# Patient Record
Sex: MALE | Race: WHITE | Employment: OTHER | URBAN - METROPOLITAN AREA
[De-identification: names, ages, dates, MRNs, and addresses within clinical notes are randomized per-mention and may not be internally consistent; named-entity substitution may affect disease eponyms.]

---

## 2018-09-07 ENCOUNTER — HOSPITAL ENCOUNTER (OUTPATIENT)
Dept: MRI IMAGING | Age: 73
Discharge: HOME | End: 2018-09-07
Attending: FAMILY MEDICINE
Payer: MEDICARE

## 2018-09-07 VITALS — WEIGHT: 250 LBS | BODY MASS INDEX: 33.91 KG/M2

## 2018-09-07 DIAGNOSIS — R89.1 ABNORMAL LEVEL OF HORMONES IN SPECIMENS FROM OTH ORG/TISS: ICD-10-CM

## 2018-09-07 PROCEDURE — 74011250636 HC RX REV CODE- 250/636: Performed by: RADIOLOGY

## 2018-09-07 PROCEDURE — 70553 MRI BRAIN STEM W/O & W/DYE: CPT

## 2018-09-07 PROCEDURE — A9585 GADOBUTROL INJECTION: HCPCS | Performed by: RADIOLOGY

## 2018-09-07 RX ADMIN — GADOBUTROL 11.5 ML: 604.72 INJECTION INTRAVENOUS at 10:06

## 2018-11-19 ENCOUNTER — OFFICE VISIT (OUTPATIENT)
Dept: SURGERY | Age: 73
End: 2018-11-19

## 2018-11-19 VITALS
SYSTOLIC BLOOD PRESSURE: 120 MMHG | HEIGHT: 72 IN | HEART RATE: 89 BPM | TEMPERATURE: 97.3 F | DIASTOLIC BLOOD PRESSURE: 80 MMHG | OXYGEN SATURATION: 94 % | BODY MASS INDEX: 34.4 KG/M2 | WEIGHT: 254 LBS | RESPIRATION RATE: 16 BRPM

## 2018-11-19 DIAGNOSIS — R59.0 POSTERIOR CERVICAL ADENOPATHY: Primary | ICD-10-CM

## 2018-11-19 RX ORDER — BUSPIRONE HYDROCHLORIDE 5 MG/1
5 TABLET ORAL DAILY
COMMUNITY
End: 2022-04-29

## 2018-11-19 RX ORDER — TAMSULOSIN HYDROCHLORIDE 0.4 MG/1
0.4 CAPSULE ORAL
Status: ON HOLD | COMMUNITY
End: 2022-02-06

## 2018-11-19 NOTE — PROGRESS NOTES
Surgery History and Physical    Subjective:      Jose Gottlieb  is a 68 y.o.  male who presents for evaluation of enlarged lymph nodes in right posterior cervical region. Pt denies cold symptoms. He also denies unusual recent allergies. Past Medical History:   Diagnosis Date    Arthritis     Cancer ([facility] [address]     scc top of head and nose    Hypertension     Other unknown and unspecified cause of morbidity or mortality     history of pulmonary sarcoid     Other unknown and unspecified cause of morbidity or mortality     depression, anxiety    Pulmonary sarcoidosis ([facility] [address]     Unspecified sleep apnea     cpap       Past Surgical History:   Procedure Laterality Date    ABDOMEN SURGERY PROC UNLISTED Bilateral     inguinal hernia repair    HX CATARACT REMOVAL      HX ORTHOPAEDIC Left     TKR    HX OTHER SURGICAL      scc removed top of head and nose    HX OTHER SURGICAL      benign lump removed from thyroid       Social History     Tobacco Use    Smoking status: Former Smoker     Years: 2.00     Last attempt to quit: 1965     Years since quittin.2    Smokeless tobacco: Never Used   Substance Use Topics    Alcohol use: Yes     Alcohol/week: 6.0 oz     Types: 10 Glasses of wine per week     Comment: 2 glassses of wine per day       Family History   Problem Relation Age of Onset    Cancer Mother         breast with mets    Heart Disease Father     Cancer Sister         breast    Anesth Problems Neg Hx        Current Outpatient Medications on File Prior to Visit   Medication Sig Dispense Refill    tamsulosin (FLOMAX) 0.4 mg capsule tamsulosin 0.4 mg capsule      busPIRone (BUSPAR) 5 mg tablet buspirone 5 mg tablet      lisinopril (PRINIVIL, ZESTRIL) 10 mg tablet Take 10 mg by mouth two (2) times a day.  simvastatin (ZOCOR) 20 mg tablet Take 20 mg by mouth daily.  escitalopram oxalate (LEXAPRO) 10 mg tablet Take 10 mg by mouth daily.       zolpidem (AMBIEN) 5 mg tablet Take 2.5 mg by mouth nightly as needed for Sleep.  naproxen sodium (ALEVE) 220 mg cap Take 2 Tabs by mouth daily.  multivitamin (ONE A DAY) tablet Take 1 Tab by mouth daily. No current facility-administered medications on file prior to visit. Allergies   Allergen Reactions    Pcn [Penicillins] Hives         Review of Systems:    Pertinent items are noted in the History of Present Illness. Objective:     Visit Vitals  /80 (BP 1 Location: Left arm, BP Patient Position: Sitting)   Pulse 89   Temp 97.3 °F (36.3 °C) (Oral)   Resp 16   Ht 6' (1.829 m)   Wt 254 lb (115.2 kg)   SpO2 94%   BMI 34.45 kg/m²        Physical Exam:  LYMPHATIC: 1cm right upper posterior cervical lymph node that was soft and nontender. Labs: No results found for this or any previous visit (from the past 24 hour(s)). Assessment and Plan: This is clearly a benign lymph node which might possibly be related to his sarcoidosis. In any event, it can be ignored. This document was scribed by Rodrigo Brochure as dictated by Dr. Vargas Juares.        Signed By: Bryan Soliman MD     November 19, 2018

## 2018-11-19 NOTE — PROGRESS NOTES
1. Have you been to the ER, urgent care clinic since your last visit? Hospitalized since your last visit? PCP-Dr. Angela Cardoza    2. Have you seen or consulted any other health care providers outside of the [address] [place] [address] since your last visit? Include any pap smears or colon screening.  No

## 2021-01-28 ENCOUNTER — HOSPITAL ENCOUNTER (OUTPATIENT)
Dept: PREADMISSION TESTING | Age: 76
Discharge: HOME | End: 2021-01-28
Payer: MEDICARE

## 2021-01-28 ENCOUNTER — TRANSCRIBE ORDER (OUTPATIENT)
Dept: REGISTRATION | Age: 76
End: 2021-01-28

## 2021-01-28 VITALS
HEART RATE: 95 BPM | WEIGHT: 248.46 LBS | SYSTOLIC BLOOD PRESSURE: 126 MMHG | BODY MASS INDEX: 34.78 KG/M2 | TEMPERATURE: 97.8 F | HEIGHT: 71 IN | OXYGEN SATURATION: 96 % | DIASTOLIC BLOOD PRESSURE: 69 MMHG

## 2021-01-28 DIAGNOSIS — Z01.812 PRE-PROCEDURE LAB EXAM: Primary | ICD-10-CM

## 2021-01-28 LAB
ALBUMIN SERPL-MCNC: 3.7 G/DL (ref 3.5–5)
ALBUMIN/GLOB SERPL: 1.1 {RATIO} (ref 1.1–2.2)
ALP SERPL-CCNC: 47 U/L (ref 45–117)
ALT SERPL-CCNC: 48 U/L (ref 12–78)
ANION GAP SERPL CALC-SCNC: 7 MMOL/L (ref 5–15)
APPEARANCE UR: ABNORMAL
AST SERPL-CCNC: 27 U/L (ref 15–37)
BACTERIA URNS QL MICRO: NEGATIVE /HPF
BASOPHILS # BLD: 0 K/UL (ref 0–0.1)
BASOPHILS NFR BLD: 1 % (ref 0–1)
BILIRUB SERPL-MCNC: 0.5 MG/DL (ref 0.2–1)
BILIRUB UR QL: NEGATIVE
BUN SERPL-MCNC: 24 MG/DL (ref 6–20)
BUN/CREAT SERPL: 21 (ref 12–20)
CALCIUM SERPL-MCNC: 9.2 MG/DL (ref 8.5–10.1)
CAOX CRY URNS QL MICRO: ABNORMAL
CHLORIDE SERPL-SCNC: 110 MMOL/L (ref 97–108)
CO2 SERPL-SCNC: 25 MMOL/L (ref 21–32)
COLOR UR: ABNORMAL
CREAT SERPL-MCNC: 1.16 MG/DL (ref 0.7–1.3)
DIFFERENTIAL METHOD BLD: ABNORMAL
EOSINOPHIL # BLD: 0.2 K/UL (ref 0–0.4)
EOSINOPHIL NFR BLD: 4 % (ref 0–7)
EPITH CASTS URNS QL MICRO: ABNORMAL /LPF
ERYTHROCYTE [DISTWIDTH] IN BLOOD BY AUTOMATED COUNT: 13.2 % (ref 11.5–14.5)
GLOBULIN SER CALC-MCNC: 3.3 G/DL (ref 2–4)
GLUCOSE SERPL-MCNC: 104 MG/DL (ref 65–100)
GLUCOSE UR STRIP.AUTO-MCNC: NEGATIVE MG/DL
HCT VFR BLD AUTO: 41.8 % (ref 36.6–50.3)
HGB BLD-MCNC: 13.9 G/DL (ref 12.1–17)
HGB UR QL STRIP: ABNORMAL
IMM GRANULOCYTES # BLD AUTO: 0 K/UL (ref 0–0.04)
IMM GRANULOCYTES NFR BLD AUTO: 1 % (ref 0–0.5)
KETONES UR QL STRIP.AUTO: NEGATIVE MG/DL
LEUKOCYTE ESTERASE UR QL STRIP.AUTO: ABNORMAL
LYMPHOCYTES # BLD: 0.5 K/UL (ref 0.8–3.5)
LYMPHOCYTES NFR BLD: 11 % (ref 12–49)
MCH RBC QN AUTO: 33.4 PG (ref 26–34)
MCHC RBC AUTO-ENTMCNC: 33.3 G/DL (ref 30–36.5)
MCV RBC AUTO: 100.5 FL (ref 80–99)
MONOCYTES # BLD: 0.6 K/UL (ref 0–1)
MONOCYTES NFR BLD: 15 % (ref 5–13)
NEUTS SEG # BLD: 2.8 K/UL (ref 1.8–8)
NEUTS SEG NFR BLD: 68 % (ref 32–75)
NITRITE UR QL STRIP.AUTO: NEGATIVE
NRBC # BLD: 0 K/UL (ref 0–0.01)
NRBC BLD-RTO: 0 PER 100 WBC
PH UR STRIP: 5 [PH] (ref 5–8)
PLATELET # BLD AUTO: 170 K/UL (ref 150–400)
PMV BLD AUTO: 9.7 FL (ref 8.9–12.9)
POTASSIUM SERPL-SCNC: 4.1 MMOL/L (ref 3.5–5.1)
PROT SERPL-MCNC: 7 G/DL (ref 6.4–8.2)
PROT UR STRIP-MCNC: ABNORMAL MG/DL
RBC # BLD AUTO: 4.16 M/UL (ref 4.1–5.7)
RBC #/AREA URNS HPF: ABNORMAL /HPF (ref 0–5)
RBC MORPH BLD: ABNORMAL
SODIUM SERPL-SCNC: 142 MMOL/L (ref 136–145)
SP GR UR REFRACTOMETRY: 1.02 (ref 1–1.03)
UA: UC IF INDICATED,UAUC: ABNORMAL
UROBILINOGEN UR QL STRIP.AUTO: 0.2 EU/DL (ref 0.2–1)
WBC # BLD AUTO: 4.1 K/UL (ref 4.1–11.1)
WBC URNS QL MICRO: ABNORMAL /HPF (ref 0–4)

## 2021-01-28 PROCEDURE — 87086 URINE CULTURE/COLONY COUNT: CPT

## 2021-01-28 PROCEDURE — 93005 ELECTROCARDIOGRAM TRACING: CPT

## 2021-01-28 PROCEDURE — 36415 COLL VENOUS BLD VENIPUNCTURE: CPT

## 2021-01-28 PROCEDURE — 85025 COMPLETE CBC W/AUTO DIFF WBC: CPT

## 2021-01-28 PROCEDURE — 80053 COMPREHEN METABOLIC PANEL: CPT

## 2021-01-28 PROCEDURE — 81001 URINALYSIS AUTO W/SCOPE: CPT

## 2021-01-28 RX ORDER — ACETAMINOPHEN 500 MG
1000 TABLET ORAL
COMMUNITY

## 2021-01-28 NOTE — PERIOP NOTES
PAT instructions reviewed with patient and given the opportunity to ask questions. Patient given surgical site information FAQs handout and reviewed. Patient given two bottles CHG soap and instruction sheet, instructions for use reviewed with patient. Patient made aware of COVID 19 testing to be done 96  hours prior to surgery. Patient instructed to self quarantine between testing and arrival time day of surgery. Patient instructed re: check-in procedure for day of surgery. Pulmonary notes CXR 1/11/21 received - Dr. Kyra Montano. Cardiology notes received - Dr. Christopher Salas. Left message for nurse [facility] re: patient states CXR done 1/11/21 does not want to repeat today, is this acceptable for surgery 2/10/21.

## 2021-01-29 LAB
ATRIAL RATE: 79 BPM
BACTERIA SPEC CULT: NORMAL
CALCULATED P AXIS, ECG09: 29 DEGREES
CALCULATED R AXIS, ECG10: -31 DEGREES
CALCULATED T AXIS, ECG11: 102 DEGREES
DIAGNOSIS, 93000: NORMAL
P-R INTERVAL, ECG05: 206 MS
Q-T INTERVAL, ECG07: 428 MS
QRS DURATION, ECG06: 140 MS
QTC CALCULATION (BEZET), ECG08: 490 MS
SERVICE CMNT-IMP: NORMAL
VENTRICULAR RATE, ECG03: 79 BPM

## 2021-02-06 ENCOUNTER — HOSPITAL ENCOUNTER (OUTPATIENT)
Dept: PREADMISSION TESTING | Age: 76
Discharge: HOME | End: 2021-02-06
Payer: MEDICARE

## 2021-02-06 DIAGNOSIS — Z01.812 PRE-PROCEDURE LAB EXAM: ICD-10-CM

## 2021-02-06 PROCEDURE — U0003 INFECTIOUS AGENT DETECTION BY NUCLEIC ACID (DNA OR RNA); SEVERE ACUTE RESPIRATORY SYNDROME CORONAVIRUS 2 (SARS-COV-2) (CORONAVIRUS DISEASE [COVID-19]), AMPLIFIED PROBE TECHNIQUE, MAKING USE OF HIGH THROUGHPUT TECHNOLOGIES AS DESCRIBED BY CMS-2020-01-R: HCPCS

## 2021-02-07 LAB — SARS-COV-2, COV2NT: NOT DETECTED

## 2021-02-10 ENCOUNTER — HOSPITAL ENCOUNTER (OUTPATIENT)
Age: 76
Setting detail: OBSERVATION
Discharge: HOME | End: 2021-02-11
Attending: UROLOGY | Admitting: UROLOGY
Payer: MEDICARE

## 2021-02-10 ENCOUNTER — ANESTHESIA (OUTPATIENT)
Dept: SURGERY | Age: 76
End: 2021-02-10
Payer: MEDICARE

## 2021-02-10 ENCOUNTER — ANESTHESIA EVENT (OUTPATIENT)
Dept: SURGERY | Age: 76
End: 2021-02-10
Payer: MEDICARE

## 2021-02-10 PROBLEM — Z98.890 STATUS POST SURGERY: Status: ACTIVE | Noted: 2021-02-10

## 2021-02-10 PROCEDURE — 74011000258 HC RX REV CODE- 258: Performed by: UROLOGY

## 2021-02-10 PROCEDURE — 77030013079 HC BLNKT BAIR HGGR 3M -A: Performed by: ANESTHESIOLOGY

## 2021-02-10 PROCEDURE — 76010000149 HC OR TIME 1 TO 1.5 HR: Performed by: UROLOGY

## 2021-02-10 PROCEDURE — 88305 TISSUE EXAM BY PATHOLOGIST: CPT

## 2021-02-10 PROCEDURE — 74011250636 HC RX REV CODE- 250/636: Performed by: NURSE ANESTHETIST, CERTIFIED REGISTERED

## 2021-02-10 PROCEDURE — 99218 HC RM OBSERVATION: CPT

## 2021-02-10 PROCEDURE — 74011250637 HC RX REV CODE- 250/637: Performed by: UROLOGY

## 2021-02-10 PROCEDURE — 74011250636 HC RX REV CODE- 250/636: Performed by: ANESTHESIOLOGY

## 2021-02-10 PROCEDURE — 88307 TISSUE EXAM BY PATHOLOGIST: CPT

## 2021-02-10 PROCEDURE — 77030021707 HC SET IRR FLD WRMR 3M -B: Performed by: UROLOGY

## 2021-02-10 PROCEDURE — 76060000033 HC ANESTHESIA 1 TO 1.5 HR: Performed by: UROLOGY

## 2021-02-10 PROCEDURE — 77010033678 HC OXYGEN DAILY

## 2021-02-10 PROCEDURE — 94760 N-INVAS EAR/PLS OXIMETRY 1: CPT

## 2021-02-10 PROCEDURE — 74011000250 HC RX REV CODE- 250: Performed by: NURSE ANESTHETIST, CERTIFIED REGISTERED

## 2021-02-10 PROCEDURE — 94762 N-INVAS EAR/PLS OXIMTRY CONT: CPT

## 2021-02-10 PROCEDURE — 77030010509 HC AIRWY LMA MSK TELE -A: Performed by: ANESTHESIOLOGY

## 2021-02-10 PROCEDURE — 77030040361 HC SLV COMPR DVT MDII -B: Performed by: UROLOGY

## 2021-02-10 PROCEDURE — 76210000001 HC OR PH I REC 2.5 TO 3 HR: Performed by: UROLOGY

## 2021-02-10 PROCEDURE — 2709999900 HC NON-CHARGEABLE SUPPLY: Performed by: UROLOGY

## 2021-02-10 RX ORDER — ROCURONIUM BROMIDE 10 MG/ML
INJECTION, SOLUTION INTRAVENOUS AS NEEDED
Status: DISCONTINUED | OUTPATIENT
Start: 2021-02-10 | End: 2021-02-10 | Stop reason: HOSPADM

## 2021-02-10 RX ORDER — CELECOXIB 100 MG/1
100 CAPSULE ORAL
Status: DISCONTINUED | OUTPATIENT
Start: 2021-02-10 | End: 2021-02-11 | Stop reason: HOSPADM

## 2021-02-10 RX ORDER — SODIUM CHLORIDE, SODIUM LACTATE, POTASSIUM CHLORIDE, CALCIUM CHLORIDE 600; 310; 30; 20 MG/100ML; MG/100ML; MG/100ML; MG/100ML
75 INJECTION, SOLUTION INTRAVENOUS CONTINUOUS
Status: DISCONTINUED | OUTPATIENT
Start: 2021-02-10 | End: 2021-02-10 | Stop reason: HOSPADM

## 2021-02-10 RX ORDER — HYDROMORPHONE HYDROCHLORIDE 1 MG/ML
0.2 INJECTION, SOLUTION INTRAMUSCULAR; INTRAVENOUS; SUBCUTANEOUS
Status: DISCONTINUED | OUTPATIENT
Start: 2021-02-10 | End: 2021-02-10 | Stop reason: HOSPADM

## 2021-02-10 RX ORDER — MIDAZOLAM HYDROCHLORIDE 1 MG/ML
0.5 INJECTION, SOLUTION INTRAMUSCULAR; INTRAVENOUS
Status: COMPLETED | OUTPATIENT
Start: 2021-02-10 | End: 2021-02-10

## 2021-02-10 RX ORDER — SODIUM CHLORIDE 9 MG/ML
50 INJECTION, SOLUTION INTRAVENOUS CONTINUOUS
Status: DISCONTINUED | OUTPATIENT
Start: 2021-02-10 | End: 2021-02-10 | Stop reason: HOSPADM

## 2021-02-10 RX ORDER — ONDANSETRON 2 MG/ML
INJECTION INTRAMUSCULAR; INTRAVENOUS AS NEEDED
Status: DISCONTINUED | OUTPATIENT
Start: 2021-02-10 | End: 2021-02-10 | Stop reason: HOSPADM

## 2021-02-10 RX ORDER — PHENYLEPHRINE HCL IN 0.9% NACL 0.4MG/10ML
SYRINGE (ML) INTRAVENOUS AS NEEDED
Status: DISCONTINUED | OUTPATIENT
Start: 2021-02-10 | End: 2021-02-10 | Stop reason: HOSPADM

## 2021-02-10 RX ORDER — MIDAZOLAM HYDROCHLORIDE 1 MG/ML
1 INJECTION, SOLUTION INTRAMUSCULAR; INTRAVENOUS AS NEEDED
Status: DISCONTINUED | OUTPATIENT
Start: 2021-02-10 | End: 2021-02-10 | Stop reason: HOSPADM

## 2021-02-10 RX ORDER — MORPHINE SULFATE 10 MG/ML
2 INJECTION, SOLUTION INTRAMUSCULAR; INTRAVENOUS
Status: DISCONTINUED | OUTPATIENT
Start: 2021-02-10 | End: 2021-02-10 | Stop reason: HOSPADM

## 2021-02-10 RX ORDER — FENTANYL CITRATE 50 UG/ML
50 INJECTION, SOLUTION INTRAMUSCULAR; INTRAVENOUS AS NEEDED
Status: DISCONTINUED | OUTPATIENT
Start: 2021-02-10 | End: 2021-02-10 | Stop reason: HOSPADM

## 2021-02-10 RX ORDER — ZOLPIDEM TARTRATE 5 MG/1
2.5 TABLET ORAL
Status: DISCONTINUED | OUTPATIENT
Start: 2021-02-10 | End: 2021-02-11 | Stop reason: HOSPADM

## 2021-02-10 RX ORDER — LEVOFLOXACIN 5 MG/ML
INJECTION, SOLUTION INTRAVENOUS AS NEEDED
Status: DISCONTINUED | OUTPATIENT
Start: 2021-02-10 | End: 2021-02-10 | Stop reason: HOSPADM

## 2021-02-10 RX ORDER — DEXMEDETOMIDINE HYDROCHLORIDE 100 UG/ML
INJECTION, SOLUTION INTRAVENOUS AS NEEDED
Status: DISCONTINUED | OUTPATIENT
Start: 2021-02-10 | End: 2021-02-10 | Stop reason: HOSPADM

## 2021-02-10 RX ORDER — LISINOPRIL 10 MG/1
10 TABLET ORAL 2 TIMES DAILY
Status: DISCONTINUED | OUTPATIENT
Start: 2021-02-10 | End: 2021-02-11 | Stop reason: HOSPADM

## 2021-02-10 RX ORDER — BUSPIRONE HYDROCHLORIDE 5 MG/1
5 TABLET ORAL DAILY
Status: DISCONTINUED | OUTPATIENT
Start: 2021-02-11 | End: 2021-02-11 | Stop reason: HOSPADM

## 2021-02-10 RX ORDER — FENTANYL CITRATE 50 UG/ML
25 INJECTION, SOLUTION INTRAMUSCULAR; INTRAVENOUS
Status: COMPLETED | OUTPATIENT
Start: 2021-02-10 | End: 2021-02-10

## 2021-02-10 RX ORDER — LIDOCAINE HYDROCHLORIDE 10 MG/ML
0.1 INJECTION, SOLUTION EPIDURAL; INFILTRATION; INTRACAUDAL; PERINEURAL AS NEEDED
Status: DISCONTINUED | OUTPATIENT
Start: 2021-02-10 | End: 2021-02-10 | Stop reason: HOSPADM

## 2021-02-10 RX ORDER — SODIUM CHLORIDE 0.9 % (FLUSH) 0.9 %
5-40 SYRINGE (ML) INJECTION EVERY 8 HOURS
Status: DISCONTINUED | OUTPATIENT
Start: 2021-02-10 | End: 2021-02-10 | Stop reason: HOSPADM

## 2021-02-10 RX ORDER — SODIUM CHLORIDE 0.9 % (FLUSH) 0.9 %
5-40 SYRINGE (ML) INJECTION EVERY 8 HOURS
Status: DISCONTINUED | OUTPATIENT
Start: 2021-02-10 | End: 2021-02-11 | Stop reason: HOSPADM

## 2021-02-10 RX ORDER — NALOXONE HYDROCHLORIDE 0.4 MG/ML
0.4 INJECTION, SOLUTION INTRAMUSCULAR; INTRAVENOUS; SUBCUTANEOUS AS NEEDED
Status: DISCONTINUED | OUTPATIENT
Start: 2021-02-10 | End: 2021-02-11 | Stop reason: HOSPADM

## 2021-02-10 RX ORDER — ESCITALOPRAM OXALATE 10 MG/1
10 TABLET ORAL DAILY
Status: DISCONTINUED | OUTPATIENT
Start: 2021-02-11 | End: 2021-02-11 | Stop reason: HOSPADM

## 2021-02-10 RX ORDER — DIPHENHYDRAMINE HYDROCHLORIDE 50 MG/ML
12.5 INJECTION, SOLUTION INTRAMUSCULAR; INTRAVENOUS AS NEEDED
Status: DISCONTINUED | OUTPATIENT
Start: 2021-02-10 | End: 2021-02-10 | Stop reason: HOSPADM

## 2021-02-10 RX ORDER — OXYCODONE AND ACETAMINOPHEN 5; 325 MG/1; MG/1
1 TABLET ORAL AS NEEDED
Status: DISCONTINUED | OUTPATIENT
Start: 2021-02-10 | End: 2021-02-10 | Stop reason: HOSPADM

## 2021-02-10 RX ORDER — SODIUM CHLORIDE 0.9 % (FLUSH) 0.9 %
5-40 SYRINGE (ML) INJECTION AS NEEDED
Status: DISCONTINUED | OUTPATIENT
Start: 2021-02-10 | End: 2021-02-11 | Stop reason: HOSPADM

## 2021-02-10 RX ORDER — ONDANSETRON 2 MG/ML
4 INJECTION INTRAMUSCULAR; INTRAVENOUS AS NEEDED
Status: DISCONTINUED | OUTPATIENT
Start: 2021-02-10 | End: 2021-02-10 | Stop reason: HOSPADM

## 2021-02-10 RX ORDER — EPHEDRINE SULFATE/0.9% NACL/PF 50 MG/5 ML
SYRINGE (ML) INTRAVENOUS AS NEEDED
Status: DISCONTINUED | OUTPATIENT
Start: 2021-02-10 | End: 2021-02-10 | Stop reason: HOSPADM

## 2021-02-10 RX ORDER — PROPOFOL 10 MG/ML
INJECTION, EMULSION INTRAVENOUS AS NEEDED
Status: DISCONTINUED | OUTPATIENT
Start: 2021-02-10 | End: 2021-02-10 | Stop reason: HOSPADM

## 2021-02-10 RX ORDER — ACETAMINOPHEN 325 MG/1
650 TABLET ORAL
Status: DISCONTINUED | OUTPATIENT
Start: 2021-02-10 | End: 2021-02-11 | Stop reason: HOSPADM

## 2021-02-10 RX ORDER — SODIUM CHLORIDE 0.9 % (FLUSH) 0.9 %
5-40 SYRINGE (ML) INJECTION AS NEEDED
Status: DISCONTINUED | OUTPATIENT
Start: 2021-02-10 | End: 2021-02-10 | Stop reason: HOSPADM

## 2021-02-10 RX ORDER — FENTANYL CITRATE 50 UG/ML
INJECTION, SOLUTION INTRAMUSCULAR; INTRAVENOUS AS NEEDED
Status: DISCONTINUED | OUTPATIENT
Start: 2021-02-10 | End: 2021-02-10 | Stop reason: HOSPADM

## 2021-02-10 RX ORDER — MIDAZOLAM HYDROCHLORIDE 1 MG/ML
INJECTION, SOLUTION INTRAMUSCULAR; INTRAVENOUS AS NEEDED
Status: DISCONTINUED | OUTPATIENT
Start: 2021-02-10 | End: 2021-02-10 | Stop reason: HOSPADM

## 2021-02-10 RX ORDER — DEXAMETHASONE SODIUM PHOSPHATE 4 MG/ML
INJECTION, SOLUTION INTRA-ARTICULAR; INTRALESIONAL; INTRAMUSCULAR; INTRAVENOUS; SOFT TISSUE AS NEEDED
Status: DISCONTINUED | OUTPATIENT
Start: 2021-02-10 | End: 2021-02-10 | Stop reason: HOSPADM

## 2021-02-10 RX ORDER — LIDOCAINE HYDROCHLORIDE 20 MG/ML
INJECTION, SOLUTION EPIDURAL; INFILTRATION; INTRACAUDAL; PERINEURAL AS NEEDED
Status: DISCONTINUED | OUTPATIENT
Start: 2021-02-10 | End: 2021-02-10 | Stop reason: HOSPADM

## 2021-02-10 RX ORDER — TAMSULOSIN HYDROCHLORIDE 0.4 MG/1
0.4 CAPSULE ORAL
Status: DISCONTINUED | OUTPATIENT
Start: 2021-02-10 | End: 2021-02-11 | Stop reason: HOSPADM

## 2021-02-10 RX ORDER — DEXTROSE MONOHYDRATE AND SODIUM CHLORIDE 5; .45 G/100ML; G/100ML
75 INJECTION, SOLUTION INTRAVENOUS CONTINUOUS
Status: DISCONTINUED | OUTPATIENT
Start: 2021-02-10 | End: 2021-02-11 | Stop reason: HOSPADM

## 2021-02-10 RX ADMIN — ONDANSETRON HYDROCHLORIDE 4 MG: 2 INJECTION, SOLUTION INTRAMUSCULAR; INTRAVENOUS at 08:28

## 2021-02-10 RX ADMIN — FENTANYL CITRATE 25 MCG: 50 INJECTION, SOLUTION INTRAMUSCULAR; INTRAVENOUS at 09:58

## 2021-02-10 RX ADMIN — SUGAMMADEX 200 MG: 100 INJECTION, SOLUTION INTRAVENOUS at 09:15

## 2021-02-10 RX ADMIN — Medication 15 MG: at 08:45

## 2021-02-10 RX ADMIN — MIDAZOLAM 0.5 MG: 1 INJECTION INTRAMUSCULAR; INTRAVENOUS at 10:00

## 2021-02-10 RX ADMIN — DEXMEDETOMIDINE HYDROCHLORIDE 10 MCG: 100 INJECTION, SOLUTION, CONCENTRATE INTRAVENOUS at 09:20

## 2021-02-10 RX ADMIN — ROCURONIUM BROMIDE 10 MG: 10 SOLUTION INTRAVENOUS at 08:56

## 2021-02-10 RX ADMIN — ONDANSETRON 4 MG: 2 INJECTION INTRAMUSCULAR; INTRAVENOUS at 09:36

## 2021-02-10 RX ADMIN — PROPOFOL 100 MG: 10 INJECTION, EMULSION INTRAVENOUS at 08:21

## 2021-02-10 RX ADMIN — SODIUM CHLORIDE, POTASSIUM CHLORIDE, SODIUM LACTATE AND CALCIUM CHLORIDE 75 ML/HR: 600; 310; 30; 20 INJECTION, SOLUTION INTRAVENOUS at 08:05

## 2021-02-10 RX ADMIN — DEXTROSE MONOHYDRATE AND SODIUM CHLORIDE 75 ML/HR: 5; .45 INJECTION, SOLUTION INTRAVENOUS at 22:48

## 2021-02-10 RX ADMIN — MIDAZOLAM 2 MG: 1 INJECTION INTRAMUSCULAR; INTRAVENOUS at 08:14

## 2021-02-10 RX ADMIN — PROPOFOL 50 MG: 10 INJECTION, EMULSION INTRAVENOUS at 08:56

## 2021-02-10 RX ADMIN — Medication 80 MCG: at 08:37

## 2021-02-10 RX ADMIN — ZOLPIDEM TARTRATE 2.5 MG: 5 TABLET ORAL at 21:39

## 2021-02-10 RX ADMIN — PROPOFOL 50 MG: 10 INJECTION, EMULSION INTRAVENOUS at 08:50

## 2021-02-10 RX ADMIN — DEXTROSE MONOHYDRATE AND SODIUM CHLORIDE 75 ML/HR: 5; .45 INJECTION, SOLUTION INTRAVENOUS at 10:02

## 2021-02-10 RX ADMIN — MORPHINE SULFATE 2 MG: 10 INJECTION INTRAVENOUS at 10:30

## 2021-02-10 RX ADMIN — MORPHINE SULFATE 2 MG: 10 INJECTION INTRAVENOUS at 10:25

## 2021-02-10 RX ADMIN — Medication 80 MCG: at 08:32

## 2021-02-10 RX ADMIN — PROPOFOL 50 MG: 10 INJECTION, EMULSION INTRAVENOUS at 08:22

## 2021-02-10 RX ADMIN — FENTANYL CITRATE 25 MCG: 50 INJECTION, SOLUTION INTRAMUSCULAR; INTRAVENOUS at 08:27

## 2021-02-10 RX ADMIN — MIDAZOLAM 0.5 MG: 1 INJECTION INTRAMUSCULAR; INTRAVENOUS at 09:54

## 2021-02-10 RX ADMIN — MEPERIDINE HYDROCHLORIDE 12.5 MG: 25 INJECTION INTRAMUSCULAR; INTRAVENOUS; SUBCUTANEOUS at 10:48

## 2021-02-10 RX ADMIN — PROPOFOL 50 MG: 10 INJECTION, EMULSION INTRAVENOUS at 09:02

## 2021-02-10 RX ADMIN — LISINOPRIL 10 MG: 10 TABLET ORAL at 21:39

## 2021-02-10 RX ADMIN — MEPERIDINE HYDROCHLORIDE 12.5 MG: 25 INJECTION INTRAMUSCULAR; INTRAVENOUS; SUBCUTANEOUS at 10:13

## 2021-02-10 RX ADMIN — DEXAMETHASONE SODIUM PHOSPHATE 4 MG: 4 INJECTION, SOLUTION INTRAMUSCULAR; INTRAVENOUS at 08:28

## 2021-02-10 RX ADMIN — ROCURONIUM BROMIDE 20 MG: 10 SOLUTION INTRAVENOUS at 08:50

## 2021-02-10 RX ADMIN — FENTANYL CITRATE 25 MCG: 50 INJECTION, SOLUTION INTRAMUSCULAR; INTRAVENOUS at 08:21

## 2021-02-10 RX ADMIN — FENTANYL CITRATE 25 MCG: 50 INJECTION, SOLUTION INTRAMUSCULAR; INTRAVENOUS at 09:35

## 2021-02-10 RX ADMIN — PHENYLEPHRINE HYDROCHLORIDE 40 MCG/MIN: 10 INJECTION INTRAVENOUS at 08:37

## 2021-02-10 RX ADMIN — Medication 15 MG: at 08:35

## 2021-02-10 RX ADMIN — PROPOFOL 50 MG: 10 INJECTION, EMULSION INTRAVENOUS at 08:23

## 2021-02-10 RX ADMIN — MORPHINE SULFATE 2 MG: 10 INJECTION INTRAVENOUS at 11:15

## 2021-02-10 RX ADMIN — ROCURONIUM BROMIDE 10 MG: 10 SOLUTION INTRAVENOUS at 08:57

## 2021-02-10 RX ADMIN — DEXMEDETOMIDINE HYDROCHLORIDE 10 MCG: 100 INJECTION, SOLUTION, CONCENTRATE INTRAVENOUS at 09:01

## 2021-02-10 RX ADMIN — LIDOCAINE HYDROCHLORIDE 80 MG: 20 INJECTION, SOLUTION EPIDURAL; INFILTRATION; INTRACAUDAL; PERINEURAL at 08:21

## 2021-02-10 RX ADMIN — FENTANYL CITRATE 25 MCG: 50 INJECTION, SOLUTION INTRAMUSCULAR; INTRAVENOUS at 08:25

## 2021-02-10 RX ADMIN — MORPHINE SULFATE 2 MG: 10 INJECTION INTRAVENOUS at 11:05

## 2021-02-10 RX ADMIN — MORPHINE SULFATE 2 MG: 10 INJECTION INTRAVENOUS at 11:00

## 2021-02-10 RX ADMIN — MIDAZOLAM 0.5 MG: 1 INJECTION INTRAMUSCULAR; INTRAVENOUS at 09:49

## 2021-02-10 RX ADMIN — FENTANYL CITRATE 25 MCG: 50 INJECTION, SOLUTION INTRAMUSCULAR; INTRAVENOUS at 10:07

## 2021-02-10 RX ADMIN — FENTANYL CITRATE 25 MCG: 50 INJECTION, SOLUTION INTRAMUSCULAR; INTRAVENOUS at 09:40

## 2021-02-10 RX ADMIN — LEVOFLOXACIN 500 MG: 5 INJECTION, SOLUTION INTRAVENOUS at 08:30

## 2021-02-10 RX ADMIN — FENTANYL CITRATE 25 MCG: 50 INJECTION, SOLUTION INTRAMUSCULAR; INTRAVENOUS at 08:29

## 2021-02-10 RX ADMIN — MIDAZOLAM 0.5 MG: 1 INJECTION INTRAMUSCULAR; INTRAVENOUS at 09:44

## 2021-02-10 RX ADMIN — TAMSULOSIN HYDROCHLORIDE 0.4 MG: 0.4 CAPSULE ORAL at 21:39

## 2021-02-10 NOTE — OP NOTES
[facility]  OPERATIVE REPORT    Name:  Lisa Rocha  MR#:  [id number]  :  1945  ACCOUNT #:  [de-identified]  DATE OF SERVICE:  02/10/2021    PREOPERATIVE DIAGNOSIS:  Transitional cell carcinoma of the bladder. POSTOPERATIVE DIAGNOSIS:  Transitional cell carcinoma of the bladder. PROCEDURES PERFORMED:  Transurethral resection of medium bladder tumor 3 cm, biopsy of the prostatic urethra. SURGEON:  Viki Fernandez MD    ASSISTANT:  None. ANESTHESIA:  General.    COMPLICATIONS:  None. SPECIMENS REMOVED:  Bladder tumor labeled by site, prostatic urethral sample. IMPLANTS:  None. ESTIMATED BLOOD LOSS:  200 mL. PROCEDURE:  After anesthesia, the patient was prepped and draped in lithotomy. The 21 cystoscope was passed through the urethra. There was some papillary change involving the prostatic urethra at the level of the verumontanum, more prominent on the patient's left side. The bladder was then carefully examined with a 30-degree and a 70-degree lens. There was a reddened area with papillary change involving the dome of the bladder near the air bubble. There was a more pale, raised papillary lesion arising from the right lateral wall just inside the bladder neck at the 7 o'clock position. Lateral to the patient's right ureteral orifice, there was a large depression that could be termed as a large cellule and this area contained reddened, irregular mucosa with papillary change and this area was 3 cm. At the 4 o'clock position just lateral to the patient's median lobe, there was another papillary abnormality at the bladder neck at 4 o'clock on the left side. Initially using the rigid transurethral resection forceps, all the above areas were sampled and in some cases completely eradicated. Then, the resectoscope was inserted and the larger area involving the right floor of the bladder lateral to the right ureteral orifice was resected.   All these tissue samples were collected and sent separately by site. The median lobe was partially resected in conjunction with some further resection of the bladder neck. Hemostasis was obtained with electrocautery, but the prostatic urethra continued to be somewhat oozy and a 24-Kyrgyz 3-way Juarez was inserted and put to continuous irrigation. The patient was reacted from the anesthetic and transferred to the recovery in stable condition. A decision was made not to use gemcitabine, because of the vascularity of the resection site.         Adalberto Mishra MD      [email or web address]  D:  02/10/2021 9:35  T:  02/10/2021 14:48  JOB #:  [id number]  CC:  [facility]

## 2021-02-10 NOTE — ANESTHESIA PREPROCEDURE EVALUATION
Anesthetic History   No history of anesthetic complications            Review of Systems / Medical History  Patient summary reviewed, nursing notes reviewed and pertinent labs reviewed    Pulmonary        Sleep apnea: CPAP        Comments: Pulmonary sarcoidosis ([facility] [address] (D86.0)  Smoking Status: Former Smoker  Quit Smokin65     Neuro/Psych         Psychiatric history    Comments: Posterior cervical adenopathy, benign (R59.0) Cardiovascular    Hypertension        Dysrhythmias       Exercise tolerance: >4 METS  Comments: LBBB   GI/Hepatic/Renal  Within defined limits              Endo/Other        Arthritis and cancer     Other Findings              Physical Exam    Airway  Mallampati: III  TM Distance: > 6 cm  Neck ROM: normal range of motion   Mouth opening: Normal     Cardiovascular  Regular rate and rhythm,  S1 and S2 normal,  no murmur, click, rub, or gallop  Rhythm: regular  Rate: normal         Dental    Dentition: Caps/crowns  Comments: missing molar   Pulmonary  Breath sounds clear to auscultation               Abdominal  GI exam deferred       Other Findings            Anesthetic Plan    ASA: 3  Anesthesia type: general          Induction: Intravenous  Anesthetic plan and risks discussed with: Patient

## 2021-02-10 NOTE — PROGRESS NOTES
Admission Medication Reconciliation:    Attempted to reach patient by cell phone-no answer. Called to speak with patient in his room, RN states he is \"very sleepy\" following surgery and \"probably not up to much conversation right now. \"    +++++++++++++++++++++++++++++++++++++++++++++++++++++  Update:  Have called [facility] three times, put on hold each time for > 10 min. Please note that the following are supplements which wife states patient takes:    · B complex  · Testosterone support  · Male enhancement support  · Cogni-- support (for memory)  · Vit E    +++++++++++++++++++++++++++++++++++++++++++++++++++++++  In progress:    Unable to speak with patient face to face at this time due to general isolation precautions in the ED related to COVID-19 pandemic. Spoke with wife Dolphus Cockayne) by telephone @ [phone]. She has referred me to [facility] for to obtain current RX list, states that patient uses numerous supplements at home. Also stated that patient is a \"very heavy drinker, ingests 2-3 bottles of wine every night \"at a minimum. \"  Called and discussed with Urology as well as Alexandra Mitchell RN (for monitoring purposes). Last ingestion was last night. Will update PTA med list once the additional information is obtained. Thank you for allowing me to participate in the care of your patient. Timo Kothari PharmD, RN # [phone]       [facility] pharmacy benefit data reflects medications filled and processed through the patient's insurance, however   this data does NOT capture whether the medication was picked up or is currently being taken by the patient.     Allergies:  Pcn [penicillins]    Significant PMH/Disease States:   Past Medical History:   Diagnosis Date    Arrhythmia     LBBB    Arthritis     Autoimmune disease ([address]     PULMONARY SARCOIDOSIS    Cancer ([facility] [address]     scc top of head and nose    Cancer ([facility] [address] 2015    BLADDER    Hypertension     Other unknown and unspecified cause of morbidity or mortality history of pulmonary sarcoid     Other unknown and unspecified cause of morbidity or mortality     depression, anxiety    Posterior cervical adenopathy, benign 11/19/2018    Pulmonary sarcoidosis ([facility] [address]     Unspecified sleep apnea     cpap     Chief Complaint for this Admission:  No chief complaint on file. Prior to Admission Medications:   Prior to Admission Medications   Prescriptions Last Dose Informant Taking?   acetaminophen (Tylenol Extra Strength) 500 mg tablet 2/10/2021 at 0530  Yes   Sig: Take 1,000 mg by mouth every six (6) hours as needed for Pain. busPIRone (BUSPAR) 5 mg tablet 2/9/2021 at 0800  Yes   Sig: Take 5 mg by mouth daily. escitalopram oxalate (LEXAPRO) 10 mg tablet 2/9/2021 at 0800  Yes   Sig: Take 10 mg by mouth daily. lisinopril (PRINIVIL, ZESTRIL) 10 mg tablet 2/9/2021 at 2100  Yes   Sig: Take 10 mg by mouth two (2) times a day. multivitamin (ONE A DAY) tablet 2/3/2021 at Unknown time  Yes   Sig: Take 1 Tab by mouth daily. simvastatin (ZOCOR) 20 mg tablet 2/9/2021 at 2100  Yes   Sig: Take 20 mg by mouth nightly. tamsulosin (FLOMAX) 0.4 mg capsule 2/9/2021 at 2100  Yes   Sig: Take 0.4 mg by mouth nightly. zolpidem (AMBIEN) 5 mg tablet 2/9/2021 at 2100  Yes   Sig: Take 2.5 mg by mouth nightly as needed for Sleep. Facility-Administered Medications: None     Please contact the main inpatient pharmacy with any questions or concerns at [phone] and we will direct you to the clinical pharmacist covering this patient's care while in-house.    Elli Ruelas, [address]

## 2021-02-10 NOTE — PERIOP NOTES
TRANSFER - OUT REPORT:    Verbal report given to CIRA Aj(name) on Afia Colby  being transferred to 5E(unit) for routine post - op       Report consisted of patients Situation, Background, Assessment and   Recommendations(SBAR). Time Pre op antibiotic given:0830  Anesthesia Stop time: 7346  Juarez Present on Transfer to floor:YES  Order for Juarez on Chart:YES  Discharge Prescriptions with Chart:YES    Information from the following report(s) SBAR, Kardex, OR Summary, Procedure Summary, Intake/Output, MAR, Recent Results and Cardiac Rhythm SR was reviewed with the receiving nurse. Opportunity for questions and clarification was provided. Is the patient on 02? YES       L/Min 2       Other     Is the patient on a monitor? NO    Is the nurse transporting with the patient? NO    Surgical Waiting Area notified of patient's transfer from PACU? YES      The following personal items collected during your admission accompanied patient upon transfer:   Dental Appliance: Dental Appliances: Other (comment)(glasses and bag of clothes returned to pt in PACU)  Vision: Visual Aid: Glasses  Hearing Aid:    Jewelry: Jewelry: None  Clothing: Clothing: Other (comment)(clothes and shoes to pacu)  Other Valuables:  Other Valuables: Eyeglasses, Cell Phone(Glasses to pacu)  Valuables sent to safe:

## 2021-02-10 NOTE — PERIOP NOTES
Patient: Hanane Spangler MRN: [id number]  SSN: [id number]   YOB: 1945  Age: 77 y.o. Sex: male     Patient is status post Procedure(s):  TRANSURETHRAL RESECTION OF BLADDER TUMOR, MEDIUM 3CM.     Surgeon(s) and Role:     * Jocelyn Garcia MD - Primary    Local/Dose/Irrigation:  See [facility]                  Peripheral IV 02/10/21 Right Hand (Active)   Site Assessment Clean, dry, & intact 02/10/21 0803   Phlebitis Assessment 0 02/10/21 0803   Infiltration Assessment 0 02/10/21 0803   Dressing Status Clean, dry, & intact 02/10/21 0803   Dressing Type Transparent 02/10/21 0803   Hub Color/Line Status Pink 02/10/21 0803            Airway - Endotracheal Tube 02/10/21 (Active)                   Dressing/Packing:       Splint/Cast:  ]    Other:

## 2021-02-10 NOTE — ANESTHESIA POSTPROCEDURE EVALUATION
Procedure(s):  TRANSURETHRAL RESECTION OF BLADDER TUMOR, MEDIUM 3CM. general    Anesthesia Post Evaluation      Multimodal analgesia: multimodal analgesia used between 6 hours prior to anesthesia start to PACU discharge  Patient location during evaluation: PACU  Patient participation: complete - patient participated  Level of consciousness: awake and alert  Pain management: adequate  Airway patency: patent  Anesthetic complications: no  Cardiovascular status: acceptable  Respiratory status: acceptable  Hydration status: acceptable  Comments: Seen, no complaints   Post anesthesia nausea and vomiting:  none  Final Post Anesthesia Temperature Assessment:  Normothermia (36.0-37.5 degrees C)      INITIAL Post-op Vital signs:   Vitals Value Taken Time   /83 02/10/21 1015   Temp 36.2 °C (97.1 °F) 02/10/21 0928   Pulse 68 02/10/21 1015   Resp 18 02/10/21 1015   SpO2 94 % 02/10/21 1015   Vitals shown include unvalidated device data.

## 2021-02-10 NOTE — BRIEF OP NOTE
Brief Postoperative Note    Patient: Blair Mott  YOB: 1945  MRN: [id number]    Date of Procedure: 2/10/2021     Pre-Op Diagnosis: BLADDER CANCER    Post-Op Diagnosis: Same as preoperative diagnosis. Procedure(s):  TRANSURETHRAL RESECTION OF BLADDER TUMOR, MEDIUM 3CM    Surgeon(s):  Reyna Mortimer, MD    Surgical Assistant: None    Anesthesia: General     Estimated Blood Loss (mL): 230     Complications: None    Specimens:   ID Type Source Tests Collected by Time Destination   1 : right bladder neck 7 o'clock Fresh Bladder  Reyna Mortimer, MD 2/10/2021 5738 Pathology   2 : posterior dome Fresh Bladder  Reyna Mortimer, MD 2/10/2021 6755 Pathology   3 : right floor Fresh Bladder  Reyna Mortimer, MD 2/10/2021 0845 Pathology   4 : prostatic urethra Fresh Urethra  Reyna Mortimer, MD 2/10/2021 1131 Pathology   5 : bladder neck 4 o'clock Fresh Bladder  Reyna Mortimer, MD 2/10/2021 9472 Pathology   6 : transuretheral resection of right floor of bladder and median lobe Fresh Bladder  Reyna Mortimer, MD 2/10/2021 6468 Pathology        Implants: * No implants in log *    Drains: * No LDAs found *    Findings: multiple areas of likely recurrent tcc.  Largest 3 cm     Electronically Signed by Erin Juarez MD on 2/10/2021 at 9:30 AM

## 2021-02-11 VITALS
WEIGHT: 248 LBS | HEART RATE: 80 BPM | DIASTOLIC BLOOD PRESSURE: 74 MMHG | TEMPERATURE: 98.1 F | RESPIRATION RATE: 18 BRPM | OXYGEN SATURATION: 97 % | SYSTOLIC BLOOD PRESSURE: 115 MMHG | BODY MASS INDEX: 34.59 KG/M2

## 2021-02-11 LAB
ANION GAP SERPL CALC-SCNC: 7 MMOL/L (ref 5–15)
BUN SERPL-MCNC: 21 MG/DL (ref 6–20)
BUN/CREAT SERPL: 22 (ref 12–20)
CALCIUM SERPL-MCNC: 8.9 MG/DL (ref 8.5–10.1)
CHLORIDE SERPL-SCNC: 108 MMOL/L (ref 97–108)
CO2 SERPL-SCNC: 25 MMOL/L (ref 21–32)
CREAT SERPL-MCNC: 0.94 MG/DL (ref 0.7–1.3)
GLUCOSE SERPL-MCNC: 118 MG/DL (ref 65–100)
HGB BLD-MCNC: 12.9 G/DL (ref 12.1–17)
POTASSIUM SERPL-SCNC: 3.7 MMOL/L (ref 3.5–5.1)
SODIUM SERPL-SCNC: 140 MMOL/L (ref 136–145)

## 2021-02-11 PROCEDURE — 80048 BASIC METABOLIC PNL TOTAL CA: CPT

## 2021-02-11 PROCEDURE — 36415 COLL VENOUS BLD VENIPUNCTURE: CPT

## 2021-02-11 PROCEDURE — 74011250637 HC RX REV CODE- 250/637: Performed by: UROLOGY

## 2021-02-11 PROCEDURE — 85018 HEMOGLOBIN: CPT

## 2021-02-11 PROCEDURE — 99218 HC RM OBSERVATION: CPT

## 2021-02-11 RX ADMIN — BUSPIRONE HYDROCHLORIDE 5 MG: 5 TABLET ORAL at 10:40

## 2021-02-11 RX ADMIN — LISINOPRIL 10 MG: 10 TABLET ORAL at 10:40

## 2021-02-11 RX ADMIN — ESCITALOPRAM OXALATE 10 MG: 10 TABLET ORAL at 10:40

## 2021-02-11 NOTE — PROGRESS NOTES
Progress Note    Patient: Ming Bullock MRN: [id number]  SSN: [id number]    YOB: 1945  Age: 77 y.o. Sex: male        ADMITTED:  2/10/2021 to Mercedes Gonzalez MD  for Status post surgery [Z98.890]         Ming Bullock is 1 Day Post-Op Procedure(s):  TRANSURETHRAL RESECTION OF BLADDER TUMOR, MEDIUM 3CM. Patient denies pain and resting comfortably. Abd soft. No s/s of infection noted  Slow CBI in place. Macario draining clear yellow UA in tubing and light pink UA in macario bag. No clots noted. +flatus and BM  Reports ambulation without difficulty  Mild nonproductive cough that he reports he gets in the AM at baseline    afvss  Hgb: 12.9  Cr: 0.94  Pathology pending      Vitals:  Temp (24hrs), Av.7 °F (36.5 °C), Min:97.3 °F (36.3 °C), Max:98.4 °F (36.9 °C)     Blood pressure 115/74, pulse 80, temperature 98.1 °F (36.7 °C), resp. rate 18, weight 112.5 kg (248 lb), SpO2 97 %. I&O's:   1901 -  0700  In: 33173 [P.O.:420; I.V.:950]  Out: 28106 [Urine:58584]   No intake/output data recorded. Labs:   Recent Labs     21  0248   HGB 12.9     Recent Labs     21  0248      K 3.7      CO2 25   *   BUN 21*   CREA 0.94   CA 8.9        Cultures:      Imaging:       Assessment:     - 1 Day Post-Op Procedure(s):  TRANSURETHRAL RESECTION OF BLADDER TUMOR, MEDIUM 3CM    Active Problems:    Status post surgery (2/10/2021)        Plan:     - anticipated discharge this morning  - discussed importance of leaving macario in place at discharge. patient refuses to be discharged with macario. Discussed with MD who reported risk of internal seepage.  Information relayed to patient who is aware of the importance to return to ED for clot retention or issues voiding.  - outpatient f/u scheduled 21 at 12 with Dr. Tracie Gitelman at the [facility] location    Signed By: Isabela Mckeon NP - 2021

## 2021-02-11 NOTE — DISCHARGE SUMMARY
Urology Discharge Summary    Patient: Ashish Castañeda MRN: [id number]  SSN: [id number]    YOB: 1945  Age: 77 y.o. Sex: male               ADMISSION:  to Arianne Burnett MD by Jewel Deal MD  2/10/2021 ADMISSION DIAGNOSIS: Status post surgery [Z98.890]  2021 DISCHARGE DIAGNOSIS: [x]    Same  PROCEDURES: POD# 1 Day Post-Op Procedure(s):  TRANSURETHRAL RESECTION OF BLADDER TUMOR, MEDIUM 3CM    RECENT LABS: Temp (24hrs), Av.8 °F (36.6 °C), Min:97.4 °F (36.3 °C), Max:98.4 °F (36.9 °C)    No results found for requested labs within last 720 hours. Recent Labs     21  0248   BUN 21*   CREA 0.94        HOSPITAL COURSE: [x]    Uncomplicated.      COMPLICATIONS: [x]    None identified  DISCHARGE TO:     [x]    Home  []    Rehab []    SNF  FOLLOWUP: 21 at 12 with Dr. Caterina Osborn with [facility] at the [facility] location    [x]    [employer] WITHOUT CHANGES WITH THE EXCEPTION OF:  []    NONE    [unfilled]      Veda Mak NP 2021 11:37 AM

## 2021-02-11 NOTE — PROGRESS NOTES
Observation notice provided in writing to patient and/or caregiver as well as verbal explanation of the policy. Patients who are in outpatient status also receive the Observation notice.       Renee TIM, ACM-SW

## 2021-02-11 NOTE — PROGRESS NOTES
Bedside shift change report given to Rosangela Granado RN (oncoming nurse) by Cassandra Humphreys RN (offgoing nurse). Report included the following information SBAR, Kardex, Intake/Output, MAR and Recent Results.

## 2021-06-17 ENCOUNTER — HOSPITAL ENCOUNTER (OUTPATIENT)
Dept: PREADMISSION TESTING | Age: 76
Discharge: HOME | End: 2021-06-17
Payer: MEDICARE

## 2021-06-17 VITALS
DIASTOLIC BLOOD PRESSURE: 87 MMHG | HEART RATE: 80 BPM | WEIGHT: 246.47 LBS | BODY MASS INDEX: 34.51 KG/M2 | RESPIRATION RATE: 20 BRPM | HEIGHT: 71 IN | TEMPERATURE: 98 F | SYSTOLIC BLOOD PRESSURE: 155 MMHG

## 2021-06-17 LAB
ALBUMIN SERPL-MCNC: 3.7 G/DL (ref 3.5–5)
ALBUMIN/GLOB SERPL: 1.1 {RATIO} (ref 1.1–2.2)
ALP SERPL-CCNC: 48 U/L (ref 45–117)
ALT SERPL-CCNC: 54 U/L (ref 12–78)
ANION GAP SERPL CALC-SCNC: 5 MMOL/L (ref 5–15)
APPEARANCE UR: CLEAR
AST SERPL-CCNC: 34 U/L (ref 15–37)
BACTERIA URNS QL MICRO: ABNORMAL /HPF
BASOPHILS # BLD: 0 K/UL (ref 0–0.1)
BASOPHILS NFR BLD: 1 % (ref 0–1)
BILIRUB SERPL-MCNC: 0.5 MG/DL (ref 0.2–1)
BILIRUB UR QL: NEGATIVE
BUN SERPL-MCNC: 22 MG/DL (ref 6–20)
BUN/CREAT SERPL: 21 (ref 12–20)
CALCIUM SERPL-MCNC: 9.5 MG/DL (ref 8.5–10.1)
CAOX CRY URNS QL MICRO: ABNORMAL
CHLORIDE SERPL-SCNC: 109 MMOL/L (ref 97–108)
CO2 SERPL-SCNC: 28 MMOL/L (ref 21–32)
COLOR UR: ABNORMAL
CREAT SERPL-MCNC: 1.03 MG/DL (ref 0.7–1.3)
DIFFERENTIAL METHOD BLD: ABNORMAL
EOSINOPHIL # BLD: 0.1 K/UL (ref 0–0.4)
EOSINOPHIL NFR BLD: 3 % (ref 0–7)
EPITH CASTS URNS QL MICRO: ABNORMAL /LPF
ERYTHROCYTE [DISTWIDTH] IN BLOOD BY AUTOMATED COUNT: 13.7 % (ref 11.5–14.5)
GLOBULIN SER CALC-MCNC: 3.3 G/DL (ref 2–4)
GLUCOSE SERPL-MCNC: 102 MG/DL (ref 65–100)
GLUCOSE UR STRIP.AUTO-MCNC: NEGATIVE MG/DL
HCT VFR BLD AUTO: 42.5 % (ref 36.6–50.3)
HGB BLD-MCNC: 14.1 G/DL (ref 12.1–17)
HGB UR QL STRIP: ABNORMAL
IMM GRANULOCYTES # BLD AUTO: 0 K/UL (ref 0–0.04)
IMM GRANULOCYTES NFR BLD AUTO: 0 % (ref 0–0.5)
KETONES UR QL STRIP.AUTO: NEGATIVE MG/DL
LEUKOCYTE ESTERASE UR QL STRIP.AUTO: ABNORMAL
LYMPHOCYTES # BLD: 0.4 K/UL (ref 0.8–3.5)
LYMPHOCYTES NFR BLD: 11 % (ref 12–49)
MCH RBC QN AUTO: 33 PG (ref 26–34)
MCHC RBC AUTO-ENTMCNC: 33.2 G/DL (ref 30–36.5)
MCV RBC AUTO: 99.5 FL (ref 80–99)
MONOCYTES # BLD: 0.6 K/UL (ref 0–1)
MONOCYTES NFR BLD: 17 % (ref 5–13)
NEUTS SEG # BLD: 2.4 K/UL (ref 1.8–8)
NEUTS SEG NFR BLD: 68 % (ref 32–75)
NITRITE UR QL STRIP.AUTO: NEGATIVE
NRBC # BLD: 0 K/UL (ref 0–0.01)
NRBC BLD-RTO: 0 PER 100 WBC
PH UR STRIP: 6.5 [PH] (ref 5–8)
PLATELET # BLD AUTO: 153 K/UL (ref 150–400)
PMV BLD AUTO: 9.8 FL (ref 8.9–12.9)
POTASSIUM SERPL-SCNC: 4.1 MMOL/L (ref 3.5–5.1)
PROT SERPL-MCNC: 7 G/DL (ref 6.4–8.2)
PROT UR STRIP-MCNC: ABNORMAL MG/DL
RBC # BLD AUTO: 4.27 M/UL (ref 4.1–5.7)
RBC #/AREA URNS HPF: ABNORMAL /HPF (ref 0–5)
RBC MORPH BLD: ABNORMAL
SODIUM SERPL-SCNC: 142 MMOL/L (ref 136–145)
SP GR UR REFRACTOMETRY: 1.02 (ref 1–1.03)
UA: UC IF INDICATED,UAUC: ABNORMAL
UROBILINOGEN UR QL STRIP.AUTO: 0.2 EU/DL (ref 0.2–1)
WBC # BLD AUTO: 3.5 K/UL (ref 4.1–11.1)
WBC URNS QL MICRO: ABNORMAL /HPF (ref 0–4)

## 2021-06-17 PROCEDURE — 87086 URINE CULTURE/COLONY COUNT: CPT

## 2021-06-17 PROCEDURE — 81001 URINALYSIS AUTO W/SCOPE: CPT

## 2021-06-17 PROCEDURE — 80053 COMPREHEN METABOLIC PANEL: CPT

## 2021-06-17 PROCEDURE — 85025 COMPLETE CBC W/AUTO DIFF WBC: CPT

## 2021-06-17 NOTE — PERIOP NOTES
Patient given surgical site infection FAQ handout and CHG soap Preop instructions reviewed and patient verbalizes understanding of instructions. Patient has been given the opportunity to ask additional questions. PT HAS COMPLETED COVID 19 VACCINE 2 WEEKS OR GREATER FROM SURGERY DATE AND DOES NOT REQUIRE COVID TESTING PER HOSPITAL POLICY. PT INSTRUCTED TO BRING PROOF OF VACCINE DOCUMENTATION ON DOS.     PATIENT RECEIVED MODERNA VACCINE  1-28-21 AND 2-25-21

## 2021-06-18 LAB
BACTERIA SPEC CULT: NORMAL
SERVICE CMNT-IMP: NORMAL

## 2021-06-18 NOTE — PERIOP NOTES
CALLED DR. SUAREZ'S OFFICE AND SPOKE TO BETTE ABOUT ABNORMAL LABS. WBC 3.5  SAID SHE WILL NOTIFY MD.

## 2021-06-23 ENCOUNTER — ANESTHESIA (OUTPATIENT)
Dept: SURGERY | Age: 76
End: 2021-06-23
Payer: MEDICARE

## 2021-06-23 ENCOUNTER — ANESTHESIA EVENT (OUTPATIENT)
Dept: SURGERY | Age: 76
End: 2021-06-23
Payer: MEDICARE

## 2021-06-23 ENCOUNTER — HOSPITAL ENCOUNTER (OUTPATIENT)
Age: 76
Setting detail: OBSERVATION
Discharge: HOME | End: 2021-06-24
Attending: UROLOGY | Admitting: UROLOGY
Payer: MEDICARE

## 2021-06-23 PROBLEM — C67.9 BLADDER CANCER (HCC): Status: ACTIVE | Noted: 2021-06-23

## 2021-06-23 LAB
ANION GAP SERPL CALC-SCNC: 3 MMOL/L (ref 5–15)
BUN SERPL-MCNC: 22 MG/DL (ref 6–20)
BUN/CREAT SERPL: 23 (ref 12–20)
CALCIUM SERPL-MCNC: 8.7 MG/DL (ref 8.5–10.1)
CHLORIDE SERPL-SCNC: 108 MMOL/L (ref 97–108)
CO2 SERPL-SCNC: 27 MMOL/L (ref 21–32)
CREAT SERPL-MCNC: 0.94 MG/DL (ref 0.7–1.3)
GLUCOSE SERPL-MCNC: 112 MG/DL (ref 65–100)
HCT VFR BLD AUTO: 41.4 % (ref 36.6–50.3)
HGB BLD-MCNC: 13.9 G/DL (ref 12.1–17)
POTASSIUM SERPL-SCNC: 4.1 MMOL/L (ref 3.5–5.1)
SODIUM SERPL-SCNC: 138 MMOL/L (ref 136–145)

## 2021-06-23 PROCEDURE — 74011250636 HC RX REV CODE- 250/636: Performed by: ANESTHESIOLOGY

## 2021-06-23 PROCEDURE — 2709999900 HC NON-CHARGEABLE SUPPLY: Performed by: UROLOGY

## 2021-06-23 PROCEDURE — 88342 IMHCHEM/IMCYTCHM 1ST ANTB: CPT

## 2021-06-23 PROCEDURE — 74011250636 HC RX REV CODE- 250/636: Performed by: UROLOGY

## 2021-06-23 PROCEDURE — 74011250637 HC RX REV CODE- 250/637: Performed by: ANESTHESIOLOGY

## 2021-06-23 PROCEDURE — 76010000153 HC OR TIME 1.5 TO 2 HR: Performed by: UROLOGY

## 2021-06-23 PROCEDURE — 88341 IMHCHEM/IMCYTCHM EA ADD ANTB: CPT

## 2021-06-23 PROCEDURE — 77030026438 HC STYL ET INTUB CARD -A: Performed by: NURSE ANESTHETIST, CERTIFIED REGISTERED

## 2021-06-23 PROCEDURE — 88307 TISSUE EXAM BY PATHOLOGIST: CPT

## 2021-06-23 PROCEDURE — 77030008684 HC TU ET CUF COVD -B: Performed by: NURSE ANESTHETIST, CERTIFIED REGISTERED

## 2021-06-23 PROCEDURE — 85018 HEMOGLOBIN: CPT

## 2021-06-23 PROCEDURE — 2709999900 HC NON-CHARGEABLE SUPPLY

## 2021-06-23 PROCEDURE — 99218 HC RM OBSERVATION: CPT

## 2021-06-23 PROCEDURE — 76210000016 HC OR PH I REC 1 TO 1.5 HR: Performed by: UROLOGY

## 2021-06-23 PROCEDURE — 77030021707 HC SET IRR FLD WRMR 3M -B: Performed by: UROLOGY

## 2021-06-23 PROCEDURE — 77030018831 HC SOL IRR H20 BAXT -A: Performed by: UROLOGY

## 2021-06-23 PROCEDURE — 76060000034 HC ANESTHESIA 1.5 TO 2 HR: Performed by: UROLOGY

## 2021-06-23 PROCEDURE — 74011000258 HC RX REV CODE- 258: Performed by: NURSE ANESTHETIST, CERTIFIED REGISTERED

## 2021-06-23 PROCEDURE — 77030012893

## 2021-06-23 PROCEDURE — 77030040922 HC BLNKT HYPOTHRM STRY -A

## 2021-06-23 PROCEDURE — 88305 TISSUE EXAM BY PATHOLOGIST: CPT

## 2021-06-23 PROCEDURE — 77030040361 HC SLV COMPR DVT MDII -B: Performed by: UROLOGY

## 2021-06-23 PROCEDURE — 80048 BASIC METABOLIC PNL TOTAL CA: CPT

## 2021-06-23 PROCEDURE — 74011000250 HC RX REV CODE- 250: Performed by: UROLOGY

## 2021-06-23 PROCEDURE — 74011000250 HC RX REV CODE- 250: Performed by: NURSE ANESTHETIST, CERTIFIED REGISTERED

## 2021-06-23 PROCEDURE — 77030040831 HC BAG URINE DRNG MDII -A: Performed by: UROLOGY

## 2021-06-23 PROCEDURE — 74011250637 HC RX REV CODE- 250/637: Performed by: UROLOGY

## 2021-06-23 PROCEDURE — 74011250636 HC RX REV CODE- 250/636: Performed by: NURSE ANESTHETIST, CERTIFIED REGISTERED

## 2021-06-23 PROCEDURE — 36415 COLL VENOUS BLD VENIPUNCTURE: CPT

## 2021-06-23 RX ORDER — SODIUM CHLORIDE, SODIUM LACTATE, POTASSIUM CHLORIDE, CALCIUM CHLORIDE 600; 310; 30; 20 MG/100ML; MG/100ML; MG/100ML; MG/100ML
1000 INJECTION, SOLUTION INTRAVENOUS CONTINUOUS
Status: DISCONTINUED | OUTPATIENT
Start: 2021-06-23 | End: 2021-06-23 | Stop reason: HOSPADM

## 2021-06-23 RX ORDER — MIDAZOLAM HYDROCHLORIDE 1 MG/ML
1 INJECTION, SOLUTION INTRAMUSCULAR; INTRAVENOUS AS NEEDED
Status: DISCONTINUED | OUTPATIENT
Start: 2021-06-23 | End: 2021-06-23 | Stop reason: HOSPADM

## 2021-06-23 RX ORDER — FENTANYL CITRATE 50 UG/ML
INJECTION, SOLUTION INTRAMUSCULAR; INTRAVENOUS AS NEEDED
Status: DISCONTINUED | OUTPATIENT
Start: 2021-06-23 | End: 2021-06-23 | Stop reason: HOSPADM

## 2021-06-23 RX ORDER — HYDROMORPHONE HYDROCHLORIDE 1 MG/ML
0.2 INJECTION, SOLUTION INTRAMUSCULAR; INTRAVENOUS; SUBCUTANEOUS
Status: ACTIVE | OUTPATIENT
Start: 2021-06-23 | End: 2021-06-23

## 2021-06-23 RX ORDER — MORPHINE SULFATE 2 MG/ML
2 INJECTION, SOLUTION INTRAMUSCULAR; INTRAVENOUS
Status: DISCONTINUED | OUTPATIENT
Start: 2021-06-23 | End: 2021-06-23 | Stop reason: HOSPADM

## 2021-06-23 RX ORDER — PHENYLEPHRINE HCL IN 0.9% NACL 0.4MG/10ML
SYRINGE (ML) INTRAVENOUS
Status: DISCONTINUED | OUTPATIENT
Start: 2021-06-23 | End: 2021-06-23 | Stop reason: HOSPADM

## 2021-06-23 RX ORDER — DEXAMETHASONE SODIUM PHOSPHATE 4 MG/ML
INJECTION, SOLUTION INTRA-ARTICULAR; INTRALESIONAL; INTRAMUSCULAR; INTRAVENOUS; SOFT TISSUE AS NEEDED
Status: DISCONTINUED | OUTPATIENT
Start: 2021-06-23 | End: 2021-06-23 | Stop reason: HOSPADM

## 2021-06-23 RX ORDER — SODIUM CHLORIDE 9 MG/ML
25 INJECTION, SOLUTION INTRAVENOUS CONTINUOUS
Status: DISCONTINUED | OUTPATIENT
Start: 2021-06-23 | End: 2021-06-23 | Stop reason: HOSPADM

## 2021-06-23 RX ORDER — ZOLPIDEM TARTRATE 10 MG/1
5 TABLET ORAL
Status: ON HOLD | COMMUNITY
End: 2021-12-20 | Stop reason: SDUPTHER

## 2021-06-23 RX ORDER — ACETAMINOPHEN 500 MG
1000 TABLET ORAL EVERY 6 HOURS
Status: DISCONTINUED | OUTPATIENT
Start: 2021-06-23 | End: 2021-06-24 | Stop reason: HOSPADM

## 2021-06-23 RX ORDER — FENTANYL CITRATE 50 UG/ML
50 INJECTION, SOLUTION INTRAMUSCULAR; INTRAVENOUS AS NEEDED
Status: DISCONTINUED | OUTPATIENT
Start: 2021-06-23 | End: 2021-06-23 | Stop reason: HOSPADM

## 2021-06-23 RX ORDER — ACETAMINOPHEN 325 MG/1
650 TABLET ORAL ONCE
Status: COMPLETED | OUTPATIENT
Start: 2021-06-23 | End: 2021-06-23

## 2021-06-23 RX ORDER — PROPOFOL 10 MG/ML
INJECTION, EMULSION INTRAVENOUS AS NEEDED
Status: DISCONTINUED | OUTPATIENT
Start: 2021-06-23 | End: 2021-06-23 | Stop reason: HOSPADM

## 2021-06-23 RX ORDER — EPHEDRINE SULFATE/0.9% NACL/PF 50 MG/5 ML
5 SYRINGE (ML) INTRAVENOUS AS NEEDED
Status: DISCONTINUED | OUTPATIENT
Start: 2021-06-23 | End: 2021-06-23 | Stop reason: HOSPADM

## 2021-06-23 RX ORDER — DIPHENHYDRAMINE HYDROCHLORIDE 50 MG/ML
12.5 INJECTION, SOLUTION INTRAMUSCULAR; INTRAVENOUS AS NEEDED
Status: DISCONTINUED | OUTPATIENT
Start: 2021-06-23 | End: 2021-06-23 | Stop reason: HOSPADM

## 2021-06-23 RX ORDER — ROCURONIUM BROMIDE 10 MG/ML
INJECTION, SOLUTION INTRAVENOUS AS NEEDED
Status: DISCONTINUED | OUTPATIENT
Start: 2021-06-23 | End: 2021-06-23 | Stop reason: HOSPADM

## 2021-06-23 RX ORDER — SODIUM CHLORIDE, SODIUM LACTATE, POTASSIUM CHLORIDE, CALCIUM CHLORIDE 600; 310; 30; 20 MG/100ML; MG/100ML; MG/100ML; MG/100ML
100 INJECTION, SOLUTION INTRAVENOUS CONTINUOUS
Status: DISCONTINUED | OUTPATIENT
Start: 2021-06-23 | End: 2021-06-23 | Stop reason: HOSPADM

## 2021-06-23 RX ORDER — SODIUM CHLORIDE 0.9 % (FLUSH) 0.9 %
5-40 SYRINGE (ML) INJECTION AS NEEDED
Status: DISCONTINUED | OUTPATIENT
Start: 2021-06-23 | End: 2021-06-24 | Stop reason: HOSPADM

## 2021-06-23 RX ORDER — LIDOCAINE HYDROCHLORIDE 10 MG/ML
0.1 INJECTION, SOLUTION EPIDURAL; INFILTRATION; INTRACAUDAL; PERINEURAL AS NEEDED
Status: DISCONTINUED | OUTPATIENT
Start: 2021-06-23 | End: 2021-06-23 | Stop reason: HOSPADM

## 2021-06-23 RX ORDER — SODIUM CHLORIDE, SODIUM LACTATE, POTASSIUM CHLORIDE, CALCIUM CHLORIDE 600; 310; 30; 20 MG/100ML; MG/100ML; MG/100ML; MG/100ML
75 INJECTION, SOLUTION INTRAVENOUS CONTINUOUS
Status: DISCONTINUED | OUTPATIENT
Start: 2021-06-23 | End: 2021-06-24 | Stop reason: HOSPADM

## 2021-06-23 RX ORDER — ONDANSETRON 2 MG/ML
4 INJECTION INTRAMUSCULAR; INTRAVENOUS AS NEEDED
Status: DISCONTINUED | OUTPATIENT
Start: 2021-06-23 | End: 2021-06-23 | Stop reason: HOSPADM

## 2021-06-23 RX ORDER — ONDANSETRON 2 MG/ML
INJECTION INTRAMUSCULAR; INTRAVENOUS AS NEEDED
Status: DISCONTINUED | OUTPATIENT
Start: 2021-06-23 | End: 2021-06-23 | Stop reason: HOSPADM

## 2021-06-23 RX ORDER — LISINOPRIL 5 MG/1
5 TABLET ORAL 2 TIMES DAILY
COMMUNITY
End: 2021-12-21

## 2021-06-23 RX ORDER — HYDROMORPHONE HYDROCHLORIDE 2 MG/ML
INJECTION, SOLUTION INTRAMUSCULAR; INTRAVENOUS; SUBCUTANEOUS AS NEEDED
Status: DISCONTINUED | OUTPATIENT
Start: 2021-06-23 | End: 2021-06-23 | Stop reason: HOSPADM

## 2021-06-23 RX ORDER — ROPIVACAINE HYDROCHLORIDE 5 MG/ML
150 INJECTION, SOLUTION EPIDURAL; INFILTRATION; PERINEURAL AS NEEDED
Status: DISCONTINUED | OUTPATIENT
Start: 2021-06-23 | End: 2021-06-23 | Stop reason: HOSPADM

## 2021-06-23 RX ORDER — TAMSULOSIN HYDROCHLORIDE 0.4 MG/1
0.4 CAPSULE ORAL
Status: DISCONTINUED | OUTPATIENT
Start: 2021-06-23 | End: 2021-06-24 | Stop reason: HOSPADM

## 2021-06-23 RX ORDER — ONDANSETRON 4 MG/1
4 TABLET, ORALLY DISINTEGRATING ORAL
Status: DISCONTINUED | OUTPATIENT
Start: 2021-06-23 | End: 2021-06-24 | Stop reason: HOSPADM

## 2021-06-23 RX ORDER — SODIUM CHLORIDE, SODIUM LACTATE, POTASSIUM CHLORIDE, CALCIUM CHLORIDE 600; 310; 30; 20 MG/100ML; MG/100ML; MG/100ML; MG/100ML
25 INJECTION, SOLUTION INTRAVENOUS CONTINUOUS
Status: DISCONTINUED | OUTPATIENT
Start: 2021-06-23 | End: 2021-06-23 | Stop reason: HOSPADM

## 2021-06-23 RX ORDER — BUSPIRONE HYDROCHLORIDE 5 MG/1
5 TABLET ORAL DAILY
Status: DISCONTINUED | OUTPATIENT
Start: 2021-06-24 | End: 2021-06-24 | Stop reason: HOSPADM

## 2021-06-23 RX ORDER — TRAMADOL HYDROCHLORIDE 50 MG/1
100 TABLET ORAL
Status: DISCONTINUED | OUTPATIENT
Start: 2021-06-23 | End: 2021-06-24 | Stop reason: HOSPADM

## 2021-06-23 RX ORDER — LISINOPRIL 10 MG/1
10 TABLET ORAL 2 TIMES DAILY
Status: DISCONTINUED | OUTPATIENT
Start: 2021-06-23 | End: 2021-06-24 | Stop reason: HOSPADM

## 2021-06-23 RX ORDER — MIDAZOLAM HYDROCHLORIDE 1 MG/ML
0.5 INJECTION, SOLUTION INTRAMUSCULAR; INTRAVENOUS
Status: DISCONTINUED | OUTPATIENT
Start: 2021-06-23 | End: 2021-06-23 | Stop reason: HOSPADM

## 2021-06-23 RX ORDER — MIDAZOLAM HYDROCHLORIDE 1 MG/ML
INJECTION, SOLUTION INTRAMUSCULAR; INTRAVENOUS AS NEEDED
Status: DISCONTINUED | OUTPATIENT
Start: 2021-06-23 | End: 2021-06-23 | Stop reason: HOSPADM

## 2021-06-23 RX ORDER — SODIUM CHLORIDE 0.9 % (FLUSH) 0.9 %
5-40 SYRINGE (ML) INJECTION EVERY 8 HOURS
Status: DISCONTINUED | OUTPATIENT
Start: 2021-06-23 | End: 2021-06-24 | Stop reason: HOSPADM

## 2021-06-23 RX ORDER — TRAMADOL HYDROCHLORIDE 50 MG/1
50 TABLET ORAL
Status: DISCONTINUED | OUTPATIENT
Start: 2021-06-23 | End: 2021-06-24 | Stop reason: HOSPADM

## 2021-06-23 RX ORDER — FENTANYL CITRATE 50 UG/ML
25 INJECTION, SOLUTION INTRAMUSCULAR; INTRAVENOUS
Status: DISCONTINUED | OUTPATIENT
Start: 2021-06-23 | End: 2021-06-23 | Stop reason: HOSPADM

## 2021-06-23 RX ORDER — ESCITALOPRAM OXALATE 10 MG/1
10 TABLET ORAL DAILY
Status: DISCONTINUED | OUTPATIENT
Start: 2021-06-24 | End: 2021-06-24 | Stop reason: HOSPADM

## 2021-06-23 RX ORDER — SUCCINYLCHOLINE CHLORIDE 20 MG/ML
INJECTION INTRAMUSCULAR; INTRAVENOUS AS NEEDED
Status: DISCONTINUED | OUTPATIENT
Start: 2021-06-23 | End: 2021-06-23 | Stop reason: HOSPADM

## 2021-06-23 RX ORDER — LIDOCAINE HYDROCHLORIDE 20 MG/ML
INJECTION, SOLUTION EPIDURAL; INFILTRATION; INTRACAUDAL; PERINEURAL AS NEEDED
Status: DISCONTINUED | OUTPATIENT
Start: 2021-06-23 | End: 2021-06-23 | Stop reason: HOSPADM

## 2021-06-23 RX ORDER — ONDANSETRON 2 MG/ML
4 INJECTION INTRAMUSCULAR; INTRAVENOUS
Status: DISCONTINUED | OUTPATIENT
Start: 2021-06-23 | End: 2021-06-24 | Stop reason: HOSPADM

## 2021-06-23 RX ORDER — OXYCODONE HYDROCHLORIDE 5 MG/1
5 TABLET ORAL AS NEEDED
Status: DISCONTINUED | OUTPATIENT
Start: 2021-06-23 | End: 2021-06-23 | Stop reason: HOSPADM

## 2021-06-23 RX ORDER — BUPROPION HYDROCHLORIDE 100 MG/1
100 TABLET, EXTENDED RELEASE ORAL DAILY
COMMUNITY

## 2021-06-23 RX ORDER — EPHEDRINE SULFATE/0.9% NACL/PF 50 MG/5 ML
SYRINGE (ML) INTRAVENOUS AS NEEDED
Status: DISCONTINUED | OUTPATIENT
Start: 2021-06-23 | End: 2021-06-23 | Stop reason: HOSPADM

## 2021-06-23 RX ORDER — ZOLPIDEM TARTRATE 5 MG/1
2.5 TABLET ORAL
Status: DISCONTINUED | OUTPATIENT
Start: 2021-06-23 | End: 2021-06-24 | Stop reason: HOSPADM

## 2021-06-23 RX ADMIN — DEXMEDETOMIDINE HYDROCHLORIDE 0.6 MCG: 100 INJECTION, SOLUTION, CONCENTRATE INTRAVENOUS at 09:11

## 2021-06-23 RX ADMIN — PROPOFOL 150 MG: 10 INJECTION, EMULSION INTRAVENOUS at 07:36

## 2021-06-23 RX ADMIN — DEXMEDETOMIDINE HYDROCHLORIDE 4 MCG: 100 INJECTION, SOLUTION, CONCENTRATE INTRAVENOUS at 08:34

## 2021-06-23 RX ADMIN — LIDOCAINE HYDROCHLORIDE 80 MG: 20 INJECTION, SOLUTION EPIDURAL; INFILTRATION; INTRACAUDAL; PERINEURAL at 07:36

## 2021-06-23 RX ADMIN — ROCURONIUM BROMIDE 10 MG: 10 SOLUTION INTRAVENOUS at 08:08

## 2021-06-23 RX ADMIN — ROCURONIUM BROMIDE 10 MG: 10 SOLUTION INTRAVENOUS at 07:57

## 2021-06-23 RX ADMIN — FENTANYL CITRATE 50 MCG: 50 INJECTION, SOLUTION INTRAMUSCULAR; INTRAVENOUS at 07:57

## 2021-06-23 RX ADMIN — SODIUM CHLORIDE, POTASSIUM CHLORIDE, SODIUM LACTATE AND CALCIUM CHLORIDE 25 ML/HR: 600; 310; 30; 20 INJECTION, SOLUTION INTRAVENOUS at 06:40

## 2021-06-23 RX ADMIN — Medication 5 MG: at 08:01

## 2021-06-23 RX ADMIN — SODIUM CHLORIDE, POTASSIUM CHLORIDE, SODIUM LACTATE AND CALCIUM CHLORIDE: 600; 310; 30; 20 INJECTION, SOLUTION INTRAVENOUS at 09:00

## 2021-06-23 RX ADMIN — Medication 10 MG: at 08:48

## 2021-06-23 RX ADMIN — DEXMEDETOMIDINE HYDROCHLORIDE 4 MCG: 100 INJECTION, SOLUTION, CONCENTRATE INTRAVENOUS at 08:37

## 2021-06-23 RX ADMIN — ROCURONIUM BROMIDE 20 MG: 10 SOLUTION INTRAVENOUS at 08:48

## 2021-06-23 RX ADMIN — Medication 40 MCG: at 07:50

## 2021-06-23 RX ADMIN — ACETAMINOPHEN 650 MG: 325 TABLET ORAL at 07:19

## 2021-06-23 RX ADMIN — ROCURONIUM BROMIDE 20 MG: 10 SOLUTION INTRAVENOUS at 08:21

## 2021-06-23 RX ADMIN — PROPOFOL 50 MG: 10 INJECTION, EMULSION INTRAVENOUS at 07:37

## 2021-06-23 RX ADMIN — ROCURONIUM BROMIDE 5 MG: 10 SOLUTION INTRAVENOUS at 07:36

## 2021-06-23 RX ADMIN — TAMSULOSIN HYDROCHLORIDE 0.4 MG: 0.4 CAPSULE ORAL at 21:55

## 2021-06-23 RX ADMIN — SUCCINYLCHOLINE CHLORIDE 160 MG: 20 INJECTION, SOLUTION INTRAMUSCULAR; INTRAVENOUS at 07:36

## 2021-06-23 RX ADMIN — DEXMEDETOMIDINE HYDROCHLORIDE 4 MCG: 100 INJECTION, SOLUTION, CONCENTRATE INTRAVENOUS at 08:21

## 2021-06-23 RX ADMIN — Medication 40 MCG/MIN: at 07:49

## 2021-06-23 RX ADMIN — HYDROMORPHONE HYDROCHLORIDE 0.4 MG: 2 INJECTION, SOLUTION INTRAMUSCULAR; INTRAVENOUS; SUBCUTANEOUS at 08:40

## 2021-06-23 RX ADMIN — SODIUM CHLORIDE, POTASSIUM CHLORIDE, SODIUM LACTATE AND CALCIUM CHLORIDE 75 ML/HR: 600; 310; 30; 20 INJECTION, SOLUTION INTRAVENOUS at 09:45

## 2021-06-23 RX ADMIN — SODIUM CHLORIDE, POTASSIUM CHLORIDE, SODIUM LACTATE AND CALCIUM CHLORIDE 75 ML/HR: 600; 310; 30; 20 INJECTION, SOLUTION INTRAVENOUS at 21:55

## 2021-06-23 RX ADMIN — ONDANSETRON HYDROCHLORIDE 4 MG: 2 INJECTION, SOLUTION INTRAMUSCULAR; INTRAVENOUS at 07:45

## 2021-06-23 RX ADMIN — Medication 5 MG: at 08:06

## 2021-06-23 RX ADMIN — DEXAMETHASONE SODIUM PHOSPHATE 4 MG: 4 INJECTION, SOLUTION INTRAMUSCULAR; INTRAVENOUS at 07:45

## 2021-06-23 RX ADMIN — WATER 2 G: 1 INJECTION INTRAMUSCULAR; INTRAVENOUS; SUBCUTANEOUS at 07:45

## 2021-06-23 RX ADMIN — MIDAZOLAM 2 MG: 1 INJECTION INTRAMUSCULAR; INTRAVENOUS at 07:25

## 2021-06-23 RX ADMIN — SUGAMMADEX 200 MG: 100 INJECTION, SOLUTION INTRAVENOUS at 08:55

## 2021-06-23 RX ADMIN — DEXMEDETOMIDINE HYDROCHLORIDE 4 MCG: 100 INJECTION, SOLUTION, CONCENTRATE INTRAVENOUS at 08:30

## 2021-06-23 RX ADMIN — ACETAMINOPHEN 1000 MG: 500 TABLET ORAL at 14:43

## 2021-06-23 RX ADMIN — FENTANYL CITRATE 50 MCG: 50 INJECTION, SOLUTION INTRAMUSCULAR; INTRAVENOUS at 07:36

## 2021-06-23 RX ADMIN — ROCURONIUM BROMIDE 25 MG: 10 SOLUTION INTRAVENOUS at 07:45

## 2021-06-23 RX ADMIN — DEXMEDETOMIDINE HYDROCHLORIDE 4 MCG: 100 INJECTION, SOLUTION, CONCENTRATE INTRAVENOUS at 08:41

## 2021-06-23 RX ADMIN — ACETAMINOPHEN 1000 MG: 500 TABLET ORAL at 21:55

## 2021-06-23 NOTE — PROGRESS NOTES
Admission Medication Reconciliation:    Information obtained from:  Patient  RxQuery data available¹:  YES    Comments/Recommendations: Spoke with patient regarding use of PTA medications including prescription/OTC, vitamins/supplements, inhaled, topical, nasal, injectable, otic and ophthalmic medications. Patient was a good historian. Updated PTA meds/reviewed patient's allergies. Medication changes (since last review): Added  - Bupropion SR    Adjusted  - Lisinopril (from 10 mg to 5  mg BID)  - Zolpidem (from 2.5 mg to 5 mg QHS prn- take 1/2 of 10 mg tab)    Removed  - None     ¹RxQuery pharmacy benefit data reflects medications filled and processed through the patient's insurance, however   this data does NOT capture whether the medication was picked up or is currently being taken by the patient. Allergies:  Pcn [penicillins]    Significant PMH/Disease States:   Past Medical History:   Diagnosis Date    Arrhythmia     LBBB    Arthritis     Autoimmune disease ([facility] [address]     PULMONARY SARCOIDOSIS    Cancer ([facility] [address]     scc top of head and nose    Cancer ([facility] [address] 2015    BLADDER    COVID-19 vaccine series completed     [facility] VACCINE 1-28-21 AND 2-25-21    Hypertension     Other unknown and unspecified cause of morbidity or mortality     history of pulmonary sarcoid     Other unknown and unspecified cause of morbidity or mortality     depression, anxiety    Posterior cervical adenopathy, benign 11/19/2018    Psychiatric disorder     DEPRESSION AND ANXIETY    Pulmonary sarcoidosis ([facility] [address]     Unspecified sleep apnea     cpap     Chief Complaint for this Admission:  No chief complaint on file. Prior to Admission Medications:   Prior to Admission Medications   Prescriptions Last Dose Informant Taking?   acetaminophen (Tylenol Extra Strength) 500 mg tablet 6/22/2021 at 930PM  Yes   Sig: Take 1,000 mg by mouth every six (6) hours as needed for Pain.    buPROPion SR (WELLBUTRIN SR) 100 mg SR tablet   Yes   Sig: Take 100 mg by mouth daily. busPIRone (BUSPAR) 5 mg tablet 6/22/2021 at Unknown time  Yes   Sig: Take 5 mg by mouth daily. escitalopram oxalate (LEXAPRO) 10 mg tablet 6/22/2021 at Unknown time  Yes   Sig: Take 10 mg by mouth daily. lisinopriL (PRINIVIL, ZESTRIL) 5 mg tablet 6/22/2021 at Unknown time  Yes   Sig: Take 5 mg by mouth two (2) times a day. multivitamin (ONE A DAY) tablet 6/16/2021  Yes   Sig: Take 1 Tab by mouth daily. simvastatin (ZOCOR) 20 mg tablet 6/22/2021 at Unknown time  Yes   Sig: Take 20 mg by mouth nightly. tamsulosin (FLOMAX) 0.4 mg capsule 6/22/2021 at Unknown time  Yes   Sig: Take 0.4 mg by mouth nightly. zolpidem (AMBIEN) 10 mg tablet 6/22/2021 at Unknown time  Yes   Sig: Take 5 mg by mouth nightly as needed for Sleep. Facility-Administered Medications: None     Please contact the main inpatient pharmacy with any questions or concerns at [phone] and we will direct you to the clinical pharmacist covering this patient's care while in-house.    TARAS Canales

## 2021-06-23 NOTE — OP NOTES
[address]   OPERATIVE REPORT    Name:  Toño Gandhi  MR#:  [id number]  :  1945  ACCOUNT #:  [de-identified]  DATE OF SERVICE:  2021      PREOPERATIVE DIAGNOSIS:  Bladder cancer. POSTOPERATIVE DIAGNOSIS:  Bladder cancer. PROCEDURES PERFORMED:  Cystoscopy; transurethral resection of bladder tumor, the largest bladder tumor was 2 cm, there were multiple sites including the prostatic urethra; also presence of prostatic urethral stones, extraction of prostatic urethral stones. SURGEON:  Ayesha James MD    ASSISTANT:  None. ANESTHESIA:  General.    COMPLICATIONS:  None. SPECIMENS REMOVED:  Bladder tumor labeled by site, prostatic urethral stones. IMPLANTS:  None. ESTIMATED BLOOD LOSS:  100 mL. PROCEDURE:  After anesthesia, the patient was prepped and draped in the lithotomy position. A 21 cystoscope was passed into the urethra. The prostate was visually obstructing and there were prostatic calcifications arising from ducts within the prostate. There was also a diverticulum lateral to the verumontanum off of the prostatic urethra that had smooth walls and contained multiple round calcifications. Papillary changes were noted within the prostatic urethra consistent with transitional cell carcinoma and this was seen in the floor of the prostatic urethra and there was also low papillary change with erythema noted diffusely to involve the prostatic urethra. Within the bladder, there were 2 papillary tumors seen emanating from the surface of the left hemitrigone. The most concerning lesion was a raised, solid-appearing mass involving the upper right lateral wall that had erythema and papillary configuration. This was 2 cm in size. There was diffuse redness scattered in other areas involving the posterior wall and the left lateral wall.   There were also diffuse changes in the floor of the bladder that could be consistent with carcinoma in situ or an inflammatory process. At this point, the rigid transurethral resection forceps were employed to take samples from within the prostatic urethra. Additional tissue was later resected and added to the specimen. The rigid biopsy forceps were used to sample the lesion involving the right lateral wall and the posterior wall as well as the left hemitrigone. The resectoscope was then inserted with the obturator in position and using pure cutting current, the right lateral wall lesion was resected deeply into muscularis propria and in some areas perivesical adipose tissue. Tissue was then resected from the prostatic urethra to try and assess for deeper involvement and then the surface of the prostatic urethra was diffusely fulgurated. Other reddened areas within the bladder were fulgurated. Stones were extracted from the diverticulum in the floor of the prostatic urethra, but care was taken not to injure the surface of that structure which likely is closely associated with the anterior wall of the rectum. Some tiny stones were left in place and there were multiple stones within the deeper ducts of the prostatic urethra that were not removed. After all the tissue was collected and hemostasis was obtained, a 24-German 3-way Juarez was inserted, put to gentle irrigation with clear return. The patient was reacted from anesthesia. Given the depth of the resection, the vascularity of the prostatic urethra was felt inappropriate to use intravesical chemotherapy at this time. The patient was transferred to Recovery in stable condition.         MD NINI [email or web address]  D:  06/23/2021 9:10  T:  06/23/2021 12:02  JOB #:  [id number]  CC:  [facility]

## 2021-06-23 NOTE — ANESTHESIA PREPROCEDURE EVALUATION
Relevant Problems   HEMATOLOGY   (+) Posterior cervical adenopathy, benign       Anesthetic History   No history of anesthetic complications            Review of Systems / Medical History  Patient summary reviewed, nursing notes reviewed and pertinent labs reviewed    Pulmonary        Sleep apnea: CPAP        Comments: Pulmonary sarcoiditis   Neuro/Psych         Psychiatric history     Cardiovascular    Hypertension                   GI/Hepatic/Renal  Within defined limits              Endo/Other        Arthritis and cancer     Other Findings              Physical Exam    Airway  Mallampati: II  TM Distance: > 6 cm  Neck ROM: normal range of motion   Mouth opening: Normal     Cardiovascular  Regular rate and rhythm,  S1 and S2 normal,  no murmur, click, rub, or gallop             Dental  No notable dental hx       Pulmonary  Breath sounds clear to auscultation               Abdominal  GI exam deferred       Other Findings            Anesthetic Plan    ASA: 3  Anesthesia type: general          Induction: Intravenous  Anesthetic plan and risks discussed with: Patient

## 2021-06-23 NOTE — BRIEF OP NOTE
Brief Postoperative Note    Patient: Julieta Fregoso  YOB: 1945  MRN: [id number]    Date of Procedure: 6/23/2021     Pre-Op Diagnosis: BLADDER CANCER, prostatic urethral stones. Post-Op Diagnosis: Same as preoperative diagnosis. Procedure(s):  TRANSURETHRAL RESECTION OF BLADDER TUMOR  Extraction of prostatic urethral stones. bx and fulguration of prostatic urethra  Surgeon(s):  Gallo Medel MD    Surgical Assistant: None    Anesthesia: General     Estimated Blood Loss (mL): less than 646     Complications: None    Specimens:   ID Type Source Tests Collected by Time Destination   1 : Prostatic Urethra Fresh Ton Medel MD 6/23/2021 0809 Pathology   2 : Right Lateral Wall Bladder Fresh Ton Medel MD 6/23/2021 1510 Pathology   3 : POSTERIOR WALL  FLOOR  RIGHT OF MIDLINE Fresh Ton Medel MD 6/23/2021 2182 Pathology   4 : Left Ferny Trigone Corine Medel MD 6/23/2021 4233 Pathology   5 : additional tissue from bladder Fresh Ton Medel MD 6/23/2021 1114 Pathology        Implants: * No implants in log *    Drains: * No LDAs found *    Findings: dicateted .       Electronically Signed by Shay Rodriguez MD on 6/23/2021 at 9:10 AM

## 2021-06-23 NOTE — PERIOP NOTES
TRANSFER - OUT REPORT:    Verbal report given to CIRA Shelton(name) on Yvonne Lambert  being transferred to room 504(unit) for routine post - op       Report consisted of patients Situation, Background, Assessment and   Recommendations(SBAR). Time Pre op antibiotic given:0745  Anesthesia Stop time: 5949  Juarez Present on Transfer to floor:yes  Order for Juarez on Chart:yes  Discharge Prescriptions with Chart:yes    Information from the following report(s) SBAR, Kardex, OR Summary, Procedure Summary, Intake/Output, MAR, Recent Results and Cardiac Rhythm sr with BBB was reviewed with the receiving nurse. Opportunity for questions and clarification was provided. Is the patient on 02? NO    Is the patient on a monitor? NO    Is the nurse transporting with the patient? NO    Surgical Waiting Area notified of patient's transfer from PACU? YES      The following personal items collected during your admission accompanied patient upon transfer:   Dental Appliance: Dental Appliances: Other (comment) (bag of clothes and glasses retured to pt in PACU)  Vision: Visual Aid: Glasses  Hearing Aid:    Jewelry: Jewelry: None  Clothing: Clothing:  (to pacu)  Other Valuables:  Other Valuables: Eyeglasses (glasses to pacu)  Valuables sent to safe:

## 2021-06-24 VITALS
DIASTOLIC BLOOD PRESSURE: 82 MMHG | OXYGEN SATURATION: 97 % | HEART RATE: 74 BPM | BODY MASS INDEX: 34.51 KG/M2 | RESPIRATION RATE: 17 BRPM | TEMPERATURE: 99.6 F | HEIGHT: 71 IN | SYSTOLIC BLOOD PRESSURE: 135 MMHG | WEIGHT: 246.47 LBS

## 2021-06-24 LAB
ANION GAP SERPL CALC-SCNC: 4 MMOL/L (ref 5–15)
BUN SERPL-MCNC: 20 MG/DL (ref 6–20)
BUN/CREAT SERPL: 21 (ref 12–20)
CALCIUM SERPL-MCNC: 9.1 MG/DL (ref 8.5–10.1)
CHLORIDE SERPL-SCNC: 106 MMOL/L (ref 97–108)
CO2 SERPL-SCNC: 29 MMOL/L (ref 21–32)
CREAT SERPL-MCNC: 0.95 MG/DL (ref 0.7–1.3)
GLUCOSE SERPL-MCNC: 103 MG/DL (ref 65–100)
HCT VFR BLD AUTO: 39.7 % (ref 36.6–50.3)
HGB BLD-MCNC: 13.3 G/DL (ref 12.1–17)
POTASSIUM SERPL-SCNC: 4.5 MMOL/L (ref 3.5–5.1)
SODIUM SERPL-SCNC: 139 MMOL/L (ref 136–145)

## 2021-06-24 PROCEDURE — 36415 COLL VENOUS BLD VENIPUNCTURE: CPT

## 2021-06-24 PROCEDURE — 77030012865 HC BG URIN LEG MDII -A

## 2021-06-24 PROCEDURE — 77030040831 HC BAG URINE DRNG MDII -A

## 2021-06-24 PROCEDURE — 99218 HC RM OBSERVATION: CPT

## 2021-06-24 PROCEDURE — 80048 BASIC METABOLIC PNL TOTAL CA: CPT

## 2021-06-24 PROCEDURE — 74011250637 HC RX REV CODE- 250/637: Performed by: UROLOGY

## 2021-06-24 PROCEDURE — 85018 HEMOGLOBIN: CPT

## 2021-06-24 RX ADMIN — ACETAMINOPHEN 1000 MG: 500 TABLET ORAL at 10:45

## 2021-06-24 RX ADMIN — ESCITALOPRAM OXALATE 10 MG: 10 TABLET ORAL at 08:30

## 2021-06-24 RX ADMIN — BUSPIRONE HYDROCHLORIDE 5 MG: 5 TABLET ORAL at 08:30

## 2021-06-24 NOTE — PROGRESS NOTES
Transition of Care: home with f/u with specialist/pcp    Transport Plan: in car with wife    RUR: n/a    Main contact is wifeAndria Poster [phone]    CM noted discharge order; no other CM needs noted    0945: this CM met with pleasant patient; he is alert and oriented x 3; he is independent in his ADLs; his wife will transport him home today; preferred pharmacy [facility]    Reason for Admission:  Bladder cancer                     RUR Score:       n/a              Plan for utilizing home health:  none        PCP: First and Last name:  Severa Fleck, MD     Name of Practice:    Are you a current patient: Yes/No: yes   Approximate date of last visit:    Can you participate in a virtual visit with your PCP:                     Current Advanced Directive/Advance Care Plan: Full Code      Healthcare Decision Maker:   Click here to complete [address] including selection of the Healthcare Decision Maker Relationship (ie \"Primary\")                             Transition of Care Plan:  Home with f/u with specialist/pcp; transport in car with wife       Care Management Interventions  Current Support Network: Lives with Spouse  Discharge Location  Discharge Placement: Home with family assistance (followup with specialist/pcp)                         Observation notice provided in writing to patient and/or caregiver as well as verbal explanation of the policy. Patients who are in outpatient status also receive the Observation notice. Patient has received notice and or patient representative has received via secure email, fax, or certified mail based on patient representative's preference.      CM following  Sd Hsieh RN, CRM

## 2021-06-24 NOTE — ANESTHESIA POSTPROCEDURE EVALUATION
Post-Anesthesia Evaluation and Assessment    Patient: Francy Manjarrez MRN: [id number]  SSN: [id number]    YOB: 1945  Age: 77 y.o. Sex: male      I have evaluated the patient and they are stable and ready for discharge from the PACU. Cardiovascular Function/Vital Signs  Visit Vitals  BP (!) 155/84 (BP 1 Location: Right upper arm, BP Patient Position: At rest;Supine)   Pulse 74   Temp 37.6 °C (99.6 °F)   Resp 17   Ht 5' 11\" (1.803 m)   Wt 111.8 kg (246 lb 7.6 oz)   SpO2 97%   BMI 34.38 kg/m²       Patient is status post General anesthesia for Procedure(s):  TRANSURETHRAL RESECTION OF BLADDER TUMOR. Nausea/Vomiting: None    Postoperative hydration reviewed and adequate. Pain:  Pain Scale 1: Numeric (0 - 10) (06/23/21 2154)  Pain Intensity 1: 0 (06/23/21 2154)   Managed    Neurological Status:   Neuro (WDL): Within Defined Limits (06/23/21 1000)  Neuro  Neurologic State: Alert (06/23/21 1341)  Orientation Level: Oriented X4 (06/23/21 1341)  Cognition: Follows commands; Appropriate decision making; Appropriate safety awareness; Appropriate for age attention/concentration (06/23/21 1341)  LUE Motor Response: Purposeful (06/23/21 1341)  LLE Motor Response: Purposeful (06/23/21 1000)  RUE Motor Response: Purposeful (06/23/21 1000)  RLE Motor Response: Purposeful (06/23/21 1000)   At baseline    Mental Status, Level of Consciousness: Alert and  oriented to person, place, and time    Pulmonary Status:   O2 Device: None (Room air) (06/23/21 2154)   Adequate oxygenation and airway patent    Complications related to anesthesia: None    Post-anesthesia assessment completed.  No concerns    Signed By: Elaina Lombard, MD     June 24, 2021              Procedure(s):  TRANSURETHRAL RESECTION OF BLADDER TUMOR.    general    <BSHSIANPOST>    INITIAL Post-op Vital signs:   Vitals Value Taken Time   /83 06/23/21 1300   Temp 36.8 °C (98.2 °F) 06/23/21 1000   Pulse 67 06/23/21 1304   Resp 19 06/23/21 1306 SpO2 90 % 06/23/21 1304   Vitals shown include unvalidated device data.

## 2021-06-24 NOTE — PROGRESS NOTES
Bedside shift change report given to [facility], RN (oncoming nurse) by Xochitl Chauhan RN (offgoing nurse). Report included the following information SBAR, Kardex, Intake/Output, MAR and Recent Results.

## 2021-06-24 NOTE — PROGRESS NOTES
vss af abd soft. Urine clear. Ok for [employer] w macario cath . surg findings discussed in detail.

## 2021-07-08 ENCOUNTER — DOCUMENTATION ONLY (OUTPATIENT)
Dept: ONCOLOGY | Age: 76
End: 2021-07-08

## 2021-07-08 ENCOUNTER — OFFICE VISIT (OUTPATIENT)
Dept: ONCOLOGY | Age: 76
End: 2021-07-08
Payer: MEDICARE

## 2021-07-08 VITALS
DIASTOLIC BLOOD PRESSURE: 66 MMHG | BODY MASS INDEX: 33.89 KG/M2 | TEMPERATURE: 98.2 F | OXYGEN SATURATION: 95 % | WEIGHT: 243 LBS | HEART RATE: 73 BPM | SYSTOLIC BLOOD PRESSURE: 97 MMHG | RESPIRATION RATE: 18 BRPM

## 2021-07-08 DIAGNOSIS — D86.9 SARCOIDOSIS: ICD-10-CM

## 2021-07-08 DIAGNOSIS — I10 ESSENTIAL HYPERTENSION: ICD-10-CM

## 2021-07-08 DIAGNOSIS — C67.9 MALIGNANT NEOPLASM OF URINARY BLADDER, UNSPECIFIED SITE (HCC): Primary | ICD-10-CM

## 2021-07-08 DIAGNOSIS — E66.09 CLASS 1 OBESITY DUE TO EXCESS CALORIES WITH SERIOUS COMORBIDITY AND BODY MASS INDEX (BMI) OF 33.0 TO 33.9 IN ADULT: ICD-10-CM

## 2021-07-08 PROCEDURE — G8536 NO DOC ELDER MAL SCRN: HCPCS | Performed by: INTERNAL MEDICINE

## 2021-07-08 PROCEDURE — G8427 DOCREV CUR MEDS BY ELIG CLIN: HCPCS | Performed by: INTERNAL MEDICINE

## 2021-07-08 PROCEDURE — G8419 CALC BMI OUT NRM PARAM NOF/U: HCPCS | Performed by: INTERNAL MEDICINE

## 2021-07-08 PROCEDURE — G8510 SCR DEP NEG, NO PLAN REQD: HCPCS | Performed by: INTERNAL MEDICINE

## 2021-07-08 PROCEDURE — 99205 OFFICE O/P NEW HI 60 MIN: CPT | Performed by: INTERNAL MEDICINE

## 2021-07-08 PROCEDURE — G0463 HOSPITAL OUTPT CLINIC VISIT: HCPCS | Performed by: INTERNAL MEDICINE

## 2021-07-08 PROCEDURE — 1101F PT FALLS ASSESS-DOCD LE1/YR: CPT | Performed by: INTERNAL MEDICINE

## 2021-07-08 PROCEDURE — 3017F COLORECTAL CA SCREEN DOC REV: CPT | Performed by: INTERNAL MEDICINE

## 2021-07-08 RX ORDER — ONDANSETRON 8 MG/1
8 TABLET, ORALLY DISINTEGRATING ORAL
Qty: 30 TABLET | Refills: 3 | Status: SHIPPED | OUTPATIENT
Start: 2021-07-08 | End: 2021-08-06 | Stop reason: SDUPTHER

## 2021-07-08 RX ORDER — LIDOCAINE AND PRILOCAINE 25; 25 MG/G; MG/G
CREAM TOPICAL AS NEEDED
Qty: 30 G | Refills: 3 | Status: SHIPPED | OUTPATIENT
Start: 2021-07-08 | End: 2021-08-06 | Stop reason: SDUPTHER

## 2021-07-08 RX ORDER — DEXAMETHASONE 4 MG/1
TABLET ORAL
Qty: 16 TABLET | Refills: 0 | Status: SHIPPED | OUTPATIENT
Start: 2021-07-08 | End: 2021-08-06 | Stop reason: SDUPTHER

## 2021-07-08 RX ORDER — ONDANSETRON 8 MG/1
8 TABLET, ORALLY DISINTEGRATING ORAL
Qty: 30 TABLET | Refills: 3 | Status: SHIPPED | OUTPATIENT
Start: 2021-07-08 | End: 2021-11-22

## 2021-07-08 RX ORDER — HYDROCODONE BITARTRATE AND ACETAMINOPHEN 7.5; 325 MG/1; MG/1
TABLET ORAL
COMMUNITY
End: 2021-11-22

## 2021-07-08 NOTE — PROGRESS NOTES
Alba Romero is a 77 y.o. male    Chief Complaint   Patient presents with    New Patient     Bladder Cancer       1. Have you been to the ER, urgent care clinic since your last visit? Hospitalized since your last visit? No    2. Have you seen or consulted any other health care providers outside of the [address] [place] [address] since your last visit? Include any pap smears or colon screening.  Abhay Ashley PCP  MD [facility]  Pulmonary associates  Cardiology- [facility]

## 2021-07-08 NOTE — PROGRESS NOTES
[facility] at [facility]  [address]  W: [phone]  F: [phone]    Reason for Visit:   Ko Good is a 77 y.o. male who is seen in consultation at the request of Dr. Tomasa De Leon for evaluation of Muscle invasive bladder cancer- Mixed histology    Treatment History:   · 3/1/2021: CT IVP: Multiple abdominal, iliac, mediastinal nodes unchanged from 2019 consistent with h/o sarcoidosis, Thickened R lateral bladder wall. · 6/23/2021: Cystoscopy and TURBT- Papillary changes were noted within the prostatic urethra ,  papillary tumors seen emanating from the surface of the left hemitrigone, There were also diffuse changes in the floor of the bladder- Low grade urothelial carcinoma of the prostatic urethra, R lateral bladder wall with HG Muscle invasive urothelial carcinoma and squamous differentiation+ CIS, Left brenda trigone HG non invasive urothelial carcinoma    History of Present Illness:   Patient is a 77 y.o. male with PMH as below including sarcoidosis who is seen for evaluation of bladder cancer. He has a history of nonmuscle invasive bladder cancer in 2015, underwent 2 induction courses of BCG, had a non muscle invasive recurrence in 2019 and had re induction with BCG. [place] [facility] [place]. In March 2021 he had a positive FISH and was seen by Dr. Tomasa De Leon. Had a CT IVP 3/2021 which showed some Bladder thickening. 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 He is now sent for management. He states that he has had frequent urination at night, he has pain with urination. He has been taking norco which has helped some with the pain. Cant sleep much. He has Left ear hearing impairment . He has no neuropathy. He can do stationary biking for 45 minutes.  Has had some bone spurs that have been removed previously. He is not unsteady. He has also had some low normal BP and is off Lisinopril. He sees Dr. Gilma Granado for Sarcoidosis      Mother and sister had breast cancer and brother had kidney cancer     Past Medical History:   Diagnosis Date    Arrhythmia     LBBB    Arthritis     Autoimmune disease ([facility] [address]     PULMONARY SARCOIDOSIS    Cancer ([facility] [address]     scc top of head and nose    Cancer ([facility] [address]     BLADDER    COVID-19 vaccine series completed     [facility] VACCINE 21 AND 21    Hypertension     Other unknown and unspecified cause of morbidity or mortality     history of pulmonary sarcoid     Other unknown and unspecified cause of morbidity or mortality     depression, anxiety    Posterior cervical adenopathy, benign 2018    Psychiatric disorder     DEPRESSION AND ANXIETY    Pulmonary sarcoidosis ([facility] [address]     Unspecified sleep apnea     cpap      Past Surgical History:   Procedure Laterality Date    HX CATARACT REMOVAL Bilateral     HX HERNIA REPAIR Right AS A CHILD    INGUINAL    HX HERNIA REPAIR Left     INGUIBAL    HX KNEE REPLACEMENT Left     HX ORTHOPAEDIC Right     BONE SPURS REMOVED FROM FOOT    HX OTHER SURGICAL      scc removed top of head and nose    HX OTHER SURGICAL      benign lump removed from thyroid    HX OTHER SURGICAL Right     SKIN CANCER RELMOVED FROM LEG    HX UROLOGICAL  2020    CYSTO    IA CARDIAC SURG PROCEDURE UNLIST  2021    CARDIAC CATH    IA REVISE KNEE JOINT REPLACE,ALL PARTS Left       Social History     Tobacco Use    Smoking status: Former Smoker     Packs/day: 0.50     Years: 2.00     Pack years: 1.00     Quit date: 1965     Years since quittin.9    Smokeless tobacco: Never Used   Substance Use Topics    Alcohol use:  Yes     Alcohol/week: 14.0 standard drinks     Types: 12 Glasses of wine, 2 Shots of liquor per week      Family History   Problem Relation Age of Onset    Cancer Mother         breast with mets    Dementia Mother     Heart Disease Father     Cancer Sister         breast    Lung Disease Brother     No Known Problems Brother     Anesth Problems Neg Hx      Current Outpatient Medications   Medication Sig    lisinopriL (PRINIVIL, ZESTRIL) 5 mg tablet Take 5 mg by mouth two (2) times a day.  zolpidem (AMBIEN) 10 mg tablet Take 5 mg by mouth nightly as needed for Sleep.  buPROPion SR (WELLBUTRIN SR) 100 mg SR tablet Take 100 mg by mouth daily.  acetaminophen (Tylenol Extra Strength) 500 mg tablet Take 1,000 mg by mouth every six (6) hours as needed for Pain.  tamsulosin (FLOMAX) 0.4 mg capsule Take 0.4 mg by mouth nightly.  busPIRone (BUSPAR) 5 mg tablet Take 5 mg by mouth daily.  simvastatin (ZOCOR) 20 mg tablet Take 20 mg by mouth nightly.  escitalopram oxalate (LEXAPRO) 10 mg tablet Take 10 mg by mouth daily.  multivitamin (ONE A DAY) tablet Take 1 Tab by mouth daily. No current facility-administered medications for this visit. Allergies   Allergen Reactions    Pcn [Penicillins] Hives        Review of Systems: A complete review of systems was obtained, negative except as described above. Physical Exam:     Visit Vitals  BP 97/66 (BP 1 Location: Left upper arm, BP Patient Position: Sitting)   Pulse 73   Temp 98.2 °F (36.8 °C)   Resp 18   Wt 243 lb (110.2 kg)   SpO2 95%   BMI 33.89 kg/m²     ECOG PS: 1  General: No distress  Eyes: PERRLA, anicteric sclerae  HENT: Atraumatic  Neck: Supple  Lymphatic: No cervical, supraclavicular, or inguinal adenopathy  Respiratory:normal respiratory effort  CV: Normal rate, no peripheral edema  GI: Soft, nontender, nondistended  MS: Normal gait and station. Digits without clubbing or cyanosis. Skin: No rashes, ecchymoses, or petechiae. Normal temperature, turgor, and texture.   Psych: Alert, oriented, appropriate affect, normal judgment/insight    Results:     Lab Results   Component Value Date/Time    WBC 3.5 (L) 06/17/2021 09:56 AM    HGB 13.3 06/24/2021 04:41 AM    HCT 39.7 06/24/2021 04:41 AM    PLATELET 378 04/15/5570 09:56 AM    MCV 99.5 (H) 06/17/2021 09:56 AM    ABS. NEUTROPHILS 2.4 06/17/2021 09:56 AM     Lab Results   Component Value Date/Time    Sodium 139 06/24/2021 04:41 AM    Potassium 4.5 06/24/2021 04:41 AM    Chloride 106 06/24/2021 04:41 AM    CO2 29 06/24/2021 04:41 AM    Glucose 103 (H) 06/24/2021 04:41 AM    BUN 20 06/24/2021 04:41 AM    Creatinine 0.95 06/24/2021 04:41 AM    GFR est AA >60 06/24/2021 04:41 AM    GFR est non-AA >60 06/24/2021 04:41 AM    Calcium 9.1 06/24/2021 04:41 AM    Glucose (POC) 93 12/01/2009 07:10 AM     Lab Results   Component Value Date/Time    Bilirubin, total 0.5 06/17/2021 09:56 AM    ALT (SGPT) 54 06/17/2021 09:56 AM    Alk. phosphatase 48 06/17/2021 09:56 AM    Protein, total 7.0 06/17/2021 09:56 AM    Albumin 3.7 06/17/2021 09:56 AM    Globulin 3.3 06/17/2021 09:56 AM       External   Records reviewed and summarized above. Pathology report(s) reviewed above. Radiology report(s) reviewed above. Assessment:   1) Muscle invasive bladder cancer- With squamous differentiation     S/p TURBT 6/23/2021- Path reviewed  Staging pending     T2N0M0     I discussed the rationale for neoadjuvant chemotherapy which has level 1 evidence supporting improved survival in pure urothelial cancers      5 year OS for muscle invasive disease is about 40-45% with surgery alone. Randomized trials have demonstrated an improvement of 5% in OS and 7% in DFS at 5 years for combined modality therapy (i.e: neoadjuvant chemotherapy followed by surgery).  I explained that though the absolute percentage of benefit appears modest, the original study that established this approach as the standard of care for muscle invasive disease and tested MVAC in this setting had a remarkable difference in the median OS 77 months, as compared with 46 months  Mala CHURCH 2003: MVAC vs none). The benefit was largely seen in patients who had no residual disease at cystectomy and significantly more patients in the combination-therapy group had  no residual disease than patients in the cystectomy group (38 percent vs. 15 percent,  P<0.001). MVAC however is an aggressive regimen which is not well tolerated in most patients over the age of 61.      There are however multiple retrospective studies that suggest poorer outcomes in patients with mixed histology and a clear survival advantage for NAC in these situations is yet to be demonstrated. NAC is likely to achieve downstaging in patients with mized histology though pT0 rates are consistently lower ( 16 % vs 38%), NAC does not lead to detrimental outcomes in mixed histology patients and is usually considered if tolerated Fabienne Vazquez et al . Natalie Oneal of Urol 2013)      Have discussed the risk of  severe/ irreversible neurotoxicity/ nephrotoxicity and ototoxicity and significant. He has some baseline hearing impairment. We discussed proceeding with NAC, restaging in 2 cycles given mixed histology and considering split dosing if he develops grade 3 or more toxicities  They are agreeable to this plan  Encouraged to get a second opinion and he has one [facility] scheduled      They have a vacation coming up and want to hold off on treatments until after that        2) Extensive low grade non muscle invasive urothelial cancer    3) Sarcoidosis    4) Obesity    5) Psychosocial  Coping well  Supportive wife Vaishali  Met with SW      Plan:     · CT CAP  · Port  · Second opinion as scheduled and call us  · Approval for Cisplatin and Gemzar NAC for 4 cycles tentatively to start Aug 4  · Scans after cycle 2, Cysto after cycle 2  · Antiemetics per protocol    RTC C1D1 with scans       I appreciate the opportunity to participate in [facility].     Signed By: Callie Francis MD

## 2021-07-08 NOTE — PROGRESS NOTES
Oncology Social Work  Psychosocial Assessment    Reason for Assessment:       [] Social Work Referral [x] Initial Assessment [] New Diagnosis [] Other    Advance Care Planning:  Advance Care Planning 6/24/2021   Patient's [address] is: -   Primary Decision Maker Name -   Secondary Decision Maker Name -   Secondary Decision Maker Phone Number -   Confirm Advance Directive Yes, not on file   Patient Would Like to Complete Advance Directive No   Does the patient have other document types -   Patient has an AMD already. Asked the patient to provide a copy of this document during his next office visit. Sources of Information:    [x]Patient  []Family  []Staff  []Medical Record    Mental Status:    [x]Alert  []Lethargic  []Unresponsive   [] Unable to assess   Oriented to:  [x]Person  [x]Place  [x]Time  [x]Situation      Relationship Status:  []Single  [x]  []Significant Other/Life Partner  []  []  []    Living Circumstances:  []Lives Alone  [x]Family/Significant Other in Household  []Roommates  []Children in the Home  []Paid Caregivers  []Assisted Living Facility/Group Home  []Skilled [address]  []Homeless  []Incarcerated  []Environmental/Care Concerns  []Other:    Employment Status:  []Employed Full-time []Employed Part-time []Homemaker  [x] Retired [] Short-Term Disability [] [place]  [] Unemployed     Barriers to Learning:    []Language  []Developmental  []Cognitive  []Altered Mental Status  []Visual/Hearing Impairment  []Unable to Read/Write  []Motivational  [] Challenges Understanding Medical Jargon [x]No Barriers Identified      Financial/Legal Concerns:    []Uninsured  []Limited Income/Resources  []Non-Citizen  []Food Insecurity [x]No Concerns Identified   []Other:    Evangelical/Spiritual/Existential:  Does patient have any spiritual or Protestant beliefs? [x] Yes [] No  Is the patient involved in a spiritual, wyatt or Protestant community?  [x] Yes [] No  Patient expressing spiritual/existential angst? [] Yes [x] No  Notes: The patient attends [address]. Support System:    Identified Support Person/Group:  [x]Strong  []Fair  []Limited    Coping with Illness:   [x]  Coping Well  [] Challenges Coping with Serious Illness [] Situational Depression [] Situational Anxiety [] Anticipatory Grief  [] Recent Loss [] Caregiver Lena            Narrative:   Met with the patient and his wife, during his initial office visit today. Patient is being seen for Muscle invasive bladder cancer- Mixed histology. The patient shared that he has had 6-7 surgeries, and treatment through [facility]. He received treatment at [facility] last year as well. Patient has a PCP - Dr. Joni Melendez. Living Situation - Patient lives with his wife, Raulito Best. He does not use any DME to ambulate, and is independent with IADLs and ADLs. He can do stationary biking for 45 minutes. 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 Social Support - This is the patient's second marriage. He and his wife have 3 sons in total, who all live in [place]. They have 6 grandchildren. They hope to be able to visit their children in the near future. They attend [facility]. Resources provided -  Invited the patient to the upcoming Virtual Support Group meetings, led by this . The patient and his wife mentioned that they learned of an ostomy support group that might meet at [facility]. Researched this, and learned that the group is not currently meeting, in person or virtually, but they have plans to resume once they find an available meeting space. Provided information for The Ostomy Association of [facility] -- BombTimer.[email or web address] org/ostomy-association-[facility]/>.   Explained that Glenny Calvert Jennifer Villalobos of the [employer], can be reached at P#[phone]. Shared that he leads this support group, as well as efforts to connect patients, past and present, who have the same surgeries. The patient and his wife expressed interest in speaking with the dietitian. Sent a referral to colleague Arielle Kilgore, Registered Dietitian for follow up purposes. Provided this 's contact information as well as information regarding the complementary services provided by the [facility], explaining that the center is currently closed due to COVID-19 restrictions. Explained that meditation and yoga are both being offered virtually. The patient's wife expressed interest in meditation services. Referral placed to [facility], Patient Advocate, for follow up purposes. Plan:   1. Introduced self and role of this  in the [employer]. 2.  Informed the patient of the [facility] and available resources there. 3.  Continue to meet with the patient when he returns to the clinic for ongoing assessment of the patients adjustment to his diagnosis and treatment. 4.  Ongoing psychosocial support as desired by patient.       Referral:   Complementary therapies referral  Support Groups referral  Nutrition referral  Michelle Middleton LCSW

## 2021-07-11 RX ORDER — DIPHENHYDRAMINE HYDROCHLORIDE 50 MG/ML
25 INJECTION, SOLUTION INTRAMUSCULAR; INTRAVENOUS AS NEEDED
Status: CANCELLED
Start: 2021-08-05

## 2021-07-11 RX ORDER — SODIUM CHLORIDE 9 MG/ML
10 INJECTION INTRAMUSCULAR; INTRAVENOUS; SUBCUTANEOUS AS NEEDED
Status: CANCELLED | OUTPATIENT
Start: 2021-08-05

## 2021-07-11 RX ORDER — PALONOSETRON 0.05 MG/ML
0.25 INJECTION, SOLUTION INTRAVENOUS ONCE
Status: CANCELLED | OUTPATIENT
Start: 2021-08-05 | End: 2021-08-04

## 2021-07-11 RX ORDER — EPINEPHRINE 1 MG/ML
0.3 INJECTION, SOLUTION, CONCENTRATE INTRAVENOUS AS NEEDED
Status: CANCELLED | OUTPATIENT
Start: 2021-08-05

## 2021-07-11 RX ORDER — SODIUM CHLORIDE 9 MG/ML
25 INJECTION, SOLUTION INTRAVENOUS CONTINUOUS
Status: CANCELLED | OUTPATIENT
Start: 2021-08-05

## 2021-07-11 RX ORDER — DIPHENHYDRAMINE HYDROCHLORIDE 50 MG/ML
50 INJECTION, SOLUTION INTRAMUSCULAR; INTRAVENOUS AS NEEDED
Status: CANCELLED
Start: 2021-08-12

## 2021-07-11 RX ORDER — HYDROCORTISONE SODIUM SUCCINATE 100 MG/2ML
100 INJECTION, POWDER, FOR SOLUTION INTRAMUSCULAR; INTRAVENOUS AS NEEDED
Status: CANCELLED | OUTPATIENT
Start: 2021-08-05

## 2021-07-11 RX ORDER — SODIUM CHLORIDE 0.9 % (FLUSH) 0.9 %
10 SYRINGE (ML) INJECTION AS NEEDED
Status: CANCELLED | OUTPATIENT
Start: 2021-08-05

## 2021-07-11 RX ORDER — ACETAMINOPHEN 325 MG/1
650 TABLET ORAL AS NEEDED
Status: CANCELLED
Start: 2021-08-05

## 2021-07-11 RX ORDER — HYDROCORTISONE SODIUM SUCCINATE 100 MG/2ML
100 INJECTION, POWDER, FOR SOLUTION INTRAMUSCULAR; INTRAVENOUS AS NEEDED
Status: CANCELLED | OUTPATIENT
Start: 2021-08-12

## 2021-07-11 RX ORDER — ONDANSETRON 2 MG/ML
8 INJECTION INTRAMUSCULAR; INTRAVENOUS AS NEEDED
Status: CANCELLED | OUTPATIENT
Start: 2021-08-12

## 2021-07-11 RX ORDER — EPINEPHRINE 1 MG/ML
0.3 INJECTION, SOLUTION, CONCENTRATE INTRAVENOUS AS NEEDED
Status: CANCELLED | OUTPATIENT
Start: 2021-08-12

## 2021-07-11 RX ORDER — HEPARIN 100 UNIT/ML
300-500 SYRINGE INTRAVENOUS AS NEEDED
Status: CANCELLED
Start: 2021-08-12

## 2021-07-11 RX ORDER — SODIUM CHLORIDE 0.9 % (FLUSH) 0.9 %
10 SYRINGE (ML) INJECTION AS NEEDED
Status: CANCELLED | OUTPATIENT
Start: 2021-08-12

## 2021-07-11 RX ORDER — ALBUTEROL SULFATE 0.83 MG/ML
2.5 SOLUTION RESPIRATORY (INHALATION) AS NEEDED
Status: CANCELLED
Start: 2021-08-05

## 2021-07-11 RX ORDER — DIPHENHYDRAMINE HYDROCHLORIDE 50 MG/ML
25 INJECTION, SOLUTION INTRAMUSCULAR; INTRAVENOUS AS NEEDED
Status: CANCELLED
Start: 2021-08-12

## 2021-07-11 RX ORDER — ACETAMINOPHEN 325 MG/1
650 TABLET ORAL AS NEEDED
Status: CANCELLED
Start: 2021-08-12

## 2021-07-11 RX ORDER — ONDANSETRON 2 MG/ML
8 INJECTION INTRAMUSCULAR; INTRAVENOUS ONCE
Status: CANCELLED | OUTPATIENT
Start: 2021-08-12 | End: 2021-08-11

## 2021-07-11 RX ORDER — HEPARIN 100 UNIT/ML
300-500 SYRINGE INTRAVENOUS AS NEEDED
Status: CANCELLED
Start: 2021-08-05

## 2021-07-11 RX ORDER — DIPHENHYDRAMINE HYDROCHLORIDE 50 MG/ML
50 INJECTION, SOLUTION INTRAMUSCULAR; INTRAVENOUS AS NEEDED
Status: CANCELLED
Start: 2021-08-05

## 2021-07-11 RX ORDER — ONDANSETRON 2 MG/ML
8 INJECTION INTRAMUSCULAR; INTRAVENOUS AS NEEDED
Status: CANCELLED | OUTPATIENT
Start: 2021-08-05

## 2021-07-11 RX ORDER — ALBUTEROL SULFATE 0.83 MG/ML
2.5 SOLUTION RESPIRATORY (INHALATION) AS NEEDED
Status: CANCELLED
Start: 2021-08-12

## 2021-07-11 RX ORDER — SODIUM CHLORIDE 9 MG/ML
25 INJECTION, SOLUTION INTRAVENOUS CONTINUOUS
Status: CANCELLED | OUTPATIENT
Start: 2021-08-12

## 2021-07-11 RX ORDER — SODIUM CHLORIDE 9 MG/ML
10 INJECTION INTRAMUSCULAR; INTRAVENOUS; SUBCUTANEOUS AS NEEDED
Status: CANCELLED | OUTPATIENT
Start: 2021-08-12

## 2021-07-16 ENCOUNTER — HOSPITAL ENCOUNTER (OUTPATIENT)
Dept: INTERVENTIONAL RADIOLOGY/VASCULAR | Age: 76
Discharge: HOME | End: 2021-07-16
Attending: STUDENT IN AN ORGANIZED HEALTH CARE EDUCATION/TRAINING PROGRAM | Admitting: STUDENT IN AN ORGANIZED HEALTH CARE EDUCATION/TRAINING PROGRAM
Payer: MEDICARE

## 2021-07-16 VITALS
SYSTOLIC BLOOD PRESSURE: 128 MMHG | RESPIRATION RATE: 18 BRPM | DIASTOLIC BLOOD PRESSURE: 79 MMHG | TEMPERATURE: 98.5 F | HEIGHT: 72 IN | HEART RATE: 76 BPM | WEIGHT: 242 LBS | BODY MASS INDEX: 32.78 KG/M2

## 2021-07-16 DIAGNOSIS — C67.9 MALIGNANT NEOPLASM OF URINARY BLADDER, UNSPECIFIED SITE (HCC): ICD-10-CM

## 2021-07-16 PROCEDURE — C1769 GUIDE WIRE: HCPCS

## 2021-07-16 PROCEDURE — 77030031139 HC SUT VCRL2 J&J -A

## 2021-07-16 PROCEDURE — 77030010507 HC ADH SKN DERMBND J&J -B

## 2021-07-16 PROCEDURE — 36561 INSERT TUNNELED CV CATH: CPT

## 2021-07-16 PROCEDURE — 2709999900 HC NON-CHARGEABLE SUPPLY

## 2021-07-16 PROCEDURE — 74011000250 HC RX REV CODE- 250: Performed by: STUDENT IN AN ORGANIZED HEALTH CARE EDUCATION/TRAINING PROGRAM

## 2021-07-16 PROCEDURE — 74011250636 HC RX REV CODE- 250/636: Performed by: STUDENT IN AN ORGANIZED HEALTH CARE EDUCATION/TRAINING PROGRAM

## 2021-07-16 PROCEDURE — C1894 INTRO/SHEATH, NON-LASER: HCPCS

## 2021-07-16 PROCEDURE — C1788 PORT, INDWELLING, IMP: HCPCS

## 2021-07-16 PROCEDURE — 77030011893 HC TY CUT DN TRIS -B

## 2021-07-16 RX ORDER — FENTANYL CITRATE 50 UG/ML
25-100 INJECTION, SOLUTION INTRAMUSCULAR; INTRAVENOUS
Status: DISCONTINUED | OUTPATIENT
Start: 2021-07-16 | End: 2021-07-16 | Stop reason: HOSPADM

## 2021-07-16 RX ORDER — CEFAZOLIN SODIUM 1 G/3ML
2 INJECTION, POWDER, FOR SOLUTION INTRAMUSCULAR; INTRAVENOUS ONCE
Status: DISCONTINUED | OUTPATIENT
Start: 2021-07-16 | End: 2021-07-16

## 2021-07-16 RX ORDER — HEPARIN 100 UNIT/ML
500 SYRINGE INTRAVENOUS
Status: COMPLETED | OUTPATIENT
Start: 2021-07-16 | End: 2021-07-16

## 2021-07-16 RX ORDER — MIDAZOLAM HYDROCHLORIDE 1 MG/ML
5 INJECTION, SOLUTION INTRAMUSCULAR; INTRAVENOUS
Status: DISCONTINUED | OUTPATIENT
Start: 2021-07-16 | End: 2021-07-16 | Stop reason: HOSPADM

## 2021-07-16 RX ORDER — LIDOCAINE HYDROCHLORIDE 20 MG/ML
20 INJECTION, SOLUTION INFILTRATION; PERINEURAL
Status: DISCONTINUED | OUTPATIENT
Start: 2021-07-16 | End: 2021-07-16 | Stop reason: HOSPADM

## 2021-07-16 RX ORDER — LIDOCAINE HYDROCHLORIDE AND EPINEPHRINE 10; 10 MG/ML; UG/ML
20 INJECTION, SOLUTION INFILTRATION; PERINEURAL
Status: COMPLETED | OUTPATIENT
Start: 2021-07-16 | End: 2021-07-16

## 2021-07-16 RX ADMIN — WATER 2 G: 1 INJECTION INTRAMUSCULAR; INTRAVENOUS; SUBCUTANEOUS at 07:50

## 2021-07-16 RX ADMIN — HEPARIN 500 UNITS: 100 SYRINGE at 08:31

## 2021-07-16 RX ADMIN — MIDAZOLAM HYDROCHLORIDE 1 MG: 1 INJECTION, SOLUTION INTRAMUSCULAR; INTRAVENOUS at 08:26

## 2021-07-16 RX ADMIN — MIDAZOLAM HYDROCHLORIDE 1 MG: 1 INJECTION, SOLUTION INTRAMUSCULAR; INTRAVENOUS at 08:13

## 2021-07-16 RX ADMIN — LIDOCAINE HYDROCHLORIDE,EPINEPHRINE BITARTRATE 20 MG: 10; .01 INJECTION, SOLUTION INFILTRATION; PERINEURAL at 08:22

## 2021-07-16 RX ADMIN — FENTANYL CITRATE 50 MCG: 50 INJECTION, SOLUTION INTRAMUSCULAR; INTRAVENOUS at 08:13

## 2021-07-16 RX ADMIN — FENTANYL CITRATE 50 MCG: 50 INJECTION, SOLUTION INTRAMUSCULAR; INTRAVENOUS at 08:20

## 2021-07-16 RX ADMIN — MIDAZOLAM HYDROCHLORIDE 1 MG: 1 INJECTION, SOLUTION INTRAMUSCULAR; INTRAVENOUS at 08:20

## 2021-07-16 NOTE — PROGRESS NOTES
Pt verbalized understanding of discharge and follow up instructions. Opportunities for questions and concerns were provided. Wound is CDI. IV discontinued, pt wheeled/ambulated off units\, steady gait noted as pt ambulated to private vehicle.

## 2021-07-16 NOTE — PROGRESS NOTES
Pt arrives ambulatory  to angio department accompanied by wiife for port placement procedure. All assessments completed and consent was reviewed. Education given was regarding procedure, moderate sedation, post-procedure care and  management/follow-up. Opportunity for questions was provided and all questions and concerns were addressed.

## 2021-07-16 NOTE — H&P
Radiology History and Physical    Patient: J Luis Foy 77 y.o. male       Chief Complaint: Bladder cancer    History of Present Illness: 77year old male with bladder cancer presents for port placement. History:    Past Medical History:   Diagnosis Date    Arrhythmia     LBBB    Arthritis     Autoimmune disease ([address]     PULMONARY SARCOIDOSIS    Cancer ([address]     scc top of head and nose    Cancer ([address] 2015    BLADDER    COVID-19 vaccine series completed     [facility] VACCINE 21 AND 21    Hypertension     Other unknown and unspecified cause of morbidity or mortality     history of pulmonary sarcoid     Other unknown and unspecified cause of morbidity or mortality     depression, anxiety    Posterior cervical adenopathy, benign 2018    Psychiatric disorder     DEPRESSION AND ANXIETY    Pulmonary sarcoidosis (HCC)     Unspecified sleep apnea     cpap     Family History   Problem Relation Age of Onset    Cancer Mother         breast with mets    Dementia Mother     Heart Disease Father     Cancer Sister         breast    Lung Disease Brother     No Known Problems Brother     Anesth Problems Neg Hx      Social History     Socioeconomic History    Marital status:      Spouse name: Not on file    Number of children: Not on file    Years of education: Not on file    Highest education level: Not on file   Occupational History    Not on file   Tobacco Use    Smoking status: Former Smoker     Packs/day: 0.50     Years: 2.00     Pack years: 1.00     Quit date: 1965     Years since quittin.9    Smokeless tobacco: Never Used   Vaping Use    Vaping Use: Never used   Substance and Sexual Activity    Alcohol use:  Yes     Alcohol/week: 14.0 standard drinks     Types: 12 Glasses of wine, 2 Shots of liquor per week    Drug use: No    Sexual activity: Not Currently   Other Topics Concern    Not on file   Social History Narrative    Not on file     Social Determinants of Health     Financial Resource Strain:     Difficulty of Paying Living Expenses:    Food Insecurity: No Food Insecurity    Worried About Running Out of Food in the Last Year: Never true    Ran Out of Food in the Last Year: Never true   Transportation Needs: No Transportation Needs    Lack of Transportation (Medical): No    Lack of Transportation (Non-Medical): No   Physical Activity:     Days of Exercise per Week:     Minutes of Exercise per Session:    Stress:     Feeling of Stress :    Social Connections: Unknown    Frequency of Communication with Friends and Family: Not on file    Frequency of Social Gatherings with Friends and Family: Not on file    Attends Confucianist Services: More than 4 times per year    Active Member of gokit Group or Organizations: Not on file    Attends Club or Organization Meetings: Not on file    Marital Status:    Intimate Partner Violence:     Fear of Current or Ex-Partner:     Emotionally Abused:     Physically Abused:     Sexually Abused: Allergies: Allergies   Allergen Reactions    Pcn [Penicillins] Hives       Current Medications:  Current Facility-Administered Medications   Medication Dose Route Frequency    lidocaine (XYLOCAINE) 20 mg/mL (2 %) injection 400 mg  20 mL SubCUTAneous RAD ONCE    lidocaine-EPINEPHrine (XYLOCAINE) 1 %-1:100,000 injection 200 mg  20 mL SubCUTAneous RAD ONCE    heparin (porcine) pf 500 Units  500 Units IntraVENous RAD ONCE    fentaNYL citrate (PF) injection  mcg   mcg IntraVENous Rad Multiple    midazolam (VERSED) injection 5 mg  5 mg IntraVENous Rad Multiple        Physical Exam:  Blood pressure (!) 139/92, pulse 75, temperature 98.5 °F (36.9 °C), resp. rate 18, height 6' (1.829 m), weight 109.8 kg (242 lb).   GENERAL: alert, cooperative, no distress, appears stated age,   LUNG: Nonlabored respiration on room air  HEART: regular rate and rhythm    Alerts:    Hospital Problems  Date Reviewed: 7/8/2021    None          Laboratory:    No results for input(s): HGB, HCT, WBC, PLT, INR, BUN, CREA, K, CRCLT, HGBEXT, HCTEXT, PLTEXT, INREXT in the last 72 hours. No lab exists for component: PTT, PT      Plan of Care/Planned Procedure:  Risks, benefits, and alternatives reviewed with patient and he agrees to proceed with the procedure. Port placement. Deemed appropriate for moderate sedation with versed and fentanyl.     Lizeth Valencia MD  Interventional Radiology  [facility]  8:05 AM, 7/16/2021

## 2021-07-16 NOTE — DISCHARGE INSTRUCTIONS
Patient Education        Implanted [facility] PROVIDERS [facility] for Chemotherapy: Care Instructions  Overview  An implanted port is a device that's placed, in most cases, under the skin of your chest below your collarbone. It's made of plastic, stainless steel, or titanium. The port is about the size of a quarter, but thicker. A thin, flexible tube called a catheter runs from the port under your skin into a large vein. A membrane (septum) similar to a pencil eraser is in the center of the port. A nurse uses a needle to put chemotherapy or other medicine and fluids through the septum into a blood vessel. The port may be used to draw blood for tests only if another vein, such as in the hand or arm, can't be used. An implanted port can be used for months. A special needle (called a Schuler needle) may stay in the port for a short time. The port needs regular care to make sure that it doesn't get blocked. Tell your doctor if you take aspirin or some other blood thinner. These medicines can increase the chance of bleeding inside your body. Follow-up care is a key part of your treatment and safety. Be sure to make and go to all appointments, and call your doctor if you are having problems. It's also a good idea to know your test results and keep a list of the medicines you take. How can you care for yourself at home? · You will probably need to take 1 day off from work and will be able return to normal activities shortly after. This depends on the type of work you do, why you have the port, and how you feel. · You probably will be able to bathe and swim. But you may need to avoid some activities if a Schuler needle is left in the port. Talk to your doctor about any limits on your activity. · Some clothes may rub the skin over the port. Do not wear a bra or suspenders that irritate your skin near the port. · You will get a medical alert card with information about your port. Carry this with you.  It will tell health care workers you have a port in case you need emergency care. · Your port will need regular flushing to keep it open. A nurse or other health professional will do this for you. When should you call for help? Call your doctor now or seek immediate medical care if:    · You have signs of infection, such as:  ? Increased pain, swelling, warmth, or redness near the port. ? Red streaks leading from the port. ? Pus draining from the port. ? A fever.     · You have pain or swelling in your neck or arm.     · You have trouble breathing. Watch closely for changes in your health, and be sure to contact your doctor if:    · You have any problems with your port. Where can you learn more? Go to http://www.gray.com/  Enter P577 in the search box to learn more about \"Implanted [facility] - [facility] [place] for Chemotherapy: Care Instructions. \"  Current as of: February 26, 2020               Content Version: 12.8  © 2006-2021 Healthwise, Incorporated. Care instructions adapted under license by [employer] (which disclaims liability or warranty for this information). If you have questions about a medical condition or this instruction, always ask your healthcare professional. [place] [address] any warranty or liability for your use of this information.

## 2021-07-19 ENCOUNTER — HOSPITAL ENCOUNTER (OUTPATIENT)
Dept: CT IMAGING | Age: 76
Discharge: HOME | End: 2021-07-19
Attending: INTERNAL MEDICINE
Payer: MEDICARE

## 2021-07-19 DIAGNOSIS — C67.9 MALIGNANT NEOPLASM OF URINARY BLADDER, UNSPECIFIED SITE (HCC): ICD-10-CM

## 2021-07-19 PROCEDURE — 71260 CT THORAX DX C+: CPT

## 2021-07-19 PROCEDURE — 74177 CT ABD & PELVIS W/CONTRAST: CPT

## 2021-07-19 PROCEDURE — 74011000636 HC RX REV CODE- 636: Performed by: INTERNAL MEDICINE

## 2021-07-19 RX ADMIN — IOPAMIDOL 100 ML: 755 INJECTION, SOLUTION INTRAVENOUS at 10:18

## 2021-07-21 ENCOUNTER — PATIENT MESSAGE (OUTPATIENT)
Dept: ONCOLOGY | Age: 76
End: 2021-07-21

## 2021-07-21 ENCOUNTER — TELEPHONE (OUTPATIENT)
Dept: ONCOLOGY | Age: 76
End: 2021-07-21

## 2021-07-21 NOTE — TELEPHONE ENCOUNTER
Cancer [place] at [address]  [address]: [phone]  F: [phone]    Medical Nutrition Therapy  Nutrition Referral:    Referral received from [facility] - BHUMI MCCORMICK regarding patients concern about to eat. Patient has bladder cancer and plan for cisplatin and gemzar. Called patient, unable to leave voicemail, mailbox full. Will send Queue-it message to open communication and let patient know that RD is available to address nutrition throughout the spectrum of care.      Signed By: Sheryl Mora, [address], [address] [facility] [address] [facility] [address]

## 2021-08-05 ENCOUNTER — DOCUMENTATION ONLY (OUTPATIENT)
Dept: ONCOLOGY | Age: 76
End: 2021-08-05

## 2021-08-05 ENCOUNTER — OFFICE VISIT (OUTPATIENT)
Dept: ONCOLOGY | Age: 76
End: 2021-08-05
Payer: MEDICARE

## 2021-08-05 ENCOUNTER — HOSPITAL ENCOUNTER (OUTPATIENT)
Dept: INFUSION THERAPY | Age: 76
Discharge: HOME | End: 2021-08-05
Payer: MEDICARE

## 2021-08-05 VITALS
WEIGHT: 247.2 LBS | HEIGHT: 71 IN | DIASTOLIC BLOOD PRESSURE: 80 MMHG | BODY MASS INDEX: 34.61 KG/M2 | TEMPERATURE: 97.3 F | RESPIRATION RATE: 18 BRPM | SYSTOLIC BLOOD PRESSURE: 157 MMHG | HEART RATE: 84 BPM

## 2021-08-05 VITALS
HEART RATE: 113 BPM | SYSTOLIC BLOOD PRESSURE: 128 MMHG | DIASTOLIC BLOOD PRESSURE: 83 MMHG | OXYGEN SATURATION: 95 % | BODY MASS INDEX: 34.45 KG/M2 | TEMPERATURE: 97.3 F | WEIGHT: 247 LBS | RESPIRATION RATE: 18 BRPM

## 2021-08-05 DIAGNOSIS — C67.9 MALIGNANT NEOPLASM OF URINARY BLADDER, UNSPECIFIED SITE (HCC): ICD-10-CM

## 2021-08-05 DIAGNOSIS — Z51.11 ENCOUNTER FOR ANTINEOPLASTIC CHEMOTHERAPY: Primary | ICD-10-CM

## 2021-08-05 DIAGNOSIS — C67.9 MALIGNANT NEOPLASM OF URINARY BLADDER, UNSPECIFIED SITE (HCC): Primary | ICD-10-CM

## 2021-08-05 DIAGNOSIS — Z95.828 PORT-A-CATH IN PLACE: ICD-10-CM

## 2021-08-05 LAB
ALBUMIN SERPL-MCNC: 2.4 G/DL (ref 3.5–5)
ALBUMIN/GLOB SERPL: 0.5 {RATIO} (ref 1.1–2.2)
ALP SERPL-CCNC: 53 U/L (ref 45–117)
ALT SERPL-CCNC: 36 U/L (ref 12–78)
ANION GAP SERPL CALC-SCNC: 3 MMOL/L (ref 5–15)
AST SERPL-CCNC: 24 U/L (ref 15–37)
BASOPHILS # BLD: 0 K/UL (ref 0–0.1)
BASOPHILS NFR BLD: 1 % (ref 0–1)
BILIRUB SERPL-MCNC: 0.5 MG/DL (ref 0.2–1)
BUN SERPL-MCNC: 17 MG/DL (ref 6–20)
BUN/CREAT SERPL: 20 (ref 12–20)
CALCIUM SERPL-MCNC: 9.3 MG/DL (ref 8.5–10.1)
CHLORIDE SERPL-SCNC: 109 MMOL/L (ref 97–108)
CO2 SERPL-SCNC: 28 MMOL/L (ref 21–32)
CREAT SERPL-MCNC: 0.87 MG/DL (ref 0.7–1.3)
DIFFERENTIAL METHOD BLD: ABNORMAL
EOSINOPHIL # BLD: 0.1 K/UL (ref 0–0.4)
EOSINOPHIL NFR BLD: 4 % (ref 0–7)
ERYTHROCYTE [DISTWIDTH] IN BLOOD BY AUTOMATED COUNT: 13.8 % (ref 11.5–14.5)
GLOBULIN SER CALC-MCNC: 5 G/DL (ref 2–4)
GLUCOSE SERPL-MCNC: 113 MG/DL (ref 65–100)
HBV CORE IGM SER QL: NONREACTIVE
HBV SURFACE AG SER QL: 0.14 INDEX
HBV SURFACE AG SER QL: NEGATIVE
HCT VFR BLD AUTO: 40.8 % (ref 36.6–50.3)
HGB BLD-MCNC: 13.7 G/DL (ref 12.1–17)
IMM GRANULOCYTES # BLD AUTO: 0 K/UL (ref 0–0.04)
IMM GRANULOCYTES NFR BLD AUTO: 1 % (ref 0–0.5)
LYMPHOCYTES # BLD: 0.5 K/UL (ref 0.8–3.5)
LYMPHOCYTES NFR BLD: 13 % (ref 12–49)
MAGNESIUM SERPL-MCNC: 1.7 MG/DL (ref 1.6–2.4)
MCH RBC QN AUTO: 33.4 PG (ref 26–34)
MCHC RBC AUTO-ENTMCNC: 33.6 G/DL (ref 30–36.5)
MCV RBC AUTO: 99.5 FL (ref 80–99)
MONOCYTES # BLD: 0.4 K/UL (ref 0–1)
MONOCYTES NFR BLD: 12 % (ref 5–13)
NEUTS SEG # BLD: 2.6 K/UL (ref 1.8–8)
NEUTS SEG NFR BLD: 69 % (ref 32–75)
NRBC # BLD: 0 K/UL (ref 0–0.01)
NRBC BLD-RTO: 0 PER 100 WBC
PLATELET # BLD AUTO: 188 K/UL (ref 150–400)
PMV BLD AUTO: 9 FL (ref 8.9–12.9)
POTASSIUM SERPL-SCNC: 3.9 MMOL/L (ref 3.5–5.1)
PROT SERPL-MCNC: 7.4 G/DL (ref 6.4–8.2)
RBC # BLD AUTO: 4.1 M/UL (ref 4.1–5.7)
RBC MORPH BLD: ABNORMAL
SODIUM SERPL-SCNC: 140 MMOL/L (ref 136–145)
WBC # BLD AUTO: 3.6 K/UL (ref 4.1–11.1)

## 2021-08-05 PROCEDURE — 74011000258 HC RX REV CODE- 258: Performed by: INTERNAL MEDICINE

## 2021-08-05 PROCEDURE — G8417 CALC BMI ABV UP PARAM F/U: HCPCS | Performed by: INTERNAL MEDICINE

## 2021-08-05 PROCEDURE — 1101F PT FALLS ASSESS-DOCD LE1/YR: CPT | Performed by: INTERNAL MEDICINE

## 2021-08-05 PROCEDURE — G0463 HOSPITAL OUTPT CLINIC VISIT: HCPCS | Performed by: REGISTERED NURSE

## 2021-08-05 PROCEDURE — 96413 CHEMO IV INFUSION 1 HR: CPT

## 2021-08-05 PROCEDURE — 77030012965 HC NDL HUBR BBMI -A

## 2021-08-05 PROCEDURE — G8427 DOCREV CUR MEDS BY ELIG CLIN: HCPCS | Performed by: INTERNAL MEDICINE

## 2021-08-05 PROCEDURE — 80053 COMPREHEN METABOLIC PANEL: CPT

## 2021-08-05 PROCEDURE — 85025 COMPLETE CBC W/AUTO DIFF WBC: CPT

## 2021-08-05 PROCEDURE — 83735 ASSAY OF MAGNESIUM: CPT

## 2021-08-05 PROCEDURE — 96415 CHEMO IV INFUSION ADDL HR: CPT

## 2021-08-05 PROCEDURE — 74011250636 HC RX REV CODE- 250/636: Performed by: INTERNAL MEDICINE

## 2021-08-05 PROCEDURE — 96361 HYDRATE IV INFUSION ADD-ON: CPT

## 2021-08-05 PROCEDURE — 86705 HEP B CORE ANTIBODY IGM: CPT

## 2021-08-05 PROCEDURE — 99214 OFFICE O/P EST MOD 30 MIN: CPT | Performed by: INTERNAL MEDICINE

## 2021-08-05 PROCEDURE — 36415 COLL VENOUS BLD VENIPUNCTURE: CPT

## 2021-08-05 PROCEDURE — 3017F COLORECTAL CA SCREEN DOC REV: CPT | Performed by: INTERNAL MEDICINE

## 2021-08-05 PROCEDURE — 96417 CHEMO IV INFUS EACH ADDL SEQ: CPT

## 2021-08-05 PROCEDURE — G8536 NO DOC ELDER MAL SCRN: HCPCS | Performed by: INTERNAL MEDICINE

## 2021-08-05 PROCEDURE — G8432 DEP SCR NOT DOC, RNG: HCPCS | Performed by: INTERNAL MEDICINE

## 2021-08-05 PROCEDURE — 87340 HEPATITIS B SURFACE AG IA: CPT

## 2021-08-05 PROCEDURE — 96375 TX/PRO/DX INJ NEW DRUG ADDON: CPT

## 2021-08-05 RX ORDER — SODIUM CHLORIDE 9 MG/ML
25 INJECTION, SOLUTION INTRAVENOUS CONTINUOUS
Status: DISPENSED | OUTPATIENT
Start: 2021-08-05 | End: 2021-08-05

## 2021-08-05 RX ORDER — PROCHLORPERAZINE MALEATE 5 MG
5 TABLET ORAL
Qty: 30 TABLET | Refills: 3 | Status: SHIPPED | OUTPATIENT
Start: 2021-08-05 | End: 2021-11-22

## 2021-08-05 RX ORDER — SODIUM CHLORIDE 0.9 % (FLUSH) 0.9 %
10 SYRINGE (ML) INJECTION AS NEEDED
Status: DISPENSED | OUTPATIENT
Start: 2021-08-05 | End: 2021-08-05

## 2021-08-05 RX ORDER — HEPARIN 100 UNIT/ML
300-500 SYRINGE INTRAVENOUS AS NEEDED
Status: ACTIVE | OUTPATIENT
Start: 2021-08-05 | End: 2021-08-05

## 2021-08-05 RX ORDER — PALONOSETRON 0.05 MG/ML
0.25 INJECTION, SOLUTION INTRAVENOUS ONCE
Status: COMPLETED | OUTPATIENT
Start: 2021-08-05 | End: 2021-08-05

## 2021-08-05 RX ADMIN — Medication 10 ML: at 17:00

## 2021-08-05 RX ADMIN — PALONOSETRON 0.25 MG: 0.25 INJECTION, SOLUTION INTRAVENOUS at 13:46

## 2021-08-05 RX ADMIN — GEMCITABINE 2350 MG: 38 INJECTION, SOLUTION INTRAVENOUS at 13:52

## 2021-08-05 RX ADMIN — CISPLATIN 164.5 MG: 100 INJECTION, SOLUTION INTRAVENOUS at 14:32

## 2021-08-05 RX ADMIN — HEPARIN 500 UNITS: 100 SYRINGE at 17:00

## 2021-08-05 RX ADMIN — DEXAMETHASONE SODIUM PHOSPHATE 12 MG: 4 INJECTION, SOLUTION INTRA-ARTICULAR; INTRALESIONAL; INTRAMUSCULAR; INTRAVENOUS; SOFT TISSUE at 13:20

## 2021-08-05 RX ADMIN — Medication 10 ML: at 09:15

## 2021-08-05 RX ADMIN — MAGNESIUM SULFATE HEPTAHYDRATE: 500 INJECTION, SOLUTION INTRAMUSCULAR; INTRAVENOUS at 14:30

## 2021-08-05 RX ADMIN — MAGNESIUM SULFATE HEPTAHYDRATE: 500 INJECTION, SOLUTION INTRAMUSCULAR; INTRAVENOUS at 11:50

## 2021-08-05 RX ADMIN — SODIUM CHLORIDE 25 ML/HR: 900 INJECTION, SOLUTION INTRAVENOUS at 12:53

## 2021-08-05 RX ADMIN — SODIUM CHLORIDE 150 MG: 900 INJECTION, SOLUTION INTRAVENOUS at 12:55

## 2021-08-05 RX ADMIN — Medication 10 ML: at 09:16

## 2021-08-05 NOTE — PROGRESS NOTES
Pharmacy Note- Chemotherapy Education    Bety Pérez is a  77 y. o.male  diagnosed with bladder cancer here today for Cycle 1, Day 1 chemotherapy counseling. Mr. Camila Garcia is being treated with CISplatin and gemcitabine. Provided education on CISplatin, gemcitabine and premedications - fosaprepitant, palonosetron and dexamethasone. Provided Mr. Camila Garcia with a calendar and handout on supportive care medications and reviewed this in detail. Side effects of chemotherapy reviewed included s/s infection, anemia, appetite changes, thromboyctopenia, fatigue, hair loss/alopecia, bone pain, skin and nail changes, diarrhea/constipation, kidney changes and hearing changes. Patient given ways to manage these side effects and when to contact office. Mr. Camila Garcia verbalized understanding of the information presented and all of the patient's questions were answered.     Bettina Litten, PharmD, BCPS, BCOP    For Pharmacy Admin Tracking Only     CPA in place: No   Recommendation Provided To: Patient/Caregiver: 1 via In person     Intervention Accepted By: Patient/Caregiver: 1   Time Spent (min): 20

## 2021-08-05 NOTE — PROGRESS NOTES
[facility] Chemo Progress Note    [id number]  Arrived to [facility] for  Cisplatin/Gemzar (C1D1) ambulatory in stable condition accompanied by wife. Assessment was completed, no acute issues at this time, no new complaints voiced. Port accessed with 0.75 inch villa needle, with positive blood return. Labs drawn and sent for processing. Port flushed, curos cap applied to end clave. Patient left for medical oncology appointment. 1035 Patient returned to [facility]. Labs WDL for treatment. Medication ordered from pharmacy. Chemotherapy Flowsheet 8/5/2021   Cycle C1D1   Date 8/5/2021   Drug / Regimen Cisplatin/Gemzar   Pre Hydration given   Post Hydration given   Pre Meds given   Notes given     Mr. Darrin Polo vitals were reviewed. Patient Vitals for the past 12 hrs:   Temp Pulse Resp BP   08/05/21 1700  84  (!) 157/80   08/05/21 0911 97.3 °F (36.3 °C) (!) 113 18 128/83         Lab results were obtained and reviewed. Recent Results (from the past 12 hour(s))   CBC WITH AUTOMATED DIFF    Collection Time: 08/05/21  9:24 AM   Result Value Ref Range    WBC 3.6 (L) 4.1 - 11.1 K/uL    RBC 4.10 4. 10 - 5.70 M/uL    HGB 13.7 12.1 - 17.0 g/dL    HCT 40.8 36.6 - 50.3 %    MCV 99.5 (H) 80.0 - 99.0 FL    MCH 33.4 26.0 - 34.0 PG    MCHC 33.6 30.0 - 36.5 g/dL    RDW 13.8 11.5 - 14.5 %    PLATELET 471 671 - 248 K/uL    MPV 9.0 8.9 - 12.9 FL    NRBC 0.0 0  WBC    ABSOLUTE NRBC 0.00 0.00 - 0.01 K/uL    NEUTROPHILS 69 32 - 75 %    LYMPHOCYTES 13 12 - 49 %    MONOCYTES 12 5 - 13 %    EOSINOPHILS 4 0 - 7 %    BASOPHILS 1 0 - 1 %    IMMATURE GRANULOCYTES 1 (H) 0.0 - 0.5 %    ABS. NEUTROPHILS 2.6 1.8 - 8.0 K/UL    ABS. LYMPHOCYTES 0.5 (L) 0.8 - 3.5 K/UL    ABS. MONOCYTES 0.4 0.0 - 1.0 K/UL    ABS. EOSINOPHILS 0.1 0.0 - 0.4 K/UL    ABS. BASOPHILS 0.0 0.0 - 0.1 K/UL    ABS. IMM.  GRANS. 0.0 0.00 - 0.04 K/UL    DF SMEAR SCANNED      RBC COMMENTS NORMOCYTIC, NORMOCHROMIC     METABOLIC PANEL, COMPREHENSIVE    Collection Time: 08/05/21  9:24 AM   Result Value Ref Range    Sodium 140 136 - 145 mmol/L    Potassium 3.9 3.5 - 5.1 mmol/L    Chloride 109 (H) 97 - 108 mmol/L    CO2 28 21 - 32 mmol/L    Anion gap 3 (L) 5 - 15 mmol/L    Glucose 113 (H) 65 - 100 mg/dL    BUN 17 6 - 20 MG/DL    Creatinine 0.87 0.70 - 1.30 MG/DL    BUN/Creatinine ratio 20 12 - 20      GFR est AA >60 >60 ml/min/1.73m2    GFR est non-AA >60 >60 ml/min/1.73m2    Calcium 9.3 8.5 - 10.1 MG/DL    Bilirubin, total 0.5 0.2 - 1.0 MG/DL    ALT (SGPT) 36 12 - 78 U/L    AST (SGOT) 24 15 - 37 U/L    Alk. phosphatase 53 45 - 117 U/L    Protein, total 7.4 6.4 - 8.2 g/dL    Albumin 2.4 (L) 3.5 - 5.0 g/dL    Globulin 5.0 (H) 2.0 - 4.0 g/dL    A-G Ratio 0.5 (L) 1.1 - 2.2     MAGNESIUM    Collection Time: 08/05/21  9:24 AM   Result Value Ref Range    Magnesium 1.7 1.6 - 2.4 mg/dL   HEP B SURFACE AG    Collection Time: 08/05/21  9:24 AM   Result Value Ref Range    Hepatitis B surface Ag 0.14 Index    Hep B surface Ag Interp. Negative NEG     HEPATITIS B CORE AB, IGM    Collection Time: 08/05/21  9:24 AM   Result Value Ref Range    Hepatitis B core, IgM NONREACTIVE NR         Pre-medications  were administered as ordered and chemotherapy was initiated.   Medications Administered     0.9% sodium chloride 1,000 mL with potassium chloride 10 mEq, magnesium sulfate 2 g infusion     Admin Date  08/05/2021 Action  New Bag Dose   Rate  1,000 mL/hr Route  IntraVENous Administered By  Celeste Arellaon RN           Admin Date  08/05/2021 Action  New Bag Dose   Rate  1,000 mL/hr Route  IntraVENous Administered By  Celeste Arellano RN          0.9% sodium chloride infusion     Admin Date  08/05/2021 Action  New Bag Dose  25 mL/hr Rate  25 mL/hr Route  IntraVENous Administered By  Celeste Arellano RN          CISplatin (PLATINOL) 164.5 mg in 0.9% sodium chloride 500 mL, overfill volume 50 mL chemo infusion     Admin Date  08/05/2021 Action  New Bag Dose  164.5 mg Rate  357.3 mL/hr Route  IntraVENous Administered By  Haaj Liang Gaye Ye RN          dexamethasone (DECADRON) 12 mg in 0.9% sodium chloride 50 mL, overfill volume 5 mL IVPB     Admin Date  08/05/2021 Action  New Bag Dose  12 mg Rate  232 mL/hr Route  IntraVENous Administered By  Jayesh Levnie RN          fosaprepitant (EMEND) 150 mg in 0.9% sodium chloride 150 mL IVPB     Admin Date  08/05/2021 Action  New Bag Dose  150 mg Rate  450 mL/hr Route  IntraVENous Administered By  Jayesh Levine RN          gemcitabine (GEMZAR) 2,350 mg in 0.9% sodium chloride 250 mL, overfill volume 25 mL chemo infusion     Admin Date  08/05/2021 Action  New Bag Dose  2,350 mg Rate  673.6 mL/hr Route  IntraVENous Administered By  Jayesh Levine RN          heparin (porcine) pf 300-500 Units     Admin Date  08/05/2021 Action  Given Dose  500 Units Route  InterCATHeter Administered By  Jayesh Levine RN          palonosetron HCl (ALOXI) injection 0.25 mg     Admin Date  08/05/2021 Action  Given Dose  0.25 mg Route  IntraVENous Administered By  Jayesh Levine RN          sodium chloride (NS) flush 10 mL     Admin Date  08/05/2021 Action  Given Dose  10 mL Route  IntraVENous Administered By  Jayesh Levine RN           Admin Date  08/05/2021 Action  Given Dose  10 mL Route  IntraVENous Administered By  Jayesh Levine RN           Admin Date  08/05/2021 Action  Given Dose  10 mL Route  IntraVENous Administered By  Jayesh Levine RN              1700 Patient tolerated treatment well. Port maintained positive blood return throughout treatment. Port flushed, heparinized and de accessed per protocol. Patient was discharged from [place] in stable condition. Patient is aware of next appointment.     Future Appointments   Date Time Provider Gregorio Ibarra   8/12/2021  9:00 AM G3 TD LONG [address] [facility]   8/26/2021 11:30 AM E4 TD LONG [facility]   8/26/2021 11:45 AM Tash Diaz, ELISABET [address] BS AMB   9/2/2021  1:00 PM F4 TD [place] [address] [facility] [place]  August 5, 2021

## 2021-08-05 NOTE — PROGRESS NOTES
[facility] [place] [facility]  [address], [address]  W: [phone]  F: [phone]    Reason for Visit:   Chrissie Leos is a 77 y.o. male who is seen in follow-up of Muscle invasive bladder cancer- Mixed histology    Treatment History:   · 3/1/2021: CT IVP: Multiple abdominal, iliac, mediastinal nodes unchanged from 2019 consistent with h/o sarcoidosis, Thickened R lateral bladder wall. · 6/23/2021: Cystoscopy and TURBT- Papillary changes were noted within the prostatic urethra ,  papillary tumors seen emanating from the surface of the left hemitrigone, There were also diffuse changes in the floor of the bladder- Low grade urothelial carcinoma of the prostatic urethra, R lateral bladder wall with HG Muscle invasive urothelial carcinoma and squamous differentiation+ CIS, Left brenda trigone HG non invasive urothelial carcinoma  · 8/5/21: Cisplatin + Gemzar     History of Present Illness:   Patient is a 77 y.o. male with PMH as below including sarcoidosis who is seen for evaluation of bladder cancer. He has a history of nonmuscle invasive bladder cancer in 2015, underwent 2 induction courses of BCG, had a non muscle invasive recurrence in 2019 and had re induction with BCG. Cystoscopy 6/2020 at [facility] did not show any recurrence. In March 2021 he had a positive FISH and was seen by Dr. Cristian Stone. Had a CT IVP 3/2021 which showed some Bladder thickening. 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 He comes today for cycle 1 of Cisplatin + Gemzar. He overall feels well. His pain with urination has resolved. He has no other pain. He has no neuropathy. He denies nausea/vomiting or diarrhea/constipation. No other complaints today.      He sees Dr. Shira Cannon for Sarcoidosis      Mother and sister had breast cancer and brother had kidney cancer     Past Medical History:   Diagnosis Date    Arrhythmia     LBBB    Arthritis     Autoimmune disease ([address]     PULMONARY SARCOIDOSIS    Cancer ([address]     scc top of head and nose    Cancer ([address]     BLADDER    COVID-19 vaccine series completed     [facility] VACCINE 21 AND 21    Hypertension     Other unknown and unspecified cause of morbidity or mortality     history of pulmonary sarcoid     Other unknown and unspecified cause of morbidity or mortality     depression, anxiety    Posterior cervical adenopathy, benign 2018    Psychiatric disorder     DEPRESSION AND ANXIETY    Pulmonary sarcoidosis ([address]     Unspecified sleep apnea     cpap      Past Surgical History:   Procedure Laterality Date    HX CATARACT REMOVAL Bilateral     HX HERNIA REPAIR Right AS A CHILD    INGUINAL    HX HERNIA REPAIR Left     INGUIBAL    HX KNEE REPLACEMENT Left     HX ORTHOPAEDIC Right     BONE SPURS REMOVED FROM FOOT    HX OTHER SURGICAL      scc removed top of head and nose    HX OTHER SURGICAL  2005    benign lump removed from thyroid    HX OTHER SURGICAL Right 2020    SKIN CANCER RELMOVED FROM LEG    HX UROLOGICAL  2020    CYSTO    IR INSERT TUNL CVC W PORT OVER 5 YEARS  2021    ME CARDIAC SURG PROCEDURE UNLIST  2021    CARDIAC CATH    ME REVISE KNEE JOINT REPLACE,ALL PARTS Left       Social History     Tobacco Use    Smoking status: Former Smoker     Packs/day: 0.50     Years: 2.00     Pack years: 1.00     Quit date: 1965     Years since quittin.9    Smokeless tobacco: Never Used   Substance Use Topics    Alcohol use:  Yes     Alcohol/week: 14.0 standard drinks     Types: 12 Glasses of wine, 2 Shots of liquor per week      Family History   Problem Relation Age of Onset    Cancer Mother         breast with mets    Dementia Mother     Heart Disease Father     Cancer Sister         breast    Lung Disease Brother     No Known Problems Brother     Anesth Problems Neg Hx      Current Outpatient Medications   Medication Sig    prochlorperazine (Compazine) 5 mg tablet Take 1 Tablet by mouth every six (6) hours as needed for Nausea or Vomiting.  HYDROcodone-acetaminophen (NORCO) 7.5-325 mg per tablet Take  by mouth.  dexAMETHasone (DECADRON) 4 mg tablet Take 2 tabs (8mg) once daily on days 2 & 3 of chemotherapy    ondansetron (ZOFRAN ODT) 8 mg disintegrating tablet Take 1 Tablet by mouth every eight (8) hours as needed for Nausea or Vomiting.  ondansetron (ZOFRAN ODT) 8 mg disintegrating tablet Take 1 Tablet by mouth every eight (8) hours as needed for Nausea or Vomiting.  lidocaine-prilocaine (EMLA) topical cream Apply  to affected area as needed for Pain. Apply a thin layer over port a cath 30-60 minutes prior to port access    lisinopriL (PRINIVIL, ZESTRIL) 5 mg tablet Take 5 mg by mouth two (2) times a day.  zolpidem (AMBIEN) 10 mg tablet Take 5 mg by mouth nightly as needed for Sleep.  buPROPion SR (WELLBUTRIN SR) 100 mg SR tablet Take 100 mg by mouth daily.  acetaminophen (Tylenol Extra Strength) 500 mg tablet Take 1,000 mg by mouth every six (6) hours as needed for Pain.  tamsulosin (FLOMAX) 0.4 mg capsule Take 0.4 mg by mouth nightly.  busPIRone (BUSPAR) 5 mg tablet Take 5 mg by mouth daily.  simvastatin (ZOCOR) 20 mg tablet Take 20 mg by mouth nightly.  escitalopram oxalate (LEXAPRO) 10 mg tablet Take 10 mg by mouth daily.  multivitamin (ONE A DAY) tablet Take 1 Tab by mouth daily. No current facility-administered medications for this visit.      Facility-Administered Medications Ordered in Other Visits   Medication Dose Route Frequency    sodium chloride (NS) flush 10 mL  10 mL IntraVENous PRN    heparin (porcine) pf 300-500 Units  300-500 Units InterCATHeter PRN      Allergies   Allergen Reactions    Pcn [Penicillins] Hives        Review of Systems: A complete review of systems was obtained, negative except as described above. Physical Exam:     Visit Vitals  /83 (BP 1 Location: Left upper arm, BP Patient Position: Sitting)   Pulse (!) 113   Temp 97.3 °F (36.3 °C)   Resp 18   Wt 247 lb (112 kg)   SpO2 95%   BMI 34.45 kg/m²     ECOG PS: 1  General: No distress  Eyes: PERRL, anicteric sclerae  HENT: Atraumatic, PAC in place   Neck: Supple  Skin: No rashes, ecchymoses, or petechiae. Normal temperature, turgor, and texture. Psych: Alert, oriented, appropriate affect, normal judgment/insight    Results:     Lab Results   Component Value Date/Time    WBC 3.6 (L) 08/05/2021 09:24 AM    HGB 13.7 08/05/2021 09:24 AM    HCT 40.8 08/05/2021 09:24 AM    PLATELET 990 21/05/6454 09:24 AM    MCV 99.5 (H) 08/05/2021 09:24 AM    ABS. NEUTROPHILS 2.6 08/05/2021 09:24 AM     Lab Results   Component Value Date/Time    Sodium 139 06/24/2021 04:41 AM    Potassium 4.5 06/24/2021 04:41 AM    Chloride 106 06/24/2021 04:41 AM    CO2 29 06/24/2021 04:41 AM    Glucose 103 (H) 06/24/2021 04:41 AM    BUN 20 06/24/2021 04:41 AM    Creatinine 0.95 06/24/2021 04:41 AM    GFR est AA >60 06/24/2021 04:41 AM    GFR est non-AA >60 06/24/2021 04:41 AM    Calcium 9.1 06/24/2021 04:41 AM    Glucose (POC) 93 12/01/2009 07:10 AM     Lab Results   Component Value Date/Time    Bilirubin, total 0.5 06/17/2021 09:56 AM    ALT (SGPT) 54 06/17/2021 09:56 AM    Alk. phosphatase 48 06/17/2021 09:56 AM    Protein, total 7.0 06/17/2021 09:56 AM    Albumin 3.7 06/17/2021 09:56 AM    Globulin 3.3 06/17/2021 09:56 AM       External   Records reviewed and summarized above. Pathology report(s) reviewed above. Radiology report(s) reviewed above. CT CAP 7/19/21:   IMPRESSION  1. While the bladder is decompressed by Juarez is thick-walled and irregular  2. No evidence of metastatic disease to the abdomen or pelvis  3.  Extensive bilateral hilar and mediastinal adenopathy with patchy perihilar  airspace disease with associated nodular peribronchial cuffing. Findings can be  seen with sarcoidosis. Differentiating adenopathy from sarcoidosis for  metastatic disease is difficult     Assessment:   1) Muscle invasive bladder cancer- With squamous differentiation     S/p TURBT 6/23/2021- Path reviewed   T2N0M0     I discussed the rationale for neoadjuvant chemotherapy which has level 1 evidence supporting improved survival in pure urothelial cancers. See prior office notes regarding discussion of treatment plan. We discussed proceeding with NAC, restaging in 2 cycles given mixed histology and considering split dosing if he develops grade 3 or more toxicities    Today is cycle 1 day 1 of NAC Cisplatin + Gemzar x 4 cycles     CT imaging obtained 7/19/21 was personally reviewed & shows no evidence of metastatic disease. B/l hilar & mediastinal adenopathy is likely secondary to sarcoidosis but we will follow this closely. He had a second opinion with [place] [facility] who agreed with above plan  He is also seeking a second opinion at [facility] urology      2) Extensive low grade non muscle invasive urothelial cancer    3) Sarcoidosis  Follows with pulmonary    4) Obesity    5) Psychosocial  Coping well  Supportive wife Vaishali  Met with SW    6) Encounter for high risk drugs like chemotherapy  Reviewed side effects & anti-emetics  Met with pharmD     Plan:     · Proceed today with cycle 1 day 1 of Cisplatin (70mg/m2 on day 1) and Gemzar (1000mg/m2 on days 1, 8)  NAC for 4 cycles   · Dexamethasone days 2, 3  · zofran / compazine prn  · Labs per protocol   · Scans after cycle 2, Cysto after cycle 2  · Send records to pulmonologist per patient request     RTC in 3 weeks for cycle 2     I appreciate the opportunity to participate in Mr. Magdiel Griggs care. I performed a history and physical examination of the patient and discussed his management with the NPP.  I reviewed the NPP note and agree with the documented findings and plan of care    Mixed histology MIBC  Starting NAC today  No adenopathy, no hematuria  Notes from [facility] reviewed  Plan on interim scans    Signed By: Rosa Tompkins MD

## 2021-08-05 NOTE — PROGRESS NOTES
Rohit Sanches is a 77 y.o. male    Chief Complaint   Patient presents with    Chemotherapy     Muscle invasive bladder cancer- Mixed histology       1. Have you been to the ER, urgent care clinic since your last visit? Hospitalized since your last visit? No    2. Have you seen or consulted any other health care providers outside of the [address] [place] [address] since your last visit? Include any pap smears or colon screening.  ENT, Cardiology, Pulmonology, PCP
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
## 2021-08-06 ENCOUNTER — TELEPHONE (OUTPATIENT)
Dept: ONCOLOGY | Age: 76
End: 2021-08-06

## 2021-08-06 DIAGNOSIS — C67.9 MALIGNANT NEOPLASM OF URINARY BLADDER, UNSPECIFIED SITE (HCC): ICD-10-CM

## 2021-08-06 RX ORDER — DEXAMETHASONE 4 MG/1
TABLET ORAL
Qty: 16 TABLET | Refills: 0 | Status: SHIPPED | OUTPATIENT
Start: 2021-08-06 | End: 2021-09-02 | Stop reason: SDUPTHER

## 2021-08-06 RX ORDER — LIDOCAINE AND PRILOCAINE 25; 25 MG/G; MG/G
CREAM TOPICAL AS NEEDED
Qty: 30 G | Refills: 3 | Status: SHIPPED
Start: 2021-08-06 | End: 2021-12-21

## 2021-08-06 RX ORDER — ONDANSETRON 8 MG/1
8 TABLET, ORALLY DISINTEGRATING ORAL
Qty: 30 TABLET | Refills: 3 | Status: SHIPPED | OUTPATIENT
Start: 2021-08-06 | End: 2021-11-22

## 2021-08-06 NOTE — TELEPHONE ENCOUNTER
8/6/21 1120pm - Called patient, ID verified x2. Patient informed this RN that he went to  his prescriptions yesterday and his dexamethasone was not there and Zofran was no there. Patient also requesting a new prescription of emla be sent to his pharmacy as well. This RN will notify NP. Patient has no other questions or concerns at this time.

## 2021-08-11 ENCOUNTER — HOSPITAL ENCOUNTER (OUTPATIENT)
Dept: INFUSION THERAPY | Age: 76
Discharge: HOME | End: 2021-08-11
Payer: MEDICARE

## 2021-08-11 VITALS
SYSTOLIC BLOOD PRESSURE: 128 MMHG | DIASTOLIC BLOOD PRESSURE: 85 MMHG | HEART RATE: 81 BPM | TEMPERATURE: 97.1 F | RESPIRATION RATE: 18 BRPM

## 2021-08-11 LAB
ANION GAP SERPL CALC-SCNC: 5 MMOL/L (ref 5–15)
BASOPHILS # BLD: 0 K/UL (ref 0–0.1)
BASOPHILS NFR BLD: 1 % (ref 0–1)
BUN SERPL-MCNC: 23 MG/DL (ref 6–20)
BUN/CREAT SERPL: 25 (ref 12–20)
CALCIUM SERPL-MCNC: 8.6 MG/DL (ref 8.5–10.1)
CHLORIDE SERPL-SCNC: 104 MMOL/L (ref 97–108)
CO2 SERPL-SCNC: 25 MMOL/L (ref 21–32)
CREAT SERPL-MCNC: 0.93 MG/DL (ref 0.7–1.3)
DIFFERENTIAL METHOD BLD: ABNORMAL
EOSINOPHIL # BLD: 0 K/UL (ref 0–0.4)
EOSINOPHIL NFR BLD: 1 % (ref 0–7)
ERYTHROCYTE [DISTWIDTH] IN BLOOD BY AUTOMATED COUNT: 13 % (ref 11.5–14.5)
GLUCOSE SERPL-MCNC: 87 MG/DL (ref 65–100)
HCT VFR BLD AUTO: 36.4 % (ref 36.6–50.3)
HGB BLD-MCNC: 12.4 G/DL (ref 12.1–17)
IMM GRANULOCYTES # BLD AUTO: 0 K/UL (ref 0–0.04)
IMM GRANULOCYTES NFR BLD AUTO: 1 % (ref 0–0.5)
LYMPHOCYTES # BLD: 0.3 K/UL (ref 0.8–3.5)
LYMPHOCYTES NFR BLD: 11 % (ref 12–49)
MAGNESIUM SERPL-MCNC: 2 MG/DL (ref 1.6–2.4)
MCH RBC QN AUTO: 33.6 PG (ref 26–34)
MCHC RBC AUTO-ENTMCNC: 34.1 G/DL (ref 30–36.5)
MCV RBC AUTO: 98.6 FL (ref 80–99)
MONOCYTES # BLD: 0.2 K/UL (ref 0–1)
MONOCYTES NFR BLD: 7 % (ref 5–13)
NEUTS SEG # BLD: 2.6 K/UL (ref 1.8–8)
NEUTS SEG NFR BLD: 79 % (ref 32–75)
NRBC # BLD: 0 K/UL (ref 0–0.01)
NRBC BLD-RTO: 0 PER 100 WBC
PLATELET # BLD AUTO: 104 K/UL (ref 150–400)
PMV BLD AUTO: 9.4 FL (ref 8.9–12.9)
POTASSIUM SERPL-SCNC: 3.1 MMOL/L (ref 3.5–5.1)
RBC # BLD AUTO: 3.69 M/UL (ref 4.1–5.7)
RBC MORPH BLD: ABNORMAL
SODIUM SERPL-SCNC: 134 MMOL/L (ref 136–145)
WBC # BLD AUTO: 3.1 K/UL (ref 4.1–11.1)

## 2021-08-11 PROCEDURE — 80048 BASIC METABOLIC PNL TOTAL CA: CPT

## 2021-08-11 PROCEDURE — 83735 ASSAY OF MAGNESIUM: CPT

## 2021-08-11 PROCEDURE — 85025 COMPLETE CBC W/AUTO DIFF WBC: CPT

## 2021-08-11 PROCEDURE — 36415 COLL VENOUS BLD VENIPUNCTURE: CPT

## 2021-08-12 ENCOUNTER — HOSPITAL ENCOUNTER (OUTPATIENT)
Dept: INFUSION THERAPY | Age: 76
Discharge: HOME | End: 2021-08-12
Payer: MEDICARE

## 2021-08-12 VITALS
WEIGHT: 238 LBS | TEMPERATURE: 97 F | SYSTOLIC BLOOD PRESSURE: 112 MMHG | HEIGHT: 71 IN | BODY MASS INDEX: 33.32 KG/M2 | HEART RATE: 69 BPM | DIASTOLIC BLOOD PRESSURE: 67 MMHG | RESPIRATION RATE: 18 BRPM

## 2021-08-12 DIAGNOSIS — C67.9 MALIGNANT NEOPLASM OF URINARY BLADDER, UNSPECIFIED SITE (HCC): Primary | ICD-10-CM

## 2021-08-12 PROCEDURE — 74011250636 HC RX REV CODE- 250/636: Performed by: INTERNAL MEDICINE

## 2021-08-12 PROCEDURE — 77030012965 HC NDL HUBR BBMI -A

## 2021-08-12 PROCEDURE — 96375 TX/PRO/DX INJ NEW DRUG ADDON: CPT

## 2021-08-12 PROCEDURE — 96361 HYDRATE IV INFUSION ADD-ON: CPT

## 2021-08-12 PROCEDURE — 96413 CHEMO IV INFUSION 1 HR: CPT

## 2021-08-12 PROCEDURE — 74011250636 HC RX REV CODE- 250/636: Performed by: REGISTERED NURSE

## 2021-08-12 PROCEDURE — 74011250637 HC RX REV CODE- 250/637: Performed by: REGISTERED NURSE

## 2021-08-12 RX ORDER — SODIUM CHLORIDE 0.9 % (FLUSH) 0.9 %
10 SYRINGE (ML) INJECTION AS NEEDED
Status: DISPENSED | OUTPATIENT
Start: 2021-08-12 | End: 2021-08-12

## 2021-08-12 RX ORDER — SODIUM CHLORIDE 9 MG/ML
25 INJECTION, SOLUTION INTRAVENOUS CONTINUOUS
Status: DISPENSED | OUTPATIENT
Start: 2021-08-12 | End: 2021-08-12

## 2021-08-12 RX ORDER — POTASSIUM CHLORIDE 750 MG/1
40 TABLET, FILM COATED, EXTENDED RELEASE ORAL
Status: COMPLETED | OUTPATIENT
Start: 2021-08-12 | End: 2021-08-12

## 2021-08-12 RX ORDER — HEPARIN 100 UNIT/ML
300-500 SYRINGE INTRAVENOUS AS NEEDED
Status: DISCONTINUED | OUTPATIENT
Start: 2021-08-12 | End: 2021-08-13 | Stop reason: HOSPADM

## 2021-08-12 RX ORDER — ONDANSETRON 2 MG/ML
8 INJECTION INTRAMUSCULAR; INTRAVENOUS ONCE
Status: COMPLETED | OUTPATIENT
Start: 2021-08-12 | End: 2021-08-12

## 2021-08-12 RX ADMIN — GEMCITABINE 2350 MG: 38 INJECTION, SOLUTION INTRAVENOUS at 10:24

## 2021-08-12 RX ADMIN — SODIUM CHLORIDE 500 ML: 900 INJECTION, SOLUTION INTRAVENOUS at 10:30

## 2021-08-12 RX ADMIN — HEPARIN 500 UNITS: 100 SYRINGE at 11:20

## 2021-08-12 RX ADMIN — Medication 10 ML: at 11:20

## 2021-08-12 RX ADMIN — POTASSIUM CHLORIDE 40 MEQ: 750 TABLET, FILM COATED, EXTENDED RELEASE ORAL at 10:29

## 2021-08-12 RX ADMIN — ONDANSETRON 8 MG: 2 INJECTION INTRAMUSCULAR; INTRAVENOUS at 09:56

## 2021-08-12 RX ADMIN — SODIUM CHLORIDE 25 ML/HR: 900 INJECTION, SOLUTION INTRAVENOUS at 09:57

## 2021-08-20 ENCOUNTER — TELEPHONE (OUTPATIENT)
Dept: ONCOLOGY | Age: 76
End: 2021-08-20

## 2021-08-20 RX ORDER — ALBUTEROL SULFATE 0.83 MG/ML
2.5 SOLUTION RESPIRATORY (INHALATION) AS NEEDED
Status: CANCELLED
Start: 2021-08-26

## 2021-08-20 RX ORDER — HYDROCORTISONE SODIUM SUCCINATE 100 MG/2ML
100 INJECTION, POWDER, FOR SOLUTION INTRAMUSCULAR; INTRAVENOUS AS NEEDED
Status: CANCELLED | OUTPATIENT
Start: 2021-09-09

## 2021-08-20 RX ORDER — DIPHENHYDRAMINE HYDROCHLORIDE 50 MG/ML
50 INJECTION, SOLUTION INTRAMUSCULAR; INTRAVENOUS AS NEEDED
Status: CANCELLED
Start: 2021-08-26

## 2021-08-20 RX ORDER — HYDROCORTISONE SODIUM SUCCINATE 100 MG/2ML
100 INJECTION, POWDER, FOR SOLUTION INTRAMUSCULAR; INTRAVENOUS AS NEEDED
Status: CANCELLED | OUTPATIENT
Start: 2021-08-26

## 2021-08-20 RX ORDER — ONDANSETRON 2 MG/ML
8 INJECTION INTRAMUSCULAR; INTRAVENOUS AS NEEDED
Status: CANCELLED | OUTPATIENT
Start: 2021-08-26

## 2021-08-20 RX ORDER — ONDANSETRON 2 MG/ML
8 INJECTION INTRAMUSCULAR; INTRAVENOUS ONCE
Status: CANCELLED | OUTPATIENT
Start: 2021-09-09 | End: 2021-09-02

## 2021-08-20 RX ORDER — DIPHENHYDRAMINE HYDROCHLORIDE 50 MG/ML
25 INJECTION, SOLUTION INTRAMUSCULAR; INTRAVENOUS AS NEEDED
Status: CANCELLED
Start: 2021-08-26

## 2021-08-20 RX ORDER — ACETAMINOPHEN 325 MG/1
650 TABLET ORAL AS NEEDED
Status: CANCELLED
Start: 2021-08-26

## 2021-08-20 RX ORDER — ONDANSETRON 2 MG/ML
8 INJECTION INTRAMUSCULAR; INTRAVENOUS AS NEEDED
Status: CANCELLED | OUTPATIENT
Start: 2021-09-09

## 2021-08-20 RX ORDER — SODIUM CHLORIDE 9 MG/ML
10 INJECTION INTRAMUSCULAR; INTRAVENOUS; SUBCUTANEOUS AS NEEDED
Status: CANCELLED | OUTPATIENT
Start: 2021-09-09

## 2021-08-20 RX ORDER — PALONOSETRON 0.05 MG/ML
0.25 INJECTION, SOLUTION INTRAVENOUS ONCE
Status: CANCELLED | OUTPATIENT
Start: 2021-08-26 | End: 2021-08-26

## 2021-08-20 RX ORDER — ACETAMINOPHEN 325 MG/1
650 TABLET ORAL AS NEEDED
Status: CANCELLED
Start: 2021-09-09

## 2021-08-20 RX ORDER — SODIUM CHLORIDE 9 MG/ML
25 INJECTION, SOLUTION INTRAVENOUS CONTINUOUS
Status: CANCELLED | OUTPATIENT
Start: 2021-09-09

## 2021-08-20 RX ORDER — DIPHENHYDRAMINE HYDROCHLORIDE 50 MG/ML
50 INJECTION, SOLUTION INTRAMUSCULAR; INTRAVENOUS AS NEEDED
Status: CANCELLED
Start: 2021-09-09

## 2021-08-20 RX ORDER — DIPHENHYDRAMINE HYDROCHLORIDE 50 MG/ML
25 INJECTION, SOLUTION INTRAMUSCULAR; INTRAVENOUS AS NEEDED
Status: CANCELLED
Start: 2021-09-09

## 2021-08-20 RX ORDER — SODIUM CHLORIDE 9 MG/ML
25 INJECTION, SOLUTION INTRAVENOUS CONTINUOUS
Status: CANCELLED | OUTPATIENT
Start: 2021-08-26

## 2021-08-20 RX ORDER — ALBUTEROL SULFATE 0.83 MG/ML
2.5 SOLUTION RESPIRATORY (INHALATION) AS NEEDED
Status: CANCELLED
Start: 2021-09-09

## 2021-08-20 RX ORDER — HEPARIN 100 UNIT/ML
300-500 SYRINGE INTRAVENOUS AS NEEDED
Status: CANCELLED
Start: 2021-09-09

## 2021-08-20 RX ORDER — EPINEPHRINE 1 MG/ML
0.3 INJECTION, SOLUTION, CONCENTRATE INTRAVENOUS AS NEEDED
Status: CANCELLED | OUTPATIENT
Start: 2021-08-26

## 2021-08-20 RX ORDER — HEPARIN 100 UNIT/ML
300-500 SYRINGE INTRAVENOUS AS NEEDED
Status: CANCELLED
Start: 2021-08-26

## 2021-08-20 RX ORDER — SODIUM CHLORIDE 0.9 % (FLUSH) 0.9 %
10 SYRINGE (ML) INJECTION AS NEEDED
Status: CANCELLED | OUTPATIENT
Start: 2021-09-09

## 2021-08-20 RX ORDER — SODIUM CHLORIDE 0.9 % (FLUSH) 0.9 %
10 SYRINGE (ML) INJECTION AS NEEDED
Status: CANCELLED | OUTPATIENT
Start: 2021-08-26

## 2021-08-20 RX ORDER — SODIUM CHLORIDE 9 MG/ML
10 INJECTION INTRAMUSCULAR; INTRAVENOUS; SUBCUTANEOUS AS NEEDED
Status: CANCELLED | OUTPATIENT
Start: 2021-08-26

## 2021-08-20 RX ORDER — EPINEPHRINE 1 MG/ML
0.3 INJECTION, SOLUTION, CONCENTRATE INTRAVENOUS AS NEEDED
Status: CANCELLED | OUTPATIENT
Start: 2021-09-09

## 2021-08-20 NOTE — TELEPHONE ENCOUNTER
Pt wife called and stated Mr Franklin Magana is being admitted to an ER in [place], [place]. (they are on vacation). Pt has been having lots of complications. He is currently getting Ct done of his stomach.     # [phone]

## 2021-08-23 ENCOUNTER — TELEPHONE (OUTPATIENT)
Dept: ONCOLOGY | Age: 76
End: 2021-08-23

## 2021-08-23 NOTE — TELEPHONE ENCOUNTER
Called pt, no answer.    LVM stating we would like to see him office this week to decide on treatment [facility] Hospital Medicine Progress Note    Primary Team: [facility] Hospitalist Team A  Attending Physician: Anayeli Madden MD  Resident: Kostas  Intern: Kalia    Subjective:      Ms. Watson is a 89yo female admitted to [facility] Internal Medicine on for pre-op prior to hip arthroplasty performed on 11/10 at [facility].     This morning, Ms Watson endorses feeling really well. She has no pain but endorses minimal soreness. She is voiding and stooling. She has no complaints and slept well. Her incision site is clean, dry, and intact. She states her appetite has improved, will discontinue fluids in preparation for discharge today as her JENA has resolved. No reports of afib overnight. Has been approved for SNF, will discharge today. Denies Fever, Chills, SOB, or Abd pain.     Objective:     Last 24 Hour Vital Signs:  BP  Min: 103/55  Max: 158/71  Temp  Av.7 °F (36.5 °C)  Min: 97.4 °F (36.3 °C)  Max: 98 °F (36.7 °C)  Pulse  Av.7  Min: 62  Max: 110  Resp  Av.2  Min: 17  Max: 20  SpO2  Av.3 %  Min: 95 %  Max: 98 %  I/O last 3 completed shifts:  In: 1587.5 [P.O.:120; I.V.:1467.5]  Out: 600 [Urine:600]    Physical Examination:  General appearance: alert, appears stated age and cooperative  Head: Normocephalic, without obvious abnormality, atraumatic  Eyes: conjunctivae/corneas clear. EOM's intact.  Throat: lips, mucosa, and tongue normal; teeth and gums normal, MMM  Lungs: clear to auscultation bilaterally, unlabored respirations, symmetric  Heart:regular rate and rhythm, S1, S2 normal  Abdomen: soft, non-tender; bowel sounds normal  Extremities: extremities normal, atraumatic, no cyanosis or edema, right hip tender and immobilized, incision site clean, dry, and intact  Pulses: 2+ and symmetric  Skin: Skin color, texture, turgor normal. No rashes or lesions  Neurologic: Grossly normal, A&Ox4       Laboratory:  Laboratory Data Reviewed: yes  Pertinent Findings:  Recent Labs   Lab 18  1998  18  7569  11/13/18  0446 11/14/18  0456   WBC 11.83   < > 14.20* 11.51 10.02   HGB 13.2   < > 10.4* 10.9* 10.6*   HCT 40.8   < > 32.8* 34.3* 33.3*      < > 223 280 376*   MCV 91   < > 91 92 91   RDW 12.6   < > 12.8 12.7 12.8      < > 134* 133* 136   K 3.6   < > 4.2 3.8 4.0      < > 99 100 105   CO2 22*   < > 24 21* 22*   BUN 15   < > 26* 46* 31*   CREATININE 0.7   < > 1.0 1.5* 0.8   *   < > 104 101 114*   CALCIUM 8.9   < > 8.9 8.7 8.8   PROT 6.7  --   --   --   --    ALBUMIN 3.6  --   --   --   --    AST 18  --   --   --   --    ALT 12  --   --   --   --    ALKPHOS 115  --   --   --   --    BILITOT 0.6  --   --   --   --     < > = values in this interval not displayed.      Trended Cardiac Data:   Recent Labs   Lab 11/11/18  0347 11/11/18  0450 11/11/18  0930   TROPONINI 0.046* 0.053* 0.039*        Other Results:  EKG (my interpretation): sinus tachycardia, t-wave flattening, normal axis, PVCs    Radiology Data Reviewed: yes  Pertinent Findings:  11/10/18: Postsurgical changes of recent hip arthroplasty procedure. The orthopedic hardware appears in good position and alignment without adjacent fracture.    Current Medications:     Infusions:   sodium chloride 0.9% 75 mL/hr at 11/13/18 2212        Scheduled:   enoxaparin  30 mg Subcutaneous Daily    morphine  15 mg Oral Q12H    mupirocin  1 g Nasal BID    polyethylene glycol  17 g Oral Daily    senna-docusate 8.6-50 mg  1 tablet Oral BID    tuberculin  5 Units Intradermal Once    verapamil  240 mg Oral Daily        PRN:  HYDROmorphone, ondansetron, oxyCODONE-acetaminophen, ramelteon, sodium chloride 0.9%, traMADol    Antibiotics and Day Number of Therapy:  Cefazoline x1 on 11/10 in surgery    Lines and Day Number of Therapy:  PIV - 11/9    Assessment:     Apple Watson is a 89 y.o.female with  Patient Active Problem List    Diagnosis Date Noted    JENA (acute kidney injury) 11/13/2018    Elevated troponin     NSTEMI (non-ST elevated  myocardial infarction) 11/11/2018    Closed nondisplaced fracture of condyle of right femur     Fall     Closed fracture of neck of right femur 11/09/2018    Spondylolisthesis, grade 2 03/01/2017    Aortic calcification 03/01/2017    White coat syndrome with diagnosis of hypertension 03/01/2017    Calcification of aorta 03/01/2017    Irregular heart beat 03/01/2017    Scoliosis 02/10/2016    Osteoporosis 02/10/2016    Arthritis 02/10/2016    Essential hypertension 02/10/2016        Plan:     Intermediate Troponin - resolved  - Pt normotensive overnight, trops peaked, r/o ACS  - Troponin 0.046 >> 0.053 >>0.39  - Likely due to stress of surgery plus hypertensive episode from pain  - HR previously irregularily irregular on exam, today RRR  - continue on home med verapamil  - will establish care with cardiologist on discharge    Femur fracture - s/p hip arthroplasty 11/10  - fracture as above, Orthopedic surgery consulted  - will give dilaudid & tylenol for pain control  - Noted to have osteoporosis in chart, but no imaging available  - will recommend DEXA scan outpt  - Calcium & Vit D supplementation in setting of femur fracture  - granda removed, voiding and stooling  - pt has placement at SNF, stable for discharge today     JENA - resolved  - Cr 0.6 on admission -->1.0 --> 1.5  --> 0.8 today  - not on any nephrotoxic drugs  - On maintenance fluids  - encouraging PO intake  - FeNa 0.15% - indicates prerenal cause  - voiding well  - BP stable    Irregular heart beat  - appreciated previously on my exam, will continue home verapamil and place ambulatory outpt consult for Cardiology  - monitor  - Echo: LV EF 60%, RV function normal, LA severely dilated, normal CVP (3mm Hg), PA pressure 30.67, mild aortic valve stenosis     Scoliosis  - Stable, will continue pain management as above, holding home medications     HCM  - HgA1c 5, no need for intervention     Diet: regular  Ppx: Lovenox  Dispo: Today to SNF,  medically stable    Marliuz Motta MD  hospitals Internal Medicine HO-I    hospitals Medicine Hospitalist Pager numbers:   hospitals Hospitalist Medicine Team A (Lidia/Maryanne): [phone]  hospitals Hospitalist Medicine Team B (Светлана/Wayne):  [phone]

## 2021-08-23 NOTE — TELEPHONE ENCOUNTER
Called patient back, LUZ verified. He was down in [place] and he was called by his urologist & told he had a UTI. He was started on antibiotics for this and took this for 2 days but then went to ER as he was not feeling well and he was admitted to ICU for bacteremia. Now being discharged on abx x 14 days (unsure name). He was wondering if his chemo needs to be delayed. Advised I would d/w Dr. Anastasia Lebron and call him back.

## 2021-08-25 ENCOUNTER — TELEPHONE (OUTPATIENT)
Dept: ONCOLOGY | Age: 76
End: 2021-08-25

## 2021-08-25 RX ORDER — DIPHENHYDRAMINE HYDROCHLORIDE 50 MG/ML
50 INJECTION, SOLUTION INTRAMUSCULAR; INTRAVENOUS AS NEEDED
Status: CANCELLED
Start: 2021-09-02

## 2021-08-25 RX ORDER — EPINEPHRINE 1 MG/ML
0.3 INJECTION, SOLUTION, CONCENTRATE INTRAVENOUS AS NEEDED
Status: CANCELLED | OUTPATIENT
Start: 2021-09-02

## 2021-08-25 RX ORDER — SODIUM CHLORIDE 9 MG/ML
25 INJECTION, SOLUTION INTRAVENOUS CONTINUOUS
Status: CANCELLED | OUTPATIENT
Start: 2021-09-02

## 2021-08-25 RX ORDER — HYDROCORTISONE SODIUM SUCCINATE 100 MG/2ML
100 INJECTION, POWDER, FOR SOLUTION INTRAMUSCULAR; INTRAVENOUS AS NEEDED
Status: CANCELLED | OUTPATIENT
Start: 2021-09-02

## 2021-08-25 RX ORDER — ACETAMINOPHEN 325 MG/1
650 TABLET ORAL AS NEEDED
Status: CANCELLED
Start: 2021-09-02

## 2021-08-25 RX ORDER — SODIUM CHLORIDE 0.9 % (FLUSH) 0.9 %
10 SYRINGE (ML) INJECTION AS NEEDED
Status: CANCELLED | OUTPATIENT
Start: 2021-09-02

## 2021-08-25 RX ORDER — ALBUTEROL SULFATE 0.83 MG/ML
2.5 SOLUTION RESPIRATORY (INHALATION) AS NEEDED
Status: CANCELLED
Start: 2021-09-02

## 2021-08-25 RX ORDER — HEPARIN 100 UNIT/ML
300-500 SYRINGE INTRAVENOUS AS NEEDED
Status: CANCELLED
Start: 2021-09-02

## 2021-08-25 RX ORDER — SODIUM CHLORIDE 9 MG/ML
10 INJECTION INTRAMUSCULAR; INTRAVENOUS; SUBCUTANEOUS AS NEEDED
Status: CANCELLED | OUTPATIENT
Start: 2021-09-02

## 2021-08-25 RX ORDER — DIPHENHYDRAMINE HYDROCHLORIDE 50 MG/ML
25 INJECTION, SOLUTION INTRAMUSCULAR; INTRAVENOUS AS NEEDED
Status: CANCELLED
Start: 2021-09-02

## 2021-08-25 RX ORDER — PALONOSETRON 0.05 MG/ML
0.25 INJECTION, SOLUTION INTRAVENOUS ONCE
Status: CANCELLED | OUTPATIENT
Start: 2021-09-02 | End: 2021-09-02

## 2021-08-25 RX ORDER — ONDANSETRON 2 MG/ML
8 INJECTION INTRAMUSCULAR; INTRAVENOUS AS NEEDED
Status: CANCELLED | OUTPATIENT
Start: 2021-09-02

## 2021-08-25 NOTE — TELEPHONE ENCOUNTER
Called the patient and verified ID x 2. The patient stated that he is travelling and requested that communication be via email as it is very difficult to hear. Offered MyChart and the patient declined. [employer] email sent to the patient as requested.

## 2021-08-25 NOTE — TELEPHONE ENCOUNTER
Patient has a  to attend tomorrow at 11am. Patient would like to reschedule his opic and ov.      # [phone]

## 2021-08-26 ENCOUNTER — HOSPITAL ENCOUNTER (OUTPATIENT)
Dept: INFUSION THERAPY | Age: 76
End: 2021-08-26
Payer: MEDICARE

## 2021-08-26 NOTE — TELEPHONE ENCOUNTER
Patient wants to know was anyone able to get him rescheduled yet for opic?  Please call at [phone]

## 2021-08-27 ENCOUNTER — TELEPHONE (OUTPATIENT)
Dept: ONCOLOGY | Age: 76
End: 2021-08-27

## 2021-08-27 NOTE — TELEPHONE ENCOUNTER
Patient would like to do labs a day before his treatment, if possible.  Please call patient     Cb# [phone]

## 2021-09-01 ENCOUNTER — HOSPITAL ENCOUNTER (OUTPATIENT)
Dept: INFUSION THERAPY | Age: 76
Discharge: HOME | End: 2021-09-01
Payer: MEDICARE

## 2021-09-01 VITALS
TEMPERATURE: 97.1 F | RESPIRATION RATE: 18 BRPM | DIASTOLIC BLOOD PRESSURE: 75 MMHG | SYSTOLIC BLOOD PRESSURE: 119 MMHG | OXYGEN SATURATION: 96 % | HEART RATE: 77 BPM

## 2021-09-01 LAB
ALBUMIN SERPL-MCNC: 2.8 G/DL (ref 3.5–5)
ALBUMIN/GLOB SERPL: 0.7 {RATIO} (ref 1.1–2.2)
ALP SERPL-CCNC: 92 U/L (ref 45–117)
ALT SERPL-CCNC: 33 U/L (ref 12–78)
ANION GAP SERPL CALC-SCNC: 7 MMOL/L (ref 5–15)
AST SERPL-CCNC: 22 U/L (ref 15–37)
BASOPHILS # BLD: 0.1 K/UL (ref 0–0.1)
BASOPHILS NFR BLD: 1 % (ref 0–1)
BILIRUB SERPL-MCNC: 0.3 MG/DL (ref 0.2–1)
BUN SERPL-MCNC: 17 MG/DL (ref 6–20)
BUN/CREAT SERPL: 18 (ref 12–20)
CALCIUM SERPL-MCNC: 9 MG/DL (ref 8.5–10.1)
CHLORIDE SERPL-SCNC: 107 MMOL/L (ref 97–108)
CO2 SERPL-SCNC: 23 MMOL/L (ref 21–32)
CREAT SERPL-MCNC: 0.93 MG/DL (ref 0.7–1.3)
DIFFERENTIAL METHOD BLD: ABNORMAL
EOSINOPHIL # BLD: 0.1 K/UL (ref 0–0.4)
EOSINOPHIL NFR BLD: 1 % (ref 0–7)
ERYTHROCYTE [DISTWIDTH] IN BLOOD BY AUTOMATED COUNT: 13.5 % (ref 11.5–14.5)
GLOBULIN SER CALC-MCNC: 4.3 G/DL (ref 2–4)
GLUCOSE SERPL-MCNC: 89 MG/DL (ref 65–100)
HCT VFR BLD AUTO: 33.4 % (ref 36.6–50.3)
HGB BLD-MCNC: 11.1 G/DL (ref 12.1–17)
IMM GRANULOCYTES # BLD AUTO: 0.2 K/UL (ref 0–0.04)
IMM GRANULOCYTES NFR BLD AUTO: 3 % (ref 0–0.5)
LYMPHOCYTES # BLD: 0.9 K/UL (ref 0.8–3.5)
LYMPHOCYTES NFR BLD: 16 % (ref 12–49)
MAGNESIUM SERPL-MCNC: 2.2 MG/DL (ref 1.6–2.4)
MCH RBC QN AUTO: 33.5 PG (ref 26–34)
MCHC RBC AUTO-ENTMCNC: 33.2 G/DL (ref 30–36.5)
MCV RBC AUTO: 100.9 FL (ref 80–99)
MONOCYTES # BLD: 0.7 K/UL (ref 0–1)
MONOCYTES NFR BLD: 13 % (ref 5–13)
NEUTS SEG # BLD: 3.7 K/UL (ref 1.8–8)
NEUTS SEG NFR BLD: 66 % (ref 32–75)
NRBC # BLD: 0 K/UL (ref 0–0.01)
NRBC BLD-RTO: 0 PER 100 WBC
PLATELET # BLD AUTO: 426 K/UL (ref 150–400)
PMV BLD AUTO: 9.3 FL (ref 8.9–12.9)
POTASSIUM SERPL-SCNC: 4.7 MMOL/L (ref 3.5–5.1)
PROT SERPL-MCNC: 7.1 G/DL (ref 6.4–8.2)
RBC # BLD AUTO: 3.31 M/UL (ref 4.1–5.7)
RBC MORPH BLD: ABNORMAL
RBC MORPH BLD: ABNORMAL
SODIUM SERPL-SCNC: 137 MMOL/L (ref 136–145)
WBC # BLD AUTO: 5.7 K/UL (ref 4.1–11.1)

## 2021-09-01 PROCEDURE — 83735 ASSAY OF MAGNESIUM: CPT

## 2021-09-01 PROCEDURE — 85025 COMPLETE CBC W/AUTO DIFF WBC: CPT

## 2021-09-01 PROCEDURE — 80053 COMPREHEN METABOLIC PANEL: CPT

## 2021-09-01 PROCEDURE — 36415 COLL VENOUS BLD VENIPUNCTURE: CPT

## 2021-09-01 NOTE — PROGRESS NOTES
[facility] Lab Visit:        1700:  Pt arrived ambulatory to [facility] in stable condition, for lab draw. Labs drawn peripherally from Left AC arm and sent fro processing. Pt tolerated well. Visit Vitals  /75 (BP 1 Location: Left upper arm, BP Patient Position: Sitting)   Pulse 77   Temp 97.1 °F (36.2 °C)   Resp 18   SpO2 96%         1715: No new concerns voiced. D/cd home ambulatory in no distress. Pt aware of next [facility] appointment scheduled. Labs available in CC once resulted.

## 2021-09-02 ENCOUNTER — OFFICE VISIT (OUTPATIENT)
Dept: ONCOLOGY | Age: 76
End: 2021-09-02
Payer: MEDICARE

## 2021-09-02 ENCOUNTER — HOSPITAL ENCOUNTER (OUTPATIENT)
Dept: INFUSION THERAPY | Age: 76
Discharge: HOME | End: 2021-09-02
Payer: MEDICARE

## 2021-09-02 VITALS
BODY MASS INDEX: 32.5 KG/M2 | DIASTOLIC BLOOD PRESSURE: 65 MMHG | RESPIRATION RATE: 18 BRPM | WEIGHT: 233 LBS | HEART RATE: 87 BPM | TEMPERATURE: 98.1 F | OXYGEN SATURATION: 94 % | SYSTOLIC BLOOD PRESSURE: 96 MMHG

## 2021-09-02 VITALS
WEIGHT: 233.03 LBS | DIASTOLIC BLOOD PRESSURE: 83 MMHG | SYSTOLIC BLOOD PRESSURE: 133 MMHG | BODY MASS INDEX: 32.62 KG/M2 | HEART RATE: 85 BPM | HEIGHT: 71 IN | TEMPERATURE: 97.8 F | RESPIRATION RATE: 18 BRPM

## 2021-09-02 DIAGNOSIS — Z95.828 PORT-A-CATH IN PLACE: ICD-10-CM

## 2021-09-02 DIAGNOSIS — C67.9 MALIGNANT NEOPLASM OF URINARY BLADDER, UNSPECIFIED SITE (HCC): ICD-10-CM

## 2021-09-02 DIAGNOSIS — Z51.11 ENCOUNTER FOR ANTINEOPLASTIC CHEMOTHERAPY: Primary | ICD-10-CM

## 2021-09-02 DIAGNOSIS — C67.9 MALIGNANT NEOPLASM OF URINARY BLADDER, UNSPECIFIED SITE (HCC): Primary | ICD-10-CM

## 2021-09-02 PROCEDURE — 99214 OFFICE O/P EST MOD 30 MIN: CPT | Performed by: INTERNAL MEDICINE

## 2021-09-02 PROCEDURE — G0463 HOSPITAL OUTPT CLINIC VISIT: HCPCS | Performed by: REGISTERED NURSE

## 2021-09-02 PROCEDURE — 96413 CHEMO IV INFUSION 1 HR: CPT

## 2021-09-02 PROCEDURE — 96415 CHEMO IV INFUSION ADDL HR: CPT

## 2021-09-02 PROCEDURE — 3017F COLORECTAL CA SCREEN DOC REV: CPT | Performed by: INTERNAL MEDICINE

## 2021-09-02 PROCEDURE — G8536 NO DOC ELDER MAL SCRN: HCPCS | Performed by: INTERNAL MEDICINE

## 2021-09-02 PROCEDURE — G8417 CALC BMI ABV UP PARAM F/U: HCPCS | Performed by: INTERNAL MEDICINE

## 2021-09-02 PROCEDURE — 96367 TX/PROPH/DG ADDL SEQ IV INF: CPT

## 2021-09-02 PROCEDURE — 96417 CHEMO IV INFUS EACH ADDL SEQ: CPT

## 2021-09-02 PROCEDURE — G8427 DOCREV CUR MEDS BY ELIG CLIN: HCPCS | Performed by: INTERNAL MEDICINE

## 2021-09-02 PROCEDURE — 77030012965 HC NDL HUBR BBMI -A

## 2021-09-02 PROCEDURE — 74011000258 HC RX REV CODE- 258: Performed by: REGISTERED NURSE

## 2021-09-02 PROCEDURE — 74011250636 HC RX REV CODE- 250/636: Performed by: REGISTERED NURSE

## 2021-09-02 PROCEDURE — 96375 TX/PRO/DX INJ NEW DRUG ADDON: CPT

## 2021-09-02 PROCEDURE — G8432 DEP SCR NOT DOC, RNG: HCPCS | Performed by: INTERNAL MEDICINE

## 2021-09-02 PROCEDURE — 96361 HYDRATE IV INFUSION ADD-ON: CPT

## 2021-09-02 PROCEDURE — 1101F PT FALLS ASSESS-DOCD LE1/YR: CPT | Performed by: INTERNAL MEDICINE

## 2021-09-02 RX ORDER — SODIUM CHLORIDE 9 MG/ML
25 INJECTION, SOLUTION INTRAVENOUS CONTINUOUS
Status: DISPENSED | OUTPATIENT
Start: 2021-09-02 | End: 2021-09-02

## 2021-09-02 RX ORDER — HEPARIN 100 UNIT/ML
300-500 SYRINGE INTRAVENOUS AS NEEDED
Status: ACTIVE | OUTPATIENT
Start: 2021-09-02 | End: 2021-09-02

## 2021-09-02 RX ORDER — CEFDINIR 300 MG/1
300 CAPSULE ORAL 2 TIMES DAILY
COMMUNITY
End: 2021-11-22

## 2021-09-02 RX ORDER — PALONOSETRON 0.05 MG/ML
0.25 INJECTION, SOLUTION INTRAVENOUS ONCE
Status: COMPLETED | OUTPATIENT
Start: 2021-09-02 | End: 2021-09-02

## 2021-09-02 RX ORDER — SODIUM CHLORIDE 0.9 % (FLUSH) 0.9 %
10 SYRINGE (ML) INJECTION AS NEEDED
Status: DISPENSED | OUTPATIENT
Start: 2021-09-02 | End: 2021-09-02

## 2021-09-02 RX ORDER — SODIUM CHLORIDE 9 MG/ML
10 INJECTION INTRAMUSCULAR; INTRAVENOUS; SUBCUTANEOUS AS NEEDED
Status: ACTIVE | OUTPATIENT
Start: 2021-09-02 | End: 2021-09-02

## 2021-09-02 RX ORDER — DEXAMETHASONE 4 MG/1
TABLET ORAL
Qty: 16 TABLET | Refills: 0 | Status: SHIPPED | OUTPATIENT
Start: 2021-09-02 | End: 2021-11-22

## 2021-09-02 RX ADMIN — DEXAMETHASONE SODIUM PHOSPHATE 12 MG: 4 INJECTION, SOLUTION INTRA-ARTICULAR; INTRALESIONAL; INTRAMUSCULAR; INTRAVENOUS; SOFT TISSUE at 11:18

## 2021-09-02 RX ADMIN — SODIUM CHLORIDE 25 ML/HR: 900 INJECTION, SOLUTION INTRAVENOUS at 10:22

## 2021-09-02 RX ADMIN — CISPLATIN 164.5 MG: 100 INJECTION, SOLUTION INTRAVENOUS at 12:27

## 2021-09-02 RX ADMIN — Medication 10 ML: at 15:22

## 2021-09-02 RX ADMIN — SODIUM CHLORIDE 150 MG: 900 INJECTION, SOLUTION INTRAVENOUS at 10:51

## 2021-09-02 RX ADMIN — POTASSIUM CHLORIDE: 2 INJECTION, SOLUTION, CONCENTRATE INTRAVENOUS at 13:41

## 2021-09-02 RX ADMIN — Medication 500 UNITS: at 15:22

## 2021-09-02 RX ADMIN — POTASSIUM CHLORIDE: 2 INJECTION, SOLUTION, CONCENTRATE INTRAVENOUS at 11:19

## 2021-09-02 RX ADMIN — PALONOSETRON 0.25 MG: 0.05 INJECTION, SOLUTION INTRAVENOUS at 10:24

## 2021-09-02 RX ADMIN — GEMCITABINE 1762 MG: 38 INJECTION, SOLUTION INTRAVENOUS at 11:46

## 2021-09-02 NOTE — PROGRESS NOTES
Isrrael Willard is a 77 y.o. male    Chief Complaint   Patient presents with    Chemotherapy     Muscle invasive bladder cancer- Mixed histology       1. Have you been to the ER, urgent care clinic since your last visit? Hospitalized since your last visit? Yes, [facility]    2. Have you seen or consulted any other health care providers outside of the [address] [place] [address] since your last visit? Include any pap smears or colon screening.  No

## 2021-09-02 NOTE — PROGRESS NOTES
[facility] at [address]   Michael Sumner [address], [address]  W: [phone]  F: [phone]    Reason for Visit:   Bernadette Monge is a 77 y.o. male who is seen in follow-up of Muscle invasive bladder cancer- Mixed histology    Treatment History:   · 3/1/2021: CT IVP: Multiple abdominal, iliac, mediastinal nodes unchanged from 2019 consistent with h/o sarcoidosis, Thickened R lateral bladder wall. · 6/23/2021: Cystoscopy and TURBT- Papillary changes were noted within the prostatic urethra ,  papillary tumors seen emanating from the surface of the left hemitrigone, There were also diffuse changes in the floor of the bladder- Low grade urothelial carcinoma of the prostatic urethra, R lateral bladder wall with HG Muscle invasive urothelial carcinoma and squamous differentiation+ CIS, Left brenda trigone HG non invasive urothelial carcinoma  · 8/5/21: Cisplatin + Gemzar     History of Present Illness:   Patient is a 77 y.o. male with PMH as below including sarcoidosis who is seen for evaluation of bladder cancer. He has a history of nonmuscle invasive bladder cancer in 2015, underwent 2 induction courses of BCG, had a non muscle invasive recurrence in 2019 and had re induction with BCG. Cystoscopy 6/2020 at [facility] did not show any recurrence. In March 2021 he had a positive FISH and was seen by Dr. Frances Ospina. Had a CT IVP 3/2021 which showed some Bladder thickening. 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 He comes today for cycle 2 of Cisplatin + Gemzar. After cycle 1, he was out of town and was told he had a UTI by urologist. He was started on Bactrim. He went to the ER in [place] due to not feeling well and was hospitalized with severe sepsis requiring ICU admission and pressors. Was not intubated.  Per patient, he received granix while hospitalized due to neutropenia. He was d/c with macario and has had fu with urology and macario was removed and he has to straight cath himself. He has had no fevers / chills since d/c. This treatment was delayed 1 week as he had a  to attend.      He sees Dr. Mansi Brandt for Sarcoidosis      Mother and sister had breast cancer and brother had kidney cancer     Past Medical History:   Diagnosis Date    Arrhythmia     LBBB    Arthritis     Autoimmune disease ([address]     PULMONARY SARCOIDOSIS    Cancer ([address]     scc top of head and nose    Cancer [facility] 2015    BLADDER    COVID-19 vaccine series completed     [facility] VACCINE 21 AND 21    Hypertension     Other unknown and unspecified cause of morbidity or mortality     history of pulmonary sarcoid     Other unknown and unspecified cause of morbidity or mortality     depression, anxiety    Posterior cervical adenopathy, benign 2018    Psychiatric disorder     DEPRESSION AND ANXIETY    Pulmonary sarcoidosis ([facility]     Unspecified sleep apnea     cpap      Past Surgical History:   Procedure Laterality Date    HX CATARACT REMOVAL Bilateral     HX HERNIA REPAIR Right AS A CHILD    INGUINAL    HX HERNIA REPAIR Left     INGUIBAL    HX KNEE REPLACEMENT Left     HX ORTHOPAEDIC Right     BONE SPURS REMOVED FROM FOOT    HX OTHER SURGICAL      scc removed top of head and nose    HX OTHER SURGICAL      benign lump removed from thyroid    HX OTHER SURGICAL Right     SKIN CANCER RELMOVED FROM LEG    HX UROLOGICAL  2020    CYSTO    IR INSERT TUNL CVC W PORT OVER 5 YEARS  2021    MA CARDIAC SURG PROCEDURE UNLIST  2021    CARDIAC CATH    MA REVISE KNEE JOINT REPLACE,ALL PARTS Left       Social History     Tobacco Use    Smoking status: Former Smoker     Packs/day: 0.50     Years: 2.00     Pack years: 1.00     Quit date: 1965     Years since quittin.0    Smokeless tobacco: Never Used   Substance Use Topics    Alcohol use: Yes     Alcohol/week: 14.0 standard drinks     Types: 12 Glasses of wine, 2 Shots of liquor per week      Family History   Problem Relation Age of Onset    Cancer Mother         breast with mets    Dementia Mother     Heart Disease Father     Cancer Sister         breast    Lung Disease Brother     No Known Problems Brother     Anesth Problems Neg Hx      Current Outpatient Medications   Medication Sig    cefdinir (OMNICEF) 300 mg capsule Take 300 mg by mouth two (2) times a day.  dexAMETHasone (DECADRON) 4 mg tablet Take 2 tabs (8mg) once daily on days 2 & 3 of chemotherapy    lidocaine-prilocaine (EMLA) topical cream Apply  to affected area as needed for Pain. Apply a thin layer over port a cath 30-60 minutes prior to port access    ondansetron (ZOFRAN ODT) 8 mg disintegrating tablet Take 1 Tablet by mouth every eight (8) hours as needed for Nausea or Vomiting.  prochlorperazine (Compazine) 5 mg tablet Take 1 Tablet by mouth every six (6) hours as needed for Nausea or Vomiting.  HYDROcodone-acetaminophen (NORCO) 7.5-325 mg per tablet Take  by mouth.  ondansetron (ZOFRAN ODT) 8 mg disintegrating tablet Take 1 Tablet by mouth every eight (8) hours as needed for Nausea or Vomiting.  lisinopriL (PRINIVIL, ZESTRIL) 5 mg tablet Take 5 mg by mouth two (2) times a day.  zolpidem (AMBIEN) 10 mg tablet Take 5 mg by mouth nightly as needed for Sleep.  buPROPion SR (WELLBUTRIN SR) 100 mg SR tablet Take 100 mg by mouth daily.  acetaminophen (Tylenol Extra Strength) 500 mg tablet Take 1,000 mg by mouth every six (6) hours as needed for Pain.  tamsulosin (FLOMAX) 0.4 mg capsule Take 0.4 mg by mouth nightly.  busPIRone (BUSPAR) 5 mg tablet Take 5 mg by mouth daily.  simvastatin (ZOCOR) 20 mg tablet Take 20 mg by mouth nightly.  escitalopram oxalate (LEXAPRO) 10 mg tablet Take 10 mg by mouth daily.     multivitamin (ONE A DAY) tablet Take 1 Tab by mouth daily. No current facility-administered medications for this visit. Allergies   Allergen Reactions    Pcn [Penicillins] Hives        Review of Systems: A complete review of systems was obtained, negative except as described above. Physical Exam:     Visit Vitals  BP 96/65 (BP 1 Location: Left upper arm, BP Patient Position: Sitting)   Pulse 87   Temp 98.1 °F (36.7 °C)   Resp 18   Wt 233 lb (105.7 kg)   SpO2 94%   BMI 32.50 kg/m²     ECOG PS: 1  General: No distress  Eyes: PERRL, anicteric sclerae  HENT: Atraumatic, PAC in place   Neck: Supple  Skin: No rashes, ecchymoses, or petechiae. Normal temperature, turgor, and texture. Psych: Alert, oriented, appropriate affect, normal judgment/insight    Results:     Lab Results   Component Value Date/Time    WBC 5.7 09/01/2021 04:30 PM    HGB 11.1 (L) 09/01/2021 04:30 PM    HCT 33.4 (L) 09/01/2021 04:30 PM    PLATELET 191 (H) 86/03/9109 04:30 PM    .9 (H) 09/01/2021 04:30 PM    ABS. NEUTROPHILS 3.7 09/01/2021 04:30 PM     Lab Results   Component Value Date/Time    Sodium 137 09/01/2021 04:30 PM    Potassium 4.7 09/01/2021 04:30 PM    Chloride 107 09/01/2021 04:30 PM    CO2 23 09/01/2021 04:30 PM    Glucose 89 09/01/2021 04:30 PM    BUN 17 09/01/2021 04:30 PM    Creatinine 0.93 09/01/2021 04:30 PM    GFR est AA >60 09/01/2021 04:30 PM    GFR est non-AA >60 09/01/2021 04:30 PM    Calcium 9.0 09/01/2021 04:30 PM    Glucose (POC) 93 12/01/2009 07:10 AM     Lab Results   Component Value Date/Time    Bilirubin, total 0.3 09/01/2021 04:30 PM    ALT (SGPT) 33 09/01/2021 04:30 PM    Alk. phosphatase 92 09/01/2021 04:30 PM    Protein, total 7.1 09/01/2021 04:30 PM    Albumin 2.8 (L) 09/01/2021 04:30 PM    Globulin 4.3 (H) 09/01/2021 04:30 PM       External   Records reviewed and summarized above. Pathology report(s) reviewed above. Radiology report(s) reviewed above. CT CAP 7/19/21:   IMPRESSION  1.  While the bladder is decompressed by Juarez is thick-walled and irregular  2. No evidence of metastatic disease to the abdomen or pelvis  3. Extensive bilateral hilar and mediastinal adenopathy with patchy perihilar  airspace disease with associated nodular peribronchial cuffing. Findings can be  seen with sarcoidosis. Differentiating adenopathy from sarcoidosis for  metastatic disease is difficult     Assessment:   1) Muscle invasive bladder cancer- With squamous differentiation     S/p TURBT 6/23/2021- Path reviewed   [id number]     I discussed the rationale for neoadjuvant chemotherapy which has level 1 evidence supporting improved survival in pure urothelial cancers. See prior office notes regarding discussion of treatment plan. We discussed proceeding with NAC, restaging in 2 cycles given mixed histology and considering split dosing if he develops grade 3 or more toxicities. CT imaging obtained 7/19/21 was personally reviewed & shows no evidence of metastatic disease. B/l hilar & mediastinal adenopathy is likely secondary to sarcoidosis but we will follow this closely. He had a second opinion with urology at [facility] [place] who agreed with above plan  He is also seeking a second opinion at [facility] urology        Today is cycle 2 day 1 of NAC Cisplatin + Gemzar x 4 cycles. He was hospitalized with sepsis after cycle 1- see below.    Dose reductions discussed   Interim scans       2) Extensive low grade non muscle invasive urothelial cancer    3) Sarcoidosis  Follows with pulmonary    4) Obesity    5) Recent sepsis / UTI  Has recovered   On PO abx until Tuesday   No recurrent symptoms     6) Neutropenia  Per outside records - grade 4 & required granix   He is at high risk for recurrent grade IV neutropenia hence will add neulasta moving forward     7) Thrombocytopenia  Grade 2 per outside records - 60K   Will DR gemzar as below     6) Encounter for high risk drugs like chemotherapy  Toxicities include grade IV neutropenia, grade 2 thrombocytopenia   DR as below     Plan: · Proceed today with cycle 2 day 1 of Cisplatin (70mg/m2 on day 1) and Gemzar DR 25% (750mg/m2 on days 1, 8)  NAC for 4 cycles   · Dexamethasone days 2, 3 DR to 4mg  · zofran / compazine prn  · Labs per protocol   · Scans after cycle 2   · 500cc bolus today   · neulasta on day 8 given high risk for grade IV neutropenia     RTC in 3 weeks for cycle 3 & scan review     I appreciate the opportunity to participate in Mr. Juvencio Mcneill care. I performed a history and physical examination of the patient and discussed his management with the NPP.  I reviewed the NPP note and agree with the documented findings and plan of care    MIBC - With squamous histology  Grade IV neutropenia after cycle 1  Recovered now, gemzar to be dropped by 25%, add Neulasta  Interim scans   Labs stable  No erythema around the port and no sores  Signed By: Adriana Montoya MD

## 2021-09-02 NOTE — PROGRESS NOTES
[facility] Chemo Progress Note    Date: 2021    Name: Tatianna Solis    MRN: [id number]         : 1945    [address] Magana Arrived to [facility] for  C2 D1 Cisplatin/ Gemcitabine ambulatory in stable condition. Assessment was completed, no acute issues at this time, no new complaints voiced. Port accessed with positive blood return. Labs drawn yesterday  Chemotherapy Flowsheet 2021   Cycle C2 D1   Date 2021   Drug / Regimen Cisplatin/Gemzar   Pre Hydration given   Post Hydration given   Pre Meds given   Notes given         Patient denies SOB, fever, cough, general not feeling well. Patient denies recent exposure to someone who has tested positive for COVID-19. Patient denies having contact with anyone who has a pending COVID test.      Mr. Kathy Fernández vitals were reviewed. Patient Vitals for the past 12 hrs:   Temp Pulse Resp BP   21 1520  85  133/83   21 1013 97.8 °F (36.6 °C) 71 18 122/80         Lab results were obtained and reviewed. No results found for this or any previous visit (from the past 12 hour(s)). Pre-medications  were administered as ordered and chemotherapy was initiated.   Medications Administered     0.9% sodium chloride 1,000 mL with potassium chloride 10 mEq, magnesium sulfate 2 g infusion     Admin Date  2021 Action  New Bag Dose   Rate  1,000 mL/hr Route  IntraVENous Administered By  Lisa Rosenthal RN           Admin Date  2021 Action  New Bag Dose   Rate  1,000 mL/hr Route  IntraVENous Administered By  Lisa Rosenthal RN          0.9% sodium chloride infusion     Admin Date  2021 Action  New Bag Dose  25 mL/hr Rate  25 mL/hr Route  IntraVENous Administered By  Lisa Rosenthal RN          CISplatin (PLATINOL) 164.5 mg in 0.9% sodium chloride 500 mL, overfill volume 50 mL chemo infusion     Admin Date  2021 Action  New Bag Dose  164.5 mg Rate  357.3 mL/hr Route  IntraVENous Administered By  Lisa Rosenthal RN dexamethasone (DECADRON) 12 mg in 0.9% sodium chloride 50 mL, overfill volume 5 mL IVPB     Admin Date  09/02/2021 Action  New Bag Dose  12 mg Rate  232 mL/hr Route  IntraVENous Administered By  Benjy Najera RN          fosaprepitant (EMEND) 150 mg in 0.9% sodium chloride 150 mL IVPB     Admin Date  09/02/2021 Action  New Bag Dose  150 mg Rate  450 mL/hr Route  IntraVENous Administered By  Benjy Najera RN          gemcitabine (GEMZAR) 1,762 mg in 0.9% sodium chloride 250 mL, overfill volume 25 mL chemo infusion     Admin Date  09/02/2021 Action  New Bag Dose  1,762 mg Rate  642.7 mL/hr Route  IntraVENous Administered By  Benjy Najera RN          heparin (porcine) pf 300-500 Units     Admin Date  09/02/2021 Action  Given Dose  500 Units Route  InterCATHeter Administered By  Benjy Najera RN          palonosetron HCl (ALOXI) injection 0.25 mg     Admin Date  09/02/2021 Action  Given Dose  0.25 mg Route  IntraVENous Administered By  Benjy Najera RN          sodium chloride (NS) flush 10 mL     Admin Date  09/02/2021 Action  Given Dose  10 mL Route  IntraVENous Administered By  Benjy Najera RN                  9017 Patient tolerated treatment well. Port maintained positive blood return throughout treatment. Port flushed, heparinized and de accessed per protocol.  Patient was discharged from [address] [place] [address]   Date Time Provider Gregorio Stacey   9/9/2021  1:00 PM C1 TD MED [address] [place] [address]   9/23/2021  9:00 AM G3 TD LONG [address] [place] [address]   9/23/2021  9:15 AM Rajat Bai  [address] BS AMB   9/30/2021  9:00 AM E3 TD MED [facility]   10/14/2021  8:30 AM F1 TD LONG TX [facility]   10/21/2021  1:30 PM D4 TD MED [facility] Kwaku Petit RN  September 2, 2021

## 2021-09-08 ENCOUNTER — HOSPITAL ENCOUNTER (OUTPATIENT)
Dept: INFUSION THERAPY | Age: 76
Discharge: HOME | End: 2021-09-08
Payer: MEDICARE

## 2021-09-08 VITALS
RESPIRATION RATE: 18 BRPM | SYSTOLIC BLOOD PRESSURE: 116 MMHG | DIASTOLIC BLOOD PRESSURE: 72 MMHG | OXYGEN SATURATION: 98 % | HEART RATE: 66 BPM | TEMPERATURE: 96.8 F

## 2021-09-08 LAB
ANION GAP SERPL CALC-SCNC: 6 MMOL/L (ref 5–15)
BASOPHILS # BLD: 0.1 K/UL (ref 0–0.1)
BASOPHILS NFR BLD: 2 % (ref 0–1)
BUN SERPL-MCNC: 21 MG/DL (ref 6–20)
BUN/CREAT SERPL: 22 (ref 12–20)
CALCIUM SERPL-MCNC: 8.5 MG/DL (ref 8.5–10.1)
CHLORIDE SERPL-SCNC: 107 MMOL/L (ref 97–108)
CO2 SERPL-SCNC: 23 MMOL/L (ref 21–32)
CREAT SERPL-MCNC: 0.97 MG/DL (ref 0.7–1.3)
DIFFERENTIAL METHOD BLD: ABNORMAL
EOSINOPHIL # BLD: 0 K/UL (ref 0–0.4)
EOSINOPHIL NFR BLD: 1 % (ref 0–7)
ERYTHROCYTE [DISTWIDTH] IN BLOOD BY AUTOMATED COUNT: 13.4 % (ref 11.5–14.5)
GLUCOSE SERPL-MCNC: 88 MG/DL (ref 65–100)
HCT VFR BLD AUTO: 31.3 % (ref 36.6–50.3)
HGB BLD-MCNC: 10.4 G/DL (ref 12.1–17)
IMM GRANULOCYTES # BLD AUTO: 0 K/UL (ref 0–0.04)
IMM GRANULOCYTES NFR BLD AUTO: 1 % (ref 0–0.5)
LYMPHOCYTES # BLD: 0.6 K/UL (ref 0.8–3.5)
LYMPHOCYTES NFR BLD: 15 % (ref 12–49)
MAGNESIUM SERPL-MCNC: 1.8 MG/DL (ref 1.6–2.4)
MCH RBC QN AUTO: 33.2 PG (ref 26–34)
MCHC RBC AUTO-ENTMCNC: 33.2 G/DL (ref 30–36.5)
MCV RBC AUTO: 100 FL (ref 80–99)
MONOCYTES # BLD: 0.2 K/UL (ref 0–1)
MONOCYTES NFR BLD: 6 % (ref 5–13)
NEUTS SEG # BLD: 2.8 K/UL (ref 1.8–8)
NEUTS SEG NFR BLD: 75 % (ref 32–75)
NRBC # BLD: 0 K/UL (ref 0–0.01)
NRBC BLD-RTO: 0 PER 100 WBC
PLATELET # BLD AUTO: 226 K/UL (ref 150–400)
PMV BLD AUTO: 9.2 FL (ref 8.9–12.9)
POTASSIUM SERPL-SCNC: 4.2 MMOL/L (ref 3.5–5.1)
RBC # BLD AUTO: 3.13 M/UL (ref 4.1–5.7)
RBC MORPH BLD: ABNORMAL
RBC MORPH BLD: ABNORMAL
SODIUM SERPL-SCNC: 136 MMOL/L (ref 136–145)
WBC # BLD AUTO: 3.7 K/UL (ref 4.1–11.1)

## 2021-09-08 PROCEDURE — 83735 ASSAY OF MAGNESIUM: CPT

## 2021-09-08 PROCEDURE — 85025 COMPLETE CBC W/AUTO DIFF WBC: CPT

## 2021-09-08 PROCEDURE — 36415 COLL VENOUS BLD VENIPUNCTURE: CPT

## 2021-09-08 PROCEDURE — 80048 BASIC METABOLIC PNL TOTAL CA: CPT

## 2021-09-09 ENCOUNTER — TELEPHONE (OUTPATIENT)
Dept: ONCOLOGY | Age: 76
End: 2021-09-09

## 2021-09-09 ENCOUNTER — HOSPITAL ENCOUNTER (OUTPATIENT)
Dept: INFUSION THERAPY | Age: 76
Discharge: HOME | End: 2021-09-09
Payer: MEDICARE

## 2021-09-09 VITALS
WEIGHT: 231.6 LBS | HEART RATE: 77 BPM | BODY MASS INDEX: 32.42 KG/M2 | HEIGHT: 71 IN | SYSTOLIC BLOOD PRESSURE: 131 MMHG | DIASTOLIC BLOOD PRESSURE: 86 MMHG | TEMPERATURE: 96.8 F | RESPIRATION RATE: 18 BRPM

## 2021-09-09 DIAGNOSIS — C67.9 MALIGNANT NEOPLASM OF URINARY BLADDER, UNSPECIFIED SITE (HCC): Primary | ICD-10-CM

## 2021-09-09 PROCEDURE — 96413 CHEMO IV INFUSION 1 HR: CPT

## 2021-09-09 PROCEDURE — 74011250636 HC RX REV CODE- 250/636: Performed by: INTERNAL MEDICINE

## 2021-09-09 PROCEDURE — 77030012965 HC NDL HUBR BBMI -A

## 2021-09-09 PROCEDURE — 96377 APPLICATON ON-BODY INJECTOR: CPT

## 2021-09-09 PROCEDURE — 74011250636 HC RX REV CODE- 250/636: Performed by: REGISTERED NURSE

## 2021-09-09 PROCEDURE — 96375 TX/PRO/DX INJ NEW DRUG ADDON: CPT

## 2021-09-09 RX ORDER — SODIUM CHLORIDE 0.9 % (FLUSH) 0.9 %
10 SYRINGE (ML) INJECTION AS NEEDED
Status: DISPENSED | OUTPATIENT
Start: 2021-09-09 | End: 2021-09-09

## 2021-09-09 RX ORDER — HEPARIN 100 UNIT/ML
300-500 SYRINGE INTRAVENOUS AS NEEDED
Status: ACTIVE | OUTPATIENT
Start: 2021-09-09 | End: 2021-09-09

## 2021-09-09 RX ORDER — SODIUM CHLORIDE 9 MG/ML
25 INJECTION, SOLUTION INTRAVENOUS CONTINUOUS
Status: DISPENSED | OUTPATIENT
Start: 2021-09-09 | End: 2021-09-09

## 2021-09-09 RX ORDER — ONDANSETRON 2 MG/ML
8 INJECTION INTRAMUSCULAR; INTRAVENOUS ONCE
Status: COMPLETED | OUTPATIENT
Start: 2021-09-09 | End: 2021-09-09

## 2021-09-09 RX ORDER — SODIUM CHLORIDE 9 MG/ML
10 INJECTION INTRAMUSCULAR; INTRAVENOUS; SUBCUTANEOUS AS NEEDED
Status: ACTIVE | OUTPATIENT
Start: 2021-09-09 | End: 2021-09-09

## 2021-09-09 RX ADMIN — PEGFILGRASTIM 6 MG: KIT SUBCUTANEOUS at 13:04

## 2021-09-09 RX ADMIN — ONDANSETRON 8 MG: 2 INJECTION INTRAMUSCULAR; INTRAVENOUS at 11:23

## 2021-09-09 RX ADMIN — HEPARIN 500 UNITS: 100 SYRINGE at 13:06

## 2021-09-09 RX ADMIN — GEMCITABINE 1762 MG: 38 INJECTION, SOLUTION INTRAVENOUS at 12:25

## 2021-09-09 RX ADMIN — SODIUM CHLORIDE 25 ML/HR: 900 INJECTION, SOLUTION INTRAVENOUS at 11:19

## 2021-09-09 RX ADMIN — SODIUM CHLORIDE 10 ML: 9 INJECTION INTRAMUSCULAR; INTRAVENOUS; SUBCUTANEOUS at 11:19

## 2021-09-09 RX ADMIN — Medication 10 ML: at 13:05

## 2021-09-09 NOTE — TELEPHONE ENCOUNTER
Pt called and stated Dr Jeana Balderas wanted him to get a scan done long term through his chemo treatment. He would like further info on this. also patient has question regarding a OTC medication dr Jeana Balderas suggested he take    # [phone]

## 2021-09-09 NOTE — PROGRESS NOTES
[facility] Chemo Progress Note    Date: 2021    Name: Percy Almazan    MRN: [id number]         : 1945    [address] Mr. Marie Corral Arrived to [facility] for  Cycle 2 Day 8 Gemzar ambulatory in stable condition. Assessment was completed, no acute issues at this time, no new complaints voiced. Port accessed with positive blood return. Port flushed and NS started at [facility]. Labs were drawn 21 and are within the treatment limits. Chemotherapy Flowsheet 2021   Cycle C2D8   Date 2021   Drug / Regimen Gemzar   Pre Hydration -   Post Hydration -   Pre Meds given   Notes given + Neulasta OBI         Patient denies SOB, fever, cough, general not feeling well. Patient denies recent exposure to someone who has tested positive for COVID-19. Patient denies having contact with anyone who has a pending COVID test.      Mr. Carmen Fink vitals were reviewed. Patient Vitals for the past 12 hrs:   Temp Pulse Resp BP   21 1259  77  131/86   21 1107 96.8 °F (36 °C) 73 18 118/79         Lab results were obtained and reviewed. No results found for this or any previous visit (from the past 12 hour(s)). Pre-medications  were administered as ordered and chemotherapy was initiated.   Medications Administered     0.9% sodium chloride infusion     Admin Date  2021 Action  New Bag Dose  25 mL/hr Rate  25 mL/hr Route  IntraVENous Administered By  Roselyn Thomas RN          0.9% sodium chloride injection 10 mL     Admin Date  2021 Action  Given Dose  10 mL Route  IntraVENous Administered By  Roselyn Thomas RN          gemcitabine (GEMZAR) 1,762 mg in 0.9% sodium chloride 250 mL, overfill volume 25 mL chemo infusion     Admin Date  2021 Action  New Bag Dose  1,762 mg Rate  642.7 mL/hr Route  IntraVENous Administered By  Roselyn Thomas RN          heparin (porcine) pf 300-500 Units     Admin Date  2021 Action  Given Dose  500 Units Route  InterCATHeter Administered By  Roselyn Thomas CIRA          ondansetron [facility]) injection 8 mg     Admin Date  09/09/2021 Action  Given Dose  8 mg Route  IntraVENous Administered By  Rachel Mccracken RN          pegfilgrastim (NEULASTA) wearable SQ injector 6 mg     Admin Date  09/09/2021 Action  Given Dose  6 mg Route  SubCUTAneous Administered By  Rachel Mccracken RN          sodium chloride (NS) flush 10 mL     Admin Date  09/09/2021 Action  Given Dose  10 mL Route  IntraVENous Administered By  Rachel Mccracken, CIRA                Patient provided with education regarding the Neulasta OBI. Patient understands need to keep it dry around the time of infusion to help know if it leaks. Patient aware that it will start to give the Neulasta around 2000 on 9/10/21 and will complete it's infusion around 2100 on 9/10/21. Patient aware to call [facility] with any questions or concerns about the OBI. Information given to patient to review. 1310 Patient tolerated treatment well. Port maintained positive blood return throughout treatment. Port flushed, heparinized and de accessed per protocol. Patient was discharged from [facility] in stable condition. Patient aware of next appointment.      Future Appointments   Date Time Provider Gregorio Ibarra   9/23/2021  9:00 AM G3 TD LONG [address]   9/23/2021  9:15 AM Yoel Chanel  N [facility] BS AMB   9/30/2021  9:00 AM E3 TD MED [address]   10/14/2021  8:30 AM F1 TD LONG [place] [facility] H   10/21/2021  1:30 PM D4 TD  Iván Luna RN  September 9, 2021

## 2021-09-09 NOTE — TELEPHONE ENCOUNTER
Called the patient and left a message to call this office back at his earliest convenience tomorrow 9/10/21 between 8:00 am and 5:00 pm.

## 2021-09-10 NOTE — TELEPHONE ENCOUNTER
Received a call from the patient and verified ID x 2. The patient stated that when he last saw Dr. Kareem Perkins she mentioned that midway through treatment he would have scans done and the patient asked how he should go about scheduling those. Informed the patient that a CT of his abdomen and pelvis and a CT of his chest were ordered and both are with contrast and that he is able to call the schedulers at [phone] to schedule those appointments and to schedule them early to mid week next week so that results are available for his scheduled appointment on 9/23/21. The patient also stated that Dr. Kareem Perkins suggested that he take Claritin for side effects from one of the medications that he is taking but he is not able to remember the details. Informed the patient that the Claritin is to help with bone pain that can occur due to the Neulasta. The patient verbalized understanding and stated that he will call the schedulers and denied any questions or concerns.

## 2021-09-14 ENCOUNTER — HOSPITAL ENCOUNTER (OUTPATIENT)
Dept: CT IMAGING | Age: 76
Discharge: HOME | End: 2021-09-14
Attending: REGISTERED NURSE
Payer: MEDICARE

## 2021-09-14 DIAGNOSIS — C67.9 MALIGNANT NEOPLASM OF URINARY BLADDER, UNSPECIFIED SITE (HCC): ICD-10-CM

## 2021-09-14 PROCEDURE — 74011000250 HC RX REV CODE- 250: Performed by: REGISTERED NURSE

## 2021-09-14 PROCEDURE — 74011000636 HC RX REV CODE- 636: Performed by: REGISTERED NURSE

## 2021-09-14 PROCEDURE — 71260 CT THORAX DX C+: CPT

## 2021-09-14 PROCEDURE — 74177 CT ABD & PELVIS W/CONTRAST: CPT

## 2021-09-14 RX ORDER — BARIUM SULFATE 20 MG/ML
900 SUSPENSION ORAL ONCE
Status: COMPLETED | OUTPATIENT
Start: 2021-09-14 | End: 2021-09-14

## 2021-09-14 RX ADMIN — IOPAMIDOL 100 ML: 755 INJECTION, SOLUTION INTRAVENOUS at 14:57

## 2021-09-14 RX ADMIN — BARIUM SULFATE 900 ML: 20 SUSPENSION ORAL at 14:55

## 2021-09-17 RX ORDER — HYDROCORTISONE SODIUM SUCCINATE 100 MG/2ML
100 INJECTION, POWDER, FOR SOLUTION INTRAMUSCULAR; INTRAVENOUS AS NEEDED
Status: CANCELLED | OUTPATIENT
Start: 2021-09-23

## 2021-09-17 RX ORDER — ONDANSETRON 2 MG/ML
8 INJECTION INTRAMUSCULAR; INTRAVENOUS AS NEEDED
Status: CANCELLED | OUTPATIENT
Start: 2021-09-30

## 2021-09-17 RX ORDER — SODIUM CHLORIDE 9 MG/ML
25 INJECTION, SOLUTION INTRAVENOUS CONTINUOUS
Status: CANCELLED | OUTPATIENT
Start: 2021-09-30

## 2021-09-17 RX ORDER — DIPHENHYDRAMINE HYDROCHLORIDE 50 MG/ML
50 INJECTION, SOLUTION INTRAMUSCULAR; INTRAVENOUS AS NEEDED
Status: CANCELLED
Start: 2021-09-23

## 2021-09-17 RX ORDER — DIPHENHYDRAMINE HYDROCHLORIDE 50 MG/ML
25 INJECTION, SOLUTION INTRAMUSCULAR; INTRAVENOUS AS NEEDED
Status: CANCELLED
Start: 2021-09-23

## 2021-09-17 RX ORDER — HEPARIN 100 UNIT/ML
300-500 SYRINGE INTRAVENOUS AS NEEDED
Status: CANCELLED
Start: 2021-09-30

## 2021-09-17 RX ORDER — ALBUTEROL SULFATE 0.83 MG/ML
2.5 SOLUTION RESPIRATORY (INHALATION) AS NEEDED
Status: CANCELLED
Start: 2021-09-23

## 2021-09-17 RX ORDER — ACETAMINOPHEN 325 MG/1
650 TABLET ORAL AS NEEDED
Status: CANCELLED
Start: 2021-09-30

## 2021-09-17 RX ORDER — EPINEPHRINE 1 MG/ML
0.3 INJECTION, SOLUTION, CONCENTRATE INTRAVENOUS AS NEEDED
Status: CANCELLED | OUTPATIENT
Start: 2021-09-23

## 2021-09-17 RX ORDER — DIPHENHYDRAMINE HYDROCHLORIDE 50 MG/ML
25 INJECTION, SOLUTION INTRAMUSCULAR; INTRAVENOUS AS NEEDED
Status: CANCELLED
Start: 2021-09-30

## 2021-09-17 RX ORDER — ONDANSETRON 2 MG/ML
8 INJECTION INTRAMUSCULAR; INTRAVENOUS AS NEEDED
Status: CANCELLED | OUTPATIENT
Start: 2021-09-23

## 2021-09-17 RX ORDER — HYDROCORTISONE SODIUM SUCCINATE 100 MG/2ML
100 INJECTION, POWDER, FOR SOLUTION INTRAMUSCULAR; INTRAVENOUS AS NEEDED
Status: CANCELLED | OUTPATIENT
Start: 2021-09-30

## 2021-09-17 RX ORDER — ALBUTEROL SULFATE 0.83 MG/ML
2.5 SOLUTION RESPIRATORY (INHALATION) AS NEEDED
Status: CANCELLED
Start: 2021-09-30

## 2021-09-17 RX ORDER — DIPHENHYDRAMINE HYDROCHLORIDE 50 MG/ML
50 INJECTION, SOLUTION INTRAMUSCULAR; INTRAVENOUS AS NEEDED
Status: CANCELLED
Start: 2021-09-30

## 2021-09-17 RX ORDER — EPINEPHRINE 1 MG/ML
0.3 INJECTION, SOLUTION, CONCENTRATE INTRAVENOUS AS NEEDED
Status: CANCELLED | OUTPATIENT
Start: 2021-09-30

## 2021-09-17 RX ORDER — ACETAMINOPHEN 325 MG/1
650 TABLET ORAL AS NEEDED
Status: CANCELLED
Start: 2021-09-23

## 2021-09-17 RX ORDER — SODIUM CHLORIDE 9 MG/ML
10 INJECTION INTRAMUSCULAR; INTRAVENOUS; SUBCUTANEOUS AS NEEDED
Status: CANCELLED | OUTPATIENT
Start: 2021-09-30

## 2021-09-17 RX ORDER — ONDANSETRON 2 MG/ML
8 INJECTION INTRAMUSCULAR; INTRAVENOUS ONCE
Status: CANCELLED | OUTPATIENT
Start: 2021-09-30 | End: 2021-09-30

## 2021-09-17 RX ORDER — SODIUM CHLORIDE 0.9 % (FLUSH) 0.9 %
10 SYRINGE (ML) INJECTION AS NEEDED
Status: CANCELLED | OUTPATIENT
Start: 2021-09-30

## 2021-09-22 ENCOUNTER — HOSPITAL ENCOUNTER (OUTPATIENT)
Dept: INFUSION THERAPY | Age: 76
Discharge: HOME | End: 2021-09-22
Payer: MEDICARE

## 2021-09-22 VITALS
HEART RATE: 93 BPM | TEMPERATURE: 96.9 F | RESPIRATION RATE: 18 BRPM | DIASTOLIC BLOOD PRESSURE: 61 MMHG | SYSTOLIC BLOOD PRESSURE: 105 MMHG | OXYGEN SATURATION: 97 %

## 2021-09-22 LAB
ALBUMIN SERPL-MCNC: 2.9 G/DL (ref 3.5–5)
ALBUMIN/GLOB SERPL: 0.8 {RATIO} (ref 1.1–2.2)
ALP SERPL-CCNC: 82 U/L (ref 45–117)
ALT SERPL-CCNC: 18 U/L (ref 12–78)
ANION GAP SERPL CALC-SCNC: 7 MMOL/L (ref 5–15)
AST SERPL-CCNC: 15 U/L (ref 15–37)
BASOPHILS # BLD: 0 K/UL (ref 0–0.1)
BASOPHILS NFR BLD: 0 % (ref 0–1)
BILIRUB SERPL-MCNC: 0.2 MG/DL (ref 0.2–1)
BUN SERPL-MCNC: 23 MG/DL (ref 6–20)
BUN/CREAT SERPL: 21 (ref 12–20)
CALCIUM SERPL-MCNC: 8.9 MG/DL (ref 8.5–10.1)
CHLORIDE SERPL-SCNC: 107 MMOL/L (ref 97–108)
CO2 SERPL-SCNC: 24 MMOL/L (ref 21–32)
CREAT SERPL-MCNC: 1.09 MG/DL (ref 0.7–1.3)
DIFFERENTIAL METHOD BLD: ABNORMAL
EOSINOPHIL # BLD: 0 K/UL (ref 0–0.4)
EOSINOPHIL NFR BLD: 0 % (ref 0–7)
ERYTHROCYTE [DISTWIDTH] IN BLOOD BY AUTOMATED COUNT: 16.1 % (ref 11.5–14.5)
GLOBULIN SER CALC-MCNC: 3.6 G/DL (ref 2–4)
GLUCOSE SERPL-MCNC: 106 MG/DL (ref 65–100)
HCT VFR BLD AUTO: 28.6 % (ref 36.6–50.3)
HGB BLD-MCNC: 9.2 G/DL (ref 12.1–17)
IMM GRANULOCYTES # BLD AUTO: 0 K/UL
IMM GRANULOCYTES NFR BLD AUTO: 0 %
LYMPHOCYTES # BLD: 0.7 K/UL (ref 0.8–3.5)
LYMPHOCYTES NFR BLD: 7 % (ref 12–49)
MAGNESIUM SERPL-MCNC: 1.9 MG/DL (ref 1.6–2.4)
MCH RBC QN AUTO: 33.5 PG (ref 26–34)
MCHC RBC AUTO-ENTMCNC: 32.2 G/DL (ref 30–36.5)
MCV RBC AUTO: 104 FL (ref 80–99)
METAMYELOCYTES NFR BLD MANUAL: 1 %
MONOCYTES # BLD: 0.7 K/UL (ref 0–1)
MONOCYTES NFR BLD: 8 % (ref 5–13)
NEUTS BAND NFR BLD MANUAL: 2 % (ref 0–6)
NEUTS SEG # BLD: 7.8 K/UL (ref 1.8–8)
NEUTS SEG NFR BLD: 82 % (ref 32–75)
NRBC # BLD: 0 K/UL (ref 0–0.01)
NRBC BLD-RTO: 0 PER 100 WBC
PLATELET # BLD AUTO: 245 K/UL (ref 150–400)
PMV BLD AUTO: 9.8 FL (ref 8.9–12.9)
POTASSIUM SERPL-SCNC: 4.2 MMOL/L (ref 3.5–5.1)
PROT SERPL-MCNC: 6.5 G/DL (ref 6.4–8.2)
RBC # BLD AUTO: 2.75 M/UL (ref 4.1–5.7)
RBC MORPH BLD: ABNORMAL
RBC MORPH BLD: ABNORMAL
SODIUM SERPL-SCNC: 138 MMOL/L (ref 136–145)
WBC # BLD AUTO: 9.3 K/UL (ref 4.1–11.1)

## 2021-09-22 PROCEDURE — 83735 ASSAY OF MAGNESIUM: CPT

## 2021-09-22 PROCEDURE — 80053 COMPREHEN METABOLIC PANEL: CPT

## 2021-09-22 PROCEDURE — 85025 COMPLETE CBC W/AUTO DIFF WBC: CPT

## 2021-09-23 ENCOUNTER — OFFICE VISIT (OUTPATIENT)
Dept: ONCOLOGY | Age: 76
End: 2021-09-23
Payer: MEDICARE

## 2021-09-23 ENCOUNTER — HOSPITAL ENCOUNTER (OUTPATIENT)
Dept: INFUSION THERAPY | Age: 76
Discharge: HOME | End: 2021-09-23
Payer: MEDICARE

## 2021-09-23 VITALS
DIASTOLIC BLOOD PRESSURE: 71 MMHG | TEMPERATURE: 98.6 F | RESPIRATION RATE: 18 BRPM | SYSTOLIC BLOOD PRESSURE: 109 MMHG | WEIGHT: 238 LBS | OXYGEN SATURATION: 93 % | HEART RATE: 93 BPM | BODY MASS INDEX: 33.19 KG/M2

## 2021-09-23 VITALS
WEIGHT: 238 LBS | DIASTOLIC BLOOD PRESSURE: 66 MMHG | RESPIRATION RATE: 18 BRPM | SYSTOLIC BLOOD PRESSURE: 138 MMHG | BODY MASS INDEX: 33.32 KG/M2 | HEIGHT: 71 IN | HEART RATE: 84 BPM | TEMPERATURE: 96.8 F

## 2021-09-23 DIAGNOSIS — C67.9 MALIGNANT NEOPLASM OF URINARY BLADDER, UNSPECIFIED SITE (HCC): Primary | ICD-10-CM

## 2021-09-23 DIAGNOSIS — E66.09 CLASS 1 OBESITY DUE TO EXCESS CALORIES WITH SERIOUS COMORBIDITY AND BODY MASS INDEX (BMI) OF 33.0 TO 33.9 IN ADULT: ICD-10-CM

## 2021-09-23 LAB
FERRITIN SERPL-MCNC: 626 NG/ML (ref 26–388)
IRON SATN MFR SERPL: 31 % (ref 20–50)
IRON SERPL-MCNC: 78 UG/DL (ref 35–150)
TIBC SERPL-MCNC: 248 UG/DL (ref 250–450)

## 2021-09-23 PROCEDURE — 96413 CHEMO IV INFUSION 1 HR: CPT

## 2021-09-23 PROCEDURE — 36415 COLL VENOUS BLD VENIPUNCTURE: CPT

## 2021-09-23 PROCEDURE — 99215 OFFICE O/P EST HI 40 MIN: CPT | Performed by: INTERNAL MEDICINE

## 2021-09-23 PROCEDURE — 3017F COLORECTAL CA SCREEN DOC REV: CPT | Performed by: INTERNAL MEDICINE

## 2021-09-23 PROCEDURE — 74011250636 HC RX REV CODE- 250/636: Performed by: INTERNAL MEDICINE

## 2021-09-23 PROCEDURE — G8417 CALC BMI ABV UP PARAM F/U: HCPCS | Performed by: INTERNAL MEDICINE

## 2021-09-23 PROCEDURE — G8536 NO DOC ELDER MAL SCRN: HCPCS | Performed by: INTERNAL MEDICINE

## 2021-09-23 PROCEDURE — 1101F PT FALLS ASSESS-DOCD LE1/YR: CPT | Performed by: INTERNAL MEDICINE

## 2021-09-23 PROCEDURE — G8432 DEP SCR NOT DOC, RNG: HCPCS | Performed by: INTERNAL MEDICINE

## 2021-09-23 PROCEDURE — 96368 THER/DIAG CONCURRENT INF: CPT

## 2021-09-23 PROCEDURE — 83540 ASSAY OF IRON: CPT

## 2021-09-23 PROCEDURE — G0463 HOSPITAL OUTPT CLINIC VISIT: HCPCS | Performed by: INTERNAL MEDICINE

## 2021-09-23 PROCEDURE — 96375 TX/PRO/DX INJ NEW DRUG ADDON: CPT

## 2021-09-23 PROCEDURE — 96417 CHEMO IV INFUS EACH ADDL SEQ: CPT

## 2021-09-23 PROCEDURE — 77030012965 HC NDL HUBR BBMI -A

## 2021-09-23 PROCEDURE — G8427 DOCREV CUR MEDS BY ELIG CLIN: HCPCS | Performed by: INTERNAL MEDICINE

## 2021-09-23 PROCEDURE — 82728 ASSAY OF FERRITIN: CPT

## 2021-09-23 PROCEDURE — 74011000258 HC RX REV CODE- 258: Performed by: INTERNAL MEDICINE

## 2021-09-23 PROCEDURE — 96367 TX/PROPH/DG ADDL SEQ IV INF: CPT

## 2021-09-23 PROCEDURE — 96415 CHEMO IV INFUSION ADDL HR: CPT

## 2021-09-23 RX ORDER — SODIUM CHLORIDE 9 MG/ML
25 INJECTION, SOLUTION INTRAVENOUS CONTINUOUS
Status: DISPENSED | OUTPATIENT
Start: 2021-09-23 | End: 2021-09-23

## 2021-09-23 RX ORDER — PALONOSETRON 0.05 MG/ML
0.25 INJECTION, SOLUTION INTRAVENOUS ONCE
Status: COMPLETED | OUTPATIENT
Start: 2021-09-23 | End: 2021-09-23

## 2021-09-23 RX ORDER — SODIUM CHLORIDE 0.9 % (FLUSH) 0.9 %
10 SYRINGE (ML) INJECTION AS NEEDED
Status: DISPENSED | OUTPATIENT
Start: 2021-09-23 | End: 2021-09-23

## 2021-09-23 RX ORDER — HEPARIN 100 UNIT/ML
300-500 SYRINGE INTRAVENOUS AS NEEDED
Status: ACTIVE | OUTPATIENT
Start: 2021-09-23 | End: 2021-09-23

## 2021-09-23 RX ORDER — SODIUM CHLORIDE 9 MG/ML
10 INJECTION INTRAMUSCULAR; INTRAVENOUS; SUBCUTANEOUS AS NEEDED
Status: DISPENSED | OUTPATIENT
Start: 2021-09-23 | End: 2021-09-23

## 2021-09-23 RX ADMIN — DEXAMETHASONE SODIUM PHOSPHATE 12 MG: 4 INJECTION, SOLUTION INTRA-ARTICULAR; INTRALESIONAL; INTRAMUSCULAR; INTRAVENOUS; SOFT TISSUE at 13:15

## 2021-09-23 RX ADMIN — SODIUM CHLORIDE 10 ML: 9 INJECTION INTRAMUSCULAR; INTRAVENOUS; SUBCUTANEOUS at 09:00

## 2021-09-23 RX ADMIN — SODIUM CHLORIDE 150 MG: 900 INJECTION, SOLUTION INTRAVENOUS at 12:50

## 2021-09-23 RX ADMIN — Medication 10 ML: at 18:00

## 2021-09-23 RX ADMIN — MAGNESIUM SULFATE HEPTAHYDRATE: 500 INJECTION, SOLUTION INTRAMUSCULAR; INTRAVENOUS at 11:40

## 2021-09-23 RX ADMIN — POTASSIUM CHLORIDE: 2 INJECTION, SOLUTION, CONCENTRATE INTRAVENOUS at 15:21

## 2021-09-23 RX ADMIN — GEMCITABINE 1762 MG: 38 INJECTION, SOLUTION INTRAVENOUS at 14:27

## 2021-09-23 RX ADMIN — CISPLATIN 164.5 MG: 100 INJECTION, SOLUTION INTRAVENOUS at 15:24

## 2021-09-23 RX ADMIN — Medication 10 ML: at 09:00

## 2021-09-23 RX ADMIN — PALONOSETRON HYDROCHLORIDE 0.25 MG: 0.25 INJECTION, SOLUTION INTRAVENOUS at 13:57

## 2021-09-23 RX ADMIN — HEPARIN 500 UNITS: 100 SYRINGE at 18:00

## 2021-09-23 NOTE — PROGRESS NOTES
Percy Almazan is a 77 y.o. male    Chief Complaint   Patient presents with    Chemotherapy      Muscle invasive bladder cancer- Mixed histology       1. Have you been to the ER, urgent care clinic since your last visit? Hospitalized since your last visit? No    2. Have you seen or consulted any other health care providers outside of the [address] [place] [address] since your last visit? Include any pap smears or colon screening.  Yes, Md Mery King

## 2021-09-23 NOTE — PROGRESS NOTES
\[facility]\"" Chemo Progress Note    Date: 2021    Name: Micky Easley    MRN: [id number]         : 1945    0900 Mr. Cecilia Pino Arrived to [facility] for C3D1 Gemzar/Cisplatin ambulatory in stable condition. Assessment was completed, no acute issues at this time, no new complaints voiced. Port accessed with positive blood return. Labs drawn and sent for processing. Patient to MD office. Patient returned to [facility], Normal Saline started at [facility]. Chemotherapy Flowsheet 2021   Cycle C3D1   Date 2021   Drug / Regimen Cisplatin/Gemzar   Pre Hydration given   Post Hydration given   Pre Meds given   Notes given         Patient denies SOB, fever, cough, general not feeling well. Patient denies recent exposure to someone who has tested positive for COVID-19. Patient denies having contact with anyone who has a pending COVID test.      Mr. Isabel Ibarra vitals were reviewed. Patient Vitals for the past 12 hrs:   Temp Pulse Resp BP   21 1800  84 18 138/66   21 0900 96.8 °F (36 °C) 93 18 (!) 95/58         Lab results were obtained and reviewed. Recent Results (from the past 12 hour(s))   IRON PROFILE    Collection Time: 21 11:37 AM   Result Value Ref Range    Iron 78 35 - 150 ug/dL    TIBC 248 (L) 250 - 450 ug/dL    Iron % saturation 31 20 - 50 %   FERRITIN    Collection Time: 21 11:37 AM   Result Value Ref Range    Ferritin 626 (H) 26 - 388 NG/ML       Pre-medications  were administered as ordered and chemotherapy was initiated.   Medications Administered     0.9% sodium chloride 1,000 mL with potassium chloride 10 mEq, magnesium sulfate 2 g infusion     Admin Date  2021 Action  New Bag Dose   Rate  1,000 mL/hr Route  IntraVENous Administered By  Hayden Jones RN           Admin Date  2021 Action  New Bag Dose   Rate  1,000 mL/hr Route  IntraVENous Administered By  Hayden Jones RN          0.9% sodium chloride injection 10 mL     Admin Date  2021 Action  Given Dose  10 mL Route  IntraVENous Administered By  Jammie Barragan RN          CISplatin (PLATINOL) 164.5 mg in 0.9% sodium chloride 500 mL, overfill volume 50 mL chemo infusion     Admin Date  09/23/2021 Action  New Bag Dose  164.5 mg Rate  357.3 mL/hr Route  IntraVENous Administered By  Jammie Barragan RN          dexamethasone (DECADRON) 12 mg in 0.9% sodium chloride 50 mL, overfill volume 5 mL IVPB     Admin Date  09/23/2021 Action  New Bag Dose  12 mg Rate  232 mL/hr Route  IntraVENous Administered By  Jammie Barragan RN          fosaprepitant (EMEND) 150 mg in 0.9% sodium chloride 150 mL IVPB     Admin Date  09/23/2021 Action  New Bag Dose  150 mg Rate  450 mL/hr Route  IntraVENous Administered By  Jammie Barragan RN          gemcitabine (GEMZAR) 1,762 mg in 0.9% sodium chloride 250 mL, overfill volume 25 mL chemo infusion     Admin Date  09/23/2021 Action  New Bag Dose  1,762 mg Rate  642.7 mL/hr Route  IntraVENous Administered By  Jammie Barragan RN          heparin (porcine) pf 300-500 Units     Admin Date  09/23/2021 Action  Given Dose  500 Units Route  InterCATHeter Administered By  Jammie Barragan RN          palonosetron HCl (ALOXI) injection 0.25 mg     Admin Date  09/23/2021 Action  Given Dose  0.25 mg Route  IntraVENous Administered By  Jammie Barragan RN          sodium chloride (NS) flush 10 mL     Admin Date  09/23/2021 Action  Given Dose  10 mL Route  IntraVENous Administered By  Jammie Barragan RN           Admin Date  09/23/2021 Action  Given Dose  10 mL Route  IntraVENous Administered By  Jammie Barragan RN                  1800 Patient tolerated treatment well. Port maintained positive blood return throughout treatment. Port flushed, heparinized and de accessed per protocol. Patient was discharged from [facility] in stable condition. Patient aware of next appointment.      Future Appointments   Date Time Provider Gregorio Ibarra   9/30/2021  9:00 AM [address] 10/14/2021  8:30 AM F1 TD LONG [address] [facility] H   10/14/2021  9:00 AM Trinity Catalan, ELISABET [address] [facility] BS AMB   10/21/2021  1:30 PM D4 TD  [facility], RN  September 23, 2021

## 2021-09-23 NOTE — PROGRESS NOTES
[facility] [place] [facility], [id number] [place] [address] Road, [address] [place] [address] Cheryl  W: [phone]  F: [phone]    Reason for Visit:   Sonu Ortiz is a 77 y.o. male who is seen in follow-up of Muscle invasive bladder cancer- Mixed histology    Treatment History:   · 3/1/2021: CT IVP: Multiple abdominal, iliac, mediastinal nodes unchanged from 2019 consistent with h/o sarcoidosis, Thickened R lateral bladder wall. · 6/23/2021: Cystoscopy and TURBT- Papillary changes were noted within the prostatic urethra ,  papillary tumors seen emanating from the surface of the left hemitrigone, There were also diffuse changes in the floor of the bladder- Low grade urothelial carcinoma of the prostatic urethra, R lateral bladder wall with HG Muscle invasive urothelial carcinoma and squamous differentiation+ CIS, Left brenda trigone HG non invasive urothelial carcinoma  · 8/5/21: Cisplatin + Gemzar   · 9/14/2021: CT was stable. History of Present Illness:   Patient is a 77 y.o. male with PMH as below including sarcoidosis who is seen for evaluation of bladder cancer. He has a history of nonmuscle invasive bladder cancer in 2015, underwent 2 induction courses of BCG, had a non muscle invasive recurrence in 2019 and had re induction with BCG. Cystoscopy 6/2020 at [facility] did not show any recurrence. In March 2021 he had a positive FISH and was seen by Dr. Arlene Bolanos. Had a CT IVP 3/2021 which showed some Bladder thickening. 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 Grade 4 neutropenia after cycle 1. He comes today for cycle 3 of Cisplatin + Gemzar. He states that he did well. He had no nausea, no fevers, chills, sweats. He is self cathing, he has had blood in urine once. He states that he tolerated the neulasta.  He has no neuropathy and no sores and no hearing impediment. He has decided to have surgery with Dr. Mary Vieira   He sees Dr. Luis A Najera for Sarcoidosis      Mother and sister had breast cancer and brother had kidney cancer     Past Medical History:   Diagnosis Date    Arrhythmia     LBBB    Arthritis     Autoimmune disease ([facility] [address]     PULMONARY SARCOIDOSIS    Cancer ([facility] [address]     scc top of head and nose    Cancer ([facility] [address] [facility]     BLADDER    COVID-19 vaccine series completed     [address] 21 AND 21    Hypertension     Other unknown and unspecified cause of morbidity or mortality     history of pulmonary sarcoid     Other unknown and unspecified cause of morbidity or mortality     depression, anxiety    Posterior cervical adenopathy, benign 2018    Psychiatric disorder     DEPRESSION AND ANXIETY    Pulmonary sarcoidosis ([facility] [address] [facility]     Unspecified sleep apnea     cpap      Past Surgical History:   Procedure Laterality Date    HX CATARACT REMOVAL Bilateral     HX HERNIA REPAIR Right AS A CHILD    INGUINAL    HX HERNIA REPAIR Left     INGUIBAL    HX KNEE REPLACEMENT Left     HX ORTHOPAEDIC Right     BONE SPURS REMOVED FROM FOOT    HX OTHER SURGICAL      scc removed top of head and nose    HX OTHER SURGICAL      benign lump removed from thyroid    HX OTHER SURGICAL Right 2020    SKIN CANCER RELMOVED FROM LEG    HX UROLOGICAL  2020    CYSTO    IR INSERT TUNL CVC W PORT OVER 5 YEARS  2021    RI CARDIAC SURG PROCEDURE UNLIST  2021    CARDIAC CATH    RI REVISE KNEE JOINT REPLACE,ALL PARTS Left       Social History     Tobacco Use    Smoking status: Former Smoker     Packs/day: 0.50     Years: 2.00     Pack years: 1.00     Quit date: 1965     Years since quittin.1    Smokeless tobacco: Never Used   Substance Use Topics    Alcohol use:  Yes     Alcohol/week: 14.0 standard drinks     Types: 12 Glasses of wine, 2 Shots of liquor per week      Family History   Problem Relation Age of Onset    Cancer Mother         breast with mets    Dementia Mother     Heart Disease Father     Cancer Sister         breast    Lung Disease Brother     No Known Problems Brother     Anesth Problems Neg Hx      Current Outpatient Medications   Medication Sig    cefdinir (OMNICEF) 300 mg capsule Take 300 mg by mouth two (2) times a day.  dexAMETHasone (DECADRON) 4 mg tablet Take 2 tabs (8mg) once daily on days 2 & 3 of chemotherapy    lidocaine-prilocaine (EMLA) topical cream Apply  to affected area as needed for Pain. Apply a thin layer over port a cath 30-60 minutes prior to port access    ondansetron (ZOFRAN ODT) 8 mg disintegrating tablet Take 1 Tablet by mouth every eight (8) hours as needed for Nausea or Vomiting.  prochlorperazine (Compazine) 5 mg tablet Take 1 Tablet by mouth every six (6) hours as needed for Nausea or Vomiting.  HYDROcodone-acetaminophen (NORCO) 7.5-325 mg per tablet Take  by mouth.  ondansetron (ZOFRAN ODT) 8 mg disintegrating tablet Take 1 Tablet by mouth every eight (8) hours as needed for Nausea or Vomiting.  lisinopriL (PRINIVIL, ZESTRIL) 5 mg tablet Take 5 mg by mouth two (2) times a day.  zolpidem (AMBIEN) 10 mg tablet Take 5 mg by mouth nightly as needed for Sleep.  buPROPion SR (WELLBUTRIN SR) 100 mg SR tablet Take 100 mg by mouth daily.  acetaminophen (Tylenol Extra Strength) 500 mg tablet Take 1,000 mg by mouth every six (6) hours as needed for Pain.  tamsulosin (FLOMAX) 0.4 mg capsule Take 0.4 mg by mouth nightly.  busPIRone (BUSPAR) 5 mg tablet Take 5 mg by mouth daily.  simvastatin (ZOCOR) 20 mg tablet Take 20 mg by mouth nightly.  escitalopram oxalate (LEXAPRO) 10 mg tablet Take 10 mg by mouth daily.  multivitamin (ONE A DAY) tablet Take 1 Tab by mouth daily. No current facility-administered medications for this visit.       Allergies   Allergen Reactions    Pcn [Penicillins] Hives        Review of Systems: A complete review of systems was obtained, negative except as described above. Physical Exam:     Visit Vitals  /71 (BP 1 Location: Left upper arm, BP Patient Position: Sitting)   Pulse 93   Temp 98.6 °F (37 °C)   Resp 18   Wt 238 lb (108 kg)   SpO2 93%   BMI 33.19 kg/m²     ECOG PS: 1  General: No distress  Eyes: PERRL, anicteric sclerae  HENT: Atraumatic, PAC in place   Neck: Supple  Skin: No rashes, ecchymoses, or petechiae. Normal temperature, turgor, and texture. Psych: Alert, oriented, appropriate affect, normal judgment/insight    Results:     Lab Results   Component Value Date/Time    WBC 9.3 09/22/2021 05:14 PM    HGB 9.2 (L) 09/22/2021 05:14 PM    HCT 28.6 (L) 09/22/2021 05:14 PM    PLATELET 309 58/27/8991 05:14 PM    .0 (H) 09/22/2021 05:14 PM    ABS. NEUTROPHILS 7.8 09/22/2021 05:14 PM     Lab Results   Component Value Date/Time    Sodium 138 09/22/2021 05:14 PM    Potassium 4.2 09/22/2021 05:14 PM    Chloride 107 09/22/2021 05:14 PM    CO2 24 09/22/2021 05:14 PM    Glucose 106 (H) 09/22/2021 05:14 PM    BUN 23 (H) 09/22/2021 05:14 PM    Creatinine 1.09 09/22/2021 05:14 PM    GFR est AA >60 09/22/2021 05:14 PM    GFR est non-AA >60 09/22/2021 05:14 PM    Calcium 8.9 09/22/2021 05:14 PM    Glucose (POC) 93 12/01/2009 07:10 AM     Lab Results   Component Value Date/Time    Bilirubin, total 0.2 09/22/2021 05:14 PM    ALT (SGPT) 18 09/22/2021 05:14 PM    Alk. phosphatase 82 09/22/2021 05:14 PM    Protein, total 6.5 09/22/2021 05:14 PM    Albumin 2.9 (L) 09/22/2021 05:14 PM    Globulin 3.6 09/22/2021 05:14 PM       External   Records reviewed and summarized above. Pathology report(s) reviewed above. Radiology report(s) reviewed above. CT CAP 7/19/21:   IMPRESSION  1. While the bladder is decompressed by Juarez is thick-walled and irregular  2. No evidence of metastatic disease to the abdomen or pelvis  3.  Extensive bilateral hilar and mediastinal adenopathy with patchy perihilar  airspace disease with associated nodular peribronchial cuffing. Findings can be  seen with sarcoidosis. Differentiating adenopathy from sarcoidosis for  metastatic disease is difficult       9/2021 CT     IMPRESSION     1. Mild bladder wall thickening posteriorly and along the right lateral bladder  wall, nonspecific. No evidence of metastatic disease within the chest, abdomen,  or pelvis. 2. Extensive mediastinal and bilateral hilar lymphadenopathy with lymph node  calcifications, most compatible with known sarcoidosis. No significant change. 3. Bilateral perihilar airspace opacities have improved. No new pulmonary  pathology identified. 4. Severe diverticulosis of the colon. No definite superimposed acute  diverticulitis. Assessment:   1) Muscle invasive bladder cancer- With squamous differentiation     S/p TURBT 6/23/2021- Path reviewed   [id number]     I discussed the rationale for neoadjuvant chemotherapy which has level 1 evidence supporting improved survival in pure urothelial cancers. See prior office notes regarding discussion of treatment plan. We discussed proceeding with NAC, restaging in 2 cycles given mixed histology and considering split dosing if he develops grade 3 or more toxicities. CT imaging obtained 7/19/21 was personally reviewed & shows no evidence of metastatic disease. B/l hilar & mediastinal adenopathy is likely secondary to sarcoidosis but we will follow this closely. He had a second opinion with urology at [facility] who agreed with above plan. He is also seeking a second opinion at [facility] urology        Today is cycle 3 day 1 of NAC Cisplatin + Gemzar x 4 cycles. He was hospitalized with sepsis after cycle 1- and hence dose reductions were made. Tolerated cycle 2 well. Interim scans reviewed as above and stable. .   Proceed with cycle 3  He can have surgery anytime after Thanksgiving and preferably before the end of this year.         2) Extensive low grade non muscle invasive urothelial cancer    3) Sarcoidosis  Follows with pulmonary  Stable. 4) Obesity    5) Recent sepsis / UTI  Has recovered       6) Neutropenia  Per outside records - grade 4 & required granix   He is at high risk for recurrent grade IV neutropenia hence added neulasta moving forward   Counts normal today     7) Thrombocytopenia  Grade 2 per outside records - 60K   DR gemzar and tolerated well     6) Encounter for high risk drugs like chemotherapy  Grade 1 anemia  Previous dose reductions due to grade IV neutropenia after cycle 1    Plan:     · Proceed today with cycle 3 day 1 of Cisplatin (70mg/m2 on day 1) and Gemzar DR 25% (750mg/m2 on days 1, 8)  NAC for 4 cycles   · Dexamethasone days 2, 3 DR to 4mg  · zofran / compazine prn  · Labs per protocol . Add Iron profile and ferritin   · neulasta on day 8 given high risk for grade IV neutropenia     RTC in 3 weeks for cycle 4  Communicated with Dr. Sanchez Husbands     I appreciate the opportunity to participate in Mr. Terri Clifford shannon.     Signed By: Klaudia Gracia MD

## 2021-09-29 ENCOUNTER — HOSPITAL ENCOUNTER (OUTPATIENT)
Dept: INFUSION THERAPY | Age: 76
Discharge: HOME | End: 2021-09-29
Payer: MEDICARE

## 2021-09-29 VITALS
SYSTOLIC BLOOD PRESSURE: 154 MMHG | RESPIRATION RATE: 18 BRPM | DIASTOLIC BLOOD PRESSURE: 94 MMHG | OXYGEN SATURATION: 97 % | HEART RATE: 67 BPM | TEMPERATURE: 96.9 F

## 2021-09-29 LAB
ANION GAP SERPL CALC-SCNC: 8 MMOL/L (ref 5–15)
BASOPHILS # BLD: 0 K/UL (ref 0–0.1)
BASOPHILS NFR BLD: 2 % (ref 0–1)
BUN SERPL-MCNC: 24 MG/DL (ref 6–20)
BUN/CREAT SERPL: 26 (ref 12–20)
CALCIUM SERPL-MCNC: 9 MG/DL (ref 8.5–10.1)
CHLORIDE SERPL-SCNC: 104 MMOL/L (ref 97–108)
CO2 SERPL-SCNC: 23 MMOL/L (ref 21–32)
CREAT SERPL-MCNC: 0.92 MG/DL (ref 0.7–1.3)
DIFFERENTIAL METHOD BLD: ABNORMAL
EOSINOPHIL # BLD: 0 K/UL (ref 0–0.4)
EOSINOPHIL NFR BLD: 1 % (ref 0–7)
ERYTHROCYTE [DISTWIDTH] IN BLOOD BY AUTOMATED COUNT: 16.1 % (ref 11.5–14.5)
GLUCOSE SERPL-MCNC: 89 MG/DL (ref 65–100)
HCT VFR BLD AUTO: 27.3 % (ref 36.6–50.3)
HGB BLD-MCNC: 8.6 G/DL (ref 12.1–17)
IMM GRANULOCYTES # BLD AUTO: 0 K/UL (ref 0–0.04)
IMM GRANULOCYTES NFR BLD AUTO: 1 % (ref 0–0.5)
LYMPHOCYTES # BLD: 0.4 K/UL (ref 0.8–3.5)
LYMPHOCYTES NFR BLD: 23 % (ref 12–49)
MAGNESIUM SERPL-MCNC: 1.8 MG/DL (ref 1.6–2.4)
MCH RBC QN AUTO: 33 PG (ref 26–34)
MCHC RBC AUTO-ENTMCNC: 31.5 G/DL (ref 30–36.5)
MCV RBC AUTO: 104.6 FL (ref 80–99)
MONOCYTES # BLD: 0.3 K/UL (ref 0–1)
MONOCYTES NFR BLD: 15 % (ref 5–13)
NEUTS SEG # BLD: 1.2 K/UL (ref 1.8–8)
NEUTS SEG NFR BLD: 58 % (ref 32–75)
NRBC # BLD: 0 K/UL (ref 0–0.01)
NRBC BLD-RTO: 0 PER 100 WBC
PLATELET # BLD AUTO: 224 K/UL (ref 150–400)
PMV BLD AUTO: 9 FL (ref 8.9–12.9)
POTASSIUM SERPL-SCNC: 3.9 MMOL/L (ref 3.5–5.1)
RBC # BLD AUTO: 2.61 M/UL (ref 4.1–5.7)
RBC MORPH BLD: ABNORMAL
RBC MORPH BLD: ABNORMAL
SODIUM SERPL-SCNC: 135 MMOL/L (ref 136–145)
WBC # BLD AUTO: 1.9 K/UL (ref 4.1–11.1)

## 2021-09-29 PROCEDURE — 36415 COLL VENOUS BLD VENIPUNCTURE: CPT

## 2021-09-29 PROCEDURE — 85025 COMPLETE CBC W/AUTO DIFF WBC: CPT

## 2021-09-29 PROCEDURE — 83735 ASSAY OF MAGNESIUM: CPT

## 2021-09-29 PROCEDURE — 80048 BASIC METABOLIC PNL TOTAL CA: CPT

## 2021-09-29 NOTE — PROGRESS NOTES
Luis CROWELL -     [id number] - Self ambulating patient, in stable condition, presented to [facility] for lab draw. 1637 - Labs were drawn and sent for processing.  Patient tolerated lab draw and voiced no complaints    VITALS:    BP: 154/94  Temp: 96.9  P: 67  RR: 18  O2: 97

## 2021-09-30 ENCOUNTER — HOSPITAL ENCOUNTER (OUTPATIENT)
Dept: INFUSION THERAPY | Age: 76
Discharge: HOME | End: 2021-09-30
Payer: MEDICARE

## 2021-09-30 VITALS
WEIGHT: 238 LBS | RESPIRATION RATE: 16 BRPM | SYSTOLIC BLOOD PRESSURE: 130 MMHG | HEIGHT: 71 IN | BODY MASS INDEX: 33.32 KG/M2 | TEMPERATURE: 96.8 F | DIASTOLIC BLOOD PRESSURE: 74 MMHG | HEART RATE: 75 BPM

## 2021-09-30 DIAGNOSIS — C67.9 MALIGNANT NEOPLASM OF URINARY BLADDER, UNSPECIFIED SITE (HCC): Primary | ICD-10-CM

## 2021-09-30 PROCEDURE — 77030012965 HC NDL HUBR BBMI -A

## 2021-09-30 PROCEDURE — 74011250636 HC RX REV CODE- 250/636: Performed by: INTERNAL MEDICINE

## 2021-09-30 PROCEDURE — 96413 CHEMO IV INFUSION 1 HR: CPT

## 2021-09-30 PROCEDURE — 96375 TX/PRO/DX INJ NEW DRUG ADDON: CPT

## 2021-09-30 PROCEDURE — 96377 APPLICATON ON-BODY INJECTOR: CPT

## 2021-09-30 RX ORDER — ONDANSETRON 2 MG/ML
8 INJECTION INTRAMUSCULAR; INTRAVENOUS ONCE
Status: COMPLETED | OUTPATIENT
Start: 2021-09-30 | End: 2021-09-30

## 2021-09-30 RX ORDER — SODIUM CHLORIDE 0.9 % (FLUSH) 0.9 %
10 SYRINGE (ML) INJECTION AS NEEDED
Status: DISPENSED | OUTPATIENT
Start: 2021-09-30 | End: 2021-09-30

## 2021-09-30 RX ORDER — HEPARIN 100 UNIT/ML
300-500 SYRINGE INTRAVENOUS AS NEEDED
Status: ACTIVE | OUTPATIENT
Start: 2021-09-30 | End: 2021-09-30

## 2021-09-30 RX ORDER — SODIUM CHLORIDE 9 MG/ML
25 INJECTION, SOLUTION INTRAVENOUS CONTINUOUS
Status: DISPENSED | OUTPATIENT
Start: 2021-09-30 | End: 2021-09-30

## 2021-09-30 RX ADMIN — Medication 10 ML: at 11:40

## 2021-09-30 RX ADMIN — PEGFILGRASTIM 6 MG: KIT SUBCUTANEOUS at 11:41

## 2021-09-30 RX ADMIN — Medication 10 ML: at 09:27

## 2021-09-30 RX ADMIN — SODIUM CHLORIDE 25 ML/HR: 900 INJECTION, SOLUTION INTRAVENOUS at 09:27

## 2021-09-30 RX ADMIN — HEPARIN 500 UNITS: 100 SYRINGE at 11:40

## 2021-09-30 RX ADMIN — Medication 10 ML: at 09:26

## 2021-09-30 RX ADMIN — GEMCITABINE HYDROCHLORIDE 1762 MG: 2 INJECTION, SOLUTION INTRAVENOUS at 11:09

## 2021-09-30 RX ADMIN — ONDANSETRON 8 MG: 2 INJECTION INTRAMUSCULAR; INTRAVENOUS at 10:05

## 2021-09-30 NOTE — PROGRESS NOTES
[facility] Chemo Progress Note    0900 Mr. Izzy Ashford Arrived to [address] [place] [address] for Gemzar (C3D8) ambulatory in stable condition. Assessment was completed, no acute issues at this time, no new complaints voiced. Port accessed with positive blood return. Port connected to [facility]. Labs reviewed from 9/29 - [facility] for treatment. Chemotherapy ordered from pharmacy. Patient denies SOB, fever, cough, general not feeling well. Patient denies recent exposure to someone who has tested positive for COVID-19. Patient denies having contact with anyone who has a pending COVID test.    Chemotherapy Flowsheet 9/30/2021   Cycle C3D8   Date 9/30/2021   Drug / Regimen Gemzar   Pre Hydration -   Post Hydration -   Pre Meds given   Notes given + neulasta OBI     Mr. Grady Locket vitals were reviewed. Patient Vitals for the past 12 hrs:   Temp Pulse Resp BP   09/30/21 1140  75  130/74   09/30/21 0907 96.8 °F (36 °C) 84 16 (!) 107/59   Pre-medications  were administered as ordered and chemotherapy was initiated.   Medications Administered     0.9% sodium chloride infusion     Admin Date  09/30/2021 Action  New Bag Dose  25 mL/hr Rate  25 mL/hr Route  IntraVENous Administered By  Domingo Barker RN          gemcitabine (GEMZAR) 1,762 mg in 0.9% sodium chloride 250 mL, overfill volume 25 mL chemo infusion     Admin Date  09/30/2021 Action  New Bag Dose  1,762 mg Rate  642.7 mL/hr Route  IntraVENous Administered By  Domingo Barker RN          heparin (porcine) pf 300-500 Units     Admin Date  09/30/2021 Action  Given Dose  500 Units Route  InterCATHeter Administered By  Domingo Barker RN          [facility]etron [facility]) injection 8 mg     Admin Date  09/30/2021 Action  Given Dose  8 mg Route  IntraVENous Administered By  Domingo Barker RN          pegfilgrastim (NEULASTA) wearable SQ injector 6 mg     Admin Date  09/30/2021 Action  Given Dose  6 mg Route  SubCUTAneous Administered By  Domingo Barker RN          sodium chloride (NS) flush 10 mL     Admin Date  09/30/2021 Action  Given Dose  10 mL Route  IntraVENous Administered By  Sara Graves RN           Admin Date  09/30/2021 Action  Given Dose  10 mL Route  IntraVENous Administered By  Sara Graves RN           Admin Date  09/30/2021 Action  Given Dose  10 mL Route  IntraVENous Administered By  Sara Graves RN            Education provided to patient about Neulasta On Body Injector including: side effects, how/when to remove the device, as well as what to do in the event of device malfunction. Opportunity for questions was provided and all questions were answered. Patient verbalized understanding. Neulasta placed on left arm. 1140 Patient tolerated treatment well. Port maintained positive blood return throughout treatment. Port flushed, heparinized and de accessed per protocol. Patient was discharged from [facility] in stable condition. Patient aware of next appointment.      Future Appointments   Date Time Provider Gregorio Ibarra   10/14/2021  8:30 AM F1 TD LONG [facility]   10/14/2021  9:00 AM Sen Vanessa  [address] BS AMB   10/21/2021  1:30 PM D4 TD AMY [place] [facility] [place]  September 30, 2021

## 2021-10-07 RX ORDER — ACETAMINOPHEN 325 MG/1
650 TABLET ORAL AS NEEDED
Status: CANCELLED
Start: 2021-10-21

## 2021-10-07 RX ORDER — ALBUTEROL SULFATE 0.83 MG/ML
2.5 SOLUTION RESPIRATORY (INHALATION) AS NEEDED
Status: CANCELLED
Start: 2021-10-21

## 2021-10-07 RX ORDER — HYDROCORTISONE SODIUM SUCCINATE 100 MG/2ML
100 INJECTION, POWDER, FOR SOLUTION INTRAMUSCULAR; INTRAVENOUS AS NEEDED
Status: CANCELLED | OUTPATIENT
Start: 2021-10-21

## 2021-10-07 RX ORDER — SODIUM CHLORIDE 9 MG/ML
10 INJECTION INTRAMUSCULAR; INTRAVENOUS; SUBCUTANEOUS AS NEEDED
Status: CANCELLED | OUTPATIENT
Start: 2021-10-21

## 2021-10-07 RX ORDER — HEPARIN 100 UNIT/ML
300-500 SYRINGE INTRAVENOUS AS NEEDED
Status: CANCELLED
Start: 2021-10-21

## 2021-10-07 RX ORDER — ACETAMINOPHEN 325 MG/1
650 TABLET ORAL AS NEEDED
Status: CANCELLED
Start: 2021-10-14

## 2021-10-07 RX ORDER — DIPHENHYDRAMINE HYDROCHLORIDE 50 MG/ML
50 INJECTION, SOLUTION INTRAMUSCULAR; INTRAVENOUS AS NEEDED
Status: CANCELLED
Start: 2021-10-21

## 2021-10-07 RX ORDER — DIPHENHYDRAMINE HYDROCHLORIDE 50 MG/ML
50 INJECTION, SOLUTION INTRAMUSCULAR; INTRAVENOUS AS NEEDED
Status: CANCELLED
Start: 2021-10-14

## 2021-10-07 RX ORDER — HYDROCORTISONE SODIUM SUCCINATE 100 MG/2ML
100 INJECTION, POWDER, FOR SOLUTION INTRAMUSCULAR; INTRAVENOUS AS NEEDED
Status: CANCELLED | OUTPATIENT
Start: 2021-10-14

## 2021-10-07 RX ORDER — SODIUM CHLORIDE 9 MG/ML
25 INJECTION, SOLUTION INTRAVENOUS CONTINUOUS
Status: CANCELLED | OUTPATIENT
Start: 2021-10-21

## 2021-10-07 RX ORDER — ONDANSETRON 2 MG/ML
8 INJECTION INTRAMUSCULAR; INTRAVENOUS ONCE
Status: CANCELLED | OUTPATIENT
Start: 2021-10-21 | End: 2021-10-21

## 2021-10-07 RX ORDER — SODIUM CHLORIDE 0.9 % (FLUSH) 0.9 %
10 SYRINGE (ML) INJECTION AS NEEDED
Status: CANCELLED | OUTPATIENT
Start: 2021-10-21

## 2021-10-07 RX ORDER — EPINEPHRINE 1 MG/ML
0.3 INJECTION, SOLUTION, CONCENTRATE INTRAVENOUS AS NEEDED
Status: CANCELLED | OUTPATIENT
Start: 2021-10-14

## 2021-10-07 RX ORDER — DIPHENHYDRAMINE HYDROCHLORIDE 50 MG/ML
25 INJECTION, SOLUTION INTRAMUSCULAR; INTRAVENOUS AS NEEDED
Status: CANCELLED
Start: 2021-10-21

## 2021-10-07 RX ORDER — EPINEPHRINE 1 MG/ML
0.3 INJECTION, SOLUTION, CONCENTRATE INTRAVENOUS AS NEEDED
Status: CANCELLED | OUTPATIENT
Start: 2021-10-21

## 2021-10-07 RX ORDER — ONDANSETRON 2 MG/ML
8 INJECTION INTRAMUSCULAR; INTRAVENOUS AS NEEDED
Status: CANCELLED | OUTPATIENT
Start: 2021-10-14

## 2021-10-07 RX ORDER — ALBUTEROL SULFATE 0.83 MG/ML
2.5 SOLUTION RESPIRATORY (INHALATION) AS NEEDED
Status: CANCELLED
Start: 2021-10-14

## 2021-10-07 RX ORDER — ONDANSETRON 2 MG/ML
8 INJECTION INTRAMUSCULAR; INTRAVENOUS AS NEEDED
Status: CANCELLED | OUTPATIENT
Start: 2021-10-21

## 2021-10-07 RX ORDER — DIPHENHYDRAMINE HYDROCHLORIDE 50 MG/ML
25 INJECTION, SOLUTION INTRAMUSCULAR; INTRAVENOUS AS NEEDED
Status: CANCELLED
Start: 2021-10-14

## 2021-10-11 ENCOUNTER — TELEPHONE (OUTPATIENT)
Dept: ONCOLOGY | Age: 76
End: 2021-10-11

## 2021-10-11 NOTE — TELEPHONE ENCOUNTER
Patient Is requesting labs to be drawn the day prior to opic this week. Something late wednesday afternoon pt said.  Please call and arrange     Cb# [phone]

## 2021-10-12 NOTE — TELEPHONE ENCOUNTER
Pt is following up on this as he has not heard from anyone. Patient requesting labs day prior to treatment this week. Please call patient to arrange, thanks!     Cb# [id number]

## 2021-10-13 ENCOUNTER — HOSPITAL ENCOUNTER (OUTPATIENT)
Dept: INFUSION THERAPY | Age: 76
Discharge: HOME | End: 2021-10-13
Payer: MEDICARE

## 2021-10-13 VITALS
SYSTOLIC BLOOD PRESSURE: 113 MMHG | OXYGEN SATURATION: 95 % | DIASTOLIC BLOOD PRESSURE: 76 MMHG | RESPIRATION RATE: 18 BRPM | TEMPERATURE: 97.3 F | HEART RATE: 88 BPM

## 2021-10-13 LAB
ALBUMIN SERPL-MCNC: 3.4 G/DL (ref 3.5–5)
ALBUMIN/GLOB SERPL: 1.1 {RATIO} (ref 1.1–2.2)
ALP SERPL-CCNC: 99 U/L (ref 45–117)
ALT SERPL-CCNC: 20 U/L (ref 12–78)
ANION GAP SERPL CALC-SCNC: 9 MMOL/L (ref 5–15)
AST SERPL-CCNC: 16 U/L (ref 15–37)
BASOPHILS # BLD: 0.1 K/UL (ref 0–0.1)
BASOPHILS NFR BLD: 1 % (ref 0–1)
BILIRUB SERPL-MCNC: 0.3 MG/DL (ref 0.2–1)
BUN SERPL-MCNC: 16 MG/DL (ref 6–20)
BUN/CREAT SERPL: 15 (ref 12–20)
CALCIUM SERPL-MCNC: 8.8 MG/DL (ref 8.5–10.1)
CHLORIDE SERPL-SCNC: 106 MMOL/L (ref 97–108)
CO2 SERPL-SCNC: 24 MMOL/L (ref 21–32)
CREAT SERPL-MCNC: 1.06 MG/DL (ref 0.7–1.3)
DIFFERENTIAL METHOD BLD: ABNORMAL
EOSINOPHIL # BLD: 0.1 K/UL (ref 0–0.4)
EOSINOPHIL NFR BLD: 1 % (ref 0–7)
ERYTHROCYTE [DISTWIDTH] IN BLOOD BY AUTOMATED COUNT: 19.9 % (ref 11.5–14.5)
GLOBULIN SER CALC-MCNC: 3.1 G/DL (ref 2–4)
GLUCOSE SERPL-MCNC: 119 MG/DL (ref 65–100)
HCT VFR BLD AUTO: 25.4 % (ref 36.6–50.3)
HGB BLD-MCNC: 8.3 G/DL (ref 12.1–17)
IMM GRANULOCYTES # BLD AUTO: 0 K/UL
IMM GRANULOCYTES NFR BLD AUTO: 0 %
LYMPHOCYTES # BLD: 0.5 K/UL (ref 0.8–3.5)
LYMPHOCYTES NFR BLD: 5 % (ref 12–49)
MAGNESIUM SERPL-MCNC: 2.1 MG/DL (ref 1.6–2.4)
MCH RBC QN AUTO: 34.7 PG (ref 26–34)
MCHC RBC AUTO-ENTMCNC: 32.7 G/DL (ref 30–36.5)
MCV RBC AUTO: 106.3 FL (ref 80–99)
METAMYELOCYTES NFR BLD MANUAL: 3 %
MONOCYTES # BLD: 0.8 K/UL (ref 0–1)
MONOCYTES NFR BLD: 7 % (ref 5–13)
NEUTS BAND NFR BLD MANUAL: 2 % (ref 0–6)
NEUTS SEG # BLD: 9 K/UL (ref 1.8–8)
NEUTS SEG NFR BLD: 81 % (ref 32–75)
NRBC # BLD: 0.05 K/UL (ref 0–0.01)
NRBC BLD-RTO: 0.5 PER 100 WBC
PLATELET # BLD AUTO: 183 K/UL (ref 150–400)
PMV BLD AUTO: 10 FL (ref 8.9–12.9)
POTASSIUM SERPL-SCNC: 4.2 MMOL/L (ref 3.5–5.1)
PROT SERPL-MCNC: 6.5 G/DL (ref 6.4–8.2)
RBC # BLD AUTO: 2.39 M/UL (ref 4.1–5.7)
RBC MORPH BLD: ABNORMAL
RBC MORPH BLD: ABNORMAL
SODIUM SERPL-SCNC: 139 MMOL/L (ref 136–145)
WBC # BLD AUTO: 10.8 K/UL (ref 4.1–11.1)

## 2021-10-13 PROCEDURE — 83735 ASSAY OF MAGNESIUM: CPT

## 2021-10-13 PROCEDURE — 80053 COMPREHEN METABOLIC PANEL: CPT

## 2021-10-13 PROCEDURE — 36415 COLL VENOUS BLD VENIPUNCTURE: CPT

## 2021-10-13 PROCEDURE — 85025 COMPLETE CBC W/AUTO DIFF WBC: CPT

## 2021-10-13 NOTE — PROGRESS NOTES
[facility] Lab Visit:        1500:  Pt arrived ambulatory  To [facility] in stable condition, fort lab draw. Labs drawn peripherally from Right AC arm and sent for processing. Pt tolerated well. Visit Vitals  /76 (BP 1 Location: Right upper arm, BP Patient Position: Sitting)   Pulse 88   Temp 97.3 °F (36.3 °C)   Resp 18   SpO2 95%       1515: No new concerns voiced. D/cd home ambulatory in no distress. Pt aware of next [facility] appointment scheduled. Labs available in CC once resulted.

## 2021-10-14 ENCOUNTER — HOSPITAL ENCOUNTER (OUTPATIENT)
Dept: INFUSION THERAPY | Age: 76
Discharge: HOME | End: 2021-10-14
Payer: MEDICARE

## 2021-10-14 ENCOUNTER — OFFICE VISIT (OUTPATIENT)
Dept: ONCOLOGY | Age: 76
End: 2021-10-14
Payer: MEDICARE

## 2021-10-14 VITALS
HEART RATE: 102 BPM | RESPIRATION RATE: 16 BRPM | TEMPERATURE: 98.2 F | BODY MASS INDEX: 33.21 KG/M2 | SYSTOLIC BLOOD PRESSURE: 109 MMHG | WEIGHT: 237.2 LBS | HEIGHT: 71 IN | DIASTOLIC BLOOD PRESSURE: 71 MMHG

## 2021-10-14 VITALS
RESPIRATION RATE: 18 BRPM | DIASTOLIC BLOOD PRESSURE: 71 MMHG | SYSTOLIC BLOOD PRESSURE: 109 MMHG | WEIGHT: 237.2 LBS | HEART RATE: 102 BPM | OXYGEN SATURATION: 93 % | HEIGHT: 71 IN | TEMPERATURE: 98.2 F | BODY MASS INDEX: 33.21 KG/M2

## 2021-10-14 DIAGNOSIS — C67.9 MALIGNANT NEOPLASM OF URINARY BLADDER, UNSPECIFIED SITE (HCC): Primary | ICD-10-CM

## 2021-10-14 DIAGNOSIS — Z95.828 PORT-A-CATH IN PLACE: ICD-10-CM

## 2021-10-14 DIAGNOSIS — D64.9 ANEMIA, UNSPECIFIED TYPE: ICD-10-CM

## 2021-10-14 DIAGNOSIS — Z51.11 ENCOUNTER FOR ANTINEOPLASTIC CHEMOTHERAPY: ICD-10-CM

## 2021-10-14 PROCEDURE — G8510 SCR DEP NEG, NO PLAN REQD: HCPCS | Performed by: INTERNAL MEDICINE

## 2021-10-14 PROCEDURE — G8427 DOCREV CUR MEDS BY ELIG CLIN: HCPCS | Performed by: INTERNAL MEDICINE

## 2021-10-14 PROCEDURE — G8417 CALC BMI ABV UP PARAM F/U: HCPCS | Performed by: INTERNAL MEDICINE

## 2021-10-14 PROCEDURE — 74011250636 HC RX REV CODE- 250/636: Performed by: INTERNAL MEDICINE

## 2021-10-14 PROCEDURE — 74011000258 HC RX REV CODE- 258: Performed by: INTERNAL MEDICINE

## 2021-10-14 PROCEDURE — 96415 CHEMO IV INFUSION ADDL HR: CPT

## 2021-10-14 PROCEDURE — G0463 HOSPITAL OUTPT CLINIC VISIT: HCPCS | Performed by: REGISTERED NURSE

## 2021-10-14 PROCEDURE — 96413 CHEMO IV INFUSION 1 HR: CPT

## 2021-10-14 PROCEDURE — G8536 NO DOC ELDER MAL SCRN: HCPCS | Performed by: INTERNAL MEDICINE

## 2021-10-14 PROCEDURE — 3017F COLORECTAL CA SCREEN DOC REV: CPT | Performed by: INTERNAL MEDICINE

## 2021-10-14 PROCEDURE — 99214 OFFICE O/P EST MOD 30 MIN: CPT | Performed by: INTERNAL MEDICINE

## 2021-10-14 PROCEDURE — 96367 TX/PROPH/DG ADDL SEQ IV INF: CPT

## 2021-10-14 PROCEDURE — 96417 CHEMO IV INFUS EACH ADDL SEQ: CPT

## 2021-10-14 PROCEDURE — 96375 TX/PRO/DX INJ NEW DRUG ADDON: CPT

## 2021-10-14 PROCEDURE — 1101F PT FALLS ASSESS-DOCD LE1/YR: CPT | Performed by: INTERNAL MEDICINE

## 2021-10-14 PROCEDURE — 77030012965 HC NDL HUBR BBMI -A

## 2021-10-14 RX ORDER — SODIUM CHLORIDE 0.9 % (FLUSH) 0.9 %
10 SYRINGE (ML) INJECTION AS NEEDED
Status: DISPENSED | OUTPATIENT
Start: 2021-10-14 | End: 2021-10-14

## 2021-10-14 RX ORDER — SODIUM CHLORIDE 9 MG/ML
10 INJECTION INTRAMUSCULAR; INTRAVENOUS; SUBCUTANEOUS AS NEEDED
Status: ACTIVE | OUTPATIENT
Start: 2021-10-14 | End: 2021-10-14

## 2021-10-14 RX ORDER — SODIUM CHLORIDE 9 MG/ML
25 INJECTION, SOLUTION INTRAVENOUS CONTINUOUS
Status: DISPENSED | OUTPATIENT
Start: 2021-10-14 | End: 2021-10-14

## 2021-10-14 RX ORDER — HEPARIN 100 UNIT/ML
300-500 SYRINGE INTRAVENOUS AS NEEDED
Status: ACTIVE | OUTPATIENT
Start: 2021-10-14 | End: 2021-10-14

## 2021-10-14 RX ORDER — PALONOSETRON 0.05 MG/ML
0.25 INJECTION, SOLUTION INTRAVENOUS ONCE
Status: COMPLETED | OUTPATIENT
Start: 2021-10-14 | End: 2021-10-14

## 2021-10-14 RX ADMIN — POTASSIUM CHLORIDE: 2 INJECTION, SOLUTION, CONCENTRATE INTRAVENOUS at 10:20

## 2021-10-14 RX ADMIN — SODIUM CHLORIDE 25 ML/HR: 900 INJECTION, SOLUTION INTRAVENOUS at 11:24

## 2021-10-14 RX ADMIN — GEMCITABINE HYDROCHLORIDE 1762 MG: 2 INJECTION, SOLUTION INTRAVENOUS at 12:15

## 2021-10-14 RX ADMIN — DEXAMETHASONE SODIUM PHOSPHATE 12 MG: 4 INJECTION, SOLUTION INTRA-ARTICULAR; INTRALESIONAL; INTRAMUSCULAR; INTRAVENOUS; SOFT TISSUE at 11:30

## 2021-10-14 RX ADMIN — Medication 500 UNITS: at 15:41

## 2021-10-14 RX ADMIN — SODIUM CHLORIDE 10 ML: 9 INJECTION INTRAMUSCULAR; INTRAVENOUS; SUBCUTANEOUS at 09:52

## 2021-10-14 RX ADMIN — FOSAPREPITANT 150 MG: 150 INJECTION, POWDER, LYOPHILIZED, FOR SOLUTION INTRAVENOUS at 11:50

## 2021-10-14 RX ADMIN — CISPLATIN 164.5 MG: 100 INJECTION, SOLUTION INTRAVENOUS at 13:05

## 2021-10-14 RX ADMIN — PALONOSETRON HYDROCHLORIDE 0.25 MG: 0.25 INJECTION, SOLUTION INTRAVENOUS at 11:24

## 2021-10-14 RX ADMIN — Medication 10 ML: at 09:52

## 2021-10-14 RX ADMIN — POTASSIUM CHLORIDE: 2 INJECTION, SOLUTION, CONCENTRATE INTRAVENOUS at 13:05

## 2021-10-14 NOTE — PROGRESS NOTES
[facility] at [address], [id number] [place] [address] Road, [address] [place] [address]  W: [phone]  F: [phone]    Reason for Visit:   Blair Mott is a 77 y.o. male who is seen in follow-up of Muscle invasive bladder cancer- Mixed histology    Treatment History:   · 3/1/2021: CT IVP: Multiple abdominal, iliac, mediastinal nodes unchanged from 2019 consistent with h/o sarcoidosis, Thickened R lateral bladder wall. · 6/23/2021: Cystoscopy and TURBT- Papillary changes were noted within the prostatic urethra ,  papillary tumors seen emanating from the surface of the left hemitrigone, There were also diffuse changes in the floor of the bladder- Low grade urothelial carcinoma of the prostatic urethra, R lateral bladder wall with HG Muscle invasive urothelial carcinoma and squamous differentiation+ CIS, Left brenda trigone HG non invasive urothelial carcinoma  · 8/5/21: Cisplatin + Gemzar   · 9/14/2021: CT was stable. History of Present Illness:   Patient is a 77 y.o. male with PMH as below including sarcoidosis who is seen for evaluation of bladder cancer. He has a history of nonmuscle invasive bladder cancer in 2015, underwent 2 induction courses of BCG, had a non muscle invasive recurrence in 2019 and had re induction with BCG. Cystoscopy 6/2020 at [facility] did not show any recurrence. In March 2021 he had a positive FISH and was seen by Dr. Rosangela Granado. Had a CT IVP 3/2021 which showed some Bladder thickening. 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 Grade 4 neutropenia after cycle 1. He comes today for cycle 4 day 1 of Cisplatin + Gemzar. Felt a little more tired after last cycle. Surgery is either 12/6 or 12/13. Denies nausea/vomiting. Has a good appetite. Denies mouth sores. Denies numbness/tingling in fingers/toes.  Hearing unchanged. Occasional hematuria unchanged. No other complaints today. Mother and sister had breast cancer and brother had kidney cancer     Past Medical History:   Diagnosis Date    Arrhythmia     LBBB    Arthritis     Autoimmune disease ([facility]     PULMONARY SARCOIDOSIS    Cancer ([facility]     scc top of head and nose    Cancer ([facility]     BLADDER    COVID-19 vaccine series completed     [facility] VACCINE 21 AND 21    Hypertension     Other unknown and unspecified cause of morbidity or mortality     history of pulmonary sarcoid     Other unknown and unspecified cause of morbidity or mortality     depression, anxiety    Posterior cervical adenopathy, benign 2018    Psychiatric disorder     DEPRESSION AND ANXIETY    Pulmonary sarcoidosis ([facility]     Unspecified sleep apnea     cpap      Past Surgical History:   Procedure Laterality Date    HX CATARACT REMOVAL Bilateral     HX HERNIA REPAIR Right AS A CHILD    INGUINAL    HX HERNIA REPAIR Left     INGUIBAL    HX KNEE REPLACEMENT Left     HX ORTHOPAEDIC Right     BONE SPURS REMOVED FROM FOOT    HX OTHER SURGICAL      scc removed top of head and nose    HX OTHER SURGICAL      benign lump removed from thyroid    HX OTHER SURGICAL Right     SKIN CANCER RELMOVED FROM LEG    HX UROLOGICAL  2020    CYSTO    IR INSERT TUNL CVC W PORT OVER 5 YEARS  2021    [place] CARDIAC SURG PROCEDURE UNLIST  2021    CARDIAC CATH    WI REVISE KNEE JOINT REPLACE,ALL PARTS Left       Social History     Tobacco Use    Smoking status: Former Smoker     Packs/day: 0.50     Years: 2.00     Pack years: 1.00     Quit date: 1965     Years since quittin.1    Smokeless tobacco: Never Used   Substance Use Topics    Alcohol use:  Yes     Alcohol/week: 14.0 standard drinks     Types: 12 Glasses of wine, 2 Shots of liquor per week      Family History   Problem Relation Age of Onset    Cancer Mother         breast with mets    Dementia Mother     Heart Disease Father     Cancer Sister         breast    Lung Disease Brother     No Known Problems Brother     Anesth Problems Neg Hx      Current Outpatient Medications   Medication Sig    dexAMETHasone (DECADRON) 4 mg tablet Take 2 tabs (8mg) once daily on days 2 & 3 of chemotherapy    lidocaine-prilocaine (EMLA) topical cream Apply  to affected area as needed for Pain. Apply a thin layer over port a cath 30-60 minutes prior to port access    ondansetron (ZOFRAN ODT) 8 mg disintegrating tablet Take 1 Tablet by mouth every eight (8) hours as needed for Nausea or Vomiting.  prochlorperazine (Compazine) 5 mg tablet Take 1 Tablet by mouth every six (6) hours as needed for Nausea or Vomiting.  HYDROcodone-acetaminophen (NORCO) 7.5-325 mg per tablet Take  by mouth.  ondansetron (ZOFRAN ODT) 8 mg disintegrating tablet Take 1 Tablet by mouth every eight (8) hours as needed for Nausea or Vomiting.  lisinopriL (PRINIVIL, ZESTRIL) 5 mg tablet Take 5 mg by mouth two (2) times a day.  zolpidem (AMBIEN) 10 mg tablet Take 5 mg by mouth nightly as needed for Sleep.  buPROPion SR (WELLBUTRIN SR) 100 mg SR tablet Take 100 mg by mouth daily.  acetaminophen (Tylenol Extra Strength) 500 mg tablet Take 1,000 mg by mouth every six (6) hours as needed for Pain.  tamsulosin (FLOMAX) 0.4 mg capsule Take 0.4 mg by mouth nightly.  busPIRone (BUSPAR) 5 mg tablet Take 5 mg by mouth daily.  simvastatin (ZOCOR) 20 mg tablet Take 20 mg by mouth nightly.  escitalopram oxalate (LEXAPRO) 10 mg tablet Take 10 mg by mouth daily.  multivitamin (ONE A DAY) tablet Take 1 Tab by mouth daily.  cefdinir (OMNICEF) 300 mg capsule Take 300 mg by mouth two (2) times a day. (Patient not taking: Reported on 10/14/2021)     No current facility-administered medications for this visit.       Allergies   Allergen Reactions    Pcn [Penicillins] Hives        Review of Systems: A complete review of systems was obtained, negative except as described above. Physical Exam:     Visit Vitals  /71   Pulse (!) 102   Temp 98.2 °F (36.8 °C) (Temporal)   Resp 18   Ht 5' 11\" (1.803 m)   Wt 237 lb 3.2 oz (107.6 kg)   SpO2 93%   BMI 33.08 kg/m²     ECOG PS: 1  General: No distress  Eyes: PERRL, anicteric sclerae  HENT: Atraumatic, PAC in place   Neck: Supple  Skin: No rashes, ecchymoses, or petechiae. Normal temperature, turgor, and texture. Psych: Alert, oriented, appropriate affect, normal judgment/insight    Results:     Lab Results   Component Value Date/Time    WBC 10.8 10/13/2021 03:18 PM    HGB 8.3 (L) 10/13/2021 03:18 PM    HCT 25.4 (L) 10/13/2021 03:18 PM    PLATELET 388 13/68/3855 03:18 PM    .3 (H) 10/13/2021 03:18 PM    ABS. NEUTROPHILS 9.0 (H) 10/13/2021 03:18 PM     Lab Results   Component Value Date/Time    Sodium 139 10/13/2021 03:18 PM    Potassium 4.2 10/13/2021 03:18 PM    Chloride 106 10/13/2021 03:18 PM    CO2 24 10/13/2021 03:18 PM    Glucose 119 (H) 10/13/2021 03:18 PM    BUN 16 10/13/2021 03:18 PM    Creatinine 1.06 10/13/2021 03:18 PM    GFR est AA >60 10/13/2021 03:18 PM    GFR est non-AA >60 10/13/2021 03:18 PM    Calcium 8.8 10/13/2021 03:18 PM    Glucose (POC) 93 12/01/2009 07:10 AM     Lab Results   Component Value Date/Time    Bilirubin, total 0.3 10/13/2021 03:18 PM    ALT (SGPT) 20 10/13/2021 03:18 PM    Alk. phosphatase 99 10/13/2021 03:18 PM    Protein, total 6.5 10/13/2021 03:18 PM    Albumin 3.4 (L) 10/13/2021 03:18 PM    Globulin 3.1 10/13/2021 03:18 PM       External   Records reviewed and summarized above. Pathology report(s) reviewed above. Radiology report(s) reviewed above. CT CAP 7/19/21:   IMPRESSION  1. While the bladder is decompressed by Juarez is thick-walled and irregular  2. No evidence of metastatic disease to the abdomen or pelvis  3.  Extensive bilateral hilar and mediastinal adenopathy with patchy perihilar  airspace disease with associated nodular peribronchial cuffing. Findings can be  seen with sarcoidosis. Differentiating adenopathy from sarcoidosis for  metastatic disease is difficult       9/2021 CT     IMPRESSION     1. Mild bladder wall thickening posteriorly and along the right lateral bladder  wall, nonspecific. No evidence of metastatic disease within the chest, abdomen,  or pelvis. 2. Extensive mediastinal and bilateral hilar lymphadenopathy with lymph node  calcifications, most compatible with known sarcoidosis. No significant change. 3. Bilateral perihilar airspace opacities have improved. No new pulmonary  pathology identified. 4. Severe diverticulosis of the colon. No definite superimposed acute  diverticulitis. Assessment:   1) Muscle invasive bladder cancer- With squamous differentiation     S/p TURBT 6/23/2021- Path reviewed   [id number]     I discussed the rationale for neoadjuvant chemotherapy which has level 1 evidence supporting improved survival in pure urothelial cancers. See prior office notes regarding discussion of treatment plan. We discussed proceeding with NAC, restaging in 2 cycles given mixed histology and considering split dosing if he develops grade 3 or more toxicities. CT imaging obtained 7/19/21 was personally reviewed & shows no evidence of metastatic disease. B/l hilar & mediastinal adenopathy is likely secondary to sarcoidosis but we will follow this closely. He had a second opinion with urology at [facility] who agreed with above plan. He is also seeking a second opinion at [facility] urology      Today is cycle 4 day 1 of NAC Cisplatin + Gemzar x 4 cycles. He was hospitalized with sepsis after cycle 1- and hence dose reductions were made. Tolerated cycle 2 well. Interim scans reviewed as above and stable.  .   Proceed with cycle 4  He can have surgery anytime after Thanksgiving and preferably before the end of this year- he is thinking either 12/6 or 12/13    2) Extensive low grade non muscle invasive urothelial cancer      3) Sarcoidosis  Follows with pulmonary  Stable. 4) Obesity    5) Recent sepsis / UTI  Has recovered     6) Neutropenia  Per outside records - grade 4 & required granix   He is at high risk for recurrent grade IV neutropenia hence added neulasta moving forward   Counts normal today     7) Thrombocytopenia  Grade 2 per outside records - 60K   DR gemzar and tolerated well     8) Encounter for high risk drugs like chemotherapy  Grade 2 anemia  Previous dose reductions due to grade IV neutropenia after cycle 1    Plan:     · Proceed today with cycle 4 day 1 of Cisplatin (70mg/m2 on day 1) and Gemzar DR 25% (750mg/m2 on days 1, 8)  NAC for 4 cycles   · Dexamethasone days 2, 3 DR to 4mg  · zofran / compazine prn  · Labs per protocol  · neulasta on day 8 given high risk for grade IV neutropenia   · Surgery early December   · PF every 6-8 weeks   · Follow-up 3 weeks post op     RTC 3 weeks post op     I appreciate the opportunity to participate in Mr. David ortega. I performed a history and physical examination of the patient and discussed his management with the NPP.  I reviewed the NPP note and agree with the documented findings and plan of care  Mixed histology bladder cancer  Today is cycle 4/4 of NAC  Has grade 1 anemia, no recurrence of neutropenia after dose modification  No edema  Has fatigue  Surgery 12/6  See me post op    Signed By: Selene Butterfield MD

## 2021-10-14 NOTE — PROGRESS NOTES
Identified pt with two pt identifiers(name and ). Reviewed record in preparation for visit and have obtained necessary documentation. Chief Complaint   Patient presents with    Chemotherapy        Vitals:    10/14/21 0856   BP: 109/71   Pulse: (!) 102   Resp: 18   Temp: 98.2 °F (36.8 °C)   TempSrc: Temporal   SpO2: 93%   Weight: 237 lb 3.2 oz (107.6 kg)   Height: 5' 11\" (1.803 m)   PainSc:   0 - No pain       Health Maintenance Due   Topic    Hepatitis C Screening     Lipid Screen     DTaP/Tdap/Td series (1 - Tdap)    Colorectal Cancer Screening Combo     Shingrix Vaccine Age 49> (1 of 2)    AAA Screening 73-69 YO Male Smoking Patients     Pneumococcal 65+ years (1 of 1 - PPSV23)    Medicare Yearly Exam     Flu Vaccine (1)       Coordination of Care Questionnaire:  :   1) Have you been to an emergency room, urgent care, or hospitalized since your last visit? If yes, where when, and reason for visit? no       2. Have seen or consulted any other health care provider since your last visit? If yes, where when, and reason for visit? NO      Patient is accompanied by wife I have received verbal consent from Erasto Josue to discuss any/all medical information while they are present in the room.

## 2021-10-14 NOTE — PROGRESS NOTES
[facility]fusion [facility] - Chemotherapy Progress Note    0915 Pt admit to [facility] for Gemzar/Cisplatin C4D1 ambulatory in stable condition. Assessment completed. No new concerns voiced. Port accessed with positive blood return; labs drawn 10/13/2021, WNL. Line flushed, pre-hydration infusing. Patient denies SOB, fever, cough, general not feeling well. Patient denies recent exposure to someone who has tested positive for COVID-19.  Patient  denies having contact with anyone who has a pending COVID test.     Visit Vitals  /71   Pulse (!) 102   Temp 98.2 °F (36.8 °C)   Resp 16   Ht 5' 11\" (1.803 m)   Wt 107.6 kg (237 lb 3.2 oz)   BMI 33.08 kg/m²     Medications Administered     0.9% sodium chloride 1,000 mL with potassium chloride 10 mEq, magnesium sulfate 2 g infusion     Admin Date  10/14/2021 Action  New Bag Dose   Rate  1,000 mL/hr Route  IntraVENous Administered By  Booker Leigh RN           Admin Date  10/14/2021 Action  New Bag Dose   Rate  357 mL/hr Route  IntraVENous Administered By  Booker Leigh RN          0.9% sodium chloride infusion     Admin Date  10/14/2021 Action  New Bag Dose  25 mL/hr Rate  25 mL/hr Route  IntraVENous Administered By  Booker Leigh RN          0.9% sodium chloride injection 10 mL     Admin Date  10/14/2021 Action  Given Dose  10 mL Route  IntraVENous Administered By  Booker Leigh RN          CISplatin (PLATINOL) 164.5 mg in 0.9% sodium chloride 500 mL, overfill volume 50 mL chemo infusion     Admin Date  10/14/2021 Action  New Bag Dose  164.5 mg Rate  357.3 mL/hr Route  IntraVENous Administered By  Booker Leigh RN          dexamethasone (DECADRON) 12 mg in 0.9% sodium chloride 50 mL, overfill volume 5 mL IVPB     Admin Date  10/14/2021 Action  New Bag Dose  12 mg Rate  232 mL/hr Route  IntraVENous Administered By  Booker Leigh RN          fosaprepitant (EMEND) 150 mg in 0.9% sodium chloride 150 mL IVPB     Admin Date  10/14/2021 Action  New Bag Dose  150 mg Rate  450 mL/hr Route  IntraVENous Administered By  Charlyn Severe, RN          gemcitabine (GEMZAR) 1,762 mg in 0.9% sodium chloride 250 mL, overfill volume 25 mL chemo infusion     Admin Date  10/14/2021 Action  New Bag Dose  1,762 mg Rate  642.7 mL/hr Route  IntraVENous Administered By  Charlyn Severe, RN          heparin (porcine) pf 300-500 Units     Admin Date  10/14/2021 Action  Given Dose  500 Units Route  InterCATHeter Administered By  Charlyn Severe, RN          palonosetron HCl (ALOXI) injection 0.25 mg     Admin Date  10/14/2021 Action  Given Dose  0.25 mg Route  IntraVENous Administered By  Charlyn Severe, RN          sodium chloride (NS) flush 10 mL     Admin Date  10/14/2021 Action  Given Dose  10 mL Route  IntraVENous Administered By  Charlyn Severe, RN                  1540 Pt tolerated treatment well. Port maintained positive blood return throughout treatment, flushed with positive blood return at conclusion, heparinized and de-accessed. D/c home ambulatory in no distress. Pt aware of next [facility] appointment scheduled for 10/21/2021. Recent Results (from the past 24 hour(s))   CBC WITH AUTOMATED DIFF    Collection Time: 10/13/21  3:18 PM   Result Value Ref Range    WBC 10.8 4.1 - 11.1 K/uL    RBC 2.39 (L) 4.10 - 5.70 M/uL    HGB 8.3 (L) 12.1 - 17.0 g/dL    HCT 25.4 (L) 36.6 - 50.3 %    .3 (H) 80.0 - 99.0 FL    MCH 34.7 (H) 26.0 - 34.0 PG    MCHC 32.7 30.0 - 36.5 g/dL    RDW 19.9 (H) 11.5 - 14.5 %    PLATELET 120 724 - 709 K/uL    MPV 10.0 8.9 - 12.9 FL    NRBC 0.5 (H) 0  WBC    ABSOLUTE NRBC 0.05 (H) 0.00 - 0.01 K/uL    NEUTROPHILS 81 (H) 32 - 75 %    BAND NEUTROPHILS 2 0 - 6 %    LYMPHOCYTES 5 (L) 12 - 49 %    MONOCYTES 7 5 - 13 %    EOSINOPHILS 1 0 - 7 %    BASOPHILS 1 0 - 1 %    METAMYELOCYTES 3 (H) 0 %    IMMATURE GRANULOCYTES 0 %    ABS. NEUTROPHILS 9.0 (H) 1.8 - 8.0 K/UL    ABS. LYMPHOCYTES 0.5 (L) 0.8 - 3.5 K/UL    ABS.  MONOCYTES 0.8 0.0 - 1.0 K/UL    ABS. EOSINOPHILS 0.1 0.0 - 0.4 K/UL    ABS. BASOPHILS 0.1 0.0 - 0.1 K/UL    ABS. IMM. GRANS. 0.0 K/UL    DF MANUAL      RBC COMMENTS ANISOCYTOSIS  1+        RBC COMMENTS MACROCYTOSIS  1+       METABOLIC PANEL, COMPREHENSIVE    Collection Time: 10/13/21  3:18 PM   Result Value Ref Range    Sodium 139 136 - 145 mmol/L    Potassium 4.2 3.5 - 5.1 mmol/L    Chloride 106 97 - 108 mmol/L    CO2 24 21 - 32 mmol/L    Anion gap 9 5 - 15 mmol/L    Glucose 119 (H) 65 - 100 mg/dL    BUN 16 6 - 20 MG/DL    Creatinine 1.06 0.70 - 1.30 MG/DL    BUN/Creatinine ratio 15 12 - 20      GFR est AA >60 >60 ml/min/1.73m2    GFR est non-AA >60 >60 ml/min/1.73m2    Calcium 8.8 8.5 - 10.1 MG/DL    Bilirubin, total 0.3 0.2 - 1.0 MG/DL    ALT (SGPT) 20 12 - 78 U/L    AST (SGOT) 16 15 - 37 U/L    Alk.  phosphatase 99 45 - 117 U/L    Protein, total 6.5 6.4 - 8.2 g/dL    Albumin 3.4 (L) 3.5 - 5.0 g/dL    Globulin 3.1 2.0 - 4.0 g/dL    A-G Ratio 1.1 1.1 - 2.2     MAGNESIUM    Collection Time: 10/13/21  3:18 PM   Result Value Ref Range    Magnesium 2.1 1.6 - 2.4 mg/dL

## 2021-10-21 ENCOUNTER — HOSPITAL ENCOUNTER (OUTPATIENT)
Dept: INFUSION THERAPY | Age: 76
Discharge: HOME | End: 2021-10-21
Payer: MEDICARE

## 2021-10-21 VITALS
WEIGHT: 241.4 LBS | DIASTOLIC BLOOD PRESSURE: 89 MMHG | BODY MASS INDEX: 33.67 KG/M2 | SYSTOLIC BLOOD PRESSURE: 147 MMHG | HEART RATE: 71 BPM | TEMPERATURE: 96.8 F | RESPIRATION RATE: 18 BRPM

## 2021-10-21 DIAGNOSIS — C67.9 MALIGNANT NEOPLASM OF URINARY BLADDER, UNSPECIFIED SITE (HCC): Primary | ICD-10-CM

## 2021-10-21 PROCEDURE — 77030012965 HC NDL HUBR BBMI -A

## 2021-10-21 PROCEDURE — 74011250636 HC RX REV CODE- 250/636: Performed by: INTERNAL MEDICINE

## 2021-10-21 PROCEDURE — 96375 TX/PRO/DX INJ NEW DRUG ADDON: CPT

## 2021-10-21 PROCEDURE — 96413 CHEMO IV INFUSION 1 HR: CPT

## 2021-10-21 RX ORDER — ONDANSETRON 2 MG/ML
8 INJECTION INTRAMUSCULAR; INTRAVENOUS ONCE
Status: COMPLETED | OUTPATIENT
Start: 2021-10-21 | End: 2021-10-21

## 2021-10-21 RX ORDER — SODIUM CHLORIDE 0.9 % (FLUSH) 0.9 %
10 SYRINGE (ML) INJECTION AS NEEDED
Status: DISCONTINUED | OUTPATIENT
Start: 2021-10-21 | End: 2021-10-22 | Stop reason: HOSPADM

## 2021-10-21 RX ORDER — HEPARIN 100 UNIT/ML
300-500 SYRINGE INTRAVENOUS AS NEEDED
Status: DISCONTINUED | OUTPATIENT
Start: 2021-10-21 | End: 2021-10-22 | Stop reason: HOSPADM

## 2021-10-21 RX ORDER — SODIUM CHLORIDE 9 MG/ML
25 INJECTION, SOLUTION INTRAVENOUS CONTINUOUS
Status: DISCONTINUED | OUTPATIENT
Start: 2021-10-21 | End: 2021-10-22 | Stop reason: HOSPADM

## 2021-10-21 RX ORDER — SODIUM CHLORIDE 9 MG/ML
10 INJECTION INTRAMUSCULAR; INTRAVENOUS; SUBCUTANEOUS AS NEEDED
Status: DISCONTINUED | OUTPATIENT
Start: 2021-10-21 | End: 2021-10-22 | Stop reason: HOSPADM

## 2021-10-21 RX ADMIN — SODIUM CHLORIDE 25 ML/HR: 900 INJECTION, SOLUTION INTRAVENOUS at 14:29

## 2021-10-21 RX ADMIN — GEMCITABINE HYDROCHLORIDE 1762 MG: 2 INJECTION, SOLUTION INTRAVENOUS at 15:18

## 2021-10-21 RX ADMIN — ONDANSETRON 8 MG: 2 INJECTION, SOLUTION INTRAMUSCULAR; INTRAVENOUS at 14:30

## 2021-10-21 RX ADMIN — SODIUM CHLORIDE 10 ML: 9 INJECTION INTRAMUSCULAR; INTRAVENOUS; SUBCUTANEOUS at 14:15

## 2021-10-21 RX ADMIN — Medication 10 ML: at 16:05

## 2021-10-21 RX ADMIN — PEGFILGRASTIM 6 MG: KIT SUBCUTANEOUS at 16:08

## 2021-10-21 RX ADMIN — HEPARIN 500 UNITS: 100 SYRINGE at 16:05

## 2021-10-21 RX ADMIN — Medication 10 ML: at 14:15

## 2021-10-21 NOTE — PROGRESS NOTES
Outpatient Infusion Center - Chemotherapy Progress Note    1400 Pt admit to [facility] for Gemzar ambulatory in stable condition. Assessment completed. No new concerns voiced. Port accessed with positive blood return; labs drawn 10/20/2021, WNL. Line flushed, NS  infusing. Chemotherapy Flowsheet 10/21/2021   Cycle C4 D8   Date 10/21/2021   Drug / Regimen Gemzar   Pre Hydration -   Post Hydration -   Pre Meds -   Notes given +Neulasta       Visit Vitals  BP (!) 147/89   Pulse 71   Temp 96.8 °F (36 °C)   Resp 18   Wt 109.5 kg (241 lb 6.4 oz)   BMI 33.67 kg/m²     Medications Administered     0.9% sodium chloride infusion     Admin Date  10/21/2021 Action  New Bag Dose  25 mL/hr Rate  25 mL/hr Route  IntraVENous Administered By  Jh Mora RN          0.9% sodium chloride injection 10 mL     Admin Date  10/21/2021 Action  Given Dose  10 mL Route  IntraVENous Administered By  Jh Mora RN          gemcitabine (GEMZAR) 1,762 mg in 0.9% sodium chloride 250 mL, overfill volume 25 mL chemo infusion     Admin Date  10/21/2021 Action  New Bag Dose  1,762 mg Rate  642.7 mL/hr Route  IntraVENous Administered By  Jh Mora RN          heparin (porcine) pf 300-500 Units     Admin Date  10/21/2021 Action  Given Dose  500 Units Route  InterCATHeter Administered By  Jh Mora RN          ondansetron [facility]) injection 8 mg     Admin Date  10/21/2021 Action  Given Dose  8 mg Route  IntraVENous Administered By  Jh Mora RN          pegfilgrastim (NEULASTA) wearable SQ injector 6 mg     Admin Date  10/21/2021 Action  Given Dose  6 mg Route  SubCUTAneous Administered By  Jh Mora RN          sodium chloride (NS) flush 10 mL     Admin Date  10/21/2021 Action  Given Dose  10 mL Route  IntraVENous Administered By  hJ Mora RN           Admin Date  10/21/2021 Action  Given Dose  10 mL Route  IntraVENous Administered By  Jh Mora, CIRA                        1600 Pt tolerated treatment well. Port maintained positive blood return throughout treatment, flushed with positive blood return at conclusion, heparinized and de-accessed. D/c home ambulatory in no distress. Pt has no further appts.

## 2021-10-28 ENCOUNTER — TELEPHONE (OUTPATIENT)
Dept: ONCOLOGY | Age: 76
End: 2021-10-28

## 2021-10-28 NOTE — TELEPHONE ENCOUNTER
Patient stated he is having some reactions to last chemo and is requesting call back from nurse.     # [phone]

## 2021-10-29 ENCOUNTER — HOSPITAL ENCOUNTER (OUTPATIENT)
Dept: INFUSION THERAPY | Age: 76
Discharge: HOME | End: 2021-10-29
Payer: MEDICARE

## 2021-10-29 ENCOUNTER — TELEPHONE (OUTPATIENT)
Dept: ONCOLOGY | Age: 76
End: 2021-10-29

## 2021-10-29 ENCOUNTER — DOCUMENTATION ONLY (OUTPATIENT)
Dept: ONCOLOGY | Age: 76
End: 2021-10-29

## 2021-10-29 VITALS
DIASTOLIC BLOOD PRESSURE: 93 MMHG | SYSTOLIC BLOOD PRESSURE: 132 MMHG | TEMPERATURE: 98.3 F | HEART RATE: 82 BPM | RESPIRATION RATE: 18 BRPM

## 2021-10-29 DIAGNOSIS — C67.9 MALIGNANT NEOPLASM OF URINARY BLADDER, UNSPECIFIED SITE (HCC): Primary | ICD-10-CM

## 2021-10-29 LAB
ALBUMIN SERPL-MCNC: 3.1 G/DL (ref 3.5–5)
ALBUMIN/GLOB SERPL: 0.8 {RATIO} (ref 1.1–2.2)
ALP SERPL-CCNC: 114 U/L (ref 45–117)
ALT SERPL-CCNC: 18 U/L (ref 12–78)
ANION GAP SERPL CALC-SCNC: 6 MMOL/L (ref 5–15)
AST SERPL-CCNC: 10 U/L (ref 15–37)
BASOPHILS # BLD: 0 K/UL (ref 0–0.1)
BASOPHILS NFR BLD: 0 % (ref 0–1)
BILIRUB SERPL-MCNC: 0.4 MG/DL (ref 0.2–1)
BUN SERPL-MCNC: 16 MG/DL (ref 6–20)
BUN/CREAT SERPL: 16 (ref 12–20)
CALCIUM SERPL-MCNC: 9.2 MG/DL (ref 8.5–10.1)
CHLORIDE SERPL-SCNC: 105 MMOL/L (ref 97–108)
CO2 SERPL-SCNC: 25 MMOL/L (ref 21–32)
CREAT SERPL-MCNC: 0.98 MG/DL (ref 0.7–1.3)
DIFFERENTIAL METHOD BLD: ABNORMAL
EOSINOPHIL # BLD: 0 K/UL (ref 0–0.4)
EOSINOPHIL NFR BLD: 0 % (ref 0–7)
ERYTHROCYTE [DISTWIDTH] IN BLOOD BY AUTOMATED COUNT: 19.3 % (ref 11.5–14.5)
GLOBULIN SER CALC-MCNC: 3.7 G/DL (ref 2–4)
GLUCOSE SERPL-MCNC: 115 MG/DL (ref 65–100)
HCT VFR BLD AUTO: 18.5 % (ref 36.6–50.3)
HGB BLD-MCNC: 6.1 G/DL (ref 12.1–17)
IMM GRANULOCYTES # BLD AUTO: 0 K/UL
IMM GRANULOCYTES NFR BLD AUTO: 0 %
LYMPHOCYTES # BLD: 0.6 K/UL (ref 0.8–3.5)
LYMPHOCYTES NFR BLD: 4 % (ref 12–49)
MCH RBC QN AUTO: 34.9 PG (ref 26–34)
MCHC RBC AUTO-ENTMCNC: 33 G/DL (ref 30–36.5)
MCV RBC AUTO: 105.7 FL (ref 80–99)
MONOCYTES # BLD: 1.2 K/UL (ref 0–1)
MONOCYTES NFR BLD: 8 % (ref 5–13)
NEUTS SEG # BLD: 13.2 K/UL (ref 1.8–8)
NEUTS SEG NFR BLD: 88 % (ref 32–75)
NRBC # BLD: 0.08 K/UL (ref 0–0.01)
NRBC BLD-RTO: 0.5 PER 100 WBC
PATH REV BLD -IMP: ABNORMAL
PLATELET # BLD AUTO: 26 K/UL (ref 150–400)
PLATELET COMMENTS,PCOM: ABNORMAL
PMV BLD AUTO: 10.5 FL (ref 8.9–12.9)
POTASSIUM SERPL-SCNC: 3.7 MMOL/L (ref 3.5–5.1)
PROT SERPL-MCNC: 6.8 G/DL (ref 6.4–8.2)
RBC # BLD AUTO: 1.75 M/UL (ref 4.1–5.7)
RBC MORPH BLD: ABNORMAL
RBC MORPH BLD: ABNORMAL
SODIUM SERPL-SCNC: 136 MMOL/L (ref 136–145)
WBC # BLD AUTO: 15 K/UL (ref 4.1–11.1)

## 2021-10-29 PROCEDURE — 96360 HYDRATION IV INFUSION INIT: CPT

## 2021-10-29 PROCEDURE — 85025 COMPLETE CBC W/AUTO DIFF WBC: CPT

## 2021-10-29 PROCEDURE — 36415 COLL VENOUS BLD VENIPUNCTURE: CPT

## 2021-10-29 PROCEDURE — 80053 COMPREHEN METABOLIC PANEL: CPT

## 2021-10-29 PROCEDURE — 77030016057 HC NDL HUBR APOL -B

## 2021-10-29 PROCEDURE — 74011250636 HC RX REV CODE- 250/636: Performed by: REGISTERED NURSE

## 2021-10-29 RX ORDER — SODIUM CHLORIDE 9 MG/ML
10 INJECTION INTRAMUSCULAR; INTRAVENOUS; SUBCUTANEOUS AS NEEDED
Status: CANCELLED | OUTPATIENT
Start: 2021-10-29

## 2021-10-29 RX ORDER — HEPARIN 100 UNIT/ML
300-500 SYRINGE INTRAVENOUS AS NEEDED
Status: DISCONTINUED | OUTPATIENT
Start: 2021-10-29 | End: 2021-10-30 | Stop reason: HOSPADM

## 2021-10-29 RX ORDER — SODIUM CHLORIDE 0.9 % (FLUSH) 0.9 %
10 SYRINGE (ML) INJECTION AS NEEDED
Status: CANCELLED | OUTPATIENT
Start: 2021-10-29

## 2021-10-29 RX ORDER — SODIUM CHLORIDE 0.9 % (FLUSH) 0.9 %
10 SYRINGE (ML) INJECTION AS NEEDED
Status: DISCONTINUED | OUTPATIENT
Start: 2021-10-29 | End: 2021-10-30 | Stop reason: HOSPADM

## 2021-10-29 RX ORDER — SODIUM CHLORIDE 9 MG/ML
10 INJECTION INTRAMUSCULAR; INTRAVENOUS; SUBCUTANEOUS AS NEEDED
Status: DISCONTINUED | OUTPATIENT
Start: 2021-10-29 | End: 2021-10-30 | Stop reason: HOSPADM

## 2021-10-29 RX ORDER — HEPARIN 100 UNIT/ML
300-500 SYRINGE INTRAVENOUS AS NEEDED
Status: CANCELLED
Start: 2021-10-29

## 2021-10-29 RX ADMIN — HEPARIN 500 UNITS: 100 SYRINGE at 14:28

## 2021-10-29 RX ADMIN — Medication 10 ML: at 14:27

## 2021-10-29 RX ADMIN — SODIUM CHLORIDE 10 ML: 9 INJECTION INTRAMUSCULAR; INTRAVENOUS; SUBCUTANEOUS at 13:10

## 2021-10-29 RX ADMIN — Medication 10 ML: at 13:10

## 2021-10-29 RX ADMIN — SODIUM CHLORIDE 1000 ML: 900 INJECTION, SOLUTION INTRAVENOUS at 13:15

## 2021-10-29 NOTE — PROGRESS NOTES
Please call & let pt know his Hgb is 6.1 g/dL   He needs to go to ER tonight for 1 unit prbc transfusion  His PLT are also low at 26K and he needs to let us know if he has any bleeding

## 2021-10-29 NOTE — TELEPHONE ENCOUNTER
175 [facility] patient ,HIPAA verified x2  Informed patient that his Hgb is 6.1 g/dL   He needs to go to ER tonight for 1 unit prbc transfusion  His PLT are also low at 26K and he needs to let us know if he has any bleeding. Patient denied any bleeding at this time and voiced understanding on going to the ER ASAP today.

## 2021-10-29 NOTE — TELEPHONE ENCOUNTER
10/29/21 at 10:25 am - Called the patient and verified ID x 2. Asked how the patient is doing and the patient stated that he is generally \"ok\" in the morning but in the afternoon he goes in to a \"meltdown\" and is extremely tired, headachy, dizzy, has chills, and \"collapses. \"  Asked if he has taken his temperature when he has chills and if he experiences any other symptoms like shortness of breath, blurry vision or anything else and the patient stated that he has not taken his temperature but feels a bit feverish in the afternoon, has shortness of breath and that just a little walking exhausts him. Asked how long has this been going on and the patient stated that this has been happening since his last infusion on Thursday 10/21/21. Asked if it is any better or worse since it started and the patient stated that it is about the same since it started. Asked the patient if he is staying hydrated and eating well and the patient stated that he is trying to drink plenty of fluids but finds himself thirsty and his appetite is ok. Also, asked the patient if he is having any urinary issues or coughing and the patient denied any coughing and stated that he has to use a catheter to void. Asked if his urine is malodorous and the patient denied that his urine is malodorous. Informed the patient that Dominic Grant NP recommends IV hydration and labs and asked about the patient's availability today for hydration and labs. The patient stated that he has doctors' appointments at 11:00 am and 3:00 pm around the [facility]. Informed the patient that this office will see if [facility] has any available appointments for hydration and this office will call him back with an update. The patient verbalized understanding and denied any questions or concerns.     10/29/21 at 10:43 am - Called the patient back and informed him that there is an available appointment at 1:00 pm today in the [address] [place] [address] in the [facility] building [address] and that they will also draw labs and that Dominic Grant NP recommends that he monitor his temperature over the weekend and if it is above 100.4 that he should go to the emergency room immediately. The patient verbalized understanding and stated that he is able to make that appointment and denied any questions or concerns.

## 2021-10-29 NOTE — PROGRESS NOTES
Recent Results (from the past 12 hour(s))   CBC WITH AUTOMATED DIFF    Collection Time: 10/29/21  1:21 PM   Result Value Ref Range    WBC 15.0 (H) 4.1 - 11.1 K/uL    RBC 1.75 (L) 4.10 - 5.70 M/uL    HGB 6.1 (L) 12.1 - 17.0 g/dL    HCT 18.5 (L) 36.6 - 50.3 %    .7 (H) 80.0 - 99.0 FL    MCH 34.9 (H) 26.0 - 34.0 PG    MCHC 33.0 30.0 - 36.5 g/dL    RDW 19.3 (H) 11.5 - 14.5 %    PLATELET 26 (LL) 994 - 400 K/uL    MPV 10.5 8.9 - 12.9 FL    NRBC 0.5 (H) 0  WBC    ABSOLUTE NRBC 0.08 (H) 0.00 - 0.01 K/uL    NEUTROPHILS 88 (H) 32 - 75 %    LYMPHOCYTES 4 (L) 12 - 49 %    MONOCYTES 8 5 - 13 %    EOSINOPHILS 0 0 - 7 %    BASOPHILS 0 0 - 1 %    IMMATURE GRANULOCYTES 0 %    ABS. NEUTROPHILS 13.2 (H) 1.8 - 8.0 K/UL    ABS. LYMPHOCYTES 0.6 (L) 0.8 - 3.5 K/UL    ABS. MONOCYTES 1.2 (H) 0.0 - 1.0 K/UL    ABS. EOSINOPHILS 0.0 0.0 - 0.4 K/UL    ABS. BASOPHILS 0.0 0.0 - 0.1 K/UL    ABS. IMM. GRANS. 0.0 K/UL    DF MANUAL      PLATELET COMMENTS Pathology Review Requested      RBC COMMENTS ANISOCYTOSIS  1+        RBC COMMENTS MACROCYTOSIS  1+       METABOLIC PANEL, COMPREHENSIVE    Collection Time: 10/29/21  1:21 PM   Result Value Ref Range    Sodium 136 136 - 145 mmol/L    Potassium 3.7 3.5 - 5.1 mmol/L    Chloride 105 97 - 108 mmol/L    CO2 25 21 - 32 mmol/L    Anion gap 6 5 - 15 mmol/L    Glucose 115 (H) 65 - 100 mg/dL    BUN 16 6 - 20 MG/DL    Creatinine 0.98 0.70 - 1.30 MG/DL    BUN/Creatinine ratio 16 12 - 20      GFR est AA >60 >60 ml/min/1.73m2    GFR est non-AA >60 >60 ml/min/1.73m2    Calcium 9.2 8.5 - 10.1 MG/DL    Bilirubin, total 0.4 0.2 - 1.0 MG/DL    ALT (SGPT) 18 12 - 78 U/L    AST (SGOT) 10 (L) 15 - 37 U/L    Alk.  phosphatase 114 45 - 117 U/L    Protein, total 6.8 6.4 - 8.2 g/dL    Albumin 3.1 (L) 3.5 - 5.0 g/dL    Globulin 3.7 2.0 - 4.0 g/dL    A-G Ratio 0.8 (L) 1.1 - 2.2

## 2021-10-29 NOTE — PROGRESS NOTES
[facility] Progress Note    Date: 2021    Name: Venkat Wylie    MRN: [id number]         : 1945    Mr. Jennifer Gale Arrived ambulatory and in no distress for Hydration Regimen. Assessment was completed, no acute issues at this time, pt c/o feeling \"flu-like\" in the evenings, with chills, fatigue, headaches. Right chest wall port accessed without difficulty, labs drawn & sent for processing. Mr. Mims [facility] vitals were reviewed. Patient Vitals for the past 12 hrs:   Temp Pulse Resp BP   10/29/21 1429  82 18 (!) 132/93   10/29/21 1303 98.3 °F (36.8 °C) 92 18 135/71     Lab results were obtained and reviewed. Recent Results (from the past 12 hour(s))   METABOLIC PANEL, COMPREHENSIVE    Collection Time: 10/29/21  1:21 PM   Result Value Ref Range    Sodium 136 136 - 145 mmol/L    Potassium 3.7 3.5 - 5.1 mmol/L    Chloride 105 97 - 108 mmol/L    CO2 25 21 - 32 mmol/L    Anion gap 6 5 - 15 mmol/L    Glucose 115 (H) 65 - 100 mg/dL    BUN 16 6 - 20 MG/DL    Creatinine 0.98 0.70 - 1.30 MG/DL    BUN/Creatinine ratio 16 12 - 20      GFR est AA >60 >60 ml/min/1.73m2    GFR est non-AA >60 >60 ml/min/1.73m2    Calcium 9.2 8.5 - 10.1 MG/DL    Bilirubin, total 0.4 0.2 - 1.0 MG/DL    ALT (SGPT) 18 12 - 78 U/L    AST (SGOT) 10 (L) 15 - 37 U/L    Alk. phosphatase 114 45 - 117 U/L    Protein, total 6.8 6.4 - 8.2 g/dL    Albumin 3.1 (L) 3.5 - 5.0 g/dL    Globulin 3.7 2.0 - 4.0 g/dL    A-G Ratio 0.8 (L) 1.1 - 2.2     CBC pending in CC.     Medications:  Medications Administered     0.9% sodium chloride injection 10 mL     Admin Date  10/29/2021 Action  Given Dose  10 mL Route  IntraVENous Administered By  Tr Yang RN          heparin (porcine) pf 300-500 Units     Admin Date  10/29/2021 Action  Given Dose  500 Units Route  InterCATHeter Administered By  Tr Yang, RN          sodium chloride (NS) flush 10 mL     Admin Date  10/29/2021 Action  Given Dose  10 mL Route  IntraVENous Administered By  Nolan Rivers RN           Admin Date  10/29/2021 Action  Given Dose  10 mL Route  IntraVENous Administered By  Nolan Rivers RN          sodium chloride 0.9 % bolus infusion 1,000 mL     Admin Date  10/29/2021 Action  New Bag Dose  1,000 mL Rate  1,000 mL/hr Route  IntraVENous Administered By  Nolan Rivers RN              Mr. Leanne Venegas tolerated treatment well and was discharged from [place] [address] in stable condition at 1435. Port de-accessed, flushed & heparinized per protocol. He is to return on December 2 for his next appointment.     Tari Ludny RN  October 29, 2021

## 2021-10-29 NOTE — PROGRESS NOTES
1507: Received critical platelet result. Notified Zulema Perales NP. No additional orders received at this time. 1520: Faxed CBC/CMP results from 10/29/21 to patient's PCP, Dr. Luciana Villegas.

## 2021-10-29 NOTE — TELEPHONE ENCOUNTER
Patient and wife both made follow up calls this morning regarding this issue. Call back at your earliest convince.     # [phone]

## 2021-10-30 ENCOUNTER — HOSPITAL ENCOUNTER (EMERGENCY)
Age: 76
Discharge: HOME | End: 2021-10-30
Attending: STUDENT IN AN ORGANIZED HEALTH CARE EDUCATION/TRAINING PROGRAM | Admitting: STUDENT IN AN ORGANIZED HEALTH CARE EDUCATION/TRAINING PROGRAM
Payer: MEDICARE

## 2021-10-30 VITALS
BODY MASS INDEX: 32.48 KG/M2 | HEIGHT: 71 IN | HEART RATE: 87 BPM | OXYGEN SATURATION: 95 % | WEIGHT: 232 LBS | TEMPERATURE: 98.3 F | RESPIRATION RATE: 15 BRPM | SYSTOLIC BLOOD PRESSURE: 144 MMHG | DIASTOLIC BLOOD PRESSURE: 87 MMHG

## 2021-10-30 DIAGNOSIS — Z51.89 ENCOUNTER FOR BLOOD TRANSFUSION: ICD-10-CM

## 2021-10-30 DIAGNOSIS — D64.81 ANEMIA ASSOCIATED WITH CHEMOTHERAPY: Primary | ICD-10-CM

## 2021-10-30 DIAGNOSIS — T45.1X5A ANEMIA ASSOCIATED WITH CHEMOTHERAPY: Primary | ICD-10-CM

## 2021-10-30 LAB
ALBUMIN SERPL-MCNC: 3 G/DL (ref 3.5–5)
ALBUMIN/GLOB SERPL: 0.8 {RATIO} (ref 1.1–2.2)
ALP SERPL-CCNC: 111 U/L (ref 45–117)
ALT SERPL-CCNC: 17 U/L (ref 12–78)
ANION GAP SERPL CALC-SCNC: 4 MMOL/L (ref 5–15)
AST SERPL-CCNC: 10 U/L (ref 15–37)
BASOPHILS # BLD: 0 K/UL (ref 0–0.1)
BASOPHILS NFR BLD: 0 % (ref 0–1)
BILIRUB SERPL-MCNC: 0.4 MG/DL (ref 0.2–1)
BUN SERPL-MCNC: 15 MG/DL (ref 6–20)
BUN/CREAT SERPL: 15 (ref 12–20)
CALCIUM SERPL-MCNC: 8.8 MG/DL (ref 8.5–10.1)
CHLORIDE SERPL-SCNC: 106 MMOL/L (ref 97–108)
CO2 SERPL-SCNC: 24 MMOL/L (ref 21–32)
COMMENT, HOLDF: NORMAL
CREAT SERPL-MCNC: 1 MG/DL (ref 0.7–1.3)
DIFFERENTIAL METHOD BLD: ABNORMAL
EOSINOPHIL # BLD: 0 K/UL (ref 0–0.4)
EOSINOPHIL NFR BLD: 0 % (ref 0–7)
ERYTHROCYTE [DISTWIDTH] IN BLOOD BY AUTOMATED COUNT: 19.8 % (ref 11.5–14.5)
GLOBULIN SER CALC-MCNC: 3.8 G/DL (ref 2–4)
GLUCOSE SERPL-MCNC: 101 MG/DL (ref 65–100)
HCT VFR BLD AUTO: 19.2 % (ref 36.6–50.3)
HGB BLD-MCNC: 6.2 G/DL (ref 12.1–17)
HISTORY CHECKED?,CKHIST: NORMAL
IMM GRANULOCYTES # BLD AUTO: 0 K/UL
IMM GRANULOCYTES NFR BLD AUTO: 0 %
LYMPHOCYTES # BLD: 0.4 K/UL (ref 0.8–3.5)
LYMPHOCYTES NFR BLD: 3 % (ref 12–49)
MCH RBC QN AUTO: 34.8 PG (ref 26–34)
MCHC RBC AUTO-ENTMCNC: 32.3 G/DL (ref 30–36.5)
MCV RBC AUTO: 107.9 FL (ref 80–99)
METAMYELOCYTES NFR BLD MANUAL: 3 %
MONOCYTES # BLD: 1.4 K/UL (ref 0–1)
MONOCYTES NFR BLD: 11 % (ref 5–13)
MYELOCYTES NFR BLD MANUAL: 1 %
NEUTS SEG # BLD: 10.5 K/UL (ref 1.8–8)
NEUTS SEG NFR BLD: 82 % (ref 32–75)
NRBC # BLD: 0.04 K/UL (ref 0–0.01)
NRBC BLD-RTO: 0.3 PER 100 WBC
PLATELET # BLD AUTO: 43 K/UL (ref 150–400)
PMV BLD AUTO: 11.6 FL (ref 8.9–12.9)
POTASSIUM SERPL-SCNC: 3.4 MMOL/L (ref 3.5–5.1)
PROT SERPL-MCNC: 6.8 G/DL (ref 6.4–8.2)
RBC # BLD AUTO: 1.78 M/UL (ref 4.1–5.7)
RBC MORPH BLD: ABNORMAL
SAMPLES BEING HELD,HOLD: NORMAL
SODIUM SERPL-SCNC: 134 MMOL/L (ref 136–145)
WBC # BLD AUTO: 12.8 K/UL (ref 4.1–11.1)
WBC MORPH BLD: ABNORMAL

## 2021-10-30 PROCEDURE — 36430 TRANSFUSION BLD/BLD COMPNT: CPT

## 2021-10-30 PROCEDURE — 74011250636 HC RX REV CODE- 250/636: Performed by: STUDENT IN AN ORGANIZED HEALTH CARE EDUCATION/TRAINING PROGRAM

## 2021-10-30 PROCEDURE — 85025 COMPLETE CBC W/AUTO DIFF WBC: CPT

## 2021-10-30 PROCEDURE — 74011250637 HC RX REV CODE- 250/637: Performed by: STUDENT IN AN ORGANIZED HEALTH CARE EDUCATION/TRAINING PROGRAM

## 2021-10-30 PROCEDURE — 51701 INSERT BLADDER CATHETER: CPT

## 2021-10-30 PROCEDURE — 86901 BLOOD TYPING SEROLOGIC RH(D): CPT

## 2021-10-30 PROCEDURE — 36415 COLL VENOUS BLD VENIPUNCTURE: CPT

## 2021-10-30 PROCEDURE — 96374 THER/PROPH/DIAG INJ IV PUSH: CPT

## 2021-10-30 PROCEDURE — P9016 RBC LEUKOCYTES REDUCED: HCPCS

## 2021-10-30 PROCEDURE — 80053 COMPREHEN METABOLIC PANEL: CPT

## 2021-10-30 PROCEDURE — 99285 EMERGENCY DEPT VISIT HI MDM: CPT

## 2021-10-30 PROCEDURE — 86923 COMPATIBILITY TEST ELECTRIC: CPT

## 2021-10-30 RX ORDER — SODIUM CHLORIDE 9 MG/ML
250 INJECTION, SOLUTION INTRAVENOUS AS NEEDED
Status: DISCONTINUED | OUTPATIENT
Start: 2021-10-30 | End: 2021-10-30 | Stop reason: HOSPADM

## 2021-10-30 RX ORDER — POTASSIUM CHLORIDE 750 MG/1
20 TABLET, FILM COATED, EXTENDED RELEASE ORAL
Status: COMPLETED | OUTPATIENT
Start: 2021-10-30 | End: 2021-10-30

## 2021-10-30 RX ORDER — FUROSEMIDE 10 MG/ML
40 INJECTION INTRAMUSCULAR; INTRAVENOUS ONCE
Status: COMPLETED | OUTPATIENT
Start: 2021-10-30 | End: 2021-10-30

## 2021-10-30 RX ADMIN — FUROSEMIDE 40 MG: 10 INJECTION, SOLUTION INTRAMUSCULAR; INTRAVENOUS at 15:37

## 2021-10-30 RX ADMIN — POTASSIUM CHLORIDE 20 MEQ: 750 TABLET, FILM COATED, EXTENDED RELEASE ORAL at 15:37

## 2021-10-30 NOTE — ED PROVIDER NOTES
Blair Mott is a 77 y.o. male with past medical history notable for bladder cancer presenting with generalized weakness, fatigue and dyspnea on exertion. This been gradually progressive. Is recently completed a cycle of chemotherapy. He is followed for bladder cancer by Dr Samaria Stein. Denies GI bleeding, abdominal pain, chest pain, nausea, hematemesis. Has poor appetite and low energy level however. No recent infections. Has had intermittent hematuria but none in the past week. Sent for blood transfusion. Referral Request  This is a new problem. The current episode started more than 2 days ago. The problem occurs constantly. The problem has not changed since onset. Pertinent negatives include no chest pain, no abdominal pain, no headaches and no shortness of breath. Exacerbated by: Exertion. Relieved by: Rest.   Abnormal Lab Results  Pertinent negatives include no chest pain, no abdominal pain, no headaches and no shortness of breath.         Past Medical History:   Diagnosis Date    Arrhythmia     LBBB    Arthritis     Autoimmune disease ([facility]     PULMONARY SARCOIDOSIS    Cancer ([facility]     scc top of head and nose    Cancer ([facility] 2015    BLADDER    COVID-19 vaccine series completed     [facility] VACCINE 1-28-21 AND 2-25-21    Hypertension     Other unknown and unspecified cause of morbidity or mortality     history of pulmonary sarcoid     Other unknown and unspecified cause of morbidity or mortality     depression, anxiety    Posterior cervical adenopathy, benign 11/19/2018    Psychiatric disorder     DEPRESSION AND ANXIETY    Pulmonary sarcoidosis ([facility]     Unspecified sleep apnea     cpap       Past Surgical History:   Procedure Laterality Date    HX CATARACT REMOVAL Bilateral     HX HERNIA REPAIR Right AS A CHILD    INGUINAL    HX HERNIA REPAIR Left 1990    INGUIBAL    HX KNEE REPLACEMENT Left 2008    HX ORTHOPAEDIC Right     BONE SPURS REMOVED FROM FOOT    HX OTHER SURGICAL      scc removed top of head and nose    HX OTHER SURGICAL      benign lump removed from thyroid    HX OTHER SURGICAL Right     SKIN CANCER RELMOVED FROM LEG    HX UROLOGICAL  2020    CYSTO    IR INSERT TUNL CVC W PORT OVER 5 YEARS  2021    [place] CARDIAC SURG PROCEDURE UNLIST  2021    CARDIAC CATH    [place] REVISE KNEE JOINT REPLACE,ALL PARTS Left          Family History:   Problem Relation Age of Onset    Cancer Mother         breast with mets    Dementia Mother     Heart Disease Father     Cancer Sister         breast    Lung Disease Brother     No Known Problems Brother     Anesth Problems Neg Hx        Social History     Socioeconomic History    Marital status:      Spouse name: Not on file    Number of children: Not on file    Years of education: Not on file    Highest education level: Not on file   Occupational History    Not on file   Tobacco Use    Smoking status: Former Smoker     Packs/day: 0.50     Years: 2.00     Pack years: 1.00     Quit date: 1965     Years since quittin.2    Smokeless tobacco: Never Used   Vaping Use    Vaping Use: Never used   Substance and Sexual Activity    Alcohol use: Yes     Alcohol/week: 14.0 standard drinks     Types: 12 Glasses of wine, 2 Shots of liquor per week    Drug use: No    Sexual activity: Not Currently   Other Topics Concern    Not on file   Social History Narrative    Not on file     Social Determinants of Health     Financial Resource Strain:     Difficulty of Paying Living Expenses:    Food Insecurity: No Food Insecurity    Worried About Running Out of Food in the Last Year: Never true    Tee of Food in the Last Year: Never true   Transportation Needs: No Transportation Needs    Lack of Transportation (Medical): No    Lack of Transportation (Non-Medical):  No   Physical Activity:     Days of Exercise per Week:     Minutes of Exercise per Session:    Stress:     Feeling of Stress :    Social Connections: Unknown    Frequency of Communication with Friends and Family: Not on file    Frequency of Social Gatherings with Friends and Family: Not on file    Attends Taoist Services: More than 4 times per year    Active Member of [address] [place] [address] [employer] or Organizations: Not on file    Attends Club or Organization Meetings: Not on file    Marital Status:    Intimate Partner Violence:     Fear of Current or Ex-Partner:     Emotionally Abused:     Physically Abused:     Sexually Abused: ALLERGIES: Pcn [penicillins]    Review of Systems   Constitutional: Positive for chills and fatigue. Negative for fever. HENT: Negative for ear pain, sore throat and trouble swallowing. Eyes: Negative for visual disturbance. Respiratory: Negative for cough and shortness of breath. Cardiovascular: Negative for chest pain. Gastrointestinal: Negative for abdominal pain, nausea and vomiting. Genitourinary: Negative for dysuria and frequency. Musculoskeletal: Negative for back pain. Skin: Negative for rash. Neurological: Positive for light-headedness. Negative for syncope and headaches. Psychiatric/Behavioral: Negative for confusion. All other systems reviewed and are negative. Vitals:    10/30/21 1030 10/30/21 1115   BP: 116/64 112/72   Pulse: 93 73   Resp: 20 16   Temp: 98 °F (36.7 °C)    SpO2: 98% 96%   Weight: 105.2 kg (232 lb)    Height: 5' 11\" (1.803 m)             Physical Exam  Constitutional:       General: He is not in acute distress. Appearance: He is not toxic-appearing. HENT:      Head: Normocephalic and atraumatic. Mouth/Throat:      Mouth: Mucous membranes are moist.   Eyes:      Extraocular Movements: Extraocular movements intact. Cardiovascular:      Rate and Rhythm: Normal rate and regular rhythm. Heart sounds: Normal heart sounds. Pulmonary:      Effort: Pulmonary effort is normal. No respiratory distress. Breath sounds: Normal breath sounds. Abdominal:      Palpations: Abdomen is soft. Tenderness: There is no abdominal tenderness. Musculoskeletal:      Cervical back: Normal range of motion. Right lower leg: No edema. Left lower leg: No edema. Skin:     Capillary Refill: Capillary refill takes less than 2 seconds. Coloration: Skin is pale. Neurological:      General: No focal deficit present. Mental Status: He is alert and oriented to person, place, and time. Psychiatric:         Mood and Affect: Mood normal.          MDM  Number of Diagnoses or Management Options     Amount and/or Complexity of Data Reviewed  Decide to obtain previous medical records or to obtain history from someone other than the patient: yes           Procedures        PROGRESS NOTE:    Patient with no symptoms of hemorrhage but gradually worsening symptomatic anemia. Consented for blood transfusion. We will transfuse 1 unit. Patient has been in the ED for 6 hours already and is still waiting to finish his transfusion. Would prefer to recheck levels as outpatient. No edema initially, lungs clear prior to initiation of transfusion. Reevaluated 4:02 PM and feels slightly dyspneic but otherwise well. Give a dose of Lasix and then resume the remainder of his transfusion. If he remains stable he may be discharged for outpatient follow-up. If he has any progressive symptoms he can be admitted for observation. Discussed with Dr. Marco A Martinez who will follow up on this. Plan for discharge at this time.     4:02 PM   10/30/21  Faina Ponce MD   _____________________________       LABORATORY RESULTS:  Labs Reviewed   CBC WITH AUTOMATED DIFF - Abnormal; Notable for the following components:       Result Value    WBC 12.8 (*)     RBC 1.78 (*)     HGB 6.2 (*)     HCT 19.2 (*)     .9 (*)     MCH 34.8 (*)     RDW 19.8 (*)     PLATELET 43 (*)     NRBC 0.3 (*)     ABSOLUTE NRBC 0.04 (*)     NEUTROPHILS 82 (*)     LYMPHOCYTES 3 (*)     METAMYELOCYTES 3 (*)     MYELOCYTES 1 (*)     ABS. NEUTROPHILS 10.5 (*)     ABS. LYMPHOCYTES 0.4 (*)     ABS. MONOCYTES 1.4 (*)     All other components within normal limits   METABOLIC PANEL, COMPREHENSIVE - Abnormal; Notable for the following components:    Sodium 134 (*)     Potassium 3.4 (*)     Anion gap 4 (*)     Glucose 101 (*)     AST (SGOT) 10 (*)     Albumin 3.0 (*)     A-G Ratio 0.8 (*)     All other components within normal limits   SAMPLES BEING HELD   TYPE & SCREEN   RBC, ALLOCATE       IMAGING RESULTS:  No orders to display       MEDICATIONS GIVEN:  Medications   0.9% sodium chloride infusion 250 mL (has no administration in time range)   furosemide (LASIX) injection 40 mg (40 mg IntraVENous Given 10/30/21 1537)   potassium chloride SR (KLOR-CON 10) tablet 20 mEq (20 mEq Oral Given 10/30/21 1537)       IMPRESSION:  1. Anemia associated with chemotherapy    2. Encounter for blood transfusion        PLAN:  Follow-up Information     Follow up With Specialties Details Why Contact Info    Bianca Brito MD Hematology and Oncology, Internal Medicine, Hematology, Oncology Schedule an appointment as soon as possible for a visit on 11/1/2021 Call for a follow up lab work and appointment. [address] [place] [address]  [phone]           Current Discharge Medication List            Refugio Kanner, MD        Please note that this dictation was completed with [employer], the computer voice recognition software. Quite often unanticipated grammatical, syntax, homophones, and other interpretive errors are inadvertently transcribed by the computer software. Please disregard these errors. Please excuse any errors that have escaped final proofreading.

## 2021-10-30 NOTE — ED NOTES
Pt ambulatory to ED after being referred by Dr. Melgar Persons to be transfused. Pt with hx of bladder CA and receiving chemo. Hgb 6.1 yesterday.

## 2021-10-30 NOTE — ED NOTES
Patient straight cathed, 500mL. Patient states he straight caths himself at home due to bladder cancer.

## 2021-10-31 LAB
ABO + RH BLD: NORMAL
BLD PROD TYP BPU: NORMAL
BLOOD GROUP ANTIBODIES SERPL: NORMAL
BPU ID: NORMAL
CROSSMATCH RESULT,%XM: NORMAL
SPECIMEN EXP DATE BLD: NORMAL
STATUS OF UNIT,%ST: NORMAL
UNIT DIVISION, %UDIV: 0

## 2021-11-01 RX ORDER — SODIUM CHLORIDE 9 MG/ML
10 INJECTION INTRAMUSCULAR; INTRAVENOUS; SUBCUTANEOUS AS NEEDED
Status: CANCELLED | OUTPATIENT
Start: 2021-11-04

## 2021-11-01 RX ORDER — HEPARIN 100 UNIT/ML
500 SYRINGE INTRAVENOUS AS NEEDED
Status: CANCELLED | OUTPATIENT
Start: 2021-11-04

## 2021-11-01 RX ORDER — SODIUM CHLORIDE 0.9 % (FLUSH) 0.9 %
5-10 SYRINGE (ML) INJECTION AS NEEDED
Status: CANCELLED | OUTPATIENT
Start: 2021-11-04

## 2021-11-01 NOTE — TELEPHONE ENCOUNTER
Patient called and stated he went in for blood transfusion over the weekend. What needs to happen next?     # [phone]

## 2021-11-04 ENCOUNTER — HOSPITAL ENCOUNTER (OUTPATIENT)
Dept: INFUSION THERAPY | Age: 76
Discharge: HOME | End: 2021-11-04
Payer: MEDICARE

## 2021-11-04 VITALS
HEART RATE: 79 BPM | RESPIRATION RATE: 14 BRPM | TEMPERATURE: 96.8 F | SYSTOLIC BLOOD PRESSURE: 119 MMHG | DIASTOLIC BLOOD PRESSURE: 59 MMHG

## 2021-11-04 DIAGNOSIS — C67.9 MALIGNANT NEOPLASM OF URINARY BLADDER, UNSPECIFIED SITE (HCC): Primary | ICD-10-CM

## 2021-11-04 LAB
ABO + RH BLD: NORMAL
BASOPHILS # BLD: 0 K/UL (ref 0–0.1)
BASOPHILS NFR BLD: 0 % (ref 0–1)
BLOOD GROUP ANTIBODIES SERPL: NORMAL
DIFFERENTIAL METHOD BLD: ABNORMAL
EOSINOPHIL # BLD: 0.2 K/UL (ref 0–0.4)
EOSINOPHIL NFR BLD: 2 % (ref 0–7)
ERYTHROCYTE [DISTWIDTH] IN BLOOD BY AUTOMATED COUNT: 21.7 % (ref 11.5–14.5)
HCT VFR BLD AUTO: 23.1 % (ref 36.6–50.3)
HGB BLD-MCNC: 7.3 G/DL (ref 12.1–17)
IMM GRANULOCYTES # BLD AUTO: 0 K/UL
IMM GRANULOCYTES NFR BLD AUTO: 0 %
LYMPHOCYTES # BLD: 0.7 K/UL (ref 0.8–3.5)
LYMPHOCYTES NFR BLD: 8 % (ref 12–49)
MCH RBC QN AUTO: 33.6 PG (ref 26–34)
MCHC RBC AUTO-ENTMCNC: 31.6 G/DL (ref 30–36.5)
MCV RBC AUTO: 106.5 FL (ref 80–99)
MONOCYTES # BLD: 0.7 K/UL (ref 0–1)
MONOCYTES NFR BLD: 8 % (ref 5–13)
MYELOCYTES NFR BLD MANUAL: 1 %
NEUTS SEG # BLD: 6.8 K/UL (ref 1.8–8)
NEUTS SEG NFR BLD: 81 % (ref 32–75)
NRBC # BLD: 0 K/UL (ref 0–0.01)
NRBC BLD-RTO: 0 PER 100 WBC
PLATELET # BLD AUTO: 207 K/UL (ref 150–400)
PMV BLD AUTO: 9.7 FL (ref 8.9–12.9)
RBC # BLD AUTO: 2.17 M/UL (ref 4.1–5.7)
RBC MORPH BLD: ABNORMAL
RBC MORPH BLD: ABNORMAL
SPECIMEN EXP DATE BLD: NORMAL
WBC # BLD AUTO: 8.4 K/UL (ref 4.1–11.1)

## 2021-11-04 PROCEDURE — 85025 COMPLETE CBC W/AUTO DIFF WBC: CPT

## 2021-11-04 PROCEDURE — 86901 BLOOD TYPING SEROLOGIC RH(D): CPT

## 2021-11-04 PROCEDURE — 36415 COLL VENOUS BLD VENIPUNCTURE: CPT

## 2021-11-04 RX ORDER — SODIUM CHLORIDE 0.9 % (FLUSH) 0.9 %
5-10 SYRINGE (ML) INJECTION AS NEEDED
Status: DISCONTINUED | OUTPATIENT
Start: 2021-11-04 | End: 2021-11-05 | Stop reason: HOSPADM

## 2021-11-04 RX ORDER — SODIUM CHLORIDE 9 MG/ML
10 INJECTION INTRAMUSCULAR; INTRAVENOUS; SUBCUTANEOUS AS NEEDED
Status: DISCONTINUED | OUTPATIENT
Start: 2021-11-04 | End: 2021-11-05 | Stop reason: HOSPADM

## 2021-11-04 RX ORDER — HEPARIN 100 UNIT/ML
500 SYRINGE INTRAVENOUS AS NEEDED
Status: DISCONTINUED | OUTPATIENT
Start: 2021-11-04 | End: 2021-11-05 | Stop reason: HOSPADM

## 2021-11-04 NOTE — PROGRESS NOTES
OPIC Short Note                   1400 Pt admit to [facility] for Labs/Possible transfusion ambulatory in stable condition. Assessment completed. No new concerns voiced. Labs drawn peripherally and sent for processing. Visit Vitals  BP (!) 119/59 (BP 1 Location: Right upper arm, BP Patient Position: Sitting)   Pulse 79   Temp 96.8 °F (36 °C)   Resp 14     Lab results were obtained and reviewed. See CC for results. Blood transfusion not indicated per MD order. Mr. Leanne Venegas was discharged from [place] [address] in stable condition 1445. Patient is aware of next appointment.     Future Appointments   Date Time Provider Gregorio Ibarra   12/2/2021  1:00 PM G1 TD [facility]   1/6/2022  9:30 AM Edmundo Flynn  [address] [facility] AMB   1/13/2022  1:00 PM G1 TD [facility]   2/24/2022  1:00 PM G1 TD [facility]   4/7/2022  1:00 PM G1 TD [facility] [address] [facility] [place] RN  November 4, 2021

## 2021-11-15 ENCOUNTER — TRANSCRIBE ORDER (OUTPATIENT)
Dept: REGISTRATION | Age: 76
End: 2021-11-15

## 2021-11-15 DIAGNOSIS — Z01.812 PRE-PROCEDURE LAB EXAM: Primary | ICD-10-CM

## 2021-11-22 ENCOUNTER — HOSPITAL ENCOUNTER (OUTPATIENT)
Dept: PREADMISSION TESTING | Age: 76
Discharge: HOME | End: 2021-11-22
Payer: MEDICARE

## 2021-11-22 VITALS
SYSTOLIC BLOOD PRESSURE: 135 MMHG | DIASTOLIC BLOOD PRESSURE: 84 MMHG | HEART RATE: 72 BPM | BODY MASS INDEX: 33.4 KG/M2 | WEIGHT: 238.54 LBS | TEMPERATURE: 98.2 F | HEIGHT: 71 IN

## 2021-11-22 LAB
ABO + RH BLD: NORMAL
ALBUMIN SERPL-MCNC: 3.6 G/DL (ref 3.5–5)
ALBUMIN/GLOB SERPL: 1.1 {RATIO} (ref 1.1–2.2)
ALP SERPL-CCNC: 58 U/L (ref 45–117)
ALT SERPL-CCNC: 22 U/L (ref 12–78)
ANION GAP SERPL CALC-SCNC: 4 MMOL/L (ref 5–15)
AST SERPL-CCNC: 16 U/L (ref 15–37)
BASOPHILS # BLD: 0.1 K/UL (ref 0–0.1)
BASOPHILS NFR BLD: 1 % (ref 0–1)
BILIRUB SERPL-MCNC: 0.4 MG/DL (ref 0.2–1)
BLOOD GROUP ANTIBODIES SERPL: NORMAL
BUN SERPL-MCNC: 28 MG/DL (ref 6–20)
BUN/CREAT SERPL: 32 (ref 12–20)
CALCIUM SERPL-MCNC: 9.7 MG/DL (ref 8.5–10.1)
CEA SERPL-MCNC: 3.5 NG/ML
CHLORIDE SERPL-SCNC: 106 MMOL/L (ref 97–108)
CO2 SERPL-SCNC: 27 MMOL/L (ref 21–32)
CREAT SERPL-MCNC: 0.88 MG/DL (ref 0.7–1.3)
DIFFERENTIAL METHOD BLD: ABNORMAL
EOSINOPHIL # BLD: 0.2 K/UL (ref 0–0.4)
EOSINOPHIL NFR BLD: 4 % (ref 0–7)
ERYTHROCYTE [DISTWIDTH] IN BLOOD BY AUTOMATED COUNT: 18.6 % (ref 11.5–14.5)
EST. AVERAGE GLUCOSE BLD GHB EST-MCNC: 82 MG/DL
GLOBULIN SER CALC-MCNC: 3.3 G/DL (ref 2–4)
GLUCOSE SERPL-MCNC: 100 MG/DL (ref 65–100)
HBA1C MFR BLD: 4.5 % (ref 4–5.6)
HCT VFR BLD AUTO: 31.1 % (ref 36.6–50.3)
HGB BLD-MCNC: 9.7 G/DL (ref 12.1–17)
HISTORY CHECKED?,CKHIST: NORMAL
IMM GRANULOCYTES # BLD AUTO: 0 K/UL (ref 0–0.04)
IMM GRANULOCYTES NFR BLD AUTO: 0 % (ref 0–0.5)
INR PPP: 1.1 (ref 0.9–1.1)
LYMPHOCYTES # BLD: 0.6 K/UL (ref 0.8–3.5)
LYMPHOCYTES NFR BLD: 12 % (ref 12–49)
MAGNESIUM SERPL-MCNC: 2 MG/DL (ref 1.6–2.4)
MCH RBC QN AUTO: 35 PG (ref 26–34)
MCHC RBC AUTO-ENTMCNC: 31.2 G/DL (ref 30–36.5)
MCV RBC AUTO: 112.3 FL (ref 80–99)
MONOCYTES # BLD: 0.6 K/UL (ref 0–1)
MONOCYTES NFR BLD: 13 % (ref 5–13)
NEUTS SEG # BLD: 3.4 K/UL (ref 1.8–8)
NEUTS SEG NFR BLD: 70 % (ref 32–75)
NRBC # BLD: 0 K/UL (ref 0–0.01)
NRBC BLD-RTO: 0 PER 100 WBC
PHOSPHATE SERPL-MCNC: 3.7 MG/DL (ref 2.6–4.7)
PLATELET # BLD AUTO: 214 K/UL (ref 150–400)
PMV BLD AUTO: 8.8 FL (ref 8.9–12.9)
POTASSIUM SERPL-SCNC: 4.8 MMOL/L (ref 3.5–5.1)
PROT SERPL-MCNC: 6.9 G/DL (ref 6.4–8.2)
PROTHROMBIN TIME: 11.1 SEC (ref 9–11.1)
RBC # BLD AUTO: 2.77 M/UL (ref 4.1–5.7)
RBC MORPH BLD: ABNORMAL
RBC MORPH BLD: ABNORMAL
SODIUM SERPL-SCNC: 137 MMOL/L (ref 136–145)
SPECIMEN EXP DATE BLD: NORMAL
WBC # BLD AUTO: 4.9 K/UL (ref 4.1–11.1)

## 2021-11-22 PROCEDURE — 80053 COMPREHEN METABOLIC PANEL: CPT

## 2021-11-22 PROCEDURE — 85610 PROTHROMBIN TIME: CPT

## 2021-11-22 PROCEDURE — 85025 COMPLETE CBC W/AUTO DIFF WBC: CPT

## 2021-11-22 PROCEDURE — 36415 COLL VENOUS BLD VENIPUNCTURE: CPT

## 2021-11-22 PROCEDURE — 83735 ASSAY OF MAGNESIUM: CPT

## 2021-11-22 PROCEDURE — 86901 BLOOD TYPING SEROLOGIC RH(D): CPT

## 2021-11-22 PROCEDURE — 84100 ASSAY OF PHOSPHORUS: CPT

## 2021-11-22 PROCEDURE — 83036 HEMOGLOBIN GLYCOSYLATED A1C: CPT

## 2021-11-22 PROCEDURE — 82378 CARCINOEMBRYONIC ANTIGEN: CPT

## 2021-11-22 RX ORDER — SODIUM CHLORIDE 9 MG/ML
250 INJECTION, SOLUTION INTRAVENOUS AS NEEDED
Status: CANCELLED | OUTPATIENT
Start: 2021-11-22

## 2021-11-22 RX ORDER — SODIUM CHLORIDE 9 MG/ML
250 INJECTION, SOLUTION INTRAVENOUS AS NEEDED
Status: DISCONTINUED | OUTPATIENT
Start: 2021-11-22 | End: 2021-11-26 | Stop reason: HOSPADM

## 2021-11-22 NOTE — PERIOP NOTES
PAT Nutrition Screen    1. Has the patient had an unplanned weight loss of 10% of body weight or more in the last 6 months? No = 0   2. Has the patient been eating less than 50% of their normal diet in the preceding week? No = 0       Patient instructed on Non-Diabetic pre-operative nutrition plan to start 5 days prior to surgery. Patient given 10 shakes and 3Patient given surgical site infection information FAQs handout and hand hygiene tips sheet. Pre-operative instructions reviewed and patient verbalizes understanding of instructions. Patient has been given the opportunity to ask additional questions. PATIENT GIVEN 2 BOTTLES OF CHG SOAP TO USE DAY BEFORE SURGERY AND DOS. INSTRUCTIONS PROVIDED. PT ADVISED TO NOT EAT SOLID FOODS AFTER MIDNIGHT THE NIGHT BEFORE SURGERY INCLUDING CANDY,MINTS OR GUM. DO NOT DRINK ALCOHOL 24 HOURS BEFORE SURGERY. PT MAY DRINK CLEAR LIQUIDS FROM 12 MIDNIGHT UNTIL 1 HOUR PRIOR TO ARRIVAL. pre-surgery drinks. Opportunity given for questions and all questions answered. WENT OVER PG 7-11 IN [facility] HANDBOOK,PT PERFORMED INCENTIVE SPIROMETRY WELL. PT IS SCHEDULED FOR COVID TESTING.

## 2021-11-22 NOTE — PERIOP NOTES
NOTIFIED DR SUAREZ'S OFFICE THAT WAS UNABLE TO OBTAIN A URINE SAMPLE FROM PT. PT SELF CATHS. SPOKE TO EMILY IN WOUND CARE THAT PT WILL NEED AN ILEAL CONDUIT PLACED.

## 2021-11-23 NOTE — PERIOP NOTES
PAT Nurse Practitioner   Pre-Operative Chart Review/Assessment: UROLOGY [facility]                 Patient Name:  Satnam Puri                    MRN:   [id number]     Sex:   male                                       Age:   77 y.o.    :  1945       Today's Date:  2021     Date of PAT:   2021      Date of Surgery:    2021      Procedure(s):   Radical Cystectomy, Urethrectomy, Prostatectomy, Bilateral Lymphadenectomy, Ileal Conduit Diversion     Surgeon:  Dr. Munroe                    PLAN:       1)  PCP: Dr. Devonte Ahumada        2)  Cardiac Clearance: Pt followed by Dr. Kristine Watts. LOV 21. Pt was cleared for urological procedure at that time. No cardiac changes since. OV note and EKG on chart. 3)  Diabetic Treatment Consult: Not indicated. A1c-4.5       4)  Sleep Apnea evaluation:  +DOLORES dx. Pt uses CPAP. 5)  Additional Concerns: Former smoker, HTN, Pulmonary Sarcoidosis(followed by Dr. Mauricio Flores; [address]; condition stable at that time, no pulmonary changes since, OV note on chart), LBBB, dep/anx, [place] L side, pt self-caths                    Vital Signs:         Vitals:    21 1217   BP: 135/84   Pulse: 72   Temp: 98.2 °F (36.8 °C)   Weight: 108.2 kg (238 lb 8.6 oz)   Height: 5' 11\" (1.803 m)          Preoperative Nutrition Screen (CLOTILDE)   Patient's Age: 77 y.o. Patient's BMI: Estimated body mass index is 33.27 kg/m² as calculated from the following:    Height as of this encounter: 5' 11\" (1.803 m). Weight as of this encounter: 108.2 kg (238 lb 8.6 oz). If the answer to any of the following is Yes, then recommend prescribe Oral Nutrition Supplements (ONS) for at least 5 days prior to surgery and/or order referral to dietitian for further assessment and nutrition therapy. 1. Does the patient have a documented serum albumin less than 3.0 within the last 90 days? No = 0          2. Is patient's BMI less than 18.5 (or less than 20 if age over 72)?   No = 0 3. Has the patient had an unplanned weight loss of 10% of body weight or more in the last 6 months? No = 0   4. Has the patient been eating less than 50% of their normal diet in the preceding week? No = 0   CLOTILDE Score (number of Yes responses), 0-4 0     Plan:   2 daily immuno nutrition shakes, 5 days prior to surgery and 5 days post-operatively. 3 pre-surgery drinks.       Electronically signed by Macey Kang NP on 11/23/21 at 8:47 AM    ____________________________________________  PAST MEDICAL HISTORY  Past Medical History:   Diagnosis Date    Arrhythmia     LBBB    Arthritis     Asthma     MILD    Cancer ([address]     scc top of head and nose    Cancer ([address] 2015    BLADDER    COVID-19 vaccine series completed     [facility] VACCINE 1-28-21 AND 2-25-21    Depression with anxiety     Hypertension     Other unknown and unspecified cause of morbidity or mortality     history of pulmonary sarcoid     Posterior cervical adenopathy, benign 11/19/2018    Pulmonary sarcoidosis ([address]     Unspecified sleep apnea     cpap      ____________________________________________  PAST SURGICAL HISTORY  Past Surgical History:   Procedure Laterality Date    HX CATARACT REMOVAL Bilateral     HX HERNIA REPAIR Right AS A CHILD    INGUINAL    HX HERNIA REPAIR Left 1990    INGUIBAL    HX KNEE REPLACEMENT Left 2008    HX ORTHOPAEDIC Right     BONE SPURS REMOVED FROM FOOT    HX OTHER SURGICAL      scc removed top of head and nose    HX OTHER SURGICAL  2005    benign lump removed from thyroid    HX OTHER SURGICAL Right 2020    SKIN CANCER RELMOVED FROM LEG    HX UROLOGICAL  06/2020    CYSTO    IR INSERT TUNL CVC W PORT OVER 5 YEARS  7/16/2021    TN CARDIAC SURG PROCEDURE UNLIST  01/22/2021    CARDIAC CATH    TN REVISE KNEE JOINT REPLACE,ALL PARTS Left 2019      ____________________________________________  HOME MEDICATIONS  Current Outpatient Medications   Medication Sig    lidocaine-prilocaine (EMLA) topical cream Apply  to affected area as needed for Pain. Apply a thin layer over port a cath 30-60 minutes prior to port access    lisinopriL (PRINIVIL, ZESTRIL) 5 mg tablet Take 5 mg by mouth two (2) times a day.  zolpidem (AMBIEN) 10 mg tablet Take 5 mg by mouth nightly as needed for Sleep.  buPROPion SR (WELLBUTRIN SR) 100 mg SR tablet Take 100 mg by mouth daily.  acetaminophen (Tylenol Extra Strength) 500 mg tablet Take 1,000 mg by mouth every six (6) hours as needed for Pain.  tamsulosin (FLOMAX) 0.4 mg capsule Take 0.4 mg by mouth nightly.  busPIRone (BUSPAR) 5 mg tablet Take 5 mg by mouth daily.  simvastatin (ZOCOR) 20 mg tablet Take 20 mg by mouth nightly.  escitalopram oxalate (LEXAPRO) 10 mg tablet Take 10 mg by mouth daily.  multivitamin (ONE A DAY) tablet Take 1 Tab by mouth daily. Current Facility-Administered Medications   Medication Dose Route Frequency    0.9% sodium chloride infusion 250 mL  250 mL IntraVENous PRN      ____________________________________________  ALLERGIES  Allergies   Allergen Reactions    Pcn [Penicillins] Hives     Patient screened for any delayed non-IgE-mediated reaction to PCN.         Patient notes the following:    NO SEVERE NON-IGE MEDIATED REACTIONS      ____________________________________________  SOCIAL HISTORY  Social History     Tobacco Use    Smoking status: Former Smoker     Packs/day: 0.50     Years: 2.00     Pack years: 1.00     Quit date: 1965     Years since quittin.2    Smokeless tobacco: Never Used   Substance Use Topics    Alcohol use:  Yes     Alcohol/week: 2.0 standard drinks     Types: 2 Glasses of wine per week     Comment: DAILY      ____________________________________________   Internal Administration   First Dose COVID-19, Chris Langston, Primary or Immunocompromised Series, MRNA, PF, 100mcg/0.5mL  2021   Second Dose COVID-19, Chris Langston, Primary or Immunocompromised Series, MRNA, PF, 100mcg/0.5mL  2021      Last COVID Lab SARS-CoV-2 ( )   Date Value   12/01/2021 Not detected        ___________________________________________    Labs:    Hospital Outpatient Visit on 11/22/2021   Component Date Value Ref Range Status    WBC 11/22/2021 4.9  4.1 - 11.1 K/uL Final    RBC 11/22/2021 2.77* 4.10 - 5.70 M/uL Final    HGB 11/22/2021 9.7* 12.1 - 17.0 g/dL Final    INVESTIGATED PER DELTA CHECK PROTOCOL    HCT 11/22/2021 31.1* 36.6 - 50.3 % Final    MCV 11/22/2021 112.3* 80.0 - 99.0 FL Final    MCH 11/22/2021 35.0* 26.0 - 34.0 PG Final    MCHC 11/22/2021 31.2  30.0 - 36.5 g/dL Final    RDW 11/22/2021 18.6* 11.5 - 14.5 % Final    PLATELET 83/94/6190 568  150 - 400 K/uL Final    MPV 11/22/2021 8.8* 8.9 - 12.9 FL Final    NRBC 11/22/2021 0.0  0  WBC Final    ABSOLUTE NRBC 11/22/2021 0.00  0.00 - 0.01 K/uL Final    NEUTROPHILS 11/22/2021 70  32 - 75 % Final    LYMPHOCYTES 11/22/2021 12  12 - 49 % Final    MONOCYTES 11/22/2021 13  5 - 13 % Final    EOSINOPHILS 11/22/2021 4  0 - 7 % Final    BASOPHILS 11/22/2021 1  0 - 1 % Final    IMMATURE GRANULOCYTES 11/22/2021 0  0.0 - 0.5 % Final    ABS. NEUTROPHILS 11/22/2021 3.4  1.8 - 8.0 K/UL Final    ABS. LYMPHOCYTES 11/22/2021 0.6* 0.8 - 3.5 K/UL Final    ABS. MONOCYTES 11/22/2021 0.6  0.0 - 1.0 K/UL Final    ABS. EOSINOPHILS 11/22/2021 0.2  0.0 - 0.4 K/UL Final    ABS. BASOPHILS 11/22/2021 0.1  0.0 - 0.1 K/UL Final    ABS. IMM.  GRANS. 11/22/2021 0.0  0.00 - 0.04 K/UL Final    DF 11/22/2021 SMEAR SCANNED    Final    RBC COMMENTS 11/22/2021     Final                    Value:ANISOCYTOSIS  1+      RBC COMMENTS 11/22/2021     Final                    Value:MACROCYTOSIS  2+      Sodium 11/22/2021 137  136 - 145 mmol/L Final    Potassium 11/22/2021 4.8  3.5 - 5.1 mmol/L Final    Chloride 11/22/2021 106  97 - 108 mmol/L Final    CO2 11/22/2021 27  21 - 32 mmol/L Final    Anion gap 11/22/2021 4* 5 - 15 mmol/L Final    Glucose 11/22/2021 100  65 - 100 mg/dL Final    BUN 11/22/2021 28* 6 - 20 MG/DL Final    Creatinine 11/22/2021 0.88  0.70 - 1.30 MG/DL Final    BUN/Creatinine ratio 11/22/2021 32* 12 - 20   Final    GFR est AA 11/22/2021 >60  >60 ml/min/1.73m2 Final    GFR est non-AA 11/22/2021 >60  >60 ml/min/1.73m2 Final    Estimated GFR is calculated using the IDMS-traceable Modification of Diet in Renal Disease (MDRD) Study equation, reported for both  Americans (GFRAA) and non- Americans (GFRNA), and normalized to 1.73m2 body surface area. The physician must decide which value applies to the patient.  Calcium 11/22/2021 9.7  8.5 - 10.1 MG/DL Final    Bilirubin, total 11/22/2021 0.4  0.2 - 1.0 MG/DL Final    ALT (SGPT) 11/22/2021 22  12 - 78 U/L Final    AST (SGOT) 11/22/2021 16  15 - 37 U/L Final    Alk. phosphatase 11/22/2021 58  45 - 117 U/L Final    Protein, total 11/22/2021 6.9  6.4 - 8.2 g/dL Final    Albumin 11/22/2021 3.6  3.5 - 5.0 g/dL Final    Globulin 11/22/2021 3.3  2.0 - 4.0 g/dL Final    A-G Ratio 11/22/2021 1.1  1.1 - 2.2   Final    Hemoglobin A1c 11/22/2021 4.5  4.0 - 5.6 % Final    Comment: NEW METHOD  PLEASE NOTE NEW REFERENCE RANGE  (NOTE)  HbA1C Interpretive Ranges  <5.7              Normal  5.7 - 6.4         Consider Prediabetes  >6.5              Consider Diabetes      Est. average glucose 11/22/2021 82  mg/dL Final    INR 11/22/2021 1.1  0.9 - 1.1   Final    A single therapeutic range for Vit K antagonists may not be optimal for all indications - see June, 2008 issue of Chest, American College of Chest Physicians Evidence-Based Clinical Practice Guidelines, 8th Edition.     Prothrombin time 11/22/2021 11.1  9.0 - 11.1 sec Final    Magnesium 11/22/2021 2.0  1.6 - 2.4 mg/dL Final    Crossmatch Expiration 11/22/2021 11/25/2021,2359   Final    ABO/Rh(D) 11/22/2021 A NEGATIVE   Final    Antibody screen 11/22/2021 NEG   Final    HISTORY CHECKED? 11/22/2021 Historical check performed   Final    CEA 11/22/2021 3.5  ng/mL Final Comment:  Non-smokers:   0.0 - 3.0 ng/mL   Smokers:       0.0 - 5.0 ng/mL  Results may vary depending on . Method used is [employer]      Phosphorus 11/22/2021 3.7  2.6 - 4.7 MG/DL Final   Hospital Outpatient Visit on 11/04/2021   Component Date Value Ref Range Status    Crossmatch Expiration 11/04/2021 11/07/2021,2359   Final    ABO/Rh(D) 11/04/2021 A NEGATIVE   Final    Antibody screen 11/04/2021 NEG   Final    WBC 11/04/2021 8.4  4.1 - 11.1 K/uL Final    RBC 11/04/2021 2.17* 4.10 - 5.70 M/uL Final    HGB 11/04/2021 7.3* 12.1 - 17.0 g/dL Final    HCT 11/04/2021 23.1* 36.6 - 50.3 % Final    MCV 11/04/2021 106.5* 80.0 - 99.0 FL Final    MCH 11/04/2021 33.6  26.0 - 34.0 PG Final    MCHC 11/04/2021 31.6  30.0 - 36.5 g/dL Final    RDW 11/04/2021 21.7* 11.5 - 14.5 % Final    PLATELET 27/63/9099 161  150 - 400 K/uL Final    MPV 11/04/2021 9.7  8.9 - 12.9 FL Final    NRBC 11/04/2021 0.0  0  WBC Final    ABSOLUTE NRBC 11/04/2021 0.00  0.00 - 0.01 K/uL Final    NEUTROPHILS 11/04/2021 81* 32 - 75 % Final    LYMPHOCYTES 11/04/2021 8* 12 - 49 % Final    MONOCYTES 11/04/2021 8  5 - 13 % Final    EOSINOPHILS 11/04/2021 2  0 - 7 % Final    BASOPHILS 11/04/2021 0  0 - 1 % Final    MYELOCYTES 11/04/2021 1* 0 % Final    IMMATURE GRANULOCYTES 11/04/2021 0  % Final    ABS. NEUTROPHILS 11/04/2021 6.8  1.8 - 8.0 K/UL Final    ABS. LYMPHOCYTES 11/04/2021 0.7* 0.8 - 3.5 K/UL Final    ABS. MONOCYTES 11/04/2021 0.7  0.0 - 1.0 K/UL Final    ABS. EOSINOPHILS 11/04/2021 0.2  0.0 - 0.4 K/UL Final    ABS. BASOPHILS 11/04/2021 0.0  0.0 - 0.1 K/UL Final    ABS. IMM. GRANS.  11/04/2021 0.0  K/UL Final    DF 11/04/2021 MANUAL    Final    RBC COMMENTS 11/04/2021     Final                    Value:ANISOCYTOSIS  2+      RBC COMMENTS 11/04/2021     Final                    Value:MACROCYTOSIS  1+     Admission on 10/30/2021, Discharged on 10/30/2021   Component Date Value Ref Range Status  WBC 10/30/2021 12.8* 4.1 - 11.1 K/uL Final    RBC 10/30/2021 1.78* 4.10 - 5.70 M/uL Final    HGB 10/30/2021 6.2* 12.1 - 17.0 g/dL Final    RESULTS REPEATED    HCT 10/30/2021 19.2* 36.6 - 50.3 % Final    MCV 10/30/2021 107.9* 80.0 - 99.0 FL Final    MCH 10/30/2021 34.8* 26.0 - 34.0 PG Final    MCHC 10/30/2021 32.3  30.0 - 36.5 g/dL Final    RDW 10/30/2021 19.8* 11.5 - 14.5 % Final    PLATELET 68/74/5130 43* 150 - 400 K/uL Final    Comment: RESULTS VERIFIED, PHONED TO AND READ BACK BY  [facility] [place] [facility] [place]      MPV 10/30/2021 11.6  8.9 - 12.9 FL Final    NRBC 10/30/2021 0.3* 0  WBC Final    ABSOLUTE NRBC 10/30/2021 0.04* 0.00 - 0.01 K/uL Final    NEUTROPHILS 10/30/2021 82* 32 - 75 % Final    LYMPHOCYTES 10/30/2021 3* 12 - 49 % Final    MONOCYTES 10/30/2021 11  5 - 13 % Final    EOSINOPHILS 10/30/2021 0  0 - 7 % Final    BASOPHILS 10/30/2021 0  0 - 1 % Final    METAMYELOCYTES 10/30/2021 3* 0 % Final    MYELOCYTES 10/30/2021 1* 0 % Final    IMMATURE GRANULOCYTES 10/30/2021 0  % Final    ABS. NEUTROPHILS 10/30/2021 10.5* 1.8 - 8.0 K/UL Final    ABS. LYMPHOCYTES 10/30/2021 0.4* 0.8 - 3.5 K/UL Final    ABS. MONOCYTES 10/30/2021 1.4* 0.0 - 1.0 K/UL Final    ABS. EOSINOPHILS 10/30/2021 0.0  0.0 - 0.4 K/UL Final    ABS. BASOPHILS 10/30/2021 0.0  0.0 - 0.1 K/UL Final    ABS. IMM. GRANS.  10/30/2021 0.0  K/UL Final    DF 10/30/2021 MANUAL    Final    RBC COMMENTS 10/30/2021     Final                    Value:MACROCYTOSIS  1+      RBC COMMENTS 10/30/2021     Final                    Value:ANISOCYTOSIS  1+      RBC COMMENTS 10/30/2021     Final                    Value:POLYCHROMASIA  1+      WBC COMMENTS 10/30/2021 DOHLE BODIES    Final    PRESENT    Sodium 10/30/2021 134* 136 - 145 mmol/L Final    Potassium 10/30/2021 3.4* 3.5 - 5.1 mmol/L Final    Chloride 10/30/2021 106  97 - 108 mmol/L Final    CO2 10/30/2021 24  21 - 32 mmol/L Final    Anion gap 10/30/2021 4* 5 - 15 mmol/L Final  Glucose 10/30/2021 101* 65 - 100 mg/dL Final    BUN 10/30/2021 15  6 - 20 MG/DL Final    Creatinine 10/30/2021 1.00  0.70 - 1.30 MG/DL Final    BUN/Creatinine ratio 10/30/2021 15  12 - 20   Final    GFR est AA 10/30/2021 >60  >60 ml/min/1.73m2 Final    GFR est non-AA 10/30/2021 >60  >60 ml/min/1.73m2 Final    Estimated GFR is calculated using the IDMS-traceable Modification of Diet in Renal Disease (MDRD) Study equation, reported for both  Americans (GFRAA) and non- Americans (GFRNA), and normalized to 1.73m2 body surface area. The physician must decide which value applies to the patient.  Calcium 10/30/2021 8.8  8.5 - 10.1 MG/DL Final    Bilirubin, total 10/30/2021 0.4  0.2 - 1.0 MG/DL Final    ALT (SGPT) 10/30/2021 17  12 - 78 U/L Final    AST (SGOT) 10/30/2021 10* 15 - 37 U/L Final    Alk. phosphatase 10/30/2021 111  45 - 117 U/L Final    Protein, total 10/30/2021 6.8  6.4 - 8.2 g/dL Final    Albumin 10/30/2021 3.0* 3.5 - 5.0 g/dL Final    Globulin 10/30/2021 3.8  2.0 - 4.0 g/dL Final    A-G Ratio 10/30/2021 0.8* 1.1 - 2.2   Final    SAMPLES BEING HELD 10/30/2021 1RED 1BLU   Final    COMMENT 10/30/2021 Add-on orders for these samples will be processed based on acceptable specimen integrity and analyte stability, which may vary by analyte.     Final    Crossmatch Expiration 10/30/2021 11/02/2021,2358   Final    ABO/Rh(D) 10/30/2021 A NEGATIVE   Final    Antibody screen 10/30/2021 NEG   Final    Unit number 10/30/2021 [id number]   Final    Blood component type 10/30/2021 RC LR   Final    Unit division 10/30/2021 00   Final    Status of unit 10/30/2021 TRANSFUSED   Final    Crossmatch result 10/30/2021 Compatible   Final    HISTORY CHECKED? 10/30/2021 Historical check performed   Final   Hospital Outpatient Visit on 10/29/2021   Component Date Value Ref Range Status    WBC 10/29/2021 15.0* 4.1 - 11.1 K/uL Final    RBC 10/29/2021 1.75* 4.10 - 5.70 M/uL Final    HGB 10/29/2021 6.1* 12.1 - 17.0 g/dL Final    RESULTS REPEATED    HCT 10/29/2021 18.5* 36.6 - 50.3 % Final    MCV 10/29/2021 105.7* 80.0 - 99.0 FL Final    MCH 10/29/2021 34.9* 26.0 - 34.0 PG Final    MCHC 10/29/2021 33.0  30.0 - 36.5 g/dL Final    RDW 10/29/2021 19.3* 11.5 - 14.5 % Final    PLATELET 13/63/9207 26* 150 - 400 K/uL Final    Comment: RESULTS REPEATED  RESULTS VERIFIED, PHONED TO AND READ BACK BY  CIRA MAGUIRE @ 6207 TO RELEASE PLT COUNT/CJP      MPV 10/29/2021 10.5  8.9 - 12.9 FL Final    NRBC 10/29/2021 0.5* 0  WBC Final    ABSOLUTE NRBC 10/29/2021 0.08* 0.00 - 0.01 K/uL Final    NEUTROPHILS 10/29/2021 88* 32 - 75 % Final    LYMPHOCYTES 10/29/2021 4* 12 - 49 % Final    MONOCYTES 10/29/2021 8  5 - 13 % Final    EOSINOPHILS 10/29/2021 0  0 - 7 % Final    BASOPHILS 10/29/2021 0  0 - 1 % Final    IMMATURE GRANULOCYTES 10/29/2021 0  % Final    ABS. NEUTROPHILS 10/29/2021 13.2* 1.8 - 8.0 K/UL Final    ABS. LYMPHOCYTES 10/29/2021 0.6* 0.8 - 3.5 K/UL Final    ABS. MONOCYTES 10/29/2021 1.2* 0.0 - 1.0 K/UL Final    ABS. EOSINOPHILS 10/29/2021 0.0  0.0 - 0.4 K/UL Final    ABS. BASOPHILS 10/29/2021 0.0  0.0 - 0.1 K/UL Final    ABS. IMM. GRANS. 10/29/2021 0.0  K/UL Final    DF 10/29/2021 MANUAL    Final    PLATELET COMMENTS 55/74/1777 Pathology Review Requested    Final    RBC COMMENTS 10/29/2021     Final                    Value:ANISOCYTOSIS  1+      RBC COMMENTS 10/29/2021     Final                    Value:MACROCYTOSIS  1+      Pathologist review 10/29/2021 Pathologic examination results can be viewed in [facility] Chart Review under the Pathology tab.     Final        Sodium 10/29/2021 136  136 - 145 mmol/L Final    Potassium 10/29/2021 3.7  3.5 - 5.1 mmol/L Final    Chloride 10/29/2021 105  97 - 108 mmol/L Final    CO2 10/29/2021 25  21 - 32 mmol/L Final    Anion gap 10/29/2021 6  5 - 15 mmol/L Final    Glucose 10/29/2021 115* 65 - 100 mg/dL Final    BUN 10/29/2021 16  6 - 20 MG/DL Final    Creatinine 10/29/2021 0.98  0.70 - 1.30 MG/DL Final    BUN/Creatinine ratio 10/29/2021 16  12 - 20   Final    GFR est AA 10/29/2021 >60  >60 ml/min/1.73m2 Final    GFR est non-AA 10/29/2021 >60  >60 ml/min/1.73m2 Final    Estimated GFR is calculated using the IDMS-traceable Modification of Diet in Renal Disease (MDRD) Study equation, reported for both  Americans (GFRAA) and non- Americans (GFRNA), and normalized to 1.73m2 body surface area. The physician must decide which value applies to the patient.  Calcium 10/29/2021 9.2  8.5 - 10.1 MG/DL Final    Bilirubin, total 10/29/2021 0.4  0.2 - 1.0 MG/DL Final    ALT (SGPT) 10/29/2021 18  12 - 78 U/L Final    AST (SGOT) 10/29/2021 10* 15 - 37 U/L Final    Alk.  phosphatase 10/29/2021 114  45 - 117 U/L Final    Protein, total 10/29/2021 6.8  6.4 - 8.2 g/dL Final    Albumin 10/29/2021 3.1* 3.5 - 5.0 g/dL Final    Globulin 10/29/2021 3.7  2.0 - 4.0 g/dL Final    A-G Ratio 10/29/2021 0.8* 1.1 - 2.2   Final   Hospital Outpatient Visit on 10/20/2021   Component Date Value Ref Range Status    WBC 10/20/2021 2.3* 4.1 - 11.1 K/uL Final    RBC 10/20/2021 2.22* 4.10 - 5.70 M/uL Final    HGB 10/20/2021 7.7* 12.1 - 17.0 g/dL Final    HCT 10/20/2021 23.1* 36.6 - 50.3 % Final    MCV 10/20/2021 104.1* 80.0 - 99.0 FL Final    MCH 10/20/2021 34.7* 26.0 - 34.0 PG Final    MCHC 10/20/2021 33.3  30.0 - 36.5 g/dL Final    RDW 10/20/2021 18.7* 11.5 - 14.5 % Final    PLATELET 36/61/7561 053  150 - 400 K/uL Final    MPV 10/20/2021 9.1  8.9 - 12.9 FL Final    NRBC 10/20/2021 0.0  0  WBC Final    ABSOLUTE NRBC 10/20/2021 0.00  0.00 - 0.01 K/uL Final    NEUTROPHILS 10/20/2021 70  32 - 75 % Final    LYMPHOCYTES 10/20/2021 16  12 - 49 % Final    MONOCYTES 10/20/2021 12  5 - 13 % Final    EOSINOPHILS 10/20/2021 1  0 - 7 % Final    BASOPHILS 10/20/2021 1  0 - 1 % Final    IMMATURE GRANULOCYTES 10/20/2021 0  0.0 - 0.5 % Final    ABS. NEUTROPHILS 10/20/2021 1.6* 1.8 - 8.0 K/UL Final    ABS. LYMPHOCYTES 10/20/2021 0.4* 0.8 - 3.5 K/UL Final    ABS. MONOCYTES 10/20/2021 0.3  0.0 - 1.0 K/UL Final    ABS. EOSINOPHILS 10/20/2021 0.0  0.0 - 0.4 K/UL Final    ABS. BASOPHILS 10/20/2021 0.0  0.0 - 0.1 K/UL Final    ABS. IMM. GRANS.  10/20/2021 0.0  0.00 - 0.04 K/UL Final    DF 10/20/2021 SMEAR SCANNED    Final    RBC COMMENTS 10/20/2021     Final                    Value:ANISOCYTOSIS  1+      RBC COMMENTS 10/20/2021     Final                    Value:MACROCYTOSIS  1+      Sodium 10/20/2021 139  136 - 145 mmol/L Final    Potassium 10/20/2021 4.3  3.5 - 5.1 mmol/L Final    Chloride 10/20/2021 109* 97 - 108 mmol/L Final    CO2 10/20/2021 22  21 - 32 mmol/L Final    Anion gap 10/20/2021 8  5 - 15 mmol/L Final    Glucose 10/20/2021 110* 65 - 100 mg/dL Final    BUN 10/20/2021 32* 6 - 20 MG/DL Final    Creatinine 10/20/2021 1.05  0.70 - 1.30 MG/DL Final    BUN/Creatinine ratio 10/20/2021 30* 12 - 20   Final    GFR est AA 10/20/2021 >60  >60 ml/min/1.73m2 Final    GFR est non-AA 10/20/2021 >60  >60 ml/min/1.73m2 Final    Calcium 10/20/2021 8.9  8.5 - 10.1 MG/DL Final    Magnesium 10/20/2021 1.6  1.6 - 2.4 mg/dL Final   Hospital Outpatient Visit on 10/13/2021   Component Date Value Ref Range Status    WBC 10/13/2021 10.8  4.1 - 11.1 K/uL Final    RBC 10/13/2021 2.39* 4.10 - 5.70 M/uL Final    HGB 10/13/2021 8.3* 12.1 - 17.0 g/dL Final    HCT 10/13/2021 25.4* 36.6 - 50.3 % Final    MCV 10/13/2021 106.3* 80.0 - 99.0 FL Final    MCH 10/13/2021 34.7* 26.0 - 34.0 PG Final    MCHC 10/13/2021 32.7  30.0 - 36.5 g/dL Final    RDW 10/13/2021 19.9* 11.5 - 14.5 % Final    PLATELET 06/47/8389 711  150 - 400 K/uL Final    MPV 10/13/2021 10.0  8.9 - 12.9 FL Final    NRBC 10/13/2021 0.5* 0  WBC Final    ABSOLUTE NRBC 10/13/2021 0.05* 0.00 - 0.01 K/uL Final    NEUTROPHILS 10/13/2021 81* 32 - 75 % Final    BAND NEUTROPHILS 10/13/2021 2  0 - 6 % Final    LYMPHOCYTES 10/13/2021 5* 12 - 49 % Final    MONOCYTES 10/13/2021 7  5 - 13 % Final    EOSINOPHILS 10/13/2021 1  0 - 7 % Final    BASOPHILS 10/13/2021 1  0 - 1 % Final    METAMYELOCYTES 10/13/2021 3* 0 % Final    IMMATURE GRANULOCYTES 10/13/2021 0  % Final    ABS. NEUTROPHILS 10/13/2021 9.0* 1.8 - 8.0 K/UL Final    ABS. LYMPHOCYTES 10/13/2021 0.5* 0.8 - 3.5 K/UL Final    ABS. MONOCYTES 10/13/2021 0.8  0.0 - 1.0 K/UL Final    ABS. EOSINOPHILS 10/13/2021 0.1  0.0 - 0.4 K/UL Final    ABS. BASOPHILS 10/13/2021 0.1  0.0 - 0.1 K/UL Final    ABS. IMM. GRANS. 10/13/2021 0.0  K/UL Final    DF 10/13/2021 MANUAL    Final    RBC COMMENTS 10/13/2021     Final                    Value:ANISOCYTOSIS  1+      RBC COMMENTS 10/13/2021     Final                    Value:MACROCYTOSIS  1+      Sodium 10/13/2021 139  136 - 145 mmol/L Final    Potassium 10/13/2021 4.2  3.5 - 5.1 mmol/L Final    Chloride 10/13/2021 106  97 - 108 mmol/L Final    CO2 10/13/2021 24  21 - 32 mmol/L Final    Anion gap 10/13/2021 9  5 - 15 mmol/L Final    Glucose 10/13/2021 119* 65 - 100 mg/dL Final    BUN 10/13/2021 16  6 - 20 MG/DL Final    Creatinine 10/13/2021 1.06  0.70 - 1.30 MG/DL Final    BUN/Creatinine ratio 10/13/2021 15  12 - 20   Final    GFR est AA 10/13/2021 >60  >60 ml/min/1.73m2 Final    GFR est non-AA 10/13/2021 >60  >60 ml/min/1.73m2 Final    Estimated GFR is calculated using the IDMS-traceable Modification of Diet in Renal Disease (MDRD) Study equation, reported for both  Americans (GFRAA) and non- Americans (GFRNA), and normalized to 1.73m2 body surface area. The physician must decide which value applies to the patient.  Calcium 10/13/2021 8.8  8.5 - 10.1 MG/DL Final    Bilirubin, total 10/13/2021 0.3  0.2 - 1.0 MG/DL Final    ALT (SGPT) 10/13/2021 20  12 - 78 U/L Final    AST (SGOT) 10/13/2021 16  15 - 37 U/L Final    Alk.  phosphatase 10/13/2021 99  45 - 117 U/L Final    Protein, total 10/13/2021 6.5  6.4 - 8.2 g/dL Final    Albumin 10/13/2021 3.4* 3.5 - 5.0 g/dL Final    Globulin 10/13/2021 3.1  2.0 - 4.0 g/dL Final    A-G Ratio 10/13/2021 1.1  1.1 - 2.2   Final    Magnesium 10/13/2021 2.1  1.6 - 2.4 mg/dL Final   Hospital Outpatient Visit on 09/29/2021   Component Date Value Ref Range Status    WBC 09/29/2021 1.9* 4.1 - 11.1 K/uL Final    RBC 09/29/2021 2.61* 4.10 - 5.70 M/uL Final    HGB 09/29/2021 8.6* 12.1 - 17.0 g/dL Final    HCT 09/29/2021 27.3* 36.6 - 50.3 % Final    MCV 09/29/2021 104.6* 80.0 - 99.0 FL Final    MCH 09/29/2021 33.0  26.0 - 34.0 PG Final    MCHC 09/29/2021 31.5  30.0 - 36.5 g/dL Final    RDW 09/29/2021 16.1* 11.5 - 14.5 % Final    PLATELET 59/99/9444 222  150 - 400 K/uL Final    MPV 09/29/2021 9.0  8.9 - 12.9 FL Final    NRBC 09/29/2021 0.0  0  WBC Final    ABSOLUTE NRBC 09/29/2021 0.00  0.00 - 0.01 K/uL Final    NEUTROPHILS 09/29/2021 58  32 - 75 % Final    LYMPHOCYTES 09/29/2021 23  12 - 49 % Final    MONOCYTES 09/29/2021 15* 5 - 13 % Final    EOSINOPHILS 09/29/2021 1  0 - 7 % Final    BASOPHILS 09/29/2021 2* 0 - 1 % Final    IMMATURE GRANULOCYTES 09/29/2021 1* 0.0 - 0.5 % Final    ABS. NEUTROPHILS 09/29/2021 1.2* 1.8 - 8.0 K/UL Final    ABS. LYMPHOCYTES 09/29/2021 0.4* 0.8 - 3.5 K/UL Final    ABS. MONOCYTES 09/29/2021 0.3  0.0 - 1.0 K/UL Final    ABS. EOSINOPHILS 09/29/2021 0.0  0.0 - 0.4 K/UL Final    ABS. BASOPHILS 09/29/2021 0.0  0.0 - 0.1 K/UL Final    ABS. IMM.  GRANS. 09/29/2021 0.0  0.00 - 0.04 K/UL Final    DF 09/29/2021 SMEAR SCANNED    Final    RBC COMMENTS 09/29/2021     Final                    Value:ANISOCYTOSIS  1+      RBC COMMENTS 09/29/2021     Final                    Value:MACROCYTOSIS  1+      Sodium 09/29/2021 135* 136 - 145 mmol/L Final    Potassium 09/29/2021 3.9  3.5 - 5.1 mmol/L Final    Chloride 09/29/2021 104  97 - 108 mmol/L Final    CO2 09/29/2021 23  21 - 32 mmol/L Final    Anion gap 09/29/2021 8  5 - 15 mmol/L Final    Glucose 09/29/2021 89  65 - 100 mg/dL Final    BUN 09/29/2021 24* 6 - 20 MG/DL Final    Creatinine 09/29/2021 0.92  0.70 - 1.30 MG/DL Final    BUN/Creatinine ratio 09/29/2021 26* 12 - 20   Final    GFR est AA 09/29/2021 >60  >60 ml/min/1.73m2 Final    GFR est non-AA 09/29/2021 >60  >60 ml/min/1.73m2 Final    Calcium 09/29/2021 9.0  8.5 - 10.1 MG/DL Final    Magnesium 09/29/2021 1.8  1.6 - 2.4 mg/dL Final   Hospital Outpatient Visit on 09/23/2021   Component Date Value Ref Range Status    Iron 09/23/2021 78  35 - 150 ug/dL Final    TIBC 09/23/2021 248* 250 - 450 ug/dL Final    Iron % saturation 09/23/2021 31  20 - 50 % Final    Ferritin 09/23/2021 626* 26 - 388 NG/ML Final        Results from Hospital Encounter encounter on 06/03/16    XR SPINE CERV 4 OR 5 V    Narrative  **Final Report**      ICD Codes / Adm. Diagnosis: 585.2  N18.2 /  Examination:  CR C SPINE MIN 4 VWS  - [id number] - Jay  3 2016  2:36PM  Accession No:  [id number]  Reason:  neck pain      REPORT:  EXAM: CR C SPINE MIN 4 VWS    INDICATION: Left-sided neck pain for 2 to 3 months. No history of injury. COMPARISON: None. FINDINGS: AP, lateral, swimmers lateral, bilateral oblique and open mouth  odontoid views of the cervical spine were obtained. The alignment is  remarkable for straightening. The vertebral body heights are  well-preserved. There is disc space narrowing with spondylitic change at  C4-5 C5-6 and C6-7. Marked narrowing is present between the anterior C1  arch and dens. Uncovertebral joint hypertrophy is more marked on the right  than the left, most pronounced at C4-5 and C5-6. There is no fracture or  subluxation. The prevertebral soft tissues are normal. The odontoid  process is intact and the C1-C2 relationship is normal.    Impression  :  Degenerative disc disease and osteoarthritis.           Signing/Reading Doctor: Yadiel Kilgore ([id number])  Approved: Yadiel Kilgore ([id number])  Jay  3 2016  4:15PM      Skin:     Denies open wounds, cuts, sores, rashes or other areas of concern in PAT assessment.          Yvonne Valdes, NP

## 2021-11-23 NOTE — PERIOP NOTES
FAXED PRE-ADMISSION TESTING REPORTS (AND FAX CONFIRMATION RECEIVED TO DR.WILLIAM SUAREZ'S OFFICE . CALLED ON 11/23/21  AT 1414  AND LEFT MESSAGE WITH ONE OF STAFF FOR NURSE TO RETURN MY CALL ,  RE: ABNORMAL LAB WORK RBC 2.77 (L), HEMOGLOBIN 9.7 (L), HEMATOCRIT 31.1 (L), RDW 18.6 (H). PTT WASN'T ABLE TO BE RUN, CALLED PATIENT BACK ON HIS CELL # TO REQUEST HE RETURN TO P.A.T. TO REDRAW PTT. LEFT VOICEMAIL MESSAGE. FAXED EKG FROM PARTNER MD TO SURGEONS OFFICE.

## 2021-11-29 ENCOUNTER — HOSPITAL ENCOUNTER (OUTPATIENT)
Dept: PREADMISSION TESTING | Age: 76
Discharge: HOME | End: 2021-11-29
Payer: MEDICARE

## 2021-11-29 LAB
APTT PPP: 26.8 SEC (ref 22.1–31)
THERAPEUTIC RANGE,PTTT: NORMAL SECS (ref 58–77)

## 2021-11-29 PROCEDURE — 85730 THROMBOPLASTIN TIME PARTIAL: CPT

## 2021-11-29 RX ORDER — HEPARIN 100 UNIT/ML
500 SYRINGE INTRAVENOUS AS NEEDED
Status: CANCELLED | OUTPATIENT
Start: 2021-12-02

## 2021-11-29 RX ORDER — SODIUM CHLORIDE 9 MG/ML
10 INJECTION INTRAMUSCULAR; INTRAVENOUS; SUBCUTANEOUS AS NEEDED
Status: CANCELLED | OUTPATIENT
Start: 2021-12-02

## 2021-11-29 RX ORDER — SODIUM CHLORIDE 0.9 % (FLUSH) 0.9 %
5-10 SYRINGE (ML) INJECTION AS NEEDED
Status: CANCELLED | OUTPATIENT
Start: 2021-12-02

## 2021-12-01 ENCOUNTER — HOSPITAL ENCOUNTER (OUTPATIENT)
Dept: PREADMISSION TESTING | Age: 76
Discharge: HOME | End: 2021-12-01
Attending: UROLOGY
Payer: MEDICARE

## 2021-12-01 DIAGNOSIS — Z01.812 PRE-PROCEDURE LAB EXAM: ICD-10-CM

## 2021-12-01 PROCEDURE — U0005 INFEC AGEN DETEC AMPLI PROBE: HCPCS

## 2021-12-02 ENCOUNTER — HOSPITAL ENCOUNTER (OUTPATIENT)
Dept: INFUSION THERAPY | Age: 76
Discharge: HOME | End: 2021-12-02
Payer: MEDICARE

## 2021-12-02 ENCOUNTER — HOSPITAL ENCOUNTER (OUTPATIENT)
Dept: INFUSION THERAPY | Age: 76
End: 2021-12-02

## 2021-12-02 VITALS
HEART RATE: 81 BPM | DIASTOLIC BLOOD PRESSURE: 78 MMHG | TEMPERATURE: 97.1 F | SYSTOLIC BLOOD PRESSURE: 126 MMHG | RESPIRATION RATE: 18 BRPM

## 2021-12-02 DIAGNOSIS — C67.9 MALIGNANT NEOPLASM OF URINARY BLADDER, UNSPECIFIED SITE (HCC): Primary | ICD-10-CM

## 2021-12-02 LAB
ALBUMIN SERPL-MCNC: 3.7 G/DL (ref 3.5–5)
ALBUMIN/GLOB SERPL: 0.9 {RATIO} (ref 1.1–2.2)
ALP SERPL-CCNC: 53 U/L (ref 45–117)
ALT SERPL-CCNC: 24 U/L (ref 12–78)
ANION GAP SERPL CALC-SCNC: 9 MMOL/L (ref 5–15)
AST SERPL-CCNC: 18 U/L (ref 15–37)
BILIRUB SERPL-MCNC: 0.3 MG/DL (ref 0.2–1)
BUN SERPL-MCNC: 25 MG/DL (ref 6–20)
BUN/CREAT SERPL: 28 (ref 12–20)
CALCIUM SERPL-MCNC: 9.3 MG/DL (ref 8.5–10.1)
CHLORIDE SERPL-SCNC: 106 MMOL/L (ref 97–108)
CO2 SERPL-SCNC: 23 MMOL/L (ref 21–32)
CREAT SERPL-MCNC: 0.9 MG/DL (ref 0.7–1.3)
GLOBULIN SER CALC-MCNC: 4.1 G/DL (ref 2–4)
GLUCOSE SERPL-MCNC: 82 MG/DL (ref 65–100)
POTASSIUM SERPL-SCNC: 3.9 MMOL/L (ref 3.5–5.1)
PROT SERPL-MCNC: 7.8 G/DL (ref 6.4–8.2)
SARS-COV-2, XPLCVT: NOT DETECTED
SODIUM SERPL-SCNC: 138 MMOL/L (ref 136–145)
SOURCE, COVRS: NORMAL

## 2021-12-02 PROCEDURE — 74011250636 HC RX REV CODE- 250/636: Performed by: REGISTERED NURSE

## 2021-12-02 PROCEDURE — 80053 COMPREHEN METABOLIC PANEL: CPT

## 2021-12-02 PROCEDURE — 85025 COMPLETE CBC W/AUTO DIFF WBC: CPT

## 2021-12-02 PROCEDURE — 36591 DRAW BLOOD OFF VENOUS DEVICE: CPT

## 2021-12-02 PROCEDURE — 77030012965 HC NDL HUBR BBMI -A

## 2021-12-02 RX ORDER — SODIUM CHLORIDE 9 MG/ML
10 INJECTION INTRAMUSCULAR; INTRAVENOUS; SUBCUTANEOUS AS NEEDED
Status: DISCONTINUED | OUTPATIENT
Start: 2021-12-02 | End: 2021-12-03 | Stop reason: HOSPADM

## 2021-12-02 RX ORDER — HEPARIN 100 UNIT/ML
500 SYRINGE INTRAVENOUS AS NEEDED
Status: DISCONTINUED | OUTPATIENT
Start: 2021-12-02 | End: 2021-12-03 | Stop reason: HOSPADM

## 2021-12-02 RX ORDER — SODIUM CHLORIDE 0.9 % (FLUSH) 0.9 %
5-10 SYRINGE (ML) INJECTION AS NEEDED
Status: DISCONTINUED | OUTPATIENT
Start: 2021-12-02 | End: 2021-12-03 | Stop reason: HOSPADM

## 2021-12-02 RX ADMIN — SODIUM CHLORIDE 10 ML: 9 INJECTION INTRAMUSCULAR; INTRAVENOUS; SUBCUTANEOUS at 18:01

## 2021-12-02 RX ADMIN — SODIUM CHLORIDE 10 ML: 9 INJECTION INTRAMUSCULAR; INTRAVENOUS; SUBCUTANEOUS at 18:00

## 2021-12-02 RX ADMIN — HEPARIN 500 UNITS: 100 SYRINGE at 18:01

## 2021-12-02 NOTE — PROGRESS NOTES
OPIC Short Note                  1800 Pt admit to [facility] for Port flush + Labs ambulatory in stable condition. Assessment completed. No new concerns voiced. Port accessed with positive blood return. Labs collected and sent for processing. Port flushed, heparinized and de-accessed per protocol. Patient Vitals for the past 12 hrs:   Temp Pulse Resp BP   12/02/21 1809 97.1 °F (36.2 °C) 81 18 126/78     Medications Administered     0.9% sodium chloride injection 10 mL     Admin Date  12/02/2021 Action  Given Dose  10 mL Route  IntraVENous Administered By  Ho Rondon RN           Admin Date  12/02/2021 Action  Given Dose  10 mL Route  IntraVENous Administered By  Ho Rondon RN          heparin (porcine) pf 500 Units     Admin Date  12/02/2021 Action  Given Dose  500 Units Route  IntraVENous Administered By  Ho Rondon RN              Mr. Devonte Wall was discharged from [place] [id number] in stable condition. Patient is aware of next appointment.      Future Appointments   Date Time Provider Gregorio Ibarra   1/6/2022  9:30 AM Nasim Rosario  [address] BS AMB   1/13/2022  1:00 PM G1 TD [facility]   2/24/2022  1:00 PM G1 TD [facility]   4/7/2022  1:00 PM G1 TD [address] RN  December 2, 2021

## 2021-12-03 ENCOUNTER — ANESTHESIA EVENT (OUTPATIENT)
Dept: SURGERY | Age: 76
DRG: 653 | End: 2021-12-03
Payer: MEDICARE

## 2021-12-03 LAB
BASOPHILS # BLD: 0 K/UL (ref 0–0.1)
BASOPHILS NFR BLD: 1 % (ref 0–1)
DIFFERENTIAL METHOD BLD: ABNORMAL
EOSINOPHIL # BLD: 0.2 K/UL (ref 0–0.4)
EOSINOPHIL NFR BLD: 5 % (ref 0–7)
ERYTHROCYTE [DISTWIDTH] IN BLOOD BY AUTOMATED COUNT: 15.8 % (ref 11.5–14.5)
HCT VFR BLD AUTO: 32.5 % (ref 36.6–50.3)
HGB BLD-MCNC: 10.2 G/DL (ref 12.1–17)
IMM GRANULOCYTES # BLD AUTO: 0 K/UL (ref 0–0.04)
IMM GRANULOCYTES NFR BLD AUTO: 0 % (ref 0–0.5)
LYMPHOCYTES # BLD: 0.9 K/UL (ref 0.8–3.5)
LYMPHOCYTES NFR BLD: 19 % (ref 12–49)
MCH RBC QN AUTO: 34.5 PG (ref 26–34)
MCHC RBC AUTO-ENTMCNC: 31.4 G/DL (ref 30–36.5)
MCV RBC AUTO: 109.8 FL (ref 80–99)
MONOCYTES # BLD: 0.6 K/UL (ref 0–1)
MONOCYTES NFR BLD: 13 % (ref 5–13)
NEUTS SEG # BLD: 3.1 K/UL (ref 1.8–8)
NEUTS SEG NFR BLD: 62 % (ref 32–75)
NRBC # BLD: 0 K/UL (ref 0–0.01)
NRBC BLD-RTO: 0 PER 100 WBC
PLATELET # BLD AUTO: 180 K/UL (ref 150–400)
PMV BLD AUTO: 9.1 FL (ref 8.9–12.9)
RBC # BLD AUTO: 2.96 M/UL (ref 4.1–5.7)
RBC MORPH BLD: ABNORMAL
RBC MORPH BLD: ABNORMAL
WBC # BLD AUTO: 4.8 K/UL (ref 4.1–11.1)

## 2021-12-06 ENCOUNTER — HOSPITAL ENCOUNTER (INPATIENT)
Age: 76
LOS: 15 days | Discharge: NURSING, REHAB OR LONG-TERM CARE | DRG: 653 | End: 2021-12-21
Attending: UROLOGY | Admitting: UROLOGY
Payer: MEDICARE

## 2021-12-06 ENCOUNTER — ANESTHESIA (OUTPATIENT)
Dept: SURGERY | Age: 76
DRG: 653 | End: 2021-12-06
Payer: MEDICARE

## 2021-12-06 ENCOUNTER — APPOINTMENT (OUTPATIENT)
Dept: GENERAL RADIOLOGY | Age: 76
DRG: 653 | End: 2021-12-06
Attending: UROLOGY
Payer: MEDICARE

## 2021-12-06 DIAGNOSIS — T81.31XA DEHISCENCE OF POSTOPERATIVE WOUND OF ABDOMEN: ICD-10-CM

## 2021-12-06 DIAGNOSIS — F51.01 INSOMNIA, IDIOPATHIC: Primary | ICD-10-CM

## 2021-12-06 LAB
ABO + RH BLD: NORMAL
ANION GAP SERPL CALC-SCNC: 8 MMOL/L (ref 5–15)
BLD PROD TYP BPU: NORMAL
BLD PROD TYP BPU: NORMAL
BLOOD GROUP ANTIBODIES SERPL: NORMAL
BPU ID: NORMAL
BPU ID: NORMAL
BUN SERPL-MCNC: 22 MG/DL (ref 6–20)
BUN/CREAT SERPL: 17 (ref 12–20)
CALCIUM SERPL-MCNC: 8.1 MG/DL (ref 8.5–10.1)
CHLORIDE SERPL-SCNC: 108 MMOL/L (ref 97–108)
CO2 SERPL-SCNC: 25 MMOL/L (ref 21–32)
CREAT SERPL-MCNC: 1.31 MG/DL (ref 0.7–1.3)
CROSSMATCH RESULT,%XM: NORMAL
CROSSMATCH RESULT,%XM: NORMAL
GLUCOSE SERPL-MCNC: 120 MG/DL (ref 65–100)
HCT VFR BLD AUTO: 27.9 % (ref 36.6–50.3)
HGB BLD-MCNC: 8.6 G/DL (ref 12.1–17)
HISTORY CHECKED?,CKHIST: NORMAL
POTASSIUM SERPL-SCNC: 4.1 MMOL/L (ref 3.5–5.1)
SODIUM SERPL-SCNC: 141 MMOL/L (ref 136–145)
SPECIMEN EXP DATE BLD: NORMAL
STATUS OF UNIT,%ST: NORMAL
STATUS OF UNIT,%ST: NORMAL
UNIT DIVISION, %UDIV: 0
UNIT DIVISION, %UDIV: 0

## 2021-12-06 PROCEDURE — 65270000032 HC RM SEMIPRIVATE

## 2021-12-06 PROCEDURE — 77030040504 HC DRN WND MDII -B: Performed by: UROLOGY

## 2021-12-06 PROCEDURE — 77030040361 HC SLV COMPR DVT MDII -B: Performed by: UROLOGY

## 2021-12-06 PROCEDURE — 77030019702 HC WRP THER MENM -C: Performed by: UROLOGY

## 2021-12-06 PROCEDURE — 0VT00ZZ RESECTION OF PROSTATE, OPEN APPROACH: ICD-10-PCS | Performed by: UROLOGY

## 2021-12-06 PROCEDURE — 76060000042 HC ANESTHESIA 5.5 TO 6 HR: Performed by: UROLOGY

## 2021-12-06 PROCEDURE — 77030026438 HC STYL ET INTUB CARD -A: Performed by: ANESTHESIOLOGY

## 2021-12-06 PROCEDURE — 86901 BLOOD TYPING SEROLOGIC RH(D): CPT

## 2021-12-06 PROCEDURE — 77030010544 HC BG URET DRNG BARD -A: Performed by: UROLOGY

## 2021-12-06 PROCEDURE — 77030008684 HC TU ET CUF COVD -B: Performed by: ANESTHESIOLOGY

## 2021-12-06 PROCEDURE — 77030040830 HC CATH URETH FOL MDII -A: Performed by: UROLOGY

## 2021-12-06 PROCEDURE — 77030009979 HC RELD STPLR TCR J&J -C: Performed by: UROLOGY

## 2021-12-06 PROCEDURE — 88331 PATH CONSLTJ SURG 1 BLK 1SPC: CPT

## 2021-12-06 PROCEDURE — C1769 GUIDE WIRE: HCPCS | Performed by: UROLOGY

## 2021-12-06 PROCEDURE — 0WQF0ZZ REPAIR ABDOMINAL WALL, OPEN APPROACH: ICD-10-PCS | Performed by: UROLOGY

## 2021-12-06 PROCEDURE — 74011250636 HC RX REV CODE- 250/636: Performed by: ANESTHESIOLOGY

## 2021-12-06 PROCEDURE — 77030031139 HC SUT VCRL2 J&J -A: Performed by: UROLOGY

## 2021-12-06 PROCEDURE — 74011000250 HC RX REV CODE- 250: Performed by: ANESTHESIOLOGY

## 2021-12-06 PROCEDURE — C2617 STENT, NON-COR, TEM W/O DEL: HCPCS | Performed by: UROLOGY

## 2021-12-06 PROCEDURE — 74011000250 HC RX REV CODE- 250: Performed by: NURSE ANESTHETIST, CERTIFIED REGISTERED

## 2021-12-06 PROCEDURE — 88307 TISSUE EXAM BY PATHOLOGIST: CPT

## 2021-12-06 PROCEDURE — 74011250636 HC RX REV CODE- 250/636: Performed by: UROLOGY

## 2021-12-06 PROCEDURE — 77030040506 HC DRN WND MDII -A: Performed by: UROLOGY

## 2021-12-06 PROCEDURE — 86923 COMPATIBILITY TEST ELECTRIC: CPT

## 2021-12-06 PROCEDURE — 07BC0ZX EXCISION OF PELVIS LYMPHATIC, OPEN APPROACH, DIAGNOSTIC: ICD-10-PCS | Performed by: UROLOGY

## 2021-12-06 PROCEDURE — 80048 BASIC METABOLIC PNL TOTAL CA: CPT

## 2021-12-06 PROCEDURE — 74011250636 HC RX REV CODE- 250/636: Performed by: NURSE ANESTHETIST, CERTIFIED REGISTERED

## 2021-12-06 PROCEDURE — 77030002916 HC SUT ETHLN J&J -A: Performed by: UROLOGY

## 2021-12-06 PROCEDURE — P9045 ALBUMIN (HUMAN), 5%, 250 ML: HCPCS | Performed by: NURSE ANESTHETIST, CERTIFIED REGISTERED

## 2021-12-06 PROCEDURE — 85018 HEMOGLOBIN: CPT

## 2021-12-06 PROCEDURE — 88309 TISSUE EXAM BY PATHOLOGIST: CPT

## 2021-12-06 PROCEDURE — 0TTB0ZZ RESECTION OF BLADDER, OPEN APPROACH: ICD-10-PCS | Performed by: UROLOGY

## 2021-12-06 PROCEDURE — 0TTD0ZZ RESECTION OF URETHRA, OPEN APPROACH: ICD-10-PCS | Performed by: UROLOGY

## 2021-12-06 PROCEDURE — 76010000177 HC OR TIME 5 TO 5.5 HR INTENSV-TIER 1: Performed by: UROLOGY

## 2021-12-06 PROCEDURE — 77030011264 HC ELECTRD BLD EXT COVD -A: Performed by: UROLOGY

## 2021-12-06 PROCEDURE — 77030019905 HC CATH URETH INTMIT MDII -A: Performed by: UROLOGY

## 2021-12-06 PROCEDURE — 77030008462 HC STPLR SKN PROX J&J -A: Performed by: UROLOGY

## 2021-12-06 PROCEDURE — 77030002888 HC SUT CHRMC J&J -A: Performed by: UROLOGY

## 2021-12-06 PROCEDURE — 74011000258 HC RX REV CODE- 258: Performed by: ANESTHESIOLOGY

## 2021-12-06 PROCEDURE — 88305 TISSUE EXAM BY PATHOLOGIST: CPT

## 2021-12-06 PROCEDURE — 77030040356 HC CORD BPLR FRCP COVD -A: Performed by: UROLOGY

## 2021-12-06 PROCEDURE — 77030042556 HC PNCL CAUT -B: Performed by: UROLOGY

## 2021-12-06 PROCEDURE — 71045 X-RAY EXAM CHEST 1 VIEW: CPT

## 2021-12-06 PROCEDURE — 2709999900 HC NON-CHARGEABLE SUPPLY: Performed by: UROLOGY

## 2021-12-06 PROCEDURE — 36415 COLL VENOUS BLD VENIPUNCTURE: CPT

## 2021-12-06 PROCEDURE — 74011250637 HC RX REV CODE- 250/637: Performed by: UROLOGY

## 2021-12-06 PROCEDURE — 77030002996 HC SUT SLK J&J -A: Performed by: UROLOGY

## 2021-12-06 PROCEDURE — 77030040503 HC DRN WND PENRS MDII -A: Performed by: UROLOGY

## 2021-12-06 PROCEDURE — 77030040831 HC BAG URINE DRNG MDII -A: Performed by: UROLOGY

## 2021-12-06 PROCEDURE — 77030011278 HC ELECTRD LIG IMPT COVD -F: Performed by: UROLOGY

## 2021-12-06 PROCEDURE — 76210000000 HC OR PH I REC 2 TO 2.5 HR: Performed by: UROLOGY

## 2021-12-06 PROCEDURE — 77030020061 HC IV BLD WRMR ADMIN SET 3M -B: Performed by: ANESTHESIOLOGY

## 2021-12-06 PROCEDURE — 0T1807C BYPASS BILATERAL URETERS TO ILEOCUTANEOUS WITH AUTOLOGOUS TISSUE SUBSTITUTE, OPEN APPROACH: ICD-10-PCS | Performed by: UROLOGY

## 2021-12-06 DEVICE — STENT URET 7FR L90CM SIL PTFE J SGL URIN DIV: Type: IMPLANTABLE DEVICE | Status: FUNCTIONAL

## 2021-12-06 RX ORDER — EPHEDRINE SULFATE/0.9% NACL/PF 50 MG/5 ML
SYRINGE (ML) INTRAVENOUS AS NEEDED
Status: DISCONTINUED | OUTPATIENT
Start: 2021-12-06 | End: 2021-12-06 | Stop reason: HOSPADM

## 2021-12-06 RX ORDER — LORAZEPAM 2 MG/ML
1 INJECTION INTRAMUSCULAR
Status: DISCONTINUED | OUTPATIENT
Start: 2021-12-06 | End: 2021-12-10 | Stop reason: ALTCHOICE

## 2021-12-06 RX ORDER — ONDANSETRON 2 MG/ML
4 INJECTION INTRAMUSCULAR; INTRAVENOUS
Status: CANCELLED | OUTPATIENT
Start: 2021-12-06

## 2021-12-06 RX ORDER — LIDOCAINE HYDROCHLORIDE AND EPINEPHRINE 15; 5 MG/ML; UG/ML
INJECTION, SOLUTION EPIDURAL
Status: COMPLETED | OUTPATIENT
Start: 2021-12-06 | End: 2021-12-06

## 2021-12-06 RX ORDER — MIDAZOLAM HYDROCHLORIDE 1 MG/ML
5 INJECTION, SOLUTION INTRAMUSCULAR; INTRAVENOUS ONCE
Status: COMPLETED | OUTPATIENT
Start: 2021-12-06 | End: 2021-12-06

## 2021-12-06 RX ORDER — FENTANYL CITRATE 50 UG/ML
INJECTION, SOLUTION INTRAMUSCULAR; INTRAVENOUS AS NEEDED
Status: DISCONTINUED | OUTPATIENT
Start: 2021-12-06 | End: 2021-12-06 | Stop reason: HOSPADM

## 2021-12-06 RX ORDER — NALOXONE HYDROCHLORIDE 0.4 MG/ML
0.4 INJECTION, SOLUTION INTRAMUSCULAR; INTRAVENOUS; SUBCUTANEOUS AS NEEDED
Status: ACTIVE | OUTPATIENT
Start: 2021-12-06 | End: 2021-12-09

## 2021-12-06 RX ORDER — BUPIVACAINE HYDROCHLORIDE 2.5 MG/ML
INJECTION, SOLUTION EPIDURAL; INFILTRATION; INTRACAUDAL AS NEEDED
Status: DISCONTINUED | OUTPATIENT
Start: 2021-12-06 | End: 2021-12-06 | Stop reason: HOSPADM

## 2021-12-06 RX ORDER — DIPHENHYDRAMINE HYDROCHLORIDE 50 MG/ML
12.5 INJECTION, SOLUTION INTRAMUSCULAR; INTRAVENOUS
Status: DISCONTINUED | OUTPATIENT
Start: 2021-12-06 | End: 2021-12-16

## 2021-12-06 RX ORDER — SODIUM CHLORIDE, SODIUM LACTATE, POTASSIUM CHLORIDE, CALCIUM CHLORIDE 600; 310; 30; 20 MG/100ML; MG/100ML; MG/100ML; MG/100ML
INJECTION, SOLUTION INTRAVENOUS
Status: DISCONTINUED | OUTPATIENT
Start: 2021-12-06 | End: 2021-12-06 | Stop reason: HOSPADM

## 2021-12-06 RX ORDER — FENTANYL CITRATE 50 UG/ML
50 INJECTION, SOLUTION INTRAMUSCULAR; INTRAVENOUS
Status: DISCONTINUED | OUTPATIENT
Start: 2021-12-06 | End: 2021-12-06 | Stop reason: HOSPADM

## 2021-12-06 RX ORDER — DEXMEDETOMIDINE HYDROCHLORIDE 100 UG/ML
INJECTION, SOLUTION INTRAVENOUS AS NEEDED
Status: DISCONTINUED | OUTPATIENT
Start: 2021-12-06 | End: 2021-12-06 | Stop reason: HOSPADM

## 2021-12-06 RX ORDER — DEXAMETHASONE SODIUM PHOSPHATE 4 MG/ML
INJECTION, SOLUTION INTRA-ARTICULAR; INTRALESIONAL; INTRAMUSCULAR; INTRAVENOUS; SOFT TISSUE AS NEEDED
Status: DISCONTINUED | OUTPATIENT
Start: 2021-12-06 | End: 2021-12-06 | Stop reason: HOSPADM

## 2021-12-06 RX ORDER — SODIUM CHLORIDE, SODIUM LACTATE, POTASSIUM CHLORIDE, CALCIUM CHLORIDE 600; 310; 30; 20 MG/100ML; MG/100ML; MG/100ML; MG/100ML
125 INJECTION, SOLUTION INTRAVENOUS CONTINUOUS
Status: DISCONTINUED | OUTPATIENT
Start: 2021-12-06 | End: 2021-12-06 | Stop reason: HOSPADM

## 2021-12-06 RX ORDER — SODIUM CHLORIDE, SODIUM LACTATE, POTASSIUM CHLORIDE, CALCIUM CHLORIDE 600; 310; 30; 20 MG/100ML; MG/100ML; MG/100ML; MG/100ML
75 INJECTION, SOLUTION INTRAVENOUS CONTINUOUS
Status: CANCELLED | OUTPATIENT
Start: 2021-12-06

## 2021-12-06 RX ORDER — SODIUM CHLORIDE 9 MG/ML
250 INJECTION, SOLUTION INTRAVENOUS AS NEEDED
Status: DISCONTINUED | OUTPATIENT
Start: 2021-12-06 | End: 2021-12-06 | Stop reason: HOSPADM

## 2021-12-06 RX ORDER — LORAZEPAM 2 MG/ML
1 INJECTION INTRAMUSCULAR
Status: CANCELLED | OUTPATIENT
Start: 2021-12-06

## 2021-12-06 RX ORDER — ENOXAPARIN SODIUM 100 MG/ML
40 INJECTION SUBCUTANEOUS EVERY 24 HOURS
Status: CANCELLED | OUTPATIENT
Start: 2021-12-06

## 2021-12-06 RX ORDER — PROPOFOL 10 MG/ML
INJECTION, EMULSION INTRAVENOUS AS NEEDED
Status: DISCONTINUED | OUTPATIENT
Start: 2021-12-06 | End: 2021-12-06 | Stop reason: HOSPADM

## 2021-12-06 RX ORDER — FENTANYL CITRATE 50 UG/ML
25 INJECTION, SOLUTION INTRAMUSCULAR; INTRAVENOUS
Status: DISCONTINUED | OUTPATIENT
Start: 2021-12-06 | End: 2021-12-06 | Stop reason: HOSPADM

## 2021-12-06 RX ORDER — PHENYLEPHRINE HCL IN 0.9% NACL 0.4MG/10ML
SYRINGE (ML) INTRAVENOUS
Status: DISCONTINUED | OUTPATIENT
Start: 2021-12-06 | End: 2021-12-06 | Stop reason: HOSPADM

## 2021-12-06 RX ORDER — SODIUM CHLORIDE 0.9 % (FLUSH) 0.9 %
5-40 SYRINGE (ML) INJECTION AS NEEDED
Status: CANCELLED | OUTPATIENT
Start: 2021-12-06

## 2021-12-06 RX ORDER — MIDAZOLAM HYDROCHLORIDE 1 MG/ML
1 INJECTION, SOLUTION INTRAMUSCULAR; INTRAVENOUS AS NEEDED
Status: COMPLETED | OUTPATIENT
Start: 2021-12-06 | End: 2021-12-06

## 2021-12-06 RX ORDER — CEFOXITIN 2 G/1
INJECTION, POWDER, FOR SOLUTION INTRAVENOUS AS NEEDED
Status: DISCONTINUED | OUTPATIENT
Start: 2021-12-06 | End: 2021-12-06 | Stop reason: HOSPADM

## 2021-12-06 RX ORDER — ONDANSETRON 2 MG/ML
4 INJECTION INTRAMUSCULAR; INTRAVENOUS AS NEEDED
Status: DISCONTINUED | OUTPATIENT
Start: 2021-12-06 | End: 2021-12-06 | Stop reason: HOSPADM

## 2021-12-06 RX ORDER — MIDAZOLAM HYDROCHLORIDE 1 MG/ML
INJECTION, SOLUTION INTRAMUSCULAR; INTRAVENOUS AS NEEDED
Status: DISCONTINUED | OUTPATIENT
Start: 2021-12-06 | End: 2021-12-06 | Stop reason: HOSPADM

## 2021-12-06 RX ORDER — CELECOXIB 200 MG/1
200 CAPSULE ORAL ONCE
Status: COMPLETED | OUTPATIENT
Start: 2021-12-06 | End: 2021-12-06

## 2021-12-06 RX ORDER — ACETAMINOPHEN 500 MG
1000 TABLET ORAL ONCE
Status: COMPLETED | OUTPATIENT
Start: 2021-12-06 | End: 2021-12-06

## 2021-12-06 RX ORDER — TRANEXAMIC ACID 100 MG/ML
INJECTION, SOLUTION INTRAVENOUS AS NEEDED
Status: DISCONTINUED | OUTPATIENT
Start: 2021-12-06 | End: 2021-12-06 | Stop reason: HOSPADM

## 2021-12-06 RX ORDER — ONDANSETRON 4 MG/1
4 TABLET, ORALLY DISINTEGRATING ORAL
Status: CANCELLED | OUTPATIENT
Start: 2021-12-06

## 2021-12-06 RX ORDER — ALBUMIN HUMAN 50 G/1000ML
SOLUTION INTRAVENOUS AS NEEDED
Status: DISCONTINUED | OUTPATIENT
Start: 2021-12-06 | End: 2021-12-06 | Stop reason: HOSPADM

## 2021-12-06 RX ORDER — SODIUM CHLORIDE 9 MG/ML
INJECTION, SOLUTION INTRAVENOUS
Status: DISCONTINUED | OUTPATIENT
Start: 2021-12-06 | End: 2021-12-06 | Stop reason: HOSPADM

## 2021-12-06 RX ORDER — GABAPENTIN 300 MG/1
300 CAPSULE ORAL 3 TIMES DAILY
Status: DISCONTINUED | OUTPATIENT
Start: 2021-12-06 | End: 2021-12-06 | Stop reason: HOSPADM

## 2021-12-06 RX ORDER — ROCURONIUM BROMIDE 10 MG/ML
INJECTION, SOLUTION INTRAVENOUS AS NEEDED
Status: DISCONTINUED | OUTPATIENT
Start: 2021-12-06 | End: 2021-12-06 | Stop reason: HOSPADM

## 2021-12-06 RX ORDER — ONDANSETRON 2 MG/ML
4 INJECTION INTRAMUSCULAR; INTRAVENOUS
Status: DISCONTINUED | OUTPATIENT
Start: 2021-12-06 | End: 2021-12-07

## 2021-12-06 RX ORDER — SUCCINYLCHOLINE CHLORIDE 20 MG/ML
INJECTION INTRAMUSCULAR; INTRAVENOUS AS NEEDED
Status: DISCONTINUED | OUTPATIENT
Start: 2021-12-06 | End: 2021-12-06 | Stop reason: HOSPADM

## 2021-12-06 RX ORDER — KETAMINE HYDROCHLORIDE 10 MG/ML
INJECTION, SOLUTION INTRAMUSCULAR; INTRAVENOUS AS NEEDED
Status: DISCONTINUED | OUTPATIENT
Start: 2021-12-06 | End: 2021-12-06 | Stop reason: HOSPADM

## 2021-12-06 RX ORDER — METRONIDAZOLE 500 MG/100ML
500 INJECTION, SOLUTION INTRAVENOUS ONCE
Status: DISCONTINUED | OUTPATIENT
Start: 2021-12-06 | End: 2021-12-06 | Stop reason: CLARIF

## 2021-12-06 RX ORDER — ACETAMINOPHEN 500 MG
1000 TABLET ORAL EVERY 6 HOURS
Status: CANCELLED | OUTPATIENT
Start: 2021-12-06

## 2021-12-06 RX ORDER — ONDANSETRON 2 MG/ML
INJECTION INTRAMUSCULAR; INTRAVENOUS AS NEEDED
Status: DISCONTINUED | OUTPATIENT
Start: 2021-12-06 | End: 2021-12-06 | Stop reason: HOSPADM

## 2021-12-06 RX ORDER — LIDOCAINE HYDROCHLORIDE 20 MG/ML
INJECTION, SOLUTION EPIDURAL; INFILTRATION; INTRACAUDAL; PERINEURAL AS NEEDED
Status: DISCONTINUED | OUTPATIENT
Start: 2021-12-06 | End: 2021-12-06 | Stop reason: HOSPADM

## 2021-12-06 RX ORDER — MAGNESIUM SULFATE HEPTAHYDRATE 40 MG/ML
INJECTION, SOLUTION INTRAVENOUS AS NEEDED
Status: DISCONTINUED | OUTPATIENT
Start: 2021-12-06 | End: 2021-12-06 | Stop reason: HOSPADM

## 2021-12-06 RX ORDER — SODIUM CHLORIDE 0.9 % (FLUSH) 0.9 %
5-40 SYRINGE (ML) INJECTION EVERY 8 HOURS
Status: CANCELLED | OUTPATIENT
Start: 2021-12-06

## 2021-12-06 RX ADMIN — CELECOXIB 200 MG: 200 CAPSULE ORAL at 10:21

## 2021-12-06 RX ADMIN — SODIUM CHLORIDE 1000 ML: 9 INJECTION, SOLUTION INTRAVENOUS at 20:06

## 2021-12-06 RX ADMIN — GABAPENTIN 300 MG: 300 CAPSULE ORAL at 10:21

## 2021-12-06 RX ADMIN — ROCURONIUM BROMIDE 5 MG: 10 SOLUTION INTRAVENOUS at 12:45

## 2021-12-06 RX ADMIN — Medication 10 MG: at 18:05

## 2021-12-06 RX ADMIN — DEXAMETHASONE SODIUM PHOSPHATE 4 MG: 4 INJECTION, SOLUTION INTRAMUSCULAR; INTRAVENOUS at 13:15

## 2021-12-06 RX ADMIN — FENTANYL CITRATE 100 MCG: 0.05 INJECTION, SOLUTION INTRAMUSCULAR; INTRAVENOUS at 11:32

## 2021-12-06 RX ADMIN — ROCURONIUM BROMIDE 45 MG: 10 SOLUTION INTRAVENOUS at 12:55

## 2021-12-06 RX ADMIN — TRANEXAMIC ACID 1 G: 100 INJECTION, SOLUTION INTRAVENOUS at 13:00

## 2021-12-06 RX ADMIN — ALBUMIN (HUMAN) 250 ML: 12.5 INJECTION, SOLUTION INTRAVENOUS at 13:28

## 2021-12-06 RX ADMIN — CEFOXITIN SODIUM 2 G: 2 POWDER, FOR SOLUTION INTRAVENOUS at 17:01

## 2021-12-06 RX ADMIN — BUPIVACAINE HYDROCHLORIDE 5 ML: 2.5 INJECTION, SOLUTION EPIDURAL; INFILTRATION; INTRACAUDAL; PERINEURAL at 13:15

## 2021-12-06 RX ADMIN — FENTANYL CITRATE 50 MCG: 50 INJECTION, SOLUTION INTRAMUSCULAR; INTRAVENOUS at 17:44

## 2021-12-06 RX ADMIN — Medication 5 MG: at 12:58

## 2021-12-06 RX ADMIN — MIDAZOLAM HYDROCHLORIDE 2 MG: 1 INJECTION, SOLUTION INTRAMUSCULAR; INTRAVENOUS at 11:33

## 2021-12-06 RX ADMIN — MIDAZOLAM 4 MG: 1 INJECTION INTRAMUSCULAR; INTRAVENOUS at 10:55

## 2021-12-06 RX ADMIN — ONDANSETRON HYDROCHLORIDE 4 MG: 2 INJECTION, SOLUTION INTRAMUSCULAR; INTRAVENOUS at 17:09

## 2021-12-06 RX ADMIN — Medication 3 ML: at 11:10

## 2021-12-06 RX ADMIN — HYDROMORPHONE HYDROCHLORIDE 4 ML: 2 INJECTION INTRAMUSCULAR; INTRAVENOUS; SUBCUTANEOUS at 13:55

## 2021-12-06 RX ADMIN — KETAMINE HYDROCHLORIDE 20 MG: 10 INJECTION, SOLUTION INTRAMUSCULAR; INTRAVENOUS at 13:50

## 2021-12-06 RX ADMIN — FENTANYL CITRATE 50 MCG: 50 INJECTION, SOLUTION INTRAMUSCULAR; INTRAVENOUS at 13:22

## 2021-12-06 RX ADMIN — MAGNESIUM SULFATE 2 G: 2 INJECTION INTRAVENOUS at 13:00

## 2021-12-06 RX ADMIN — ROCURONIUM BROMIDE 20 MG: 10 SOLUTION INTRAVENOUS at 15:16

## 2021-12-06 RX ADMIN — SUCCINYLCHOLINE CHLORIDE 160 MG: 20 INJECTION, SOLUTION INTRAMUSCULAR; INTRAVENOUS at 12:45

## 2021-12-06 RX ADMIN — Medication 20 MCG/MIN: at 12:58

## 2021-12-06 RX ADMIN — PROPOFOL 200 MG: 10 INJECTION, EMULSION INTRAVENOUS at 12:45

## 2021-12-06 RX ADMIN — SODIUM CHLORIDE: 900 INJECTION, SOLUTION INTRAVENOUS at 13:00

## 2021-12-06 RX ADMIN — MIDAZOLAM 2 MG: 1 INJECTION INTRAMUSCULAR; INTRAVENOUS at 12:30

## 2021-12-06 RX ADMIN — FENTANYL CITRATE 50 MCG: 50 INJECTION, SOLUTION INTRAMUSCULAR; INTRAVENOUS at 17:55

## 2021-12-06 RX ADMIN — CEFOXITIN SODIUM 2 G: 2 POWDER, FOR SOLUTION INTRAVENOUS at 13:00

## 2021-12-06 RX ADMIN — LIDOCAINE HYDROCHLORIDE 80 MG: 20 INJECTION, SOLUTION EPIDURAL; INFILTRATION; INTRACAUDAL; PERINEURAL at 12:45

## 2021-12-06 RX ADMIN — KETAMINE HYDROCHLORIDE 10 MG: 10 INJECTION, SOLUTION INTRAMUSCULAR; INTRAVENOUS at 14:59

## 2021-12-06 RX ADMIN — ROCURONIUM BROMIDE 10 MG: 10 SOLUTION INTRAVENOUS at 15:56

## 2021-12-06 RX ADMIN — SODIUM CHLORIDE, POTASSIUM CHLORIDE, SODIUM LACTATE AND CALCIUM CHLORIDE: 600; 310; 30; 20 INJECTION, SOLUTION INTRAVENOUS at 12:30

## 2021-12-06 RX ADMIN — SODIUM CHLORIDE, POTASSIUM CHLORIDE, SODIUM LACTATE AND CALCIUM CHLORIDE: 600; 310; 30; 20 INJECTION, SOLUTION INTRAVENOUS at 14:12

## 2021-12-06 RX ADMIN — DEXMEDETOMIDINE HYDROCHLORIDE 8 MCG: 100 INJECTION, SOLUTION, CONCENTRATE INTRAVENOUS at 15:35

## 2021-12-06 RX ADMIN — ROCURONIUM BROMIDE 30 MG: 10 SOLUTION INTRAVENOUS at 13:57

## 2021-12-06 RX ADMIN — MIDAZOLAM HYDROCHLORIDE 2 MG: 1 INJECTION, SOLUTION INTRAMUSCULAR; INTRAVENOUS at 11:15

## 2021-12-06 RX ADMIN — ROCURONIUM BROMIDE 20 MG: 10 SOLUTION INTRAVENOUS at 13:22

## 2021-12-06 RX ADMIN — ACETAMINOPHEN 1000 MG: 500 TABLET ORAL at 10:21

## 2021-12-06 RX ADMIN — KETAMINE HYDROCHLORIDE 10 MG: 10 INJECTION, SOLUTION INTRAMUSCULAR; INTRAVENOUS at 14:28

## 2021-12-06 RX ADMIN — FENTANYL CITRATE 50 MCG: 50 INJECTION, SOLUTION INTRAMUSCULAR; INTRAVENOUS at 12:45

## 2021-12-06 RX ADMIN — PROPOFOL 30 MG: 10 INJECTION, EMULSION INTRAVENOUS at 13:57

## 2021-12-06 RX ADMIN — SUGAMMADEX 200 MG: 100 INJECTION, SOLUTION INTRAVENOUS at 17:29

## 2021-12-06 RX ADMIN — PROPOFOL 30 MG: 10 INJECTION, EMULSION INTRAVENOUS at 17:44

## 2021-12-06 RX ADMIN — HYDROMORPHONE HYDROCHLORIDE 8 ML/HR: 2 INJECTION INTRAMUSCULAR; INTRAVENOUS; SUBCUTANEOUS at 13:54

## 2021-12-06 RX ADMIN — ROCURONIUM BROMIDE 10 MG: 10 SOLUTION INTRAVENOUS at 16:35

## 2021-12-06 RX ADMIN — ROCURONIUM BROMIDE 10 MG: 10 SOLUTION INTRAVENOUS at 15:34

## 2021-12-06 RX ADMIN — Medication 10 MG: at 18:01

## 2021-12-06 RX ADMIN — CEFOXITIN SODIUM 2 G: 2 POWDER, FOR SOLUTION INTRAVENOUS at 15:00

## 2021-12-06 RX ADMIN — ALBUMIN (HUMAN) 250 ML: 12.5 INJECTION, SOLUTION INTRAVENOUS at 18:05

## 2021-12-06 RX ADMIN — BUPIVACAINE HYDROCHLORIDE 3 ML: 2.5 INJECTION, SOLUTION EPIDURAL; INFILTRATION; INTRACAUDAL; PERINEURAL at 17:43

## 2021-12-06 RX ADMIN — KETAMINE HYDROCHLORIDE 10 MG: 10 INJECTION, SOLUTION INTRAMUSCULAR; INTRAVENOUS at 14:15

## 2021-12-06 RX ADMIN — NALOXEGOL OXALATE 25 MG: 25 TABLET, FILM COATED ORAL at 10:21

## 2021-12-06 RX ADMIN — ROCURONIUM BROMIDE 10 MG: 10 SOLUTION INTRAVENOUS at 16:32

## 2021-12-06 NOTE — ANESTHESIA PROCEDURE NOTES
Central Line Placement    Start time: 12/6/2021 11:30 AM  End time: 12/6/2021 11:43 AM  Performed by: Blair Fernández MD  Authorized by: Blair Fernández MD     Indications: vascular access  Preanesthetic Checklist: patient identified, risks and benefits discussed, anesthesia consent, site marked, patient being monitored and timeout performed    Timeout Time: 11:30 EST       Pre-procedure: All elements of maximal sterile barrier technique followed?  Yes    2% Chlorhexidine for cutaneous antisepsis, Hand hygiene performed prior to catheter insertion and Ultrasound guidance    Sterile Ultrasound Technique followed?: Yes            Procedure:   Prep:  ChloraPrep  Location: internal jugular  Orientation:  Left  Patient position:  Trendelenburg  Catheter type:  Quad lumen  Catheter size:  8.5 Fr  Catheter length:  16 cm  Number of attempts:  1  Successful placement: Yes      Assessment:   Post-procedure:  Catheter secured, sterile dressing applied and sterile dressing with CHG applied  Assessment:  Free fluid flow, blood return through all ports, placement verified by x-ray and guidewire removal verified  Insertion:  Uncomplicated  Patient tolerance:  Patient tolerated the procedure well with no immediate complications

## 2021-12-06 NOTE — ANESTHESIA PREPROCEDURE EVALUATION
Relevant Problems   No relevant active problems       Anesthetic History               Review of Systems / Medical History  Patient summary reviewed, nursing notes reviewed and pertinent labs reviewed    Pulmonary        Sleep apnea: CPAP           Neuro/Psych              Cardiovascular    Hypertension: well controlled              Exercise tolerance: >4 METS     GI/Hepatic/Renal                Endo/Other        Obesity, arthritis and cancer     Other Findings            Physical Exam    Airway  Mallampati: II  TM Distance: > 6 cm  Neck ROM: normal range of motion   Mouth opening: Normal     Cardiovascular  Regular rate and rhythm,  S1 and S2 normal,  no murmur, click, rub, or gallop             Dental  No notable dental hx       Pulmonary  Breath sounds clear to auscultation               Abdominal  GI exam deferred       Other Findings            Anesthetic Plan    ASA: 3  Anesthesia type: general    Monitoring Plan: CVP  Post-op pain plan if not by surgeon: indwelling epidural catheter    Induction: Intravenous  Anesthetic plan and risks discussed with: Patient

## 2021-12-06 NOTE — WOUND CARE
Stoma Marking Note:     Type of ostomy scheduled: Urostomy/Ileal Conduit by Dr. Stefano Ribeiro. Introduced self and services to patient. Patient able to verbalize knowledge of procedure consistent with consent. Stoma site marked with surgical marker in Right upper quadrant. Stoma site chosen is in the patients visual field and within the rectus muscle while avoiding the following: umbilicus, wrinkles, dips, skin folds, rib cage, iliac crest and belt/pants/underwear line. Site was assessed in the lying, sitting and standing positions. Abdominal topography:  Obese and rounded. Patient understands that the final location will be determined by the surgeon and the site is only a guideline. Pt concerns discussed: none    Plan to follow after surgery for teaching. Will likely need home health care for further teaching after discharge.     CELESTINE HuiN  [facility] Inpatient Wound Care  Available on Perfect Serve  Pager [phone]  Office [phone]

## 2021-12-06 NOTE — PERIOP NOTES
called wife Vaishali to inform that the case had started.      Called wife at 1500 to inform that the bladder was out, ok to leave message per surgeon      Called to check if wife had received voicemail per surgeon request

## 2021-12-06 NOTE — PERIOP NOTES
Computers went down as Dr. Karin Porras was putting in his orders. Per Dr. Karin Porras order the following:    H & H and BMP to be drawn in the PACU  LR @ 125 in the PACU & on floor   Ativan 1mg Q6 PRN alcohol withdrawal      Per Dr. Karin Porras only give 500ml bolus if patient needs it. 1919-advised wife of room number    2003-spoke with Dr. Karin Porras to give him an update on patient as requested. Patients BP is 97/63. Per Dr. Karin Porras give patient 1L bolus and watch for results of hemoglobin.

## 2021-12-06 NOTE — ANESTHESIA PROCEDURE NOTES
Epidural Block    Patient location during procedure: holding area  Start time: 12/6/2021 11:10 AM  End time: 12/6/2021 11:25 AM  Reason for block: procedure for pain, at surgeon's request and post-op pain management  Staffing  Performed: attending   Anesthesiologist: Lanny Santos MD  Preanesthetic Checklist  Completed: patient identified, IV checked, site marked, risks and benefits discussed, surgical consent, monitors and equipment checked, pre-op evaluation and timeout performed  Block Placement  Patient position: sitting  Prep: Betadine  Sterility prep: cap, drape, gloves and mask  Sedation level: light sedation  Patient monitoring: continuous pulse oximetry and heart rate  Approach: right paramedian  Location: thoracic  Thoracic location: T10-T11  Epidural  Loss of resistance technique: air  Guidance: landmark technique  Needle  Needle type: Tuohy   Needle gauge: 17 G  Needle length: 9 cm  Needle insertion depth: 7 cm  Catheter type: end hole  Catheter size: 19 G  Catheter at skin depth: 12 cm  Catheter securement method: clear occlusive dressing, liquid medical adhesive and surgical tape  Test dose: negative  Medications Administered  Lidocaine-EPINEPHrine (XYLOCAINE) 1.5 %-1:200,000 Epidural, 3 mL  Assessment  Block outcome: block to be assessed in the OR  Number of attempts: 1  Procedure assessment: patient tolerated procedure well with no immediate complications

## 2021-12-07 LAB
COMMENT, HOLDF: NORMAL
HGB BLD-MCNC: 7.9 G/DL (ref 12.1–17)
MAGNESIUM SERPL-MCNC: 1.9 MG/DL (ref 1.6–2.4)
PHOSPHATE SERPL-MCNC: 3.4 MG/DL (ref 2.6–4.7)
SAMPLES BEING HELD,HOLD: NORMAL

## 2021-12-07 PROCEDURE — 74011000250 HC RX REV CODE- 250: Performed by: NURSE PRACTITIONER

## 2021-12-07 PROCEDURE — 36415 COLL VENOUS BLD VENIPUNCTURE: CPT

## 2021-12-07 PROCEDURE — 74011000258 HC RX REV CODE- 258: Performed by: UROLOGY

## 2021-12-07 PROCEDURE — 65270000032 HC RM SEMIPRIVATE

## 2021-12-07 PROCEDURE — 77030040831 HC BAG URINE DRNG MDII -A

## 2021-12-07 PROCEDURE — 74011250636 HC RX REV CODE- 250/636: Performed by: NURSE PRACTITIONER

## 2021-12-07 PROCEDURE — 2709999900 HC NON-CHARGEABLE SUPPLY

## 2021-12-07 PROCEDURE — 74011250636 HC RX REV CODE- 250/636: Performed by: ANESTHESIOLOGY

## 2021-12-07 PROCEDURE — 74011000250 HC RX REV CODE- 250: Performed by: ANESTHESIOLOGY

## 2021-12-07 PROCEDURE — 74011250637 HC RX REV CODE- 250/637: Performed by: UROLOGY

## 2021-12-07 PROCEDURE — 85018 HEMOGLOBIN: CPT

## 2021-12-07 PROCEDURE — 74011250636 HC RX REV CODE- 250/636: Performed by: UROLOGY

## 2021-12-07 PROCEDURE — 83735 ASSAY OF MAGNESIUM: CPT

## 2021-12-07 PROCEDURE — 74011250637 HC RX REV CODE- 250/637: Performed by: NURSE PRACTITIONER

## 2021-12-07 PROCEDURE — 74011000258 HC RX REV CODE- 258: Performed by: ANESTHESIOLOGY

## 2021-12-07 PROCEDURE — 84100 ASSAY OF PHOSPHORUS: CPT

## 2021-12-07 RX ORDER — BUPROPION HYDROCHLORIDE 100 MG/1
100 TABLET, EXTENDED RELEASE ORAL DAILY
Status: DISCONTINUED | OUTPATIENT
Start: 2021-12-07 | End: 2021-12-21 | Stop reason: HOSPADM

## 2021-12-07 RX ORDER — ESCITALOPRAM OXALATE 10 MG/1
10 TABLET ORAL DAILY
Status: DISCONTINUED | OUTPATIENT
Start: 2021-12-07 | End: 2021-12-21 | Stop reason: HOSPADM

## 2021-12-07 RX ORDER — HEPARIN SODIUM 5000 [USP'U]/ML
5000 INJECTION, SOLUTION INTRAVENOUS; SUBCUTANEOUS EVERY 12 HOURS
Status: DISCONTINUED | OUTPATIENT
Start: 2021-12-07 | End: 2021-12-14

## 2021-12-07 RX ORDER — ONDANSETRON 2 MG/ML
4 INJECTION INTRAMUSCULAR; INTRAVENOUS
Status: DISCONTINUED | OUTPATIENT
Start: 2021-12-07 | End: 2021-12-21 | Stop reason: HOSPADM

## 2021-12-07 RX ORDER — BUSPIRONE HYDROCHLORIDE 5 MG/1
5 TABLET ORAL DAILY
Status: DISCONTINUED | OUTPATIENT
Start: 2021-12-07 | End: 2021-12-21 | Stop reason: HOSPADM

## 2021-12-07 RX ORDER — SODIUM CHLORIDE, SODIUM LACTATE, POTASSIUM CHLORIDE, CALCIUM CHLORIDE 600; 310; 30; 20 MG/100ML; MG/100ML; MG/100ML; MG/100ML
75 INJECTION, SOLUTION INTRAVENOUS CONTINUOUS
Status: DISCONTINUED | OUTPATIENT
Start: 2021-12-07 | End: 2021-12-17

## 2021-12-07 RX ORDER — ONDANSETRON 4 MG/1
4 TABLET, ORALLY DISINTEGRATING ORAL
Status: DISCONTINUED | OUTPATIENT
Start: 2021-12-07 | End: 2021-12-21 | Stop reason: HOSPADM

## 2021-12-07 RX ORDER — ACETAMINOPHEN 500 MG
1000 TABLET ORAL EVERY 6 HOURS
Status: DISCONTINUED | OUTPATIENT
Start: 2021-12-07 | End: 2021-12-11 | Stop reason: SDUPTHER

## 2021-12-07 RX ADMIN — CEFOXITIN SODIUM 2 G: 2 POWDER, FOR SOLUTION INTRAVENOUS at 05:54

## 2021-12-07 RX ADMIN — FAMOTIDINE 20 MG: 10 INJECTION, SOLUTION INTRAVENOUS at 21:38

## 2021-12-07 RX ADMIN — SODIUM CHLORIDE 500 ML: 9 INJECTION, SOLUTION INTRAVENOUS at 07:13

## 2021-12-07 RX ADMIN — HYDROMORPHONE HYDROCHLORIDE 8 ML/HR: 2 INJECTION INTRAMUSCULAR; INTRAVENOUS; SUBCUTANEOUS at 19:46

## 2021-12-07 RX ADMIN — SODIUM CHLORIDE 500 ML: 9 INJECTION, SOLUTION INTRAVENOUS at 05:54

## 2021-12-07 RX ADMIN — BUPROPION HYDROCHLORIDE 100 MG: 100 TABLET, EXTENDED RELEASE ORAL at 11:05

## 2021-12-07 RX ADMIN — ACETAMINOPHEN 1000 MG: 500 TABLET ORAL at 17:38

## 2021-12-07 RX ADMIN — ESCITALOPRAM OXALATE 10 MG: 10 TABLET ORAL at 11:05

## 2021-12-07 RX ADMIN — LORAZEPAM 1 MG: 2 INJECTION INTRAMUSCULAR; INTRAVENOUS at 11:14

## 2021-12-07 RX ADMIN — CEFOXITIN SODIUM 2 G: 2 POWDER, FOR SOLUTION INTRAVENOUS at 01:22

## 2021-12-07 RX ADMIN — HEPARIN SODIUM 5000 UNITS: 5000 INJECTION INTRAVENOUS; SUBCUTANEOUS at 11:05

## 2021-12-07 RX ADMIN — ACETAMINOPHEN 1000 MG: 500 TABLET ORAL at 21:37

## 2021-12-07 RX ADMIN — BUSPIRONE HYDROCHLORIDE 5 MG: 5 TABLET ORAL at 11:05

## 2021-12-07 RX ADMIN — SODIUM CHLORIDE 500 ML: 900 INJECTION, SOLUTION INTRAVENOUS at 09:19

## 2021-12-07 RX ADMIN — SODIUM CHLORIDE, POTASSIUM CHLORIDE, SODIUM LACTATE AND CALCIUM CHLORIDE 125 ML/HR: 600; 310; 30; 20 INJECTION, SOLUTION INTRAVENOUS at 08:54

## 2021-12-07 RX ADMIN — NALOXEGOL OXALATE 25 MG: 25 TABLET, FILM COATED ORAL at 09:09

## 2021-12-07 RX ADMIN — ACETAMINOPHEN 1000 MG: 500 TABLET ORAL at 09:06

## 2021-12-07 NOTE — PROGRESS NOTES
vss af.  Feels bloated. Pain 4/10 but epidural is working. Stoma dusky as expected but viable. Creat 1. 3. hgb 7.9. Plan use ativan. Avoid narcs. Decrease po.  oob w help.

## 2021-12-07 NOTE — PERIOP NOTES
TRANSFER - OUT REPORT:    Verbal report given to Syed Hoffmann RN(name) on Daniel Yost  being transferred to [address] [place] [address] for routine post - op       Report consisted of patients Situation, Background, Assessment and   Recommendations(SBAR). Time Pre op antibiotic given:1300,1500,1701  Anesthesia Stop time: 0848  Juarez Present on Transfer to floor:no  Order for Juarez on Chart:no  Discharge Prescriptions with Chart:no    Information from the following report(s) SBAR, OR Summary, Procedure Summary, Intake/Output, MAR, Recent Results and Cardiac Rhythm NSR was reviewed with the receiving nurse. Opportunity for questions and clarification was provided. Is the patient on 02? YES       L/Min 5      Is the patient on a monitor? NO    Is the nurse transporting with the patient? YES    Surgical Waiting Area notified of patient's transfer from PACU? YES      The following personal items collected during your admission accompanied patient upon transfer:   Dental Appliance: Dental Appliances: None  Vision: Visual Aid: Glasses, Other (comment) (Permanent Lenses in bilaeral eyes; Glasses Checked in by RN)  Hearing Aid:    Jewelry: Jewelry: None  Clothing: Clothing: Footwear, Pants, Shirt, Socks, Undergarments  Other Valuables:  Other Valuables: Eyeglasses  Valuables sent to safe:      Belongings transferred to the floor with patient

## 2021-12-07 NOTE — PROGRESS NOTES
Epidural Follow-up Note    1 Day Post-Op sp Procedure(s):  RADICAL CYSTECTOMY, URETHRECTOMY, PROSTATECTOMY, BILATERAL LYMPHADENECTOMY,  ILEAL CONDUIT DIVERSION (E R A S). Visit Vitals  BP (!) 92/56 (BP 1 Location: Left upper arm, BP Patient Position: At rest;Supine)   Pulse 92   Temp 37.4 °C (99.3 °F)   Resp 16   Ht 5' 11\" (1.803 m)   Wt 108.6 kg (239 lb 6.4 oz)   SpO2 92%   BMI 33.39 kg/m²   . Pain is well controlled with epidural infusion. Epidural site is clean, dry and intact. No LE weakness or numbness.     Plan: Continue epidural at current setting for 72 hrs total.    Saumya Suarez DO  12/7/2021  7:58 AM

## 2021-12-07 NOTE — PROGRESS NOTES
RUR: 10% Low     YEHUDA: Patient requesting discharge to [facility] (p: [phone]). Likely BLS transport. Follow-up with PCP/specialist.     Primary Contact: Wife, Ivory Stratton, [phone]    * Will need 2nd IM letter prior to discharge. Care Management Interventions  PCP Verified by CM: Yes (Dr. Sandoval Cobb, last seen Sept. 2021)  Mode of Transport at Discharge: BLS  Transition of Care Consult (CM Consult): SNF  Support Systems: Spouse/Significant Other  Confirm Follow Up Transport: Family  The Plan for Transition of Care is Related to the Following Treatment Goals : SNF  The Patient and/or Patient Representative was Provided with a Choice of Provider and Agrees with the Discharge Plan?: Yes  Name of the Patient Representative Who was Provided with a Choice of Provider and Agrees with the Discharge Plan: patient  Freedom of Choice List was Provided with Basic Dialogue that Supports the Patient's Individualized Plan of Care/Goals, Treatment Preferences and Shares the Quality Data Associated with the Providers?: Yes  Discharge Location  Discharge Placement:  (Per patient, SNF preference )    Reason for Admission:  Bladder cancer                   RUR Score:   10% Low              Plan for utilizing home health:  Patient requesting SNF         PCP: First and Last name:  Ariel Stoddard MD     Name of Practice:    Are you a current patient: Yes/No: Yes   Approximate date of last visit: Sept. 2021   Can you participate in a virtual visit with your PCP: Yes                    Current Advanced Directive/Advance Care Plan: Full Code    Healthcare Decision Maker:   Click here to complete [address] including selection of the Healthcare Decision Maker Relationship (ie \"Primary\")                  Transition of Care Plan:  Patient requesting SNF    CM met with patient at bedside to introduce self and explain role. Patient lives with his wife in a 1st floor condo with 15 steps to enter.  Patient was independent with ADL's and IADL's, Patient was mostly independent with ambulation; using a cane when in the community at times. Patient has a CPAP machine at home. CM verified patient's demographics, PCP and insurance. Preferred pharmacy is [facility] on [facility] with no barriers obtaining needed prescriptions. Per patient, he would like to discharge to [facility]. Patient expressed he and his wife spoke with contact (unable to recall name) at facility and they are aware of his desire to discharge to SNF for skilled nursing services. CM to continue to follow as needed.     Jaxson Stinson, MSW   [phone]

## 2021-12-07 NOTE — OP NOTES
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 PROCEDURES PERFORMED:  Radical cystoprostatectomy, urethrectomy, bilateral pelvic lymphadenectomy, repair of umbilical hernia, and ileal conduit urinary diversion. SURGEON:  Giancarlo Pineda MD    ASSISTANT:  Cortes Tran SA    ANESTHESIA:  General.    COMPLICATIONS:  None. SPECIMENS REMOVED:  Prostate, bladder, ureters, urethra, lymph nodes. IMPLANTS:  None. ESTIMATED BLOOD LOSS:  700 mL. PROCEDURE:  After anesthesia, the patient was prepped and draped in the lithotomy position. A midline incision was made from the umbilicus down. The umbilical hernia was excised and the fascia was cleaned on the superior side of the hernia for closure later. The abdomen was explored. The liver was smooth and soft. There were no masses. There was no mass palpable in the bladder. The bladder appeared mobile. The patient had enlarged pelvic lymph nodes but he is known to have sarcoidosis and some stable adenopathy related to that. The peritoneum around the bladder was scored. The spermatic cord was retracted. The vas was clipped and divided on both sides. The right and left colon were mobilized medially. The ureters were traced to the level of the bladder where they were clipped and divided and sent for frozen section. Both frozen sections were clear. The lateral pedicles were then taken down using the LigaSure device. The endopelvic fascia was cleared and opened on both sides. The dorsal venous bundle was suture ligated, oversewn, and divided.   The apex of the prostate was carefully freed and dissected and a space was created between the prostate and the rectum. Once the bladder, prostate, and urethra were completely free within the pelvis, a midline incision was made in the perineum and the urethra was dissected distally to the fossa navicularis where it was divided. The penis was then wrapped with sterile dressing and a mild compression dressing. The urethra was then dissected proximally and connected up with the prostate and then the entire specimen could be passed en bloc into the pelvis and then passed off the field. Hemostasis was obtained with interrupted 3-0 chromic sutures in the perineum and the perineum was closed in 2 layers with 3-0 and 2-0 chromic over a Penrose drain. A sterile dressing was applied. The pelvic lymphadenectomy was then done from above and the limits included iliac arteries laterally, the obturator structures inferiorly, the femoral canal distally, and the hypogastric proximally. Hemostasis was obtained and lymphatics were controlled with hemoclips. The obturator nerve was protected. The left ureter was dissected proximally and then dissected under the sigmoid colon into the right lower quadrant. An 18-cm segment of terminal ileum was isolated. The bowel was reconnected with a AMANUEL stapling device and the trap was closed with chromic suture. The ureters were connected using a spatulated end-to-side running anastomosis with 4-0 double-armed Monocryl over 7-[place] stent. The connections were then tested under water pressure and there was no leak and there was obvious reflux into both ureters. The ileal conduit was then brought through a circular incision in the right mid abdomen. Multiple silk sutures were placed internally around the stoma to prevent parastomal hernia. 2-0 chromic sutures were used at the level of the fascia and a rosebud stoma was created with interrupted 4-0 Vicryl. A drain was brought out on the right side through an old inguinal incision and sutured.   The abdomen was irrigated and closed with a running #1 PDS. Skin edges were closed with skin staples. Sponge and needle counts were correct. Operating time was 4 hours and 10 minutes. Estimated blood loss 700 mL.         Lloyd Encarnacion MD      [id number]  D:  12/06/2021 17:55  T:  12/07/2021 2:33  JOB #:  [id number]  CC:  [address] [place] [address]

## 2021-12-07 NOTE — WOUND CARE
WOCN Ostomy Progress Note:     First postoperative visit. Surgery: Radical cystoprostatectomy, urethrectomy, bilateral pelvic lymphadenectomy, repair of umbilical hernia and ileal conduit urinary diversion. Date of Surgery: 12.6.21      Surgeon: Dr. John Glover  Stoma Location: right quadrant  Patient sitting up in chair. Ostomy Assessment:  Stoma type: Ileal conduit  Stoma appearance: red and moist  Stents present  Output: yellow with mucous  Current appliance: surgical pouch    Teaching/Subjects covered today:  Encouraged pt to review handout given to patient/family and ask questions regarding material on next ostomy nurse visit. Recommendations:  1. Empty appliance when 1/3 full and PRN. Encourage patient/family to notify nurse and  assist w/ pouch emptying to promote self-care. Change appliance twice weekly and PRN for leaking ASAP. DO NOT REINFORCE LEAKS to avoid skin breakdown. Transition of Care:  Ostomy nurse to return tomorrow for pouch change and ostomy education.     CELESTINE EstesN RN [facility] Inpatient Wound Care  Available on Perfect Serve  Pager [phone]  Office [phone]

## 2021-12-07 NOTE — PROGRESS NOTES
Spiritual Care Partner Volunteer visited patient in  [id number] on 12/07/2021. Documented by:  Chaplain Wheat MDiv, [address] [place] ([id number])

## 2021-12-07 NOTE — ANESTHESIA POSTPROCEDURE EVALUATION
Post-Anesthesia Evaluation and Assessment    Patient: Erasto Josue MRN: [id number]  SSN: [id number]    YOB: 1945  Age: 77 y.o. Sex: male      I have evaluated the patient and they are stable and ready for discharge from the PACU. Obstructive Sleep Apnea education discussed including taking post-op analgesics as prescribed, awareness of how opioid analgesics      affect sleep apnea, having a caregiver stay with you for 24 hrs., sleep with your CPAP machine in use, and follow up with a      physician who can perform sleep studies. Pt understands and agrees. Cardiovascular Function/Vital Signs  Visit Vitals  BP (!) 94/59   Pulse 85   Temp 37.1 °C (98.8 °F)   Resp 20   Ht 5' 11\" (1.803 m)   Wt 108.6 kg (239 lb 6.4 oz)   SpO2 97%   BMI 33.39 kg/m²       Patient is status post General anesthesia for Procedure(s):  RADICAL CYSTECTOMY, URETHRECTOMY, PROSTATECTOMY, BILATERAL LYMPHADENECTOMY,  ILEAL CONDUIT DIVERSION (E R A S). Nausea/Vomiting: None    Postoperative hydration reviewed and adequate. Pain:  Pain Scale 1: Numeric (0 - 10) (12/07/21 0506)  Pain Intensity 1: 4 (12/07/21 0506)   Managed    Neurological Status:   Neuro (WDL): Exceptions to WDL (12/06/21 1911)  Neuro  Neurologic State: Alert (12/07/21 0919)  LUE Motor Response: Purposeful (12/06/21 2141)  RUE Motor Response: Purposeful (12/06/21 2141)   At baseline    Mental Status, Level of Consciousness: Alert and  oriented to person, place, and time    Pulmonary Status:   O2 Device: Nasal cannula (12/06/21 2141)   Adequate oxygenation and airway patent    Complications related to anesthesia: None    Post-anesthesia assessment completed. No concerns    Signed By: Zach Flores MD     December 7, 2021              Procedure(s):  RADICAL CYSTECTOMY, URETHRECTOMY, PROSTATECTOMY, BILATERAL LYMPHADENECTOMY,  ILEAL CONDUIT DIVERSION (E R A S).     general    <BSHSIANPOST>    INITIAL Post-op Vital signs:   Vitals Value Taken Time   BP 101/65 12/06/21 2015   Temp 36.4 °C (97.5 °F) 12/06/21 1911   Pulse 78 12/06/21 2025   Resp 18 12/06/21 2025   SpO2 99 % 12/06/21 2025   Vitals shown include unvalidated device data.

## 2021-12-08 LAB
ANION GAP SERPL CALC-SCNC: 4 MMOL/L (ref 5–15)
BUN SERPL-MCNC: 21 MG/DL (ref 6–20)
BUN/CREAT SERPL: 18 (ref 12–20)
CALCIUM SERPL-MCNC: 8.2 MG/DL (ref 8.5–10.1)
CHLORIDE SERPL-SCNC: 111 MMOL/L (ref 97–108)
CO2 SERPL-SCNC: 23 MMOL/L (ref 21–32)
CREAT SERPL-MCNC: 1.18 MG/DL (ref 0.7–1.3)
GLUCOSE SERPL-MCNC: 98 MG/DL (ref 65–100)
HCT VFR BLD AUTO: 25.3 % (ref 36.6–50.3)
HGB BLD-MCNC: 7.8 G/DL (ref 12.1–17)
POTASSIUM SERPL-SCNC: 3.9 MMOL/L (ref 3.5–5.1)
SODIUM SERPL-SCNC: 138 MMOL/L (ref 136–145)

## 2021-12-08 PROCEDURE — 65270000029 HC RM PRIVATE

## 2021-12-08 PROCEDURE — 36415 COLL VENOUS BLD VENIPUNCTURE: CPT

## 2021-12-08 PROCEDURE — 74011250636 HC RX REV CODE- 250/636: Performed by: NURSE PRACTITIONER

## 2021-12-08 PROCEDURE — 74011250637 HC RX REV CODE- 250/637: Performed by: UROLOGY

## 2021-12-08 PROCEDURE — 80048 BASIC METABOLIC PNL TOTAL CA: CPT

## 2021-12-08 PROCEDURE — 77030040162

## 2021-12-08 PROCEDURE — 85018 HEMOGLOBIN: CPT

## 2021-12-08 PROCEDURE — 74011000250 HC RX REV CODE- 250: Performed by: NURSE PRACTITIONER

## 2021-12-08 PROCEDURE — 74011250637 HC RX REV CODE- 250/637: Performed by: NURSE PRACTITIONER

## 2021-12-08 PROCEDURE — 74011250636 HC RX REV CODE- 250/636: Performed by: UROLOGY

## 2021-12-08 RX ORDER — GABAPENTIN 100 MG/1
200 CAPSULE ORAL
Status: DISCONTINUED | OUTPATIENT
Start: 2021-12-08 | End: 2021-12-21 | Stop reason: HOSPADM

## 2021-12-08 RX ORDER — TRAMADOL HYDROCHLORIDE 50 MG/1
50 TABLET ORAL
Status: DISCONTINUED | OUTPATIENT
Start: 2021-12-08 | End: 2021-12-21 | Stop reason: HOSPADM

## 2021-12-08 RX ORDER — KETOROLAC TROMETHAMINE 30 MG/ML
15 INJECTION, SOLUTION INTRAMUSCULAR; INTRAVENOUS
Status: DISPENSED | OUTPATIENT
Start: 2021-12-08 | End: 2021-12-13

## 2021-12-08 RX ORDER — CELECOXIB 100 MG/1
100 CAPSULE ORAL
Status: DISCONTINUED | OUTPATIENT
Start: 2021-12-08 | End: 2021-12-21 | Stop reason: HOSPADM

## 2021-12-08 RX ADMIN — KETOROLAC TROMETHAMINE 15 MG: 30 INJECTION, SOLUTION INTRAMUSCULAR; INTRAVENOUS at 10:18

## 2021-12-08 RX ADMIN — KETOROLAC TROMETHAMINE 15 MG: 30 INJECTION, SOLUTION INTRAMUSCULAR; INTRAVENOUS at 15:29

## 2021-12-08 RX ADMIN — FAMOTIDINE 20 MG: 10 INJECTION, SOLUTION INTRAVENOUS at 21:52

## 2021-12-08 RX ADMIN — ESCITALOPRAM OXALATE 10 MG: 10 TABLET ORAL at 10:17

## 2021-12-08 RX ADMIN — BUSPIRONE HYDROCHLORIDE 5 MG: 5 TABLET ORAL at 10:20

## 2021-12-08 RX ADMIN — ACETAMINOPHEN 1000 MG: 500 TABLET ORAL at 10:17

## 2021-12-08 RX ADMIN — ACETAMINOPHEN 1000 MG: 500 TABLET ORAL at 21:52

## 2021-12-08 RX ADMIN — BUPROPION HYDROCHLORIDE 100 MG: 100 TABLET, EXTENDED RELEASE ORAL at 10:17

## 2021-12-08 RX ADMIN — NALOXEGOL OXALATE 25 MG: 25 TABLET, FILM COATED ORAL at 10:17

## 2021-12-08 RX ADMIN — HEPARIN SODIUM 5000 UNITS: 5000 INJECTION INTRAVENOUS; SUBCUTANEOUS at 12:07

## 2021-12-08 RX ADMIN — KETOROLAC TROMETHAMINE 15 MG: 30 INJECTION, SOLUTION INTRAMUSCULAR; INTRAVENOUS at 21:52

## 2021-12-08 RX ADMIN — ACETAMINOPHEN 1000 MG: 500 TABLET ORAL at 03:23

## 2021-12-08 RX ADMIN — FAMOTIDINE 20 MG: 10 INJECTION, SOLUTION INTRAVENOUS at 10:18

## 2021-12-08 NOTE — PROGRESS NOTES
RUR: 10% Low      YEHUDA: Patient requesting discharge to [facility] (p: [phone]). Likely BLS transport. Follow-up with PCP/specialist.      Primary Contact: Wife, Lucía Terry, [phone]     * Will need 2nd IM letter prior to discharge. 2:00pm - CM spoke with Angela Harden, [facility] [place] Liaison (p: [phone]). Referral sent via 5o9; awaiting response. Per previous conversation, patient expressed he has spoken with MD regarding request for SNF once medically stable for discharge. Per interdisciplinary rounds, OT and PT to see patient today, Wed. 12/8. Transition of Care Plan:     The Plan for Transition of Care is related to the following treatment goals: SNF    The Patient  was provided with a choice of provider and agrees  with the discharge plan. Yes [x] No []    A Freedom of choice list was provided with basic dialogue that supports the patient's individualized plan of care/goals and shares the quality data associated with the providers. Yes [x] No []    CM to continue to follow as needed.     Onur Brothers, MARINA   [phone]

## 2021-12-08 NOTE — PROGRESS NOTES
Bedside shift change report given to Alexus Olivera (oncoming nurse) by Edita Flores RN (offgoing nurse). Report included the following information SBAR, Kardex, Intake/Output, MAR and Recent Results.

## 2021-12-08 NOTE — WOUND CARE
DANAY Ostomy Progress Note:      Second postoperative visit. Surgery: Radical cystoprostatectomy, urethrectomy, bilateral pelvic lymphadenectomy, repair of umbilical hernia and ileal conduit urinary diversion. Date of Surgery: 12.6.21      Surgeon: Dr. Bob Linares  Stoma Location: right quadrant  Patient resting in bed with wife at bedside for ostomy education.     Ostomy Assessment:  Stoma type: Ileal conduit  Stoma appearance: red and moist  Stents present  Output: yellow with mucous  Current appliance: surgical pouch    Treatments:  Pouch removed, stoma and peristomal skin cleaned and assessed, new pouch prepared and applied.     Teaching/Subjects covered today:  Encouraged pt to review handout given to patient/family and ask questions regarding material on next ostomy nurse visit. - General anatomy and expectations of output  - Normal & abnormal stoma characteristics  - Normal & abnormal peristomal characteristics & changes over time  - When/how to clean stoma & peristomal skin  - When/how to empty pouch  - Crusting technique; shaving around stoma;   - When/how to change appliance  - When to notify nurse/physician  - Where/how to obtain supplies  - Manipulated/practiced with clean pouch and clip  - Reviewed ADL's  - S&S of urinary tract infection & encouraged fluid intake  - Night-time drainage to bedside bag    Recommendations:  1. Empty appliance when 1/3 full and PRN. Encourage patient/family to notify nurse and  assist w/ pouch emptying to promote self-care. Change appliance twice weekly and PRN for leaking ASAP.  DO NOT REINFORCE LEAKS to avoid skin breakdown.     Transition of Care:  Ostomy nurse to return for reinforcement of ostomy education.     GISELE Seaman RN [facility] Inpatient Wound Care  Available on Perfect Serve  Pager [phone]  Office [phone]

## 2021-12-08 NOTE — PROGRESS NOTES
Problem: Falls - Risk of  Goal: *Absence of Falls  Description: Document Bard Josephumber Fall Risk and appropriate interventions in the flowsheet.   Outcome: Progressing Towards Goal  Note: Fall Risk Interventions:            Medication Interventions: Bed/chair exit alarm, Patient to call before getting OOB    Elimination Interventions: Bed/chair exit alarm, Call light in reach    History of Falls Interventions: Room close to nurse's station         Problem: Patient Education: Go to Patient Education Activity  Goal: Patient/Family Education  Outcome: Progressing Towards Goal

## 2021-12-08 NOTE — PROGRESS NOTES
Epidural Follow-up Note    2 Days Post-Op sp Procedure(s):  RADICAL CYSTECTOMY, URETHRECTOMY, PROSTATECTOMY, BILATERAL LYMPHADENECTOMY,  ILEAL CONDUIT DIVERSION (E R A S). Visit Vitals  /75   Pulse 89   Temp 36.7 °C (98.1 °F)   Resp 16   Ht 5' 11\" (1.803 m)   Wt 108.6 kg (239 lb 6.4 oz)   SpO2 99%   BMI 33.39 kg/m²   . Pain is well controlled with epidural infusion. Epidural was removed accidentally when patient went to use the restroom. Tip was intact. Will proceed with IV/PO pain regimen.     Baron Amari DO  12/8/2021  10:10 AM

## 2021-12-09 LAB
ANION GAP SERPL CALC-SCNC: 8 MMOL/L (ref 5–15)
BUN SERPL-MCNC: 31 MG/DL (ref 6–20)
BUN/CREAT SERPL: 23 (ref 12–20)
CALCIUM SERPL-MCNC: 8.7 MG/DL (ref 8.5–10.1)
CHLORIDE SERPL-SCNC: 110 MMOL/L (ref 97–108)
CO2 SERPL-SCNC: 22 MMOL/L (ref 21–32)
CREAT FLD-MCNC: 1.23 MG/DL
CREAT SERPL-MCNC: 1.32 MG/DL (ref 0.7–1.3)
GLUCOSE SERPL-MCNC: 99 MG/DL (ref 65–100)
HCT VFR BLD AUTO: 24.4 % (ref 36.6–50.3)
HGB BLD-MCNC: 7.7 G/DL (ref 12.1–17)
POTASSIUM SERPL-SCNC: 3.9 MMOL/L (ref 3.5–5.1)
SODIUM SERPL-SCNC: 140 MMOL/L (ref 136–145)
SPECIMEN SOURCE FLD: NORMAL

## 2021-12-09 PROCEDURE — 97165 OT EVAL LOW COMPLEX 30 MIN: CPT

## 2021-12-09 PROCEDURE — 85018 HEMOGLOBIN: CPT

## 2021-12-09 PROCEDURE — 74011250636 HC RX REV CODE- 250/636: Performed by: NURSE PRACTITIONER

## 2021-12-09 PROCEDURE — 74011250637 HC RX REV CODE- 250/637: Performed by: NURSE PRACTITIONER

## 2021-12-09 PROCEDURE — 82570 ASSAY OF URINE CREATININE: CPT

## 2021-12-09 PROCEDURE — 97116 GAIT TRAINING THERAPY: CPT | Performed by: PHYSICAL THERAPIST

## 2021-12-09 PROCEDURE — 74011250637 HC RX REV CODE- 250/637: Performed by: UROLOGY

## 2021-12-09 PROCEDURE — 65270000029 HC RM PRIVATE

## 2021-12-09 PROCEDURE — 74011250636 HC RX REV CODE- 250/636: Performed by: UROLOGY

## 2021-12-09 PROCEDURE — 97535 SELF CARE MNGMENT TRAINING: CPT

## 2021-12-09 PROCEDURE — 74011000250 HC RX REV CODE- 250: Performed by: NURSE PRACTITIONER

## 2021-12-09 PROCEDURE — 97161 PT EVAL LOW COMPLEX 20 MIN: CPT | Performed by: PHYSICAL THERAPIST

## 2021-12-09 PROCEDURE — 36415 COLL VENOUS BLD VENIPUNCTURE: CPT

## 2021-12-09 PROCEDURE — 80048 BASIC METABOLIC PNL TOTAL CA: CPT

## 2021-12-09 RX ADMIN — ACETAMINOPHEN 1000 MG: 500 TABLET ORAL at 15:05

## 2021-12-09 RX ADMIN — ACETAMINOPHEN 1000 MG: 500 TABLET ORAL at 08:59

## 2021-12-09 RX ADMIN — BUPROPION HYDROCHLORIDE 100 MG: 100 TABLET, EXTENDED RELEASE ORAL at 08:59

## 2021-12-09 RX ADMIN — HEPARIN SODIUM 5000 UNITS: 5000 INJECTION INTRAVENOUS; SUBCUTANEOUS at 16:22

## 2021-12-09 RX ADMIN — KETOROLAC TROMETHAMINE 15 MG: 30 INJECTION, SOLUTION INTRAMUSCULAR; INTRAVENOUS at 09:35

## 2021-12-09 RX ADMIN — FAMOTIDINE 20 MG: 10 INJECTION, SOLUTION INTRAVENOUS at 09:35

## 2021-12-09 RX ADMIN — ACETAMINOPHEN 1000 MG: 500 TABLET ORAL at 04:01

## 2021-12-09 RX ADMIN — NALOXEGOL OXALATE 25 MG: 25 TABLET, FILM COATED ORAL at 08:59

## 2021-12-09 RX ADMIN — BUSPIRONE HYDROCHLORIDE 5 MG: 5 TABLET ORAL at 08:59

## 2021-12-09 RX ADMIN — FAMOTIDINE 20 MG: 10 INJECTION, SOLUTION INTRAVENOUS at 20:48

## 2021-12-09 RX ADMIN — ESCITALOPRAM OXALATE 10 MG: 10 TABLET ORAL at 08:59

## 2021-12-09 RX ADMIN — HEPARIN SODIUM 5000 UNITS: 5000 INJECTION INTRAVENOUS; SUBCUTANEOUS at 04:01

## 2021-12-09 NOTE — PROGRESS NOTES
Problem: Self Care Deficits Care Plan (Adult)  Goal: *Acute Goals and Plan of Care (Insert Text)  Description: FUNCTIONAL STATUS PRIOR TO ADMISSION: Patient basic and instrumental ADLs increased time. Chemo last 4 months in which increased weakness, decreased participation in instrumental ADLs, increase use of RW. Very social with his [address] and [address] friends. Well rehearsed in rehab s/p three TKR. All items raised height in the house to prevent deep bending. HOME SUPPORT: The patient lived with wife. DME Used/Available at Home: [employer], straight, Commode, bedside, Grab bars, Shower chair, Walker, rolling    Occupational Therapy Goals  Initiated 12/9/2021  1. Patient will perform bathing with minimal assistance/contact guard assist within 7 day(s). 2.  Patient will perform lower body dressing with RW and AE minimal assistance/contact guard assist within 7 day(s). 3.  Patient will perform gathering ADL items high and low 4/4 with RW with min assistance within 7 day(s). 4.  Patient will participate in upper extremity therapeutic exercise/activities with light resistance supervision/set-up  within 7 day(s). 5.  Patient will utilize energy conservation techniques during functional activities with verbal cues within 7 day(s).     Outcome: Progressing Towards Goal   OCCUPATIONAL THERAPY EVALUATION  Patient: Guilherme Anton (22 y.o. male)  Date: 12/9/2021  Primary Diagnosis: Malignant neoplasm of urinary bladder, unspecified site ([facility] [address] [C67.9]  Bladder cancer ([facility] [address] [C67.9]  Procedure(s) (LRB):  RADICAL CYSTECTOMY, URETHRECTOMY, PROSTATECTOMY, BILATERAL LYMPHADENECTOMY,  ILEAL CONDUIT DIVERSION (E R A S) (N/A) 3 Days Post-Op   Precautions:   Fall    ASSESSMENT  Based on the objective data described below, the patient presents with ADL independence impaired by standing tolerance, dynamic standing balance requiring RW training, functional reach, pain management, drain management, overall activity tolerance and fall risk. Current Level of Function Impacting Discharge (ADLs/self-care): setup upper body ADLs; moderate assistance lower body ADLs; sit to stand chair with supervision RW    Functional Outcome Measure: The patient scored Total: 45/100 on the Barthel Index outcome measure which is indicative of being partially dependent in basic self-care. Other factors to consider for discharge: wife     Patient will benefit from skilled therapy intervention to address the above noted impairments. PLAN :  Recommendations and Planned Interventions: self care training, functional mobility training, therapeutic exercise, balance training, therapeutic activities, endurance activities, patient education, home safety training, and family training/education    Frequency/Duration: Patient will be followed by occupational therapy 3 times a week to address goals. Recommendation for discharge: (in order for the patient to meet his/her long term goals)  Therapy up to 5 days/week in SNF setting to address medical management, increase independence with ADLs prior to returning back home 2* highly motivated to be as independent as possible, and is working towards tolerating 3 hours of therapy. If discharges home will need HHOT, total assistance with physically demanding instrumental ADLs, and physical assistance lower body ADLs. This discharge recommendation:  Has been made in collaboration with the attending provider and/or case management    IF patient discharges home will need the following DME: AE: long handled bathing and AE: long handled dressing       SUBJECTIVE:   Patient stated It definitely hurts but I know I have to do it.     OBJECTIVE DATA SUMMARY:   HISTORY:   Past Medical History:   Diagnosis Date    Arrhythmia     LBBB    Arthritis     Asthma     MILD    Cancer ([facility] [address]     scc top of head and nose    Cancer ([facility] 2015    BLADDER    COVID-19 vaccine series completed     Daniel Shaffer 46. 1-28-21 AND 2-25-21    Depression with anxiety     Hypertension     Other unknown and unspecified cause of morbidity or mortality     history of pulmonary sarcoid     Posterior cervical adenopathy, benign 11/19/2018    Pulmonary sarcoidosis ([address]     Unspecified sleep apnea     cpap     Past Surgical History:   Procedure Laterality Date    HX CATARACT REMOVAL Bilateral     HX HERNIA REPAIR Right AS A CHILD    INGUINAL    HX HERNIA REPAIR Left 1990    INGUIBAL    HX KNEE REPLACEMENT Left 2008    HX ORTHOPAEDIC Right     BONE SPURS REMOVED FROM FOOT    HX OTHER SURGICAL      scc removed top of head and nose    HX OTHER SURGICAL  2005    benign lump removed from thyroid    HX OTHER SURGICAL Right 2020    SKIN CANCER RELMOVED FROM LEG    HX UROLOGICAL  06/2020    CYSTO    IR INSERT TUNL CVC W PORT OVER 5 YEARS  7/16/2021    [place] CARDIAC SURG PROCEDURE UNLIST  01/22/2021    CARDIAC CATH    [place] REVISE KNEE JOINT REPLACE,ALL PARTS Left 2019       Expanded or extensive additional review of patient history:     Home Situation  Home Environment: Private residence  # Steps to Enter: 0  Rails to Enter: No  One/Two Story Residence: Two story, live on 1st floor  Living Alone: No  Support Systems: Spouse/Significant Other  Patient Expects to be Discharged to[de-identified] Rehabilitation facility  Current DME Used/Available at Home: [address] [facility], straight, Commode, bedside, Grab bars, Shower chair, Walker, rolling  Tub or Shower Type: Shower    Hand dominance: Right    EXAMINATION OF PERFORMANCE DEFICITS:  Cognitive/Behavioral Status:  Neurologic State: Alert  Orientation Level: Oriented X4                Skin: intact Milo Khat drain, PIV    Edema: intact    Hearing:       Vision/Perceptual:                                     Range of Motion:    AROM: Generally decreased, functional                         Strength:    Strength: Generally decreased, functional                Coordination:                Tone & Sensation: Balance:  Sitting: Intact  Standing: Impaired  Standing - Static: Constant support; Good  Standing - Dynamic : Fair    Functional Mobility and Transfers for ADLs:      Transfers: chair, increased time to edge and then standing with noted pain, RW  Sit to Stand: Contact guard assistance  Stand to Sit: Contact guard assistance    ADL Assessment:   Feeding: independent     Grooming: setup on beside tray and sitting, stated brushed teeth at sink today     Bathing: report assistance knees-feet and back side      Upper body dressing: infer setup from ROM     Lower body dressing: mod assistance functional reach to knees sitting and standing, RW good standing balance for mock knees to over bottom; unable to tailor sit     Toileting: reports min assistance RW for BM and increased pain during functional reach for BM hygiene; macario cath for urine      Patient stating understands pacing, asked how different from knee surgery. I          ADL Intervention and task modifications:     nstruction provided throughout on energy conservation, pacing, how to progress independence safely back to independence. Therapeutic Exercise:  Patient instructed and demonstrated standing ~2 mins during shoulder flexion 10 reps with supervision. Functional Measure:    Barthel Index:  Bathin  Bladder: 0  Bowels: 0  Groomin  Dressin  Feeding: 10  Mobility: 10  Stairs: 0  Toilet Use: 5  Transfer (Bed to Chair and Back): 10  Total: 45/100      The Barthel ADL Index: Guidelines  1. The index should be used as a record of what a patient does, not as a record of what a patient could do. 2. The main aim is to establish degree of independence from any help, physical or verbal, however minor and for whatever reason. 3. The need for supervision renders the patient not independent. 4. A patient's performance should be established using the best available evidence.  Asking the patient, friends/relatives and nurses are the usual sources, but direct observation and common sense are also important. However direct testing is not needed. 5. Usually the patient's performance over the preceding 24-48 hours is important, but occasionally longer periods will be relevant. 6. Middle categories imply that the patient supplies over 50 per cent of the effort. 7. Use of aids to be independent is allowed. Score Interpretation (from [address])    Independent   60-79 Minimally independent   40-59 Partially dependent   20-39 Very dependent   <20 Totally dependent     -Dmitriy Diaz., Barthel, D.W. (1965). Functional evaluation: the Barthel Index. [address] ([address] (1997). The Barthel activities of daily living index: self-reporting versus actual performance in the old (> or = 75 years). Journal of [address] [place] [address] 45(7), [facility], HEIKE, She Prasad, Marlin Draper. (1999). Measuring the change in disability after inpatient rehabilitation; comparison of the responsiveness of the Barthel Index and Functional Dunbar Measure. Journal of Neurology, Neurosurgery, and Psychiatry, 66(4), 019-797. [place], [place], BOGDAN Valentino, & Tavia Jones M.A. (2004) Assessment of post-stroke quality of life in cost-effectiveness studies: The usefulness of the Barthel Index and the EuroQoL-5D. Quality of Life Research, 13, 427-43     Pain Rating:  abdomen    Activity Tolerance:   Good    After treatment patient left in no apparent distress:    Sitting in chair and Call bell within reach    COMMUNICATION/EDUCATION:   The patients plan of care was discussed with: Registered nurse and Case management. Home safety education was provided and the patient/caregiver indicated understanding., Patient/family have participated as able in goal setting and plan of care. , and Patient/family agree to work toward stated goals and plan of care.     This patients plan of care is appropriate for delegation to FRANCISCO.     Thank you for this referral.  Doyle Matthews  Time Calculation: 27 mins

## 2021-12-09 NOTE — PROGRESS NOTES
vss af abd soft. Dressing changed. Stoma pink. Passing flatus.  hgb stable. Creat up some. Plan stop toridol. Walk more. teddy creat.

## 2021-12-09 NOTE — PROGRESS NOTES
RUR: 10% Low      YEHUDA: SNF. Referral sent to [facility] (p: [phone]) via [employer] and accepted. BLS transport will need to be arranged. Follow-up with PCP/specialist.      Primary Contact: Wife, Pamela Ashley, [phone]     * Will need 2nd IM letter prior to discharge.      10:00am - [facility] [phone][facility] to follow-up on referral. CM requested call back as soon as possible for discuss potential admission. 12:30pm - CM received call back from [facility]ian Nav[facility]. CM faxed clinical information for review to [phone]. Per [facility]'s DON will review referral today and provide a decision by the end of today, 12/9.    2:45pm - [facility] once medically stable. CM updated patient. CM to continue to follow as needed.     MARINA Mendoza   [phone]

## 2021-12-09 NOTE — PROGRESS NOTES
Orders received, chart reviewed and patient evaluated by occupational therapy. Pending progression with skilled acute occupational therapy, recommend:  Therapy up to 5 days/week in SNF setting    Recommend with nursing ADLs with supervision/setup, OOB to chair 3x/day and toileting via functional mobility to and from bathroom 1 assist and walker. Thank you for completing as able in order to maintain patient strength, endurance and independence. Full evaluation to follow.

## 2021-12-09 NOTE — WOUND CARE
WOCN Ostomy Progress Note:      Third postoperative visit. Surgery: Radical cystoprostatectomy, urethrectomy, bilateral pelvic lymphadenectomy, repair of umbilical hernia and ileal conduit urinary diversion. Date of Surgery: 12.6. 21      Surgeon: Dr. Jing Irwin quadrant  Patient resting in bed with wife at bedside for ostomy education.     Ostomy Assessment:  Stoma type: Ileal conduit  Stoma appearance: red and moist  Stents present  Output: yellow with mucous  Current appliance: surgical pouch with ring      Teaching/Subjects covered today:  Encouraged pt to review handout given to patient/family and ask questions regarding material on next ostomy nurse visit.     - When/how to clean stoma & peristomal skin  - When/how to empty pouch  - When/how to change appliance  - Where/how to obtain supplies  - Night-time drainage to bedside bag     Recommendations:  1. Empty appliance when 1/3 full and PRN. Encourage patient/family to notify nurse and  assist w/ pouch emptying to promote self-care. Change appliance twice weekly and PRN for leaking ASAP.  DO NOT REINFORCE LEAKS to avoid skin breakdown.     Transition of Care:  Ostomy nurse to return tomorrow for pouch change.     CELESTINE SuarezN RN [facility]  Available on Perfect Serve  Pager [phone]  Office [phone]

## 2021-12-09 NOTE — ROUTINE PROCESS
Bedside shift change report given to Malgorzata RN (oncoming nurse) by Alok Ryder RN (offgoing nurse). Report included the following information SBAR and Kardex.

## 2021-12-09 NOTE — PROGRESS NOTES
Problem: Mobility Impaired (Adult and Pediatric)  Goal: *Acute Goals and Plan of Care (Insert Text)  Description: FUNCTIONAL STATUS PRIOR TO ADMISSION: Patient was independent and active without use of DME.    HOME SUPPORT PRIOR TO ADMISSION: The patient lived with wife but did not require assist.    Physical Therapy Goals  Initiated 12/9/2021  1. Patient will move from supine to sit and sit to supine  in bed with modified independence within 7 day(s). 2.  Patient will transfer from bed to chair and chair to bed with modified independence using the least restrictive device within 7 day(s). 3.  Patient will perform sit to stand with modified independence within 7 day(s). 4.  Patient will ambulate with modified independence for 300 feet with the least restrictive device within 7 day(s). Outcome: Progressing Towards Goal   PHYSICAL THERAPY EVALUATION  Patient: Susan Woody (89 y.o. male)  Date: 12/9/2021  Primary Diagnosis: Malignant neoplasm of urinary bladder, unspecified site ([facility] [address] [C67.9]  Bladder cancer ([facility] [address] [C67.9]  Procedure(s) (LRB):  RADICAL CYSTECTOMY, URETHRECTOMY, PROSTATECTOMY, BILATERAL LYMPHADENECTOMY,  ILEAL CONDUIT DIVERSION (E R A S) (N/A) 3 Days Post-Op   Precautions:   Fall    ASSESSMENT  Based on the objective data described below, the patient presents with decreased functional mobility from baseline level of function. Patient currently limited by pain, gait instability, impaired balance and decreased activity tolerance. Currently needing Wendy to come to EOB and CGA for transfers. Able to amb approx 150 feet with RW and CGA with no overt LOB but is unsteady with turns and impulsive at times. Needing cues for pacing and safe maneuvering in tight spaces. Patient is hopeful to DC to SNF [facility]) to progress to more independent level of function.     Other factors to consider for discharge: at risk for falls, below baseline     Patient will benefit from skilled therapy intervention to address the above noted impairments. PLAN :  Recommendations and Planned Interventions: bed mobility training, transfer training, gait training, therapeutic exercises, neuromuscular re-education, patient and family training/education, and therapeutic activities      Frequency/Duration: Patient will be followed by physical therapy:  5 times a week to address goals. Recommendation for discharge: (in order for the patient to meet his/her long term goals)  Therapy up to 5 days/week in SNF setting      IF patient discharges home will need the following DME: patient owns DME required for discharge         SUBJECTIVE:   Patient stated I wish I could get rid of all this stuff (IV Pole etc).     OBJECTIVE DATA SUMMARY:   HISTORY:    Past Medical History:   Diagnosis Date    Arrhythmia     LBBB    Arthritis     Asthma     MILD    Cancer ([facility] [address]     scc top of head and nose    Cancer ([facility] [address] 2015    BLADDER    COVID-19 vaccine series completed     [facility] VACCINE 1-28-21 AND 2-25-21    Depression with anxiety     Hypertension     Other unknown and unspecified cause of morbidity or mortality     history of pulmonary sarcoid     Posterior cervical adenopathy, benign 11/19/2018    Pulmonary sarcoidosis ([facility] [address]     Unspecified sleep apnea     cpap     Past Surgical History:   Procedure Laterality Date    HX CATARACT REMOVAL Bilateral     HX HERNIA REPAIR Right AS A CHILD    INGUINAL    HX HERNIA REPAIR Left 1990    INGUIBAL    HX KNEE REPLACEMENT Left 2008    HX ORTHOPAEDIC Right     BONE SPURS REMOVED FROM FOOT    HX OTHER SURGICAL      scc removed top of head and nose    HX OTHER SURGICAL  2005    benign lump removed from thyroid    HX OTHER SURGICAL Right 2020    SKIN CANCER RELMOVED FROM LEG    HX UROLOGICAL  06/2020    CYSTO    IR INSERT TUNL CVC W PORT OVER 5 YEARS  7/16/2021    [place] CARDIAC SURG PROCEDURE UNLIST  01/22/2021    CARDIAC CATH    [place] REVISE KNEE JOINT REPLACE,ALL PARTS Left 2019 Personal factors and/or comorbidities impacting plan of care:     Home Situation  Home Environment: Private residence  # Steps to Enter: 0  Rails to Enter: No  One/Two Story Residence: Two story, live on 1st floor  Living Alone: No  Support Systems: Spouse/Significant Other  Patient Expects to be Discharged to[de-identified] Rehabilitation facility  Current DME Used/Available at Home: [employer], straight, Commode, bedside, Grab bars, Shower chair, Walker, rolling  Tub or Shower Type: Shower    EXAMINATION/PRESENTATION/DECISION MAKING:   Critical Behavior:  Neurologic State: Alert  Orientation Level: Oriented X4          Range Of Motion:  AROM: Generally decreased, functional                       Strength:    Strength: Generally decreased, functional       Functional Mobility:  Bed Mobility:  Rolling: Supervision  Supine to Sit: Minimum assistance     Scooting: Supervision  Transfers:  Sit to Stand: Contact guard assistance  Stand to Sit: Contact guard assistance                       Balance:   Sitting: Intact  Standing: Impaired  Standing - Static: Constant support; Good  Standing - Dynamic : Fair  Ambulation/Gait Training:  Distance (ft): 150 Feet (ft)  Assistive Device: Gait belt; Walker, rolling  Ambulation - Level of Assistance: Contact guard assistance     Gait Description (WDL): Exceptions to WDL  Gait Abnormalities: Decreased step clearance; Shuffling gait        Base of Support: Widened     Speed/Mary: Pace decreased (<100 feet/min); Shuffled; Slow  Step Length: Left shortened; Right shortened       Pain Rating:  Reports pain but does not rate    Activity Tolerance:   Fair and requires rest breaks    After treatment patient left in no apparent distress:   Sitting in chair and Call bell within reach    COMMUNICATION/EDUCATION:   The patients plan of care was discussed with: Physical therapist, Occupational therapist, and Registered nurse.      Fall prevention education was provided and the patient/caregiver indicated understanding., Patient/family have participated as able in goal setting and plan of care. , and Patient/family agree to work toward stated goals and plan of care.     Thank you for this referral.  Chao Blackburn PT, DPT   Time Calculation: 16 mins

## 2021-12-10 ENCOUNTER — APPOINTMENT (OUTPATIENT)
Dept: CT IMAGING | Age: 76
DRG: 653 | End: 2021-12-10
Attending: NURSE PRACTITIONER
Payer: MEDICARE

## 2021-12-10 ENCOUNTER — APPOINTMENT (OUTPATIENT)
Dept: GENERAL RADIOLOGY | Age: 76
DRG: 653 | End: 2021-12-10
Attending: UROLOGY
Payer: MEDICARE

## 2021-12-10 LAB
ALBUMIN SERPL-MCNC: 2.5 G/DL (ref 3.5–5)
ALBUMIN/GLOB SERPL: 0.7 {RATIO} (ref 1.1–2.2)
ALP SERPL-CCNC: 66 U/L (ref 45–117)
ALT SERPL-CCNC: 22 U/L (ref 12–78)
ANION GAP SERPL CALC-SCNC: 7 MMOL/L (ref 5–15)
ANION GAP SERPL CALC-SCNC: 8 MMOL/L (ref 5–15)
AST SERPL-CCNC: 20 U/L (ref 15–37)
BASOPHILS # BLD: 0 K/UL (ref 0–0.1)
BASOPHILS # BLD: 0 K/UL (ref 0–0.1)
BASOPHILS NFR BLD: 0 % (ref 0–1)
BASOPHILS NFR BLD: 0 % (ref 0–1)
BILIRUB SERPL-MCNC: 0.5 MG/DL (ref 0.2–1)
BUN SERPL-MCNC: 29 MG/DL (ref 6–20)
BUN SERPL-MCNC: 29 MG/DL (ref 6–20)
BUN/CREAT SERPL: 21 (ref 12–20)
BUN/CREAT SERPL: 22 (ref 12–20)
CALCIUM SERPL-MCNC: 8.7 MG/DL (ref 8.5–10.1)
CALCIUM SERPL-MCNC: 9 MG/DL (ref 8.5–10.1)
CHLORIDE SERPL-SCNC: 107 MMOL/L (ref 97–108)
CHLORIDE SERPL-SCNC: 110 MMOL/L (ref 97–108)
CO2 SERPL-SCNC: 21 MMOL/L (ref 21–32)
CO2 SERPL-SCNC: 22 MMOL/L (ref 21–32)
CREAT SERPL-MCNC: 1.33 MG/DL (ref 0.7–1.3)
CREAT SERPL-MCNC: 1.35 MG/DL (ref 0.7–1.3)
D DIMER PPP FEU-MCNC: 2.82 MG/L FEU (ref 0–0.65)
DIFFERENTIAL METHOD BLD: ABNORMAL
DIFFERENTIAL METHOD BLD: ABNORMAL
EOSINOPHIL # BLD: 0 K/UL (ref 0–0.4)
EOSINOPHIL # BLD: 0 K/UL (ref 0–0.4)
EOSINOPHIL NFR BLD: 0 % (ref 0–7)
EOSINOPHIL NFR BLD: 0 % (ref 0–7)
ERYTHROCYTE [DISTWIDTH] IN BLOOD BY AUTOMATED COUNT: 14.6 % (ref 11.5–14.5)
ERYTHROCYTE [DISTWIDTH] IN BLOOD BY AUTOMATED COUNT: 14.9 % (ref 11.5–14.5)
ETHANOL SERPL-MCNC: <10 MG/DL
GLOBULIN SER CALC-MCNC: 3.8 G/DL (ref 2–4)
GLUCOSE SERPL-MCNC: 127 MG/DL (ref 65–100)
GLUCOSE SERPL-MCNC: 134 MG/DL (ref 65–100)
HCT VFR BLD AUTO: 23.6 % (ref 36.6–50.3)
HCT VFR BLD AUTO: 24.3 % (ref 36.6–50.3)
HCT VFR BLD AUTO: 24.8 % (ref 36.6–50.3)
HGB BLD-MCNC: 7.7 G/DL (ref 12.1–17)
HGB BLD-MCNC: 7.7 G/DL (ref 12.1–17)
HGB BLD-MCNC: 7.8 G/DL (ref 12.1–17)
IMM GRANULOCYTES # BLD AUTO: 0.1 K/UL (ref 0–0.04)
IMM GRANULOCYTES # BLD AUTO: 0.1 K/UL (ref 0–0.04)
IMM GRANULOCYTES NFR BLD AUTO: 1 % (ref 0–0.5)
IMM GRANULOCYTES NFR BLD AUTO: 1 % (ref 0–0.5)
LYMPHOCYTES # BLD: 0.1 K/UL (ref 0.8–3.5)
LYMPHOCYTES # BLD: 0.4 K/UL (ref 0.8–3.5)
LYMPHOCYTES NFR BLD: 1 % (ref 12–49)
LYMPHOCYTES NFR BLD: 4 % (ref 12–49)
MCH RBC QN AUTO: 34.7 PG (ref 26–34)
MCH RBC QN AUTO: 35.1 PG (ref 26–34)
MCHC RBC AUTO-ENTMCNC: 31.5 G/DL (ref 30–36.5)
MCHC RBC AUTO-ENTMCNC: 32.6 G/DL (ref 30–36.5)
MCV RBC AUTO: 106.3 FL (ref 80–99)
MCV RBC AUTO: 111.7 FL (ref 80–99)
MONOCYTES # BLD: 1 K/UL (ref 0–1)
MONOCYTES # BLD: 1 K/UL (ref 0–1)
MONOCYTES NFR BLD: 10 % (ref 5–13)
MONOCYTES NFR BLD: 10 % (ref 5–13)
NEUTS SEG # BLD: 8.6 K/UL (ref 1.8–8)
NEUTS SEG # BLD: 8.7 K/UL (ref 1.8–8)
NEUTS SEG NFR BLD: 85 % (ref 32–75)
NEUTS SEG NFR BLD: 88 % (ref 32–75)
NRBC # BLD: 0 K/UL (ref 0–0.01)
NRBC # BLD: 0 K/UL (ref 0–0.01)
NRBC BLD-RTO: 0 PER 100 WBC
NRBC BLD-RTO: 0 PER 100 WBC
PLATELET # BLD AUTO: 125 K/UL (ref 150–400)
PLATELET # BLD AUTO: 128 K/UL (ref 150–400)
PMV BLD AUTO: 8.9 FL (ref 8.9–12.9)
PMV BLD AUTO: 9.1 FL (ref 8.9–12.9)
POTASSIUM SERPL-SCNC: 3.4 MMOL/L (ref 3.5–5.1)
POTASSIUM SERPL-SCNC: 3.8 MMOL/L (ref 3.5–5.1)
PROCALCITONIN SERPL-MCNC: 4.42 NG/ML
PROT SERPL-MCNC: 6.3 G/DL (ref 6.4–8.2)
RBC # BLD AUTO: 2.22 M/UL (ref 4.1–5.7)
RBC # BLD AUTO: 2.22 M/UL (ref 4.1–5.7)
RBC MORPH BLD: ABNORMAL
RBC MORPH BLD: ABNORMAL
SODIUM SERPL-SCNC: 135 MMOL/L (ref 136–145)
SODIUM SERPL-SCNC: 140 MMOL/L (ref 136–145)
WBC # BLD AUTO: 10.1 K/UL (ref 4.1–11.1)
WBC # BLD AUTO: 9.9 K/UL (ref 4.1–11.1)

## 2021-12-10 PROCEDURE — 94640 AIRWAY INHALATION TREATMENT: CPT

## 2021-12-10 PROCEDURE — 82077 ASSAY SPEC XCP UR&BREATH IA: CPT

## 2021-12-10 PROCEDURE — 84145 PROCALCITONIN (PCT): CPT

## 2021-12-10 PROCEDURE — 65660000000 HC RM CCU STEPDOWN

## 2021-12-10 PROCEDURE — 87205 SMEAR GRAM STAIN: CPT

## 2021-12-10 PROCEDURE — 85018 HEMOGLOBIN: CPT

## 2021-12-10 PROCEDURE — 74011250636 HC RX REV CODE- 250/636: Performed by: STUDENT IN AN ORGANIZED HEALTH CARE EDUCATION/TRAINING PROGRAM

## 2021-12-10 PROCEDURE — 74011000250 HC RX REV CODE- 250: Performed by: NURSE PRACTITIONER

## 2021-12-10 PROCEDURE — 74011250637 HC RX REV CODE- 250/637: Performed by: UROLOGY

## 2021-12-10 PROCEDURE — 74011250637 HC RX REV CODE- 250/637: Performed by: NURSE PRACTITIONER

## 2021-12-10 PROCEDURE — 74011250636 HC RX REV CODE- 250/636: Performed by: NURSE PRACTITIONER

## 2021-12-10 PROCEDURE — 71275 CT ANGIOGRAPHY CHEST: CPT

## 2021-12-10 PROCEDURE — 85025 COMPLETE CBC W/AUTO DIFF WBC: CPT

## 2021-12-10 PROCEDURE — 74011250636 HC RX REV CODE- 250/636: Performed by: UROLOGY

## 2021-12-10 PROCEDURE — 93005 ELECTROCARDIOGRAM TRACING: CPT

## 2021-12-10 PROCEDURE — 74011000636 HC RX REV CODE- 636: Performed by: RADIOLOGY

## 2021-12-10 PROCEDURE — 87186 SC STD MICRODIL/AGAR DIL: CPT

## 2021-12-10 PROCEDURE — 36415 COLL VENOUS BLD VENIPUNCTURE: CPT

## 2021-12-10 PROCEDURE — 71046 X-RAY EXAM CHEST 2 VIEWS: CPT

## 2021-12-10 PROCEDURE — 87040 BLOOD CULTURE FOR BACTERIA: CPT

## 2021-12-10 PROCEDURE — 85379 FIBRIN DEGRADATION QUANT: CPT

## 2021-12-10 PROCEDURE — 80053 COMPREHEN METABOLIC PANEL: CPT

## 2021-12-10 PROCEDURE — 87077 CULTURE AEROBIC IDENTIFY: CPT

## 2021-12-10 RX ORDER — PHENOBARBITAL SODIUM 65 MG/ML
65 INJECTION INTRAMUSCULAR ONCE
Status: COMPLETED | OUTPATIENT
Start: 2021-12-10 | End: 2021-12-10

## 2021-12-10 RX ORDER — FUROSEMIDE 10 MG/ML
20 INJECTION INTRAMUSCULAR; INTRAVENOUS ONCE
Status: COMPLETED | OUTPATIENT
Start: 2021-12-10 | End: 2021-12-10

## 2021-12-10 RX ORDER — IPRATROPIUM BROMIDE AND ALBUTEROL SULFATE 2.5; .5 MG/3ML; MG/3ML
3 SOLUTION RESPIRATORY (INHALATION)
Status: DISCONTINUED | OUTPATIENT
Start: 2021-12-10 | End: 2021-12-21 | Stop reason: HOSPADM

## 2021-12-10 RX ORDER — FUROSEMIDE 10 MG/ML
40 INJECTION INTRAMUSCULAR; INTRAVENOUS ONCE
Status: COMPLETED | OUTPATIENT
Start: 2021-12-10 | End: 2021-12-10

## 2021-12-10 RX ORDER — LORAZEPAM 2 MG/ML
2 INJECTION INTRAMUSCULAR
Status: DISCONTINUED | OUTPATIENT
Start: 2021-12-10 | End: 2021-12-13

## 2021-12-10 RX ORDER — LORAZEPAM 2 MG/ML
1 INJECTION INTRAMUSCULAR
Status: DISCONTINUED | OUTPATIENT
Start: 2021-12-10 | End: 2021-12-13

## 2021-12-10 RX ADMIN — LORAZEPAM 1 MG: 2 INJECTION INTRAMUSCULAR; INTRAVENOUS at 13:50

## 2021-12-10 RX ADMIN — ESCITALOPRAM OXALATE 10 MG: 10 TABLET ORAL at 08:27

## 2021-12-10 RX ADMIN — IPRATROPIUM BROMIDE AND ALBUTEROL SULFATE 3 ML: .5; 3 SOLUTION RESPIRATORY (INHALATION) at 14:59

## 2021-12-10 RX ADMIN — LORAZEPAM 2 MG: 2 INJECTION INTRAMUSCULAR; INTRAVENOUS at 21:44

## 2021-12-10 RX ADMIN — BUPROPION HYDROCHLORIDE 100 MG: 100 TABLET, EXTENDED RELEASE ORAL at 08:27

## 2021-12-10 RX ADMIN — LORAZEPAM 1 MG: 2 INJECTION INTRAMUSCULAR; INTRAVENOUS at 17:25

## 2021-12-10 RX ADMIN — FUROSEMIDE 20 MG: 10 INJECTION, SOLUTION INTRAMUSCULAR; INTRAVENOUS at 22:20

## 2021-12-10 RX ADMIN — IPRATROPIUM BROMIDE AND ALBUTEROL SULFATE 3 ML: .5; 3 SOLUTION RESPIRATORY (INHALATION) at 20:07

## 2021-12-10 RX ADMIN — HEPARIN SODIUM 5000 UNITS: 5000 INJECTION INTRAVENOUS; SUBCUTANEOUS at 04:37

## 2021-12-10 RX ADMIN — ACETAMINOPHEN 1000 MG: 500 TABLET ORAL at 21:42

## 2021-12-10 RX ADMIN — BUSPIRONE HYDROCHLORIDE 5 MG: 5 TABLET ORAL at 08:27

## 2021-12-10 RX ADMIN — FUROSEMIDE 40 MG: 10 INJECTION, SOLUTION INTRAMUSCULAR; INTRAVENOUS at 14:01

## 2021-12-10 RX ADMIN — ACETAMINOPHEN 1000 MG: 500 TABLET ORAL at 08:27

## 2021-12-10 RX ADMIN — IOPAMIDOL 100 ML: 755 INJECTION, SOLUTION INTRAVENOUS at 11:20

## 2021-12-10 RX ADMIN — LORAZEPAM 1 MG: 2 INJECTION INTRAMUSCULAR; INTRAVENOUS at 08:16

## 2021-12-10 RX ADMIN — PHENOBARBITAL SODIUM 65 MG: 65 INJECTION INTRAMUSCULAR at 23:14

## 2021-12-10 RX ADMIN — LORAZEPAM 2 MG: 2 INJECTION INTRAMUSCULAR; INTRAVENOUS at 19:24

## 2021-12-10 RX ADMIN — HEPARIN SODIUM 5000 UNITS: 5000 INJECTION INTRAVENOUS; SUBCUTANEOUS at 17:25

## 2021-12-10 RX ADMIN — NALOXEGOL OXALATE 25 MG: 25 TABLET, FILM COATED ORAL at 08:27

## 2021-12-10 RX ADMIN — FAMOTIDINE 20 MG: 10 INJECTION, SOLUTION INTRAVENOUS at 21:43

## 2021-12-10 RX ADMIN — FAMOTIDINE 20 MG: 10 INJECTION, SOLUTION INTRAVENOUS at 08:27

## 2021-12-10 RX ADMIN — ACETAMINOPHEN 1000 MG: 500 TABLET ORAL at 14:01

## 2021-12-10 RX ADMIN — ACETAMINOPHEN 1000 MG: 500 TABLET ORAL at 04:37

## 2021-12-10 RX ADMIN — IPRATROPIUM BROMIDE AND ALBUTEROL SULFATE 3 ML: .5; 3 SOLUTION RESPIRATORY (INHALATION) at 21:00

## 2021-12-10 NOTE — PROGRESS NOTES
Bedside and Verbal shift change report given to Inocencio Moses (oncoming nurse) by Lolis Mann RN (offgoing nurse). Report included the following information SBAR, Kardex, Intake/Output, MAR and Recent Results.

## 2021-12-10 NOTE — PROGRESS NOTES
TRANSFER - OUT REPORT:    Verbal report given to Diaz Martinez RN(name) on Annetta Hein  being transferred to [employer] (unit) for change in patient condition(MEWs score elevated)       Report consisted of patients Situation, Background, Assessment and   Recommendations(SBAR). Information from the following report(s) SBAR, Kardex, OR Summary, Intake/Output, MAR and Recent Results was reviewed with the receiving nurse. Lines:   Quad Lumen 12/06/21 Left (Active)   Central Line Being Utilized Yes 12/10/21 1887   Criteria for Appropriate Use Limited/no vessel suitable for conventional peripheral access 12/10/21 0823   Site Assessment Clean, dry, & intact 12/10/21 0823   Infiltration Assessment 0 12/10/21 0823   Affected Extremity/Extremities Color distal to insertion site pink (or appropriate for race) 12/10/21 0823   Date of Last Dressing Change 12/09/21 12/10/21 0823   Dressing Status Clean, dry, & intact 12/10/21 0823   Dressing Type Disk with Chlorhexadine gluconate (CHG) 12/10/21 0823   Action Taken Open ports on tubing capped 12/10/21 0823   Proximal Hub Color/Line Status Green; Flushed; Capped 12/10/21 0823   Positive Blood Return (Medial Site) Yes 12/10/21 0823   Medial 1 Hub Color/Line Status White; Flushed; Capped 12/10/21 0823   Positive Blood Return (Lateral Site) Yes 12/10/21 0823   Medial 2 Hub Color/Line Status Blue; Flushed; Capped 12/10/21 0823   Positive Blood Return (Site #3) Yes 12/10/21 0823   Distal Hub Color/Line Status Brown; Flushed; Capped 12/10/21 0823   Positive Blood Return (Site #4) Yes 12/10/21 0823   Alcohol Cap Used Yes 12/10/21 0823       Venous Access Device port 07/16/21 Upper chest (subclavicular area, right (Active)        Opportunity for questions and clarification was provided. Patient transported with:   O2 @ 2 liters     1400: Went into patient room, took vital signs mews was a 5.    Visit Vitals  BP (!) 145/87   Pulse (!) 114   Temp (!) 102.6 °F (39.2 °C)   Resp 20   Ht 5' 11\" (1.803 m)   Wt 108.6 kg (239 lb 6.4 oz)   SpO2 99%   BMI 33.39 kg/m²     This RN gave patient one time dose of IV lasix 40 mg, called RT to complete STAT ABGs and breathing treatment, gave tylenol to help with elevated temp, and . Tracie Gitelman MD notified. Orders received for paired blood cultures to be completed but patient in transport to other unit, called RN to notify.

## 2021-12-10 NOTE — WOUND CARE
WO Ostomy Progress Note:      Reconsult to assess and treat abdominal dehisced incision. Surgery: Radical cystoprostatectomy, urethrectomy, bilateral pelvic lymphadenectomy, repair of umbilical hernia and ileal conduit urinary diversion. Date of Surgery: 12.6. 21      Surgeon: Dr. Dennis Negron quadrant  [address] in bed.     Wound Assessment:  Abdominal incision with staples and distal dehisced area: The dehisced area measures 3.8 x 1.8 x 1.7 cm; 100% red; moderate serosang exudate; periwound has warm erythema. Packed with saline moist gauze; covered with dry dressing. Ostomy Assessment:  Stoma type: Ileal conduit  Stoma appearance: red and moist  Stents present  Output: yellow with mucous  Current appliance: new pouch connected to drainage bag       Recommendations:  1. Empty appliance when 1/3 full and PRN. Encourage patient/family to notify nurse and  assist w/ pouch emptying to promote self-care. Change appliance twice weekly and PRN for leaking ASAP. DO NOT REINFORCE LEAKS to avoid skin breakdown. 2.  Abdominal dehisced incision:  Daily and as needed irrigate with saline; pack with saline moist gauze; cover with dry dressing.     Transition of Care:  Ostomy nurse to [facility] today to meet wife for ostomy teaching, if patient is able.     GISELE Benton RN [facility] Inpatient Wound Care  Available on [employer]  Pager [phone]  Office [phone]

## 2021-12-10 NOTE — WOUND CARE
WOCN Ostomy Progress Note:      Follow up visit. Wife at the bedside and states that \"it's not a good day. \"  Patient reports that he feels ill and has abdominal pain. Patient was tachypnic with purse lip breathing and elevated temp. See flowsheet for vitals obtained by RN. Notified Malgorzata DENIS who responded to the bedside with Vicenta DENIS. Surgery: Radical cystoprostatectomy, urethrectomy, bilateral pelvic lymphadenectomy, repair of umbilical hernia and ileal conduit urinary diversion. Date of Surgery: 12.6. 21      Surgeon: Dr. Ranjan Sanchez quadrant     Wound Assessment:  Abdominal incision with staples and distal dehisced area: The dehisced area measures 3.8 x 1.8 x 1.7 cm; 100% red; moderate serosang exudate; periwound has warm erythema. Dressing saturated. Removed dressing; irrigated dehisced wound and packed with saline moist gauze and covered with dry dressing and abd pad.     Ostomy Assessment:  Stoma type: Ileal conduit  Stoma appearance: red and moist  Stents present  Output: yellow with mucous  Current appliance: pouch connected to drainage bag       Recommendations:  1. Empty appliance when 1/3 full and PRN. Encourage patient/family to notify nurse and  assist w/ pouch emptying to promote self-care. Change appliance twice weekly and PRN for leaking ASAP. DO NOT REINFORCE LEAKS to avoid skin breakdown. 2.  Abdominal dehisced incision:  Daily and as needed irrigate with saline; pack with saline moist gauze; cover with dry dressing.     Transition of Care:   Will follow routinely while in hospital for ostomy and wound care.     GISELE Thomas RN [facility] Inpatient Wound Care  Available on [employer]  Pager [phone]  Office [phone]

## 2021-12-10 NOTE — CONSULTS
[address] [facility] Adult  Hospitalist Group    Hospitalist Consult  Primary Care Provider: Morena Diallo MD  Consult requested by: Miriam Medina MD     Subjective:     Hoda Ware is a 77 y.o. male with a past medical history of arrhythmia, arthritis, asthma, bladder CA, depression, hypertension and pulmonary sarcoidosis who is status post radical cystoprostatectomy, ureterectomy, lymphadenectomy, repair of umbilical hernia and ileal conduit urinary diversion. Hospitalist was consulted for management of shortness of breath and altered mental status. Upon examination patient has awake alert oriented x1 appears short of breath. States that he has a history of pulmonary sarcoidosis and he feels like he is just having a \"flare\". Audible wheezes heard. There are concerns for EtOH withdrawal.  He states that he only drinks 1 to 2 glasses of wine a night. Attempted to call patient's wife Jeb Boas [phone] and [phone], no answer. Review of Systems:    A comprehensive review of systems was negative except for that written in the History of Present Illness.      Past Medical History:   Diagnosis Date    Arrhythmia     LBBB    Arthritis     Asthma     MILD    Cancer ([facility] [address]     scc top of head and nose    Cancer ([facility] [address] 2015    BLADDER    COVID-19 vaccine series completed     [facility] VACCINE 1-28-21 AND 2-25-21    Depression with anxiety     Hypertension     Other unknown and unspecified cause of morbidity or mortality     history of pulmonary sarcoid     Posterior cervical adenopathy, benign 11/19/2018    Pulmonary sarcoidosis ([facility] [address]     Unspecified sleep apnea     cpap      Past Surgical History:   Procedure Laterality Date    HX CATARACT REMOVAL Bilateral     HX HERNIA REPAIR Right AS A CHILD    INGUINAL    HX HERNIA REPAIR Left 1990    INGUIBAL    HX KNEE REPLACEMENT Left 2008    HX ORTHOPAEDIC Right     BONE SPURS REMOVED FROM FOOT    HX OTHER SURGICAL      scc removed top of head and nose    HX OTHER SURGICAL  2005    benign lump removed from thyroid    HX OTHER SURGICAL Right 2020    SKIN CANCER RELMOVED FROM LEG    HX UROLOGICAL  06/2020    CYSTO    IR INSERT TUNL CVC W PORT OVER 5 YEARS  7/16/2021    [place] CARDIAC SURG PROCEDURE UNLIST  01/22/2021    CARDIAC CATH    [place] REVISE KNEE JOINT REPLACE,ALL PARTS Left 2019     Prior to Admission medications    Medication Sig Start Date End Date Taking? Authorizing Provider   lisinopriL (PRINIVIL, ZESTRIL) 5 mg tablet Take 5 mg by mouth two (2) times a day. Yes Provider, Historical   zolpidem (AMBIEN) 10 mg tablet Take 5 mg by mouth nightly as needed for Sleep. Yes Provider, Historical   buPROPion SR (WELLBUTRIN SR) 100 mg SR tablet Take 100 mg by mouth daily. Yes Provider, Historical   acetaminophen (Tylenol Extra Strength) 500 mg tablet Take 1,000 mg by mouth every six (6) hours as needed for Pain. Yes Provider, Historical   busPIRone (BUSPAR) 5 mg tablet Take 5 mg by mouth daily. Yes Provider, Historical   simvastatin (ZOCOR) 20 mg tablet Take 20 mg by mouth nightly. Yes Provider, Historical   escitalopram oxalate (LEXAPRO) 10 mg tablet Take 10 mg by mouth daily. Yes Provider, Historical   lidocaine-prilocaine (EMLA) topical cream Apply  to affected area as needed for Pain. Apply a thin layer over port a cath 30-60 minutes prior to port access 8/6/21   Jose Raul Dang, ELISABET   tamsulosin [facility]) 0.4 mg capsule Take 0.4 mg by mouth nightly. Provider, Historical   multivitamin (ONE A DAY) tablet Take 1 Tab by mouth daily. Provider, Historical     Allergies   Allergen Reactions    Pcn [Penicillins] Hives     Patient screened for any delayed non-IgE-mediated reaction to PCN.         Patient notes the following:    NO SEVERE NON-IGE MEDIATED REACTIONS      Family History   Problem Relation Age of Onset    Cancer Mother         breast with mets    Dementia Mother     Heart Disease Father     Cancer Sister         breast    Lung Disease Brother     No Known Problems Brother     Anesth Problems Neg Hx         SOCIAL HISTORY:  Patient resides at Home. Patient ambulates with independent . Smoking history: Denies   Alcohol history: States he drinks 1-2 glasses of wine per night   Drug history: Denies     Objective:       Physical Exam:   Visit Vitals  BP (!) 161/91   Pulse (!) 106   Temp 99.4 °F (37.4 °C)   Resp 16   Ht 5' 11\" (1.803 m)   Wt 108.6 kg (239 lb 6.4 oz)   SpO2 94%   BMI 33.39 kg/m²     General appearance: alert, mild distress, pale, disoriented  Lungs: Expiratory wheezes  Heart: Tachycardia  Abdomen: Soft, nontender, + bowel sounds. Extremities: extremities normal, atraumatic, no cyanosis or edema  Skin: Abdominal incision with staple- CDI, Stoma pink    Neurologic: Confused. ECG: Pending     Data Review: All diagnostic labs and studies have been reviewed. Assessment:       Active Problems:    Bladder cancer ([facility] [address] (6/23/2021)        Plan:     Shortness of breath   AMS   - Transfer patient to telemetry for closer monitoring   - Duo nebs PRN   - Chest CT stat to evaluate for PE   - ABG stat   - CBC and ABG stat  - O2 to keep O2 sats >93%  - Start CIWA protocol, concerns for EtOH withdrawal.   - Hold IV fluids     Tachycardia   - Likely secondary to above   - EKG stat    Anemia   - Likely secondary to blood loss post surgery   - Hgb 7.7, stable   - Stat CBC pending    ISABEL   - Creatinine 1.33, stable   - Avoid nephrotoxins and renal dose medications   - Monitor labs    Bladder CA   - status post radical cystoprostatectomy, ureterectomy, lymphadenectomy, repair of umbilical hernia and ileal conduit urinary diversion.  - Management per primary team   - PT/OT following   - Wound care following     Anxiety/Depression   -Continue current treatment       Thank you for allowing us to participate in patient's care. We will follow along.      Signed By: Katelyn Bazan NP     Date of Service:  12/10/2021

## 2021-12-10 NOTE — PROGRESS NOTES
vss af abd soft bowel open stoma pink. Audible wheezing. Thrashing around last night broke suture on teddy and pulled it out. Now w clear fluid from lower incision. Opened and cultured. teddy creat yest c/w lymph and not urine. Will need wound care. Likely having etoh w drawal.  Wheezing . Will ask hospitalist to help . Continue ativan. May need to give scheduled rather than prn.

## 2021-12-10 NOTE — PROGRESS NOTES
Physician Progress Note      PATIENT:               Robbie Jones  CSN #:                  [id number]  :                       1945  ADMIT DATE:       2021 8:50 AM  DISCH DATE:  Janet Albrecht  PROVIDER #:        Willow Arevalo MD          QUERY TEXT:    Pt admitted for surgery. PreOP labs 21 sCr 0.90. Pt noted to have sCr 1.32 on 21. If possible, please document in the progress notes and discharge summary if you are evaluating and/or treating any of the following: The medical record reflects the following:  Risk Factors: 76M surgical patient, NSAIDS for pain management, HTN, Obesity, CA  Clinical Indicators:     1819 sCr 0.90 GFR >60   1834 sCr 1.31 GFR 53   0324 sCr 1.18 GFR >60   0723 sCr 1.32 GFR >60    Karthikeyan BOWMAN   Creat up some. Plan stop toridol. Walk more. teddy creat. Bridget Lim MD   Creat 1.3. Treatment: LR 75 ml/hr IV - current,  500 ml IV Bolus,  500 ml IV bolus, lab monitoring,  \"plan stop toridol\" per Bridget Lim  note however the medication is active at the time of query    Defined by Kidney Disease Improving Global Outcomes (KDIGO) clinical practice guideline for acute kidney injury:  -Increase in SCr by greater than or equal to 0.3 mg/dl within 48?hours; or  -Increase or decrease in SCr to greater than or equal to 1.5 times baseline, which is known or presumed to have occurred within the prior 7 days; or  -Urine volume < 0.5ml/kg/h for 6 hours    Thank you    Brittani Carrissons [address] Documentation Improvement Specialist  [phone]  Options provided:  -- Acute kidney failure  -- Other - I will add my own diagnosis  -- Disagree - Not applicable / Not valid  -- Disagree - Clinically unable to determine / Unknown  -- Refer to Clinical Documentation Reviewer    PROVIDER RESPONSE TEXT:    Provider disagreed with this query.   expected finding    Query created by: Sheri Rowell on 2021 4:13 PM      Electronically signed by:  Ector Bryan MD 12/10/2021 7:35 AM

## 2021-12-11 ENCOUNTER — APPOINTMENT (OUTPATIENT)
Dept: NON INVASIVE DIAGNOSTICS | Age: 76
DRG: 653 | End: 2021-12-11
Attending: NURSE PRACTITIONER
Payer: MEDICARE

## 2021-12-11 ENCOUNTER — APPOINTMENT (OUTPATIENT)
Dept: GENERAL RADIOLOGY | Age: 76
DRG: 653 | End: 2021-12-11
Attending: STUDENT IN AN ORGANIZED HEALTH CARE EDUCATION/TRAINING PROGRAM
Payer: MEDICARE

## 2021-12-11 ENCOUNTER — APPOINTMENT (OUTPATIENT)
Dept: CT IMAGING | Age: 76
DRG: 653 | End: 2021-12-11
Attending: STUDENT IN AN ORGANIZED HEALTH CARE EDUCATION/TRAINING PROGRAM
Payer: MEDICARE

## 2021-12-11 LAB
AMMONIA PLAS-SCNC: 21 UMOL/L
AMPHET UR QL SCN: NEGATIVE
ANION GAP SERPL CALC-SCNC: 9 MMOL/L (ref 5–15)
APPEARANCE UR: CLEAR
BACTERIA URNS QL MICRO: ABNORMAL /HPF
BARBITURATES UR QL SCN: NEGATIVE
BASE DEFICIT BLD-SCNC: 8 MMOL/L
BDY SITE: ABNORMAL
BENZODIAZ UR QL: NEGATIVE
BILIRUB UR QL: NEGATIVE
BUN SERPL-MCNC: 30 MG/DL (ref 6–20)
BUN/CREAT SERPL: 20 (ref 12–20)
CALCIUM SERPL-MCNC: 8.4 MG/DL (ref 8.5–10.1)
CANNABINOIDS UR QL SCN: NEGATIVE
CHLORIDE SERPL-SCNC: 109 MMOL/L (ref 97–108)
CO2 SERPL-SCNC: 21 MMOL/L (ref 21–32)
COCAINE UR QL SCN: NEGATIVE
COLOR UR: ABNORMAL
CREAT SERPL-MCNC: 1.51 MG/DL (ref 0.7–1.3)
DRUG SCRN COMMENT,DRGCM: NORMAL
ECHO AO ROOT DIAM: 4.12 CM
ECHO AV AREA PEAK VELOCITY: 2.45 CM2
ECHO AV AREA/BSA PEAK VELOCITY: 1 CM2/M2
ECHO AV PEAK GRADIENT: 9.82 MMHG
ECHO AV PEAK VELOCITY: 156.7 CM/S
ECHO LA AREA 4C: 31.07 CM2
ECHO LA MAJOR AXIS: 4.88 CM
ECHO LA MINOR AXIS: 2.06 CM
ECHO LA VOL 4C: 106.59 ML (ref 18–58)
ECHO LA VOLUME INDEX A4C: 44.97 ML/M2 (ref 16–28)
ECHO LV E' LATERAL VELOCITY: 8.34 CM/S
ECHO LV E' SEPTAL VELOCITY: 4.98 CM/S
ECHO LV INTERNAL DIMENSION DIASTOLIC: 5.7 CM (ref 4.2–5.9)
ECHO LV INTERNAL DIMENSION SYSTOLIC: 3.91 CM
ECHO LV IVSD: 1.04 CM (ref 0.6–1)
ECHO LV MASS 2D: 259.9 G (ref 88–224)
ECHO LV MASS INDEX 2D: 109.6 G/M2 (ref 49–115)
ECHO LV POSTERIOR WALL DIASTOLIC: 1.18 CM (ref 0.6–1)
ECHO LVOT DIAM: 2 CM
ECHO LVOT PEAK GRADIENT: 5.91 MMHG
ECHO LVOT PEAK VELOCITY: 121.57 CM/S
ECHO LVOT SV: 68.7 ML
ECHO LVOT VTI: 21.77 CM
ECHO MV A VELOCITY: 114.56 CM/S
ECHO MV AREA PHT: 6.81 CM2
ECHO MV E DECELERATION TIME (DT): 111.33 MS
ECHO MV E VELOCITY: 80.75 CM/S
ECHO MV E/A RATIO: 0.7
ECHO MV E/E' LATERAL: 9.68
ECHO MV E/E' RATIO (AVERAGED): 12.95
ECHO MV E/E' SEPTAL: 16.21
ECHO MV PRESSURE HALF TIME (PHT): 32.29 MS
ECHO PV PEAK INSTANTANEOUS GRADIENT SYSTOLIC: 3.68 MMHG
ECHO RV TAPSE: 2.77 CM (ref 1.5–2)
EPITH CASTS URNS QL MICRO: ABNORMAL /LPF
GAS FLOW.O2 O2 DELIVERY SYS: ABNORMAL L/MIN
GLUCOSE BLD STRIP.AUTO-MCNC: 143 MG/DL (ref 65–117)
GLUCOSE SERPL-MCNC: 130 MG/DL (ref 65–100)
GLUCOSE UR STRIP.AUTO-MCNC: NEGATIVE MG/DL
HCO3 BLD-SCNC: 18.8 MMOL/L (ref 22–26)
HCT VFR BLD AUTO: 24.4 % (ref 36.6–50.3)
HGB BLD-MCNC: 7.6 G/DL (ref 12.1–17)
HGB UR QL STRIP: ABNORMAL
HYALINE CASTS URNS QL MICRO: ABNORMAL /LPF (ref 0–5)
KETONES UR QL STRIP.AUTO: NEGATIVE MG/DL
LACTATE SERPL-SCNC: 0.7 MMOL/L (ref 0.4–2)
LACTATE SERPL-SCNC: 3.4 MMOL/L (ref 0.4–2)
LEUKOCYTE ESTERASE UR QL STRIP.AUTO: ABNORMAL
METHADONE UR QL: NEGATIVE
NITRITE UR QL STRIP.AUTO: POSITIVE
O2/TOTAL GAS SETTING VFR VENT: 100 %
OPIATES UR QL: NEGATIVE
PCO2 BLD: 42.8 MMHG (ref 35–45)
PCP UR QL: NEGATIVE
PH BLD: 7.25 [PH] (ref 7.35–7.45)
PH UR STRIP: 6.5 [PH] (ref 5–8)
PO2 BLD: 107 MMHG (ref 80–100)
POTASSIUM SERPL-SCNC: 3.4 MMOL/L (ref 3.5–5.1)
PROT UR STRIP-MCNC: 100 MG/DL
RBC #/AREA URNS HPF: ABNORMAL /HPF (ref 0–5)
SAO2 % BLD: 97.2 % (ref 92–97)
SERVICE CMNT-IMP: ABNORMAL
SODIUM SERPL-SCNC: 139 MMOL/L (ref 136–145)
SP GR UR REFRACTOMETRY: >1.03
SPECIMEN TYPE: ABNORMAL
UROBILINOGEN UR QL STRIP.AUTO: 1 EU/DL (ref 0.2–1)
WBC URNS QL MICRO: ABNORMAL /HPF (ref 0–4)

## 2021-12-11 PROCEDURE — 83605 ASSAY OF LACTIC ACID: CPT

## 2021-12-11 PROCEDURE — 74011000250 HC RX REV CODE- 250: Performed by: NURSE PRACTITIONER

## 2021-12-11 PROCEDURE — 94640 AIRWAY INHALATION TREATMENT: CPT

## 2021-12-11 PROCEDURE — 74011000250 HC RX REV CODE- 250: Performed by: INTERNAL MEDICINE

## 2021-12-11 PROCEDURE — 80048 BASIC METABOLIC PNL TOTAL CA: CPT

## 2021-12-11 PROCEDURE — 65660000001 HC RM ICU INTERMED STEPDOWN

## 2021-12-11 PROCEDURE — 82803 BLOOD GASES ANY COMBINATION: CPT

## 2021-12-11 PROCEDURE — 81001 URINALYSIS AUTO W/SCOPE: CPT

## 2021-12-11 PROCEDURE — 82962 GLUCOSE BLOOD TEST: CPT

## 2021-12-11 PROCEDURE — 82140 ASSAY OF AMMONIA: CPT

## 2021-12-11 PROCEDURE — 74011250636 HC RX REV CODE- 250/636: Performed by: STUDENT IN AN ORGANIZED HEALTH CARE EDUCATION/TRAINING PROGRAM

## 2021-12-11 PROCEDURE — 74011250637 HC RX REV CODE- 250/637: Performed by: STUDENT IN AN ORGANIZED HEALTH CARE EDUCATION/TRAINING PROGRAM

## 2021-12-11 PROCEDURE — 74011250636 HC RX REV CODE- 250/636: Performed by: UROLOGY

## 2021-12-11 PROCEDURE — 74011000636 HC RX REV CODE- 636: Performed by: RADIOLOGY

## 2021-12-11 PROCEDURE — 80307 DRUG TEST PRSMV CHEM ANLYZR: CPT

## 2021-12-11 PROCEDURE — 93306 TTE W/DOPPLER COMPLETE: CPT | Performed by: SPECIALIST

## 2021-12-11 PROCEDURE — 71260 CT THORAX DX C+: CPT

## 2021-12-11 PROCEDURE — 74011250636 HC RX REV CODE- 250/636: Performed by: INTERNAL MEDICINE

## 2021-12-11 PROCEDURE — 74011000250 HC RX REV CODE- 250: Performed by: STUDENT IN AN ORGANIZED HEALTH CARE EDUCATION/TRAINING PROGRAM

## 2021-12-11 PROCEDURE — 93306 TTE W/DOPPLER COMPLETE: CPT

## 2021-12-11 PROCEDURE — 74011250636 HC RX REV CODE- 250/636: Performed by: NURSE PRACTITIONER

## 2021-12-11 PROCEDURE — 74177 CT ABD & PELVIS W/CONTRAST: CPT

## 2021-12-11 PROCEDURE — 36415 COLL VENOUS BLD VENIPUNCTURE: CPT

## 2021-12-11 PROCEDURE — 85018 HEMOGLOBIN: CPT

## 2021-12-11 PROCEDURE — 36600 WITHDRAWAL OF ARTERIAL BLOOD: CPT

## 2021-12-11 PROCEDURE — 74011250636 HC RX REV CODE- 250/636: Performed by: HOSPITALIST

## 2021-12-11 PROCEDURE — 71045 X-RAY EXAM CHEST 1 VIEW: CPT

## 2021-12-11 RX ORDER — ACETAMINOPHEN 650 MG/1
1000 SUPPOSITORY RECTAL EVERY 6 HOURS
Status: DISCONTINUED | OUTPATIENT
Start: 2021-12-11 | End: 2021-12-13 | Stop reason: ALTCHOICE

## 2021-12-11 RX ORDER — BACITRACIN 500 UNIT/G
1 PACKET (EA) TOPICAL AS NEEDED
Status: DISCONTINUED | OUTPATIENT
Start: 2021-12-11 | End: 2021-12-21 | Stop reason: HOSPADM

## 2021-12-11 RX ORDER — LORAZEPAM 2 MG/ML
2 INJECTION INTRAMUSCULAR
Status: DISCONTINUED | OUTPATIENT
Start: 2021-12-11 | End: 2021-12-21 | Stop reason: HOSPADM

## 2021-12-11 RX ORDER — LORAZEPAM 2 MG/ML
2 INJECTION INTRAMUSCULAR ONCE
Status: COMPLETED | OUTPATIENT
Start: 2021-12-11 | End: 2021-12-11

## 2021-12-11 RX ORDER — LORAZEPAM 1 MG/1
4 TABLET ORAL
Status: DISCONTINUED | OUTPATIENT
Start: 2021-12-11 | End: 2021-12-21 | Stop reason: HOSPADM

## 2021-12-11 RX ORDER — HALOPERIDOL 5 MG/ML
5 INJECTION INTRAMUSCULAR ONCE
Status: COMPLETED | OUTPATIENT
Start: 2021-12-11 | End: 2021-12-11

## 2021-12-11 RX ORDER — LORAZEPAM 2 MG/ML
4 INJECTION INTRAMUSCULAR
Status: DISCONTINUED | OUTPATIENT
Start: 2021-12-11 | End: 2021-12-21 | Stop reason: HOSPADM

## 2021-12-11 RX ORDER — PHENOBARBITAL SODIUM 65 MG/ML
120 INJECTION INTRAMUSCULAR 4 TIMES DAILY
Status: DISPENSED | OUTPATIENT
Start: 2021-12-11 | End: 2021-12-12

## 2021-12-11 RX ADMIN — LORAZEPAM 2 MG: 2 INJECTION INTRAMUSCULAR; INTRAVENOUS at 00:51

## 2021-12-11 RX ADMIN — PHENOBARBITAL SODIUM 120 MG: 65 INJECTION INTRAMUSCULAR at 10:57

## 2021-12-11 RX ADMIN — MEROPENEM 500 MG: 500 INJECTION, POWDER, FOR SOLUTION INTRAVENOUS at 09:05

## 2021-12-11 RX ADMIN — MEROPENEM 500 MG: 500 INJECTION, POWDER, FOR SOLUTION INTRAVENOUS at 21:37

## 2021-12-11 RX ADMIN — FAMOTIDINE 20 MG: 10 INJECTION, SOLUTION INTRAVENOUS at 09:05

## 2021-12-11 RX ADMIN — IOPAMIDOL 100 ML: 755 INJECTION, SOLUTION INTRAVENOUS at 01:36

## 2021-12-11 RX ADMIN — HALOPERIDOL LACTATE 5 MG: 5 INJECTION, SOLUTION INTRAMUSCULAR at 09:05

## 2021-12-11 RX ADMIN — FAMOTIDINE 20 MG: 10 INJECTION, SOLUTION INTRAVENOUS at 21:37

## 2021-12-11 RX ADMIN — LORAZEPAM 2 MG: 2 INJECTION INTRAMUSCULAR; INTRAVENOUS at 06:05

## 2021-12-11 RX ADMIN — ACETAMINOPHEN 1000 MG: 650 SUPPOSITORY RECTAL at 03:41

## 2021-12-11 RX ADMIN — ALTEPLASE 1 MG: 2.2 INJECTION, POWDER, LYOPHILIZED, FOR SOLUTION INTRAVENOUS at 10:51

## 2021-12-11 RX ADMIN — ACETAMINOPHEN 1000 MG: 650 SUPPOSITORY RECTAL at 10:37

## 2021-12-11 RX ADMIN — MEROPENEM 500 MG: 500 INJECTION, POWDER, FOR SOLUTION INTRAVENOUS at 15:26

## 2021-12-11 RX ADMIN — MEROPENEM 500 MG: 500 INJECTION, POWDER, FOR SOLUTION INTRAVENOUS at 01:30

## 2021-12-11 RX ADMIN — LORAZEPAM 2 MG: 2 INJECTION INTRAMUSCULAR; INTRAVENOUS at 08:19

## 2021-12-11 RX ADMIN — LORAZEPAM 2 MG: 2 INJECTION INTRAMUSCULAR; INTRAVENOUS at 01:10

## 2021-12-11 RX ADMIN — ACETAMINOPHEN 1000 MG: 650 SUPPOSITORY RECTAL at 15:26

## 2021-12-11 RX ADMIN — ACETAMINOPHEN 1500 MG: 650 SUPPOSITORY RECTAL at 21:36

## 2021-12-11 RX ADMIN — VANCOMYCIN HYDROCHLORIDE 2500 MG: 10 INJECTION, POWDER, LYOPHILIZED, FOR SOLUTION INTRAVENOUS at 03:00

## 2021-12-11 RX ADMIN — LORAZEPAM 2 MG: 2 INJECTION INTRAMUSCULAR; INTRAVENOUS at 07:50

## 2021-12-11 RX ADMIN — IPRATROPIUM BROMIDE AND ALBUTEROL SULFATE 3 ML: .5; 3 SOLUTION RESPIRATORY (INHALATION) at 09:33

## 2021-12-11 RX ADMIN — THIAMINE HYDROCHLORIDE: 100 INJECTION, SOLUTION INTRAMUSCULAR; INTRAVENOUS at 10:38

## 2021-12-11 RX ADMIN — SODIUM CHLORIDE 1000 ML: 9 INJECTION, SOLUTION INTRAVENOUS at 08:44

## 2021-12-11 NOTE — PROGRESS NOTES
Blood cultures are positive for gram-negative rods in 2/4 tubes. Patient is already on IV meropenem.

## 2021-12-11 NOTE — PROGRESS NOTES
+                                                                  Critical Care Documentation    Name: Hoda Ware  YOB: 1945  MRN: [id number]  Admission Date: 12/6/2021  8:50 AM    Date of service: 12/10/2021    Active Diagnoses:    Hospital Problems  Date Reviewed: 10/14/2021          Codes Class Noted POA    Bladder cancer [facility]) ICD-10-CM: C67.9  ICD-9-CM: 188.9  6/23/2021 Unknown              Chief Complaint:  Tachypnea, restlessness and deconditioning. RR called for aforementioned nursing concerns. Pt seen and examined at bedside. Clinical Presentation:  Admitted for multiple urological surgeries. Medicine consulted for medical management of dyspnea and mental status changes. Pt with known hx of heavy etoh use as well as hx of etoh withdrawal with previous hospitalizations. Wife at bedside confirms he drinks 1 bottle of wine and 3 whiskey shots qhs. Also with reports of sarcoid and increasing dyspnea since admission. Able to answer questions and denies any cough, chest pain or palpitations. Physical Exam:   Physical Exam  Constitutional:       General: He is in acute distress. Appearance: He is toxic-appearing. HENT:      Head: Normocephalic and atraumatic. Eyes:      Extraocular Movements: Extraocular movements intact. Pupils: Pupils are equal, round, and reactive to light. Cardiovascular:      Rate and Rhythm: Tachycardia present. Pulmonary:      Comments: increased effort. Crackles. Scattered wheezes    Abdominal:      General: There is no distension. Palpations: Abdomen is soft. Tenderness: There is no abdominal tenderness. Comments: Mild erythema surrounding surgical site. IlealConduit/urostomy   Musculoskeletal:         General: Normal range of motion. Skin:     General: Skin is warm and dry. Neurological:      General: No focal deficit present. Mental Status: He is alert. Data Reviewed:    All diagnostic labs and studies have been reviewed. Assessment and Plan:  1. ETOH Withdrawal  -CIWA protocol with ativan  -Phenobarb load    2. AMS  Likely due to ETOH withdrawal given hx og heavy etoh use  -R/O infectious etiology: uti vs cellulitis vs intraabdominal  -Obtain cbc, cmp, ua, procal, blood cx    3. Dyspnea / Pulmonary edema / Sarcoid  -Multifactorial dyspnea  -Puml edema, dilated cardiomypathy and RHF  -??aLcoholic cardiomyopathy  -Lasix 20mg x1 dose  -Consider ECHO in AM    4. Severe SEPSIS  -STAT CT chest, abd et pelvis  -Start vanc and Meropenem    Medications Administered:   Sedation: [ ] yes [x ] no   Anxiolytics: [ x] yes [ ] no   Antiarrhythmics: [ ] yes [x ] no   Antihypertensives: [ ] yes [x ] no   Pressors: [ ] yes [x ] no   IVF's: [ ] yes [x ] no       Critical Care Attestation: This patient is unstable and critically ill. Due to a high probability of clinically significant, life threatening deterioration, the patient required my highest level of preparedness to intervene emergently and I personally spent this critical care time directly and personally managing the patient. This critical care time included obtaining a history; examining the patient; pulse oximetry; ordering and review of studies; arranging urgent treatment with development of a management plan; evaluation of patient's response to treatment; frequent reassessment; and, discussions with other providers and/or family. This critical care time was performed to assess and manage the high probability of imminent, life-threatening deterioration that could result in multi-organ failure and death. It was exclusive of separately billable procedures, treating other patients, and teaching time. Time Spent:     I personally spent 45 minutes in providing critical care time.     Cierra Ochoa MD  12/10/2021  9:44 PM

## 2021-12-11 NOTE — PROGRESS NOTES
Gray lumen- flushes without resistance with positive blood return  Blue lumen- flushes without resistance with positive blood return  Brown lumen- flushes without resistance with positive blood return  White lumen- flushes without resistance WITHOUT blood return

## 2021-12-11 NOTE — ROUTINE PROCESS
Call to RT Yoo asking for ABGs that were ordered stat this morning while patient was on previous unit to be drawn asap

## 2021-12-11 NOTE — PROGRESS NOTES
Day #1 of Merrem  Indication:  Sepsis, hx bladder cancer  Current regimen:  1000 mg q8h   Abx regimen: Vancomycin?  And Merrem  Recent Labs     12/10/21  2218 12/10/21  0935 12/10/21  0436 21  0723   WBC 9.9 10.1  --   --    CREA 1.35*  --  1.33* 1.32*   BUN 29*  --  29* 31*     Est CrCl: 59 ml/min  Temp (24hrs), Av.7 °F (37.6 °C), Min:97.4 °F (36.3 °C), Max:102.6 °F (39.2 °C)    Cultures: wound  and blood    Plan: Change to 500 mg q6h

## 2021-12-11 NOTE — PROGRESS NOTES
2015: received verbal & bedside report from day shift CIRA Julien during rapid response. Pt condition change from previous assessment, per [facility], but hemodynamically stable. Received orders from Antwon Campbell MD for pt transfer to intermediate level of care. 2155Cjhon Esteban MD at bedside to assess pt    0051: pt began to feel SOB again, increasingly agitated & restless. RRT called. Orders placed for CXR, CT of abdomen, head, & pelvis    0421: bed 405 ready. TRANSFER - OUT REPORT:    Verbal report given to [facility], RN(name) on Kin Morocho  being transferred to [facility] bed 405(unit) for change in patient condition(SOB, AMS)       Report consisted of patients Situation, Background, Assessment and   Recommendations(SBAR). Information from the following report(s) SBAR, Kardex, ED Summary, OR Summary, Procedure Summary, Intake/Output, MAR, Recent Results and Cardiac Rhythm Sinus Tach was reviewed with the receiving nurse. Lines:   Quad Lumen 12/06/21 Left (Active)   Central Line Being Utilized Yes 12/11/21 9551   Criteria for Appropriate Use Hemodynamically unstable, requiring monitoring lines, vasopressors, or volume resuscitation 12/11/21 0624   Site Assessment Clean, dry, & intact 12/11/21 0624   Infiltration Assessment 0 12/10/21 2002   Affected Extremity/Extremities Color distal to insertion site pink (or appropriate for race); Pulses palpable; Range of motion performed 12/11/21 0624   Date of Last Dressing Change 12/11/21 12/11/21 0624   Dressing Status Clean, dry, & intact 12/11/21 0624   Dressing Type Disk with Chlorhexadine gluconate (CHG);  Transparent 12/11/21 0624   Action Taken Dressing reinforced 12/11/21 0624   Proximal Hub Color/Line Status Green; Flushed 12/11/21 0624   Positive Blood Return (Medial Site) Yes 12/10/21 0823   Medial 1 Hub Color/Line Status White; Flushed 12/11/21 0624   Positive Blood Return (Lateral Site) Yes 12/10/21 0823   Medial 2 Hub Color/Line Status Blue; Flushed 12/11/21 0767   Positive Blood Return (Site #3) Yes 12/10/21 0823   Distal Hub Color/Line Status Guilherme Dot 12/11/21 0624   Positive Blood Return (Site #4) Yes 12/10/21 0823   Alcohol Cap Used Yes 12/10/21 2002       Venous Access Device port 07/16/21 Upper chest (subclavicular area, right (Active)        Opportunity for questions and clarification was provided.       Patient transported with:   Monitor  O2 @ 15 liters  Registered Nurse  [facility]

## 2021-12-11 NOTE — PROGRESS NOTES
Patient arrived to unit from [address] [place] [address]. Patient A&Ox3, intermittently confused, attempting to get out of bed, bed alarm on. Patient denies chest pain & SOB. Ileal conduit draining appropriately. Abd dressing CDI. Redness to abdomen outlined, patient denies pain. Updated patient and wife on plan of care. Bed locked in low position, safety precautions in place, will continue to monitor via hourly rounding and as warranted.

## 2021-12-11 NOTE — PROGRESS NOTES
1100- pts O2 sats down to 90%, RR 30's-40's, non-rebreather placed    Bedside shift change report given to Juan Salazar RN (oncoming nurse) by Sosa Vieira RN (offgoing nurse). Report included the following information SBAR, Kardex, ED Summary, Intake/Output, MAR, Accordion, Recent Results, Med Rec Status, Cardiac Rhythm NSR and Alarm Parameters .

## 2021-12-11 NOTE — PROGRESS NOTES
Problem: Falls - Risk of  Goal: *Absence of Falls  Description: Document Grisel Harp Fall Risk and appropriate interventions in the flowsheet. Outcome: Progressing Towards Goal  Note: Fall Risk Interventions:  Mobility Interventions: Assess mobility with egress test, Bed/chair exit alarm, Communicate number of staff needed for ambulation/transfer    Mentation Interventions: Adequate sleep, hydration, pain control, Door open when patient unattended, Bed/chair exit alarm    Medication Interventions: Assess postural VS orthostatic hypotension, Evaluate medications/consider consulting pharmacy, Bed/chair exit alarm    Elimination Interventions: Bed/chair exit alarm, Call light in reach, Elevated toilet seat    History of Falls Interventions: Consult care management for discharge planning, Door open when patient unattended, Bed/chair exit alarm         Problem: Patient Education: Go to Patient Education Activity  Goal: Patient/Family Education  Outcome: Progressing Towards Goal     Problem: Patient Education: Go to Patient Education Activity  Goal: Patient/Family Education  Outcome: Progressing Towards Goal     Problem: Patient Education: Go to Patient Education Activity  Goal: Patient/Family Education  Outcome: Progressing Towards Goal     Problem: Pressure Injury - Risk of  Goal: *Prevention of pressure injury  Description: Document Hunter Scale and appropriate interventions in the flowsheet.   Outcome: Progressing Towards Goal  Note: Pressure Injury Interventions:  Sensory Interventions: Assess changes in LOC, Float heels, Keep linens dry and wrinkle-free, Minimize linen layers    Moisture Interventions: Absorbent underpads, Apply protective barrier, creams and emollients, Assess need for specialty bed    Activity Interventions: Assess need for specialty bed, Increase time out of bed, Chair cushion    Mobility Interventions: Assess need for specialty bed, Chair cushion, Float heels    Nutrition Interventions: Document food/fluid/supplement intake, Offer support with meals,snacks and hydration, Discuss nutritional consult with provider    Friction and Shear Interventions: Feet elevated on foot rest, Foam dressings/transparent film/skin sealants, Apply protective barrier, creams and emollients                Problem: Patient Education: Go to Patient Education Activity  Goal: Patient/Family Education  Outcome: Progressing Towards Goal     Problem: Non-Violent Restraints  Goal: Removal from restraints as soon as assessed to be safe  Outcome: Progressing Towards Goal  Goal: No harm/injury to patient while restraints in use  Outcome: Progressing Towards Goal  Goal: Patient's dignity will be maintained  Outcome: Progressing Towards Goal  Goal: Patient Interventions  Outcome: Progressing Towards Goal     Bedside shift change report given to Rhonda DENIS (oncoming nurse) by Ivelisse Garcia RN (offgoing nurse). Report included the following information SBAR, Intake/Output, Recent Results, Procedure Verification, and Quality Measures.

## 2021-12-11 NOTE — PROGRESS NOTES
[facility]                                                                                          Hospitalist Progress Note  Socorro Galloway MD  Answering service: [phone] from in house phone        Date of Service:  2021  NAME:  Erasto Josue  :  1945  MRN:  [id number]      Admission Summary:   Copied form admit: \" Admitted for multiple urological surgeries. Medicine consulted for medical management of dyspnea and mental status changes. Pt with known hx of heavy etoh use as well as hx of etoh withdrawal with previous hospitalizations. Wife at bedside confirms he drinks 1 bottle of wine and 3 whiskey shots qhs. Also with reports of sarcoid and increasing dyspnea since admission. Able to answer questions and denies any cough, chest pain or palpitations. \"    Interval history / Subjective:     2021 :    In fulminant w/d    Lactic up, abx adjusted, f/u lactic pending,    ivf bolus 1000 cc/s now   Thiamin/folic acid added   additional ativan/haldol required this am rounds. Ania Brooks as below        Assessment & Plan:     1. ETOH Withdrawal  -CIWA protocol with ativan  -Phenobarb dosing and monitoring on going for 2021      2. AMS  Likely due to ETOH withdrawal given hx og heavy etoh use  -R/O infectious etiology: uti vs cellulitis vs intraabdominal  -Obtain cbc, cmp, ua, procal, blood cx  -Procal 4, f/u lab for comparison, is on broad abx      3. Dyspnea / Pulmonary edema / Sarcoid  -Multifactorial dyspnea  -Puml edema, dilated cardiomypathy and RHF  -??aLcoholic cardiomyopathy  -Lasix 20mg x1 dose  -Consider ECHO in AM  - no s/s of hf. Will give additional ivf bolus given current findings of lactic acidosis, sepsis/etoh w/d      4. Severe SEPSIS  -STAT CT chest, abd et pelvis  -Start vanc and Meropenem  - CT:  am: 1.  Interval radical cystoprostatectomy with ileal conduit urinary diversion and  no postoperative complication identified    Renal at risk; per CT finding 12/11 concerning for ATN, watching renal's ns bolus , ivf. aovid nephrotoxic else renal dose meds           DVT PPX =heparin      Hospital Problems  Date Reviewed: 10/14/2021          Codes Class Noted POA    Bladder cancer [facility]) ICD-10-CM: C67.9  ICD-9-CM: 188.9  6/23/2021 Unknown                  After personally interviewing pt at bedside the following is noted on 12/11/2021 :    Review of Systems   Reason unable to perform ROS: pt factors, confused/delrius               I had a face to face encounter with patient on 12/11/2021 at bedside for the following physical exam:     PHYSICAL EXAM:    Visit Vitals  /76   Pulse 96   Temp 98.7 °F (37.1 °C)   Resp (!) 31   Ht 5' 11\" (1.803 m)   Wt 120 kg (264 lb 8.8 oz)   SpO2 95%   BMI 36.90 kg/m²          Physical Exam  Constitutional:       General: He is not in acute distress. Appearance: He is ill-appearing. He is not toxic-appearing. Comments: Ill in that is in w/d   HENT:      Head: Normocephalic and atraumatic. Right Ear: External ear normal.      Left Ear: External ear normal.      Mouth/Throat:      Mouth: Mucous membranes are dry. Pharynx: Oropharynx is clear. Eyes:      Extraocular Movements: Extraocular movements intact. Conjunctiva/sclera: Conjunctivae normal.      Pupils: Pupils are equal, round, and reactive to light. Cardiovascular:      Rate and Rhythm: Normal rate and regular rhythm. Pulmonary:      Effort: Pulmonary effort is normal.      Breath sounds: Normal breath sounds. Abdominal:      General: Abdomen is flat. Bowel sounds are normal.      Palpations: Abdomen is soft. Musculoskeletal:         General: No swelling or deformity. Cervical back: Normal range of motion. Skin:     Coloration: Skin is not jaundiced or pale. Neurological:      General: No focal deficit present.    Psychiatric:      Comments: Delrius, agitated, anxious                      Data Review:    Review and/or order of clinical lab test      Labs:     Recent Labs     12/11/21  0202 12/10/21  2218 12/10/21  0935 12/10/21  0935   WBC  --  9.9  --  10.1   HGB 7.6* 7.8*   < > 7.7*   HCT 24.4* 24.8*   < > 23.6*   PLT  --  128*  --  125*    < > = values in this interval not displayed. Recent Labs     12/11/21  0202 12/10/21  2218 12/10/21  0436    140 135*   K 3.4* 3.4* 3.8   * 110* 107   CO2 21 22 21   BUN 30* 29* 29*   CREA 1.51* 1.35* 1.33*   * 134* 127*   CA 8.4* 8.7 9.0     Recent Labs     12/10/21  2218   ALT 22   AP 66   TBILI 0.5   TP 6.3*   ALB 2.5*   GLOB 3.8     No results for input(s): INR, PTP, APTT, INREXT in the last 72 hours. No results for input(s): FE, TIBC, PSAT, FERR in the last 72 hours. No results found for: FOL, RBCF   No results for input(s): PH, PCO2, PO2 in the last 72 hours. No results for input(s): CPK, CKNDX, TROIQ in the last 72 hours.     No lab exists for component: CPKMB  No results found for: CHOL, CHOLX, CHLST, CHOLV, HDL, HDLP, LDL, LDLC, DLDLP, TGLX, TRIGL, TRIGP, CHHD, CHHDX  Lab Results   Component Value Date/Time    Glucose (POC) 143 (H) 12/11/2021 01:02 AM    Glucose (POC) 93 12/01/2009 07:10 AM     Lab Results   Component Value Date/Time    Color YELLOW/STRAW 12/11/2021 03:03 AM    Appearance CLEAR 12/11/2021 03:03 AM    Specific gravity >1.030 12/11/2021 03:03 AM    Specific gravity 1.016 06/17/2021 09:56 AM    pH (UA) 6.5 12/11/2021 03:03 AM    Protein 100 (A) 12/11/2021 03:03 AM    Glucose Negative 12/11/2021 03:03 AM    Ketone Negative 12/11/2021 03:03 AM    Bilirubin Negative 12/11/2021 03:03 AM    Urobilinogen 1.0 12/11/2021 03:03 AM    Nitrites Positive (A) 12/11/2021 03:03 AM    Leukocyte Esterase MODERATE (A) 12/11/2021 03:03 AM    Epithelial cells FEW 12/11/2021 03:03 AM    Bacteria 1+ (A) 12/11/2021 03:03 AM    WBC  12/11/2021 03:03 AM    RBC  12/11/2021 03:03 AM         Medications Reviewed:     Current Facility-Administered Medications   Medication Dose Route Frequency    meropenem (MERREM) 500 mg in sterile water (preservative free) 10 mL IV syringe  0.5 g IntraVENous Q6H    Vancomycin Pharmacy Dosing   Other Rx Dosing/Monitoring    acetaminophen (TYLENOL) suppository 1,000 mg  1,000 mg Rectal Q6H    [START ON 12/12/2021] vancomycin (VANCOCIN) 1,250 mg in 0.9% sodium chloride 250 mL (VIAL-MATE)  1,250 mg IntraVENous Q24H    0.9% sodium chloride 1,000 mL with thiamine 706 mg, folic acid 1 mg infusion   IntraVENous DAILY    LORazepam (ATIVAN) tablet 4 mg  4 mg Oral Q1H PRN    LORazepam (ATIVAN) injection 2 mg  2 mg IntraVENous Q1H PRN    LORazepam (ATIVAN) injection 4 mg  4 mg IntraVENous Q1H PRN    haloperidol lactate (HALDOL) injection 5 mg  5 mg IntraVENous ONCE    sodium chloride 0.9 % bolus infusion 1,000 mL  1,000 mL IntraVENous ONCE    multivitamin, tx-iron-ca-min (THERA-M w/ IRON) tablet 1 Tablet  1 Tablet Oral DAILY    albuterol-ipratropium (DUO-NEB) 2.5 MG-0.5 MG/3 ML  3 mL Nebulization Q6H PRN    LORazepam (ATIVAN) injection 1 mg  1 mg IntraVENous Q2H PRN    LORazepam (ATIVAN) injection 2 mg  2 mg IntraVENous Q2H PRN    ketorolac (TORADOL) injection 15 mg  15 mg IntraVENous Q6H PRN    traMADoL (ULTRAM) tablet 50 mg  50 mg Oral Q6H PRN    gabapentin (NEURONTIN) capsule 200 mg  200 mg Oral BID PRN    celecoxib (CELEBREX) capsule 100 mg  100 mg Oral BID PRN    [Held by provider] lactated Ringers infusion  75 mL/hr IntraVENous CONTINUOUS    ondansetron (ZOFRAN ODT) tablet 4 mg  4 mg Oral Q8H PRN    Or    ondansetron (ZOFRAN) injection 4 mg  4 mg IntraVENous Q6H PRN    naloxegoL (MOVANTIK) tablet 25 mg  25 mg Oral DAILY    sodium chloride 0.9 % bolus infusion 500 mL  500 mL IntraVENous PRN    famotidine (PF) (PEPCID) 20 mg in 0.9% sodium chloride 10 mL injection  20 mg IntraVENous Q12H    heparin (porcine) injection 5,000 Units  5,000 Units SubCUTAneous Q12H    buPROPion SR [facility] SR) tablet 100 mg  100 mg Oral DAILY    busPIRone (BUSPAR) tablet 5 mg  5 mg Oral DAILY    escitalopram oxalate (LEXAPRO) tablet 10 mg  10 mg Oral DAILY    diphenhydrAMINE (BENADRYL) injection 12.5 mg  12.5 mg IntraVENous Q4H PRN     ______________________________________________________________________  EXPECTED LENGTH OF STAY: 5d 14h  ACTUAL LENGTH OF STAY:          Dalia Levine MD

## 2021-12-11 NOTE — PROGRESS NOTES
Pharmacist Note - Vancomycin Dosing    Consult provided for this 77 y.o. male for indication of sepsis, hx bladder cancer. Antibiotic regimen(s): merrem and vancomycin  Patient on vancomycin PTA? NO     Recent Labs     21  0202 12/10/21  2218 12/10/21  0935 12/10/21  0436   WBC  --  9.9 10.1  --    CREA 1.51* 1.35*  --  1.33*   BUN 30* 29*  --  29*     Frequency of BMP: daily  Height: 180 cm  Weight: 108.6 kg  Est CrCl: 53 ml/min  Temp (24hrs), Av.9 °F (37.7 °C), Min:97.4 °F (36.3 °C), Max:102.6 °F (39.2 °C)    Cultures:  Wound and blood    MRSA Swab ordered (if applicable)? YES    The plan below is expected to result in a target range of AUC/HAILEY 400-600    Therapy will be initiated with a loading dose of 2500 mg IV x 1 to be followed by a maintenance dose of 1250 mg IV every 24 hours. Pharmacy to follow patient daily and order levels / make dose adjustments as appropriate. *Vancomycin has been dosed used Bayesian kinetics software to target an AUC/HAILEY of 400-600, which provides adequate exposure for an assumed infection due to MRSA with an HAILEY of 1 or less while reducing the risk of nephrotoxicity as seen with traditional trough based dosing goals.

## 2021-12-11 NOTE — PROGRESS NOTES
Day #1 of Vancomycin  Indication: GNR bacteremia  -UA significant for pyuria and bacteria  -CXR with scarring, no acute changes  -CT chest with no change from previous  -CTA chest consistent with sarcoidosis  -CT abdomen with no post-op complications  Current regimen:  vanc 1.25g q24h  Abx regimen:  vanc/meropenem  ID Following ?: NO  Concomitant nephrotoxic drugs (requires more frequent monitoring): Contrast agents  Frequency of BMP?: daily    Recent Labs     21  0202 12/10/21  2218 12/10/21  0935 12/10/21  0436   WBC  --  9.9 10.1  --    CREA 1.51* 1.35*  --  1.33*   BUN 30* 29*  --  29*     Est CrCl: 55-60 ml/min; UO: 1.5 ml/kg/hr  Temp (24hrs), Av.9 °F (37.7 °C), Min:97.4 °F (36.3 °C), Max:102.6 °F (39.2 °C)    Cultures:   12/10 blood: GNR in 2/4 bottles; prelim  12/10 abdomen wound: no organisms seen on gram stain; prelim   MSRA swab: in process    MRSA Swab ordered (if applicable)? YES    Goal target range AUC/HAILEY 400-600    Recent level history:  Date/Time Dose & Interval Measured Level (mcg/mL) Associated AUC/HAILEY Dose Adjustment                                                   Plan: Continue current regimen for estimated AUC/HAILEY ~459. Will assess with random level 24-48h after initiation of new dosing strategy.

## 2021-12-11 NOTE — PROGRESS NOTES
Progress Note    Patient: Erasto Josue MRN: [id number]  SSN: [id number]    YOB: 1945  Age: 77 y.o. Sex: male        ADMITTED:  12/6/2021 to Antoine Aj MD  for Malignant neoplasm of urinary bladder, unspecified site [facility]) [C67.9]  Bladder cancer [facility]) [C67.9]         Erasto Josue is 5 Days Post-Op Procedure(s):  RADICAL CYSTECTOMY, URETHRECTOMY, PROSTATECTOMY, BILATERAL LYMPHADENECTOMY,  ILEAL CONDUIT DIVERSION (E R A S). Temp 99.2. . +NRB mask. Rapid response called last evening d/t elevated HR and tachypneic. Patient transferred to different room this AM. Afebrile. abd soft, stoma pink, conduit in place draining clear yellow UA. Documented UOP 3975cc. MD sent culture of clear fluid from lower incision- pending. No drainage noted today. JASMINA creat 12/09 c/w lymph and not urine, creat fluid 1.23. JASMINA removed. CT A/P and CT chest reviewed. No complications noted. Audible wheezing on NRB. Hospitalist note reviewed regarding ETOH withdrawal. Assessment limited d/t recent ativan admin. +Nonviolent restraints in place. Hospitalist and wife bedside on assessment. Wound nurse notes reviewed. Cr: 1.51  Hgb: 7.6  WBC: wnl (12/10)  Bcx: GNR  UA:  WBCs,  RBCs, 1+ bacteria  Wound cx: SCANT MIXED SKIN ZAIN ISOLATED   +meropenem, vanc    12/10/21  CTA chest:  IMPRESSION  1. No evidence of pulmonary embolism, or thoracic aortic aneurysm/dissection. 2.  Interval enlargement of main pulmonary artery and right heart suggestive of  right heart failure. 3.  New mild-moderate pulmonary volume overload. 4.  Stable findings of mediastinal and hilar calcified/noncalcified adenopathy  consistent with sarcoidosis.     12/11/21  CT AP w:  IMPRESSION  1. Interval radical cystoprostatectomy with ileal conduit urinary diversion. 2. No postoperative complication identified.   3. Dense bilateral nephrograms in this patient who is status post contrast  injection the previous day. This suggests ATN. 21  CT Chest w:  IMPRESSION  No change. 21  Chest XR:  IMPRESSION  No significant change in bilateral lung scarring. Vitals:  Temp (24hrs), Av.9 °F (37.7 °C), Min:97.4 °F (36.3 °C), Max:102.6 °F (39.2 °C)     Blood pressure 104/84, pulse (!) 115, temperature 99.2 °F (37.3 °C), resp. rate 27, height 5' 11\" (1.803 m), weight 120 kg (264 lb 8.8 oz), SpO2 98 %. I&O's:   1901 -  0700  In: -   Out: 8001 [LEEOW:3492; Drains:90]   No intake/output data recorded. Labs:   Recent Labs     21  0202 12/10/21  2218 12/10/21  0935   WBC  --  9.9 10.1   HGB 7.6* 7.8* 7.7*   HCT 24.4* 24.8* 23.6*   PLT  --  128* 125*     Recent Labs     21  0202 12/10/21  2218 12/10/21  0436    140 135*   K 3.4* 3.4* 3.8   * 110* 107   CO2    * 134* 127*   BUN 30* 29* 29*   CREA 1.51* 1.35* 1.33*   CA 8.4* 8.7 9.0        Cultures:      Imaging:       Assessment:     - 5 Days Post-Op Procedure(s):  RADICAL CYSTECTOMY, URETHRECTOMY, PROSTATECTOMY, BILATERAL LYMPHADENECTOMY,  ILEAL CONDUIT DIVERSION (E R A S)    Active Problems:    Bladder cancer (HCC) (2021)        Plan:     - Dr. Brittani Hassan note reviewed, \"Likely having etoh w drawal.  Wheezing . Will ask hospitalist to help . Continue ativan. \"  - Continue with wound care. Notes reviewed. - Bcx with GNR. Follow cultures with culture specific abx  - Hospitalist notes reviewed. Appreciate assistance.   - Use of incentive spirometry per hospitalist  - Urology MD will be by later to Shaila Tobias    Supervising MD, Dr. Cristian Calvin    Signed By: Haja Hughes NP - 2021     Seen and agree  CBC tomorrow

## 2021-12-12 LAB
ANION GAP SERPL CALC-SCNC: 9 MMOL/L (ref 5–15)
ATRIAL RATE: 98 BPM
BACTERIA SPEC CULT: NORMAL
BASOPHILS # BLD: 0 K/UL (ref 0–0.1)
BASOPHILS NFR BLD: 0 % (ref 0–1)
BUN SERPL-MCNC: 42 MG/DL (ref 6–20)
BUN/CREAT SERPL: 31 (ref 12–20)
CALCIUM SERPL-MCNC: 8.7 MG/DL (ref 8.5–10.1)
CALCULATED P AXIS, ECG09: 64 DEGREES
CALCULATED R AXIS, ECG10: 0 DEGREES
CALCULATED T AXIS, ECG11: 71 DEGREES
CHLORIDE SERPL-SCNC: 120 MMOL/L (ref 97–108)
CO2 SERPL-SCNC: 21 MMOL/L (ref 21–32)
CREAT SERPL-MCNC: 1.36 MG/DL (ref 0.7–1.3)
DIAGNOSIS, 93000: NORMAL
DIFFERENTIAL METHOD BLD: ABNORMAL
EOSINOPHIL # BLD: 0.1 K/UL (ref 0–0.4)
EOSINOPHIL NFR BLD: 1 % (ref 0–7)
ERYTHROCYTE [DISTWIDTH] IN BLOOD BY AUTOMATED COUNT: 15.5 % (ref 11.5–14.5)
GLUCOSE SERPL-MCNC: 109 MG/DL (ref 65–100)
GRAM STN SPEC: NORMAL
GRAM STN SPEC: NORMAL
HCT VFR BLD AUTO: 25.1 % (ref 36.6–50.3)
HGB BLD-MCNC: 7.7 G/DL (ref 12.1–17)
IMM GRANULOCYTES # BLD AUTO: 0.1 K/UL (ref 0–0.04)
IMM GRANULOCYTES NFR BLD AUTO: 1 % (ref 0–0.5)
LYMPHOCYTES # BLD: 0.3 K/UL (ref 0.8–3.5)
LYMPHOCYTES NFR BLD: 4 % (ref 12–49)
MAGNESIUM SERPL-MCNC: 2.2 MG/DL (ref 1.6–2.4)
MCH RBC QN AUTO: 34.8 PG (ref 26–34)
MCHC RBC AUTO-ENTMCNC: 30.7 G/DL (ref 30–36.5)
MCV RBC AUTO: 113.6 FL (ref 80–99)
MONOCYTES # BLD: 0.6 K/UL (ref 0–1)
MONOCYTES NFR BLD: 9 % (ref 5–13)
NEUTS SEG # BLD: 5.3 K/UL (ref 1.8–8)
NEUTS SEG NFR BLD: 85 % (ref 32–75)
NRBC # BLD: 0 K/UL (ref 0–0.01)
NRBC BLD-RTO: 0 PER 100 WBC
P-R INTERVAL, ECG05: 184 MS
PLATELET # BLD AUTO: 138 K/UL (ref 150–400)
PMV BLD AUTO: 9.8 FL (ref 8.9–12.9)
POTASSIUM SERPL-SCNC: 3.7 MMOL/L (ref 3.5–5.1)
Q-T INTERVAL, ECG07: 422 MS
QRS DURATION, ECG06: 150 MS
QTC CALCULATION (BEZET), ECG08: 538 MS
RBC # BLD AUTO: 2.21 M/UL (ref 4.1–5.7)
RBC MORPH BLD: ABNORMAL
RBC MORPH BLD: ABNORMAL
SERVICE CMNT-IMP: NORMAL
SERVICE CMNT-IMP: NORMAL
SODIUM SERPL-SCNC: 150 MMOL/L (ref 136–145)
VENTRICULAR RATE, ECG03: 98 BPM
WBC # BLD AUTO: 6.4 K/UL (ref 4.1–11.1)

## 2021-12-12 PROCEDURE — 74011000250 HC RX REV CODE- 250: Performed by: INTERNAL MEDICINE

## 2021-12-12 PROCEDURE — 74011250636 HC RX REV CODE- 250/636: Performed by: INTERNAL MEDICINE

## 2021-12-12 PROCEDURE — 74011000250 HC RX REV CODE- 250: Performed by: STUDENT IN AN ORGANIZED HEALTH CARE EDUCATION/TRAINING PROGRAM

## 2021-12-12 PROCEDURE — 74011000250 HC RX REV CODE- 250: Performed by: NURSE PRACTITIONER

## 2021-12-12 PROCEDURE — 74011000258 HC RX REV CODE- 258: Performed by: INTERNAL MEDICINE

## 2021-12-12 PROCEDURE — 83735 ASSAY OF MAGNESIUM: CPT

## 2021-12-12 PROCEDURE — 74011250636 HC RX REV CODE- 250/636: Performed by: NURSE PRACTITIONER

## 2021-12-12 PROCEDURE — 74011250636 HC RX REV CODE- 250/636: Performed by: UROLOGY

## 2021-12-12 PROCEDURE — 65660000001 HC RM ICU INTERMED STEPDOWN

## 2021-12-12 PROCEDURE — 94640 AIRWAY INHALATION TREATMENT: CPT

## 2021-12-12 PROCEDURE — 74011250637 HC RX REV CODE- 250/637: Performed by: STUDENT IN AN ORGANIZED HEALTH CARE EDUCATION/TRAINING PROGRAM

## 2021-12-12 PROCEDURE — 74011250636 HC RX REV CODE- 250/636: Performed by: STUDENT IN AN ORGANIZED HEALTH CARE EDUCATION/TRAINING PROGRAM

## 2021-12-12 PROCEDURE — 85025 COMPLETE CBC W/AUTO DIFF WBC: CPT

## 2021-12-12 PROCEDURE — 74011250637 HC RX REV CODE- 250/637: Performed by: INTERNAL MEDICINE

## 2021-12-12 PROCEDURE — 74011250637 HC RX REV CODE- 250/637: Performed by: NURSE PRACTITIONER

## 2021-12-12 PROCEDURE — 74011250637 HC RX REV CODE- 250/637: Performed by: UROLOGY

## 2021-12-12 PROCEDURE — 80048 BASIC METABOLIC PNL TOTAL CA: CPT

## 2021-12-12 PROCEDURE — 36415 COLL VENOUS BLD VENIPUNCTURE: CPT

## 2021-12-12 RX ORDER — FUROSEMIDE 10 MG/ML
40 INJECTION INTRAMUSCULAR; INTRAVENOUS ONCE
Status: COMPLETED | OUTPATIENT
Start: 2021-12-12 | End: 2021-12-12

## 2021-12-12 RX ORDER — DILTIAZEM HYDROCHLORIDE 5 MG/ML
10 INJECTION INTRAVENOUS
Status: COMPLETED | OUTPATIENT
Start: 2021-12-12 | End: 2021-12-12

## 2021-12-12 RX ORDER — FOLIC ACID 1 MG/1
1 TABLET ORAL DAILY
Status: DISCONTINUED | OUTPATIENT
Start: 2021-12-13 | End: 2021-12-21 | Stop reason: HOSPADM

## 2021-12-12 RX ORDER — SODIUM CHLORIDE 450 MG/100ML
125 INJECTION, SOLUTION INTRAVENOUS CONTINUOUS
Status: DISCONTINUED | OUTPATIENT
Start: 2021-12-12 | End: 2021-12-13

## 2021-12-12 RX ORDER — IPRATROPIUM BROMIDE 0.5 MG/2.5ML
0.5 SOLUTION RESPIRATORY (INHALATION)
Status: DISCONTINUED | OUTPATIENT
Start: 2021-12-12 | End: 2021-12-21 | Stop reason: HOSPADM

## 2021-12-12 RX ORDER — LANOLIN ALCOHOL/MO/W.PET/CERES
100 CREAM (GRAM) TOPICAL DAILY
Status: DISCONTINUED | OUTPATIENT
Start: 2021-12-13 | End: 2021-12-21 | Stop reason: HOSPADM

## 2021-12-12 RX ADMIN — BUSPIRONE HYDROCHLORIDE 5 MG: 5 TABLET ORAL at 09:13

## 2021-12-12 RX ADMIN — MEROPENEM 500 MG: 500 INJECTION, POWDER, FOR SOLUTION INTRAVENOUS at 03:02

## 2021-12-12 RX ADMIN — MEROPENEM 500 MG: 500 INJECTION, POWDER, FOR SOLUTION INTRAVENOUS at 14:33

## 2021-12-12 RX ADMIN — FAMOTIDINE 20 MG: 10 INJECTION, SOLUTION INTRAVENOUS at 09:14

## 2021-12-12 RX ADMIN — DILTIAZEM HYDROCHLORIDE 10 MG: 5 INJECTION INTRAVENOUS at 10:44

## 2021-12-12 RX ADMIN — DEXTROSE MONOHYDRATE 150 MG: 50 INJECTION, SOLUTION INTRAVENOUS at 12:28

## 2021-12-12 RX ADMIN — SODIUM CHLORIDE 125 ML/HR: 4.5 INJECTION, SOLUTION INTRAVENOUS at 21:14

## 2021-12-12 RX ADMIN — MEROPENEM 500 MG: 500 INJECTION, POWDER, FOR SOLUTION INTRAVENOUS at 09:14

## 2021-12-12 RX ADMIN — THIAMINE HYDROCHLORIDE: 100 INJECTION, SOLUTION INTRAMUSCULAR; INTRAVENOUS at 09:19

## 2021-12-12 RX ADMIN — HEPARIN SODIUM 5000 UNITS: 5000 INJECTION INTRAVENOUS; SUBCUTANEOUS at 16:20

## 2021-12-12 RX ADMIN — MULTIPLE VITAMINS W/ MINERALS TAB 1 TABLET: TAB at 09:13

## 2021-12-12 RX ADMIN — IPRATROPIUM BROMIDE AND ALBUTEROL SULFATE 3 ML: .5; 3 SOLUTION RESPIRATORY (INHALATION) at 09:45

## 2021-12-12 RX ADMIN — IPRATROPIUM BROMIDE 0.5 MG: 0.5 SOLUTION RESPIRATORY (INHALATION) at 21:31

## 2021-12-12 RX ADMIN — HEPARIN SODIUM 5000 UNITS: 5000 INJECTION INTRAVENOUS; SUBCUTANEOUS at 03:03

## 2021-12-12 RX ADMIN — SODIUM CHLORIDE 125 ML/HR: 4.5 INJECTION, SOLUTION INTRAVENOUS at 12:33

## 2021-12-12 RX ADMIN — AMIODARONE HYDROCHLORIDE 1 MG/MIN: 50 INJECTION, SOLUTION INTRAVENOUS at 13:03

## 2021-12-12 RX ADMIN — VANCOMYCIN HYDROCHLORIDE 1250 MG: 1.25 INJECTION, POWDER, LYOPHILIZED, FOR SOLUTION INTRAVENOUS at 03:03

## 2021-12-12 RX ADMIN — ACETAMINOPHEN 1000 MG: 650 SUPPOSITORY RECTAL at 03:02

## 2021-12-12 RX ADMIN — MEROPENEM 500 MG: 500 INJECTION, POWDER, FOR SOLUTION INTRAVENOUS at 21:17

## 2021-12-12 RX ADMIN — BUPROPION HYDROCHLORIDE 100 MG: 100 TABLET, EXTENDED RELEASE ORAL at 09:13

## 2021-12-12 RX ADMIN — NALOXEGOL OXALATE 25 MG: 25 TABLET, FILM COATED ORAL at 09:13

## 2021-12-12 RX ADMIN — ESCITALOPRAM OXALATE 10 MG: 10 TABLET ORAL at 09:13

## 2021-12-12 RX ADMIN — FUROSEMIDE 40 MG: 10 INJECTION, SOLUTION INTRAVENOUS at 11:01

## 2021-12-12 RX ADMIN — AMIODARONE HYDROCHLORIDE 0.5 MG/MIN: 50 INJECTION, SOLUTION INTRAVENOUS at 21:15

## 2021-12-12 RX ADMIN — FAMOTIDINE 20 MG: 10 INJECTION, SOLUTION INTRAVENOUS at 21:17

## 2021-12-12 NOTE — PROGRESS NOTES
Admit Date: 2021    POD 6 Days Post-Op    Procedure:  Procedure(s):  RADICAL CYSTECTOMY, URETHRECTOMY, PROSTATECTOMY, BILATERAL LYMPHADENECTOMY,  ILEAL CONDUIT DIVERSION (E R A S)    Subjective:     Patient has no complaints. Alert and oriented, having BM's , no pain    Objective:     Blood pressure (!) 158/92, pulse 95, temperature 98.2 °F (36.8 °C), resp. rate 22, height 5' 11\" (1.803 m), weight 123.5 kg (272 lb 4.3 oz), SpO2 98 %. Temp (24hrs), Av.9 °F (37.7 °C), Min:98.2 °F (36.8 °C), Max:100.9 °F (38.3 °C)      Physical Exam:  GENERAL: alert, cooperative, no distress, appears stated age, ABDOMEN: soft, non-tender. Bowel sounds normal. No masses,  no organomegaly, stoma pink and viable, stents in place    Labs:   Recent Results (from the past 48 hour(s))   EKG, 12 LEAD, INITIAL    Collection Time: 12/10/21 10:00 AM   Result Value Ref Range    Ventricular Rate 98 BPM    Atrial Rate 98 BPM    P-R Interval 184 ms    QRS Duration 150 ms    Q-T Interval 422 ms    QTC Calculation (Bezet) 538 ms    Calculated P Axis 64 degrees    Calculated R Axis 0 degrees    Calculated T Axis 71 degrees    Diagnosis       Normal sinus rhythm  Left bundle branch block  Abnormal ECG  When compared with ECG of 2021 12:46,  QT has lengthened     CULTURE, BLOOD, PAIRED    Collection Time: 12/10/21  3:38 PM    Specimen: Blood   Result Value Ref Range    Special Requests: NO SPECIAL REQUESTS      Culture result: (A)       GRAM NEGATIVE RODS GROWING IN 3 OF 4 BOTTLES DRAWN SITE = L ARM AND R ARM    Culture result: REMAINING BOTTLE(S) HAS/HAVE NO GROWTH SO FAR      Culture result:       (NOTE) GNR GROWING IN TWO OF FOUR BOTTLES, SITE = L ARM.  REPORTED TO AND REPEATED BACK BY Angel Paul RN @ 6921 ON 21 ([address] [place] [address])   CBC WITH AUTOMATED DIFF    Collection Time: 12/10/21 10:18 PM   Result Value Ref Range    WBC 9.9 4.1 - 11.1 K/uL    RBC 2.22 (L) 4.10 - 5.70 M/uL    HGB 7.8 (L) 12.1 - 17.0 g/dL    HCT 24.8 (L) 36.6 - 50.3 %    MCV 111.7 (H) 80.0 - 99.0 FL    MCH 35.1 (H) 26.0 - 34.0 PG    MCHC 31.5 30.0 - 36.5 g/dL    RDW 14.9 (H) 11.5 - 14.5 %    PLATELET 524 (L) 134 - 400 K/uL    MPV 9.1 8.9 - 12.9 FL    NRBC 0.0 0  WBC    ABSOLUTE NRBC 0.00 0.00 - 0.01 K/uL    NEUTROPHILS 88 (H) 32 - 75 %    LYMPHOCYTES 1 (L) 12 - 49 %    MONOCYTES 10 5 - 13 %    EOSINOPHILS 0 0 - 7 %    BASOPHILS 0 0 - 1 %    IMMATURE GRANULOCYTES 1 (H) 0.0 - 0.5 %    ABS. NEUTROPHILS 8.7 (H) 1.8 - 8.0 K/UL    ABS. LYMPHOCYTES 0.1 (L) 0.8 - 3.5 K/UL    ABS. MONOCYTES 1.0 0.0 - 1.0 K/UL    ABS. EOSINOPHILS 0.0 0.0 - 0.4 K/UL    ABS. BASOPHILS 0.0 0.0 - 0.1 K/UL    ABS. IMM. GRANS. 0.1 (H) 0.00 - 0.04 K/UL    DF SMEAR SCANNED      RBC COMMENTS MACROCYTOSIS  1+       METABOLIC PANEL, COMPREHENSIVE    Collection Time: 12/10/21 10:18 PM   Result Value Ref Range    Sodium 140 136 - 145 mmol/L    Potassium 3.4 (L) 3.5 - 5.1 mmol/L    Chloride 110 (H) 97 - 108 mmol/L    CO2 22 21 - 32 mmol/L    Anion gap 8 5 - 15 mmol/L    Glucose 134 (H) 65 - 100 mg/dL    BUN 29 (H) 6 - 20 MG/DL    Creatinine 1.35 (H) 0.70 - 1.30 MG/DL    BUN/Creatinine ratio 21 (H) 12 - 20      GFR est AA >60 >60 ml/min/1.73m2    GFR est non-AA 52 (L) >60 ml/min/1.73m2    Calcium 8.7 8.5 - 10.1 MG/DL    Bilirubin, total 0.5 0.2 - 1.0 MG/DL    ALT (SGPT) 22 12 - 78 U/L    AST (SGOT) 20 15 - 37 U/L    Alk.  phosphatase 66 45 - 117 U/L    Protein, total 6.3 (L) 6.4 - 8.2 g/dL    Albumin 2.5 (L) 3.5 - 5.0 g/dL    Globulin 3.8 2.0 - 4.0 g/dL    A-G Ratio 0.7 (L) 1.1 - 2.2     PROCALCITONIN    Collection Time: 12/10/21 10:18 PM   Result Value Ref Range    Procalcitonin 4.42 ng/mL   ETHYL ALCOHOL    Collection Time: 12/10/21 10:18 PM   Result Value Ref Range    ALCOHOL(ETHYL),SERUM <10 <10 MG/DL   GLUCOSE, POC    Collection Time: 12/11/21  1:02 AM   Result Value Ref Range    Glucose (POC) 143 (H) 65 - 117 mg/dL    Performed by Mervin Snyder   PCT    POC G3 - PUL    Collection Time: 12/11/21  1:08 AM   Result Value Ref Range    FIO2 (POC) 100 %    pH (POC) 7.25 (L) 7.35 - 7.45      pCO2 (POC) 42.8 35.0 - 45.0 MMHG    pO2 (POC) 107 (H) 80 - 100 MMHG    HCO3 (POC) 18.8 (L) 22 - 26 MMOL/L    sO2 (POC) 97.2 (H) 92 - 97 %    Base deficit (POC) 8.0 mmol/L    Site RIGHT BRACHIAL      Device: Non rebreather      Specimen type (POC) ARTERIAL     LACTIC ACID    Collection Time: 12/11/21  1:23 AM   Result Value Ref Range    Lactic acid 3.4 (HH) 0.4 - 2.0 MMOL/L   AMMONIA    Collection Time: 12/11/21  1:23 AM   Result Value Ref Range    Ammonia 21 <32 UMOL/L   CULTURE, MRSA    Collection Time: 12/11/21  1:58 AM    Specimen: Nasal   Result Value Ref Range    Special Requests: NO SPECIAL REQUESTS      Culture result: MRSA NOT PRESENT      Culture result:        Screening of patient nares for MRSA is for surveillance purposes and, if positive, to facilitate isolation considerations in high risk settings. It is not intended for automatic decolonization interventions per se as regimens are not sufficiently effective to warrant routine use.    HGB & HCT    Collection Time: 12/11/21  2:02 AM   Result Value Ref Range    HGB 7.6 (L) 12.1 - 17.0 g/dL    HCT 24.4 (L) 36.6 - 15.9 %   METABOLIC PANEL, BASIC    Collection Time: 12/11/21  2:02 AM   Result Value Ref Range    Sodium 139 136 - 145 mmol/L    Potassium 3.4 (L) 3.5 - 5.1 mmol/L    Chloride 109 (H) 97 - 108 mmol/L    CO2 21 21 - 32 mmol/L    Anion gap 9 5 - 15 mmol/L    Glucose 130 (H) 65 - 100 mg/dL    BUN 30 (H) 6 - 20 MG/DL    Creatinine 1.51 (H) 0.70 - 1.30 MG/DL    BUN/Creatinine ratio 20 12 - 20      GFR est AA 55 (L) >60 ml/min/1.73m2    GFR est non-AA 45 (L) >60 ml/min/1.73m2    Calcium 8.4 (L) 8.5 - 10.1 MG/DL   URINALYSIS W/MICROSCOPIC    Collection Time: 12/11/21  3:03 AM   Result Value Ref Range    Color YELLOW/STRAW      Appearance CLEAR CLEAR      Specific gravity >1.030     pH (UA) 6.5 5.0 - 8.0      Protein 100 (A) NEG mg/dL    Glucose Negative NEG mg/dL    Ketone Negative NEG mg/dL    Bilirubin Negative NEG      Blood LARGE (A) NEG      Urobilinogen 1.0 0.2 - 1.0 EU/dL    Nitrites Positive (A) NEG      Leukocyte Esterase MODERATE (A) NEG      WBC  0 - 4 /hpf    RBC  0 - 5 /hpf    Epithelial cells FEW FEW /lpf    Bacteria 1+ (A) NEG /hpf    Hyaline cast 0-2 0 - 5 /lpf   DRUG SCREEN, URINE    Collection Time: 12/11/21  3:03 AM   Result Value Ref Range    AMPHETAMINES Negative NEG      BARBITURATES Negative NEG      BENZODIAZEPINES Negative NEG      COCAINE Negative NEG      METHADONE Negative NEG      OPIATES Negative NEG      PCP(PHENCYCLIDINE) Negative NEG      THC (TH-CANNABINOL) Negative NEG      Drug screen comment (NOTE)    LACTIC ACID    Collection Time: 12/11/21 12:00 PM   Result Value Ref Range    Lactic acid 0.7 0.4 - 2.0 MMOL/L   ECHO ADULT COMPLETE    Collection Time: 12/11/21  3:23 PM   Result Value Ref Range    IVSd 1.04 (A) 0.6 - 1.0 cm    LVIDd 5.70 4.2 - 5.9 cm    LVIDs 3.91 cm    LVOT d 2.00 cm    LVPWd 1.18 (A) 0.6 - 1.0 cm    LVOT Peak Gradient 5.91 mmHg    LVOT SV 68.7 mL    LVOT Peak Velocity 121.57 cm/s    LVOT VTI 21.77 cm    Left Atrium Major Axis 4.88 cm    LA Area 4C 31.07 cm2    LA Vol 4C 106.59 (A) 18 - 58 mL    Aortic Valve Area by Continuity of Peak Velocity 2.45 cm2    AoV PG 9.82 mmHg    Aortic Valve Systolic Peak Velocity 389.49 cm/s    MV A Trung 114.56 cm/s    Mitral Valve E Wave Deceleration Time 111.33 ms    MV E Trung 80.75 cm/s    E/E' lateral 9.68     E/E' septal 16.21     LV E' Lateral Velocity 8.34 cm/s    LV E' Septal Velocity 4.98 cm/s    Mitral Valve Pressure Half-time 32.29 ms    MVA (PHT) 6.81 cm2    Pulmonic Valve Systolic Peak Instantaneous Gradient 3.68 mmHg    Tapse 2.77 (A) 1.5 - 2.0 cm    Ao Root D 4.12 cm    MV E/A 0.70     LV Mass .9 88 - 224 g    LV Mass AL Index 109.6 49 - 115 g/m2    E/E' ratio (averaged) 12.95     Left Atrium Minor Axis 2.06 cm    LA Vol Index 44.97 16 - 28 ml/m2    PEDRO/BSA Pk Trung 1.0 cm2/m2 MAGNESIUM    Collection Time: 12/12/21  5:53 AM   Result Value Ref Range    Magnesium 2.2 1.6 - 2.4 mg/dL   CBC WITH AUTOMATED DIFF    Collection Time: 12/12/21  5:53 AM   Result Value Ref Range    WBC 6.4 4.1 - 11.1 K/uL    RBC 2.21 (L) 4.10 - 5.70 M/uL    HGB 7.7 (L) 12.1 - 17.0 g/dL    HCT 25.1 (L) 36.6 - 50.3 %    .6 (H) 80.0 - 99.0 FL    MCH 34.8 (H) 26.0 - 34.0 PG    MCHC 30.7 30.0 - 36.5 g/dL    RDW 15.5 (H) 11.5 - 14.5 %    PLATELET 025 (L) 590 - 400 K/uL    MPV 9.8 8.9 - 12.9 FL    NRBC 0.0 0  WBC    ABSOLUTE NRBC 0.00 0.00 - 0.01 K/uL    NEUTROPHILS 85 (H) 32 - 75 %    LYMPHOCYTES 4 (L) 12 - 49 %    MONOCYTES 9 5 - 13 %    EOSINOPHILS 1 0 - 7 %    BASOPHILS 0 0 - 1 %    IMMATURE GRANULOCYTES 1 (H) 0.0 - 0.5 %    ABS. NEUTROPHILS 5.3 1.8 - 8.0 K/UL    ABS. LYMPHOCYTES 0.3 (L) 0.8 - 3.5 K/UL    ABS. MONOCYTES 0.6 0.0 - 1.0 K/UL    ABS. EOSINOPHILS 0.1 0.0 - 0.4 K/UL    ABS. BASOPHILS 0.0 0.0 - 0.1 K/UL    ABS. IMM.  GRANS. 0.1 (H) 0.00 - 0.04 K/UL    DF SMEAR SCANNED      RBC COMMENTS ANISOCYTOSIS  1+        RBC COMMENTS MACROCYTOSIS  2+       METABOLIC PANEL, BASIC    Collection Time: 12/12/21  6:05 AM   Result Value Ref Range    Sodium 150 (H) 136 - 145 mmol/L    Potassium 3.7 3.5 - 5.1 mmol/L    Chloride 120 (H) 97 - 108 mmol/L    CO2 21 21 - 32 mmol/L    Anion gap 9 5 - 15 mmol/L    Glucose 109 (H) 65 - 100 mg/dL    BUN 42 (H) 6 - 20 MG/DL    Creatinine 1.36 (H) 0.70 - 1.30 MG/DL    BUN/Creatinine ratio 31 (H) 12 - 20      GFR est AA >60 >60 ml/min/1.73m2    GFR est non-AA 51 (L) >60 ml/min/1.73m2    Calcium 8.7 8.5 - 10.1 MG/DL       Data Review images and reports reviewed    Assessment:     Active Problems:    Bladder cancer ([facility] [address] (6/23/2021)        Plan/Recommendations/Medical Decision Making:     Continue present treatment

## 2021-12-12 NOTE — PROGRESS NOTES
Pt HR still sustaining in the 150s-160s. Pt maxed out on the Cardizem gtt. Sue Eric MD paged and made aware. Provider will place orders for amiodarone bolus/gtt.

## 2021-12-12 NOTE — PROGRESS NOTES
Bedside, Verbal and Written shift change report given to CIRA Lacy (oncoming nurse) by Stefanie Parikh RN (offgoing nurse).  Report included the following information SBAR, Kardex, ED Summary, Intake/Output, Med Rec Status and Cardiac Rhythm NSR

## 2021-12-12 NOTE — PROGRESS NOTES
Pt in a.fib RVR. HR sustaining in the 150s-160s. Pt feels some palpitations in his chest but otherwise asymptomatic. Jean Paul Auguste MD at the bedside and ordered Cardizem bolus/gtt.

## 2021-12-12 NOTE — PROGRESS NOTES
[facility] [facility] Adult  Hospitalist Group                                                                                          Hospitalist Progress Note  Reynaldo Keane MD  Answering service: [phone] from in house phone        Date of Service:  2021  NAME:  Hanane Spangler  :  1945  MRN:  [id number]      Admission Summary:   Copied form admit: \" Admitted for multiple urological surgeries. Medicine consulted for medical management of dyspnea and mental status changes. Pt with known hx of heavy etoh use as well as hx of etoh withdrawal with previous hospitalizations. Wife at bedside confirms he drinks 1 bottle of wine and 3 whiskey shots qhs. Also with reports of sarcoid and increasing dyspnea since admission. Able to answer questions and denies any cough, chest pain or palpitations. \"    Interval history / Subjective:     2021 :    Intermittently confused    Afib w rvr - responed to dilt bolus/drip then failed to respond: -> amiodarone bolus/drip started    No tremors   Wife at bedside noted pt did this in 2021 w sepsis as to afib/rvr as well as w/d's        Assessment & Plan:     ETOH Withdrawal  -CIWA protocol with ativan  -Phenobarb dosing and monitoring on going for 2021:   Intermittently confused    No tremors  · Wife at bedside noted pt did this in 2021 w sepsis as to afib/rvr as well as w/d's         Afib w RVR   - am of 2021 :  Afib w rvr - responed to dilt bolus/drip then failed to respond:  - -> amiodarone bolus/drip started after dilt fails  Also pt given empiric lasix x 1 iv 2n2 wheezing at time ; on reeval, pt s elevated S Na and no wheezing and over all appears dry, ivf added     Hypernatremia 2021 :  - Added 1/2 NS 2021     AMS  Likely due to ETOH withdrawal given hx og heavy etoh use  -R/O infectious etiology: uti vs cellulitis vs intraabdominal  -Obtain cbc, cmp, ua, procal, blood cx  -Procal 4, f/u lab for comparison, is on broad abx   - as above      3. Dyspnea / Pulmonary edema / Sarcoid  -Multifactorial dyspnea  -Puml edema, dilated cardiomypathy and RHF  -??aLcoholic cardiomyopathy  -Lasix 20mg x1 dose  -Consider ECHO in AM  - no s/s of hf. Will give additional ivf bolus given current findings of lactic acidosis, sepsis/etoh w/d      4. Severe SEPSIS  -STAT CT chest, abd et pelvi  GNRs on cutl blood 12/10 ID /sens pending,   -Start vanc and Meropenem ( dropping Vanc on 12/12/2021 )  - CT: 12/11 am: 1. Interval radical cystoprostatectomy with ileal conduit urinary diversion and  no postoperative complication identified    Renal at risk; per CT finding 12/11 concerning for ATN, watching renal's ns bolus , ivf. aovid nephrotoxic else renal dose meds   Lab Results   Component Value Date/Time    Creatinine 1.36 (H) 12/12/2021 06:05 AM              DVT PPX =heparin      Hospital Problems  Date Reviewed: 10/14/2021          Codes Class Noted POA    [facility] ICD-10-CM: C67.9  ICD-9-CM: 188.9  6/23/2021 Unknown                  After personally interviewing pt at bedside the following is noted on 12/12/2021 :    Review of Systems   Reason unable to perform ROS: Intermittantly confused re orients well               I had a face to face encounter with patient on 12/12/2021 at bedside for the following physical exam:     PHYSICAL EXAM:    Visit Vitals  /75   Pulse (!) 155   Temp 97.8 °F (36.6 °C)   Resp (!) 41   Ht 5' 11\" (1.803 m)   Wt 123.5 kg (272 lb 4.3 oz)   SpO2 94%   BMI 37.97 kg/m²          Physical Exam  Constitutional:       General: He is in acute distress. Appearance: He is obese. He is not ill-appearing, toxic-appearing or diaphoretic. HENT:      Head: Normocephalic and atraumatic. Right Ear: External ear normal.      Left Ear: External ear normal.      Nose: Nose normal.      Mouth/Throat:      Mouth: Mucous membranes are dry. Pharynx: Oropharynx is clear.    Eyes: Extraocular Movements: Extraocular movements intact. Conjunctiva/sclera: Conjunctivae normal.      Pupils: Pupils are equal, round, and reactive to light. Cardiovascular:      Heart sounds: No murmur heard. No gallop. Comments: afib rvr  Pulmonary:      Effort: Pulmonary effort is normal. No respiratory distress. Breath sounds: Wheezing present. Abdominal:      General: Abdomen is flat. Palpations: Abdomen is soft. Musculoskeletal:         General: No swelling or deformity. Cervical back: Normal range of motion and neck supple. Skin:     Coloration: Skin is not jaundiced or pale. Neurological:      General: No focal deficit present. Mental Status: He is alert. Comments: Intermittently confused, re-orients well    Psychiatric:         Mood and Affect: Mood normal.         Behavior: Behavior normal.                     Data Review:    Review and/or order of clinical lab test      Labs:     Recent Labs     12/12/21  0553 12/11/21  0202 12/10/21  2218 12/10/21  2218   WBC 6.4  --   --  9.9   HGB 7.7* 7.6*   < > 7.8*   HCT 25.1* 24.4*   < > 24.8*   *  --   --  128*    < > = values in this interval not displayed. Recent Labs     12/12/21  0605 12/12/21  0553 12/11/21  0202 12/10/21  2218   *  --  139 140   K 3.7  --  3.4* 3.4*   *  --  109* 110*   CO2 21  --  21 22   BUN 42*  --  30* 29*   CREA 1.36*  --  1.51* 1.35*   *  --  130* 134*   CA 8.7  --  8.4* 8.7   MG  --  2.2  --   --      Recent Labs     12/10/21  2218   ALT 22   AP 66   TBILI 0.5   TP 6.3*   ALB 2.5*   GLOB 3.8     No results for input(s): INR, PTP, APTT, INREXT, INREXT in the last 72 hours. No results for input(s): FE, TIBC, PSAT, FERR in the last 72 hours. No results found for: FOL, RBCF   No results for input(s): PH, PCO2, PO2 in the last 72 hours. No results for input(s): CPK, CKNDX, TROIQ in the last 72 hours.     No lab exists for component: CPKMB  No results found for: CHOL, CHOLX, CHLST, CHOLV, HDL, HDLP, LDL, LDLC, DLDLP, TGLX, TRIGL, TRIGP, CHHD, CHHDX  Lab Results   Component Value Date/Time    Glucose (POC) 143 (H) 12/11/2021 01:02 AM    Glucose (POC) 93 12/01/2009 07:10 AM     Lab Results   Component Value Date/Time    Color YELLOW/STRAW 12/11/2021 03:03 AM    Appearance CLEAR 12/11/2021 03:03 AM    Specific gravity >1.030 12/11/2021 03:03 AM    Specific gravity 1.016 06/17/2021 09:56 AM    pH (UA) 6.5 12/11/2021 03:03 AM    Protein 100 (A) 12/11/2021 03:03 AM    Glucose Negative 12/11/2021 03:03 AM    Ketone Negative 12/11/2021 03:03 AM    Bilirubin Negative 12/11/2021 03:03 AM    Urobilinogen 1.0 12/11/2021 03:03 AM    Nitrites Positive (A) 12/11/2021 03:03 AM    Leukocyte Esterase MODERATE (A) 12/11/2021 03:03 AM    Epithelial cells FEW 12/11/2021 03:03 AM    Bacteria 1+ (A) 12/11/2021 03:03 AM    WBC  12/11/2021 03:03 AM    RBC  12/11/2021 03:03 AM         Medications Reviewed:     Current Facility-Administered Medications   Medication Dose Route Frequency    [START ON 12/13/2021] Vancomycin Random - Please draw level on 12/13 @ 0600, thanks!    Other ONCE    amiodarone (CORDARONE) 375 mg/250 mL D5W infusion  0.5-1 mg/min IntraVENous TITRATE    [START ON 12/13/2021] thiamine HCL (B-1) tablet 100 mg  100 mg Oral DAILY    [START ON 37/15/8284] folic acid (FOLVITE) tablet 1 mg  1 mg Oral DAILY    0.45% sodium chloride infusion  125 mL/hr IntraVENous CONTINUOUS    meropenem (MERREM) 500 mg in sterile water (preservative free) 10 mL IV syringe  0.5 g IntraVENous Q6H    Vancomycin Pharmacy Dosing   Other Rx Dosing/Monitoring    acetaminophen (TYLENOL) suppository 1,000 mg  1,000 mg Rectal Q6H    vancomycin (VANCOCIN) 1,250 mg in 0.9% sodium chloride 250 mL (VIAL-MATE)  1,250 mg IntraVENous Q24H    LORazepam (ATIVAN) tablet 4 mg  4 mg Oral Q1H PRN    LORazepam (ATIVAN) injection 2 mg  2 mg IntraVENous Q1H PRN    LORazepam (ATIVAN) injection 4 mg  4 mg IntraVENous Q1H PRN    multivitamin, tx-iron-ca-min (THERA-M w/ IRON) tablet 1 Tablet  1 Tablet Oral DAILY    alteplase (CATHFLO) 1 mg in sterile water (preservative free) 1 mL injection  1 mg InterCATHeter PRN    bacitracin 500 unit/gram packet 1 Packet  1 Packet Topical PRN    albuterol-ipratropium (DUO-NEB) 2.5 MG-0.5 MG/3 ML  3 mL Nebulization Q6H PRN    LORazepam (ATIVAN) injection 1 mg  1 mg IntraVENous Q2H PRN    LORazepam (ATIVAN) injection 2 mg  2 mg IntraVENous Q2H PRN    ketorolac (TORADOL) injection 15 mg  15 mg IntraVENous Q6H PRN    traMADoL (ULTRAM) tablet 50 mg  50 mg Oral Q6H PRN    gabapentin (NEURONTIN) capsule 200 mg  200 mg Oral BID PRN    celecoxib (CELEBREX) capsule 100 mg  100 mg Oral BID PRN    [Held by provider] lactated Ringers infusion  75 mL/hr IntraVENous CONTINUOUS    ondansetron (ZOFRAN ODT) tablet 4 mg  4 mg Oral Q8H PRN    Or    ondansetron (ZOFRAN) injection 4 mg  4 mg IntraVENous Q6H PRN    [Held by provider] naloxegoL (MOVANTIK) tablet 25 mg  25 mg Oral DAILY    sodium chloride 0.9 % bolus infusion 500 mL  500 mL IntraVENous PRN    famotidine (PF) (PEPCID) 20 mg in 0.9% sodium chloride 10 mL injection  20 mg IntraVENous Q12H    heparin (porcine) injection 5,000 Units  5,000 Units SubCUTAneous Q12H    buPROPion SR (WELLBUTRIN SR) tablet 100 mg  100 mg Oral DAILY    busPIRone (BUSPAR) tablet 5 mg  5 mg Oral DAILY    escitalopram oxalate (LEXAPRO) tablet 10 mg  10 mg Oral DAILY    diphenhydrAMINE (BENADRYL) injection 12.5 mg  12.5 mg IntraVENous Q4H PRN     ______________________________________________________________________  EXPECTED LENGTH OF STAY: 5d 14h  ACTUAL LENGTH OF STAY:          6                 Tona Trejo MD

## 2021-12-12 NOTE — PROGRESS NOTES
Problem: Falls - Risk of  Goal: *Absence of Falls  Description: Document Karen Aponte Fall Risk and appropriate interventions in the flowsheet. Outcome: Progressing Towards Goal  Note: Fall Risk Interventions:  Mobility Interventions: Communicate number of staff needed for ambulation/transfer    Mentation Interventions: Adequate sleep, hydration, pain control, Door open when patient unattended, Eyeglasses and hearing aids, More frequent rounding, Reorient patient    Medication Interventions: Assess postural VS orthostatic hypotension, Evaluate medications/consider consulting pharmacy    Elimination Interventions: Call light in reach    History of Falls Interventions: Door open when patient unattended         Problem: Patient Education: Go to Patient Education Activity  Goal: Patient/Family Education  Outcome: Progressing Towards Goal     Problem: Pressure Injury - Risk of  Goal: *Prevention of pressure injury  Description: Document Hunter Scale and appropriate interventions in the flowsheet.   Outcome: Progressing Towards Goal  Note: Pressure Injury Interventions:  Sensory Interventions: Assess changes in LOC, Assess need for specialty bed, Float heels, Minimize linen layers    Moisture Interventions: Absorbent underpads, Assess need for specialty bed, Contain wound drainage, Minimize layers    Activity Interventions: Assess need for specialty bed, Pressure redistribution bed/mattress(bed type), PT/OT evaluation    Mobility Interventions: Assess need for specialty bed, HOB 30 degrees or less    Nutrition Interventions: Document food/fluid/supplement intake, Discuss nutritional consult with provider, Offer support with meals,snacks and hydration    Friction and Shear Interventions: Apply protective barrier, creams and emollients, HOB 30 degrees or less, Lift sheet, Minimize layers

## 2021-12-13 LAB
ANION GAP SERPL CALC-SCNC: 5 MMOL/L (ref 5–15)
ATRIAL RATE: 93 BPM
BASOPHILS # BLD: 0 K/UL (ref 0–0.1)
BASOPHILS NFR BLD: 0 % (ref 0–1)
BNP SERPL-MCNC: 7888 PG/ML
BUN SERPL-MCNC: 40 MG/DL (ref 6–20)
BUN/CREAT SERPL: 29 (ref 12–20)
CALCIUM SERPL-MCNC: 9 MG/DL (ref 8.5–10.1)
CALCULATED P AXIS, ECG09: 35 DEGREES
CALCULATED R AXIS, ECG10: -30 DEGREES
CALCULATED T AXIS, ECG11: 90 DEGREES
CHLORIDE SERPL-SCNC: 111 MMOL/L (ref 97–108)
CO2 SERPL-SCNC: 24 MMOL/L (ref 21–32)
CREAT SERPL-MCNC: 1.37 MG/DL (ref 0.7–1.3)
DIAGNOSIS, 93000: NORMAL
DIFFERENTIAL METHOD BLD: ABNORMAL
EOSINOPHIL # BLD: 0.1 K/UL (ref 0–0.4)
EOSINOPHIL NFR BLD: 1 % (ref 0–7)
ERYTHROCYTE [DISTWIDTH] IN BLOOD BY AUTOMATED COUNT: 16 % (ref 11.5–14.5)
GLUCOSE SERPL-MCNC: 116 MG/DL (ref 65–100)
HCT VFR BLD AUTO: 24.5 % (ref 36.6–50.3)
HGB BLD-MCNC: 7.6 G/DL (ref 12.1–17)
IMM GRANULOCYTES # BLD AUTO: 0.1 K/UL (ref 0–0.04)
IMM GRANULOCYTES NFR BLD AUTO: 1 % (ref 0–0.5)
LYMPHOCYTES # BLD: 0.6 K/UL (ref 0.8–3.5)
LYMPHOCYTES NFR BLD: 7 % (ref 12–49)
MCH RBC QN AUTO: 34.5 PG (ref 26–34)
MCHC RBC AUTO-ENTMCNC: 31 G/DL (ref 30–36.5)
MCV RBC AUTO: 111.4 FL (ref 80–99)
MONOCYTES # BLD: 0.8 K/UL (ref 0–1)
MONOCYTES NFR BLD: 10 % (ref 5–13)
NEUTS SEG # BLD: 6.3 K/UL (ref 1.8–8)
NEUTS SEG NFR BLD: 81 % (ref 32–75)
NRBC # BLD: 0 K/UL (ref 0–0.01)
NRBC BLD-RTO: 0 PER 100 WBC
P-R INTERVAL, ECG05: 182 MS
PLATELET # BLD AUTO: 156 K/UL (ref 150–400)
PMV BLD AUTO: 10.1 FL (ref 8.9–12.9)
POTASSIUM SERPL-SCNC: 3.2 MMOL/L (ref 3.5–5.1)
Q-T INTERVAL, ECG07: 428 MS
QRS DURATION, ECG06: 152 MS
QTC CALCULATION (BEZET), ECG08: 532 MS
RBC # BLD AUTO: 2.2 M/UL (ref 4.1–5.7)
RBC MORPH BLD: ABNORMAL
SODIUM SERPL-SCNC: 140 MMOL/L (ref 136–145)
VENTRICULAR RATE, ECG03: 93 BPM
WBC # BLD AUTO: 7.9 K/UL (ref 4.1–11.1)

## 2021-12-13 PROCEDURE — 74011000250 HC RX REV CODE- 250: Performed by: INTERNAL MEDICINE

## 2021-12-13 PROCEDURE — 74011250637 HC RX REV CODE- 250/637: Performed by: UROLOGY

## 2021-12-13 PROCEDURE — 74011000250 HC RX REV CODE- 250: Performed by: NURSE PRACTITIONER

## 2021-12-13 PROCEDURE — 74011250636 HC RX REV CODE- 250/636: Performed by: NURSE PRACTITIONER

## 2021-12-13 PROCEDURE — 87040 BLOOD CULTURE FOR BACTERIA: CPT

## 2021-12-13 PROCEDURE — 74011000250 HC RX REV CODE- 250: Performed by: STUDENT IN AN ORGANIZED HEALTH CARE EDUCATION/TRAINING PROGRAM

## 2021-12-13 PROCEDURE — 74011250636 HC RX REV CODE- 250/636: Performed by: UROLOGY

## 2021-12-13 PROCEDURE — 97535 SELF CARE MNGMENT TRAINING: CPT

## 2021-12-13 PROCEDURE — 83880 ASSAY OF NATRIURETIC PEPTIDE: CPT

## 2021-12-13 PROCEDURE — 85025 COMPLETE CBC W/AUTO DIFF WBC: CPT

## 2021-12-13 PROCEDURE — 74011250637 HC RX REV CODE- 250/637: Performed by: INTERNAL MEDICINE

## 2021-12-13 PROCEDURE — 74011250636 HC RX REV CODE- 250/636: Performed by: INTERNAL MEDICINE

## 2021-12-13 PROCEDURE — 93005 ELECTROCARDIOGRAM TRACING: CPT

## 2021-12-13 PROCEDURE — 36415 COLL VENOUS BLD VENIPUNCTURE: CPT

## 2021-12-13 PROCEDURE — 74011250637 HC RX REV CODE- 250/637: Performed by: NURSE PRACTITIONER

## 2021-12-13 PROCEDURE — 74011250636 HC RX REV CODE- 250/636: Performed by: STUDENT IN AN ORGANIZED HEALTH CARE EDUCATION/TRAINING PROGRAM

## 2021-12-13 PROCEDURE — 74011000258 HC RX REV CODE- 258: Performed by: INTERNAL MEDICINE

## 2021-12-13 PROCEDURE — 65660000001 HC RM ICU INTERMED STEPDOWN

## 2021-12-13 PROCEDURE — 94640 AIRWAY INHALATION TREATMENT: CPT

## 2021-12-13 PROCEDURE — 97530 THERAPEUTIC ACTIVITIES: CPT

## 2021-12-13 PROCEDURE — 80048 BASIC METABOLIC PNL TOTAL CA: CPT

## 2021-12-13 RX ORDER — POTASSIUM CHLORIDE 750 MG/1
20 TABLET, FILM COATED, EXTENDED RELEASE ORAL 2 TIMES DAILY
Status: DISCONTINUED | OUTPATIENT
Start: 2021-12-13 | End: 2021-12-14 | Stop reason: ALTCHOICE

## 2021-12-13 RX ORDER — METOPROLOL TARTRATE 50 MG/1
50 TABLET ORAL 2 TIMES DAILY
Status: DISCONTINUED | OUTPATIENT
Start: 2021-12-13 | End: 2021-12-21 | Stop reason: HOSPADM

## 2021-12-13 RX ORDER — ACETAMINOPHEN 500 MG
1000 TABLET ORAL EVERY 6 HOURS
Status: DISCONTINUED | OUTPATIENT
Start: 2021-12-13 | End: 2021-12-21 | Stop reason: HOSPADM

## 2021-12-13 RX ORDER — BENZONATATE 100 MG/1
100 CAPSULE ORAL
Status: DISCONTINUED | OUTPATIENT
Start: 2021-12-13 | End: 2021-12-21 | Stop reason: HOSPADM

## 2021-12-13 RX ORDER — POTASSIUM CHLORIDE 750 MG/1
40 TABLET, FILM COATED, EXTENDED RELEASE ORAL
Status: COMPLETED | OUTPATIENT
Start: 2021-12-13 | End: 2021-12-13

## 2021-12-13 RX ORDER — ACETAMINOPHEN 325 MG/1
650 TABLET ORAL
Status: DISCONTINUED | OUTPATIENT
Start: 2021-12-13 | End: 2021-12-21 | Stop reason: HOSPADM

## 2021-12-13 RX ORDER — FUROSEMIDE 10 MG/ML
40 INJECTION INTRAMUSCULAR; INTRAVENOUS EVERY 12 HOURS
Status: DISCONTINUED | OUTPATIENT
Start: 2021-12-13 | End: 2021-12-15

## 2021-12-13 RX ORDER — FAMOTIDINE 20 MG/1
20 TABLET, FILM COATED ORAL EVERY 12 HOURS
Status: DISCONTINUED | OUTPATIENT
Start: 2021-12-13 | End: 2021-12-21 | Stop reason: HOSPADM

## 2021-12-13 RX ADMIN — CEFTRIAXONE SODIUM 2 G: 2 INJECTION, POWDER, FOR SOLUTION INTRAMUSCULAR; INTRAVENOUS at 17:28

## 2021-12-13 RX ADMIN — ACETAMINOPHEN 650 MG: 325 TABLET ORAL at 03:44

## 2021-12-13 RX ADMIN — ESCITALOPRAM OXALATE 10 MG: 10 TABLET ORAL at 09:15

## 2021-12-13 RX ADMIN — MEROPENEM 500 MG: 500 INJECTION, POWDER, FOR SOLUTION INTRAVENOUS at 02:07

## 2021-12-13 RX ADMIN — HEPARIN SODIUM 5000 UNITS: 5000 INJECTION INTRAVENOUS; SUBCUTANEOUS at 15:20

## 2021-12-13 RX ADMIN — SODIUM CHLORIDE 125 ML/HR: 4.5 INJECTION, SOLUTION INTRAVENOUS at 07:17

## 2021-12-13 RX ADMIN — ACETAMINOPHEN 1000 MG: 500 TABLET ORAL at 16:15

## 2021-12-13 RX ADMIN — FOLIC ACID 1 MG: 1 TABLET ORAL at 09:15

## 2021-12-13 RX ADMIN — BENZONATATE 100 MG: 100 CAPSULE ORAL at 16:15

## 2021-12-13 RX ADMIN — MULTIPLE VITAMINS W/ MINERALS TAB 1 TABLET: TAB at 09:15

## 2021-12-13 RX ADMIN — AMIODARONE HYDROCHLORIDE 0.5 MG/MIN: 50 INJECTION, SOLUTION INTRAVENOUS at 11:06

## 2021-12-13 RX ADMIN — SODIUM CHLORIDE 125 ML/HR: 4.5 INJECTION, SOLUTION INTRAVENOUS at 15:14

## 2021-12-13 RX ADMIN — ACETAMINOPHEN 1000 MG: 500 TABLET ORAL at 21:05

## 2021-12-13 RX ADMIN — BUPROPION HYDROCHLORIDE 100 MG: 100 TABLET, EXTENDED RELEASE ORAL at 09:15

## 2021-12-13 RX ADMIN — METOPROLOL TARTRATE 50 MG: 50 TABLET, FILM COATED ORAL at 17:56

## 2021-12-13 RX ADMIN — POTASSIUM CHLORIDE 40 MEQ: 750 TABLET, FILM COATED, EXTENDED RELEASE ORAL at 09:17

## 2021-12-13 RX ADMIN — MEROPENEM 500 MG: 500 INJECTION, POWDER, FOR SOLUTION INTRAVENOUS at 09:15

## 2021-12-13 RX ADMIN — HEPARIN SODIUM 5000 UNITS: 5000 INJECTION INTRAVENOUS; SUBCUTANEOUS at 03:44

## 2021-12-13 RX ADMIN — Medication 100 MG: at 09:15

## 2021-12-13 RX ADMIN — IPRATROPIUM BROMIDE AND ALBUTEROL SULFATE 3 ML: .5; 3 SOLUTION RESPIRATORY (INHALATION) at 16:21

## 2021-12-13 RX ADMIN — FAMOTIDINE 20 MG: 10 INJECTION, SOLUTION INTRAVENOUS at 09:15

## 2021-12-13 RX ADMIN — BUSPIRONE HYDROCHLORIDE 5 MG: 5 TABLET ORAL at 09:15

## 2021-12-13 RX ADMIN — IPRATROPIUM BROMIDE 0.5 MG: 0.5 SOLUTION RESPIRATORY (INHALATION) at 04:56

## 2021-12-13 RX ADMIN — FAMOTIDINE 20 MG: 20 TABLET ORAL at 21:05

## 2021-12-13 RX ADMIN — POTASSIUM CHLORIDE 20 MEQ: 750 TABLET, FILM COATED, EXTENDED RELEASE ORAL at 21:05

## 2021-12-13 NOTE — PROGRESS NOTES
Patient: Hoda Ware MRN: [id number]  SSN: [id number]    YOB: 1945  Age: 77 y.o. Sex: male        ADMITTED: 2021 to Rayna Koo MD by Stefania Campa MD for Malignant neoplasm of urinary bladder, unspecified site [facility]) [C67.9]  Bladder cancer ([address] [C67.9]  POD# 7 Days Post-Op Procedure(s):  RADICAL CYSTECTOMY, URETHRECTOMY, PROSTATECTOMY, BILATERAL LYMPHADENECTOMY,  ILEAL CONDUIT DIVERSION (E R A S)    Hoda Ware is doing well vss afebrile , tolerating diet denies any N/V . + flatus  + Bowel sounds . RLQ stoma pink stents in place  NSR on tele , rate controlled for now still 100-112 overnight ,still on amiodarone  drip . Pt requesting to go home     Vitals: Temp (24hrs), Av.1 °F (36.7 °C), Min:97.7 °F (36.5 °C), Max:98.8 °F (37.1 °C)    Blood pressure (!) 127/91, pulse 85, temperature 98.1 °F (36.7 °C), resp. rate 27, height 5' 11\" (1.803 m), weight 126.7 kg (279 lb 5.2 oz), SpO2 95 %. Intake and Output:  1901 -  07  In: -   Out: 8059 [UIIJA:7746]   07 -  190  In: -   Out: 400 [Urine:400]  JASMINA Output lats 24 hrs: No data found. JASMINA Output last 8 hrs: No data found.   BM over last 24 hrs:   Patient Vitals for the past 24 hrs:   Stool Occurrence(s)   21 1   21 1720 1   21 1035 1       Exam:   EXAMINATION:    Appearance:  well-developed NAD, pleasant     Conjunctiva/Lids: conjunctivae and lids normal   External Ears/Nose:   normal no lesions or deformities   Neck:  supple   Respiratory Effort:  breathing easily on room air    Lower Extremity: no edema   Abdomen/Flank: soft non-tender without masses; no CVA tenderness, midline incision intact , drainage on dressing , stoma beefy red    Liver/Spleen: no organomegaly     Hernia: no ventral hernia    Gait/Station: Not assessed   Skin Inspection:  warm and dry   Mood/Affect: normal                          Labs:  CBC:   Lab Results   Component Value Date/Time    WBC 7.9 12/13/2021 02:32 AM    HCT 24.5 (L) 12/13/2021 02:32 AM    PLATELET 563 48/20/4950 02:32 AM     BMP:   Lab Results   Component Value Date/Time    Glucose 116 (H) 12/13/2021 02:32 AM    Sodium 140 12/13/2021 02:32 AM    Potassium 3.2 (L) 12/13/2021 02:32 AM    Chloride 111 (H) 12/13/2021 02:32 AM    CO2 24 12/13/2021 02:32 AM    BUN 40 (H) 12/13/2021 02:32 AM    Creatinine 1.37 (H) 12/13/2021 02:32 AM    Calcium 9.0 12/13/2021 02:32 AM     Cultures: N/A    Imaging: *N/A  Assessment/Plan:     1. Po#7 s/p RADICAL CYSTECTOMY, URETHRECTOMY, PROSTATECTOMY, BILATERAL LYMPHADENECTOMY,  ILEAL CONDUIT DIVERSION (E R A S) doing well , oob to chair for meals   2. Hypokalemia - K 3.2 - replaced with po potassium       Signed By: Jaspal Barillas.  Concepcion Rock NP - December 13, 2021

## 2021-12-13 NOTE — WOUND CARE
WOCN Ostomy Progress Note:      Follow up visit.       Surgery: Radical cystoprostatectomy, urethrectomy, bilateral pelvic lymphadenectomy, repair of umbilical hernia and ileal conduit urinary diversion. Date of Surgery: 12.6. 21      Surgeon: Dr. Fany Kilgore quadrant     Wound Assessment:  Abdominal incision with staples and 2 communicating dehisced areas: The distal dehisced area measures 3 x 1.8 x 1.7 cm; proximal dehisced area 4 x 2 x 4 cm; with undermining 6 cm at 12 o'clock; 2 cm at 9 o'clock; 2.5 cm at 3 o'clock and communicates with distal wound at 6 o'clock. 100% red; moderate serosang exudate. Dressing saturated. Removed dressing; irrigated dehisced wounds and packed with saline moist gauze and covered with dry dressing and abd pad. Reapplied abdominal binder.     Ostomy Assessment:  Stoma type: Ileal conduit  Stoma appearance: red and moist  Stents present  Output: yellow with mucous  Current appliance: changed surgical pouch and connected to drainage bag       Recommendations:  1. Empty appliance when 1/3 full and PRN. Encourage patient/family to notify nurse and  assist w/ pouch emptying to promote self-care. Change appliance twice weekly and PRN for leaking ASAP. DO NOT REINFORCE LEAKS to avoid skin breakdown. 2.  Abdominal dehisced incisions:  Daily and as needed irrigate with saline; pack with saline moist gauze; cover with dry dressing.     Transition of Care:   Will follow routinely while in hospital for ostomy and wound care.     GISELE Whitney RN [facility] Inpatient Wound Care  Available on Perfect Serve  Pager [phone] [phone]

## 2021-12-13 NOTE — PROGRESS NOTES
Bedside shift change report given to [address] (oncoming nurse) by Caroline Childers RN (offgoing nurse). Report included the following information SBAR, Kardex, ED Summary, Intake/Output, MAR, Recent Results and Cardiac Rhythm NSR/ST.

## 2021-12-13 NOTE — PROGRESS NOTES
vss now. Nsr. Labs stable. Stoma pink. Serous fluid from drain. More staples removed. No obvious fascial defect palpable but given his body habitus there is some concern about the integrity of the fascia. On rx for pos blood cx klebsiella. No further temp. Breathing better. Appreciate help from hospitalist team.  Will get abd wall binder and keep close watch on wound.

## 2021-12-13 NOTE — PROGRESS NOTES
Problem: Self Care Deficits Care Plan (Adult)  Goal: *Acute Goals and Plan of Care (Insert Text)  Description: FUNCTIONAL STATUS PRIOR TO ADMISSION: Patient basic and instrumental ADLs increased time. Chemo last 4 months in which increased weakness, decreased participation in instrumental ADLs, increase use of RW. Very social with his [address] and [address] friends. Well rehearsed in rehab s/p three TKR. All items raised height in the house to prevent deep bending. HOME SUPPORT: The patient lived with wife. DME Used/Available at Home: Jonita Jamaal, straight, Commode, bedside, Grab bars, Shower chair, Walker, rolling    Occupational Therapy Goals  Initiated 12/9/2021  1. Patient will perform bathing with minimal assistance/contact guard assist within 7 day(s). 2.  Patient will perform lower body dressing with RW and AE minimal assistance/contact guard assist within 7 day(s). 3.  Patient will perform gathering ADL items high and low 4/4 with RW with min assistance within 7 day(s). 4.  Patient will participate in upper extremity therapeutic exercise/activities with light resistance supervision/set-up  within 7 day(s). 5.  Patient will utilize energy conservation techniques during functional activities with verbal cues within 7 day(s). Outcome: Progressing Towards Goal   OCCUPATIONAL THERAPY TREATMENT  Patient: Hoda Ware (54 y.o. male)  Date: 12/13/2021  Diagnosis: Malignant neoplasm of urinary bladder, unspecified site [facility]) [C67.9]  Bladder cancer ([facility] [C67.9]   <principal problem not specified>  Procedure(s) (LRB):  RADICAL CYSTECTOMY, URETHRECTOMY, PROSTATECTOMY, BILATERAL LYMPHADENECTOMY,  ILEAL CONDUIT DIVERSION (E R A S) (N/A) 7 Days Post-Op  Precautions: Fall  Chart, occupational therapy assessment, plan of care, and goals were reviewed. ASSESSMENT  Patient continues with skilled OT services and is progressing towards goals.  Patient sitting edge of bed with PT upon OT entry and agreeable to working with therapy. Patient min A sit <> stand and transferring to chair. Once sitting in chair, patient completed grooming tasks with setup assistance. Patient's lunch tray delivered at end of session, patient stating appetite significantly decreased and encouraged to eat as much as able/drink Ensure on trays for nutrition. Patient had rapid response and was transferred to [facility] since last seen by OT, reports not getting out of bed since Thursday. Patient would benefit from skilled OT services during admission to improve independence with self care and functional mobility/transfers. Recommend discharge to SNF at this time. Current Level of Function Impacting Discharge (ADLs): requiring setup assist for grooming sitting in chair, min A sit <> stand and chair transfer    Other factors to consider for discharge: time since onset, prior level of function, severity of deficits         PLAN :  Patient continues to benefit from skilled intervention to address the above impairments. Continue treatment per established plan of care to address goals. Recommend with staff: up to chair for meals, BSC for toileting    Recommendation for discharge: (in order for the patient to meet his/her long term goals)  Therapy up to 5 days/week in SNF setting    This discharge recommendation:  Has been made in collaboration with the attending provider and/or case management    IF patient discharges home will need the following DME: TBD       SUBJECTIVE:   Patient stated That feels so much better.  After brushing his teeth. OBJECTIVE DATA SUMMARY:   Cognitive/Behavioral Status:  Neurologic State: Alert                   Functional Mobility and Transfers for ADLs:  Bed Mobility:  Supine to Sit: Maximum assistance    Transfers:  Sit to Stand: Minimum assistance          Balance:  Sitting: Intact;  Without support  Standing: Impaired  Standing - Static: Constant support; Good  Standing - Dynamic : Constant support; Fair    ADL Intervention:  Feeding  Drink to Mouth: Independent    Grooming  Position Performed: Seated in chair  Washing Face: Set-up  Brushing Teeth: Set-up  Cues: Verbal cues provided                                 Pain:  5/10 groin pain in standing    Activity Tolerance:   Fair and requires rest breaks    After treatment patient left in no apparent distress:   Sitting in chair, Call bell within reach and Bed / chair alarm activated    COMMUNICATION/COLLABORATION:   The patients plan of care was discussed with: Physical therapist and Registered nurse.      Martinez Chopra  Time Calculation: 18 mins

## 2021-12-13 NOTE — CONSULTS
CARDIOLOGY CONSULT                 Assessment:     Assessment:       Active Problems:    [facility] (6/23/2021)         Plan:      1. Paroxysmal afib: currently nsr   Left atrial enlargement   etoh is sig risk factor  chadsvasc 3  Chronic anticoag indicated  eliquis 5 bid   nll lvfx   Would try to avoid antiarrhythmic meds given buspar and bupropion and lexapro all prolong qt interval     rec stop amiodarone     b blocker for rate control until can eliminate some or all of above meds   2. Hx htn  3. Hx sarcoidosis   4. Coronary calcium score 290 ( 2011)   5. Etoh withdrawal   6. POD# 7 Days Post-Op Procedure(s):  RADICAL CYSTECTOMY, URETHRECTOMY, PROSTATECTOMY, BILATERAL LYMPHADENECTOMY,  ILEAL CONDUIT DIVERSION (E R A S)  7. Dyspnea:  Suspect volume redistibution in setting of diastolic dysfucion    Check bnp    Lasix IV   Pending ekg         Subjective:    Date of  Admission: 12/6/2021  8:50 AM     Admission type:Elective    Wei Balderas is a 77 y.o. male admitted for Malignant neoplasm of urinary bladder, unspecified site [facility]) [C67.9]  Bladder cancer ([facility] [C67.9]. Sp radical cystectomy     12/12/2021 : Afib w rvr - responed to dilt bolus/drip then failed to respond:  - -> amiodarone bolus/drip started after dilt fails  Also pt given empiric lasix x 1 iv 2n2 wheezing at time ; on reeval  Hx of afib in September     CT12/11/2021  Mediastinal calcified lymph nodes are stable  JUAN LUIS: No mass or lymphadenopathy. Echo 12/11/2021  LV: Estimated LVEF is 65 - 70%. Normal cavity size, wall thickness and systolic function (ejection fraction normal). Wall motion: normal.  · MV: Mitral valve thickening. Moderate mitral annular calcification. · LA: Moderately dilated left atrium.         Patient Active Problem List    Diagnosis Date Noted    Essential hypertension 07/08/2021    Sarcoidosis 07/08/2021    Class 1 obesity due to excess calories with serious comorbidity and body mass index (BMI) of 33.0 to 33.9 in adult 07/08/2021    Bladder cancer ([address] 06/23/2021    Status post surgery 02/10/2021    Posterior cervical adenopathy, benign 11/19/2018      Mortimer Samuel, MD  Past Medical History:   Diagnosis Date    Arrhythmia     LBBB    Arthritis     Asthma     MILD    Cancer ([address]     scc top of head and nose    Cancer ([address] 2015    BLADDER    COVID-19 vaccine series completed     [facility] VACCINE 1-28-21 AND 2-25-21    Depression with anxiety     Hypertension     Other unknown and unspecified cause of morbidity or mortality     history of pulmonary sarcoid     Posterior cervical adenopathy, benign 11/19/2018    [facility]     Unspecified sleep apnea     cpap      Past Surgical History:   Procedure Laterality Date    HX CATARACT REMOVAL Bilateral     HX HERNIA REPAIR Right AS A CHILD    INGUINAL    HX HERNIA REPAIR Left 1990    INGUIBAL    HX KNEE REPLACEMENT Left 2008    HX ORTHOPAEDIC Right     BONE SPURS REMOVED FROM FOOT    HX OTHER SURGICAL      scc removed top of head and nose    HX OTHER SURGICAL  2005    benign lump removed from thyroid    HX OTHER SURGICAL Right 2020    SKIN CANCER RELMOVED FROM LEG    HX UROLOGICAL  06/2020    CYSTO    IR INSERT TUNL CVC W PORT OVER 5 YEARS  7/16/2021    OK CARDIAC SURG PROCEDURE UNLIST  01/22/2021    CARDIAC CATH    OK REVISE KNEE JOINT REPLACE,ALL PARTS Left 2019     Allergies   Allergen Reactions    Pcn [Penicillins] Hives     Patient screened for any delayed non-IgE-mediated reaction to PCN.         Patient notes the following:    NO SEVERE NON-IGE MEDIATED REACTIONS      Family History   Problem Relation Age of Onset   [address] [facility] [address] Cancer Mother         breast with mets    Dementia Mother     Heart Disease Father     Cancer Sister         breast    Lung Disease Brother     No Known Problems Brother     Anesth Problems Neg Hx       Current Facility-Administered Medications   Medication Dose Route Frequency    acetaminophen (TYLENOL) tablet 650 mg  650 mg Oral Q4H PRN    cefTRIAXone (ROCEPHIN) 2 g in 0.9% sodium chloride (MBP/ADV) 50 mL MBP  2 g IntraVENous Q24H    acetaminophen (TYLENOL) tablet 1,000 mg  1,000 mg Oral Q6H    famotidine (PEPCID) tablet 20 mg  20 mg Oral Q12H    benzonatate (TESSALON) capsule 100 mg  100 mg Oral TID PRN    amiodarone (CORDARONE) 375 mg/250 mL D5W infusion  0.5-1 mg/min IntraVENous TITRATE    thiamine HCL (B-1) tablet 100 mg  100 mg Oral DAILY    folic acid (FOLVITE) tablet 1 mg  1 mg Oral DAILY    0.45% sodium chloride infusion  125 mL/hr IntraVENous CONTINUOUS    ipratropium (ATROVENT) 0.02 % nebulizer solution 0.5 mg  0.5 mg Nebulization Q6H PRN    LORazepam (ATIVAN) tablet 4 mg  4 mg Oral Q1H PRN    LORazepam (ATIVAN) injection 2 mg  2 mg IntraVENous Q1H PRN    LORazepam (ATIVAN) injection 4 mg  4 mg IntraVENous Q1H PRN    multivitamin, tx-iron-ca-min (THERA-M w/ IRON) tablet 1 Tablet  1 Tablet Oral DAILY    alteplase (CATHFLO) 1 mg in sterile water (preservative free) 1 mL injection  1 mg InterCATHeter PRN    bacitracin 500 unit/gram packet 1 Packet  1 Packet Topical PRN    albuterol-ipratropium (DUO-NEB) 2.5 MG-0.5 MG/3 ML  3 mL Nebulization Q6H PRN    traMADoL (ULTRAM) tablet 50 mg  50 mg Oral Q6H PRN    gabapentin (NEURONTIN) capsule 200 mg  200 mg Oral BID PRN    celecoxib (CELEBREX) capsule 100 mg  100 mg Oral BID PRN    [Held by provider] lactated Ringers infusion  75 mL/hr IntraVENous CONTINUOUS    ondansetron (ZOFRAN ODT) tablet 4 mg  4 mg Oral Q8H PRN    Or    ondansetron (ZOFRAN) injection 4 mg  4 mg IntraVENous Q6H PRN    [Held by provider] naloxegoL (MOVANTIK) tablet 25 mg  25 mg Oral DAILY    sodium chloride 0.9 % bolus infusion 500 mL  500 mL IntraVENous PRN    heparin (porcine) injection 5,000 Units  5,000 Units SubCUTAneous Q12H    buPROPion SR (WELLBUTRIN SR) tablet 100 mg  100 mg Oral DAILY    busPIRone (BUSPAR) tablet 5 mg  5 mg Oral DAILY    escitalopram oxalate (LEXAPRO) tablet 10 mg  10 mg Oral DAILY    diphenhydrAMINE (BENADRYL) injection 12.5 mg  12.5 mg IntraVENous Q4H PRN      Fmhx; father with htn andcad  Sister with cancer     Shx:  + etoh excess,  Non smoker    Review of Symptoms:  A comprehensive review of systems was negative. No hemoptysis, hematemesis, epistaxis, melena, hematuria. No fevers,  Rashes, seizures, visual disturbances, difficulty walking, no abdominal pain         Physical Exam    Visit Vitals  /75 (BP 1 Location: Left upper arm, BP Patient Position: At rest)   Pulse 91   Temp 99.3 °F (37.4 °C)   Resp 30   Ht 5' 11\" (1.803 m)   Wt 279 lb 5.2 oz (126.7 kg)   SpO2 91%   BMI 38.96 kg/m²     Skin warm and dry  PERRLA, EOMI  Oropharynx without exudate. Mallampati 2  Neck supple, thyroid not enlarged  Lungs few wheezes   PMI non displaced. Normal S1/ S2   No Mummurs, click or Rubs  No S3 or S4  Abdomen soft and non tender, No Hepatosplenomegaly  Pulses 2+ throughout,    Neuro:   , normal facial grimace,  Moves all extremities. AAAO  unanxious      Cardiographics    Telemetry: afib rvr        Labs:   Recent Results (from the past 24 hour(s))   CBC WITH AUTOMATED DIFF    Collection Time: 12/13/21  2:32 AM   Result Value Ref Range    WBC 7.9 4.1 - 11.1 K/uL    RBC 2.20 (L) 4.10 - 5.70 M/uL    HGB 7.6 (L) 12.1 - 17.0 g/dL    HCT 24.5 (L) 36.6 - 50.3 %    .4 (H) 80.0 - 99.0 FL    MCH 34.5 (H) 26.0 - 34.0 PG    MCHC 31.0 30.0 - 36.5 g/dL    RDW 16.0 (H) 11.5 - 14.5 %    PLATELET 323 092 - 395 K/uL    MPV 10.1 8.9 - 12.9 FL    NRBC 0.0 0  WBC    ABSOLUTE NRBC 0.00 0.00 - 0.01 K/uL    NEUTROPHILS 81 (H) 32 - 75 %    LYMPHOCYTES 7 (L) 12 - 49 %    MONOCYTES 10 5 - 13 %    EOSINOPHILS 1 0 - 7 %    BASOPHILS 0 0 - 1 %    IMMATURE GRANULOCYTES 1 (H) 0.0 - 0.5 %    ABS. NEUTROPHILS 6.3 1.8 - 8.0 K/UL    ABS. LYMPHOCYTES 0.6 (L) 0.8 - 3.5 K/UL    ABS. MONOCYTES 0.8 0.0 - 1.0 K/UL    ABS.  EOSINOPHILS 0.1 0.0 - 0.4 K/UL ABS. BASOPHILS 0.0 0.0 - 0.1 K/UL    ABS. IMM.  GRANS. 0.1 (H) 0.00 - 0.04 K/UL    DF SMEAR SCANNED      RBC COMMENTS POLYCHROMASIA  1+        RBC COMMENTS ANISOCYTOSIS  1+        RBC COMMENTS MACROCYTOSIS  2+       METABOLIC PANEL, BASIC    Collection Time: 12/13/21  2:32 AM   Result Value Ref Range    Sodium 140 136 - 145 mmol/L    Potassium 3.2 (L) 3.5 - 5.1 mmol/L    Chloride 111 (H) 97 - 108 mmol/L    CO2 24 21 - 32 mmol/L    Anion gap 5 5 - 15 mmol/L    Glucose 116 (H) 65 - 100 mg/dL    BUN 40 (H) 6 - 20 MG/DL    Creatinine 1.37 (H) 0.70 - 1.30 MG/DL    BUN/Creatinine ratio 29 (H) 12 - 20      GFR est AA >60 >60 ml/min/1.73m2    GFR est non-AA 51 (L) >60 ml/min/1.73m2    Calcium 9.0 8.5 - 10.1 MG/DL

## 2021-12-13 NOTE — PROGRESS NOTES
Clinical Pharmacy Note: IV to PO Automatic Conversion  Please note: Jarrett Melvin medication- famotidine has been changed from IV to PO based on the following critiera:    Patient is tolerating oral medications  Patient is tolerating a diet more advanced than clear liquids  Patient is not requiring vasopressors    This IV to PO conversion is based on the [employer] approved automatic conversion policy for eligible patients. Please call with questions.

## 2021-12-13 NOTE — PROGRESS NOTES
Verbal bedside report given to Lizbeth Welsh RN oncoming nurse by Titus Cavanaugh RN off-going nurse. Report included current pt status and condition, recent results, hx of present illness, heart rate and rhythm, and respiratory status.

## 2021-12-13 NOTE — PROGRESS NOTES
[address] [facility] Adult  Hospitalist Group                                                                                          Hospitalist Progress Note  Jacquelin Viera MD  Answering service: [phone] from in house phone        Date of Service:  2021  NAME:  Annetta Hein  :  1945  MRN:  [id number]      Admission Summary:   Copied form admit: \" Admitted for multiple urological surgeries. Medicine consulted for medical management of dyspnea and mental status changes. Pt with known hx of heavy etoh use as well as hx of etoh withdrawal with previous hospitalizations. Wife at bedside confirms he drinks 1 bottle of wine and 3 whiskey shots qhs. Also with reports of sarcoid and increasing dyspnea since admission. Able to answer questions and denies any cough, chest pain or palpitations. \"    Interval history / Subjective:     2021 :    Sinus arrhythma   Was afib w rvr   Cont on amiodarone drip   Card to comment   F/u ECG   Else:        Assessment & Plan:     ETOH Withdrawal  -CIWA protocol with ativan  -Phenobarb dosing and monitoring on going for 2021:   Intermittently confused    No tremors  · Wife at bedside noted pt did this in 2021 w sepsis as to afib/rvr as well as w/d's    :  [facility] into afib rvr , failed to respond to dilt bolus/infusion after intial success   Placed on amiodarone drip after failed dilt trial w success   Sinus arrhythma 2021    Cont on amiodarone drip   Card to comment    Was in afib in 2021 in icu ,septic in [facility] - else pt denies persistent or else PAF.        Afib w RVR   - am of 2021 :  Afib w rvr - responed to dilt bolus/drip then failed to respond:  - -> amiodarone bolus/drip started after dilt fails  Also pt given empiric lasix x 1 iv 2n2 wheezing at time ; on reeval, pt s elevated S Na and no wheezing and over all appears dry, ivf added     Hypernatremia 2021 :  - Added 1/2 NS 12/12/2021   - resolves on 1/2 NS at now 140 12/13/2021      AMS  Likely due to ETOH withdrawal given hx og heavy etoh use  -R/O infectious etiology: uti vs cellulitis vs intraabdominal  -Obtain cbc, cmp, ua, procal, blood cx  -Procal 4, f/u lab for comparison, is on broad abx   - as above   - mental status improves daily     3. Dyspnea / Pulmonary edema / Sarcoid  -Multifactorial dyspnea  -Puml edema, dilated cardiomypathy and RHF  -??aLcoholic cardiomyopathy  -Lasix 20mg x1 dose  -Consider ECHO in AM  - no s/s of hf. Will give additional ivf bolus given current findings of lactic acidosis, sepsis/etoh w/d   - no s/s of sob/hf on rounds 12/13/2021      4. Severe SEPSIS  -STAT CT chest, abd et pelvi  GNRs on cutl blood 12/10 ID /sens pending,   -Start vanc and Meropenem ( dropping Vanc on 12/12/2021 )  - CT: 12/11 am: 1. Interval radical cystoprostatectomy with ileal conduit urinary diversion and  no postoperative complication identified    Renal at risk; per CT finding 12/11 concerning for ATN, watching renal's ns bolus , ivf. aovid nephrotoxic else renal dose meds   Lab Results   Component Value Date/Time    Creatinine 1.37 (H) 12/13/2021 02:32 AM              DVT PPX =heparin      Hospital Problems  Date Reviewed: 10/14/2021          Codes Class Noted POA    Bladder cancer [facility]) ICD-10-CM: C67.9  ICD-9-CM: 188.9  6/23/2021 Unknown                  After personally interviewing pt at bedside the following is noted on 12/13/2021 :    Review of Systems   Constitutional: Negative for chills, fever and malaise/fatigue. Respiratory: Positive for cough, hemoptysis, shortness of breath and wheezing. Cardiovascular: Positive for chest pain, palpitations and leg swelling. Gastrointestinal: Positive for abdominal pain. Negative for nausea and vomiting. Genitourinary: Negative. Musculoskeletal: Negative. All other systems reviewed and are negative.              I had a face to face encounter with patient on 12/13/2021 at bedside for the following physical exam:     PHYSICAL EXAM:    Visit Vitals  BP (!) 148/81 (BP 1 Location: Right upper arm, BP Patient Position: At rest)   Pulse 94   Temp 98.3 °F (36.8 °C)   Resp 27   Ht 5' 11\" (1.803 m)   Wt 126.7 kg (279 lb 5.2 oz)   SpO2 96%   BMI 38.96 kg/m²          Physical Exam  Constitutional:       General: He is in acute distress. Appearance: He is obese. He is not ill-appearing, toxic-appearing or diaphoretic. HENT:      Head: Normocephalic and atraumatic. Right Ear: External ear normal.      Left Ear: External ear normal.      Nose: Nose normal.      Mouth/Throat:      Mouth: Mucous membranes are dry. Pharynx: Oropharynx is clear. Eyes:      Extraocular Movements: Extraocular movements intact. Conjunctiva/sclera: Conjunctivae normal.      Pupils: Pupils are equal, round, and reactive to light. Cardiovascular:      Rate and Rhythm: Tachycardia present. Rhythm irregular. Heart sounds: No murmur heard. No gallop. Comments: afib rvr  Pulmonary:      Effort: Pulmonary effort is normal. No respiratory distress. Breath sounds: No wheezing. Abdominal:      General: Abdomen is flat. There is no distension. Palpations: Abdomen is soft. Tenderness: There is no abdominal tenderness. Comments: Now w binder , mild discomfort, and mildly tender , + BS's    Musculoskeletal:         General: No swelling or deformity. Cervical back: Normal range of motion and neck supple. Skin:     Coloration: Skin is not jaundiced or pale. Neurological:      General: No focal deficit present. Mental Status: He is alert.       Comments: Intermittently confused, re-orients well    Psychiatric:         Mood and Affect: Mood normal.         Behavior: Behavior normal.                     Data Review:    Review and/or order of clinical lab test      Labs:     Recent Labs     12/13/21  0232 12/12/21  0553   WBC 7.9 6.4   HGB 7.6* 7.7* HCT 24.5* 25.1*    138*     Recent Labs     12/13/21  0232 12/12/21  0605 12/12/21  0553 12/11/21  0202    150*  --  139   K 3.2* 3.7  --  3.4*   * 120*  --  109*   CO2 24 21  --  21   BUN 40* 42*  --  30*   CREA 1.37* 1.36*  --  1.51*   * 109*  --  130*   CA 9.0 8.7  --  8.4*   MG  --   --  2.2  --      Recent Labs     12/10/21  2218   ALT 22   AP 66   TBILI 0.5   TP 6.3*   ALB 2.5*   GLOB 3.8     No results for input(s): INR, PTP, APTT, INREXT, INREXT in the last 72 hours. No results for input(s): FE, TIBC, PSAT, FERR in the last 72 hours. No results found for: FOL, RBCF   No results for input(s): PH, PCO2, PO2 in the last 72 hours. No results for input(s): CPK, CKNDX, TROIQ in the last 72 hours.     No lab exists for component: CPKMB  No results found for: CHOL, CHOLX, CHLST, CHOLV, HDL, HDLP, LDL, LDLC, DLDLP, TGLX, TRIGL, TRIGP, CHHD, CHHDX  Lab Results   Component Value Date/Time    Glucose (POC) 143 (H) 12/11/2021 01:02 AM    Glucose (POC) 93 12/01/2009 07:10 AM     Lab Results   Component Value Date/Time    Color YELLOW/STRAW 12/11/2021 03:03 AM    Appearance CLEAR 12/11/2021 03:03 AM    Specific gravity >1.030 12/11/2021 03:03 AM    Specific gravity 1.016 06/17/2021 09:56 AM    pH (UA) 6.5 12/11/2021 03:03 AM    Protein 100 (A) 12/11/2021 03:03 AM    Glucose Negative 12/11/2021 03:03 AM    Ketone Negative 12/11/2021 03:03 AM    Bilirubin Negative 12/11/2021 03:03 AM    Urobilinogen 1.0 12/11/2021 03:03 AM    Nitrites Positive (A) 12/11/2021 03:03 AM    Leukocyte Esterase MODERATE (A) 12/11/2021 03:03 AM    Epithelial cells FEW 12/11/2021 03:03 AM    Bacteria 1+ (A) 12/11/2021 03:03 AM    WBC  12/11/2021 03:03 AM    RBC  12/11/2021 03:03 AM         Medications Reviewed:     Current Facility-Administered Medications   Medication Dose Route Frequency    acetaminophen (TYLENOL) tablet 650 mg  650 mg Oral Q4H PRN    amiodarone (CORDARONE) 375 mg/250 mL D5W infusion 0. 5-1 mg/min IntraVENous TITRATE    thiamine HCL (B-1) tablet 100 mg  100 mg Oral DAILY    folic acid (FOLVITE) tablet 1 mg  1 mg Oral DAILY    0.45% sodium chloride infusion  125 mL/hr IntraVENous CONTINUOUS    ipratropium (ATROVENT) 0.02 % nebulizer solution 0.5 mg  0.5 mg Nebulization Q6H PRN    meropenem (MERREM) 500 mg in sterile water (preservative free) 10 mL IV syringe  0.5 g IntraVENous Q6H    acetaminophen (TYLENOL) suppository 1,000 mg  1,000 mg Rectal Q6H    LORazepam (ATIVAN) tablet 4 mg  4 mg Oral Q1H PRN    LORazepam (ATIVAN) injection 2 mg  2 mg IntraVENous Q1H PRN    LORazepam (ATIVAN) injection 4 mg  4 mg IntraVENous Q1H PRN    multivitamin, tx-iron-ca-min (THERA-M w/ IRON) tablet 1 Tablet  1 Tablet Oral DAILY    alteplase (CATHFLO) 1 mg in sterile water (preservative free) 1 mL injection  1 mg InterCATHeter PRN    bacitracin 500 unit/gram packet 1 Packet  1 Packet Topical PRN    albuterol-ipratropium (DUO-NEB) 2.5 MG-0.5 MG/3 ML  3 mL Nebulization Q6H PRN    traMADoL (ULTRAM) tablet 50 mg  50 mg Oral Q6H PRN    gabapentin (NEURONTIN) capsule 200 mg  200 mg Oral BID PRN    celecoxib (CELEBREX) capsule 100 mg  100 mg Oral BID PRN    [Held by provider] lactated Ringers infusion  75 mL/hr IntraVENous CONTINUOUS    ondansetron (ZOFRAN ODT) tablet 4 mg  4 mg Oral Q8H PRN    Or    ondansetron (ZOFRAN) injection 4 mg  4 mg IntraVENous Q6H PRN    [Held by provider] naloxegoL (MOVANTIK) tablet 25 mg  25 mg Oral DAILY    sodium chloride 0.9 % bolus infusion 500 mL  500 mL IntraVENous PRN    famotidine (PF) (PEPCID) 20 mg in 0.9% sodium chloride 10 mL injection  20 mg IntraVENous Q12H    heparin (porcine) injection 5,000 Units  5,000 Units SubCUTAneous Q12H    buPROPion SR (WELLBUTRIN SR) tablet 100 mg  100 mg Oral DAILY    busPIRone (BUSPAR) tablet 5 mg  5 mg Oral DAILY    escitalopram oxalate (LEXAPRO) tablet 10 mg  10 mg Oral DAILY    diphenhydrAMINE (BENADRYL) injection 12.5 mg 12.5 mg IntraVENous Q4H PRN     ______________________________________________________________________  EXPECTED LENGTH OF STAY: 5d 14h  ACTUAL LENGTH OF STAY:          MD Devyn

## 2021-12-13 NOTE — PROGRESS NOTES
Transition Plan of Care  RUR 19%-Med  Disposition-accepted at [facility] for rehab. Post-op day 7 for removal of bladder secondary to cancer. Lieutenant Conti RN liaison for facility  [phone] is following along.

## 2021-12-13 NOTE — PROGRESS NOTES
Problem: Mobility Impaired (Adult and Pediatric)  Goal: *Acute Goals and Plan of Care (Insert Text)  Description: FUNCTIONAL STATUS PRIOR TO ADMISSION: Patient was independent and active without use of DME.    HOME SUPPORT PRIOR TO ADMISSION: The patient lived with wife but did not require assist.    Physical Therapy Goals  Initiated 12/9/2021  1. Patient will move from supine to sit and sit to supine  in bed with modified independence within 7 day(s). 2.  Patient will transfer from bed to chair and chair to bed with modified independence using the least restrictive device within 7 day(s). 3.  Patient will perform sit to stand with modified independence within 7 day(s). 4.  Patient will ambulate with modified independence for 300 feet with the least restrictive device within 7 day(s). Outcome: Progressing Towards Goal   PHYSICAL THERAPY TREATMENT  Patient: Darren Leventhal (79 y.o. male)  Date: 12/13/2021  Diagnosis: Malignant neoplasm of urinary bladder, unspecified site [facility]) [C67.9]  Bladder cancer ([facility] [C67.9]   <principal problem not specified>  Procedure(s) (LRB):  RADICAL CYSTECTOMY, URETHRECTOMY, PROSTATECTOMY, BILATERAL LYMPHADENECTOMY,  ILEAL CONDUIT DIVERSION (E R A S) (N/A) 7 Days Post-Op  Precautions: Fall, bed alarm  Chart, physical therapy assessment, plan of care and goals were reviewed. ASSESSMENT  Patient continues with skilled PT services and is slowly progressing towards goals. Pt transferred to [facility], (SOB, agitated, restless and in a fib). At the time I saw him, he was calm and oriented (see below). He has lost some ground with mobility (per pt has not mobilized since last Thursday.) Pt able to stand and amb over over to the chair with walker support. HR in the 90s-100 and in NSR. 02 sats stable on room air. Pt able to remain up to the chair in NAD and participate in a session with OT.  Continue to recommend rehab, pt is far from his baseline of independent    Current Level of Function Impacting Discharge (mobility/balance): up to max assist with impaired sitting and standing balance. Other factors to consider for discharge: s/p extensive surgery for bladder CA         PLAN :  Patient continues to benefit from skilled intervention to address the above impairments. Continue treatment per established plan of care. to address goals. Recommendation for discharge: (in order for the patient to meet his/her long term goals)  Therapy up to 5 days/week in SNF setting    This discharge recommendation:  A follow-up discussion with the attending provider and/or case management is planned    IF patient discharges home will need the following DME: to be determined (TBD)       SUBJECTIVE:   Patient stated I think I've been in the bed since Thursday.     OBJECTIVE DATA SUMMARY:   Chrat checked, pt cleared by nursing  Critical Behavior:  Neurologic State: Alert  Orientation Level: Oriented to person, Oriented to place, Oriented to time (cues for exact date, got month and year), Oriented to situation  Cognition: Follows commands     Functional Mobility Training:  Bed Mobility:     Supine to Sit: Maximum assistance              Transfers:  Sit to Stand: Minimum assistance  Stand to Sit: Minimum assistance                             Balance:  Sitting: Intact; Without support  Standing: Impaired  Standing - Static: Constant support; Good  Standing - Dynamic : Constant support; Fair  Ambulation/Gait Training:  Distance (ft): 4 Feet (ft)  Assistive Device: Gait belt; Walker, rolling  Ambulation - Level of Assistance: Contact guard assistance        Gait Abnormalities: Decreased step clearance        Base of Support: Widened     Speed/Mary: Slow  Step Length: Left shortened; Right shortened                    Stairs:               Therapeutic Exercises:   Pursed lip breathing  Pain Ratin/10 in the groin in standing    Activity Tolerance:   Good, SpO2 stable on RA, and requires rest breaks    After treatment patient left in no apparent distress:   Sitting in chair, Call bell within reach, and Bed / chair alarm activated    COMMUNICATION/COLLABORATION:   The patients plan of care was discussed with: Occupational therapist and Registered nurse.      Rupa Armstrong   Time Calculation: 36 mins

## 2021-12-14 LAB
ALBUMIN SERPL-MCNC: 2.2 G/DL (ref 3.5–5)
ALBUMIN/GLOB SERPL: 0.6 {RATIO} (ref 1.1–2.2)
ALP SERPL-CCNC: 108 U/L (ref 45–117)
ALT SERPL-CCNC: 30 U/L (ref 12–78)
ANION GAP SERPL CALC-SCNC: 6 MMOL/L (ref 5–15)
AST SERPL-CCNC: 32 U/L (ref 15–37)
BASOPHILS # BLD: 0 K/UL (ref 0–0.1)
BASOPHILS NFR BLD: 0 % (ref 0–1)
BILIRUB SERPL-MCNC: 0.3 MG/DL (ref 0.2–1)
BUN SERPL-MCNC: 34 MG/DL (ref 6–20)
BUN/CREAT SERPL: 29 (ref 12–20)
CALCIUM SERPL-MCNC: 8.8 MG/DL (ref 8.5–10.1)
CHLORIDE SERPL-SCNC: 111 MMOL/L (ref 97–108)
CO2 SERPL-SCNC: 22 MMOL/L (ref 21–32)
CREAT SERPL-MCNC: 1.17 MG/DL (ref 0.7–1.3)
DIFFERENTIAL METHOD BLD: ABNORMAL
EOSINOPHIL # BLD: 0.2 K/UL (ref 0–0.4)
EOSINOPHIL NFR BLD: 3 % (ref 0–7)
ERYTHROCYTE [DISTWIDTH] IN BLOOD BY AUTOMATED COUNT: 15.9 % (ref 11.5–14.5)
GLOBULIN SER CALC-MCNC: 3.7 G/DL (ref 2–4)
GLUCOSE SERPL-MCNC: 117 MG/DL (ref 65–100)
HCT VFR BLD AUTO: 24 % (ref 36.6–50.3)
HGB BLD-MCNC: 7.5 G/DL (ref 12.1–17)
IMM GRANULOCYTES # BLD AUTO: 0.1 K/UL (ref 0–0.04)
IMM GRANULOCYTES NFR BLD AUTO: 1 % (ref 0–0.5)
LYMPHOCYTES # BLD: 0.5 K/UL (ref 0.8–3.5)
LYMPHOCYTES NFR BLD: 8 % (ref 12–49)
MCH RBC QN AUTO: 34.2 PG (ref 26–34)
MCHC RBC AUTO-ENTMCNC: 31.3 G/DL (ref 30–36.5)
MCV RBC AUTO: 109.6 FL (ref 80–99)
MONOCYTES # BLD: 0.6 K/UL (ref 0–1)
MONOCYTES NFR BLD: 9 % (ref 5–13)
NEUTS SEG # BLD: 5.2 K/UL (ref 1.8–8)
NEUTS SEG NFR BLD: 79 % (ref 32–75)
NRBC # BLD: 0 K/UL (ref 0–0.01)
NRBC BLD-RTO: 0 PER 100 WBC
PLATELET # BLD AUTO: 152 K/UL (ref 150–400)
PMV BLD AUTO: 9.9 FL (ref 8.9–12.9)
POTASSIUM SERPL-SCNC: 3.3 MMOL/L (ref 3.5–5.1)
PROT SERPL-MCNC: 5.9 G/DL (ref 6.4–8.2)
RBC # BLD AUTO: 2.19 M/UL (ref 4.1–5.7)
RBC MORPH BLD: ABNORMAL
RBC MORPH BLD: ABNORMAL
SODIUM SERPL-SCNC: 139 MMOL/L (ref 136–145)
WBC # BLD AUTO: 6.6 K/UL (ref 4.1–11.1)

## 2021-12-14 PROCEDURE — 74011250637 HC RX REV CODE- 250/637: Performed by: INTERNAL MEDICINE

## 2021-12-14 PROCEDURE — 99221 1ST HOSP IP/OBS SF/LOW 40: CPT | Performed by: NURSE PRACTITIONER

## 2021-12-14 PROCEDURE — 65660000001 HC RM ICU INTERMED STEPDOWN

## 2021-12-14 PROCEDURE — 85025 COMPLETE CBC W/AUTO DIFF WBC: CPT

## 2021-12-14 PROCEDURE — 97530 THERAPEUTIC ACTIVITIES: CPT

## 2021-12-14 PROCEDURE — 74011250636 HC RX REV CODE- 250/636: Performed by: UROLOGY

## 2021-12-14 PROCEDURE — 74011250636 HC RX REV CODE- 250/636: Performed by: INTERNAL MEDICINE

## 2021-12-14 PROCEDURE — 36415 COLL VENOUS BLD VENIPUNCTURE: CPT

## 2021-12-14 PROCEDURE — 99231 SBSQ HOSP IP/OBS SF/LOW 25: CPT | Performed by: SURGERY

## 2021-12-14 PROCEDURE — 74011000250 HC RX REV CODE- 250: Performed by: NURSE PRACTITIONER

## 2021-12-14 PROCEDURE — 80053 COMPREHEN METABOLIC PANEL: CPT

## 2021-12-14 PROCEDURE — 97116 GAIT TRAINING THERAPY: CPT

## 2021-12-14 PROCEDURE — 74011000258 HC RX REV CODE- 258: Performed by: INTERNAL MEDICINE

## 2021-12-14 PROCEDURE — 74011250637 HC RX REV CODE- 250/637: Performed by: UROLOGY

## 2021-12-14 PROCEDURE — 94640 AIRWAY INHALATION TREATMENT: CPT

## 2021-12-14 RX ORDER — POTASSIUM CHLORIDE 750 MG/1
40 TABLET, FILM COATED, EXTENDED RELEASE ORAL
Status: COMPLETED | OUTPATIENT
Start: 2021-12-14 | End: 2021-12-14

## 2021-12-14 RX ORDER — HYDROCODONE POLISTIREX AND CHLORPHENIRAMINE POLISTIREX 10; 8 MG/5ML; MG/5ML
5 SUSPENSION, EXTENDED RELEASE ORAL
Status: DISCONTINUED | OUTPATIENT
Start: 2021-12-14 | End: 2021-12-21 | Stop reason: HOSPADM

## 2021-12-14 RX ORDER — BENZONATATE 100 MG/1
100 CAPSULE ORAL
Status: DISCONTINUED | OUTPATIENT
Start: 2021-12-14 | End: 2021-12-14 | Stop reason: SDUPTHER

## 2021-12-14 RX ORDER — HEPARIN SODIUM 5000 [USP'U]/ML
5000 INJECTION, SOLUTION INTRAVENOUS; SUBCUTANEOUS EVERY 8 HOURS
Status: DISPENSED | OUTPATIENT
Start: 2021-12-14 | End: 2021-12-15

## 2021-12-14 RX ORDER — HEPARIN SODIUM 5000 [USP'U]/ML
5000 INJECTION, SOLUTION INTRAVENOUS; SUBCUTANEOUS EVERY 8 HOURS
Status: DISCONTINUED | OUTPATIENT
Start: 2021-12-14 | End: 2021-12-14

## 2021-12-14 RX ORDER — LOPERAMIDE HYDROCHLORIDE 2 MG/1
2 CAPSULE ORAL
Status: DISCONTINUED | OUTPATIENT
Start: 2021-12-14 | End: 2021-12-21 | Stop reason: HOSPADM

## 2021-12-14 RX ADMIN — BENZONATATE 100 MG: 100 CAPSULE ORAL at 04:43

## 2021-12-14 RX ADMIN — POTASSIUM CHLORIDE 20 MEQ: 750 TABLET, FILM COATED, EXTENDED RELEASE ORAL at 09:33

## 2021-12-14 RX ADMIN — FOLIC ACID 1 MG: 1 TABLET ORAL at 09:33

## 2021-12-14 RX ADMIN — BUPROPION HYDROCHLORIDE 100 MG: 100 TABLET, EXTENDED RELEASE ORAL at 09:33

## 2021-12-14 RX ADMIN — ESCITALOPRAM OXALATE 10 MG: 10 TABLET ORAL at 09:33

## 2021-12-14 RX ADMIN — BUSPIRONE HYDROCHLORIDE 5 MG: 5 TABLET ORAL at 09:33

## 2021-12-14 RX ADMIN — ACETAMINOPHEN 1000 MG: 500 TABLET ORAL at 03:21

## 2021-12-14 RX ADMIN — FAMOTIDINE 20 MG: 20 TABLET ORAL at 09:33

## 2021-12-14 RX ADMIN — METOPROLOL TARTRATE 50 MG: 50 TABLET, FILM COATED ORAL at 09:33

## 2021-12-14 RX ADMIN — FUROSEMIDE 40 MG: 10 INJECTION, SOLUTION INTRAVENOUS at 09:32

## 2021-12-14 RX ADMIN — HYDROCODONE POLISTIREX AND CHLORPHENIRAMINE POLISTIREX 5 ML: 10; 8 SUSPENSION, EXTENDED RELEASE ORAL at 16:30

## 2021-12-14 RX ADMIN — POTASSIUM CHLORIDE 40 MEQ: 750 TABLET, FILM COATED, EXTENDED RELEASE ORAL at 19:37

## 2021-12-14 RX ADMIN — ACETAMINOPHEN 1000 MG: 500 TABLET ORAL at 09:33

## 2021-12-14 RX ADMIN — ACETAMINOPHEN 1000 MG: 500 TABLET ORAL at 16:27

## 2021-12-14 RX ADMIN — Medication 100 MG: at 09:32

## 2021-12-14 RX ADMIN — HEPARIN SODIUM 5000 UNITS: 5000 INJECTION INTRAVENOUS; SUBCUTANEOUS at 03:21

## 2021-12-14 RX ADMIN — ACETAMINOPHEN 1000 MG: 500 TABLET ORAL at 21:25

## 2021-12-14 RX ADMIN — BENZONATATE 100 MG: 100 CAPSULE ORAL at 13:38

## 2021-12-14 RX ADMIN — HEPARIN SODIUM 5000 UNITS: 5000 INJECTION INTRAVENOUS; SUBCUTANEOUS at 13:38

## 2021-12-14 RX ADMIN — MULTIPLE VITAMINS W/ MINERALS TAB 1 TABLET: TAB at 09:33

## 2021-12-14 RX ADMIN — FAMOTIDINE 20 MG: 20 TABLET ORAL at 21:25

## 2021-12-14 RX ADMIN — IPRATROPIUM BROMIDE AND ALBUTEROL SULFATE 3 ML: .5; 3 SOLUTION RESPIRATORY (INHALATION) at 00:11

## 2021-12-14 RX ADMIN — CEFTRIAXONE SODIUM 2 G: 2 INJECTION, POWDER, FOR SOLUTION INTRAMUSCULAR; INTRAVENOUS at 19:37

## 2021-12-14 NOTE — PROGRESS NOTES
Patient: Aj Portillo MRN: [id number]  SSN: [id number]    YOB: 1945  Age: 77 y.o. Sex: male        ADMITTED: 2021 to Kendy Delarosa MD by Arturo Eng MD for Malignant neoplasm of urinary bladder, unspecified site [facility]) [C67.9]  Bladder cancer ([address] [C67.9]  POD# 8 Days Post-Op Procedure(s):  RADICAL CYSTECTOMY, URETHRECTOMY, PROSTATECTOMY, BILATERAL LYMPHADENECTOMY,  ILEAL CONDUIT DIVERSION (E R A S)    Aj Portillo is doing well , denies abd pain or discomfort . VSS afebrile  On room air , tolerating diet . HR controlled on metoprolol . Mid line incision with large amount of yellow drainage  , stoma draining clear yellow urine . Vitals: Temp (24hrs), Av.4 °F (36.9 °C), Min:97.8 °F (36.6 °C), Max:99.3 °F (37.4 °C)    Blood pressure 132/74, pulse 81, temperature 97.8 °F (36.6 °C), resp. rate 24, height 5' 11\" (1.803 m), weight 127.6 kg (281 lb 4.9 oz), SpO2 97 %. Intake and Output:   1901 -  0700  In: -   Out: 9878 [Urine:2850]  No intake/output data recorded. JASMINA Output lats 24 hrs: No data found. JASMINA Output last 8 hrs: No data found.   BM over last 24 hrs:   Patient Vitals for the past 24 hrs:   Stool Occurrence(s)   21 1411 1       Exam:   EXAMINATION:    Appearance:  well-developed NAD    Conjunctiva/Lids: conjunctivae and lids normal   External Ears/Nose:   normal no lesions or deformities   Neck:  supple   Respiratory Effort:  breathing easily   Lower Extremity: no edema   Abdomen/Flank: soft non-tender without masses; no CVA tenderness, midline incision drainage , stoma beefy draining clear yellow urine    Liver/Spleen: no organomegaly     Hernia: no ventral hernia    Gait/Station: normal   Skin Inspection:  warm and dry   Mood/Affect: normal                          Labs:  CBC:   Lab Results   Component Value Date/Time    WBC 6.6 2021 01:02 AM    HCT 24.0 (L) 2021 01:02 AM    PLATELET 789  01:02 AM     BMP:   Lab Results   Component Value Date/Time    Glucose 117 (H) 12/14/2021 01:02 AM    Sodium 139 12/14/2021 01:02 AM    Potassium 3.3 (L) 12/14/2021 01:02 AM    Chloride 111 (H) 12/14/2021 01:02 AM    CO2 22 12/14/2021 01:02 AM    BUN 34 (H) 12/14/2021 01:02 AM    Creatinine 1.17 12/14/2021 01:02 AM    Calcium 8.8 12/14/2021 01:02 AM     Cultures: + blood cx 12/10 Klebsiella 3/4 bottles                   Repeat [facility] 12/13 NGTD     Imaging: N/A  Assessment/Plan:     1. Po#8  RADICAL CYSTECTOMY, URETHRECTOMY, PROSTATECTOMY, BILATERAL LYMPHADENECTOMY,  ILEAL CONDUIT DIVERSION (E R A S, stoma draining urine , incision with drainage , pt encouraged to use Incentive spirometer and mobilize , diet advanced  2 Afib RVR - resolved transitioned to Po metoprolol , will need AC started timing to be discussed with Dr. Munroe     Signed By: Jaspal Barillas.  Concepcion Rock NP - December 14, 2021

## 2021-12-14 NOTE — CONSULTS
[facility]  Inpatient Consultation        Admit Date: 12/6/2021  Reason for Consultation: wound dehiscence     HPI:  Maddie Pineda is a 77 y.o. male whom we are asked to see in consultation by  [facility] for the above complaint. Pt is s/p radical cystoprostatectomy, urethrectomy, bilateral pelvic lymphadenectomy, repair of umbilical hernia, and ileal conduit urinary diversion on 12/6 by  [facility] for bladder cancer. Pt distal wound opened on 12/10 and has been getting packed w/ saline soaked drsgs. Yesterday the mid incision area dehisced.   We are asked to evaluate for surgery vs wound vac         Patient Active Problem List    Diagnosis Date Noted    Essential hypertension 07/08/2021    Sarcoidosis 07/08/2021    Class 1 obesity due to excess calories with serious comorbidity and body mass index (BMI) of 33.0 to 33.9 in adult 07/08/2021    Bladder cancer ([address] 06/23/2021    Status post surgery 02/10/2021    Posterior cervical adenopathy, benign 11/19/2018     Past Medical History:   Diagnosis Date    Arrhythmia     LBBB    Arthritis     Asthma     MILD    Cancer ([address]     scc top of head and nose    Cancer ([address] 2015    BLADDER    COVID-19 vaccine series completed     [facility] VACCINE 1-28-21 AND 2-25-21    Depression with anxiety     Hypertension     Other unknown and unspecified cause of morbidity or mortality     history of pulmonary sarcoid     Posterior cervical adenopathy, benign 11/19/2018    Pulmonary sarcoidosis ([address]     Unspecified sleep apnea     cpap      Past Surgical History:   Procedure Laterality Date    HX CATARACT REMOVAL Bilateral     HX HERNIA REPAIR Right AS A CHILD    INGUINAL    HX HERNIA REPAIR Left 1990    INGUIBAL    HX KNEE REPLACEMENT Left 2008    HX ORTHOPAEDIC Right     BONE SPURS REMOVED FROM FOOT    HX OTHER SURGICAL      scc removed top of head and nose    HX OTHER SURGICAL  2005    benign lump removed from thyroid    HX OTHER SURGICAL Right     SKIN CANCER RELMOVED FROM LEG    HX UROLOGICAL  2020    CYSTO    IR INSERT TUNL CVC W PORT OVER 5 YEARS  2021    NE CARDIAC SURG PROCEDURE UNLIST  2021    CARDIAC CATH    NE REVISE KNEE JOINT REPLACE,ALL PARTS Left       Social History     Tobacco Use    Smoking status: Former Smoker     Packs/day: 0.50     Years: 2.00     Pack years: 1.00     Quit date: 1965     Years since quittin.3    Smokeless tobacco: Never Used   Substance Use Topics    Alcohol use: Yes     Alcohol/week: 2.0 standard drinks     Types: 2 Glasses of wine per week     Comment: DAILY      Family History   Problem Relation Age of Onset    Cancer Mother         breast with mets    Dementia Mother     Heart Disease Father     Cancer Sister         breast    Lung Disease Brother     No Known Problems Brother     Anesth Problems Neg Hx       Prior to Admission medications    Medication Sig Start Date End Date Taking? Authorizing Provider   lisinopriL (PRINIVIL, ZESTRIL) 5 mg tablet Take 5 mg by mouth two (2) times a day. Yes Provider, Historical   zolpidem (AMBIEN) 10 mg tablet Take 5 mg by mouth nightly as needed for Sleep. Yes Provider, Historical   buPROPion SR (WELLBUTRIN SR) 100 mg SR tablet Take 100 mg by mouth daily. Yes Provider, Historical   acetaminophen (Tylenol Extra Strength) 500 mg tablet Take 1,000 mg by mouth every six (6) hours as needed for Pain. Yes Provider, Historical   busPIRone (BUSPAR) 5 mg tablet Take 5 mg by mouth daily. Yes Provider, Historical   simvastatin (ZOCOR) 20 mg tablet Take 20 mg by mouth nightly. Yes Provider, Historical   escitalopram oxalate (LEXAPRO) 10 mg tablet Take 10 mg by mouth daily. Yes Provider, Historical   lidocaine-prilocaine (EMLA) topical cream Apply  to affected area as needed for Pain.  Apply a thin layer over port a cath 30-60 minutes prior to port access 21   Benjy Heath NP   tamsulosin (FLOMAX) 0.4 mg capsule Take 0.4 mg by mouth nightly. Provider, Historical   multivitamin (ONE A DAY) tablet Take 1 Tab by mouth daily.     Provider, Historical     Current Facility-Administered Medications   Medication Dose Route Frequency    HYDROcodone-chlorpheniramine (TUSSIONEX) oral suspension 5 mL  5 mL Oral Q12H PRN    loperamide (IMODIUM) capsule 2 mg  2 mg Oral Q6H PRN    [START ON 12/15/2021] apixaban (ELIQUIS) tablet 5 mg  5 mg Oral BID    heparin (porcine) injection 5,000 Units  5,000 Units SubCUTAneous Q8H    potassium chloride SR (KLOR-CON 10) tablet 40 mEq  40 mEq Oral NOW    acetaminophen (TYLENOL) tablet 650 mg  650 mg Oral Q4H PRN    cefTRIAXone (ROCEPHIN) 2 g in 0.9% sodium chloride (MBP/ADV) 50 mL MBP  2 g IntraVENous Q24H    acetaminophen (TYLENOL) tablet 1,000 mg  1,000 mg Oral Q6H    famotidine (PEPCID) tablet 20 mg  20 mg Oral Q12H    benzonatate (TESSALON) capsule 100 mg  100 mg Oral TID PRN    metoprolol tartrate (LOPRESSOR) tablet 50 mg  50 mg Oral BID    furosemide (LASIX) injection 40 mg  40 mg IntraVENous Q12H    thiamine HCL (B-1) tablet 100 mg  100 mg Oral DAILY    folic acid (FOLVITE) tablet 1 mg  1 mg Oral DAILY    ipratropium (ATROVENT) 0.02 % nebulizer solution 0.5 mg  0.5 mg Nebulization Q6H PRN    LORazepam (ATIVAN) tablet 4 mg  4 mg Oral Q1H PRN    LORazepam (ATIVAN) injection 2 mg  2 mg IntraVENous Q1H PRN    LORazepam (ATIVAN) injection 4 mg  4 mg IntraVENous Q1H PRN    multivitamin, tx-iron-ca-min (THERA-M w/ IRON) tablet 1 Tablet  1 Tablet Oral DAILY    alteplase (CATHFLO) 1 mg in sterile water (preservative free) 1 mL injection  1 mg InterCATHeter PRN    bacitracin 500 unit/gram packet 1 Packet  1 Packet Topical PRN    albuterol-ipratropium (DUO-NEB) 2.5 MG-0.5 MG/3 ML  3 mL Nebulization Q6H PRN    traMADoL (ULTRAM) tablet 50 mg  50 mg Oral Q6H PRN    gabapentin (NEURONTIN) capsule 200 mg  200 mg Oral BID PRN    celecoxib (CELEBREX) capsule 100 mg  100 mg Oral BID PRN    [Held by provider] lactated Ringers infusion  75 mL/hr IntraVENous CONTINUOUS    ondansetron (ZOFRAN ODT) tablet 4 mg  4 mg Oral Q8H PRN    Or    ondansetron (ZOFRAN) injection 4 mg  4 mg IntraVENous Q6H PRN    sodium chloride 0.9 % bolus infusion 500 mL  500 mL IntraVENous PRN    buPROPion SR (WELLBUTRIN SR) tablet 100 mg  100 mg Oral DAILY    busPIRone (BUSPAR) tablet 5 mg  5 mg Oral DAILY    escitalopram oxalate (LEXAPRO) tablet 10 mg  10 mg Oral DAILY    diphenhydrAMINE (BENADRYL) injection 12.5 mg  12.5 mg IntraVENous Q4H PRN     Allergies   Allergen Reactions    Pcn [Penicillins] Hives     Patient screened for any delayed non-IgE-mediated reaction to PCN.         Patient notes the following:    NO SEVERE NON-IGE MEDIATED REACTIONS          Subjective:     Review of Systems:    A comprehensive review of systems was negative except for that written in the History of Present Illness. Objective:     Blood pressure (P) 109/75, pulse (P) 68, temperature 97.8 °F (36.6 °C), resp. rate 20, height 5' 11\" (1.803 m), weight 281 lb 4.9 oz (127.6 kg), SpO2 (P) 100 %. Temp (24hrs), Av.4 °F (36.9 °C), Min:97.8 °F (36.6 °C), Max:99.3 °F (37.4 °C)      Recent Labs     21  0232 21  0553   WBC 6.6 7.9 6.4   HGB 7.5* 7.6* 7.7*   HCT 24.0* 24.5* 25.1*    156 138*     Recent Labs     21  01021  0232 21  0605 21  0553    140 150*  --    K 3.3* 3.2* 3.7  --    * 111* 120*  --    CO2   --    * 116* 109*  --    BUN 34* 40* 42*  --    CREA 1.17 1.37* 1.36*  --    CA 8.8 9.0 8.7  --    MG  --   --   --  2.2   ALB 2.2*  --   --   --    TBILI 0.3  --   --   --    ALT 30  --   --   --      No results for input(s): AML, LPSE in the last 72 hours.       Intake/Output Summary (Last 24 hours) at 2021 1512  Last data filed at 2021 1303  Gross per 24 hour   Intake    Output 3550 ml   Net -3550 ml     Date 21 0700 - 12/15/21 0659   Shift 6072-3546 9788-6597 0213-7370 24 Hour Total   INTAKE   Shift Total(mL/kg)       OUTPUT   Urine(mL/kg/hr) 1600(1.6)   1600   Shift Total(mL/kg) 1600(12.5)   1600(12.5)   Weight (kg) 127.6 127.6 127.6 127.6     _____________________  Physical Exam:     General:  Alert, cooperative, no distress, appears stated age. Eyes:   Sclera clear. Throat: Lips, mucosa, and tongue normal.   Neck: Supple, symmetrical, trachea midline. Lungs:   Clear to auscultation bilaterally. Heart:  Regular rate and rhythm. Abdomen:   Obese, +ileal conduit, distal incision w/ pink wound bed; mid incision w/ exposed omental fat and q tip tracks up to 11cm L and R of dehisced area. No drainage or bleeding   Extremities: Extremities normal, atraumatic, no cyanosis or edema. Skin: Skin color, texture, turgor normal. No rashes or lesions. Assessment:   Active Problems:    Bladder cancer [facility]) (6/23/2021)            Plan:     Dr Beth Avila spoke w Dr Caterina Osborn who will reclose the mid incision dehisced area. Cont LWC to distal incision  Thank you for allowing us to participate in the care of this patient. Total time spent with patient: 30 minutes. Signed By: Du Ding NP     December 14, 2021        Attending Addendum:   I have independently examined the patient and have reviewed the chart. I agree with the above plan. Asked to see patient secondary to wound dehiscence. Patient's wound began draining a large amount of serous fluid a few days ago which was initially drained through the inferior portion of the wound. Yesterday the midportion of the wound was opened as well. This appears to show omentum at the base of the wound. No exposed bowel was noted. CT imaging from 12/11 also shows fascial dehiscence in the midportion of the wound. The patient is comfortable and without signs of imminent evisceration. He has an abdominal binder on currently.   I discussed with Dr. Caterina Osborn that ideally if his fascia can be reclosed in the operating room this would be the best scenario. A wound VAC could then be placed over the closed fascia to help minimize risk of further fascial breakdown and to promote wound healing. This may not be possible if the underlying tissues are too fused at this point to allow for fascial closure. In that case wound care to facilitate granulation of the exposed omentum and creation of a controlled hernia would have to be undertaken. Dr. Annie Barboza is planning to take the patient back for surgical exploration to attempt fascial closure tomorrow. Either myself or one of my partners should be able to assist if needed in the operating room. I reviewed this with the patient who was in agreement. 30 mins of time was spent with the patient of which > 50% of the time involved face-to-face counseling of the patient regarding the proposed treatment plan.         Minna Cuevas MD  12/14/2021  6:02 PM

## 2021-12-14 NOTE — PROGRESS NOTES
Feels better. Nsr. Ok to start anticoagulation from my standpoint. hgb is low but stable. Wound continues to drain serous fluid. Suspect fascial seperation as source. teddy unfortunately was pulled out early when he was confused.

## 2021-12-14 NOTE — PROGRESS NOTES
Problem: Falls - Risk of  Goal: *Absence of Falls  Description: Document Carole Vázquez Fall Risk and appropriate interventions in the flowsheet.   Outcome: Progressing Towards Goal  Note: Fall Risk Interventions:  Mobility Interventions: Communicate number of staff needed for ambulation/transfer    Mentation Interventions: Adequate sleep, hydration, pain control    Medication Interventions: Evaluate medications/consider consulting pharmacy, Patient to call before getting OOB, Teach patient to arise slowly    Elimination Interventions: Call light in reach, Patient to call for help with toileting needs, Toileting schedule/hourly rounds    History of Falls Interventions: Evaluate medications/consider consulting pharmacy         Problem: Patient Education: Go to Patient Education Activity  Goal: Patient/Family Education  Outcome: Progressing Towards Goal     Problem: Patient Education: Go to Patient Education Activity  Goal: Patient/Family Education  Outcome: Progressing Towards Goal     Problem: Patient Education: Go to Patient Education Activity  Goal: Patient/Family Education  Outcome: Progressing Towards Goal

## 2021-12-14 NOTE — PROGRESS NOTES
2348: perfectserved DO, about AM labs. 0800:  Verbal bedside report given to Jericho Escobar RN oncoming nurse by Jose Dove RN off-going nurse. Report included current pt status and condition, recent results, hx of present illness, heart rate and rhythm, and respiratory status.

## 2021-12-14 NOTE — PROGRESS NOTES
Received a call from Dr. Pj Darden, updated, ordeered to keep patient NPO after MN, hold Eliquis but keep the Heparin for possible procedure tomorrow.

## 2021-12-14 NOTE — PROGRESS NOTES
Occupational Therapy  19/70/00    Duplicate OT evaluation orders received and acknowledged. Patient is currently on OT caseload and seen yesterday, 12/14/2021, for treatment session. Continue current plan of care.     Thank you,  Rolando Prasad OTR/L

## 2021-12-14 NOTE — PROGRESS NOTES
Cardiology Progress Note                                        Admit Date: 12/6/2021    Assessment/Plan:     1. Paroxysmal afib: currently nsr   Left atrial enlargement   etoh is sig risk factor  chadsvasc 3  Chronic anticoag indicated  eliquis 5 bid   nll lvfx   Would try to avoid antiarrhythmic meds given buspar and bupropion and lexapro all prolong qt interval     rec stop amiodarone     b blocker for rate control until can eliminate some or all of above meds   2. Hx htn  3. Hx sarcoidosis   4. Coronary calcium score 290 ( 2011)   5. Etoh withdrawal   6. POD# 8 Days Post-Op Procedure(s):  RADICAL CYSTECTOMY, URETHRECTOMY, PROSTATECTOMY, BILATERAL LYMPHADENECTOMY,  ILEAL CONDUIT DIVERSION (E R A S)  7. Dyspnea: improved most likely related to  volume redistibution in setting of diastolic dysfunction  Net 5l out   Continue lasix today only. 8. Hypokalemia:  Replace       ekg :  Sinus lbbb occ apd        Aj Portillo is a 77 y.o. male with      PROBLEM LIST:  Patient Active Problem List    Diagnosis Date Noted    Essential hypertension 07/08/2021    Sarcoidosis 07/08/2021    Class 1 obesity due to excess calories with serious comorbidity and body mass index (BMI) of 33.0 to 33.9 in adult 07/08/2021    Bladder cancer ([facility] 06/23/2021    Status post surgery 02/10/2021    Posterior cervical adenopathy, benign 11/19/2018         Subjective:     Aj Portillo denies dyspnea.     Visit Vitals  /74   Pulse 81   Temp 97.8 °F (36.6 °C)   Resp 24   Ht 5' 11\" (1.803 m)   Wt 281 lb 4.9 oz (127.6 kg)   SpO2 97%   BMI 39.23 kg/m²       Intake/Output Summary (Last 24 hours) at 12/14/2021 0850  Last data filed at 12/14/2021 0320  Gross per 24 hour   Intake    Output 1950 ml   Net -1950 ml       Objective:      Physical Exam:  HEENT: Perrla, EOMI  Neck: No JVD,  No thyroidmegaly  Resp: CTA bilaterally; exp  wheezes present  CV: RRR s1s2 No murmur no s3  Abd:Soft, Nontender  Ext: No edema  Neuro: Alert and oriented; Nonfocal  Skin: Warm, Dry, Intact  Pulses: 2+ DP/PT/Rad      Telemetry: normal sinus rhythm    Current Facility-Administered Medications   Medication Dose Route Frequency    HYDROcodone-chlorpheniramine (TUSSIONEX) oral suspension 5 mL  5 mL Oral Q12H PRN    acetaminophen (TYLENOL) tablet 650 mg  650 mg Oral Q4H PRN    cefTRIAXone (ROCEPHIN) 2 g in 0.9% sodium chloride (MBP/ADV) 50 mL MBP  2 g IntraVENous Q24H    acetaminophen (TYLENOL) tablet 1,000 mg  1,000 mg Oral Q6H    famotidine (PEPCID) tablet 20 mg  20 mg Oral Q12H    benzonatate (TESSALON) capsule 100 mg  100 mg Oral TID PRN    metoprolol tartrate (LOPRESSOR) tablet 50 mg  50 mg Oral BID    furosemide (LASIX) injection 40 mg  40 mg IntraVENous Q12H    potassium chloride SR (KLOR-CON 10) tablet 20 mEq  20 mEq Oral BID    thiamine HCL (B-1) tablet 100 mg  100 mg Oral DAILY    folic acid (FOLVITE) tablet 1 mg  1 mg Oral DAILY    ipratropium (ATROVENT) 0.02 % nebulizer solution 0.5 mg  0.5 mg Nebulization Q6H PRN    LORazepam (ATIVAN) tablet 4 mg  4 mg Oral Q1H PRN    LORazepam (ATIVAN) injection 2 mg  2 mg IntraVENous Q1H PRN    LORazepam (ATIVAN) injection 4 mg  4 mg IntraVENous Q1H PRN    multivitamin, tx-iron-ca-min (THERA-M w/ IRON) tablet 1 Tablet  1 Tablet Oral DAILY    alteplase (CATHFLO) 1 mg in sterile water (preservative free) 1 mL injection  1 mg InterCATHeter PRN    bacitracin 500 unit/gram packet 1 Packet  1 Packet Topical PRN    albuterol-ipratropium (DUO-NEB) 2.5 MG-0.5 MG/3 ML  3 mL Nebulization Q6H PRN    traMADoL (ULTRAM) tablet 50 mg  50 mg Oral Q6H PRN    gabapentin (NEURONTIN) capsule 200 mg  200 mg Oral BID PRN    celecoxib (CELEBREX) capsule 100 mg  100 mg Oral BID PRN    [Held by provider] lactated Ringers infusion  75 mL/hr IntraVENous CONTINUOUS    ondansetron (ZOFRAN ODT) tablet 4 mg  4 mg Oral Q8H PRN    Or    ondansetron (ZOFRAN) injection 4 mg  4 mg IntraVENous Q6H PRN    [Held by provider] naloxegoL (MOVANTIK) tablet 25 mg  25 mg Oral DAILY    sodium chloride 0.9 % bolus infusion 500 mL  500 mL IntraVENous PRN    heparin (porcine) injection 5,000 Units  5,000 Units SubCUTAneous Q12H    buPROPion SR (WELLBUTRIN SR) tablet 100 mg  100 mg Oral DAILY    busPIRone (BUSPAR) tablet 5 mg  5 mg Oral DAILY    escitalopram oxalate (LEXAPRO) tablet 10 mg  10 mg Oral DAILY    diphenhydrAMINE (BENADRYL) injection 12.5 mg  12.5 mg IntraVENous Q4H PRN         Data Review:   Labs:    Recent Results (from the past 24 hour(s))   EKG, 12 LEAD, INITIAL    Collection Time: 12/13/21 11:13 AM   Result Value Ref Range    Ventricular Rate 93 BPM    Atrial Rate 93 BPM    P-R Interval 182 ms    QRS Duration 152 ms    Q-T Interval 428 ms    QTC Calculation (Bezet) 532 ms    Calculated P Axis 35 degrees    Calculated R Axis -30 degrees    Calculated T Axis 90 degrees    Diagnosis       Sinus rhythm with Possible premature atrial complexes with aberrant   conduction  Left axis deviation  Left bundle branch block  When compared with ECG of 10-DEC-2021 10:00,  aberrant conduction is now present  QRS axis shifted left  Confirmed by Samuel Lopez M.D., Elijah Schmidt [id number]) on 12/13/2021 8:55:11 PM     CULTURE, BLOOD, PAIRED    Collection Time: 12/13/21  2:50 PM    Specimen: Blood   Result Value Ref Range    Special Requests: NO SPECIAL REQUESTS      Culture result: NO GROWTH AFTER 12 HOURS     NT-PRO BNP    Collection Time: 12/13/21  6:52 PM   Result Value Ref Range    NT pro-BNP 7,888 (H) <450 PG/ML   CBC WITH AUTOMATED DIFF    Collection Time: 12/14/21  1:02 AM   Result Value Ref Range    WBC 6.6 4.1 - 11.1 K/uL    RBC 2.19 (L) 4.10 - 5.70 M/uL    HGB 7.5 (L) 12.1 - 17.0 g/dL    HCT 24.0 (L) 36.6 - 50.3 %    .6 (H) 80.0 - 99.0 FL    MCH 34.2 (H) 26.0 - 34.0 PG    MCHC 31.3 30.0 - 36.5 g/dL    RDW 15.9 (H) 11.5 - 14.5 %    PLATELET 533 952 - 473 K/uL    MPV 9.9 8.9 - 12.9 FL    NRBC 0.0 0  WBC    ABSOLUTE NRBC 0.00 0.00 - 0.01 K/uL    NEUTROPHILS 79 (H) 32 - 75 %    LYMPHOCYTES 8 (L) 12 - 49 %    MONOCYTES 9 5 - 13 %    EOSINOPHILS 3 0 - 7 %    BASOPHILS 0 0 - 1 %    IMMATURE GRANULOCYTES 1 (H) 0.0 - 0.5 %    ABS. NEUTROPHILS 5.2 1.8 - 8.0 K/UL    ABS. LYMPHOCYTES 0.5 (L) 0.8 - 3.5 K/UL    ABS. MONOCYTES 0.6 0.0 - 1.0 K/UL    ABS. EOSINOPHILS 0.2 0.0 - 0.4 K/UL    ABS. BASOPHILS 0.0 0.0 - 0.1 K/UL    ABS. IMM. GRANS. 0.1 (H) 0.00 - 0.04 K/UL    DF SMEAR SCANNED      RBC COMMENTS MACROCYTOSIS  2+        RBC COMMENTS ANISOCYTOSIS  1+       METABOLIC PANEL, COMPREHENSIVE    Collection Time: 12/14/21  1:02 AM   Result Value Ref Range    Sodium 139 136 - 145 mmol/L    Potassium 3.3 (L) 3.5 - 5.1 mmol/L    Chloride 111 (H) 97 - 108 mmol/L    CO2 22 21 - 32 mmol/L    Anion gap 6 5 - 15 mmol/L    Glucose 117 (H) 65 - 100 mg/dL    BUN 34 (H) 6 - 20 MG/DL    Creatinine 1.17 0.70 - 1.30 MG/DL    BUN/Creatinine ratio 29 (H) 12 - 20      GFR est AA >60 >60 ml/min/1.73m2    GFR est non-AA >60 >60 ml/min/1.73m2    Calcium 8.8 8.5 - 10.1 MG/DL    Bilirubin, total 0.3 0.2 - 1.0 MG/DL    ALT (SGPT) 30 12 - 78 U/L    AST (SGOT) 32 15 - 37 U/L    Alk.  phosphatase 108 45 - 117 U/L    Protein, total 5.9 (L) 6.4 - 8.2 g/dL    Albumin 2.2 (L) 3.5 - 5.0 g/dL    Globulin 3.7 2.0 - 4.0 g/dL    A-G Ratio 0.6 (L) 1.1 - 2.2

## 2021-12-14 NOTE — WOUND CARE
[facility] Ostomy Progress Note:      Follow up visit. [place] [id number] Surgery NP at the bedside for assessment.      Surgery: Radical cystoprostatectomy, urethrectomy, bilateral pelvic lymphadenectomy, repair of umbilical hernia and ileal conduit urinary diversion. Date of Surgery: 12.6. 21      Surgeon: Dr. Genao Broad quadrant     Wound Assessment:  Abdominal incision with staples and 2 communicating dehisced areas: The distal dehisced area measures 3 x 1.8 x 1.7 cm; proximal dehisced area 4 x 2 x 4 cm; with tunneling 9 cm at 1 o'clock and 11 cm at 11 o'clock; undermining 6 cm at 12 o'clock; 2 cm at 9 o'clock; 2.5 cm at 3 o'clock and communicates with distal wound at 6 o'clock; visible wound bed is pale pink and red.  Removed dressing; irrigated dehisced wounds and packed with saline moist gauze and covered with dry dressing and abd pad. Reapplied abdominal binder.             Ostomy Assessment:  Stoma type: Ileal conduit  Stoma appearance: red and moist  Stents present  Output: yellow with mucous  Current appliance: surgical pouch and connected to drainage bag       Recommendations:  1. Empty appliance when 1/3 full and PRN. Encourage patient/family to notify nurse and  assist w/ pouch emptying to promote self-care. Change appliance twice weekly and PRN for leaking ASAP. DO NOT REINFORCE LEAKS to avoid skin breakdown. 2.  Abdominal dehisced incisions:  Daily and as needed irrigate with saline; pack with saline moist gauze; cover with dry dressing.     Transition of Care:   Will follow routinely while in hospital for ostomy and wound care.     CELESTINE CastorenaN CIRA [facility] Inpatient Wound Care  Available on Perfect Serve  Pager [phone]  Office [phone]

## 2021-12-14 NOTE — PROGRESS NOTES
Problem: Mobility Impaired (Adult and Pediatric)  Goal: *Acute Goals and Plan of Care (Insert Text)  Description: FUNCTIONAL STATUS PRIOR TO ADMISSION: Patient was independent and active without use of DME.    HOME SUPPORT PRIOR TO ADMISSION: The patient lived with wife but did not require assist.    Physical Therapy Goals  Initiated 12/9/2021  1. Patient will move from supine to sit and sit to supine  in bed with modified independence within 7 day(s). 2.  Patient will transfer from bed to chair and chair to bed with modified independence using the least restrictive device within 7 day(s). 3.  Patient will perform sit to stand with modified independence within 7 day(s). 4.  Patient will ambulate with modified independence for 300 feet with the least restrictive device within 7 day(s). Outcome: Progressing Towards Goal   PHYSICAL THERAPY TREATMENT  Patient: Leanne Pederson (7 y.o. male)  Date: 12/14/2021  Diagnosis: Malignant neoplasm of urinary bladder, unspecified site [facility]) [C67.9]  [facility] ([facility] [C67.9]   <principal problem not specified>  Procedure(s) (LRB):  RADICAL CYSTECTOMY, URETHRECTOMY, PROSTATECTOMY, BILATERAL LYMPHADENECTOMY,  ILEAL CONDUIT DIVERSION (E R A S) (N/A) 8 Days Post-Op  Precautions: Fall  Chart, physical therapy assessment, plan of care and goals were reviewed. ASSESSMENT  Patient continues with skilled PT services and is progressing towards goals. Pt is moving well, today able to amb to and from the bathroom with a walker for support. Worked with him just after he was cared for by the wound care nurse. And pt had an episode of diarrhea (ongoing today per pt's nurse). Given tight space in the bathroom, I managed the disposable brief for the pt. Anticipate next session he should be able to amb out into the hallway. HR stable, < 100 bpm. Post session he remained up to the chair. .     Current Level of Function Impacting Discharge (mobility/balance): from mod assist to stand by assist.    Other factors to consider for discharge: s/p extensive surgery for bladder CA and now post op wound is dehisced and there is tunneling. PLAN :  Patient continues to benefit from skilled intervention to address the above impairments. Continue treatment per established plan of care. to address goals. Recommendation for discharge: (in order for the patient to meet his/her long term goals)  Therapy up to 5 days/week in SNF setting per chart review, plan is for rehab as Ashlee Dejesus     This discharge recommendation:  A follow-up discussion with the attending provider and/or case management is planned    IF patient discharges home will need the following DME: to be determined (TBD)       SUBJECTIVE:   Patient stated I'd like to walk to the bathroom.     OBJECTIVE DATA SUMMARY:   Chart checked, pt cleared by nursing  Critical Behavior:  Neurologic State: Alert  Orientation Level: Oriented X4  Cognition: Follows commands     Functional Mobility Training:  Bed Mobility:     Supine to Sit: Stand-by assistance              Transfers:  Sit to Stand: Contact guard assistance from the bed; Moderate assistance from the commode in the bathroom. Stand to Sit: Contact guard assistance to the chair, min assist to the commode in the bathroom                             Balance:  Sitting: Intact; Without support  Standing: Impaired  Standing - Static: Constant support; Good  Standing - Dynamic : Constant support; Good  Ambulation/Gait Training:  Distance (ft): 50 Feet (ft) (25 feet X 2)  Assistive Device: Gait belt; Walker, rolling  Ambulation - Level of Assistance: Contact guard assistance        Gait Abnormalities: Decreased step clearance        Base of Support: Widened     Speed/Mary: Slow  Step Length: Left shortened; Right shortened                    Stairs:               Therapeutic Exercises:     Pain Rating:  None rated    Activity Tolerance:   Good    After treatment patient left in no apparent distress:   Sitting in chair and Call bell within reach, alarm activated     COMMUNICATION/COLLABORATION:   The patients plan of care was discussed with: Registered nurse.      Laurita Pulse   Time Calculation: 31 mins

## 2021-12-14 NOTE — PROGRESS NOTES
Bedside shift change report given to Erik E McGraw Dr (oncoming nurse) by Diane Chaudhry RN (offgoing nurse). Report included the following information SBAR, Kardex, ED Summary, Intake/Output, MAR and Cardiac Rhythm NSR.

## 2021-12-14 NOTE — PROGRESS NOTES
[facility] Adult  Hospitalist Group                                                                                          Hospitalist Progress Note  Yvonne Johnson MD  Answering service: [phone] from in house phone        Date of Service:  2021  NAME:  Leah Craig  :  1945  MRN:  [id number]      Admission Summary:   Copied form admit: \" Admitted for multiple urological surgeries. Medicine consulted for medical management of dyspnea and mental status changes. Pt with known hx of heavy etoh use as well as hx of etoh withdrawal with previous hospitalizations. Wife at bedside confirms he drinks 1 bottle of wine and 3 whiskey shots qhs. Also with reports of sarcoid and increasing dyspnea since admission. Able to answer questions and denies any cough, chest pain or palpitations. \"    Interval history / Subjective:     2021 :    NSR   Diet advanced   Confusion abated   Eliquis to be started   Off amiodarone   BB if needed for hr  Mae Craven;        Assessment & Plan:     ETOH Withdrawal  -CIWA protocol with ativan  -Phenobarb dosing and monitoring on going for 2021:   Intermittently confused    No tremors  · Wife at bedside noted pt did this in 2021 w sepsis as to afib/rvr as well as w/d's    :  Nehemiah Distance into afib rvr , failed to respond to dilt bolus/infusion after intial success   Placed on amiodarone drip after failed dilt trial w success   Sinus arrhythma 2021    Cont on amiodarone drip   Card to comment    Was in afib in 2021 in icu ,septic in [facility] - else pt denies persistent or else PAF.  2021 :   NSR   Diet advanced   Confusion abated   Eliquis to be started   Off amiodarone  BB if needed for hr        Afib w RVR   - am of 2021 :  Afib w rvr - responed to dilt bolus/drip then failed to respond:  - -> amiodarone bolus/drip started after dilt fails  Also pt given empiric lasix x 1 iv 2n2 wheezing at time ; on reeval, pt s elevated S Na and no wheezing and over all appears dry, ivf added   - now NRS  - Per card for BB rate control as needed. Dc amiodarone, and start Eliquis     Hypernatremia 12/12/2021 :  - Added 1/2 NS 12/12/2021   - resolves on 1/2 NS at now 140 12/13/2021      AMS  Likely due to ETOH withdrawal given hx og heavy etoh use  -R/O infectious etiology: uti vs cellulitis vs intraabdominal  -Obtain cbc, cmp, ua, procal, blood cx  -Procal 4, f/u lab for comparison, is on broad abx   - as above   - mental status improves daily     3. Dyspnea / Pulmonary edema / Sarcoid  -Multifactorial dyspnea  -Puml edema, dilated cardiomypathy and RHF  -??aLcoholic cardiomyopathy  -Lasix 20mg x1 dose  -Consider ECHO in AM  - no s/s of hf. Will give additional ivf bolus given current findings of lactic acidosis, sepsis/etoh w/d   - no s/s of sob/hf on [id number] -  12/14/2021      4. Severe SEPSIS  -STAT CT chest, abd et pelvi  GNRs on cutl blood 12/10 ID /sens pending,   -Start vanc and Meropenem ( dropping Vanc on 12/12/2021 )  - CT: 12/11 am: 1. Interval radical cystoprostatectomy with ileal conduit urinary diversion and  no postoperative complication identified  - Blood cult sens kleb; 12/10,  F/u blood cult 12/13 neg to date, 14 days iv rocephin can complete in SNF     Renal at risk; per CT finding 12/11 concerning for ATN, watching renal's ns bolus , ivf. aovid nephrotoxic else renal dose meds   Lab Results   Component Value Date/Time    Creatinine 1.17 12/14/2021 01:02 AM              DVT PPX =heparin      Hospital Problems  Date Reviewed: 10/14/2021          Codes Class Noted POA    Bladder cancer [facility]) ICD-10-CM: C67.9  ICD-9-CM: 188.9  6/23/2021 Unknown                  After personally interviewing pt at bedside the following is noted on 12/14/2021 :    Review of Systems   Constitutional: Negative for chills, fever and malaise/fatigue.    Respiratory: Negative for cough, hemoptysis, shortness of breath and wheezing. Cardiovascular: Negative for chest pain, palpitations and leg swelling. Gastrointestinal: Negative for abdominal pain, nausea and vomiting. Genitourinary: Negative. Musculoskeletal: Negative. All other systems reviewed and are negative. I had a face to face encounter with patient on 12/14/2021 at bedside for the following physical exam:     PHYSICAL EXAM:    Visit Vitals  /75 (BP 1 Location: Right upper arm, BP Patient Position: Supine)   Pulse 68   Temp 98.4 °F (36.9 °C)   Resp 18   Ht 5' 11\" (1.803 m)   Wt 127.6 kg (281 lb 4.9 oz)   SpO2 100%   BMI 39.23 kg/m²          Physical Exam  Constitutional:       General: He is not in acute distress. Appearance: He is obese. He is not ill-appearing, toxic-appearing or diaphoretic. HENT:      Head: Normocephalic and atraumatic. Right Ear: External ear normal.      Left Ear: External ear normal.      Nose: Nose normal.      Mouth/Throat:      Mouth: Mucous membranes are dry. Pharynx: Oropharynx is clear. Eyes:      Extraocular Movements: Extraocular movements intact. Conjunctiva/sclera: Conjunctivae normal.      Pupils: Pupils are equal, round, and reactive to light. Cardiovascular:      Rate and Rhythm: Normal rate and regular rhythm. Heart sounds: No murmur heard. No gallop. Comments: afib rvr  Pulmonary:      Effort: Pulmonary effort is normal. No respiratory distress. Breath sounds: No wheezing. Abdominal:      General: Abdomen is flat. There is no distension. Palpations: Abdomen is soft. Tenderness: There is no abdominal tenderness. Comments: Now w binder , mild discomfort, and mildly tender , + BS's    Musculoskeletal:         General: No swelling or deformity. Cervical back: Normal range of motion and neck supple. Skin:     Coloration: Skin is not jaundiced or pale. Neurological:      General: No focal deficit present.       Mental Status: He is alert. Comments: Well oriented, pleasant , appropriate    Psychiatric:         Mood and Affect: Mood normal.         Behavior: Behavior normal.                     Data Review:    Review and/or order of clinical lab test      Labs:     Recent Labs     12/14/21 0102 12/13/21 0232   WBC 6.6 7.9   HGB 7.5* 7.6*   HCT 24.0* 24.5*    156     Recent Labs     12/14/21 0102 12/13/21  0232 12/12/21  0605 12/12/21  0553    140 150*  --    K 3.3* 3.2* 3.7  --    * 111* 120*  --    CO2 22 24 21  --    BUN 34* 40* 42*  --    CREA 1.17 1.37* 1.36*  --    * 116* 109*  --    CA 8.8 9.0 8.7  --    MG  --   --   --  2.2     Recent Labs     12/14/21 0102   ALT 30      TBILI 0.3   TP 5.9*   ALB 2.2*   GLOB 3.7     No results for input(s): INR, PTP, APTT, INREXT, INREXT in the last 72 hours. No results for input(s): FE, TIBC, PSAT, FERR in the last 72 hours. No results found for: FOL, RBCF   No results for input(s): PH, PCO2, PO2 in the last 72 hours. No results for input(s): CPK, CKNDX, TROIQ in the last 72 hours.     No lab exists for component: CPKMB  No results found for: CHOL, CHOLX, CHLST, CHOLV, HDL, HDLP, LDL, LDLC, DLDLP, TGLX, TRIGL, TRIGP, CHHD, CHHDX  Lab Results   Component Value Date/Time    Glucose (POC) 143 (H) 12/11/2021 01:02 AM    Glucose (POC) 93 12/01/2009 07:10 AM     Lab Results   Component Value Date/Time    Color YELLOW/STRAW 12/11/2021 03:03 AM    Appearance CLEAR 12/11/2021 03:03 AM    Specific gravity >1.030 12/11/2021 03:03 AM    Specific gravity 1.016 06/17/2021 09:56 AM    pH (UA) 6.5 12/11/2021 03:03 AM    Protein 100 (A) 12/11/2021 03:03 AM    Glucose Negative 12/11/2021 03:03 AM    Ketone Negative 12/11/2021 03:03 AM    Bilirubin Negative 12/11/2021 03:03 AM    Urobilinogen 1.0 12/11/2021 03:03 AM    Nitrites Positive (A) 12/11/2021 03:03 AM    Leukocyte Esterase MODERATE (A) 12/11/2021 03:03 AM    Epithelial cells FEW 12/11/2021 03:03 AM    Bacteria 1+ (A) 12/11/2021 03:03 AM    WBC  12/11/2021 03:03 AM    RBC  12/11/2021 03:03 AM         Medications Reviewed:     Current Facility-Administered Medications   Medication Dose Route Frequency    HYDROcodone-chlorpheniramine (TUSSIONEX) oral suspension 5 mL  5 mL Oral Q12H PRN    loperamide (IMODIUM) capsule 2 mg  2 mg Oral Q6H PRN    [START ON 12/15/2021] apixaban (ELIQUIS) tablet 5 mg  5 mg Oral BID    heparin (porcine) injection 5,000 Units  5,000 Units SubCUTAneous Q8H    potassium chloride SR (KLOR-CON 10) tablet 40 mEq  40 mEq Oral NOW    acetaminophen (TYLENOL) tablet 650 mg  650 mg Oral Q4H PRN    cefTRIAXone (ROCEPHIN) 2 g in 0.9% sodium chloride (MBP/ADV) 50 mL MBP  2 g IntraVENous Q24H    acetaminophen (TYLENOL) tablet 1,000 mg  1,000 mg Oral Q6H    famotidine (PEPCID) tablet 20 mg  20 mg Oral Q12H    benzonatate (TESSALON) capsule 100 mg  100 mg Oral TID PRN    metoprolol tartrate (LOPRESSOR) tablet 50 mg  50 mg Oral BID    furosemide (LASIX) injection 40 mg  40 mg IntraVENous Q12H    thiamine HCL (B-1) tablet 100 mg  100 mg Oral DAILY    folic acid (FOLVITE) tablet 1 mg  1 mg Oral DAILY    ipratropium (ATROVENT) 0.02 % nebulizer solution 0.5 mg  0.5 mg Nebulization Q6H PRN    LORazepam (ATIVAN) tablet 4 mg  4 mg Oral Q1H PRN    LORazepam (ATIVAN) injection 2 mg  2 mg IntraVENous Q1H PRN    LORazepam (ATIVAN) injection 4 mg  4 mg IntraVENous Q1H PRN    multivitamin, tx-iron-ca-min (THERA-M w/ IRON) tablet 1 Tablet  1 Tablet Oral DAILY    alteplase (CATHFLO) 1 mg in sterile water (preservative free) 1 mL injection  1 mg InterCATHeter PRN    bacitracin 500 unit/gram packet 1 Packet  1 Packet Topical PRN    albuterol-ipratropium (DUO-NEB) 2.5 MG-0.5 MG/3 ML  3 mL Nebulization Q6H PRN    traMADoL (ULTRAM) tablet 50 mg  50 mg Oral Q6H PRN    gabapentin (NEURONTIN) capsule 200 mg  200 mg Oral BID PRN    celecoxib (CELEBREX) capsule 100 mg  100 mg Oral BID PRN    [Held by provider] lactated Ringers infusion  75 mL/hr IntraVENous CONTINUOUS    ondansetron (ZOFRAN ODT) tablet 4 mg  4 mg Oral Q8H PRN    Or    ondansetron (ZOFRAN) injection 4 mg  4 mg IntraVENous Q6H PRN    sodium chloride 0.9 % bolus infusion 500 mL  500 mL IntraVENous PRN    buPROPion SR (WELLBUTRIN SR) tablet 100 mg  100 mg Oral DAILY    busPIRone (BUSPAR) tablet 5 mg  5 mg Oral DAILY    escitalopram oxalate (LEXAPRO) tablet 10 mg  10 mg Oral DAILY    diphenhydrAMINE (BENADRYL) injection 12.5 mg  12.5 mg IntraVENous Q4H PRN     ______________________________________________________________________  EXPECTED LENGTH OF STAY: 10d 4h  ACTUAL LENGTH OF STAY:          8                 Prasanth Van MD

## 2021-12-15 ENCOUNTER — ANESTHESIA EVENT (OUTPATIENT)
Dept: SURGERY | Age: 76
DRG: 653 | End: 2021-12-15
Payer: MEDICARE

## 2021-12-15 ENCOUNTER — ANESTHESIA (OUTPATIENT)
Dept: SURGERY | Age: 76
DRG: 653 | End: 2021-12-15
Payer: MEDICARE

## 2021-12-15 LAB
ANION GAP SERPL CALC-SCNC: 6 MMOL/L (ref 5–15)
BACTERIA SPEC CULT: ABNORMAL
BASOPHILS # BLD: 0 K/UL (ref 0–0.1)
BASOPHILS NFR BLD: 0 % (ref 0–1)
BNP SERPL-MCNC: 7453 PG/ML
BUN SERPL-MCNC: 32 MG/DL (ref 6–20)
BUN/CREAT SERPL: 27 (ref 12–20)
CALCIUM SERPL-MCNC: 8.9 MG/DL (ref 8.5–10.1)
CHLORIDE SERPL-SCNC: 112 MMOL/L (ref 97–108)
CO2 SERPL-SCNC: 22 MMOL/L (ref 21–32)
CREAT SERPL-MCNC: 1.18 MG/DL (ref 0.7–1.3)
DIFFERENTIAL METHOD BLD: ABNORMAL
EOSINOPHIL # BLD: 0.2 K/UL (ref 0–0.4)
EOSINOPHIL NFR BLD: 3 % (ref 0–7)
ERYTHROCYTE [DISTWIDTH] IN BLOOD BY AUTOMATED COUNT: 15.8 % (ref 11.5–14.5)
GLUCOSE SERPL-MCNC: 106 MG/DL (ref 65–100)
HCT VFR BLD AUTO: 25.4 % (ref 36.6–50.3)
HGB BLD-MCNC: 7.8 G/DL (ref 12.1–17)
IMM GRANULOCYTES # BLD AUTO: 0.1 K/UL (ref 0–0.04)
IMM GRANULOCYTES NFR BLD AUTO: 1 % (ref 0–0.5)
LYMPHOCYTES # BLD: 0.6 K/UL (ref 0.8–3.5)
LYMPHOCYTES NFR BLD: 8 % (ref 12–49)
MCH RBC QN AUTO: 33.6 PG (ref 26–34)
MCHC RBC AUTO-ENTMCNC: 30.7 G/DL (ref 30–36.5)
MCV RBC AUTO: 109.5 FL (ref 80–99)
MONOCYTES # BLD: 0.5 K/UL (ref 0–1)
MONOCYTES NFR BLD: 6 % (ref 5–13)
NEUTS SEG # BLD: 6.7 K/UL (ref 1.8–8)
NEUTS SEG NFR BLD: 82 % (ref 32–75)
NRBC # BLD: 0 K/UL (ref 0–0.01)
NRBC BLD-RTO: 0 PER 100 WBC
PLATELET # BLD AUTO: 166 K/UL (ref 150–400)
PMV BLD AUTO: 10.2 FL (ref 8.9–12.9)
POTASSIUM SERPL-SCNC: 3.5 MMOL/L (ref 3.5–5.1)
RBC # BLD AUTO: 2.32 M/UL (ref 4.1–5.7)
RBC MORPH BLD: ABNORMAL
RBC MORPH BLD: ABNORMAL
SERVICE CMNT-IMP: ABNORMAL
SODIUM SERPL-SCNC: 140 MMOL/L (ref 136–145)
WBC # BLD AUTO: 8.1 K/UL (ref 4.1–11.1)

## 2021-12-15 PROCEDURE — 74011250636 HC RX REV CODE- 250/636: Performed by: ANESTHESIOLOGY

## 2021-12-15 PROCEDURE — 74011000250 HC RX REV CODE- 250: Performed by: NURSE ANESTHETIST, CERTIFIED REGISTERED

## 2021-12-15 PROCEDURE — 74011250637 HC RX REV CODE- 250/637: Performed by: INTERNAL MEDICINE

## 2021-12-15 PROCEDURE — 77030018717 HC DRSG GRNUFM KCON -B: Performed by: UROLOGY

## 2021-12-15 PROCEDURE — 36415 COLL VENOUS BLD VENIPUNCTURE: CPT

## 2021-12-15 PROCEDURE — 77030002994 HC SUT RETIN BLSTR J&J -A: Performed by: UROLOGY

## 2021-12-15 PROCEDURE — 77030013079 HC BLNKT BAIR HGGR 3M -A: Performed by: ANESTHESIOLOGY

## 2021-12-15 PROCEDURE — C1887 CATHETER, GUIDING: HCPCS | Performed by: UROLOGY

## 2021-12-15 PROCEDURE — 76060000035 HC ANESTHESIA 2 TO 2.5 HR: Performed by: UROLOGY

## 2021-12-15 PROCEDURE — 77030013076 HC PCH OST BAG COLO -A: Performed by: UROLOGY

## 2021-12-15 PROCEDURE — 0JQ80ZZ REPAIR ABDOMEN SUBCUTANEOUS TISSUE AND FASCIA, OPEN APPROACH: ICD-10-PCS | Performed by: UROLOGY

## 2021-12-15 PROCEDURE — 80048 BASIC METABOLIC PNL TOTAL CA: CPT

## 2021-12-15 PROCEDURE — 77030036554: Performed by: UROLOGY

## 2021-12-15 PROCEDURE — 83880 ASSAY OF NATRIURETIC PEPTIDE: CPT

## 2021-12-15 PROCEDURE — 77030042556 HC PNCL CAUT -B: Performed by: UROLOGY

## 2021-12-15 PROCEDURE — 2709999900 HC NON-CHARGEABLE SUPPLY: Performed by: UROLOGY

## 2021-12-15 PROCEDURE — 77030026438 HC STYL ET INTUB CARD -A: Performed by: ANESTHESIOLOGY

## 2021-12-15 PROCEDURE — 65660000001 HC RM ICU INTERMED STEPDOWN

## 2021-12-15 PROCEDURE — 77030002916 HC SUT ETHLN J&J -A: Performed by: UROLOGY

## 2021-12-15 PROCEDURE — 74011250636 HC RX REV CODE- 250/636: Performed by: NURSE ANESTHETIST, CERTIFIED REGISTERED

## 2021-12-15 PROCEDURE — 76210000063 HC OR PH I REC FIRST 0.5 HR: Performed by: UROLOGY

## 2021-12-15 PROCEDURE — 77030002966 HC SUT PDS J&J -A: Performed by: UROLOGY

## 2021-12-15 PROCEDURE — 74011000258 HC RX REV CODE- 258: Performed by: INTERNAL MEDICINE

## 2021-12-15 PROCEDURE — 77030008684 HC TU ET CUF COVD -B: Performed by: ANESTHESIOLOGY

## 2021-12-15 PROCEDURE — 76010000153 HC OR TIME 1.5 TO 2 HR: Performed by: UROLOGY

## 2021-12-15 PROCEDURE — 74011250636 HC RX REV CODE- 250/636: Performed by: INTERNAL MEDICINE

## 2021-12-15 PROCEDURE — 74011000250 HC RX REV CODE- 250: Performed by: REGISTERED NURSE

## 2021-12-15 PROCEDURE — 74011250636 HC RX REV CODE- 250/636: Performed by: REGISTERED NURSE

## 2021-12-15 PROCEDURE — 85025 COMPLETE CBC W/AUTO DIFF WBC: CPT

## 2021-12-15 PROCEDURE — 74011250637 HC RX REV CODE- 250/637: Performed by: UROLOGY

## 2021-12-15 RX ORDER — SODIUM CHLORIDE 0.9 % (FLUSH) 0.9 %
5-40 SYRINGE (ML) INJECTION EVERY 8 HOURS
Status: DISCONTINUED | OUTPATIENT
Start: 2021-12-15 | End: 2021-12-21 | Stop reason: HOSPADM

## 2021-12-15 RX ORDER — MIDAZOLAM HYDROCHLORIDE 1 MG/ML
1 INJECTION, SOLUTION INTRAMUSCULAR; INTRAVENOUS AS NEEDED
Status: DISCONTINUED | OUTPATIENT
Start: 2021-12-15 | End: 2021-12-16

## 2021-12-15 RX ORDER — ONDANSETRON 2 MG/ML
INJECTION INTRAMUSCULAR; INTRAVENOUS AS NEEDED
Status: DISCONTINUED | OUTPATIENT
Start: 2021-12-15 | End: 2021-12-15 | Stop reason: HOSPADM

## 2021-12-15 RX ORDER — FUROSEMIDE 40 MG/1
40 TABLET ORAL DAILY
Status: DISCONTINUED | OUTPATIENT
Start: 2021-12-15 | End: 2021-12-21 | Stop reason: HOSPADM

## 2021-12-15 RX ORDER — SODIUM CHLORIDE, SODIUM LACTATE, POTASSIUM CHLORIDE, CALCIUM CHLORIDE 600; 310; 30; 20 MG/100ML; MG/100ML; MG/100ML; MG/100ML
INJECTION, SOLUTION INTRAVENOUS
Status: DISCONTINUED | OUTPATIENT
Start: 2021-12-15 | End: 2021-12-15 | Stop reason: HOSPADM

## 2021-12-15 RX ORDER — HYDROMORPHONE HYDROCHLORIDE 2 MG/ML
INJECTION, SOLUTION INTRAMUSCULAR; INTRAVENOUS; SUBCUTANEOUS AS NEEDED
Status: DISCONTINUED | OUTPATIENT
Start: 2021-12-15 | End: 2021-12-15 | Stop reason: HOSPADM

## 2021-12-15 RX ORDER — MIDAZOLAM HYDROCHLORIDE 1 MG/ML
0.5 INJECTION, SOLUTION INTRAMUSCULAR; INTRAVENOUS
Status: DISCONTINUED | OUTPATIENT
Start: 2021-12-15 | End: 2021-12-15 | Stop reason: HOSPADM

## 2021-12-15 RX ORDER — PROPOFOL 10 MG/ML
INJECTION, EMULSION INTRAVENOUS AS NEEDED
Status: DISCONTINUED | OUTPATIENT
Start: 2021-12-15 | End: 2021-12-15 | Stop reason: HOSPADM

## 2021-12-15 RX ORDER — SODIUM CHLORIDE 0.9 % (FLUSH) 0.9 %
5-40 SYRINGE (ML) INJECTION AS NEEDED
Status: DISCONTINUED | OUTPATIENT
Start: 2021-12-15 | End: 2021-12-15 | Stop reason: HOSPADM

## 2021-12-15 RX ORDER — ACETAMINOPHEN 325 MG/1
650 TABLET ORAL ONCE
Status: ACTIVE | OUTPATIENT
Start: 2021-12-15 | End: 2021-12-15

## 2021-12-15 RX ORDER — HYDROMORPHONE HYDROCHLORIDE 1 MG/ML
0.2 INJECTION, SOLUTION INTRAMUSCULAR; INTRAVENOUS; SUBCUTANEOUS
Status: DISCONTINUED | OUTPATIENT
Start: 2021-12-15 | End: 2021-12-15 | Stop reason: HOSPADM

## 2021-12-15 RX ORDER — MIDAZOLAM HYDROCHLORIDE 1 MG/ML
INJECTION, SOLUTION INTRAMUSCULAR; INTRAVENOUS AS NEEDED
Status: DISCONTINUED | OUTPATIENT
Start: 2021-12-15 | End: 2021-12-15 | Stop reason: HOSPADM

## 2021-12-15 RX ORDER — MORPHINE SULFATE 2 MG/ML
2 INJECTION, SOLUTION INTRAMUSCULAR; INTRAVENOUS
Status: DISCONTINUED | OUTPATIENT
Start: 2021-12-15 | End: 2021-12-15 | Stop reason: HOSPADM

## 2021-12-15 RX ORDER — SODIUM CHLORIDE 9 MG/ML
25 INJECTION, SOLUTION INTRAVENOUS CONTINUOUS
Status: DISCONTINUED | OUTPATIENT
Start: 2021-12-15 | End: 2021-12-16

## 2021-12-15 RX ORDER — ROPIVACAINE HYDROCHLORIDE 5 MG/ML
30 INJECTION, SOLUTION EPIDURAL; INFILTRATION; PERINEURAL ONCE
Status: ACTIVE | OUTPATIENT
Start: 2021-12-15 | End: 2021-12-15

## 2021-12-15 RX ORDER — DEXAMETHASONE SODIUM PHOSPHATE 4 MG/ML
INJECTION, SOLUTION INTRA-ARTICULAR; INTRALESIONAL; INTRAMUSCULAR; INTRAVENOUS; SOFT TISSUE AS NEEDED
Status: DISCONTINUED | OUTPATIENT
Start: 2021-12-15 | End: 2021-12-15 | Stop reason: HOSPADM

## 2021-12-15 RX ORDER — SUCCINYLCHOLINE CHLORIDE 20 MG/ML
INJECTION INTRAMUSCULAR; INTRAVENOUS AS NEEDED
Status: DISCONTINUED | OUTPATIENT
Start: 2021-12-15 | End: 2021-12-15 | Stop reason: HOSPADM

## 2021-12-15 RX ORDER — FENTANYL CITRATE 50 UG/ML
50 INJECTION, SOLUTION INTRAMUSCULAR; INTRAVENOUS AS NEEDED
Status: DISCONTINUED | OUTPATIENT
Start: 2021-12-15 | End: 2021-12-15 | Stop reason: HOSPADM

## 2021-12-15 RX ORDER — FENTANYL CITRATE 50 UG/ML
INJECTION, SOLUTION INTRAMUSCULAR; INTRAVENOUS AS NEEDED
Status: DISCONTINUED | OUTPATIENT
Start: 2021-12-15 | End: 2021-12-15 | Stop reason: HOSPADM

## 2021-12-15 RX ORDER — ROCURONIUM BROMIDE 10 MG/ML
INJECTION, SOLUTION INTRAVENOUS AS NEEDED
Status: DISCONTINUED | OUTPATIENT
Start: 2021-12-15 | End: 2021-12-15 | Stop reason: HOSPADM

## 2021-12-15 RX ORDER — EPHEDRINE SULFATE/0.9% NACL/PF 50 MG/5 ML
SYRINGE (ML) INTRAVENOUS AS NEEDED
Status: DISCONTINUED | OUTPATIENT
Start: 2021-12-15 | End: 2021-12-15 | Stop reason: HOSPADM

## 2021-12-15 RX ORDER — SODIUM CHLORIDE, SODIUM LACTATE, POTASSIUM CHLORIDE, CALCIUM CHLORIDE 600; 310; 30; 20 MG/100ML; MG/100ML; MG/100ML; MG/100ML
75 INJECTION, SOLUTION INTRAVENOUS CONTINUOUS
Status: DISCONTINUED | OUTPATIENT
Start: 2021-12-15 | End: 2021-12-15 | Stop reason: HOSPADM

## 2021-12-15 RX ORDER — SODIUM CHLORIDE 9 MG/ML
25 INJECTION, SOLUTION INTRAVENOUS CONTINUOUS
Status: DISCONTINUED | OUTPATIENT
Start: 2021-12-15 | End: 2021-12-15 | Stop reason: HOSPADM

## 2021-12-15 RX ORDER — PHENYLEPHRINE HCL IN 0.9% NACL 0.4MG/10ML
SYRINGE (ML) INTRAVENOUS AS NEEDED
Status: DISCONTINUED | OUTPATIENT
Start: 2021-12-15 | End: 2021-12-15 | Stop reason: HOSPADM

## 2021-12-15 RX ORDER — SODIUM CHLORIDE, SODIUM LACTATE, POTASSIUM CHLORIDE, CALCIUM CHLORIDE 600; 310; 30; 20 MG/100ML; MG/100ML; MG/100ML; MG/100ML
125 INJECTION, SOLUTION INTRAVENOUS CONTINUOUS
Status: DISCONTINUED | OUTPATIENT
Start: 2021-12-15 | End: 2021-12-16

## 2021-12-15 RX ORDER — DIPHENHYDRAMINE HYDROCHLORIDE 50 MG/ML
12.5 INJECTION, SOLUTION INTRAMUSCULAR; INTRAVENOUS AS NEEDED
Status: DISCONTINUED | OUTPATIENT
Start: 2021-12-15 | End: 2021-12-15 | Stop reason: HOSPADM

## 2021-12-15 RX ORDER — LIDOCAINE HYDROCHLORIDE 20 MG/ML
INJECTION, SOLUTION EPIDURAL; INFILTRATION; INTRACAUDAL; PERINEURAL AS NEEDED
Status: DISCONTINUED | OUTPATIENT
Start: 2021-12-15 | End: 2021-12-15 | Stop reason: HOSPADM

## 2021-12-15 RX ORDER — METOPROLOL TARTRATE 5 MG/5ML
INJECTION INTRAVENOUS AS NEEDED
Status: DISCONTINUED | OUTPATIENT
Start: 2021-12-15 | End: 2021-12-15 | Stop reason: HOSPADM

## 2021-12-15 RX ORDER — ONDANSETRON 2 MG/ML
4 INJECTION INTRAMUSCULAR; INTRAVENOUS AS NEEDED
Status: DISCONTINUED | OUTPATIENT
Start: 2021-12-15 | End: 2021-12-15 | Stop reason: HOSPADM

## 2021-12-15 RX ORDER — LIDOCAINE HYDROCHLORIDE 10 MG/ML
0.1 INJECTION, SOLUTION EPIDURAL; INFILTRATION; INTRACAUDAL; PERINEURAL AS NEEDED
Status: DISCONTINUED | OUTPATIENT
Start: 2021-12-15 | End: 2021-12-15 | Stop reason: HOSPADM

## 2021-12-15 RX ORDER — FENTANYL CITRATE 50 UG/ML
25 INJECTION, SOLUTION INTRAMUSCULAR; INTRAVENOUS
Status: DISCONTINUED | OUTPATIENT
Start: 2021-12-15 | End: 2021-12-15 | Stop reason: HOSPADM

## 2021-12-15 RX ORDER — SODIUM CHLORIDE 0.9 % (FLUSH) 0.9 %
5-40 SYRINGE (ML) INJECTION EVERY 8 HOURS
Status: DISCONTINUED | OUTPATIENT
Start: 2021-12-15 | End: 2021-12-15 | Stop reason: HOSPADM

## 2021-12-15 RX ADMIN — ACETAMINOPHEN 1000 MG: 500 TABLET ORAL at 16:58

## 2021-12-15 RX ADMIN — HYDROMORPHONE HYDROCHLORIDE 0.4 MG: 2 INJECTION, SOLUTION INTRAMUSCULAR; INTRAVENOUS; SUBCUTANEOUS at 10:53

## 2021-12-15 RX ADMIN — ACETAMINOPHEN 1000 MG: 500 TABLET ORAL at 21:47

## 2021-12-15 RX ADMIN — MIDAZOLAM 1 MG: 1 INJECTION INTRAMUSCULAR; INTRAVENOUS at 09:13

## 2021-12-15 RX ADMIN — Medication 120 MCG: at 09:40

## 2021-12-15 RX ADMIN — FENTANYL CITRATE 25 MCG: 50 INJECTION INTRAMUSCULAR; INTRAVENOUS at 11:20

## 2021-12-15 RX ADMIN — BUPROPION HYDROCHLORIDE 100 MG: 100 TABLET, EXTENDED RELEASE ORAL at 17:14

## 2021-12-15 RX ADMIN — FENTANYL CITRATE 50 MCG: 50 INJECTION, SOLUTION INTRAMUSCULAR; INTRAVENOUS at 09:23

## 2021-12-15 RX ADMIN — FUROSEMIDE 40 MG: 40 TABLET ORAL at 17:15

## 2021-12-15 RX ADMIN — PHENYLEPHRINE HYDROCHLORIDE 60 MCG/MIN: 10 INJECTION INTRAVENOUS at 09:36

## 2021-12-15 RX ADMIN — Medication 10 MG: at 09:30

## 2021-12-15 RX ADMIN — HYDROCODONE POLISTIREX AND CHLORPHENIRAMINE POLISTIREX 5 ML: 10; 8 SUSPENSION, EXTENDED RELEASE ORAL at 04:42

## 2021-12-15 RX ADMIN — SUGAMMADEX 200 MG: 100 INJECTION, SOLUTION INTRAVENOUS at 10:45

## 2021-12-15 RX ADMIN — Medication 10 MG: at 09:36

## 2021-12-15 RX ADMIN — Medication 80 MCG: at 09:32

## 2021-12-15 RX ADMIN — Medication 20 MG: at 09:39

## 2021-12-15 RX ADMIN — HYDROMORPHONE HYDROCHLORIDE 0.4 MG: 2 INJECTION, SOLUTION INTRAMUSCULAR; INTRAVENOUS; SUBCUTANEOUS at 10:46

## 2021-12-15 RX ADMIN — FOLIC ACID 1 MG: 1 TABLET ORAL at 17:15

## 2021-12-15 RX ADMIN — PROPOFOL 150 MG: 10 INJECTION, EMULSION INTRAVENOUS at 09:23

## 2021-12-15 RX ADMIN — HYDROMORPHONE HYDROCHLORIDE 0.2 MG: 2 INJECTION, SOLUTION INTRAMUSCULAR; INTRAVENOUS; SUBCUTANEOUS at 11:16

## 2021-12-15 RX ADMIN — BUSPIRONE HYDROCHLORIDE 5 MG: 5 TABLET ORAL at 17:16

## 2021-12-15 RX ADMIN — CEFTRIAXONE SODIUM 2 G: 2 INJECTION, POWDER, FOR SOLUTION INTRAMUSCULAR; INTRAVENOUS at 19:14

## 2021-12-15 RX ADMIN — TRAMADOL HYDROCHLORIDE 50 MG: 50 TABLET, COATED ORAL at 16:59

## 2021-12-15 RX ADMIN — METOPROLOL TARTRATE 2 MG: 5 INJECTION, SOLUTION INTRAVENOUS at 09:13

## 2021-12-15 RX ADMIN — SUCCINYLCHOLINE CHLORIDE 200 MG: 20 INJECTION, SOLUTION INTRAMUSCULAR; INTRAVENOUS at 09:24

## 2021-12-15 RX ADMIN — SODIUM CHLORIDE, POTASSIUM CHLORIDE, SODIUM LACTATE AND CALCIUM CHLORIDE: 600; 310; 30; 20 INJECTION, SOLUTION INTRAVENOUS at 11:04

## 2021-12-15 RX ADMIN — MULTIPLE VITAMINS W/ MINERALS TAB 1 TABLET: TAB at 17:16

## 2021-12-15 RX ADMIN — HYDROMORPHONE HYDROCHLORIDE 0.4 MG: 2 INJECTION, SOLUTION INTRAMUSCULAR; INTRAVENOUS; SUBCUTANEOUS at 11:14

## 2021-12-15 RX ADMIN — Medication 10 ML: at 22:00

## 2021-12-15 RX ADMIN — ROCURONIUM BROMIDE 10 MG: 10 SOLUTION INTRAVENOUS at 10:08

## 2021-12-15 RX ADMIN — FAMOTIDINE 20 MG: 20 TABLET ORAL at 21:47

## 2021-12-15 RX ADMIN — ROCURONIUM BROMIDE 45 MG: 10 SOLUTION INTRAVENOUS at 09:29

## 2021-12-15 RX ADMIN — Medication 10 ML: at 17:20

## 2021-12-15 RX ADMIN — LIDOCAINE HYDROCHLORIDE 100 MG: 20 INJECTION, SOLUTION EPIDURAL; INFILTRATION; INTRACAUDAL; PERINEURAL at 09:23

## 2021-12-15 RX ADMIN — ONDANSETRON HYDROCHLORIDE 4 MG: 2 INJECTION, SOLUTION INTRAMUSCULAR; INTRAVENOUS at 10:20

## 2021-12-15 RX ADMIN — Medication 10 MG: at 09:32

## 2021-12-15 RX ADMIN — METOPROLOL TARTRATE 50 MG: 50 TABLET, FILM COATED ORAL at 17:19

## 2021-12-15 RX ADMIN — SODIUM CHLORIDE, POTASSIUM CHLORIDE, SODIUM LACTATE AND CALCIUM CHLORIDE: 600; 310; 30; 20 INJECTION, SOLUTION INTRAVENOUS at 09:13

## 2021-12-15 RX ADMIN — ROCURONIUM BROMIDE 5 MG: 10 SOLUTION INTRAVENOUS at 09:23

## 2021-12-15 RX ADMIN — ACETAMINOPHEN 1000 MG: 500 TABLET ORAL at 04:42

## 2021-12-15 RX ADMIN — FAMOTIDINE 20 MG: 20 TABLET ORAL at 17:15

## 2021-12-15 RX ADMIN — FENTANYL CITRATE 50 MCG: 50 INJECTION, SOLUTION INTRAMUSCULAR; INTRAVENOUS at 09:24

## 2021-12-15 RX ADMIN — DEXAMETHASONE SODIUM PHOSPHATE 4 MG: 4 INJECTION, SOLUTION INTRAMUSCULAR; INTRAVENOUS at 09:51

## 2021-12-15 NOTE — OP NOTES
[facility]  OPERATIVE REPORT    Name:  Vlad Brower  MR#:  [id number]  :  1945  ACCOUNT #:  [de-identified]  DATE OF SERVICE:  12/15/2021      PREOPERATIVE DIAGNOSIS:  Wound separation. POSTOPERATIVE DIAGNOSIS:  Wound separation. PROCEDURE PERFORMED:  Revision closure of abdominal wound. SURGEON:  shemar    ASSISTANT:  0    ANESTHESIA:  ga    COMPLICATIONS:  0    SPECIMENS REMOVED:  0    IMPLANTS:  0    ESTIMATED BLOOD LOSS:  min    BRIEF SUMMARY:  This gentleman is 8 days status post cystectomy. He developed a wound separation and there was a concern regarding the integrity of the fascia. PROCEDURE:  After anesthesia, the patient was prepped and draped in a sterile manner. The staples were removed and the wound was examined. The fascia had necrosed and was very poor quality. The rectus muscles were  in the midline from the upper aspect of the wound to the midportion of the wound. The lower aspect of the wound was intact and appeared solid. The PDS suture was divided and used to end the running closure with the first and lowest interrupted suture. The abdomen was explored. The omentum was in place below the fascial separation and had prevented any evisceration. The wound was copiously irrigated with saline. There was no evidence of fluid collection or abscess. Two large nylon retention sutures were placed initially 2 inches back from the skin edges through the belly of the rectus muscle and secured with bolsters. Then, interrupted #1 PDS suture was used to re-close the abdomen using the belly of the rectus as the main point of purchase given the erosion and necrosis of the patient's native fascia. With upward traction on the retention sutures, the abdomen came together without much difficulty. Multiple interrupted figure-of-eight sutures were used as described. The retention sutures were then tied under moderate tension to support the wound.   The skin edges were left  and the Wound Care team was brought in to place a wound VAC. Dr. Roland Ya was involved in decision making as was Dr. Nick Hyde. The patient was then reacted from anesthesia and transferred to recovery in stable condition.       Jeannine San MD      [id number]  D:  12/15/2021 10:35  T:  12/15/2021 11:09  JOB #:  [id number]  CC:  [address] [place] [address]

## 2021-12-15 NOTE — PROGRESS NOTES
Cardiology Progress Note                                        Admit Date: 12/6/2021    Assessment/Plan:     1. Paroxysmal afib: currently nsr   Left atrial enlargement   etoh is sig risk factor  chadsvasc 3  Chronic anticoag indicated  eliquis 5 bid   nll lvfx   Would try to avoid antiarrhythmic meds given buspar and bupropion and lexapro all prolong qt interval     rec stop amiodarone     b blocker for rate control until can eliminate some or all of above meds   Remains in nsr. 2.  Hx htn  3. Hx sarcoidosis   4. Coronary calcium score 290 ( 2011)   5. Etoh withdrawal   6. POD# 9 Days Post-Op Procedure(s):  RADICAL CYSTECTOMY, URETHRECTOMY, PROSTATECTOMY, BILATERAL LYMPHADENECTOMY,  ILEAL CONDUIT DIVERSION (E R A S)  7. Dyspnea: improved most likely related to  volume redistibution in setting of diastolic dysfunction  Net 2.6 l  out    Yesterday     will place on maintenance lasix 40 mg daily  8. Hypokalemia:  Replaced     From cardiac standpt will sign off and be available. Fu in office 2 weeks post dc.   eliquis 5 bid rec if ok with gi and surgery ( prophylaxis for cva with afib )               Yogesh Luo is a 77 y.o. male with      PROBLEM LIST:  Patient Active Problem List    Diagnosis Date Noted    Essential hypertension 07/08/2021    Sarcoidosis 07/08/2021    Class 1 obesity due to excess calories with serious comorbidity and body mass index (BMI) of 33.0 to 33.9 in adult 07/08/2021    [facility] 06/23/2021    Status post surgery 02/10/2021    Posterior cervical adenopathy, benign 11/19/2018         Subjective:     Yogesh Luo denies dyspnea.     Visit Vitals  /63 (BP 1 Location: Right upper arm)   Pulse 75   Temp 98.1 °F (36.7 °C)   Resp 18   Ht 5' 11\" (1.803 m)   Wt 226 lb 13.7 oz (102.9 kg) Comment: zeroed bed!!`   SpO2 97%   BMI 31.64 kg/m²       Intake/Output Summary (Last 24 hours) at 12/15/2021 0701  Last data filed at 12/15/2021 0436  Gross per 24 hour   Intake 780 ml   Output 2625 ml   Net -1845 ml       Objective:      Physical Exam:  HEENT: Perrla, EOMI  Neck: No JVD,  No thyroidmegaly  Resp: CTA bilaterally; exp  wheezes present  CV: RRR s1s2 No murmur no s3  Abd:Soft, Nontender  Ext: No edema  Neuro: Alert and oriented; Nonfocal  Skin: Warm, Dry, Intact  Pulses: 2+ DP/PT/Rad      Telemetry: normal sinus rhythm    Current Facility-Administered Medications   Medication Dose Route Frequency    HYDROcodone-chlorpheniramine (TUSSIONEX) oral suspension 5 mL  5 mL Oral Q12H PRN    loperamide (IMODIUM) capsule 2 mg  2 mg Oral Q6H PRN    [Held by provider] apixaban (ELIQUIS) tablet 5 mg  5 mg Oral BID    acetaminophen (TYLENOL) tablet 650 mg  650 mg Oral Q4H PRN    cefTRIAXone (ROCEPHIN) 2 g in 0.9% sodium chloride (MBP/ADV) 50 mL MBP  2 g IntraVENous Q24H    acetaminophen (TYLENOL) tablet 1,000 mg  1,000 mg Oral Q6H    famotidine (PEPCID) tablet 20 mg  20 mg Oral Q12H    benzonatate (TESSALON) capsule 100 mg  100 mg Oral TID PRN    metoprolol tartrate (LOPRESSOR) tablet 50 mg  50 mg Oral BID    furosemide (LASIX) injection 40 mg  40 mg IntraVENous Q12H    thiamine HCL (B-1) tablet 100 mg  100 mg Oral DAILY    folic acid (FOLVITE) tablet 1 mg  1 mg Oral DAILY    ipratropium (ATROVENT) 0.02 % nebulizer solution 0.5 mg  0.5 mg Nebulization Q6H PRN    LORazepam (ATIVAN) tablet 4 mg  4 mg Oral Q1H PRN    LORazepam (ATIVAN) injection 2 mg  2 mg IntraVENous Q1H PRN    LORazepam (ATIVAN) injection 4 mg  4 mg IntraVENous Q1H PRN    multivitamin, tx-iron-ca-min (THERA-M w/ IRON) tablet 1 Tablet  1 Tablet Oral DAILY    alteplase (CATHFLO) 1 mg in sterile water (preservative free) 1 mL injection  1 mg InterCATHeter PRN    bacitracin 500 unit/gram packet 1 Packet  1 Packet Topical PRN    albuterol-ipratropium (DUO-NEB) 2.5 MG-0.5 MG/3 ML  3 mL Nebulization Q6H PRN    traMADoL (ULTRAM) tablet 50 mg  50 mg Oral Q6H PRN    gabapentin (NEURONTIN) capsule 200 mg  200 mg Oral BID PRN    celecoxib (CELEBREX) capsule 100 mg  100 mg Oral BID PRN    [Held by provider] lactated Ringers infusion  75 mL/hr IntraVENous CONTINUOUS    ondansetron (ZOFRAN ODT) tablet 4 mg  4 mg Oral Q8H PRN    Or    ondansetron (ZOFRAN) injection 4 mg  4 mg IntraVENous Q6H PRN    sodium chloride 0.9 % bolus infusion 500 mL  500 mL IntraVENous PRN    buPROPion SR (WELLBUTRIN SR) tablet 100 mg  100 mg Oral DAILY    busPIRone (BUSPAR) tablet 5 mg  5 mg Oral DAILY    escitalopram oxalate (LEXAPRO) tablet 10 mg  10 mg Oral DAILY    diphenhydrAMINE (BENADRYL) injection 12.5 mg  12.5 mg IntraVENous Q4H PRN         Data Review:   Labs:    Recent Results (from the past 24 hour(s))   NT-PRO BNP    Collection Time: 12/15/21  5:07 AM   Result Value Ref Range    NT pro-BNP 7,453 (H) <450 PG/ML   CBC WITH AUTOMATED DIFF    Collection Time: 12/15/21  5:07 AM   Result Value Ref Range    WBC 8.1 4.1 - 11.1 K/uL    RBC 2.32 (L) 4.10 - 5.70 M/uL    HGB 7.8 (L) 12.1 - 17.0 g/dL    HCT 25.4 (L) 36.6 - 50.3 %    .5 (H) 80.0 - 99.0 FL    MCH 33.6 26.0 - 34.0 PG    MCHC 30.7 30.0 - 36.5 g/dL    RDW 15.8 (H) 11.5 - 14.5 %    PLATELET 729 729 - 801 K/uL    MPV 10.2 8.9 - 12.9 FL    NRBC 0.0 0  WBC    ABSOLUTE NRBC 0.00 0.00 - 0.01 K/uL    NEUTROPHILS PENDING %    LYMPHOCYTES PENDING %    MONOCYTES PENDING %    EOSINOPHILS PENDING %    BASOPHILS PENDING %    IMMATURE GRANULOCYTES PENDING %    ABS. NEUTROPHILS PENDING K/UL    ABS. LYMPHOCYTES PENDING K/UL    ABS. MONOCYTES PENDING K/UL    ABS. EOSINOPHILS PENDING K/UL    ABS. BASOPHILS PENDING K/UL    ABS. IMM. GRANS.  PENDING K/UL    DF PENDING    METABOLIC PANEL, BASIC    Collection Time: 12/15/21  5:07 AM   Result Value Ref Range    Sodium 140 136 - 145 mmol/L    Potassium 3.5 3.5 - 5.1 mmol/L    Chloride 112 (H) 97 - 108 mmol/L    CO2 22 21 - 32 mmol/L    Anion gap 6 5 - 15 mmol/L    Glucose 106 (H) 65 - 100 mg/dL    BUN 32 (H) 6 - 20 MG/DL    Creatinine 1.18 0.70 - 1.30 MG/DL    BUN/Creatinine ratio 27 (H) 12 - 20      GFR est AA >60 >60 ml/min/1.73m2    GFR est non-AA >60 >60 ml/min/1.73m2    Calcium 8.9 8.5 - 10.1 MG/DL

## 2021-12-15 NOTE — PERIOP NOTES
TRANSFER - OUT REPORT:    Verbal report given to Sara DENIS(name) on [facility]  being transferred to [facility](unit) for routine post - op       Report consisted of patients Situation, Background, Assessment and   Recommendations(SBAR). Time Pre op antibiotic given:  Anesthesia Stop time: 1115  Juarez Present on Transfer to floor:yes  Order for Juarez on Chart:yes  Discharge Prescriptions with Chart:no    Information from the following report(s) SBAR, OR Summary, Procedure Summary, Intake/Output, MAR, Recent Results, Med Rec Status and Cardiac Rhythm nsr was reviewed with the receiving nurse. Opportunity for questions and clarification was provided. Is the patient on 02? YES       L/Min 3       Other nc    Is the patient on a monitor? YES    Is the nurse transporting with the patient? YES    Surgical Waiting Area notified of patient's transfer from PACU? YES      The following personal items collected during your admission accompanied patient upon transfer:   Dental Appliance: Dental Appliances: None  Vision: Visual Aid: Glasses  Hearing Aid:    Jewelry: Jewelry: None  Clothing: Clothing: Footwear,Pants,Shirt,Socks,Undergarments  Other Valuables:  Other Valuables: Eyeglasses  Valuables sent to safe:

## 2021-12-15 NOTE — PROGRESS NOTES
Physical Therapy 12/15/2021    Chart reviewed up to date. Pt ARVIN for procedure. Will follow-up as appropriate. Recommend with nursing patient to complete as able in order to maintain strength, endurance and independence: OOB to chair 3x/day with 1 assist x RW. Thank you.   Nasrin Oneill, PT, DPT

## 2021-12-15 NOTE — PROGRESS NOTES
Problem: Falls - Risk of  Goal: *Absence of Falls  Description: Document Ankur Calderon Fall Risk and appropriate interventions in the flowsheet.   Outcome: Progressing Towards Goal  Note: Fall Risk Interventions:  Mobility Interventions: Patient to call before getting OOB    Mentation Interventions: Door open when patient unattended    Medication Interventions: Evaluate medications/consider consulting pharmacy    Elimination Interventions: Call light in reach,Patient to call for help with toileting needs    History of Falls Interventions: Bed/chair exit alarm         Problem: Patient Education: Go to Patient Education Activity  Goal: Patient/Family Education  Outcome: Progressing Towards Goal     Problem: Patient Education: Go to Patient Education Activity  Goal: Patient/Family Education  Outcome: Progressing Towards Goal

## 2021-12-15 NOTE — ANESTHESIA POSTPROCEDURE EVALUATION
Post-Anesthesia Evaluation and Assessment    Patient: Jose Be MRN: [id number]  SSN: [id number]    YOB: 1945  Age: 77 y.o. Sex: male      I have evaluated the patient and they are stable and ready for discharge from the PACU. Cardiovascular Function/Vital Signs  Visit Vitals  /79   Pulse 78   Temp 36.5 °C (97.7 °F)   Resp 15   Ht 5' 11\" (1.803 m)   Wt 102.9 kg (226 lb 13.7 oz)   SpO2 99%   BMI 31.64 kg/m²       Patient is status post General anesthesia for Procedure(s):  ABDOMINAL WOUND CLOSURE  ESSENTIAL. Nausea/Vomiting: None    Postoperative hydration reviewed and adequate. Pain:  Pain Scale 1: Numeric (0 - 10) (12/15/21 1120)  Pain Intensity 1: 8 (12/15/21 1120)   Managed    Neurological Status:   Neuro (WDL): Exceptions to WDL (12/15/21 1113)  Neuro  Neurologic State: Alert;Drowsy (12/15/21 1113)  Orientation Level: Oriented X4 (12/15/21 1113)  Cognition: Follows commands (12/15/21 1113)  Speech: Clear (12/15/21 1113)  LUE Motor Response: Purposeful (12/15/21 1113)  LLE Motor Response: Purposeful (12/15/21 1113)  RUE Motor Response: Purposeful (12/15/21 1113)  RLE Motor Response: Purposeful (12/15/21 1113)   At baseline    Mental Status, Level of Consciousness: Alert and  oriented to person, place, and time    Pulmonary Status:   O2 Device: Nasal cannula (12/15/21 1115)   Adequate oxygenation and airway patent    Complications related to anesthesia: None    Post-anesthesia assessment completed. No concerns    Signed By: Trinity Gregory MD     December 15, 2021              Procedure(s):  ABDOMINAL WOUND CLOSURE  ESSENTIAL. general    <BSHSIANPOST>    INITIAL Post-op Vital signs:   Vitals Value Taken Time   /79 12/15/21 1130   Temp 36.5 °C (97.7 °F) 12/15/21 1115   Pulse 77 12/15/21 1139   Resp 13 12/15/21 1139   SpO2 99 % 12/15/21 1139   Vitals shown include unvalidated device data.

## 2021-12-15 NOTE — ANESTHESIA PREPROCEDURE EVALUATION
Relevant Problems   No relevant active problems       Anesthetic History               Review of Systems / Medical History  Patient summary reviewed, nursing notes reviewed and pertinent labs reviewed    Pulmonary        Sleep apnea: CPAP  Shortness of breath  Asthma        Neuro/Psych     seizures         Cardiovascular    Hypertension        Dysrhythmias       Exercise tolerance: >4 METS     GI/Hepatic/Renal                Endo/Other        Obesity, arthritis and cancer  Pertinent negatives: No morbid obesity   Other Findings   Comments: Wound dehiscence           Physical Exam    Airway  Mallampati: II  TM Distance: > 6 cm  Neck ROM: normal range of motion   Mouth opening: Normal     Cardiovascular  Regular rate and rhythm,  S1 and S2 normal,  no murmur, click, rub, or gallop             Dental  No notable dental hx       Pulmonary  Breath sounds clear to auscultation               Abdominal  GI exam deferred       Other Findings            Anesthetic Plan    ASA: 3, emergent  Anesthesia type: general          Induction: Intravenous  Anesthetic plan and risks discussed with: Patient

## 2021-12-15 NOTE — BRIEF OP NOTE
Brief Postoperative Note    Patient: Satnam Puri  YOB: 1945  MRN: [id number]    Date of Procedure: 12/15/2021     Pre-Op Diagnosis: OPEN ABDOMINAL WOUND    Post-Op Diagnosis: Same as preoperative diagnosis. Procedure(s):  ABDOMINAL WOUND CLOSURE  ESSENTIAL    Surgeon(s):  Larry Laureano MD    Surgical Assistant: Surg Asst-1: Purvi Esteban    Anesthesia: General     Estimated Blood Loss (mL): Minimal    Complications: None    Specimens: * No specimens in log *     Implants: * No implants in log *    Drains:   [REMOVED] Penrose Drain 12/06/21 (Removed)   Site Assessment Intact 12/08/21 2156   Dressing Status Intact 12/08/21 2156   Drainage Description Sanguinous 12/08/21 2156       [REMOVED] Oleg-Hathaway Drain 12/06/21 Right (Removed)   Site Assessment Other (Comment) 12/10/21 0823   Dressing Status Dry; Intact 12/09/21 2200   Status Charged;Draining 12/09/21 0930   Drainage Color Serosanguinous 12/09/21 0930   Output (ml) 50 ml 12/09/21 2200       Findings: fascial necrosis upper 1/2 of wound.       Electronically Signed by Ramon Funes MD on 12/15/2021 at 10:29 AM

## 2021-12-15 NOTE — PROGRESS NOTES
Occupational Therapy  12/15/21     Patient currently on OT caseload. OT tx attempted at 8:54 am however patient is currently off the floor for surgery. Will continue to follow patient and attempt OT at a later time.      Thank you,  Mindy Linder OTR/L

## 2021-12-15 NOTE — PROGRESS NOTES
Verbal bedside report given to CIRA Ruiz oncoming nurse by Francesca Bond RN off-going nurse. Report included current pt status and condition, recent results, hx of present illness, heart rate and rhythm, and respiratory status.

## 2021-12-15 NOTE — PROGRESS NOTES
Bedside and Verbal shift change report given to CIRA Del Real (oncoming nurse) by Salinas Mcgee RN (offgoing nurse). Report included the following information SBAR, Kardex, Intake/Output, MAR and Recent Results.

## 2021-12-15 NOTE — PROGRESS NOTES
[address] [facility]  Hospitalist Group                                                                                          Hospitalist Progress Note  Tessa Foss MD  Answering service: [phone] from in house phone        Date of Service:  12/15/2021  NAME:  Guilherme Anton  :  1945  MRN:  [id number]      Admission Summary:   Copied form admit: \" Admitted for multiple urological surgeries. Medicine consulted for medical management of dyspnea and mental status changes. Pt with known hx of heavy etoh use as well as hx of etoh withdrawal with previous hospitalizations. Wife at bedside confirms he drinks 1 bottle of wine and 3 whiskey shots qhs. Also with reports of sarcoid and increasing dyspnea since admission. Able to answer questions and denies any cough, chest pain or palpitations.  \"    Interval history / Subjective:     12/15/2021 :    NSR   Post op wound revision form  surgical wound 12/15/2021 ;   No s/s of w/d as of last two days    See Eliquis concerns below, held for now 2n2 above        Assessment & Plan:     ETOH Withdrawal  -CIWA protocol with ativan  -Phenobarb dosing and monitoring on going for 2021:   Intermittently confused    No tremors  · Wife at bedside noted pt did this in 2021 w sepsis as to afib/rvr as well as w/d's    :  Alberto Lima into afib rvr , failed to respond to dilt bolus/infusion after intial success   Placed on amiodarone drip after failed dilt trial w success   Sinus arrhythma 2021    Cont on amiodarone drip   Card to comment    Was in afib in 2021 in icu ,septic in [facility] - else pt denies persistent or else PAF.  2021 :   NSR   Diet advanced   Confusion abated   Eliquis to be started   Off amiodarone  · BB if needed for hr   12/15/2021 :   NSR   Post op wound revision form  surgical wound 12/15/2021 ;   No s/s of w/d as of last two days          Afib w RVR   - am of 12/12/2021 : Afib w rvr - responed to dilt bolus/drip then failed to respond:  - -> amiodarone bolus/drip started after dilt fails  Also pt given empiric lasix x 1 iv 2n2 wheezing at time ; on reeval, pt s elevated S Na and no wheezing and over all appears dry, ivf added   - now NRS  - Per card for BB rate control as needed. DC amiodarone, and start Eliquis (held 2n2 surg 12/15/2021  )    - cont in NSR 12/15/2021   - RESUME Eliquis once cleared by Urology ( post op wound revision 12/15/2021  )     Hypernatremia 12/12/2021 :  - Added 1/2 NS 12/12/2021   - resolves on 1/2 NS at now 140 12/15/2021 ; resolved     AMS  Likely due to ETOH withdrawal given hx og heavy etoh use  -R/O infectious etiology: uti vs cellulitis vs intraabdominal  -Obtain cbc, cmp, ua, procal, blood cx  -Procal 4, f/u lab for comparison, is on broad abx   - as above   - mental status improves daily     3. Dyspnea / Pulmonary edema / Sarcoid  -Multifactorial dyspnea  -Puml edema, dilated cardiomypathy and RHF  -??aLcoholic cardiomyopathy  -Lasix 20mg x1 dose  -Consider ECHO in AM  - no s/s of hf. Will give additional ivf bolus given current findings of lactic acidosis, sepsis/etoh w/d   - no s/s of sob/hf on [id number] -  12/15/2021      4. Severe SEPSIS  -STAT CT chest, abd et pelvi  GNRs on cutl blood 12/10 ID /sens pending,   -Start vanc and Meropenem ( dropping Vanc on 12/12/2021 )  - CT: 12/11 am: 1.  Interval radical cystoprostatectomy with ileal conduit urinary diversion and  no postoperative complication identified  - Blood cult sens kleb; 12/10,  F/u blood cult 12/13 neg to date, 14 days iv rocephin can complete in SNF   - this writer rec's 14 days of rocephin form 12/13 ( last neg culture as date)     Renal at risk; per CT finding 12/11 concerning for ATN, watching renal's ns bolus , ivf. aovid nephrotoxic else renal dose meds   Lab Results   Component Value Date/Time    Creatinine 1.18 12/15/2021 05:07 AM              DVT PPX =heparin      Hospital Problems  Date Reviewed: 12/15/2021          Codes Class Noted POA    Bladder cancer [facility]) ICD-10-CM: C67.9  ICD-9-CM: 188.9  6/23/2021 Unknown                  After personally interviewing pt at bedside the following is noted on 12/15/2021 :    Review of Systems   Constitutional: Negative for chills, fever and malaise/fatigue. Respiratory: Negative for cough, hemoptysis, shortness of breath and wheezing. Cardiovascular: Negative for chest pain, palpitations and leg swelling. Gastrointestinal: Positive for abdominal pain. Negative for nausea and vomiting. Genitourinary: Negative. Musculoskeletal: Negative. All other systems reviewed and are negative. I had a face to face encounter with patient on 12/15/2021 at bedside for the following physical exam:     PHYSICAL EXAM:    Visit Vitals  /86 (BP 1 Location: Right arm, BP Patient Position: At rest)   Pulse 71   Temp 97.6 °F (36.4 °C)   Resp 13   Ht 5' 11\" (1.803 m)   Wt 102.9 kg (226 lb 13.7 oz) Comment: zeroed bed!!`   SpO2 100%   BMI 31.64 kg/m²          Physical Exam  Constitutional:       General: He is not in acute distress. Appearance: He is obese. He is not ill-appearing, toxic-appearing or diaphoretic. HENT:      Head: Normocephalic and atraumatic. Right Ear: External ear normal.      Left Ear: External ear normal.      Nose: Nose normal.      Mouth/Throat:      Mouth: Mucous membranes are dry. Pharynx: Oropharynx is clear. Eyes:      Extraocular Movements: Extraocular movements intact. Conjunctiva/sclera: Conjunctivae normal.      Pupils: Pupils are equal, round, and reactive to light. Cardiovascular:      Rate and Rhythm: Normal rate and regular rhythm. Heart sounds: No murmur heard. No gallop. Comments: afib rvr  Pulmonary:      Effort: Pulmonary effort is normal. No respiratory distress. Breath sounds: No wheezing.    Abdominal:      General: There is no distension. Tenderness: There is no abdominal tenderness. Comments: abd binder in place, post op tenderness,    Musculoskeletal:         General: No swelling or deformity. Cervical back: Normal range of motion and neck supple. Skin:     Coloration: Skin is not jaundiced or pale. Neurological:      General: No focal deficit present. Mental Status: He is alert. Comments: Well oriented, pleasant , appropriate    Psychiatric:         Mood and Affect: Mood normal.         Behavior: Behavior normal.                     Data Review:    Review and/or order of clinical lab test      Labs:     Recent Labs     12/15/21  0507 12/14/21 0102   WBC 8.1 6.6   HGB 7.8* 7.5*   HCT 25.4* 24.0*    152     Recent Labs     12/15/21  0507 12/14/21 0102 12/13/21  0232    139 140   K 3.5 3.3* 3.2*   * 111* 111*   CO2 22 22 24   BUN 32* 34* 40*   CREA 1.18 1.17 1.37*   * 117* 116*   CA 8.9 8.8 9.0     Recent Labs     12/14/21 0102   ALT 30      TBILI 0.3   TP 5.9*   ALB 2.2*   GLOB 3.7     No results for input(s): INR, PTP, APTT, INREXT, INREXT in the last 72 hours. No results for input(s): FE, TIBC, PSAT, FERR in the last 72 hours. No results found for: FOL, RBCF   No results for input(s): PH, PCO2, PO2 in the last 72 hours. No results for input(s): CPK, CKNDX, TROIQ in the last 72 hours.     No lab exists for component: CPKMB  No results found for: CHOL, CHOLX, CHLST, CHOLV, HDL, HDLP, LDL, LDLC, DLDLP, TGLX, TRIGL, TRIGP, CHHD, CHHDX  Lab Results   Component Value Date/Time    Glucose (POC) 143 (H) 12/11/2021 01:02 AM    Glucose (POC) 93 12/01/2009 07:10 AM     Lab Results   Component Value Date/Time    Color YELLOW/STRAW 12/11/2021 03:03 AM    Appearance CLEAR 12/11/2021 03:03 AM    Specific gravity >1.030 12/11/2021 03:03 AM    Specific gravity 1.016 06/17/2021 09:56 AM    pH (UA) 6.5 12/11/2021 03:03 AM    Protein 100 (A) 12/11/2021 03:03 AM    Glucose Negative 12/11/2021 03:03 AM    Ketone Negative 12/11/2021 03:03 AM    Bilirubin Negative 12/11/2021 03:03 AM    Urobilinogen 1.0 12/11/2021 03:03 AM    Nitrites Positive (A) 12/11/2021 03:03 AM    Leukocyte Esterase MODERATE (A) 12/11/2021 03:03 AM    Epithelial cells FEW 12/11/2021 03:03 AM    Bacteria 1+ (A) 12/11/2021 03:03 AM    WBC  12/11/2021 03:03 AM    RBC  12/11/2021 03:03 AM         Medications Reviewed:     Current Facility-Administered Medications   Medication Dose Route Frequency    furosemide (LASIX) tablet 40 mg  40 mg Oral DAILY    lactated Ringers infusion  125 mL/hr IntraVENous CONTINUOUS    0.9% sodium chloride infusion  25 mL/hr IntraVENous CONTINUOUS    sodium chloride (NS) flush 5-40 mL  5-40 mL IntraVENous Q8H    sodium chloride (NS) flush 5-40 mL  5-40 mL IntraVENous PRN    lidocaine (PF) (XYLOCAINE) 10 mg/mL (1 %) injection 0.1 mL  0.1 mL SubCUTAneous PRN    fentaNYL citrate (PF) injection 50 mcg  50 mcg IntraVENous PRN    midazolam (VERSED) injection 1 mg  1 mg IntraVENous PRN    midazolam (VERSED) injection 1 mg  1 mg IntraVENous PRN    acetaminophen (TYLENOL) tablet 650 mg  650 mg Oral ONCE    ropivacaine (PF) (NAROPIN) 5 mg/mL (0.5 %) injection 30 mL  30 mL Peripheral Nerve Block ONCE    HYDROcodone-chlorpheniramine (TUSSIONEX) oral suspension 5 mL  5 mL Oral Q12H PRN    loperamide (IMODIUM) capsule 2 mg  2 mg Oral Q6H PRN    [Held by provider] apixaban (ELIQUIS) tablet 5 mg  5 mg Oral BID    acetaminophen (TYLENOL) tablet 650 mg  650 mg Oral Q4H PRN    cefTRIAXone (ROCEPHIN) 2 g in 0.9% sodium chloride (MBP/ADV) 50 mL MBP  2 g IntraVENous Q24H    acetaminophen (TYLENOL) tablet 1,000 mg  1,000 mg Oral Q6H    famotidine (PEPCID) tablet 20 mg  20 mg Oral Q12H    benzonatate (TESSALON) capsule 100 mg  100 mg Oral TID PRN    metoprolol tartrate (LOPRESSOR) tablet 50 mg  50 mg Oral BID    thiamine HCL (B-1) tablet 100 mg  100 mg Oral DAILY    folic acid (FOLVITE) tablet 1 mg  1 mg Oral DAILY    ipratropium (ATROVENT) 0.02 % nebulizer solution 0.5 mg  0.5 mg Nebulization Q6H PRN    LORazepam (ATIVAN) tablet 4 mg  4 mg Oral Q1H PRN    LORazepam (ATIVAN) injection 2 mg  2 mg IntraVENous Q1H PRN    LORazepam (ATIVAN) injection 4 mg  4 mg IntraVENous Q1H PRN    multivitamin, tx-iron-ca-min (THERA-M w/ IRON) tablet 1 Tablet  1 Tablet Oral DAILY    alteplase (CATHFLO) 1 mg in sterile water (preservative free) 1 mL injection  1 mg InterCATHeter PRN    bacitracin 500 unit/gram packet 1 Packet  1 Packet Topical PRN    albuterol-ipratropium (DUO-NEB) 2.5 MG-0.5 MG/3 ML  3 mL Nebulization Q6H PRN    traMADoL (ULTRAM) tablet 50 mg  50 mg Oral Q6H PRN    gabapentin (NEURONTIN) capsule 200 mg  200 mg Oral BID PRN    celecoxib (CELEBREX) capsule 100 mg  100 mg Oral BID PRN    [Held by provider] lactated Ringers infusion  75 mL/hr IntraVENous CONTINUOUS    ondansetron (ZOFRAN ODT) tablet 4 mg  4 mg Oral Q8H PRN    Or    ondansetron (ZOFRAN) injection 4 mg  4 mg IntraVENous Q6H PRN    sodium chloride 0.9 % bolus infusion 500 mL  500 mL IntraVENous PRN    buPROPion SR (WELLBUTRIN SR) tablet 100 mg  100 mg Oral DAILY    busPIRone (BUSPAR) tablet 5 mg  5 mg Oral DAILY    escitalopram oxalate (LEXAPRO) tablet 10 mg  10 mg Oral DAILY    diphenhydrAMINE (BENADRYL) injection 12.5 mg  12.5 mg IntraVENous Q4H PRN     ______________________________________________________________________  EXPECTED LENGTH OF STAY: 10d 4h  ACTUAL LENGTH OF STAY:          9                 Yvonne Johnson MD

## 2021-12-15 NOTE — PERIOP NOTES
Patient: Bon Sheppard MRN: [id number]  SSN: [id number]   YOB: 1945  Age: 77 y.o. Sex: male     Patient is status post Procedure(s):  ABDOMINAL WOUND CLOSURE  ESSENTIAL. Surgeon(s) and Role:     * Trudy Castanon MD - Primary    Local/Dose/Irrigation:  none              Quad Lumen 12/06/21 Left (Active)   Central Line Being Utilized Yes 12/15/21 8122   Criteria for Appropriate Use Limited/no vessel suitable for conventional peripheral access 12/15/21 0842   Site Assessment Clean, dry, & intact 12/15/21 0842   Infiltration Assessment 0 12/15/21 0842   Affected Extremity/Extremities Color distal to insertion site pink (or appropriate for race); Pulses palpable;Range of motion performed 12/15/21 0842   Date of Last Dressing Change 12/14/21 12/15/21 0436   Dressing Status Clean, dry, & intact 12/15/21 0436   Dressing Type Disk with Chlorhexadine gluconate (CHG) 12/15/21 0436   Action Taken Open ports on tubing capped 12/15/21 0436   Proximal Hub Color/Line Status Blue; Infusing;Flushed 12/15/21 0842   Positive Blood Return (Medial Site) Yes 12/15/21 0842   Medial 1 Hub Color/Line Status White;Cap end changed; Flushed;Patent 12/15/21 0842   Positive Blood Return (Lateral Site) Yes 12/15/21 0842   Medial 2 Hub Color/Line Status Brown;Flushed;Cap end changed; Patent 12/15/21 0842   Positive Blood Return (Site #3) No 12/15/21 0842   Distal Hub Color/Line Status Brown;Capped;Flushed 12/15/21 0436   Positive Blood Return (Site #4) No 12/15/21 0436   Alcohol Cap Used Yes 12/15/21 0842      Venous Access Device port 07/16/21 Upper chest (subclavicular area, right (Active)              Airway - Endotracheal Tube 12/15/21 Oral (Active)                   Dressing/Packing:  Incision 12/06/21 Abdomen-Dressing/Treatment: ABD pad;Gauze dressing/dressing sponge (12/15/21 0436)  Incision 12/06/21 Perineum-Dressing/Treatment: Mesh panties (12/15/21 0001)    Splint/Cast:  ]    Other:

## 2021-12-15 NOTE — PROGRESS NOTES
Appreciate input form wound care team and Dr Neysa Meckel. Discussed wound reclosure with wife yesterday and pt this AM.  Will plan for procedure as soon as OR time available. Pt agrees with the plan.

## 2021-12-15 NOTE — CERTIFICATE OF TERMINAL ILLNESS
WOCN Ostomy Progress Note:      Follow up in OR with Dr Zhao Carver for NPWT application. S/P Abdominal Wound Closure     Previous Surgery: Radical cystoprostatectomy, urethrectomy, bilateral pelvic lymphadenectomy, repair of umbilical hernia and ileal conduit urinary diversion. Date of Surgery:       Surgeon: Dr. Silvestre [facility] quadrant     Wound Assessment:  Abdomen, Open Surgical Wound with yolis wound staples and retention sutures: 17.5 x 5.5 x 3.5 cm; 100% red; serosang exudate. Initiated NPWT at 125 mmHg with 1 white foam and 1 black foam.  Applied surgical ostomy pouch and OR staff to apply abdominal binder.     Ostomy Assessment:  Stoma type: Ileal conduit  Stoma appearance: red and moist  Stents present  Output: yellow with mucous  Current appliance: applied surgical pouch and connected to drainage bag       Recommendations:  1. Empty appliance when 1/3 full and PRN. Encourage patient/family to notify nurse and  assist w/ pouch emptying to promote self-care. Change appliance twice weekly and PRN for leaking ASAP. DO NOT REINFORCE LEAKS to avoid skin breakdown. 2.  Abdomen:  Continue VAC at 125 mmHG with twice weekly changes. VAC (NPWT) Dressing Orders:  VAC Settings: 125 mmHg continuous/low suction   Dressing change twice weekly by WOCN. Change canisters when full and measure output q shift. (located  or [facility] supply room). For sudden development of blood or an increased amount of ck bleedin. Immediately turn off VAC system. 2.  Leave dressing in place. 3.  Hold pressure over wound. 4.  Call physician. If vacuum fails to maintain seal or unable to manage alarm for clogged tubing for >2hrs:   1. Contact Physician    2. Cleanse wound with normal saline. 3.  Saline moistened fluff gauze to base. 4.  Cover with dry dressing. 5.  Secure with transparent film. 6.  Change q 8 hours and as needed.    7.  Notify Physician and WOCN that wound VAC dressing needs to be reapplied. If patient is discharged from our facility:   1. Remove all of equipment and sponge. 2.  Place moist dressing. 3.  Put VAC system and power cord in clear bag in utility room. (no red bags)   4. Please call I to  system and stop billing @ [phone]  For procedures or when pt is off the floor:    Battery backup on our current inpatient systems lasts for 4 hours and may be   used to prevent interruption of treatment- VAC should NOT be turned off. If disconnecting for more than 2 hours, with discharge, or a pt is admitted with a VAC:  Discontinue the therapy/ begin wound care orders above, return any outpatient equipment to family or transferring facility, and notify the [id number] [facility] Nurse and ordering practitioner. Transition of Care:   Will follow routinely while in hospital for ostomy and twice weekly VAC changes.     Severa Beal, CELESTINEN RN [facility] Inpatient Wound Care  Available on [employer] Serve  Pager [phone]  Office [phone]

## 2021-12-16 LAB
ANION GAP SERPL CALC-SCNC: 6 MMOL/L (ref 5–15)
BUN SERPL-MCNC: 30 MG/DL (ref 6–20)
BUN/CREAT SERPL: 21 (ref 12–20)
CALCIUM SERPL-MCNC: 8.7 MG/DL (ref 8.5–10.1)
CHLORIDE SERPL-SCNC: 109 MMOL/L (ref 97–108)
CO2 SERPL-SCNC: 24 MMOL/L (ref 21–32)
CREAT SERPL-MCNC: 1.44 MG/DL (ref 0.7–1.3)
GLUCOSE SERPL-MCNC: 108 MG/DL (ref 65–100)
HGB BLD-MCNC: 8.6 G/DL (ref 12.1–17)
POTASSIUM SERPL-SCNC: 3.5 MMOL/L (ref 3.5–5.1)
SODIUM SERPL-SCNC: 139 MMOL/L (ref 136–145)

## 2021-12-16 PROCEDURE — 74011250637 HC RX REV CODE- 250/637: Performed by: UROLOGY

## 2021-12-16 PROCEDURE — 36415 COLL VENOUS BLD VENIPUNCTURE: CPT

## 2021-12-16 PROCEDURE — 85018 HEMOGLOBIN: CPT

## 2021-12-16 PROCEDURE — 77030013076 HC PCH OST BAG COLO -A

## 2021-12-16 PROCEDURE — 74011250636 HC RX REV CODE- 250/636: Performed by: INTERNAL MEDICINE

## 2021-12-16 PROCEDURE — 74011000250 HC RX REV CODE- 250: Performed by: INTERNAL MEDICINE

## 2021-12-16 PROCEDURE — 65660000000 HC RM CCU STEPDOWN

## 2021-12-16 PROCEDURE — 94640 AIRWAY INHALATION TREATMENT: CPT

## 2021-12-16 PROCEDURE — 80048 BASIC METABOLIC PNL TOTAL CA: CPT

## 2021-12-16 PROCEDURE — 74011250637 HC RX REV CODE- 250/637: Performed by: INTERNAL MEDICINE

## 2021-12-16 PROCEDURE — 2709999900 HC NON-CHARGEABLE SUPPLY

## 2021-12-16 PROCEDURE — 74011000258 HC RX REV CODE- 258: Performed by: INTERNAL MEDICINE

## 2021-12-16 RX ORDER — OXYCODONE AND ACETAMINOPHEN 5; 325 MG/1; MG/1
1 TABLET ORAL
Status: DISCONTINUED | OUTPATIENT
Start: 2021-12-16 | End: 2021-12-21 | Stop reason: HOSPADM

## 2021-12-16 RX ADMIN — OXYCODONE AND ACETAMINOPHEN 1 TABLET: 5; 325 TABLET ORAL at 17:08

## 2021-12-16 RX ADMIN — Medication 10 ML: at 06:11

## 2021-12-16 RX ADMIN — FUROSEMIDE 40 MG: 40 TABLET ORAL at 08:27

## 2021-12-16 RX ADMIN — BUSPIRONE HYDROCHLORIDE 5 MG: 5 TABLET ORAL at 08:28

## 2021-12-16 RX ADMIN — OXYCODONE AND ACETAMINOPHEN 1 TABLET: 5; 325 TABLET ORAL at 08:34

## 2021-12-16 RX ADMIN — Medication 10 ML: at 17:18

## 2021-12-16 RX ADMIN — METOPROLOL TARTRATE 50 MG: 50 TABLET, FILM COATED ORAL at 08:28

## 2021-12-16 RX ADMIN — CEFTRIAXONE SODIUM 2 G: 2 INJECTION, POWDER, FOR SOLUTION INTRAMUSCULAR; INTRAVENOUS at 17:09

## 2021-12-16 RX ADMIN — METOPROLOL TARTRATE 50 MG: 50 TABLET, FILM COATED ORAL at 17:08

## 2021-12-16 RX ADMIN — BENZONATATE 100 MG: 100 CAPSULE ORAL at 08:34

## 2021-12-16 RX ADMIN — Medication 100 MG: at 08:28

## 2021-12-16 RX ADMIN — TRAMADOL HYDROCHLORIDE 50 MG: 50 TABLET, COATED ORAL at 03:16

## 2021-12-16 RX ADMIN — BENZONATATE 100 MG: 100 CAPSULE ORAL at 17:07

## 2021-12-16 RX ADMIN — BUPROPION HYDROCHLORIDE 100 MG: 100 TABLET, EXTENDED RELEASE ORAL at 08:28

## 2021-12-16 RX ADMIN — APIXABAN 5 MG: 5 TABLET, FILM COATED ORAL at 10:19

## 2021-12-16 RX ADMIN — ACETAMINOPHEN 1000 MG: 500 TABLET ORAL at 03:16

## 2021-12-16 RX ADMIN — IPRATROPIUM BROMIDE 0.5 MG: 0.5 SOLUTION RESPIRATORY (INHALATION) at 18:29

## 2021-12-16 RX ADMIN — FAMOTIDINE 20 MG: 20 TABLET ORAL at 21:21

## 2021-12-16 RX ADMIN — Medication 10 ML: at 22:00

## 2021-12-16 RX ADMIN — MULTIPLE VITAMINS W/ MINERALS TAB 1 TABLET: TAB at 08:28

## 2021-12-16 RX ADMIN — TRAMADOL HYDROCHLORIDE 50 MG: 50 TABLET, COATED ORAL at 12:51

## 2021-12-16 RX ADMIN — FAMOTIDINE 20 MG: 20 TABLET ORAL at 08:27

## 2021-12-16 RX ADMIN — FOLIC ACID 1 MG: 1 TABLET ORAL at 08:28

## 2021-12-16 RX ADMIN — ACETAMINOPHEN 1000 MG: 500 TABLET ORAL at 21:20

## 2021-12-16 RX ADMIN — APIXABAN 5 MG: 5 TABLET, FILM COATED ORAL at 21:20

## 2021-12-16 RX ADMIN — ESCITALOPRAM OXALATE 10 MG: 10 TABLET ORAL at 08:28

## 2021-12-16 NOTE — PROGRESS NOTES
Problem: Falls - Risk of  Goal: *Absence of Falls  Description: Document Rin Camara Fall Risk and appropriate interventions in the flowsheet. Outcome: Progressing Towards Goal  Note: Fall Risk Interventions:  Mobility Interventions: Assess mobility with egress test,Communicate number of staff needed for ambulation/transfer,Bed/chair exit alarm    Mentation Interventions: Adequate sleep, hydration, pain control,Bed/chair exit alarm,Door open when patient unattended    Medication Interventions: Assess postural VS orthostatic hypotension,Bed/chair exit alarm,Evaluate medications/consider consulting pharmacy    Elimination Interventions: Bed/chair exit alarm,Elevated toilet seat,Call light in reach    History of Falls Interventions: Bed/chair exit alarm,Consult care management for discharge planning,Door open when patient unattended       Pt is A/O x 4. VSS. Pt utilizes pain medication for good effect. No events occurred over night. Throughout his care and assessment pt was calm and cooperative and maintain a friendly demeanor. Pt has been compliant with all his medications. Tele-monitoring in place. Safety precautions in place. Call light within reach. Will continue to monitor.

## 2021-12-16 NOTE — PROGRESS NOTES
Chart checked, pt cleared by nursing. Just tried to work with pt and he stated that he had just started to eat breakfast and requested to not have PT at this time. PT will defer, follow and see as able and appropriate.  Thank you, Porsha Alvarez, PT

## 2021-12-16 NOTE — PROGRESS NOTES
Received in report that wound vac was not working during night shift and had been turned off. Urostomy was also hooked to suction. Called wound care RN to fix wound vac and the suction connected to the urostomy was turned off.

## 2021-12-16 NOTE — WOUND CARE
WOCN Ostomy Progress Note:      Follow up for [facility] management and change and pouch change. VAC was off because it was unplugged. It had been greater than 2 hours so a VAC change was necessary. Urostomy pouch was connected to wall suction and it was leaking at the connection from the pouch to the suction tubing. Pouch was changed and reconnected to gravity drainage bag.    12.15.21 s/p Abdominal Wound Closure  12.6.21 s/p  Radical cystoprostatectomy, urethrectomy, bilateral pelvic lymphadenectomy, repair of umbilical hernia and ileal conduit urinary diversion. Surgeon: Dr. Paramjit Pinto quadrant     Wound Assessment:  Abdomen, Open Surgical Wound with yolis wound staples (proximal end of incision) and retention sutures: 17.5 x 5.5 x 3.5 cm; 100% red; serosang exudate. Resumed NPWT at 125 mmHg with 1 black foam.  Applied surgical ostomy pouch and abdominal binder.     Ostomy Assessment:  Stoma type: Ileal conduit  Stoma appearance: red and moist  Stents present  Output: yellow with mucous  Current appliance: applied surgical pouch and connected to drainage bag       Recommendations:  1. Empty appliance when 1/3 full and PRN. Encourage patient/family to notify nurse and  assist w/ pouch emptying to promote self-care. Change appliance twice weekly and PRN for leaking ASAP. DO NOT REINFORCE LEAKS to avoid skin breakdown. 2.  Abdomen:  Continue VAC at 125 mmHG with twice weekly changes.     VAC (NPWT) Dressing Orders:  VAC Settings: 125 mmHg continuous/low suction   Dressing change twice weekly by WOCN. Change canisters when full and measure output q shift. (located  or [facility] supply room). For sudden development of blood or an increased amount of ck bleedin.  Immediately turn off VAC system. 2.  Leave dressing in place. 3.  Hold pressure over wound. 4.  Call physician.   If vacuum fails to maintain seal or unable to manage alarm for clogged tubing for >2hrs:               1.  Contact Physician               2.  Cleanse wound with normal saline. 3.  Saline moistened fluff gauze to base. 4.  Cover with dry dressing. 5.  Secure with transparent film. 6.  Change q 8 hours and as needed. 7.  Notify Physician and WOCN that wound VAC dressing needs to be reapplied. If patient is discharged from our facility:              1.  Remove all of equipment and sponge. 2.  Place moist dressing. 3.  Put VAC system and power cord in clear bag in utility room. (no red bags)              4. Please call I to  system and stop billing @ [phone]  For procedures or when pt is off the floor:               Battery backup on our current inpatient systems lasts for 4 hours and may be                   used to prevent interruption of treatment- VAC should NOT be turned off.     If disconnecting for more than 2 hours, with discharge, or a pt is admitted with a VAC:  Discontinue the therapy/ begin wound care orders above, return any outpatient equipment to family or transferring facility, and notify the [id number]  Nurse and ordering practitioner.      Transition of Care:   Will follow routinely while in hospital for ostomy and twice weekly VAC changes.     Tova Brown, CELESTINEN RN [facility] Inpatient Wound Care  Available on Perfect Serve  Pager [phone]  Office [phone]

## 2021-12-16 NOTE — PROGRESS NOTES
TRANSFER - OUT REPORT:    Verbal report given to [facility] RN(name) on Daniel Yost  being transferred to (unit) for routine progression of care       Report consisted of patients Situation, Background, Assessment and   Recommendations(SBAR). Information from the following report(s) SBAR, Kardex, ED Summary, Intake/Output, MAR, Recent Results and Cardiac Rhythm NSR was reviewed with the receiving nurse. Lines:   Quad Lumen 12/06/21 Left (Active)   Central Line Being Utilized Yes 12/15/21 2100   Criteria for Appropriate Use Limited/no vessel suitable for conventional peripheral access 12/15/21 2100   Site Assessment Clean, dry, & intact 12/15/21 2100   Infiltration Assessment 0 12/15/21 2100   Affected Extremity/Extremities Color distal to insertion site pink (or appropriate for race); Pulses palpable;Range of motion performed 12/15/21 2100   Date of Last Dressing Change 12/14/21 12/15/21 2100   Dressing Status Clean, dry, & intact; Occlusive 12/15/21 2100   Dressing Type Disk with Chlorhexadine gluconate (CHG) 12/15/21 2100   Action Taken Open ports on tubing capped 12/15/21 2100   Proximal Hub Color/Line Status Blue; Infusing;Flushed 12/15/21 2100   Positive Blood Return (Medial Site) Yes 12/15/21 2100   Medial 1 Hub Color/Line Status White;Cap end changed; Flushed;Patent 12/15/21 2100   Positive Blood Return (Lateral Site) Yes 12/15/21 2100   Medial 2 Hub Color/Line Status Brown;Flushed;Cap end changed; Patent 12/15/21 2100   Positive Blood Return (Site #3) Yes 12/15/21 2100   Distal Hub Color/Line Status Brown;Capped 12/15/21 2100   Positive Blood Return (Site #4) No 12/15/21 2100   Alcohol Cap Used Yes 12/15/21 2100       Venous Access Device port 07/16/21 Upper chest (subclavicular area, right (Active)   Central Line Being Utilized No 12/15/21 1152        Opportunity for questions and clarification was provided.       Patient transported with:   transportation staff

## 2021-12-16 NOTE — PROGRESS NOTES
[facility] Adult  Hospitalist Group                                                                                          Hospitalist Progress Note  Andrea Wang MD  Answering service: [phone] from in house phone        Date of Service:  2021  NAME:  Dontae Borrero  :  1945  MRN:  [id number]      Admission Summary:   Copied form admit: \" Admitted for multiple urological surgeries. Medicine consulted for medical management of dyspnea and mental status changes. Pt with known hx of heavy etoh use as well as hx of etoh withdrawal with previous hospitalizations. Wife at bedside confirms he drinks 1 bottle of wine and 3 whiskey shots qhs. Also with reports of sarcoid and increasing dyspnea since admission. Able to answer questions and denies any cough, chest pain or palpitations.  \"    Interval history / Subjective:     Resting in bed, somewhat distressed, denies any complaints or problems the     Assessment & Plan:     ETOH Withdrawal  -CIWA protocol with ativan  -Phenobarb dosing and monitoring on going for 2021:   Intermittently confused    No tremors  · Wife at bedside noted pt did this in 2021 w sepsis as to afib/rvr as well as w/d's    :  Garza Muskrat into afib rvr , failed to respond to dilt bolus/infusion after intial success   Placed on amiodarone drip after failed dilt trial w success   Sinus arrhythma 2021    Cont on amiodarone drip   Card to comment    Was in afib in 2021 in icu ,septic in [facility] - else pt denies persistent or else PAF.  2021 :   NSR   Diet advanced   Confusion abated   Eliquis to be started   Off amiodarone  · BB if needed for hr   2021 :   NSR   Post op wound revision form  surgical wound 12/15/2021 ;   No s/s of w/d as of last two days   2021  Heart rate improved  Post op wound revision form  surgical wound 12/15/2021       Afib w RVR   - am of 12/12/2021 : Afib w rvr - responed to dilt bolus/drip then failed to respond:  - -> amiodarone bolus/drip started after dilt fails  Also pt given empiric lasix x 1 iv 2n2 wheezing at time ; on reeval, pt s elevated S Na and no wheezing and over all appears dry, ivf added   - now NRS  - Per card for BB rate control as needed. DC amiodarone, and start Eliquis (held 2n2 surg 12/15/2021  )    - cont in NSR 12/15/2021   - RESUME Eliquis    Hypernatremia 12/12/2021 :  Resolving  - Added 1/2 NS 12/12/2021     AMS  Likely due to ETOH withdrawal given hx og heavy etoh use  -R/O infectious etiology: uti vs cellulitis vs intraabdominal  -Procal 4, f/u lab for comparison, is on Rocephin  - as above   - mental status improves daily     3. Dyspnea / Pulmonary edema / Sarcoid  -Multifactorial dyspnea  -Puml edema, dilated cardiomypathy and RHF  -??aLcoholic cardiomyopathy  -Lasix 20mg x1 dose  -  - no s/s of hf. Will give additional ivf bolus given current findings of lactic acidosis, sepsis/etoh w/d   - no s/s of sob/hf on [id number] -  12/16/2021      4. Severe SEPSIS  -Resolving  GNRs on cutl blood 12/10 ID /sens pending,   - CT: 12/11 am: 1. Interval radical cystoprostatectomy with ileal conduit urinary diversion and  no postoperative complication identified  - Blood cult sens kleb; 12/10,  F/u blood cult 12/13 neg to date, 14 days iv rocephin can complete in SNF   -Continue SNF      Lab Results   Component Value Date/Time    Creatinine 1.18 12/15/2021 05:07 AM              DVT PPX =heparin      Hospital Problems  Date Reviewed: 12/15/2021          Codes Class Noted POA    Bladder cancer [facility]) ICD-10-CM: C67.9  ICD-9-CM: 188.9  6/23/2021 Unknown                  After personally interviewing pt at bedside the following is noted on 12/16/2021 :    Review of Systems   Constitutional: Negative for chills, fever and malaise/fatigue. Respiratory: Negative for cough, hemoptysis, shortness of breath and wheezing. Cardiovascular: Negative for chest pain, palpitations and leg swelling. Gastrointestinal: Positive for abdominal pain. Negative for nausea and vomiting. Genitourinary: Negative. Musculoskeletal: Negative. All other systems reviewed and are negative. General : alert x 2, sitting in bed  HEENT: PEERL, moist mucus membrane, TM clear  Neck: supple,  Chest: Decreased basal breath sounds  CVS: Irregularly irregular   Abd: soft/mildly tender to palpation  Ext: no clubbing, no cyanosis,   Neuro/Psych: No focal neurological deficit  Skin: warm           I had a face to face encounter with patient on 12/16/2021 at bedside for the following physical exam:     PHYSICAL EXAM:    Visit Vitals  /69 (BP 1 Location: Right upper arm, BP Patient Position: At rest)   Pulse 76   Temp 97.7 °F (36.5 °C)   Resp 21   Ht 5' 11\" (1.803 m)   Wt 102.9 kg (226 lb 13.7 oz) Comment: zeroed bed!!`   SpO2 97%   BMI 31.64 kg/m²                          Data Review:    Review and/or order of clinical lab test      Labs:     Recent Labs     12/15/21  0507 12/14/21 0102   WBC 8.1 6.6   HGB 7.8* 7.5*   HCT 25.4* 24.0*    152     Recent Labs     12/15/21  0507 12/14/21 0102    139   K 3.5 3.3*   * 111*   CO2 22 22   BUN 32* 34*   CREA 1.18 1.17   * 117*   CA 8.9 8.8     Recent Labs     12/14/21 0102   ALT 30      TBILI 0.3   TP 5.9*   ALB 2.2*   GLOB 3.7     No results for input(s): INR, PTP, APTT, INREXT, INREXT in the last 72 hours. No results for input(s): FE, TIBC, PSAT, FERR in the last 72 hours. No results found for: FOL, RBCF   No results for input(s): PH, PCO2, PO2 in the last 72 hours. No results for input(s): CPK, CKNDX, TROIQ in the last 72 hours.     No lab exists for component: CPKMB  No results found for: CHOL, CHOLX, CHLST, CHOLV, HDL, HDLP, LDL, LDLC, DLDLP, TGLX, TRIGL, TRIGP, CHHD, CHHDX  Lab Results   Component Value Date/Time    Glucose (POC) 143 (H) 12/11/2021 01:02 AM Glucose (POC) 93 12/01/2009 07:10 AM     Lab Results   Component Value Date/Time    Color YELLOW/STRAW 12/11/2021 03:03 AM    Appearance CLEAR 12/11/2021 03:03 AM    Specific gravity >1.030 12/11/2021 03:03 AM    Specific gravity 1.016 06/17/2021 09:56 AM    pH (UA) 6.5 12/11/2021 03:03 AM    Protein 100 (A) 12/11/2021 03:03 AM    Glucose Negative 12/11/2021 03:03 AM    Ketone Negative 12/11/2021 03:03 AM    Bilirubin Negative 12/11/2021 03:03 AM    Urobilinogen 1.0 12/11/2021 03:03 AM    Nitrites Positive (A) 12/11/2021 03:03 AM    Leukocyte Esterase MODERATE (A) 12/11/2021 03:03 AM    Epithelial cells FEW 12/11/2021 03:03 AM    Bacteria 1+ (A) 12/11/2021 03:03 AM    WBC  12/11/2021 03:03 AM    RBC  12/11/2021 03:03 AM         Medications Reviewed:     Current Facility-Administered Medications   Medication Dose Route Frequency    oxyCODONE-acetaminophen (PERCOCET) 5-325 mg per tablet 1 Tablet  1 Tablet Oral Q6H PRN    furosemide (LASIX) tablet 40 mg  40 mg Oral DAILY    sodium chloride (NS) flush 5-40 mL  5-40 mL IntraVENous Q8H    HYDROcodone-chlorpheniramine (TUSSIONEX) oral suspension 5 mL  5 mL Oral Q12H PRN    loperamide (IMODIUM) capsule 2 mg  2 mg Oral Q6H PRN    [Held by provider] apixaban (ELIQUIS) tablet 5 mg  5 mg Oral BID    acetaminophen (TYLENOL) tablet 650 mg  650 mg Oral Q4H PRN    cefTRIAXone (ROCEPHIN) 2 g in 0.9% sodium chloride (MBP/ADV) 50 mL MBP  2 g IntraVENous Q24H    acetaminophen (TYLENOL) tablet 1,000 mg  1,000 mg Oral Q6H    famotidine (PEPCID) tablet 20 mg  20 mg Oral Q12H    benzonatate (TESSALON) capsule 100 mg  100 mg Oral TID PRN    metoprolol tartrate (LOPRESSOR) tablet 50 mg  50 mg Oral BID    thiamine HCL (B-1) tablet 100 mg  100 mg Oral DAILY    folic acid (FOLVITE) tablet 1 mg  1 mg Oral DAILY    ipratropium (ATROVENT) 0.02 % nebulizer solution 0.5 mg  0.5 mg Nebulization Q6H PRN    LORazepam (ATIVAN) tablet 4 mg  4 mg Oral Q1H PRN    LORazepam (ATIVAN) injection 2 mg  2 mg IntraVENous Q1H PRN    LORazepam (ATIVAN) injection 4 mg  4 mg IntraVENous Q1H PRN    multivitamin, tx-iron-ca-min (THERA-M w/ IRON) tablet 1 Tablet  1 Tablet Oral DAILY    alteplase (CATHFLO) 1 mg in sterile water (preservative free) 1 mL injection  1 mg InterCATHeter PRN    bacitracin 500 unit/gram packet 1 Packet  1 Packet Topical PRN    albuterol-ipratropium (DUO-NEB) 2.5 MG-0.5 MG/3 ML  3 mL Nebulization Q6H PRN    traMADoL (ULTRAM) tablet 50 mg  50 mg Oral Q6H PRN    gabapentin (NEURONTIN) capsule 200 mg  200 mg Oral BID PRN    celecoxib (CELEBREX) capsule 100 mg  100 mg Oral BID PRN    [Held by provider] lactated Ringers infusion  75 mL/hr IntraVENous CONTINUOUS    ondansetron (ZOFRAN ODT) tablet 4 mg  4 mg Oral Q8H PRN    Or    ondansetron (ZOFRAN) injection 4 mg  4 mg IntraVENous Q6H PRN    sodium chloride 0.9 % bolus infusion 500 mL  500 mL IntraVENous PRN    buPROPion SR (WELLBUTRIN SR) tablet 100 mg  100 mg Oral DAILY    busPIRone (BUSPAR) tablet 5 mg  5 mg Oral DAILY    escitalopram oxalate (LEXAPRO) tablet 10 mg  10 mg Oral DAILY     ______________________________________________________________________  EXPECTED LENGTH OF STAY: 10d 4h  ACTUAL LENGTH OF STAY:          10                 Paramjit Novak MD

## 2021-12-16 NOTE — PROGRESS NOTES
Bedside shift change report given to Rajendra Rubio (oncoming nurse) by Rancho Hammond (offgoing nurse).  Report included the following information SBAR, Procedure Summary, Intake/Output, MAR, Recent Results, Med Rec Status and Cardiac Rhythm SR.

## 2021-12-16 NOTE — PROGRESS NOTES
vss af ab soft. Wound vac in place. Leaking from appliance and connected to suction. Lab not back. Pain  Worse after reop. Non narcotic options not controlling it. Plan. Check labs. Walk. Add oral narcotic for now. Wound care input for appliance leak  . May be more difficult to keep on while retention sutures in place. Ok to start anticoagulation meds .

## 2021-12-16 NOTE — PROGRESS NOTES
Occupational Therapy  12/16/21     Patient currently on OT caseload. OT tx attempted at 2:24 pm however patient reports fatigue from sitting up in chair today. Requesting to rest at this time as he did not sleep last night due to increased pain. Will defer treatment, continue to follow patient and attempt OT at a later time.      Thank you,  Angela Gomez, OTR/L

## 2021-12-17 LAB
ANION GAP SERPL CALC-SCNC: 5 MMOL/L (ref 5–15)
BUN SERPL-MCNC: 31 MG/DL (ref 6–20)
BUN/CREAT SERPL: 24 (ref 12–20)
CALCIUM SERPL-MCNC: 8.7 MG/DL (ref 8.5–10.1)
CHLORIDE SERPL-SCNC: 108 MMOL/L (ref 97–108)
CO2 SERPL-SCNC: 26 MMOL/L (ref 21–32)
CREAT SERPL-MCNC: 1.29 MG/DL (ref 0.7–1.3)
GLUCOSE SERPL-MCNC: 101 MG/DL (ref 65–100)
HGB BLD-MCNC: 8.4 G/DL (ref 12.1–17)
POTASSIUM SERPL-SCNC: 3.2 MMOL/L (ref 3.5–5.1)
SODIUM SERPL-SCNC: 139 MMOL/L (ref 136–145)

## 2021-12-17 PROCEDURE — 36415 COLL VENOUS BLD VENIPUNCTURE: CPT

## 2021-12-17 PROCEDURE — 74011250636 HC RX REV CODE- 250/636: Performed by: UROLOGY

## 2021-12-17 PROCEDURE — 85018 HEMOGLOBIN: CPT

## 2021-12-17 PROCEDURE — 77030040162

## 2021-12-17 PROCEDURE — 74011250637 HC RX REV CODE- 250/637: Performed by: INTERNAL MEDICINE

## 2021-12-17 PROCEDURE — 74011250637 HC RX REV CODE- 250/637: Performed by: UROLOGY

## 2021-12-17 PROCEDURE — 74011000258 HC RX REV CODE- 258: Performed by: UROLOGY

## 2021-12-17 PROCEDURE — 65660000000 HC RM CCU STEPDOWN

## 2021-12-17 PROCEDURE — 2709999900 HC NON-CHARGEABLE SUPPLY

## 2021-12-17 PROCEDURE — 80048 BASIC METABOLIC PNL TOTAL CA: CPT

## 2021-12-17 PROCEDURE — 77030040831 HC BAG URINE DRNG MDII -A

## 2021-12-17 RX ADMIN — BUPROPION HYDROCHLORIDE 100 MG: 100 TABLET, EXTENDED RELEASE ORAL at 10:49

## 2021-12-17 RX ADMIN — METOPROLOL TARTRATE 50 MG: 50 TABLET, FILM COATED ORAL at 17:39

## 2021-12-17 RX ADMIN — MULTIPLE VITAMINS W/ MINERALS TAB 1 TABLET: TAB at 10:49

## 2021-12-17 RX ADMIN — Medication 100 MG: at 10:49

## 2021-12-17 RX ADMIN — ACETAMINOPHEN 1000 MG: 500 TABLET ORAL at 10:49

## 2021-12-17 RX ADMIN — APIXABAN 5 MG: 5 TABLET, FILM COATED ORAL at 21:20

## 2021-12-17 RX ADMIN — METOPROLOL TARTRATE 50 MG: 50 TABLET, FILM COATED ORAL at 10:49

## 2021-12-17 RX ADMIN — FUROSEMIDE 40 MG: 40 TABLET ORAL at 10:49

## 2021-12-17 RX ADMIN — CEFTRIAXONE SODIUM 2 G: 2 INJECTION, POWDER, FOR SOLUTION INTRAMUSCULAR; INTRAVENOUS at 20:07

## 2021-12-17 RX ADMIN — FOLIC ACID 1 MG: 1 TABLET ORAL at 10:49

## 2021-12-17 RX ADMIN — LOPERAMIDE HYDROCHLORIDE 2 MG: 2 CAPSULE ORAL at 20:07

## 2021-12-17 RX ADMIN — BUSPIRONE HYDROCHLORIDE 5 MG: 5 TABLET ORAL at 10:49

## 2021-12-17 RX ADMIN — Medication 10 ML: at 22:00

## 2021-12-17 RX ADMIN — BENZONATATE 100 MG: 100 CAPSULE ORAL at 10:25

## 2021-12-17 RX ADMIN — OXYCODONE AND ACETAMINOPHEN 1 TABLET: 5; 325 TABLET ORAL at 10:25

## 2021-12-17 RX ADMIN — APIXABAN 5 MG: 5 TABLET, FILM COATED ORAL at 10:49

## 2021-12-17 RX ADMIN — FAMOTIDINE 20 MG: 20 TABLET ORAL at 21:20

## 2021-12-17 RX ADMIN — Medication 10 ML: at 06:00

## 2021-12-17 RX ADMIN — Medication 10 ML: at 14:00

## 2021-12-17 RX ADMIN — ACETAMINOPHEN 1000 MG: 500 TABLET ORAL at 16:36

## 2021-12-17 RX ADMIN — ESCITALOPRAM OXALATE 10 MG: 10 TABLET ORAL at 10:49

## 2021-12-17 RX ADMIN — FAMOTIDINE 20 MG: 20 TABLET ORAL at 10:49

## 2021-12-17 NOTE — PROGRESS NOTES
Spiritual Care Assessment/Progress Note  [facility]      NAME: Leanne Pederson      MRN: [id number]  AGE: 77 y.o. SEX: male  Anglican Affiliation: Denominational   Language: English     12/16/2021     Total Time (in minutes): 17     Spiritual Assessment begun in [address] [place] [address] 4 IMCU through conversation with:         []Patient        [] Family    [] Friend(s)        Reason for Consult: Initial visit     Spiritual beliefs: (Please include comment if needed)     [] Identifies with a wyatt tradition:         [] Supported by a wyatt community:            [] Claims no spiritual orientation:           [] Seeking spiritual identity:                [] Adheres to an individual form of spirituality:           [x] Not able to assess:                           Identified resources for coping:      [] Prayer                               [] Music                  [] Guided Imagery     [] Family/friends                 [] Pet visits     [] Devotional reading                         [x] Unknown     [] Other:                                               Interventions offered during this visit: (See comments for more details)    Patient Interventions: Spiritual care volunteer support           Plan of Care:     [x] Support spiritual and/or cultural needs    [] Support AMD and/or advance care planning process      [] Support grieving process   [] Coordinate Rites and/or Rituals    [] Coordination with community clergy   [] No spiritual needs identified at this time   [] Detailed Plan of Care below (See Comments)  [] Make referral to Music Therapy  [] Make referral to Pet Therapy     [] Make referral to Addiction services  [] Make referral to [facility]  [] Make referral to Spiritual Care Partner  [] No future visits requested        [x] Contact Spiritual Care for further referrals     Comments: Visit for spiritual assessment in Room 405. Patient appeared to be sleeping. Provided ministry of presence and silent prayer.  Please contact Spiritual Care for any further referrals. Visited by: Cristian Kearney.  Julisa Coleman, [address] [place] [address] paging Service [phone]

## 2021-12-17 NOTE — PROGRESS NOTES
[address] [facility]  Hospitalist Group                                                                                          Hospitalist Progress Note  Nikole Crain MD  Answering service: [phone] from in house phone        Date of Service:  2021  NAME:  Wei Balderas  :  1945  MRN:  [id number]      Admission Summary:   Copied form admit: \" Admitted for multiple urological surgeries. Medicine consulted for medical management of dyspnea and mental status changes. Pt with known hx of heavy etoh use as well as hx of etoh withdrawal with previous hospitalizations. Wife at bedside confirms he drinks 1 bottle of wine and 3 whiskey shots qhs. Also with reports of sarcoid and increasing dyspnea since admission. Able to answer questions and denies any cough, chest pain or palpitations. \"    Interval history / Subjective:     Resting in bed, somewhat distressed, denies any complaints or problems the     Assessment & Plan:     ETOH Withdrawal  -CIWA protocol with ativan  -Phenobarb dosing and monitoring on going for 2021:   Intermittently confused    No tremors  · Wife at bedside noted pt did this in 2021 w sepsis as to afib/rvr as well as w/d's    :  Yadav Pal into afib rvr , failed to respond to dilt bolus/infusion after intial success   Placed on amiodarone drip after failed dilt trial w success   Sinus arrhythma 2021    Cont on amiodarone drip   Card to comment    Was in afib in 2021 in icu ,septic in [facility] - else pt denies persistent or else PAF.  2021 :   NSR   Diet advanced   Confusion abated   Eliquis to be started   Off amiodarone  · BB if needed for hr    2021  Heart rate improved  Post op wound revision form  surgical wound 12/15/2021  2021 :   NSR   Mental status clear        Afib w RVR   - am of 2021 :  Afib w rvr - responed to dilt bolus/drip then failed to respond:  - -> amiodarone bolus/drip started after dilt fails  Also pt given empiric lasix x 1 iv 2n2 wheezing at time ; on reeval, pt s elevated S Na and no wheezing and over all appears dry, ivf added   - now NRS  - Per card for BB rate control as needed. DC amiodarone, and start Eliquis (held 2n2 surg 12/15/2021  )    - cont in NSR 12/15/2021   - RESUME Eliquis    Hypernatremia 12/12/2021 :  Resolving  - Added 1/2 NS 12/12/2021   -dc ivf     AMS  Likely due to ETOH withdrawal given hx og heavy etoh use  -R/O infectious etiology: uti vs cellulitis vs intraabdominal  -Procal 4, f/u lab for comparison, is on Rocephin  - as above   - mental status improves       Dyspnea / Pulmonary edema / Sarcoid  -Multifactorial dyspnea  -Puml edema, dilated cardiomypathy and RHF  -Echo 12/11 normal EF   -lasix 40mg po daily following renal function        Severe SEPSIS  -Resolving  GNRs on cutl blood 12/10 ID /sens pending,   - CT: 12/11 am: 1. Interval radical cystoprostatectomy with ileal conduit urinary diversion and  no postoperative complication identified  - Blood cult sens kleb; 12/10,  F/u blood cult 12/13 neg to date, 14 days iv rocephin can complete in SNF ? Midline prior dc   -Continue SNF      Lab Results   Component Value Date/Time    Creatinine 1.29 12/17/2021 04:43 AM      radical cystoprostatectomy, urethrectomy, bilateral pelvic lymphadenectomy, repair of umbilical hernia, and ileal conduit urinary diversion on 12/6  -plan per primary team           DVT PPX =heparin   Dispo: SNF      Hospital Problems  Date Reviewed: 12/15/2021          Codes Class Noted POA    Bladder cancer [facility]) ICD-10-CM: C67.9  ICD-9-CM: 188.9  6/23/2021 Unknown                  After personally interviewing pt at bedside the following is noted on 12/17/2021 :    Review of Systems   Constitutional: Negative for chills, fever and malaise/fatigue. Respiratory: Negative for cough, hemoptysis, shortness of breath and wheezing.     Cardiovascular: Negative for chest pain, palpitations and leg swelling. Gastrointestinal: Positive for abdominal pain. Negative for nausea and vomiting. Genitourinary: Negative. Musculoskeletal: Negative. All other systems reviewed and are negative. General : alert x 2, sitting in bed  HEENT: PEERL, moist mucus membrane, TM clear  Neck: supple,  Chest: Decreased basal breath sounds  CVS: Irregularly irregular   Abd: soft/mildly tender to palpation  Ext: no clubbing, no cyanosis,   Neuro/Psych: No focal neurological deficit  Skin: warm           I had a face to face encounter with patient on 12/17/2021 at bedside for the following physical exam:     PHYSICAL EXAM:    Visit Vitals  /83   Pulse (!) 114   Temp 98.3 °F (36.8 °C)   Resp 18   Ht 5' 11\" (1.803 m)   Wt 102.9 kg (226 lb 13.7 oz) Comment: zeroed bed!!`   SpO2 96%   BMI 31.64 kg/m²                          Data Review:    Review and/or order of clinical lab test      Labs:     Recent Labs     12/17/21 0443 12/16/21  0856 12/15/21  0507 12/15/21  0507   WBC  --   --   --  8.1   HGB 8.4* 8.6*   < > 7.8*   HCT  --   --   --  25.4*   PLT  --   --   --  166    < > = values in this interval not displayed. Recent Labs     12/17/21 0443 12/16/21  0856 12/15/21  0507    139 140   K 3.2* 3.5 3.5    109* 112*   CO2 26 24 22   BUN 31* 30* 32*   CREA 1.29 1.44* 1.18   * 108* 106*   CA 8.7 8.7 8.9     No results for input(s): ALT, AP, TBIL, TBILI, TP, ALB, GLOB, GGT, AML, LPSE in the last 72 hours. No lab exists for component: SGOT, GPT, AMYP, HLPSE  No results for input(s): INR, PTP, APTT, INREXT, INREXT in the last 72 hours. No results for input(s): FE, TIBC, PSAT, FERR in the last 72 hours. No results found for: FOL, RBCF   No results for input(s): PH, PCO2, PO2 in the last 72 hours. No results for input(s): CPK, CKNDX, TROIQ in the last 72 hours.     No lab exists for component: CPKMB  No results found for: CHOL, CHOLX, CHLST, CHOLV, HDL, HDLP, LDL, LDLC, MAURICE, Sobeida Guillermo, [facility], [facility]  Lab Results   Component Value Date/Time    Glucose (POC) 143 (H) 12/11/2021 01:02 AM    Glucose (POC) 93 12/01/2009 07:10 AM     Lab Results   Component Value Date/Time    Color YELLOW/STRAW 12/11/2021 03:03 AM    Appearance CLEAR 12/11/2021 03:03 AM    Specific gravity >1.030 12/11/2021 03:03 AM    Specific gravity 1.016 06/17/2021 09:56 AM    pH (UA) 6.5 12/11/2021 03:03 AM    Protein 100 (A) 12/11/2021 03:03 AM    Glucose Negative 12/11/2021 03:03 AM    Ketone Negative 12/11/2021 03:03 AM    Bilirubin Negative 12/11/2021 03:03 AM    Urobilinogen 1.0 12/11/2021 03:03 AM    Nitrites Positive (A) 12/11/2021 03:03 AM    Leukocyte Esterase MODERATE (A) 12/11/2021 03:03 AM    Epithelial cells FEW 12/11/2021 03:03 AM    Bacteria 1+ (A) 12/11/2021 03:03 AM    WBC  12/11/2021 03:03 AM    RBC  12/11/2021 03:03 AM         Medications Reviewed:     Current Facility-Administered Medications   Medication Dose Route Frequency    oxyCODONE-acetaminophen (PERCOCET) 5-325 mg per tablet 1 Tablet  1 Tablet Oral Q6H PRN    furosemide (LASIX) tablet 40 mg  40 mg Oral DAILY    sodium chloride (NS) flush 5-40 mL  5-40 mL IntraVENous Q8H    HYDROcodone-chlorpheniramine (TUSSIONEX) oral suspension 5 mL  5 mL Oral Q12H PRN    loperamide (IMODIUM) capsule 2 mg  2 mg Oral Q6H PRN    apixaban (ELIQUIS) tablet 5 mg  5 mg Oral BID    acetaminophen (TYLENOL) tablet 650 mg  650 mg Oral Q4H PRN    cefTRIAXone (ROCEPHIN) 2 g in 0.9% sodium chloride (MBP/ADV) 50 mL MBP  2 g IntraVENous Q24H    acetaminophen (TYLENOL) tablet 1,000 mg  1,000 mg Oral Q6H    famotidine (PEPCID) tablet 20 mg  20 mg Oral Q12H    benzonatate (TESSALON) capsule 100 mg  100 mg Oral TID PRN    metoprolol tartrate (LOPRESSOR) tablet 50 mg  50 mg Oral BID    thiamine HCL (B-1) tablet 100 mg  100 mg Oral DAILY    folic acid (FOLVITE) tablet 1 mg  1 mg Oral DAILY    ipratropium (ATROVENT) 0.02 % nebulizer solution 0.5 mg  0.5 mg Nebulization Q6H PRN    LORazepam (ATIVAN) tablet 4 mg  4 mg Oral Q1H PRN    LORazepam (ATIVAN) injection 2 mg  2 mg IntraVENous Q1H PRN    LORazepam (ATIVAN) injection 4 mg  4 mg IntraVENous Q1H PRN    multivitamin, tx-iron-ca-min (THERA-M w/ IRON) tablet 1 Tablet  1 Tablet Oral DAILY    alteplase (CATHFLO) 1 mg in sterile water (preservative free) 1 mL injection  1 mg InterCATHeter PRN    bacitracin 500 unit/gram packet 1 Packet  1 Packet Topical PRN    albuterol-ipratropium (DUO-NEB) 2.5 MG-0.5 MG/3 ML  3 mL Nebulization Q6H PRN    traMADoL (ULTRAM) tablet 50 mg  50 mg Oral Q6H PRN    gabapentin (NEURONTIN) capsule 200 mg  200 mg Oral BID PRN    celecoxib (CELEBREX) capsule 100 mg  100 mg Oral BID PRN    [Held by provider] lactated Ringers infusion  75 mL/hr IntraVENous CONTINUOUS    ondansetron (ZOFRAN ODT) tablet 4 mg  4 mg Oral Q8H PRN    Or    ondansetron (ZOFRAN) injection 4 mg  4 mg IntraVENous Q6H PRN    sodium chloride 0.9 % bolus infusion 500 mL  500 mL IntraVENous PRN    buPROPion SR (WELLBUTRIN SR) tablet 100 mg  100 mg Oral DAILY    busPIRone (BUSPAR) tablet 5 mg  5 mg Oral DAILY    escitalopram oxalate (LEXAPRO) tablet 10 mg  10 mg Oral DAILY     ______________________________________________________________________  EXPECTED LENGTH OF STAY: 10d 4h  ACTUAL LENGTH OF STAY:          11                 Scot Celis MD

## 2021-12-17 NOTE — WOUND CARE
WOCN Ostomy Progress Note:      Follow up visit.     12.15.21 s/p Abdominal Wound Closure  12.6.21 s/p  Radical cystoprostatectomy, urethrectomy, bilateral pelvic lymphadenectomy, repair of umbilical hernia and ileal conduit urinary diversion. Surgeon: Dr. Cortez Berger quadrant  Wife at the bedside for ostomy education. Wound Assessment:  Abdomen, Open Surgical Wound with yolis wound staples (proximal end of incision) and retention sutures: NPWT at 125 mmHg with 1 black foam in place without leakage. Surgical ostomy pouch and abdominal binder also in place.     Ostomy Assessment:  Stoma type: Ileal conduit  Stoma appearance: red and moist  Stents present  Output: yellow with mucous  Current appliance: surgical pouch connected to drainage bag    Teaching/Subjects covered today:  Encouraged pt to review handout given to patient/family and ask questions regarding material on next ostomy nurse visit. - General anatomy and expectations of output  - Normal & abnormal stoma characteristics  - Normal & abnormal peristomal characteristics & changes over time  - When/how to clean stoma & peristomal skin  - When/how to empty pouch  - Crusting technique; shaving around stoma;   - When/how to change appliance  - When to notify nurse/physician  - Dietary guidelines  - Where/how to obtain supplies  - S&S of urinary tract infection & encouraged fluid intake  - Night-time drainage to bedside bag    Recommendations:  1. Empty appliance when 1/3 full and PRN. Encourage patient/family to notify nurse and  assist w/ pouch emptying to promote self-care. Change appliance twice weekly and PRN for leaking ASAP. DO NOT REINFORCE LEAKS to avoid skin breakdown. 2.  Abdomen:  Continue VAC at 125 mmHG with twice weekly changes.     VAC (NPWT) Dressing Orders:  VAC Settings: 125 mmHg continuous/low suction   Dressing change twice weekly by WOCN.   Change canisters when full and measure output q shift. (located 5E or [facility] supply room). For sudden development of blood or an increased amount of ck bleedin.  Immediately turn off VAC system.              2.  Leave dressing in place.              3.  Hold pressure over wound.  Peg Slider physician. If vacuum fails to maintain seal or unable to manage alarm for clogged tubing for >2hrs: Roselia Perez Physician               2.  Cleanse wound with normal saline.              3.  Saline moistened fluff gauze to base.              4.  Cover with dry dressing.              0.  Secure with transparent film.              6.  Change q 8 hours and as needed.              7.  Notify Physician and WOCN that wound VAC dressing needs to be reapplied. If patient is discharged from our facility:              1. Bing Hum all of equipment and sponge.              2.  Place moist dressing.              3.  Put VAC system and power cord in clear bag in utility room. (no red bags)              4. Please call [facility] to  system and stop billing @ [phone]  For procedures or when pt is off the floor:               [facility] backup on our current inpatient systems lasts for 4 hours and may be                   used to prevent interruption of treatment- VAC should NOT be turned off.     If disconnecting for more than 2 hours, with discharge, or a pt is admitted with a VAC:  Discontinue the therapy/ begin wound care orders above, return any outpatient equipment to family or transferring facility, and notify the [address] [facility] Nurse and ordering practitioner.      Transition of Care: Will follow routinely while in hospital for ostomy and twice weekly VAC changes. Patient to go to [facility] for rehab upon discharge.   Provided patients wife with ostomy supplies and ordering information to take home.     Jeanette Bolivar, CELESTINEN RN [facility] Inpatient Wound Care  Available on [employer]  Pager [phone]  Office [phone]

## 2021-12-17 NOTE — PROGRESS NOTES
RUR: 18% Medium       YEHUDA: SNF. Referral sent to [facility] (p: [phone]) via [employer] and accepted. BLS transport will need to be arranged. Follow-up with PCP/specialist.      Primary Contact: Wife, Lena Goel, [phone]     * Will need 2nd IM letter prior to discharge.     10:45am - CM reviewed chart. Per ID rounds, patient likely to discharge beginning of next week. Discharge pending medical progress. CM left voicemail with Moustapha Contreras (p: [phone]), [facility] contact to provide update. 11:30am - CM received call from Moustapha Contreras who provided update; patient remains able to admit to [facility], [facility]. Per Kwesi Houston, a wound vac has been ordered and should be delivered to [facility], [facility] within 24- hours for patient's needs. CM to continue to follow as needed.       Jaxson Stinson, MSW   [phone]

## 2021-12-17 NOTE — PROGRESS NOTES
vss af abd soft stoma pink. Wound vac in place. Lab all ok. Creat back to normal.  Better pain control. Bowel open. From my standpt he is nearly ready for transfer to [facility]. This may not be pos until Monday.

## 2021-12-18 LAB
BACTERIA SPEC CULT: NORMAL
SERVICE CMNT-IMP: NORMAL

## 2021-12-18 PROCEDURE — 74011000258 HC RX REV CODE- 258: Performed by: UROLOGY

## 2021-12-18 PROCEDURE — 74011250637 HC RX REV CODE- 250/637: Performed by: UROLOGY

## 2021-12-18 PROCEDURE — 74011250636 HC RX REV CODE- 250/636: Performed by: UROLOGY

## 2021-12-18 PROCEDURE — 74011250637 HC RX REV CODE- 250/637: Performed by: INTERNAL MEDICINE

## 2021-12-18 PROCEDURE — 65660000000 HC RM CCU STEPDOWN

## 2021-12-18 RX ADMIN — BUSPIRONE HYDROCHLORIDE 5 MG: 5 TABLET ORAL at 09:07

## 2021-12-18 RX ADMIN — ACETAMINOPHEN 1000 MG: 500 TABLET ORAL at 10:29

## 2021-12-18 RX ADMIN — APIXABAN 5 MG: 5 TABLET, FILM COATED ORAL at 09:07

## 2021-12-18 RX ADMIN — Medication 10 ML: at 22:43

## 2021-12-18 RX ADMIN — METOPROLOL TARTRATE 50 MG: 50 TABLET, FILM COATED ORAL at 17:46

## 2021-12-18 RX ADMIN — Medication 100 MG: at 09:07

## 2021-12-18 RX ADMIN — MULTIPLE VITAMINS W/ MINERALS TAB 1 TABLET: TAB at 09:07

## 2021-12-18 RX ADMIN — FAMOTIDINE 20 MG: 20 TABLET ORAL at 09:07

## 2021-12-18 RX ADMIN — CEFTRIAXONE SODIUM 2 G: 2 INJECTION, POWDER, FOR SOLUTION INTRAMUSCULAR; INTRAVENOUS at 17:46

## 2021-12-18 RX ADMIN — ACETAMINOPHEN 1000 MG: 500 TABLET ORAL at 22:42

## 2021-12-18 RX ADMIN — FUROSEMIDE 40 MG: 40 TABLET ORAL at 09:07

## 2021-12-18 RX ADMIN — FAMOTIDINE 20 MG: 20 TABLET ORAL at 22:42

## 2021-12-18 RX ADMIN — FOLIC ACID 1 MG: 1 TABLET ORAL at 09:07

## 2021-12-18 RX ADMIN — BUPROPION HYDROCHLORIDE 100 MG: 100 TABLET, EXTENDED RELEASE ORAL at 09:07

## 2021-12-18 RX ADMIN — LOPERAMIDE HYDROCHLORIDE 2 MG: 2 CAPSULE ORAL at 12:04

## 2021-12-18 RX ADMIN — ACETAMINOPHEN 1000 MG: 500 TABLET ORAL at 03:43

## 2021-12-18 RX ADMIN — METOPROLOL TARTRATE 50 MG: 50 TABLET, FILM COATED ORAL at 09:07

## 2021-12-18 RX ADMIN — Medication 10 ML: at 06:00

## 2021-12-18 RX ADMIN — Medication 6 ML: at 14:00

## 2021-12-18 RX ADMIN — ESCITALOPRAM OXALATE 10 MG: 10 TABLET ORAL at 09:07

## 2021-12-18 RX ADMIN — APIXABAN 5 MG: 5 TABLET, FILM COATED ORAL at 22:42

## 2021-12-18 RX ADMIN — ACETAMINOPHEN 1000 MG: 500 TABLET ORAL at 16:54

## 2021-12-18 NOTE — PROGRESS NOTES
Bedside and Verbal shift change report given to [facility] (oncoming nurse) by Salvatore Tracy (offgoing nurse). Report included the following information SBAR, Kardex, MAR and Recent Results.

## 2021-12-18 NOTE — PROGRESS NOTES
Patient: Guilherme Anton MRN: [id number]  SSN: [id number]    YOB: 1945  Age: 77 y.o. Sex: male        ADMITTED: 2021 to Abigail Burrows MD by Aditya Pastrana MD for Malignant neoplasm of urinary bladder, unspecified site [facility]) [C67.9]  Bladder cancer ([address] [C67.9]  POD# 3 Days Post-Op Procedure(s):  ABDOMINAL WOUND CLOSURE  ESSENTIAL    Guilherme Anton is doing well . States pain managed . VSS afebrile , wound vac to midline incision intact , bi retention sutures in place . Abd soft reports diarrhea last night no N/V , tolerating diet . Vitals: Temp (24hrs), Av.7 °F (36.5 °C), Min:97.4 °F (36.3 °C), Max:97.9 °F (36.6 °C)    Blood pressure 123/78, pulse 76, temperature 97.6 °F (36.4 °C), resp. rate 16, height 5' 11\" (1.803 m), weight 102.9 kg (226 lb 13.7 oz), SpO2 96 %. Intake and Output:   190 -  0700  In: -   Out: 5008 [Urine:2350]   07 -  190  In: -   Out: 1300 [Urine:1000; Drains:300]  JASMINA Output lats 24 hrs: No data found. JASMINA Output last 8 hrs: No data found. BM over last 24 hrs: No data found.     Exam:   EXAMINATION:    Appearance:  well-developed NAD, pleasant     Conjunctiva/Lids: conjunctivae and lids normal   External Ears/Nose:   normal no lesions or deformities   Neck:  supple   Respiratory Effort:  breathing easily   Lower Extremity: no edema   Abdomen/Flank: soft non-tender without masses; no CVA tenderness, midline incision intact with wound vac , retention sutures in place , Ileostomy draining clear yellow    Liver/Spleen: no organomegaly     Hernia: no ventral hernia    Gait/Station: Not tested   Skin Inspection:  warm and dry   Mood/Affect: normal                          Labs:  CBC:   Lab Results   Component Value Date/Time    WBC 8.1 12/15/2021 05:07 AM    HCT 25.4 (L) 12/15/2021 05:07 AM    PLATELET 169  05:07 AM     BMP:   Lab Results   Component Value Date/Time    Glucose 101 (H) 2021 04:43 AM    Sodium 139 12/17/2021 04:43 AM    Potassium 3.2 (L) 12/17/2021 04:43 AM    Chloride 108 12/17/2021 04:43 AM    CO2 26 12/17/2021 04:43 AM    BUN 31 (H) 12/17/2021 04:43 AM    Creatinine 1.29 12/17/2021 04:43 AM    Calcium 8.7 12/17/2021 04:43 AM     Cultures: BC NGTD    Imaging: N/A  Assessment/Plan:     1. Bladder cancer - PO #12 radical cystoprostatectomy PO#3 abdominal wound closure  Intact . No distention noted . Stoma draining well , wound vac draining , intact . Case discussed with Dr. Leana Rico By: Joelle Lesser.  Elpidio Varela NP - December 18, 2021

## 2021-12-18 NOTE — PROGRESS NOTES
[address] [facility] Adult  Hospitalist Group                                                                                          Hospitalist Progress Note  Irineo Samaniego MD  Answering service: [phone] from in house phone        Date of Service:  2021  NAME:  Farnaz Mullins  :  1945  MRN:  [id number]      Admission Summary:   Copied form admit: \" Admitted for multiple urological surgeries. Medicine consulted for medical management of dyspnea and mental status changes. Pt with known hx of heavy etoh use as well as hx of etoh withdrawal with previous hospitalizations. Wife at bedside confirms he drinks 1 bottle of wine and 3 whiskey shots qhs. Also with reports of sarcoid and increasing dyspnea since admission. Able to answer questions and denies any cough, chest pain or palpitations. \"    Interval history / Subjective:     Resting in bed, no complaints, awaiting rehab placement Monday.  Will sign off at this time      Assessment & Plan:     ETOH Withdrawal - resolved   -CIWA protocol with ativan  -Phenobarb dosing and monitoring on going for 2021:   Intermittently confused    No tremors  · Wife at bedside noted pt did this in 2021 w sepsis as to afib/rvr as well as w/d's    :  Chao Holter into afib rvr , failed to respond to dilt bolus/infusion after intial success   Placed on amiodarone drip after failed dilt trial w success   Sinus arrhythma 2021    Cont on amiodarone drip   Card to comment    Was in afib in 2021 in icu ,septic in [facility] - else pt denies persistent or else PAF.  2021 :   NSR   Diet advanced   Confusion abated   Eliquis to be started   Off amiodarone  · BB if needed for hr    2021  Heart rate improved  Post op wound revision form  surgical wound 12/15/2021  2021 :   NSR   Mental status clear     2021  -mentation normal, still NSR       Afib w RVR -resolved   - am of 12/12/2021 : Afib w rvr - responed to dilt bolus/drip then failed to respond:  - -> amiodarone bolus/drip started after dilt fails  Also pt given empiric lasix x 1 iv 2n2 wheezing at time ; on reeval, pt s elevated S Na and no wheezing and over all appears dry, ivf added   - now NRS  - Per card for BB rate control as needed. DC amiodarone, and start Eliquis (held 2n2 surg 12/15/2021  )    - cont in NSR 12/15/2021   - RESUME Eliquis    Hypernatremia 12/12/2021 : resolved   Resolving  - Added 1/2 NS 12/12/2021   -dc ivf     AMS -resolved   Likely due to ETOH withdrawal given hx og heavy etoh use  -R/O infectious etiology: uti vs cellulitis vs intraabdominal  -Procal 4, f/u lab for comparison, is on Rocephin  - as above   - mental status improves       Dyspnea / Pulmonary edema / Sarcoid  -Multifactorial dyspnea  -Puml edema, dilated cardiomypathy and RHF  -Echo 12/11 normal EF   -lasix 40mg po daily following renal function        Severe SEPSIS  -Resolving  GNRs on cutl blood 12/10 ID /sens pending,   - CT: 12/11 am: 1. Interval radical cystoprostatectomy with ileal conduit urinary diversion and  no postoperative complication identified  - Blood cult sens kleb; 12/10,  F/u blood cult 12/13 neg to date, 14 days iv rocephin can complete in SNF ? Midline prior dc   -Continue SNF      Lab Results   Component Value Date/Time    Creatinine 1.29 12/17/2021 04:43 AM      radical cystoprostatectomy, urethrectomy, bilateral pelvic lymphadenectomy, repair of umbilical hernia, and ileal conduit urinary diversion on 12/6  -plan per primary team       Hospitalist team will sign off.      DVT PPX =heparin   Dispo: SNF      Hospital Problems  Date Reviewed: 12/15/2021          Codes Class Noted POA    Bladder cancer [facility]) ICD-10-CM: C67.9  ICD-9-CM: 188.9  6/23/2021 Unknown                  After personally interviewing pt at bedside the following is noted on 12/18/2021 :    Review of Systems   Constitutional: Negative for chills, fever and malaise/fatigue. Respiratory: Negative for cough, hemoptysis, shortness of breath and wheezing. Cardiovascular: Negative for chest pain, palpitations and leg swelling. Gastrointestinal: Negative for abdominal pain, nausea and vomiting. Genitourinary: Negative. Musculoskeletal: Negative. All other systems reviewed and are negative. General : alert x 2, sitting in bed  HEENT: PEERL, moist mucus membrane, TM clear  Neck: supple,  Chest: Decreased basal breath sounds  CVS: Irregularly irregular   Abd: soft/mildly tender to palpation  Ext: no clubbing, no cyanosis,   Neuro/Psych: No focal neurological deficit  Skin: warm           I had a face to face encounter with patient on 12/18/2021 at bedside for the following physical exam:     PHYSICAL EXAM:    Visit Vitals  /78   Pulse 75   Temp 97.6 °F (36.4 °C)   Resp 16   Ht 5' 11\" (1.803 m)   Wt 102.9 kg (226 lb 13.7 oz) Comment: zeroed bed!!`   SpO2 96%   BMI 31.64 kg/m²                          Data Review:    Review and/or order of clinical lab test      Labs:     Recent Labs     12/17/21  0443 12/16/21  0856   HGB 8.4* 8.6*     Recent Labs     12/17/21  0443 12/16/21  0856    139   K 3.2* 3.5    109*   CO2 26 24   BUN 31* 30*   CREA 1.29 1.44*   * 108*   CA 8.7 8.7     No results for input(s): ALT, AP, TBIL, TBILI, TP, ALB, GLOB, GGT, AML, LPSE in the last 72 hours. No lab exists for component: SGOT, GPT, AMYP, HLPSE  No results for input(s): INR, PTP, APTT, INREXT, INREXT in the last 72 hours. No results for input(s): FE, TIBC, PSAT, FERR in the last 72 hours. No results found for: FOL, RBCF   No results for input(s): PH, PCO2, PO2 in the last 72 hours. No results for input(s): CPK, CKNDX, TROIQ in the last 72 hours.     No lab exists for component: CPKMB  No results found for: CHOL, CHOLX, CHLST, CHOLV, HDL, HDLP, LDL, LDLC, DLDLP, TGLX, TRIGL, TRIGP, CHHD, CHHDX  Lab Results   Component Value Date/Time Glucose (POC) 143 (H) 12/11/2021 01:02 AM    Glucose (POC) 93 12/01/2009 07:10 AM     Lab Results   Component Value Date/Time    Color YELLOW/STRAW 12/11/2021 03:03 AM    Appearance CLEAR 12/11/2021 03:03 AM    Specific gravity >1.030 12/11/2021 03:03 AM    Specific gravity 1.016 06/17/2021 09:56 AM    pH (UA) 6.5 12/11/2021 03:03 AM    Protein 100 (A) 12/11/2021 03:03 AM    Glucose Negative 12/11/2021 03:03 AM    Ketone Negative 12/11/2021 03:03 AM    Bilirubin Negative 12/11/2021 03:03 AM    Urobilinogen 1.0 12/11/2021 03:03 AM    Nitrites Positive (A) 12/11/2021 03:03 AM    Leukocyte Esterase MODERATE (A) 12/11/2021 03:03 AM    Epithelial cells FEW 12/11/2021 03:03 AM    Bacteria 1+ (A) 12/11/2021 03:03 AM    WBC  12/11/2021 03:03 AM    RBC  12/11/2021 03:03 AM         Medications Reviewed:     Current Facility-Administered Medications   Medication Dose Route Frequency    oxyCODONE-acetaminophen (PERCOCET) 5-325 mg per tablet 1 Tablet  1 Tablet Oral Q6H PRN    furosemide (LASIX) tablet 40 mg  40 mg Oral DAILY    sodium chloride (NS) flush 5-40 mL  5-40 mL IntraVENous Q8H    HYDROcodone-chlorpheniramine (TUSSIONEX) oral suspension 5 mL  5 mL Oral Q12H PRN    loperamide (IMODIUM) capsule 2 mg  2 mg Oral Q6H PRN    apixaban (ELIQUIS) tablet 5 mg  5 mg Oral BID    acetaminophen (TYLENOL) tablet 650 mg  650 mg Oral Q4H PRN    cefTRIAXone (ROCEPHIN) 2 g in 0.9% sodium chloride (MBP/ADV) 50 mL MBP  2 g IntraVENous Q24H    acetaminophen (TYLENOL) tablet 1,000 mg  1,000 mg Oral Q6H    famotidine (PEPCID) tablet 20 mg  20 mg Oral Q12H    benzonatate (TESSALON) capsule 100 mg  100 mg Oral TID PRN    metoprolol tartrate (LOPRESSOR) tablet 50 mg  50 mg Oral BID    thiamine HCL (B-1) tablet 100 mg  100 mg Oral DAILY    folic acid (FOLVITE) tablet 1 mg  1 mg Oral DAILY    ipratropium (ATROVENT) 0.02 % nebulizer solution 0.5 mg  0.5 mg Nebulization Q6H PRN    LORazepam (ATIVAN) tablet 4 mg  4 mg Oral Q1H PRN    LORazepam (ATIVAN) injection 2 mg  2 mg IntraVENous Q1H PRN    LORazepam (ATIVAN) injection 4 mg  4 mg IntraVENous Q1H PRN    multivitamin, tx-iron-ca-min (THERA-M w/ IRON) tablet 1 Tablet  1 Tablet Oral DAILY    alteplase (CATHFLO) 1 mg in sterile water (preservative free) 1 mL injection  1 mg InterCATHeter PRN    bacitracin 500 unit/gram packet 1 Packet  1 Packet Topical PRN    albuterol-ipratropium (DUO-NEB) 2.5 MG-0.5 MG/3 ML  3 mL Nebulization Q6H PRN    traMADoL (ULTRAM) tablet 50 mg  50 mg Oral Q6H PRN    gabapentin (NEURONTIN) capsule 200 mg  200 mg Oral BID PRN    celecoxib (CELEBREX) capsule 100 mg  100 mg Oral BID PRN    ondansetron (ZOFRAN ODT) tablet 4 mg  4 mg Oral Q8H PRN    Or    ondansetron (ZOFRAN) injection 4 mg  4 mg IntraVENous Q6H PRN    sodium chloride 0.9 % bolus infusion 500 mL  500 mL IntraVENous PRN    buPROPion SR (WELLBUTRIN SR) tablet 100 mg  100 mg Oral DAILY    busPIRone (BUSPAR) tablet 5 mg  5 mg Oral DAILY    escitalopram oxalate (LEXAPRO) tablet 10 mg  10 mg Oral DAILY     ______________________________________________________________________  EXPECTED LENGTH OF STAY: 10d 4h  ACTUAL LENGTH OF STAY:          12                 Mer Marin MD

## 2021-12-19 PROCEDURE — 74011000258 HC RX REV CODE- 258: Performed by: UROLOGY

## 2021-12-19 PROCEDURE — 74011250636 HC RX REV CODE- 250/636: Performed by: UROLOGY

## 2021-12-19 PROCEDURE — 74011250637 HC RX REV CODE- 250/637: Performed by: UROLOGY

## 2021-12-19 PROCEDURE — 65660000000 HC RM CCU STEPDOWN

## 2021-12-19 PROCEDURE — 74011250637 HC RX REV CODE- 250/637: Performed by: INTERNAL MEDICINE

## 2021-12-19 PROCEDURE — 74011000250 HC RX REV CODE- 250: Performed by: UROLOGY

## 2021-12-19 RX ADMIN — LOPERAMIDE HYDROCHLORIDE 2 MG: 2 CAPSULE ORAL at 14:32

## 2021-12-19 RX ADMIN — Medication 10 ML: at 06:03

## 2021-12-19 RX ADMIN — FOLIC ACID 1 MG: 1 TABLET ORAL at 08:34

## 2021-12-19 RX ADMIN — Medication 100 MG: at 08:35

## 2021-12-19 RX ADMIN — Medication 10 ML: at 14:00

## 2021-12-19 RX ADMIN — Medication 10 ML: at 22:00

## 2021-12-19 RX ADMIN — MULTIPLE VITAMINS W/ MINERALS TAB 1 TABLET: TAB at 08:35

## 2021-12-19 RX ADMIN — ACETAMINOPHEN 1000 MG: 500 TABLET ORAL at 17:22

## 2021-12-19 RX ADMIN — BUPROPION HYDROCHLORIDE 100 MG: 100 TABLET, EXTENDED RELEASE ORAL at 08:35

## 2021-12-19 RX ADMIN — FAMOTIDINE 20 MG: 20 TABLET ORAL at 21:45

## 2021-12-19 RX ADMIN — APIXABAN 5 MG: 5 TABLET, FILM COATED ORAL at 08:35

## 2021-12-19 RX ADMIN — ESCITALOPRAM OXALATE 10 MG: 10 TABLET ORAL at 08:35

## 2021-12-19 RX ADMIN — ACETAMINOPHEN 1000 MG: 500 TABLET ORAL at 06:01

## 2021-12-19 RX ADMIN — BUSPIRONE HYDROCHLORIDE 5 MG: 5 TABLET ORAL at 08:34

## 2021-12-19 RX ADMIN — METOPROLOL TARTRATE 50 MG: 50 TABLET, FILM COATED ORAL at 17:23

## 2021-12-19 RX ADMIN — ACETAMINOPHEN 1000 MG: 500 TABLET ORAL at 12:24

## 2021-12-19 RX ADMIN — FUROSEMIDE 40 MG: 40 TABLET ORAL at 08:34

## 2021-12-19 RX ADMIN — ALTEPLASE 1 MG: 2.2 INJECTION, POWDER, LYOPHILIZED, FOR SOLUTION INTRAVENOUS at 18:03

## 2021-12-19 RX ADMIN — FAMOTIDINE 20 MG: 20 TABLET ORAL at 08:34

## 2021-12-19 RX ADMIN — ACETAMINOPHEN 1000 MG: 500 TABLET ORAL at 23:09

## 2021-12-19 RX ADMIN — METOPROLOL TARTRATE 50 MG: 50 TABLET, FILM COATED ORAL at 08:34

## 2021-12-19 RX ADMIN — APIXABAN 5 MG: 5 TABLET, FILM COATED ORAL at 21:45

## 2021-12-19 RX ADMIN — CEFTRIAXONE SODIUM 2 G: 2 INJECTION, POWDER, FOR SOLUTION INTRAMUSCULAR; INTRAVENOUS at 17:23

## 2021-12-19 NOTE — PROGRESS NOTES
Bedside and Verbal shift change report given to Brittani Steiner (oncoming nurse) by Chetan Barber RN (offgoing nurse). Report included the following information SBAR, Kardex, Intake/Output, MAR and Recent Results.

## 2021-12-20 PROBLEM — I48.0 PAROXYSMAL A-FIB (HCC): Status: ACTIVE | Noted: 2021-12-20

## 2021-12-20 LAB
COVID-19 RAPID TEST, COVR: NOT DETECTED
SOURCE, COVRS: NORMAL

## 2021-12-20 PROCEDURE — 74011250636 HC RX REV CODE- 250/636: Performed by: UROLOGY

## 2021-12-20 PROCEDURE — 87635 SARS-COV-2 COVID-19 AMP PRB: CPT

## 2021-12-20 PROCEDURE — 74011000258 HC RX REV CODE- 258: Performed by: UROLOGY

## 2021-12-20 PROCEDURE — 65660000000 HC RM CCU STEPDOWN

## 2021-12-20 PROCEDURE — 74011250637 HC RX REV CODE- 250/637: Performed by: UROLOGY

## 2021-12-20 PROCEDURE — 2709999900 HC NON-CHARGEABLE SUPPLY

## 2021-12-20 PROCEDURE — 74011250637 HC RX REV CODE- 250/637: Performed by: INTERNAL MEDICINE

## 2021-12-20 PROCEDURE — 77030037255 HC PCH OST FLX SENSURA COLO -A

## 2021-12-20 PROCEDURE — 77030040162

## 2021-12-20 PROCEDURE — 77030013076 HC PCH OST BAG COLO -A

## 2021-12-20 RX ORDER — BACITRACIN 500 UNIT/G
1 PACKET (EA) TOPICAL AS NEEDED
Qty: 2 PACKET | Refills: 0 | Status: SHIPPED
Start: 2021-12-20 | End: 2021-12-22

## 2021-12-20 RX ORDER — METOPROLOL TARTRATE 50 MG/1
50 TABLET ORAL 2 TIMES DAILY
Qty: 60 TABLET | Refills: 0 | Status: SHIPPED
Start: 2021-12-20 | End: 2022-01-19

## 2021-12-20 RX ORDER — LEVOFLOXACIN 750 MG/1
750 TABLET ORAL DAILY
Qty: 6 TABLET | Refills: 0 | Status: SHIPPED
Start: 2021-12-20 | End: 2021-12-26

## 2021-12-20 RX ORDER — ZOLPIDEM TARTRATE 5 MG/1
5 TABLET ORAL
Status: DISCONTINUED | OUTPATIENT
Start: 2021-12-20 | End: 2021-12-21 | Stop reason: HOSPADM

## 2021-12-20 RX ORDER — FUROSEMIDE 40 MG/1
40 TABLET ORAL DAILY
Qty: 30 TABLET | Refills: 1 | Status: SHIPPED
Start: 2021-12-20 | End: 2022-01-19

## 2021-12-20 RX ORDER — FOLIC ACID 1 MG/1
1 TABLET ORAL DAILY
Qty: 30 TABLET | Refills: 0 | Status: SHIPPED
Start: 2021-12-20 | End: 2022-01-19

## 2021-12-20 RX ORDER — FAMOTIDINE 20 MG/1
20 TABLET, FILM COATED ORAL EVERY 12 HOURS
Qty: 60 TABLET | Refills: 0 | Status: SHIPPED
Start: 2021-12-20 | End: 2022-01-19

## 2021-12-20 RX ORDER — ZOLPIDEM TARTRATE 5 MG/1
5 TABLET ORAL
Qty: 30 TABLET | Refills: 0 | Status: SHIPPED | OUTPATIENT
Start: 2021-12-20 | End: 2022-01-19

## 2021-12-20 RX ORDER — BENZONATATE 100 MG/1
100 CAPSULE ORAL
Qty: 30 CAPSULE | Refills: 0 | Status: SHIPPED
Start: 2021-12-20 | End: 2021-12-27

## 2021-12-20 RX ORDER — LANOLIN ALCOHOL/MO/W.PET/CERES
100 CREAM (GRAM) TOPICAL DAILY
Qty: 30 TABLET | Refills: 0 | Status: SHIPPED
Start: 2021-12-20 | End: 2022-01-19

## 2021-12-20 RX ADMIN — ZOLPIDEM TARTRATE 5 MG: 5 TABLET ORAL at 00:28

## 2021-12-20 RX ADMIN — METOPROLOL TARTRATE 50 MG: 50 TABLET, FILM COATED ORAL at 11:26

## 2021-12-20 RX ADMIN — METOPROLOL TARTRATE 50 MG: 50 TABLET, FILM COATED ORAL at 18:57

## 2021-12-20 RX ADMIN — FAMOTIDINE 20 MG: 20 TABLET ORAL at 21:51

## 2021-12-20 RX ADMIN — ESCITALOPRAM OXALATE 10 MG: 10 TABLET ORAL at 11:26

## 2021-12-20 RX ADMIN — ZOLPIDEM TARTRATE 5 MG: 5 TABLET ORAL at 21:51

## 2021-12-20 RX ADMIN — FAMOTIDINE 20 MG: 20 TABLET ORAL at 11:26

## 2021-12-20 RX ADMIN — MULTIPLE VITAMINS W/ MINERALS TAB 1 TABLET: TAB at 11:26

## 2021-12-20 RX ADMIN — ACETAMINOPHEN 1000 MG: 500 TABLET ORAL at 18:57

## 2021-12-20 RX ADMIN — APIXABAN 5 MG: 5 TABLET, FILM COATED ORAL at 21:51

## 2021-12-20 RX ADMIN — CEFTRIAXONE SODIUM 2 G: 2 INJECTION, POWDER, FOR SOLUTION INTRAMUSCULAR; INTRAVENOUS at 22:12

## 2021-12-20 RX ADMIN — FOLIC ACID 1 MG: 1 TABLET ORAL at 11:25

## 2021-12-20 RX ADMIN — Medication 10 ML: at 18:58

## 2021-12-20 RX ADMIN — Medication 10 ML: at 21:53

## 2021-12-20 RX ADMIN — FUROSEMIDE 40 MG: 40 TABLET ORAL at 11:26

## 2021-12-20 RX ADMIN — BUPROPION HYDROCHLORIDE 100 MG: 100 TABLET, EXTENDED RELEASE ORAL at 11:26

## 2021-12-20 RX ADMIN — BUSPIRONE HYDROCHLORIDE 5 MG: 5 TABLET ORAL at 11:25

## 2021-12-20 RX ADMIN — Medication 100 MG: at 11:25

## 2021-12-20 RX ADMIN — ACETAMINOPHEN 1000 MG: 500 TABLET ORAL at 12:48

## 2021-12-20 RX ADMIN — APIXABAN 5 MG: 5 TABLET, FILM COATED ORAL at 11:25

## 2021-12-20 NOTE — WOUND CARE
WOCN Note:     Follow-up visit for [facility] dressing removal to prepare for discharge. Seen today with Dr. Huy Griffith. Chart shows:  Admitted for bladder cancer with ileal conduit creation 12/6 & wound revision 12/15    Assessment:   A&O x 4 and reports no pain. Mobile and continent but wears pull ups. CARLO mattress    1. Midline open surgical incision  100% healthy granulation  Scant serosanguinous exudate  Retention sutures left & right & parallel to incision  Periwound intact & without erythema    Tx: flushed with saline, packed with saline-moist gauze and dry ABD. 2. Urostomy  Stoma is pink & budded  Clear yellow output  Stents removed without difficulty by Dr. Sawyer Large flat pouch with protective ring added  Supplies packed up for discharge    Wound Recommendations:    Abdominal midline: current dressing good for 24 hours to transport/discharge; Resume NPWT @ 125 mmHg suction with twice weekly dressing changes. Change urostomy pouch ~every 3 days or as needed with leaking.      Transition of Care: Plan to follow as needed while admitted to hospital.     CELESTINE MelendezN, RN, [facility]  Certified Wound, Ostomy, Continence Nurse  office [phone]  Available via [facility]

## 2021-12-20 NOTE — PROGRESS NOTES
Bedside and Verbal shift change report given to Inocencio Moses (oncoming nurse) by Zurdo Dunlap RN (offgoing nurse). Report included the following information SBAR, Kardex, Intake/Output, MAR and Recent Results.

## 2021-12-20 NOTE — PROGRESS NOTES
vss af abd soft. Bowel open. Wound vac removed. Wound granulating nicely. Fascia tight. Stents removed. Ok to go the FIELD [facility] and get wound vac put back on there. I plan to see him outpt after he leaves [facility]. Discussed path and pos nodes. Will need f/u w Dr Samaria Stein for additional systemic therapy. Margins on specimen neg.

## 2021-12-20 NOTE — PROGRESS NOTES
Urologist (primary) asking abt abx regimen. Patient had gram-negative bacteremia with repeat blood cultures negative. Patient on IV Rocephin which was started 12/13 will complete a stay of IV antibiotics today. Would recommend discharge on p.o. Levaquin 750mg q daily for 6 days to complete a total of 14-day course for treatment G negative bacteremia.

## 2021-12-20 NOTE — PROGRESS NOTES
YEUHDA: anticipate d/c to [facility] [phone]; Pt has wound vac and will likely need 2 week course of extended IV ABX; Rapid Covid test pending; BLS Transport on will call with [facility]; Will need IMM letter prior to d/c    [facility] contact is: Elly Lilly [phone]    RUR: 11%    1030-CM reviewed pt chart & discussed pt case with Urologist. As per report, pt is medically ready for d/c today. CM sent perfect serve to Attending in regards to IV ABX course needed at d/c as well as wound vac order. CM noted that there does not appear to be an ID consult for IV ABX. CM to follow.  -1320-CM spoke with Attending of Urology to verify IV ABX orders and wound vac orders. As per Attending, NP will write IV ABX orders. CM also spoke with Elly Lilly of [facility], [phone] and was advised that he has ordered pt's wound vac and will likely have it at their facility within 24 hours, hence they cannot accept until the earliest, tomorrow. CM clarifying if [facility] can accept midline as noted in Hospitalist note. CM also to verify with nurse & Attending in regards to line placement in advance of d/c. CM to follow.  -1330-CM spoke with Attending who conferred with Hospitalist who last saw pt on 12/18 and she advised of plan for d/c with PO ABX at d/c. CM informed Eric of [facility] of this and he is just awaiting wound vac delivery to allow for pt acceptance, which he anticipates will likely be tomorrow. Pt placed on will call for BLS transport. 1430-CM clarified with [facility] liaison, Elly Lilly that they cannot accept pt until tomorrow, 12/21. CM informed nurse of d/c plan and need for rapid covid test. CM to follow.   Luz June RN BSN [facility]     Luz June RN BSN [facility]

## 2021-12-20 NOTE — PROGRESS NOTES
Patient: Leah Craig MRN: [id number]  SSN: [id number]    YOB: 1945  Age: 77 y.o. Sex: male        ADMITTED: 2021 to Sheron Alvarado MD by Chloe Camacho MD for Malignant neoplasm of urinary bladder, unspecified site [facility]) [C67.9]  Bladder cancer ([address] [C67.9]  POD# 5 Days Post-Op Procedure(s):  ABDOMINAL WOUND CLOSURE  ESSENTIAL    Leah Craig is doing well , oob to recliner this morning . Tolerating diet abd non distended , wound vac to midline incision . VSS afebrile      Vitals: Temp (24hrs), Av.2 °F (36.8 °C), Min:97.5 °F (36.4 °C), Max:98.7 °F (37.1 °C)    Blood pressure 124/77, pulse 84, temperature 97.5 °F (36.4 °C), resp. rate 16, height 5' 11\" (1.803 m), weight 102.9 kg (226 lb 13.7 oz), SpO2 94 %. Intake and Output:   1901 -  0700  In: -   Out: 6515 [Urine:3050; Drains:25]  No intake/output data recorded. JASMINA Output lats 24 hrs: No data found. JASMINA Output last 8 hrs: No data found. BM over last 24 hrs: No data found.     Exam:   EXAMINATION:    Appearance:  well-developed NAD    Conjunctiva/Lids: conjunctivae and lids normal   External Ears/Nose:   normal no lesions or deformities   Neck:  supple   Respiratory Effort:  breathing easily   Lower Extremity: no edema   Abdomen/Flank: soft non-tender without masses; no CVA tenderness midline incision intact    Liver/Spleen: no organomegaly     Hernia: no ventral hernia    Gait/Station: normal   Skin Inspection:  warm and dry   Mood/Affect: normal                          Labs:  CBC:   Lab Results   Component Value Date/Time    WBC 8.1 12/15/2021 05:07 AM    HCT 25.4 (L) 12/15/2021 05:07 AM    PLATELET 718  05:07 AM     BMP:   Lab Results   Component Value Date/Time    Glucose 101 (H) 2021 04:43 AM    Sodium 139 2021 04:43 AM    Potassium 3.2 (L) 2021 04:43 AM    Chloride 108 2021 04:43 AM    CO2 26 2021 04:43 AM    BUN 31 (H) 2021 04:43 AM    Creatinine 1.29 12/17/2021 04:43 AM    Calcium 8.7 12/17/2021 04:43 AM     Cultures: N/A    Imaging: N/A  Assessment/Plan:     1. Post wound closure day 5       Post cystoprostatectomy day 15     Plan DC when placement available     Signed By: Carole Ching.  Jesus Holley, ELISABET - December 20, 2021

## 2021-12-21 VITALS
BODY MASS INDEX: 31.27 KG/M2 | HEART RATE: 63 BPM | DIASTOLIC BLOOD PRESSURE: 59 MMHG | HEIGHT: 71 IN | RESPIRATION RATE: 16 BRPM | TEMPERATURE: 97.9 F | SYSTOLIC BLOOD PRESSURE: 106 MMHG | OXYGEN SATURATION: 97 % | WEIGHT: 223.33 LBS

## 2021-12-21 PROCEDURE — 74011250637 HC RX REV CODE- 250/637: Performed by: UROLOGY

## 2021-12-21 PROCEDURE — 94760 N-INVAS EAR/PLS OXIMETRY 1: CPT

## 2021-12-21 PROCEDURE — 77030040162

## 2021-12-21 PROCEDURE — 74011250637 HC RX REV CODE- 250/637: Performed by: INTERNAL MEDICINE

## 2021-12-21 PROCEDURE — 2709999900 HC NON-CHARGEABLE SUPPLY

## 2021-12-21 RX ADMIN — ACETAMINOPHEN 1000 MG: 500 TABLET ORAL at 07:07

## 2021-12-21 RX ADMIN — FAMOTIDINE 20 MG: 20 TABLET ORAL at 10:01

## 2021-12-21 RX ADMIN — BUSPIRONE HYDROCHLORIDE 5 MG: 5 TABLET ORAL at 10:01

## 2021-12-21 RX ADMIN — Medication 100 MG: at 10:01

## 2021-12-21 RX ADMIN — FOLIC ACID 1 MG: 1 TABLET ORAL at 10:01

## 2021-12-21 RX ADMIN — FUROSEMIDE 40 MG: 40 TABLET ORAL at 10:01

## 2021-12-21 RX ADMIN — BUPROPION HYDROCHLORIDE 100 MG: 100 TABLET, EXTENDED RELEASE ORAL at 10:01

## 2021-12-21 RX ADMIN — MULTIPLE VITAMINS W/ MINERALS TAB 1 TABLET: TAB at 10:00

## 2021-12-21 RX ADMIN — APIXABAN 5 MG: 5 TABLET, FILM COATED ORAL at 10:01

## 2021-12-21 RX ADMIN — ESCITALOPRAM OXALATE 10 MG: 10 TABLET ORAL at 10:01

## 2021-12-21 RX ADMIN — METOPROLOL TARTRATE 50 MG: 50 TABLET, FILM COATED ORAL at 10:01

## 2021-12-21 NOTE — PROGRESS NOTES
Report called to [facility]. Pt has copy os signed discharge instructions and packet of papers to take to facility. Pt has no IV access. Wound care and pouch change done by ostomy nurse this am.  Pt to receive wound vac at facility. Pt has all belongings at time of discharge. No questions or concerns voiced. Pt to be transported to facility by his wife.

## 2021-12-21 NOTE — ROUTINE PROCESS
Bedside and Verbal shift change report given to Kasandra Pizano RN (oncoming nurse) by Bradly Pinon RN (offgoing nurse). Report included the following information SBAR, Kardex, Intake/Output, MAR and Recent Results.

## 2021-12-21 NOTE — PROGRESS NOTES
Informed by day shift RN that pt's central line was not removed due to a missing antibiotic. Day shift RN to hang antibiotic before end of shift.

## 2021-12-21 NOTE — PROGRESS NOTES
RUR: 10% Low       YEHUDA: SNF.  Referral sent to [facility] (p: [phone]) [facility] and accepted. Ching Mona to transport. Follow-up with PCP/specialist. AVS updated.      Primary Contact: Wife, Ivory Stratton, [phone]     Medicare pt has received, reviewed, and signed 2nd IM letter informing them of their right to appeal the discharge. Signed copied has been placed on pt bedside chart. 9:45am - CM spoke with Dominic Saldivar, [facility] contact (p: [phone]). Negative Covid results faxed per request. Patient informed CM his wife will provide transport to [facility] between 12:00-12:30pm. Nursing to call report to [phone]. Transition of Care Plan to SNF/Rehab    SNF/Rehab Transition:  Patient has been accepted to [facility] and meets criteria for admission. Patient will transported by his wife and expected to leave at 12-12:30pm.    Communication to Patient/Family:  Met with patient and they are agreeable to the transition plan. Communication to SNF/Rehab:  Bedside RN, Wing Funes, has been notified to update the transition plan to the facility and call report (phone number [phone]). Discharge information has been updated on the AVS.       Nursing Please include all hard scripts for controlled substances, med rec and dc summary, and AVS in packet.      Reviewed and confirmed with facility, [facility], can manage the patient care needs for the following:     Arianna Junior with (X) only those applicable:    Medication:  []  Medications will be available at the facility  []  IV Antibiotics  []  Controlled Substance - hard copy to be sent with patient   []  Weekly Labs   Documents:  [x] Hard RX  [x] MAR  [x] Kardex  [x] AVS  [x]Transfer Summary  [x]Discharge   Equipment:  []  CPAP/BiPAP  [x]  Wound Vacuum  []  Juarez or Urinary Device  []  PICC/Central Line  []  Nebulizer  []  Ventilator   Treatment:  []Isolation (for MRSA, VRE, etc.)  []Surgical Drain Management  []Tracheostomy Care  []Dressing Changes  []Dialysis with transportation and chair time. []PEG Care  []Oxygen  []Daily Weights for Heart Failure   Dietary:  []Any diet limitations  []Tube Feedings   []Total Parenteral Management (TPN)   Eligible for Medicaid Long Term Services and Supports  Yes:  [] Eligible for medical assistance or will become eligible within 180 days and UAI completed. [] Provider/Patient and/or support system has requested screening. [] UAI copy provided to patient or responsible party. [] UAI unavailable at discharge will send once processed to SNF provider. [] UAI unavailable at discharged mailed to patient  No:   [x] Private pay and is not financially eligible for Medicaid within the next 180 days. [] Reside out-of-state.   [] A residents of a state owned/operated facility that is licensed  by [address] [employer] [facility] or [facility]  [] Enrollment in [facility] hospice services  [] [address]  [] Patient /Family declines to have screening completed or provide financial information for screening     Financial Resources:  Medicaid    [] Initiated and application pending   [] Full coverage     Advanced Care Plan:  []Surrogate Decision Maker of Care  []POA  []Communicated Code Status (DDNR\", \"Full\")    Other     MARINA Erwin   [phone]

## 2021-12-21 NOTE — WOUND CARE
WOCN Ostomy Progress Note:      Follow up visit.     15. s/p Abdominal Wound Closure  12.6.21 s/p  Radical cystoprostatectomy, urethrectomy, bilateral pelvic lymphadenectomy, repair of umbilical hernia and ileal conduit urinary diversion. Surgeon: Dr. Andi Mata quadrant     Wound Assessment:  Abdomen, Open Surgical Wound with yolis wound staples (proximal end of incision) and retention sutures: Packing and cover dressing intact. Abdominal binder also in place.     Ostomy Assessment:  Stoma type: Ileal conduit  Stoma appearance: red and moist  Output: yellow with mucous  Current appliance: one piece pouch with ring connected to drainage bag      Recommendations:  1. Empty appliance when 1/3 full and PRN. Encourage patient/family to notify nurse and  assist w/ pouch emptying to promote self-care. Change appliance twice weekly and PRN for leaking ASAP. DO NOT REINFORCE LEAKS to avoid skin breakdown. 2.  Abdomen:  Continue VAC at 125 mmHG with twice weekly changes.     VAC (NPWT) Dressing Orders:  VAC Settings: 125 mmHg continuous/low suction   Dressing change twice weekly by WOCN. Change canisters when full and measure output q shift. (located  or [facility] supply room). For sudden development of blood or an increased amount of ck bleedin.  Immediately turn off VAC system.              2.  Leave dressing in place.              3.  Hold pressure over wound.  Isabella Noble physician. If vacuum fails to maintain seal or unable to manage alarm for clogged tubing for >2hrs: Mena Tafoya Physician               2.  Cleanse wound with normal saline.              3.  Saline moistened fluff gauze to base.              4.  Cover with dry dressing.              4.  Secure with transparent film.              6.  Change q 8 hours and as needed.              7.  Notify Physician and WOCN that wound VAC dressing needs to be reapplied.   If patient is discharged from our facility:              1. Alva See all of equipment and sponge.              2.  Place moist dressing.              3.  Put VAC system and power cord in clear bag in utility room. (no red bags)              4. Please call [employer] to  system and stop billing @ [phone]  For procedures or when pt is off the floor:               DDQOUIL backup on our current inpatient systems lasts for 4 hours and may be                   used to prevent interruption of treatment- VAC should NOT be turned off.     If disconnecting for more than 2 hours, with discharge, or a pt is admitted with a VAC:  Discontinue the therapy/ begin wound care orders above, return any outpatient equipment to family or transferring facility, and notify the [id number] [facility] Nurse and ordering practitioner.      Transition of Care:  Patient to be discharged to [facility] today.     CELESTINE JeterN RN [facility] Inpatient Wound Care  Available on Perfect Serve  Pager [phone]  Office [phone]

## 2021-12-21 NOTE — PROGRESS NOTES
Pt wanted ileo conduit attached to overnight bag. When abd binder removed, noted that dressing was saturated and leaking urine from side of urostomy bag. Entire appliance changed and pt got up to bathroom to clean up. Came out of bathroom and reported at bag fell off at around 2030 pm.  This nurse placed another urostomy pouch. Left central line NOT removed as pt is to get final dose of rocephin tonight, med not found in pyxis, message sent to pharmacy and med was hung by this nurse.

## 2021-12-30 NOTE — DISCHARGE SUMMARY
Josse Duran [address] SUMMARY    Name:  Pete Tamez  MR#:  [id number]  :  1945  ACCOUNT #:  [de-identified]  ADMIT DATE:  2021  DISCHARGE DATE:  2021      ADMITTING DIAGNOSIS:  Bladder cancer. DISCHARGE DIAGNOSIS:  Bladder cancer. PROCEDURES:  Radical cystoprostatectomy, urethrectomy, bilateral pelvic lymphadenectomy, ileal conduit urinary diversion. BRIEF SUMMARY:  This gentleman underwent the above operation on the day of admission. Postoperatively, he had some problems with alcohol withdrawal.  He became disoriented. He removed his Oleg-Hathaway drain and his epidural before they were ready to come out. He developed some serous drainage from the incision as well as fever. He had blood cultures that were positive and he was started on IV antibiotics. His bowel function returned nicely, but CT scan suggested that there had been a separation of his fascia and for that reason, he was taken back to the operating room and underwent a revision of his closure with placement of the retention sutures. Wound VAC was applied and his incision improved markedly at that point. He recovered from his alcohol withdrawal.  He was considered to be ready for transfer to [facility] for rehab after the above on the above date. His condition at discharge was stable. Followup was arranged and communications took place with the referring facility.         Soraya Deluca MD      [id number]  D:  2021 11:14  T:  2021 0:44  JOB #:  [id number]  CC:  [address] [place] [address]

## 2022-01-03 NOTE — PROGRESS NOTES
[facility] [place] at [address]   Michael Sumner [address], [address]  W: [phone]  F: [phone]    Reason for Visit:   Carlos Suero is a 77 y.o. male who is seen in follow-up of Muscle invasive bladder cancer- Mixed histology    Treatment History:   · 3/1/2021: CT IVP: Multiple abdominal, iliac, mediastinal nodes unchanged from 2019 consistent with h/o sarcoidosis, Thickened R lateral bladder wall. · 6/23/2021: Cystoscopy and TURBT- Papillary changes were noted within the prostatic urethra ,  papillary tumors seen emanating from the surface of the left hemitrigone, There were also diffuse changes in the floor of the bladder- Low grade urothelial carcinoma of the prostatic urethra, R lateral bladder wall with HG Muscle invasive urothelial carcinoma and squamous differentiation+ CIS, Left brenda trigone HG non invasive urothelial carcinoma  · 8/5/21- 10/21/2021: Cisplatin + Gemzar x 4   · 9/14/2021: CT was stable. · 12/6/2021: Radical urethro-cystoprostatectomy     History of Present Illness:   Patient is a 77 y.o. male with PMH as below including sarcoidosis who is seen for evaluation of bladder cancer. He has a history of nonmuscle invasive bladder cancer in 2015, underwent 2 induction courses of BCG, had a non muscle invasive recurrence in 2019 and had re induction with BCG. [place] [facility] [place]. In March 2021 he had a positive FISH and was seen by Dr. Jose Damon. Had a CT IVP 3/2021 which showed some Bladder thickening. 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 Grade 4 neutropenia after cycle 1. Completed 4 cycles and seen after surgery done 12/6/2021. He had post op sepsis. He has been recovering slowly. Is undergoing PT. He has had no blood in the bag. Eating better.  He has had erratic BMs. No nausea. Port remains in place. A bit dizzy       Mother and sister had breast cancer and brother had kidney cancer     Past Medical History:   Diagnosis Date    Arrhythmia     LBBB    Arthritis     Asthma     MILD    Cancer ([address]     scc top of head and nose    Cancer ([address] 2015    BLADDER    COVID-19 vaccine series completed     [facility] VACCINE 21 AND 21    Depression with anxiety     Hypertension     Other unknown and unspecified cause of morbidity or mortality     history of pulmonary sarcoid     Posterior cervical adenopathy, benign 2018    [facility]monary sarcoidosis [facility]     Unspecified sleep apnea     cpap      Past Surgical History:   Procedure Laterality Date    HX CATARACT REMOVAL Bilateral     HX HERNIA REPAIR Right AS A CHILD    INGUINAL    HX HERNIA REPAIR Left     INGUIBAL    HX KNEE REPLACEMENT Left     HX ORTHOPAEDIC Right     BONE SPURS REMOVED FROM FOOT    HX OTHER SURGICAL      scc removed top of head and nose    HX OTHER SURGICAL      benign lump removed from thyroid    HX OTHER SURGICAL Right 2020    SKIN CANCER RELMOVED FROM LEG    HX UROLOGICAL  2020    CYSTO    IR INSERT TUNL CVC W PORT OVER 5 YEARS  2021    AL CARDIAC SURG PROCEDURE UNLIST  2021    CARDIAC CATH    AL REVISE KNEE JOINT REPLACE,ALL PARTS Left       Social History     Tobacco Use    Smoking status: Former Smoker     Packs/day: 0.50     Years: 2.00     Pack years: 1.00     Quit date: 1965     Years since quittin.3    Smokeless tobacco: Never Used   Substance Use Topics    Alcohol use:  Yes     Alcohol/week: 2.0 standard drinks     Types: 2 Glasses of wine per week     Comment: DAILY      Family History   Problem Relation Age of Onset    Cancer Mother         breast with mets    Dementia Mother     Heart Disease Father     Cancer Sister         breast    Lung Disease Brother     No Known Problems Brother     Anesth Problems Neg Hx Current Outpatient Medications   Medication Sig    apixaban (ELIQUIS) 5 mg tablet Take 1 Tablet by mouth every twelve (12) hours for 30 days. Indications: treatment to prevent blood clots in chronic atrial fibrillation    famotidine (PEPCID) 20 mg tablet Take 1 Tablet by mouth every twelve (12) hours for 30 days. Indications: prevent indigestion    folic acid (FOLVITE) 1 mg tablet Take 1 Tablet by mouth daily for 30 days. Indications: inadequate folic acid    furosemide (LASIX) 40 mg tablet Take 1 Tablet by mouth daily for 30 days. Indications: accumulation of fluid resulting from chronic heart failure    metoprolol tartrate (LOPRESSOR) 50 mg tablet Take 1 Tablet by mouth two (2) times a day for 30 days. Indications: high blood pressure    multivitamin, tx-iron-ca-min (THERA-M w/ IRON) 9 mg iron-400 mcg tab tablet Take 1 Tablet by mouth daily for 30 days. Indications: vitamin deficiency    thiamine HCL (B-1) 100 mg tablet Take 1 Tablet by mouth daily for 30 days.  zolpidem (AMBIEN) 5 mg tablet Take 1 Tablet by mouth nightly as needed for Sleep for up to 30 days. Max Daily Amount: 5 mg. Indications: difficulty falling asleep    buPROPion SR (WELLBUTRIN SR) 100 mg SR tablet Take 100 mg by mouth daily.  acetaminophen (Tylenol Extra Strength) 500 mg tablet Take 1,000 mg by mouth every six (6) hours as needed for Pain.  tamsulosin (FLOMAX) 0.4 mg capsule Take 0.4 mg by mouth nightly.  busPIRone (BUSPAR) 5 mg tablet Take 5 mg by mouth daily.  simvastatin (ZOCOR) 20 mg tablet Take 20 mg by mouth nightly.  escitalopram oxalate (LEXAPRO) 10 mg tablet Take 10 mg by mouth daily. No current facility-administered medications for this visit. Allergies   Allergen Reactions    Pcn [Penicillins] Hives     Patient screened for any delayed non-IgE-mediated reaction to PCN.         Patient notes the following:    NO SEVERE NON-IGE MEDIATED REACTIONS        Review of Systems: A complete review of systems was obtained, negative except as described above. Physical Exam:     Visit Vitals  BP 94/66 (BP 1 Location: Left upper arm, BP Patient Position: Sitting)   Pulse 77   Temp 98.9 °F (37.2 °C) (Temporal)   Resp 18   Ht 5' 11\" (1.803 m)   Wt 213 lb 3.2 oz (96.7 kg)   SpO2 95%   BMI 29.74 kg/m²     ECOG PS: 1  General: No distress  Eyes: PERRL, anicteric sclerae  HENT: Atraumatic, PAC in place   Neck: Supple  Skin: No rashes, ecchymoses, or petechiae. Normal temperature, turgor, and texture. GI: Has a urostomy bag  Psych: Alert, oriented, appropriate affect, normal judgment/insight    Results:     Lab Results   Component Value Date/Time    WBC 8.1 12/15/2021 05:07 AM    HGB 8.4 (L) 12/17/2021 04:43 AM    HCT 25.4 (L) 12/15/2021 05:07 AM    PLATELET 970 70/29/1548 05:07 AM    .5 (H) 12/15/2021 05:07 AM    ABS. NEUTROPHILS 6.7 12/15/2021 05:07 AM     Lab Results   Component Value Date/Time    Sodium 139 12/17/2021 04:43 AM    Potassium 3.2 (L) 12/17/2021 04:43 AM    Chloride 108 12/17/2021 04:43 AM    CO2 26 12/17/2021 04:43 AM    Glucose 101 (H) 12/17/2021 04:43 AM    BUN 31 (H) 12/17/2021 04:43 AM    Creatinine 1.29 12/17/2021 04:43 AM    GFR est AA >60 12/17/2021 04:43 AM    GFR est non-AA 54 (L) 12/17/2021 04:43 AM    Calcium 8.7 12/17/2021 04:43 AM    Glucose (POC) 143 (H) 12/11/2021 01:02 AM     Lab Results   Component Value Date/Time    Bilirubin, total 0.3 12/14/2021 01:02 AM    ALT (SGPT) 30 12/14/2021 01:02 AM    Alk. phosphatase 108 12/14/2021 01:02 AM    Protein, total 5.9 (L) 12/14/2021 01:02 AM    Albumin 2.2 (L) 12/14/2021 01:02 AM    Globulin 3.7 12/14/2021 01:02 AM       External   Records reviewed and summarized above.   Pathology report(s) reviewed    12/21/2021     SPECIMEN       Procedure: Radical urethro-cystoprostatectomy       TUMOR       Tumor Site:                Trigone, Dome, Right lateral wall, Left   Ureteral                                  orifice, Right bladder neck        Histologic Type:     Papillary urothelial carcinoma, invasive and   non-invasive, and carcinoma in situ       Histologic Grade:                High-grade       Tumor Size: Cannot be determined:      Multiple tumors       Tumor Extension:           Tumor invades adjacent structures -   Prostate                                  (transmural invasion from the bladder   tumor)       Lymphovascular Invasion:      Present       Tumor Configuration:           Papillary, Flat       MARGINS       Margins:                     Uninvolved by invasive carcinoma and   carcinoma in situ /                                  noninvasive urothelial carcinoma    LYMPH NODES       Number of Lymph Nodes Involved:      2           Size of Largest Metastatic Deposit:      1 cm               Site:                     Left Pelvic Lymph Nodes           Extranodal Extension:           Present       Number of Lymph Nodes Examined:      5     PATHOLOGIC STAGE CLASSIFICATION (pTNM, AJCC 8th Edition)       TNM Descriptors:                m (multiple primary tumors)       Primary Tumor (pT):                pT4a       Regional Lymph Nodes (pN):           pN2                      Carcinoma in situ URETHRA: RESECTION       Tumor Site:                     Urethra       SPECIMEN       Procedure:                     Total urethrectomy    TUMOR       Tumor Site:                     Prostatic urethra       Histologic Type:                       Papillary urothelial carcinoma,   invasive       Histologic Grade:                     High-grade       Tumor Extension:                Tumor invades adjacent structures -   Prostate       Lymphovascular Invasion:           Present       MARGINS       Margins:                          Uninvolved by invasive carcinoma and   carcinoma in situ /                                       noninvasive urothelial carcinoma       LYMPH NODES (These represent the same lymph nodes reported in the BLADDER   Resection Template)       Number of Lymph Nodes Involved:      2           Size of Largest Metastatic Deposit:      1 cm               Site:                     Left Pelvic Lymph node       Number of Lymph Nodes Examined:      5      PATHOLOGIC STAGE CLASSIFICATION (pTNM, AJCC 8th Edition)       TNM Descriptors:                m (multiple primary tumors within the   prostatic urethra)       Primary Tumor (pT):                pT2       Regional Lymph Nodes (pN):           pN2                     4.  Right pelvic lymph nodes, lymph node dissection:        One benign lymph node with noncaseating granulomatous inflammation,   compatible with patients history of sarcoidosis   One benign lymph node, no histopathologic changes   No tumor seen     5.  Left pelvic lymph nodes, lymph node dissection:        Metastatic urothelial carcinoma in two of three lymph nodes, 1 cm in   greatest dimension with extracapsular extension   Noncaseating granulomatous inflammation, compatible with patient's history   of sarcoidosis     Radiology report(s) reviewed above. CT CAP 7/19/21:   IMPRESSION  1. While the bladder is decompressed by Juarez is thick-walled and irregular  2. No evidence of metastatic disease to the abdomen or pelvis  3. Extensive bilateral hilar and mediastinal adenopathy with patchy perihilar  airspace disease with associated nodular peribronchial cuffing. Findings can be  seen with sarcoidosis. Differentiating adenopathy from sarcoidosis for  metastatic disease is difficult       9/2021 CT     IMPRESSION     1. Mild bladder wall thickening posteriorly and along the right lateral bladder  wall, nonspecific. No evidence of metastatic disease within the chest, abdomen,  or pelvis. 2. Extensive mediastinal and bilateral hilar lymphadenopathy with lymph node  calcifications, most compatible with known sarcoidosis. No significant change. 3. Bilateral perihilar airspace opacities have improved. No new pulmonary  pathology identified.   4. Severe diverticulosis of the colon. No definite superimposed acute  Diverticulitis. 12/11/2021     IMPRESSION     1. Interval radical cystoprostatectomy with ileal conduit urinary diversion. 2. No postoperative complication identified. 3. Dense bilateral nephrograms in this patient who is status post contrast  injection the previous day. This suggests ATN. Assessment:   1) Muscle invasive bladder cancer- With squamous differentiation       [id number]  S/p 4 cycles of NAC with Cisplatin+ gemzar complicated by grade 4 neutropenia  S/P Radical urethro-cystoprostatectomy 12/6/2021- pT4N2 ( invaded prostate), HG papillar, +LVI  Path stage-  Stage IIIB     I discussed the final path  Disease at high risk for local and distant recurrence  Margins negative but nodes had EPE    I discussed the rationale for recommending adjuvant Nivolumab based on CheckMate 274  Improved DFS, the primary endpoint (median 21 versus 11 months, 63 versus 47 percent at one year, HR 0.70, 98% CI 0.55-0.90), nonurothelial tract recurrence-free survival (NUTRFS; 65 versus 51 percent at one year, HR 0.72, 95% CI 0.59-0.89), and distant metastasis-free survival (median 41 versus 30 months, 83 versus 70 percent at six months, HR 0.75, 95% CI 0.59-0.94). Discussed side effects, duration of 1 year    2) Urethral TCC    S/P Total urethrectomy 12/6  HG Papillary [id number]  Margins negative  EPE  Present  See above    Will also refer to [address] [place] [address] to discuss whether RT is indictaed        3) Sarcoidosis  Follows with pulmonary  Stable. 4) Obesity    5) Psychosocial      Prognosis: Intermediate     This is our best current assessment. Cancers respond differently to treatment. Overall prognosis depends on many factors including other conditions, cancer stage, side effects, and other unforeseen events.    Goal of therapy: Curative     Expected response to treatment:  Good: Anticipate prolonged, long-term control of cancer    Treatment benefits and harms: We discussed potential short term side effects to include:Fatigue, diarrhea, rash, low thyroid hormones, pneumonitis etc    Long term side effects of treatment:  hypothyroidism    Quality of life: Quality of life concerns have been addressed. Treatment as outlined is expected to have moderate impact on patients quality of life. Plan:     · Approval for Nivolumab for 1 year to start in 3 weeks  · CBC, CMP, TSH per protocol  · Scans in 2 months  · Will refer to rad onc at a future visit      I appreciate the opportunity to participate in Mr. Ragini ortega.         Signed By: Oz Camara MD

## 2022-01-06 ENCOUNTER — OFFICE VISIT (OUTPATIENT)
Dept: ONCOLOGY | Age: 77
End: 2022-01-06
Payer: MEDICARE

## 2022-01-06 ENCOUNTER — DOCUMENTATION ONLY (OUTPATIENT)
Dept: ONCOLOGY | Age: 77
End: 2022-01-06

## 2022-01-06 VITALS
HEIGHT: 71 IN | BODY MASS INDEX: 29.85 KG/M2 | TEMPERATURE: 98.9 F | RESPIRATION RATE: 18 BRPM | SYSTOLIC BLOOD PRESSURE: 111 MMHG | WEIGHT: 213.2 LBS | DIASTOLIC BLOOD PRESSURE: 76 MMHG | HEART RATE: 77 BPM | OXYGEN SATURATION: 95 %

## 2022-01-06 DIAGNOSIS — E66.09 CLASS 1 OBESITY DUE TO EXCESS CALORIES WITH SERIOUS COMORBIDITY AND BODY MASS INDEX (BMI) OF 33.0 TO 33.9 IN ADULT: ICD-10-CM

## 2022-01-06 DIAGNOSIS — C67.9 MALIGNANT NEOPLASM OF URINARY BLADDER, UNSPECIFIED SITE (HCC): Primary | ICD-10-CM

## 2022-01-06 PROCEDURE — 1111F DSCHRG MED/CURRENT MED MERGE: CPT | Performed by: INTERNAL MEDICINE

## 2022-01-06 PROCEDURE — 99215 OFFICE O/P EST HI 40 MIN: CPT | Performed by: INTERNAL MEDICINE

## 2022-01-06 PROCEDURE — G8536 NO DOC ELDER MAL SCRN: HCPCS | Performed by: INTERNAL MEDICINE

## 2022-01-06 PROCEDURE — G0463 HOSPITAL OUTPT CLINIC VISIT: HCPCS | Performed by: INTERNAL MEDICINE

## 2022-01-06 PROCEDURE — 1101F PT FALLS ASSESS-DOCD LE1/YR: CPT | Performed by: INTERNAL MEDICINE

## 2022-01-06 PROCEDURE — G8752 SYS BP LESS 140: HCPCS | Performed by: INTERNAL MEDICINE

## 2022-01-06 PROCEDURE — G8754 DIAS BP LESS 90: HCPCS | Performed by: INTERNAL MEDICINE

## 2022-01-06 PROCEDURE — G8510 SCR DEP NEG, NO PLAN REQD: HCPCS | Performed by: INTERNAL MEDICINE

## 2022-01-06 PROCEDURE — 3017F COLORECTAL CA SCREEN DOC REV: CPT | Performed by: INTERNAL MEDICINE

## 2022-01-06 PROCEDURE — G8427 DOCREV CUR MEDS BY ELIG CLIN: HCPCS | Performed by: INTERNAL MEDICINE

## 2022-01-06 PROCEDURE — G8417 CALC BMI ABV UP PARAM F/U: HCPCS | Performed by: INTERNAL MEDICINE

## 2022-01-06 RX ORDER — HEPARIN 100 UNIT/ML
500 SYRINGE INTRAVENOUS AS NEEDED
Status: CANCELLED | OUTPATIENT
Start: 2022-01-13

## 2022-01-06 RX ORDER — SODIUM CHLORIDE 9 MG/ML
10 INJECTION INTRAMUSCULAR; INTRAVENOUS; SUBCUTANEOUS AS NEEDED
Status: CANCELLED | OUTPATIENT
Start: 2022-01-13

## 2022-01-06 RX ORDER — SODIUM CHLORIDE 0.9 % (FLUSH) 0.9 %
5-10 SYRINGE (ML) INJECTION AS NEEDED
Status: CANCELLED | OUTPATIENT
Start: 2022-01-13

## 2022-01-06 NOTE — PROGRESS NOTES
Pharmacy Note- Immunotherapy Education    Crystal Ochoa is a  77 y. o.male  diagnosed with bladder cancer here today for  chemotherapy counseling and consent. Mr. Myah Hernandez is being treated with nivolumab. Mr. Myah Hernandez was provided information regarding the risks of immune-mediated adverse reactions secondary to nivolumab that may require interruption/delay of treatment and possible use of corticosteroids. These reactions include, but are not limited to: colitis (diarrhea or severe abdominal pain); hepatitis (jaundice, severe nausea, or vomiting, easy bruising, and/or bleeding); hypophysitis (persistent or unusual headache, extreme weakness, dizziness/fainting, and/or vision changes); dermatitis (skin rash, mouth sores, skin blisters, and/or skin peeling); ocular toxicity (uveitis, iritis, and/or episcleritis); neuropathy (motor or sensory); hyper/hypothyroidism. Patient given ways to manage these side effects and when to contact office. [address] Handout of medications provided to patient. Mr. Myah Hernandez verbalized understanding of the information presented and all of the patient's questions were answered.     Kathy Boles, PharmD, BCPS, BCOP    For Pharmacy Admin Tracking Only     CPA in place: No   Recommendation Provided To: Patient/Caregiver: 1 via In person     Intervention Accepted By: Patient/Caregiver: 1   Time Spent (min): 20

## 2022-01-06 NOTE — PROGRESS NOTES
Luis Rossi is a 77 y.o. male  Chief Complaint   Patient presents with    Follow-up    Cancer     Muscle invasive bladder cancer- With squamous differentiation     1. Have you been to the ER, urgent care clinic since your last visit? Hospitalized since your last visit? See [facility]    2. Have you seen or consulted any other health care providers outside of the [address] [place] [address] since your last visit? Include any pap smears or colon screening.   Dr. Prem Rushing - wound specialist - [facility]; Dr. Marianne Price - saw on Tuesday 1/4/22

## 2022-01-07 RX ORDER — HYDROCORTISONE SODIUM SUCCINATE 100 MG/2ML
100 INJECTION, POWDER, FOR SOLUTION INTRAMUSCULAR; INTRAVENOUS AS NEEDED
Status: CANCELLED | OUTPATIENT
Start: 2022-02-03

## 2022-01-07 RX ORDER — ONDANSETRON 2 MG/ML
8 INJECTION INTRAMUSCULAR; INTRAVENOUS AS NEEDED
Status: CANCELLED | OUTPATIENT
Start: 2022-02-03

## 2022-01-07 RX ORDER — HEPARIN 100 UNIT/ML
300-500 SYRINGE INTRAVENOUS AS NEEDED
Status: CANCELLED
Start: 2022-02-03

## 2022-01-07 RX ORDER — ALBUTEROL SULFATE 0.83 MG/ML
2.5 SOLUTION RESPIRATORY (INHALATION) AS NEEDED
Status: CANCELLED
Start: 2022-02-03

## 2022-01-07 RX ORDER — EPINEPHRINE 1 MG/ML
0.3 INJECTION, SOLUTION, CONCENTRATE INTRAVENOUS AS NEEDED
Status: CANCELLED | OUTPATIENT
Start: 2022-02-03

## 2022-01-07 RX ORDER — SODIUM CHLORIDE 9 MG/ML
10 INJECTION INTRAMUSCULAR; INTRAVENOUS; SUBCUTANEOUS AS NEEDED
Status: CANCELLED | OUTPATIENT
Start: 2022-02-03

## 2022-01-07 RX ORDER — SODIUM CHLORIDE 9 MG/ML
25 INJECTION, SOLUTION INTRAVENOUS CONTINUOUS
Status: CANCELLED | OUTPATIENT
Start: 2022-02-03

## 2022-01-07 RX ORDER — ACETAMINOPHEN 325 MG/1
650 TABLET ORAL AS NEEDED
Status: CANCELLED
Start: 2022-02-03

## 2022-01-07 RX ORDER — SODIUM CHLORIDE 0.9 % (FLUSH) 0.9 %
10 SYRINGE (ML) INJECTION AS NEEDED
Status: CANCELLED | OUTPATIENT
Start: 2022-02-03

## 2022-01-07 RX ORDER — DIPHENHYDRAMINE HYDROCHLORIDE 50 MG/ML
50 INJECTION, SOLUTION INTRAMUSCULAR; INTRAVENOUS AS NEEDED
Status: CANCELLED
Start: 2022-02-03

## 2022-01-07 RX ORDER — DIPHENHYDRAMINE HYDROCHLORIDE 50 MG/ML
25 INJECTION, SOLUTION INTRAMUSCULAR; INTRAVENOUS AS NEEDED
Status: CANCELLED
Start: 2022-02-03

## 2022-01-13 ENCOUNTER — HOSPITAL ENCOUNTER (OUTPATIENT)
Dept: INFUSION THERAPY | Age: 77
End: 2022-01-13

## 2022-02-02 ENCOUNTER — HOSPITAL ENCOUNTER (OUTPATIENT)
Dept: INFUSION THERAPY | Age: 77
Discharge: HOME | End: 2022-02-02
Payer: MEDICARE

## 2022-02-02 VITALS
DIASTOLIC BLOOD PRESSURE: 66 MMHG | SYSTOLIC BLOOD PRESSURE: 105 MMHG | TEMPERATURE: 97.7 F | RESPIRATION RATE: 20 BRPM | HEART RATE: 84 BPM | OXYGEN SATURATION: 96 %

## 2022-02-02 LAB
ALBUMIN SERPL-MCNC: 2.7 G/DL (ref 3.5–5)
ALBUMIN/GLOB SERPL: 0.5 {RATIO} (ref 1.1–2.2)
ALP SERPL-CCNC: 72 U/L (ref 45–117)
ALT SERPL-CCNC: 13 U/L (ref 12–78)
ANION GAP SERPL CALC-SCNC: 4 MMOL/L (ref 5–15)
AST SERPL-CCNC: 11 U/L (ref 15–37)
BASOPHILS # BLD: 0 K/UL (ref 0–0.1)
BASOPHILS NFR BLD: 0 % (ref 0–1)
BILIRUB SERPL-MCNC: 0.3 MG/DL (ref 0.2–1)
BUN SERPL-MCNC: 57 MG/DL (ref 6–20)
BUN/CREAT SERPL: 29 (ref 12–20)
CALCIUM SERPL-MCNC: 9.3 MG/DL (ref 8.5–10.1)
CHLORIDE SERPL-SCNC: 109 MMOL/L (ref 97–108)
CO2 SERPL-SCNC: 23 MMOL/L (ref 21–32)
CREAT SERPL-MCNC: 1.99 MG/DL (ref 0.7–1.3)
DIFFERENTIAL METHOD BLD: ABNORMAL
EOSINOPHIL # BLD: 0.1 K/UL (ref 0–0.4)
EOSINOPHIL NFR BLD: 2 % (ref 0–7)
ERYTHROCYTE [DISTWIDTH] IN BLOOD BY AUTOMATED COUNT: 16.6 % (ref 11.5–14.5)
GLOBULIN SER CALC-MCNC: 5.1 G/DL (ref 2–4)
GLUCOSE SERPL-MCNC: 119 MG/DL (ref 65–100)
HBV SURFACE AB SER QL: NONREACTIVE
HBV SURFACE AB SER-ACNC: <3.1 MIU/ML
HBV SURFACE AG SER QL: <0.1 INDEX
HBV SURFACE AG SER QL: NEGATIVE
HCT VFR BLD AUTO: 27.2 % (ref 36.6–50.3)
HGB BLD-MCNC: 8.4 G/DL (ref 12.1–17)
IMM GRANULOCYTES # BLD AUTO: 0.1 K/UL (ref 0–0.04)
IMM GRANULOCYTES NFR BLD AUTO: 1 % (ref 0–0.5)
LYMPHOCYTES # BLD: 0.8 K/UL (ref 0.8–3.5)
LYMPHOCYTES NFR BLD: 13 % (ref 12–49)
MCH RBC QN AUTO: 30.1 PG (ref 26–34)
MCHC RBC AUTO-ENTMCNC: 30.9 G/DL (ref 30–36.5)
MCV RBC AUTO: 97.5 FL (ref 80–99)
MONOCYTES # BLD: 0.4 K/UL (ref 0–1)
MONOCYTES NFR BLD: 6 % (ref 5–13)
NEUTS SEG # BLD: 4.5 K/UL (ref 1.8–8)
NEUTS SEG NFR BLD: 78 % (ref 32–75)
NRBC # BLD: 0 K/UL (ref 0–0.01)
NRBC BLD-RTO: 0 PER 100 WBC
PLATELET # BLD AUTO: 247 K/UL (ref 150–400)
PMV BLD AUTO: 9.1 FL (ref 8.9–12.9)
POTASSIUM SERPL-SCNC: 4.1 MMOL/L (ref 3.5–5.1)
PROT SERPL-MCNC: 7.8 G/DL (ref 6.4–8.2)
RBC # BLD AUTO: 2.79 M/UL (ref 4.1–5.7)
RBC MORPH BLD: ABNORMAL
RBC MORPH BLD: ABNORMAL
SODIUM SERPL-SCNC: 136 MMOL/L (ref 136–145)
TSH SERPL DL<=0.05 MIU/L-ACNC: 1.22 UIU/ML (ref 0.36–3.74)
WBC # BLD AUTO: 5.9 K/UL (ref 4.1–11.1)

## 2022-02-02 PROCEDURE — 86704 HEP B CORE ANTIBODY TOTAL: CPT

## 2022-02-02 PROCEDURE — 80053 COMPREHEN METABOLIC PANEL: CPT

## 2022-02-02 PROCEDURE — 87340 HEPATITIS B SURFACE AG IA: CPT

## 2022-02-02 PROCEDURE — 84443 ASSAY THYROID STIM HORMONE: CPT

## 2022-02-02 PROCEDURE — 36415 COLL VENOUS BLD VENIPUNCTURE: CPT

## 2022-02-02 PROCEDURE — 85025 COMPLETE CBC W/AUTO DIFF WBC: CPT

## 2022-02-02 PROCEDURE — 86706 HEP B SURFACE ANTIBODY: CPT

## 2022-02-03 ENCOUNTER — OFFICE VISIT (OUTPATIENT)
Dept: ONCOLOGY | Age: 77
End: 2022-02-03
Payer: MEDICARE

## 2022-02-03 ENCOUNTER — DOCUMENTATION ONLY (OUTPATIENT)
Dept: ONCOLOGY | Age: 77
End: 2022-02-03

## 2022-02-03 ENCOUNTER — HOSPITAL ENCOUNTER (OUTPATIENT)
Dept: INFUSION THERAPY | Age: 77
Discharge: HOME | End: 2022-02-03
Payer: MEDICARE

## 2022-02-03 VITALS
RESPIRATION RATE: 18 BRPM | DIASTOLIC BLOOD PRESSURE: 87 MMHG | OXYGEN SATURATION: 99 % | TEMPERATURE: 96 F | HEART RATE: 95 BPM | SYSTOLIC BLOOD PRESSURE: 132 MMHG

## 2022-02-03 VITALS
WEIGHT: 216 LBS | BODY MASS INDEX: 30.13 KG/M2 | SYSTOLIC BLOOD PRESSURE: 117 MMHG | OXYGEN SATURATION: 96 % | HEART RATE: 104 BPM | DIASTOLIC BLOOD PRESSURE: 72 MMHG | RESPIRATION RATE: 18 BRPM | TEMPERATURE: 98.4 F

## 2022-02-03 DIAGNOSIS — Z51.12 ENCOUNTER FOR ANTINEOPLASTIC IMMUNOTHERAPY: ICD-10-CM

## 2022-02-03 DIAGNOSIS — C67.9 MALIGNANT NEOPLASM OF URINARY BLADDER, UNSPECIFIED SITE (HCC): Primary | ICD-10-CM

## 2022-02-03 DIAGNOSIS — Z95.828 PORT-A-CATH IN PLACE: ICD-10-CM

## 2022-02-03 LAB
HBV CORE AB SERPL QL IA: NEGATIVE
HBV CORE AB SERPL QL IA: NEGATIVE
HBV CORE IGM SERPL QL IA: NEGATIVE

## 2022-02-03 PROCEDURE — 74011000258 HC RX REV CODE- 258: Performed by: INTERNAL MEDICINE

## 2022-02-03 PROCEDURE — 74011250636 HC RX REV CODE- 250/636: Performed by: INTERNAL MEDICINE

## 2022-02-03 PROCEDURE — 1101F PT FALLS ASSESS-DOCD LE1/YR: CPT | Performed by: REGISTERED NURSE

## 2022-02-03 PROCEDURE — 77030012965 HC NDL HUBR BBMI -A

## 2022-02-03 PROCEDURE — G8754 DIAS BP LESS 90: HCPCS | Performed by: REGISTERED NURSE

## 2022-02-03 PROCEDURE — G0463 HOSPITAL OUTPT CLINIC VISIT: HCPCS | Performed by: REGISTERED NURSE

## 2022-02-03 PROCEDURE — 74011000250 HC RX REV CODE- 250: Performed by: INTERNAL MEDICINE

## 2022-02-03 PROCEDURE — G8536 NO DOC ELDER MAL SCRN: HCPCS | Performed by: REGISTERED NURSE

## 2022-02-03 PROCEDURE — G8427 DOCREV CUR MEDS BY ELIG CLIN: HCPCS | Performed by: REGISTERED NURSE

## 2022-02-03 PROCEDURE — G8417 CALC BMI ABV UP PARAM F/U: HCPCS | Performed by: REGISTERED NURSE

## 2022-02-03 PROCEDURE — G8432 DEP SCR NOT DOC, RNG: HCPCS | Performed by: REGISTERED NURSE

## 2022-02-03 PROCEDURE — 96413 CHEMO IV INFUSION 1 HR: CPT

## 2022-02-03 PROCEDURE — 99214 OFFICE O/P EST MOD 30 MIN: CPT | Performed by: REGISTERED NURSE

## 2022-02-03 PROCEDURE — G8752 SYS BP LESS 140: HCPCS | Performed by: REGISTERED NURSE

## 2022-02-03 RX ORDER — SODIUM CHLORIDE 9 MG/ML
10 INJECTION INTRAMUSCULAR; INTRAVENOUS; SUBCUTANEOUS AS NEEDED
Status: DISCONTINUED | OUTPATIENT
Start: 2022-02-03 | End: 2022-02-04 | Stop reason: HOSPADM

## 2022-02-03 RX ORDER — SODIUM CHLORIDE 0.9 % (FLUSH) 0.9 %
10 SYRINGE (ML) INJECTION AS NEEDED
Status: DISCONTINUED | OUTPATIENT
Start: 2022-02-03 | End: 2022-02-04 | Stop reason: HOSPADM

## 2022-02-03 RX ORDER — HEPARIN 100 UNIT/ML
300-500 SYRINGE INTRAVENOUS AS NEEDED
Status: DISCONTINUED | OUTPATIENT
Start: 2022-02-03 | End: 2022-02-04 | Stop reason: HOSPADM

## 2022-02-03 RX ORDER — ONDANSETRON 2 MG/ML
8 INJECTION INTRAMUSCULAR; INTRAVENOUS AS NEEDED
Status: DISCONTINUED | OUTPATIENT
Start: 2022-02-03 | End: 2022-02-04 | Stop reason: HOSPADM

## 2022-02-03 RX ORDER — SODIUM CHLORIDE 9 MG/ML
25 INJECTION, SOLUTION INTRAVENOUS CONTINUOUS
Status: DISCONTINUED | OUTPATIENT
Start: 2022-02-03 | End: 2022-02-04 | Stop reason: HOSPADM

## 2022-02-03 RX ORDER — DIPHENHYDRAMINE HYDROCHLORIDE 50 MG/ML
25 INJECTION, SOLUTION INTRAMUSCULAR; INTRAVENOUS AS NEEDED
Status: DISCONTINUED | OUTPATIENT
Start: 2022-02-03 | End: 2022-02-04 | Stop reason: HOSPADM

## 2022-02-03 RX ORDER — ACETAMINOPHEN 325 MG/1
650 TABLET ORAL AS NEEDED
Status: DISCONTINUED | OUTPATIENT
Start: 2022-02-03 | End: 2022-02-04 | Stop reason: HOSPADM

## 2022-02-03 RX ADMIN — SODIUM CHLORIDE, PRESERVATIVE FREE 10 ML: 5 INJECTION INTRAVENOUS at 13:11

## 2022-02-03 RX ADMIN — SODIUM CHLORIDE 480 MG: 9 INJECTION, SOLUTION INTRAVENOUS at 13:57

## 2022-02-03 RX ADMIN — HEPARIN 500 UNITS: 100 SYRINGE at 14:36

## 2022-02-03 RX ADMIN — SODIUM CHLORIDE, PRESERVATIVE FREE 10 ML: 5 INJECTION INTRAVENOUS at 14:36

## 2022-02-03 RX ADMIN — SODIUM CHLORIDE 25 ML/HR: 9 INJECTION, SOLUTION INTRAVENOUS at 13:12

## 2022-02-03 NOTE — PROGRESS NOTES
Merissa Jacobo is a 69 y.o. male    Chief Complaint   Patient presents with    Chemotherapy      Muscle invasive bladder cancer- Mixed histology       1. Have you been to the ER, urgent care clinic since your last visit? Hospitalized since your last visit? No    2. Have you seen or consulted any other health care providers outside of the [address] [place] [address] since your last visit? Include any pap smears or colon screening.  No

## 2022-02-03 NOTE — PROGRESS NOTES
[facility] Progress Note    Date: February 3, 2022    Name: Galo Noble    MRN: [id number]         : 1945    Mr. Chucky Elliott Arrived ambulatory and in no distress for cycle 1 day 1 of Opdiva regimen. [facility] [place] [facility] COVID-19 SCREENING      The patient was asked the following questions and answered as documented below:    1. Do you have any symptoms of COVID-19? SOB, coughing, fever, or generally not feeling well? NO  2. Have you been exposed to COVID-19 recently? NO  3. Have you had any recent contact with family/friend that has a pending COVID test? NO      Follow Up: Proceed with treatment, medication education reinforced with patient    Assessment was completed, no acute issues at this time, no new complaints voiced. Port accessed without difficulty. Chemotherapy Flowsheet 2/3/2022   Cycle C1   Date 2/3/2022   Drug / Regimen Opdivo   Pre Hydration -   Post Hydration -   Pre Meds -   Notes GIVEN         Mr. Katerin Wolf vitals were reviewed.   Patient Vitals for the past 12 hrs:   Temp Pulse Resp BP SpO2   22 1437  95  132/87    22 1257 (!) 96 °F (35.6 °C)       22 1244 98.4 °F (36.9 °C) 95 18 (!) 141/83 99 %       Lines:   Venous Access Device port 21 Upper chest (subclavicular area, right (Active)   Central Line Being Utilized Yes 22 1300   Criteria for Appropriate Use Limited/no vessel suitable for conventional peripheral access 10/29/21 1300   Site Assessment Clean, dry, & intact 22 1300   Date of Last Dressing Change 22 1300   Dressing Status Clean, dry, & intact 22 1300   Dressing Type Transparent 22 1300   Action Taken Blood drawn 10/29/21 1300   Date Accessed (Medial Site) 22 1300   Access Time (Medial Site) 1304 22 1300   Access Needle Size (Site #1) 20 G 22 1300   Access Needle Length (Medial Site) 0.75 inches 22 1300   Positive Blood Return (Medial Site) Yes 22 1300   Action Taken (Medial Site) Flushed; Infusing 02/03/22 1300   Positive Blood Return (Lateral Site) Yes 02/03/22 1300   Action Taken (Lateral Site) De-accessed 02/03/22 1300   Alcohol Cap Used Yes 10/29/21 1300        Lab results were obtained and reviewed. Labs within parameter for treatment. No results found for this or any previous visit (from the past 12 hour(s)). Pre-medications  were administered as ordered and chemotherapy was initiated. Medications Administered      Medications Administered     0.9% sodium chloride infusion     Admin Date  02/03/2022 Action  New Bag Dose  25 mL/hr Rate  25 mL/hr Route  IntraVENous Administered By  Jacqueline Alvarado RN          0.9% sodium chloride injection 10 mL     Admin Date  02/03/2022 Action  Given Dose  10 mL Route  IntraVENous Administered By  Jacqueline Alvarado RN           Admin Date  02/03/2022 Action  Given Dose  10 mL Route  IntraVENous Administered By  Jacqueline Alvarado RN          heparin (porcine) pf 300-500 Units     Admin Date  02/03/2022 Action  Given Dose  500 Units Route  InterCATHeter Administered By  Jacqueline Alvarado RN          nivolumab (OPDIVO) 480 mg in 0.9% sodium chloride 100 mL, overfill volume 10 mL IVPB     Admin Date  02/03/2022 Action  New Bag Dose  480 mg Rate  316 mL/hr Route  IntraVENous Administered By  Jacqueline Alvarado RN                Mr. Nae Montes De Oca tolerated treatment well, port flushed, heparinized and de accessed, patient was discharged from [place] [address] in stable condition at 1440.       Future Appointments   Date Time Provider Gregorio Ibarra   3/3/2022 11:00 AM C1 TD MED [facility]   3/3/2022 11:15 AM Hiram Lancaster  [address] BS AMB   3/31/2022 11:00 AM B3 TD MED [facility]   3/31/2022 11:30 AM Hiram Lancaster  [address] BS AMB   4/28/2022 11:00 AM B3 TD MED [facility]   5/26/2022 11:00 AM B3 TD MED [facility]   6/23/2022 11:00 AM B3 TD MED [facility]   7/21/2022 11:00 AM B3 TD  [address] ROCKY Ma, RN  February 3, 2022        Problem: Knowledge Deficit  Goal: *Verbalizes understanding of procedures and medications  Outcome: Progressing Towards Goal     Problem: Patient Education:  Go to Education Activity  Goal: Patient/Family Education  Outcome: Progressing Towards Goal

## 2022-02-03 NOTE — PROGRESS NOTES
[facility] [place] [facility] [place] [address] [address], [address]  W: [phone]  F: [phone]    Reason for Visit:   Pasquale Mondragon is a 69 y.o. male who is seen in follow-up of Muscle invasive bladder cancer- Mixed histology    Treatment History:   · 3/1/2021: CT IVP: Multiple abdominal, iliac, mediastinal nodes unchanged from 2019 consistent with h/o sarcoidosis, Thickened R lateral bladder wall. · 6/23/2021: Cystoscopy and TURBT- Papillary changes were noted within the prostatic urethra ,  papillary tumors seen emanating from the surface of the left hemitrigone, There were also diffuse changes in the floor of the bladder- Low grade urothelial carcinoma of the prostatic urethra, R lateral bladder wall with HG Muscle invasive urothelial carcinoma and squamous differentiation+ CIS, Left brenda trigone HG non invasive urothelial carcinoma  · 8/5/21- 10/21/2021: Cisplatin + Gemzar x 4   · 9/14/2021: CT was stable. · 12/6/2021: Radical urethro-cystoprostatectomy   · 2/3/22: Nivolumab    History of Present Illness:   Patient is a 69 y.o. male with PMH as below including sarcoidosis who is seen for evaluation of bladder cancer. He has a history of nonmuscle invasive bladder cancer in 2015, underwent 2 induction courses of BCG, had a non muscle invasive recurrence in 2019 and had re induction with BCG. [place] [facility] [place]. In March 2021 he had a positive FISH and was seen by Dr. Venu Zuniga. Had a CT IVP 3/2021 which showed some Bladder thickening. 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 Grade 4 neutropenia after cycle 1. Completed 4 cycles and seen after surgery done 12/6/2021. He comes today for cycle 1 of Nivolumab. His mood is down.  He has a lot of issues with his urostomy with leaking. He sees his urologist this afternoon. A home health nurse comes to help him. He is doing PT / OT. No other complaints. Mother and sister had breast cancer and brother had kidney cancer     Past Medical History:   Diagnosis Date    Arrhythmia     LBBB    Arthritis     Asthma     MILD    Cancer ([address]     scc top of head and nose    Cancer ([address]     BLADDER    COVID-19 vaccine series completed     [facility] CTR VACCINE 21 AND 21    Depression with anxiety     Hypertension     Other unknown and unspecified cause of morbidity or mortality     history of pulmonary sarcoid     Posterior cervical adenopathy, benign 2018    [facility]     Unspecified sleep apnea     cpap      Past Surgical History:   Procedure Laterality Date    HX CATARACT REMOVAL Bilateral     HX HERNIA REPAIR Right AS A CHILD    INGUINAL    HX HERNIA REPAIR Left     INGUIBAL    HX KNEE REPLACEMENT Left     HX ORTHOPAEDIC Right     BONE SPURS REMOVED FROM FOOT    HX OTHER SURGICAL      scc removed top of head and nose    HX OTHER SURGICAL      benign lump removed from thyroid    HX OTHER SURGICAL Right     SKIN CANCER RELMOVED FROM LEG    HX UROLOGICAL  2020    CYSTO    IR INSERT TUNL CVC W PORT OVER 5 YEARS  2021    CO CARDIAC SURG PROCEDURE UNLIST  2021    CARDIAC CATH    CO REVISE KNEE JOINT REPLACE,ALL PARTS Left       Social History     Tobacco Use    Smoking status: Former Smoker     Packs/day: 0.50     Years: 2.00     Pack years: 1.00     Quit date: 1965     Years since quittin.3    Smokeless tobacco: Never Used   Substance Use Topics    Alcohol use:  Yes     Alcohol/week: 2.0 standard drinks     Types: 2 Glasses of wine per week     Comment: DAILY      Family History   Problem Relation Age of Onset    Cancer Mother         breast with mets    Dementia Mother     Heart Disease Father     Cancer Sister         breast    Lung Disease Brother     No Known Problems Brother     Anesth Problems Neg Hx      Current Outpatient Medications   Medication Sig    buPROPion SR (WELLBUTRIN SR) 100 mg SR tablet Take 100 mg by mouth daily.  acetaminophen (Tylenol Extra Strength) 500 mg tablet Take 1,000 mg by mouth every six (6) hours as needed for Pain.  tamsulosin (FLOMAX) 0.4 mg capsule Take 0.4 mg by mouth nightly.  busPIRone (BUSPAR) 5 mg tablet Take 5 mg by mouth daily.  simvastatin (ZOCOR) 20 mg tablet Take 20 mg by mouth nightly.  escitalopram oxalate (LEXAPRO) 10 mg tablet Take 10 mg by mouth daily. No current facility-administered medications for this visit. Allergies   Allergen Reactions    Pcn [Penicillins] Hives     Patient screened for any delayed non-IgE-mediated reaction to PCN.         Patient notes the following:    NO SEVERE NON-IGE MEDIATED REACTIONS        Review of Systems: A complete review of systems was obtained, negative except as described above. Physical Exam:     Visit Vitals  /72 (BP 1 Location: Right arm, BP Patient Position: Sitting)   Pulse (!) 104   Temp 98.4 °F (36.9 °C)   Resp 18   Wt 216 lb (98 kg)   SpO2 96%   BMI 30.13 kg/m²     ECOG PS: 1  General: No distress  Eyes: PERRL, anicteric sclerae  HENT: Atraumatic, PAC in place   Neck: Supple  Skin: No rashes, ecchymoses, or petechiae. Normal temperature, turgor, and texture. GI: Has a urostomy bag  Psych: Alert, oriented, appropriate affect, normal judgment/insight    Results:     Lab Results   Component Value Date/Time    WBC 5.9 02/02/2022 04:20 PM    HGB 8.4 (L) 02/02/2022 04:20 PM    HCT 27.2 (L) 02/02/2022 04:20 PM    PLATELET 488 00/34/8091 04:20 PM    MCV 97.5 02/02/2022 04:20 PM    ABS.  NEUTROPHILS 4.5 02/02/2022 04:20 PM     Lab Results   Component Value Date/Time    Sodium 136 02/02/2022 04:20 PM    Potassium 4.1 02/02/2022 04:20 PM    Chloride 109 (H) 02/02/2022 04:20 PM    CO2 23 02/02/2022 04:20 PM    Glucose 119 (H) 02/02/2022 04:20 PM    BUN 57 (H) 02/02/2022 04:20 PM    Creatinine 1.99 (H) 02/02/2022 04:20 PM    GFR est AA 40 (L) 02/02/2022 04:20 PM    GFR est non-AA 33 (L) 02/02/2022 04:20 PM    Calcium 9.3 02/02/2022 04:20 PM    Glucose (POC) 143 (H) 12/11/2021 01:02 AM     Lab Results   Component Value Date/Time    Bilirubin, total 0.3 02/02/2022 04:20 PM    ALT (SGPT) 13 02/02/2022 04:20 PM    Alk. phosphatase 72 02/02/2022 04:20 PM    Protein, total 7.8 02/02/2022 04:20 PM    Albumin 2.7 (L) 02/02/2022 04:20 PM    Globulin 5.1 (H) 02/02/2022 04:20 PM       External   Records reviewed and summarized above.   Pathology report(s) reviewed    12/21/2021     SPECIMEN       Procedure: Radical urethro-cystoprostatectomy       TUMOR       Tumor Site:                Trigone, Dome, Right lateral wall, Left   Ureteral                                  orifice, Right bladder neck        Histologic Type:     Papillary urothelial carcinoma, invasive and   non-invasive, and carcinoma in situ       Histologic Grade:                High-grade       Tumor Size: Cannot be determined:      Multiple tumors       Tumor Extension:           Tumor invades adjacent structures -   Prostate                                  (transmural invasion from the bladder   tumor)       Lymphovascular Invasion:      Present       Tumor Configuration:           Papillary, Flat       MARGINS       Margins:                     Uninvolved by invasive carcinoma and   carcinoma in situ /                                  noninvasive urothelial carcinoma    LYMPH NODES       Number of Lymph Nodes Involved:      2           Size of Largest Metastatic Deposit:      1 cm               Site:                     Left Pelvic Lymph Nodes           Extranodal Extension:           Present       Number of Lymph Nodes Examined:      5     PATHOLOGIC STAGE CLASSIFICATION (pTNM, AJCC 8th Edition)       TNM Descriptors:                m (multiple primary tumors)       Primary Tumor (pT):                pT4a       Regional Lymph Nodes (pN):           pN2                      Carcinoma in situ URETHRA: RESECTION       Tumor Site:                     Urethra       SPECIMEN       Procedure:                     Total urethrectomy    TUMOR       Tumor Site:                     Prostatic urethra       Histologic Type:                       Papillary urothelial carcinoma,   invasive       Histologic Grade:                     High-grade       Tumor Extension:                Tumor invades adjacent structures -   Prostate       Lymphovascular Invasion:           Present       MARGINS       Margins:                          Uninvolved by invasive carcinoma and   carcinoma in situ /                                       noninvasive urothelial carcinoma       LYMPH NODES (These represent the same lymph nodes reported in the BLADDER   Resection Template)       Number of Lymph Nodes Involved:      2           Size of Largest Metastatic Deposit:      1 cm               Site:                     Left Pelvic Lymph node       Number of Lymph Nodes Examined:      5      PATHOLOGIC STAGE CLASSIFICATION (pTNM, AJCC 8th Edition)       TNM Descriptors:                m (multiple primary tumors within the   prostatic urethra)       Primary Tumor (pT):                pT2       Regional Lymph Nodes (pN):           pN2                     4.  Right pelvic lymph nodes, lymph node dissection:        One benign lymph node with noncaseating granulomatous inflammation,   compatible with patients history of sarcoidosis   One benign lymph node, no histopathologic changes   No tumor seen     5.  Left pelvic lymph nodes, lymph node dissection:        Metastatic urothelial carcinoma in two of three lymph nodes, 1 cm in   greatest dimension with extracapsular extension   Noncaseating granulomatous inflammation, compatible with patient's history   of sarcoidosis     Radiology report(s) reviewed above.       CT CAP 7/19/21: IMPRESSION  1. While the bladder is decompressed by Juarez is thick-walled and irregular  2. No evidence of metastatic disease to the abdomen or pelvis  3. Extensive bilateral hilar and mediastinal adenopathy with patchy perihilar  airspace disease with associated nodular peribronchial cuffing. Findings can be  seen with sarcoidosis. Differentiating adenopathy from sarcoidosis for  metastatic disease is difficult       9/2021 CT     IMPRESSION     1. Mild bladder wall thickening posteriorly and along the right lateral bladder  wall, nonspecific. No evidence of metastatic disease within the chest, abdomen,  or pelvis. 2. Extensive mediastinal and bilateral hilar lymphadenopathy with lymph node  calcifications, most compatible with known sarcoidosis. No significant change. 3. Bilateral perihilar airspace opacities have improved. No new pulmonary  pathology identified. 4. Severe diverticulosis of the colon. No definite superimposed acute  Diverticulitis. 12/11/2021     IMPRESSION     1. Interval radical cystoprostatectomy with ileal conduit urinary diversion. 2. No postoperative complication identified. 3. Dense bilateral nephrograms in this patient who is status post contrast  injection the previous day. This suggests ATN. Assessment:   1) Muscle invasive bladder cancer- With squamous differentiation     [id number]  S/p 4 cycles of NAC with Cisplatin+ gemzar complicated by grade 4 neutropenia  S/P Radical urethro-cystoprostatectomy 12/6/2021- pT4N2 (invaded prostate), HG papillar, +LVI  Path stage-  Stage IIIB     I discussed the final path  Disease at high risk for local and distant recurrence  Margins negative but nodes had EPE    Discussed side effects, duration of 1 year.  Today is cycle 1.     2) Urethral TCC    S/P Total urethrectomy 12/6  HG Papillary [id number]  Margins negative  EPE  Present  See above    Will also refer to Areli Espinoza to discuss whether RT is indictated     3) Sarcoidosis  Follows with pulmonary  Stable. 4) Obesity    5) Encounter for high risk medication like immunotherapy  Labs reviewed     Plan:     · Proceed today with cycle 1 of Nivolumab q4 week dosing for 1 year   · CBC, CMP, TSH per protocol  · Scans in 2 months  · Will refer to rad onc at a future visit  · SW following  · See urology as scheduled     RTC in 4 weeks     I appreciate the opportunity to participate in Mr. Tiera Linares care. I personally saw and evaluated the patient and performed the key components of medical decision making. The history, physical exam, and documentation were performed by Susie Eason NP. I reviewed and verified the above documentation and modified it as needed.             Signed By: Kristi Veronica MD

## 2022-02-03 NOTE — PROGRESS NOTES
[facility]   Oncology Social Work  Encounter     Patient Name:  Rashaad Hutchinson History: Muscle invasive bladder cancer- Mixed histology    Advance Directives: Patient does not have an advanced medical directive, and did not express interest in completing one today. Narrative:   Supportive visit made with the patient and his wife. Patient has been struggling with adjustment to his ostomy. His wife expressed concern that he may be depressed. The patient agreed that he has felt somewhat down. He sees a psychiatrist (Dr. Flores Ferris at [facility]), and takes an anti-depressant. Encouraged him to speak with this provider again soon, in the event that he recommends any medication adjustments. He enjoys reading, watching TV and movies, and spending time with his dogs. Spoke to the patient regarding self care methods to improve mood and mental health including eating healthy foods, drinking plenty of water, getting time in the sunshine, limiting caffeine and alcohol, exercising, and practicing good sleep hygiene. Patient struggles with excessive alcohol use. He is aware that alcohol is a depressant, and can worsen symptoms of depression. He has tried AA in the past, but did not find this to be helpful. Provided a list of behavioral health resources compiled by this , including emergency numbers for each county, suicide hotline numbers, cancer support hotlines, and local providers for psychiatry and counseling. Also provided information for the Ostomy Association of [facility], explaining that they have monthly meetings. Provided this 's contact information as well as information regarding the complementary services provided by the [facility], explaining that the center is currently closed due to COVID-19 restrictions.   Explained that meditation, yoga, relationship counseling, and music therapy, are being offered virtually. Barriers to Care:   Adjustment to illness and ostomy     Plan:  1. Re-introduced self and role of this  in the [employer]. 2.  Reminded the patient of the [facility] and available resources there. 3.  Continue to meet with the patient when he returns to the clinic for ongoing assessment of the patients adjustment to his diagnosis and treatment. 4.  Ongoing psychosocial support as desired by patient.       Referral/Handouts:   Behavioral health referral  Complementary therapies referral  Support Groups referral (Ostomy)   Subhash Bingham LCSW

## 2022-02-04 ENCOUNTER — APPOINTMENT (OUTPATIENT)
Dept: GENERAL RADIOLOGY | Age: 77
DRG: 310 | End: 2022-02-04
Attending: EMERGENCY MEDICINE
Payer: MEDICARE

## 2022-02-04 ENCOUNTER — HOSPITAL ENCOUNTER (INPATIENT)
Age: 77
LOS: 2 days | Discharge: HOME | DRG: 310 | End: 2022-02-06
Attending: EMERGENCY MEDICINE | Admitting: STUDENT IN AN ORGANIZED HEALTH CARE EDUCATION/TRAINING PROGRAM
Payer: MEDICARE

## 2022-02-04 DIAGNOSIS — R06.02 SOB (SHORTNESS OF BREATH): Primary | ICD-10-CM

## 2022-02-04 DIAGNOSIS — R77.8 ELEVATED TROPONIN: ICD-10-CM

## 2022-02-04 LAB
ALBUMIN SERPL-MCNC: 2.7 G/DL (ref 3.5–5)
ALBUMIN/GLOB SERPL: 0.5 {RATIO} (ref 1.1–2.2)
ALP SERPL-CCNC: 70 U/L (ref 45–117)
ALT SERPL-CCNC: 13 U/L (ref 12–78)
ANION GAP SERPL CALC-SCNC: 5 MMOL/L (ref 5–15)
AST SERPL-CCNC: 10 U/L (ref 15–37)
ATRIAL RATE: 102 BPM
BASOPHILS # BLD: 0.1 K/UL (ref 0–0.1)
BASOPHILS NFR BLD: 1 % (ref 0–1)
BILIRUB SERPL-MCNC: 0.3 MG/DL (ref 0.2–1)
BNP SERPL-MCNC: 4453 PG/ML
BUN SERPL-MCNC: 51 MG/DL (ref 6–20)
BUN/CREAT SERPL: 29 (ref 12–20)
CALCIUM SERPL-MCNC: 9.6 MG/DL (ref 8.5–10.1)
CALCULATED P AXIS, ECG09: 55 DEGREES
CALCULATED R AXIS, ECG10: -52 DEGREES
CALCULATED T AXIS, ECG11: 92 DEGREES
CHLORIDE SERPL-SCNC: 107 MMOL/L (ref 97–108)
CO2 SERPL-SCNC: 23 MMOL/L (ref 21–32)
COMMENT, HOLDF: NORMAL
CREAT SERPL-MCNC: 1.77 MG/DL (ref 0.7–1.3)
D DIMER PPP FEU-MCNC: 0.54 MG/L FEU (ref 0–0.65)
DIAGNOSIS, 93000: NORMAL
DIFFERENTIAL METHOD BLD: ABNORMAL
EOSINOPHIL # BLD: 0.1 K/UL (ref 0–0.4)
EOSINOPHIL NFR BLD: 2 % (ref 0–7)
ERYTHROCYTE [DISTWIDTH] IN BLOOD BY AUTOMATED COUNT: 16.5 % (ref 11.5–14.5)
FERRITIN SERPL-MCNC: 409 NG/ML (ref 26–388)
GLOBULIN SER CALC-MCNC: 5.1 G/DL (ref 2–4)
GLUCOSE SERPL-MCNC: 104 MG/DL (ref 65–100)
HCT VFR BLD AUTO: 25.9 % (ref 36.6–50.3)
HGB BLD-MCNC: 8.1 G/DL (ref 12.1–17)
IMM GRANULOCYTES # BLD AUTO: 0.1 K/UL (ref 0–0.04)
IMM GRANULOCYTES NFR BLD AUTO: 1 % (ref 0–0.5)
IRON SATN MFR SERPL: 11 % (ref 20–50)
IRON SERPL-MCNC: 24 UG/DL (ref 35–150)
LYMPHOCYTES # BLD: 0.4 K/UL (ref 0.8–3.5)
LYMPHOCYTES NFR BLD: 6 % (ref 12–49)
MAGNESIUM SERPL-MCNC: 2.1 MG/DL (ref 1.6–2.4)
MCH RBC QN AUTO: 30.5 PG (ref 26–34)
MCHC RBC AUTO-ENTMCNC: 31.3 G/DL (ref 30–36.5)
MCV RBC AUTO: 97.4 FL (ref 80–99)
MONOCYTES # BLD: 0.7 K/UL (ref 0–1)
MONOCYTES NFR BLD: 10 % (ref 5–13)
NEUTS SEG # BLD: 5.3 K/UL (ref 1.8–8)
NEUTS SEG NFR BLD: 80 % (ref 32–75)
NRBC # BLD: 0 K/UL (ref 0–0.01)
NRBC BLD-RTO: 0 PER 100 WBC
P-R INTERVAL, ECG05: 186 MS
PLATELET # BLD AUTO: 207 K/UL (ref 150–400)
PMV BLD AUTO: 9 FL (ref 8.9–12.9)
POTASSIUM SERPL-SCNC: 4 MMOL/L (ref 3.5–5.1)
PROCALCITONIN SERPL-MCNC: 0.38 NG/ML
PROT SERPL-MCNC: 7.8 G/DL (ref 6.4–8.2)
Q-T INTERVAL, ECG07: 394 MS
QRS DURATION, ECG06: 140 MS
QTC CALCULATION (BEZET), ECG08: 513 MS
RBC # BLD AUTO: 2.66 M/UL (ref 4.1–5.7)
RBC MORPH BLD: ABNORMAL
SAMPLES BEING HELD,HOLD: NORMAL
SODIUM SERPL-SCNC: 135 MMOL/L (ref 136–145)
TIBC SERPL-MCNC: 211 UG/DL (ref 250–450)
TROPONIN-HIGH SENSITIVITY: 87 NG/L (ref 0–76)
VENTRICULAR RATE, ECG03: 102 BPM
WBC # BLD AUTO: 6.7 K/UL (ref 4.1–11.1)

## 2022-02-04 PROCEDURE — 82728 ASSAY OF FERRITIN: CPT

## 2022-02-04 PROCEDURE — 83540 ASSAY OF IRON: CPT

## 2022-02-04 PROCEDURE — 84145 PROCALCITONIN (PCT): CPT

## 2022-02-04 PROCEDURE — 0202U NFCT DS 22 TRGT SARS-COV-2: CPT

## 2022-02-04 PROCEDURE — 74011250637 HC RX REV CODE- 250/637: Performed by: STUDENT IN AN ORGANIZED HEALTH CARE EDUCATION/TRAINING PROGRAM

## 2022-02-04 PROCEDURE — 85379 FIBRIN DEGRADATION QUANT: CPT

## 2022-02-04 PROCEDURE — 65660000000 HC RM CCU STEPDOWN

## 2022-02-04 PROCEDURE — 83880 ASSAY OF NATRIURETIC PEPTIDE: CPT

## 2022-02-04 PROCEDURE — 93005 ELECTROCARDIOGRAM TRACING: CPT

## 2022-02-04 PROCEDURE — 74011250637 HC RX REV CODE- 250/637: Performed by: EMERGENCY MEDICINE

## 2022-02-04 PROCEDURE — 83735 ASSAY OF MAGNESIUM: CPT

## 2022-02-04 PROCEDURE — 84484 ASSAY OF TROPONIN QUANT: CPT

## 2022-02-04 PROCEDURE — 80053 COMPREHEN METABOLIC PANEL: CPT

## 2022-02-04 PROCEDURE — 99285 EMERGENCY DEPT VISIT HI MDM: CPT

## 2022-02-04 PROCEDURE — 85025 COMPLETE CBC W/AUTO DIFF WBC: CPT

## 2022-02-04 PROCEDURE — 74011250636 HC RX REV CODE- 250/636: Performed by: EMERGENCY MEDICINE

## 2022-02-04 PROCEDURE — 71046 X-RAY EXAM CHEST 2 VIEWS: CPT

## 2022-02-04 PROCEDURE — 36415 COLL VENOUS BLD VENIPUNCTURE: CPT

## 2022-02-04 RX ORDER — BUSPIRONE HYDROCHLORIDE 5 MG/1
5 TABLET ORAL DAILY
Status: DISCONTINUED | OUTPATIENT
Start: 2022-02-05 | End: 2022-02-06 | Stop reason: HOSPADM

## 2022-02-04 RX ORDER — ZOLPIDEM TARTRATE 5 MG/1
5 TABLET ORAL
COMMUNITY

## 2022-02-04 RX ORDER — DILTIAZEM HYDROCHLORIDE 30 MG/1
30 TABLET, FILM COATED ORAL
Status: DISCONTINUED | OUTPATIENT
Start: 2022-02-04 | End: 2022-02-04

## 2022-02-04 RX ORDER — ONDANSETRON 2 MG/ML
4 INJECTION INTRAMUSCULAR; INTRAVENOUS
Status: DISCONTINUED | OUTPATIENT
Start: 2022-02-04 | End: 2022-02-06 | Stop reason: HOSPADM

## 2022-02-04 RX ORDER — ONDANSETRON 4 MG/1
4 TABLET, ORALLY DISINTEGRATING ORAL
Status: DISCONTINUED | OUTPATIENT
Start: 2022-02-04 | End: 2022-02-06 | Stop reason: HOSPADM

## 2022-02-04 RX ORDER — POLYETHYLENE GLYCOL 3350 17 G/17G
17 POWDER, FOR SOLUTION ORAL DAILY PRN
Status: DISCONTINUED | OUTPATIENT
Start: 2022-02-04 | End: 2022-02-06 | Stop reason: HOSPADM

## 2022-02-04 RX ORDER — ACETAMINOPHEN 650 MG/1
650 SUPPOSITORY RECTAL
Status: DISCONTINUED | OUTPATIENT
Start: 2022-02-04 | End: 2022-02-06 | Stop reason: HOSPADM

## 2022-02-04 RX ORDER — BUPROPION HYDROCHLORIDE 100 MG/1
100 TABLET, EXTENDED RELEASE ORAL DAILY
Status: DISCONTINUED | OUTPATIENT
Start: 2022-02-05 | End: 2022-02-06 | Stop reason: HOSPADM

## 2022-02-04 RX ORDER — SODIUM CHLORIDE 0.9 % (FLUSH) 0.9 %
5-40 SYRINGE (ML) INJECTION EVERY 8 HOURS
Status: DISCONTINUED | OUTPATIENT
Start: 2022-02-04 | End: 2022-02-06 | Stop reason: HOSPADM

## 2022-02-04 RX ORDER — GUAIFENESIN 100 MG/5ML
324 LIQUID (ML) ORAL
Status: COMPLETED | OUTPATIENT
Start: 2022-02-04 | End: 2022-02-04

## 2022-02-04 RX ORDER — METOPROLOL TARTRATE 25 MG/1
12.5 TABLET, FILM COATED ORAL EVERY 12 HOURS
Status: DISCONTINUED | OUTPATIENT
Start: 2022-02-04 | End: 2022-02-06 | Stop reason: HOSPADM

## 2022-02-04 RX ORDER — SODIUM CHLORIDE 0.9 % (FLUSH) 0.9 %
5-40 SYRINGE (ML) INJECTION AS NEEDED
Status: DISCONTINUED | OUTPATIENT
Start: 2022-02-04 | End: 2022-02-06 | Stop reason: HOSPADM

## 2022-02-04 RX ORDER — ZOLPIDEM TARTRATE 5 MG/1
5 TABLET ORAL
Status: DISCONTINUED | OUTPATIENT
Start: 2022-02-04 | End: 2022-02-06 | Stop reason: HOSPADM

## 2022-02-04 RX ORDER — ACETAMINOPHEN 325 MG/1
650 TABLET ORAL
Status: DISCONTINUED | OUTPATIENT
Start: 2022-02-04 | End: 2022-02-06 | Stop reason: HOSPADM

## 2022-02-04 RX ORDER — ESCITALOPRAM OXALATE 10 MG/1
10 TABLET ORAL DAILY
Status: DISCONTINUED | OUTPATIENT
Start: 2022-02-05 | End: 2022-02-06 | Stop reason: HOSPADM

## 2022-02-04 RX ORDER — ATORVASTATIN CALCIUM 10 MG/1
10 TABLET, FILM COATED ORAL DAILY
Status: DISCONTINUED | OUTPATIENT
Start: 2022-02-05 | End: 2022-02-06 | Stop reason: HOSPADM

## 2022-02-04 RX ORDER — TAMSULOSIN HYDROCHLORIDE 0.4 MG/1
0.4 CAPSULE ORAL
Status: DISCONTINUED | OUTPATIENT
Start: 2022-02-04 | End: 2022-02-06 | Stop reason: HOSPADM

## 2022-02-04 RX ADMIN — METOPROLOL TARTRATE 12.5 MG: 25 TABLET, FILM COATED ORAL at 22:46

## 2022-02-04 RX ADMIN — TAMSULOSIN HYDROCHLORIDE 0.4 MG: 0.4 CAPSULE ORAL at 22:46

## 2022-02-04 RX ADMIN — SODIUM CHLORIDE 1000 ML: 9 INJECTION, SOLUTION INTRAVENOUS at 18:47

## 2022-02-04 RX ADMIN — ASPIRIN 81 MG 324 MG: 81 TABLET ORAL at 20:00

## 2022-02-04 NOTE — ED TRIAGE NOTES
Pt recently had abd surgery then developed afib afterwards, pt in afib again , +sob, denies cp, denies fever , denies n/v

## 2022-02-04 NOTE — ED PROVIDER NOTES
HPI   61-year-old man with a past medical history of bladder cancer status post cystectomy with urostomy in place who is on immunotherapy, hypertension, and atrial fibrillation on Eliquis that was diagnosed when the patient was hospitalized after his cystectomy who presents to the emergency department due to acute on chronic shortness of breath. Patient says since he was discharged to [facility] for rehab after his cystectomy he has had exertional shortness of breath. However this is gotten worse over the last 7 to 10 days. The patient tells me that his home health nurses have noted that his \"A. fib levels were off\". He is also been experiencing intermittent periods of lightheadedness. He states that he talked with his primary care physician about this, who instructed him to come to the emergency department. He has not had any chest pain. Denies any lower extremity edema or calf pain. He denies fevers or cough. He says when he is sitting down his shortness of breath improves after a period of time. He also tells me he does not believe he is on any rate control medications for his A. fib.   He has never heard the words metoprolol or diltiazem in the past.    Past Medical History:   Diagnosis Date    Arrhythmia     LBBB    Arthritis     Asthma     MILD    Cancer [facility]     scc top of head and nose    Cancer ([address] 2015    BLADDER    COVID-19 vaccine series completed     [facility] CTR VACCINE 1-28-21 AND 2-25-21    Depression with anxiety     Hypertension     Other unknown and unspecified cause of morbidity or mortality     history of pulmonary sarcoid     Posterior cervical adenopathy, benign 11/19/2018    Pulmonary sarcoidosis ([facility]     Unspecified sleep apnea     cpap       Past Surgical History:   Procedure Laterality Date    HX CATARACT REMOVAL Bilateral     HX HERNIA REPAIR Right AS A CHILD    INGUINAL    HX HERNIA REPAIR Left 1990    INGUIBAL    HX KNEE REPLACEMENT Left 2008    HX ORTHOPAEDIC Right     BONE SPURS REMOVED FROM FOOT    HX OTHER SURGICAL      scc removed top of head and nose    HX OTHER SURGICAL  2005    benign lump removed from thyroid    HX OTHER SURGICAL Right     SKIN CANCER RELMOVED FROM LEG    HX UROLOGICAL  2020    CYSTO    IR INSERT TUNL CVC W PORT OVER 5 YEARS  2021    MD CARDIAC SURG PROCEDURE UNLIST  2021    CARDIAC CATH    MD REVISE KNEE JOINT REPLACE,ALL PARTS Left          Family History:   Problem Relation Age of Onset    Cancer Mother         breast with mets    Dementia Mother     Heart Disease Father     Cancer Sister         breast    Lung Disease Brother     No Known Problems Brother     Anesth Problems Neg Hx        Social History     Socioeconomic History    Marital status:      Spouse name: Not on file    Number of children: Not on file    Years of education: Not on file    Highest education level: Not on file   Occupational History    Not on file   Tobacco Use    Smoking status: Former Smoker     Packs/day: 0.50     Years: 2.00     Pack years: 1.00     Quit date: 1965     Years since quittin.3    Smokeless tobacco: Never Used   Vaping Use    Vaping Use: Never used   Substance and Sexual Activity    Alcohol use: Yes     Alcohol/week: 2.0 standard drinks     Types: 2 Glasses of wine per week     Comment: DAILY    Drug use: No    Sexual activity: Not Currently   Other Topics Concern    Not on file   Social History Narrative    Not on file     Social Determinants of Health     Financial Resource Strain:     Difficulty of Paying Living Expenses: Not on file   Food Insecurity: No Food Insecurity    Worried About Running Out of Food in the Last Year: Never true    Tee of Food in the Last Year: Never true   Transportation Needs: No Transportation Needs    Lack of Transportation (Medical): No    Lack of Transportation (Non-Medical):  No   Physical Activity:     Days of Exercise per Week: Not on file    Minutes of Exercise per Session: Not on file   Stress:     Feeling of Stress : Not on file   Social Connections: Unknown    Frequency of Communication with Friends and Family: Not on file    Frequency of Social Gatherings with Friends and Family: Not on file    Attends Confucianism Services: More than 4 times per year    Active Member of [address] [place] [address] [employer] or Organizations: Not on file    Attends Club or Organization Meetings: Not on file    Marital Status:    Intimate Partner Violence:     Fear of Current or Ex-Partner: Not on file    Emotionally Abused: Not on file    Physically Abused: Not on file    Sexually Abused: Not on file   Housing Stability:     Unable to Pay for Housing in the Last Year: Not on file    Number of [place] in the Last Year: Not on file    Unstable Housing in the Last Year: Not on file         ALLERGIES: Pcn [penicillins]    Review of Systems   A complete review of systems was performed and all systems reviewed were negative unless otherwise documented in HPI. Vitals:    02/04/22 1510   BP: (!) 145/71   Pulse: (!) 103   Resp: 20   Temp: 97 °F (36.1 °C)   SpO2: 98%            Physical Exam  Constitutional:       Comments: Elderly man resting comfortably no acute distress   HENT:      Mouth/Throat:      Comments: Moist mucous membranes  Eyes:      General: No scleral icterus. Extraocular Movements: Extraocular movements intact. Neck:      Comments: No JVD. Trachea midline. Cardiovascular:      Comments: Tachycardic. Regular rhythm. 2+ radial pulses bilaterally. No murmurs appreciated  Pulmonary:      Effort: Pulmonary effort is normal. No respiratory distress. Breath sounds: Normal breath sounds. No wheezing or rales. Abdominal:      Comments: Soft, nontender, nondistended. Urostomy present with normal-appearing urine in his urostomy bag   Musculoskeletal:         General: Normal range of motion. Right lower leg: No edema.       Left lower leg: No edema. Comments: No calf tenderness bilaterally   Skin:     General: Skin is warm and dry. Neurological:      Comments: Awake and alert. Speech is normal.  GCS 15. MDM  56-year-old man who presents with the above chief complaint. Vitals notable for heart rate of 103 in the setting of his atrial fibrillation. EKG shows sinus tachycardia with a rate of 102 and QTC of 513. He has a left bundle branch block which is been present previously. No Scarbosa criteria met. On exam he is resting comfortably. He is clear lungs bilaterally. No signs of lower extremity edema. He appears well-hydrated. Initial cardiac enzymes and a chest x-ray D-dimer will be sent given his history of malignancy and also fairly recent surgery. Disposition will depend on his results. Initial labs show creatinine of 1.77 which is increased from December of last year, but downtrending from 2 days ago. He also has a high-sensitivity troponin of 87 and will ultimately need to be admitted. Procedures    Perfect Serve Consult for Admission  7:23 PM    ED Room Number: ER12/12  Patient Name and age:  Fang Ear 69 y.o.  male  Working Diagnosis:   1. SOB (shortness of breath)    2. Elevated troponin        COVID-19 Suspicion:  no  Sepsis present:  no  Reassessment needed: yes  Code Status:  Full Code  Readmission: no  Isolation Requirements:  no  Recommended Level of Care:  telemetry  Department:  [facility] Adult ED - 21     Other: Patient presenting with exertional dyspnea for the last 7 to 10 days. No chest pain. Has a history of atrial fibrillation but sinus today. Troponin elevated in the eighties. BNP also elevated. Does not appear volume overloaded. Will give aspirin and repeat troponin. Needs admission for further work-up and cardiology consult.

## 2022-02-05 LAB
ANION GAP SERPL CALC-SCNC: 5 MMOL/L (ref 5–15)
B PERT DNA SPEC QL NAA+PROBE: NOT DETECTED
BORDETELLA PARAPERTUSSIS PCR, BORPAR: NOT DETECTED
BUN SERPL-MCNC: 44 MG/DL (ref 6–20)
BUN/CREAT SERPL: 27 (ref 12–20)
C PNEUM DNA SPEC QL NAA+PROBE: NOT DETECTED
CALCIUM SERPL-MCNC: 8.5 MG/DL (ref 8.5–10.1)
CHLORIDE SERPL-SCNC: 112 MMOL/L (ref 97–108)
CO2 SERPL-SCNC: 21 MMOL/L (ref 21–32)
CREAT SERPL-MCNC: 1.64 MG/DL (ref 0.7–1.3)
ERYTHROCYTE [DISTWIDTH] IN BLOOD BY AUTOMATED COUNT: 16.5 % (ref 11.5–14.5)
FLUAV H1 2009 PAND RNA SPEC QL NAA+PROBE: NOT DETECTED
FLUAV H1 RNA SPEC QL NAA+PROBE: NOT DETECTED
FLUAV H3 RNA SPEC QL NAA+PROBE: NOT DETECTED
FLUAV SUBTYP SPEC NAA+PROBE: NOT DETECTED
FLUBV RNA SPEC QL NAA+PROBE: NOT DETECTED
GLUCOSE SERPL-MCNC: 106 MG/DL (ref 65–100)
HADV DNA SPEC QL NAA+PROBE: NOT DETECTED
HCOV 229E RNA SPEC QL NAA+PROBE: NOT DETECTED
HCOV HKU1 RNA SPEC QL NAA+PROBE: NOT DETECTED
HCOV NL63 RNA SPEC QL NAA+PROBE: NOT DETECTED
HCOV OC43 RNA SPEC QL NAA+PROBE: NOT DETECTED
HCT VFR BLD AUTO: 23.5 % (ref 36.6–50.3)
HGB BLD-MCNC: 7.1 G/DL (ref 12.1–17)
HMPV RNA SPEC QL NAA+PROBE: NOT DETECTED
HPIV1 RNA SPEC QL NAA+PROBE: NOT DETECTED
HPIV2 RNA SPEC QL NAA+PROBE: NOT DETECTED
HPIV3 RNA SPEC QL NAA+PROBE: NOT DETECTED
HPIV4 RNA SPEC QL NAA+PROBE: NOT DETECTED
M PNEUMO DNA SPEC QL NAA+PROBE: NOT DETECTED
MCH RBC QN AUTO: 29.6 PG (ref 26–34)
MCHC RBC AUTO-ENTMCNC: 30.2 G/DL (ref 30–36.5)
MCV RBC AUTO: 97.9 FL (ref 80–99)
NRBC # BLD: 0 K/UL (ref 0–0.01)
NRBC BLD-RTO: 0 PER 100 WBC
PLATELET # BLD AUTO: 188 K/UL (ref 150–400)
PMV BLD AUTO: 9 FL (ref 8.9–12.9)
POTASSIUM SERPL-SCNC: 3.5 MMOL/L (ref 3.5–5.1)
RBC # BLD AUTO: 2.4 M/UL (ref 4.1–5.7)
RSV RNA SPEC QL NAA+PROBE: NOT DETECTED
RV+EV RNA SPEC QL NAA+PROBE: NOT DETECTED
SARS-COV-2 PCR, COVPCR: NOT DETECTED
SODIUM SERPL-SCNC: 138 MMOL/L (ref 136–145)
TROPONIN-HIGH SENSITIVITY: 78 NG/L (ref 0–76)
WBC # BLD AUTO: 4.8 K/UL (ref 4.1–11.1)

## 2022-02-05 PROCEDURE — 74011250637 HC RX REV CODE- 250/637: Performed by: NURSE PRACTITIONER

## 2022-02-05 PROCEDURE — 77010033678 HC OXYGEN DAILY

## 2022-02-05 PROCEDURE — 36415 COLL VENOUS BLD VENIPUNCTURE: CPT

## 2022-02-05 PROCEDURE — 74011000250 HC RX REV CODE- 250: Performed by: STUDENT IN AN ORGANIZED HEALTH CARE EDUCATION/TRAINING PROGRAM

## 2022-02-05 PROCEDURE — 80048 BASIC METABOLIC PNL TOTAL CA: CPT

## 2022-02-05 PROCEDURE — 85027 COMPLETE CBC AUTOMATED: CPT

## 2022-02-05 PROCEDURE — 74011250637 HC RX REV CODE- 250/637: Performed by: STUDENT IN AN ORGANIZED HEALTH CARE EDUCATION/TRAINING PROGRAM

## 2022-02-05 PROCEDURE — 97116 GAIT TRAINING THERAPY: CPT

## 2022-02-05 PROCEDURE — 84484 ASSAY OF TROPONIN QUANT: CPT

## 2022-02-05 PROCEDURE — 65660000000 HC RM CCU STEPDOWN

## 2022-02-05 PROCEDURE — 97161 PT EVAL LOW COMPLEX 20 MIN: CPT

## 2022-02-05 PROCEDURE — 94760 N-INVAS EAR/PLS OXIMETRY 1: CPT

## 2022-02-05 RX ORDER — FUROSEMIDE 10 MG/ML
20 INJECTION INTRAMUSCULAR; INTRAVENOUS ONCE
Status: DISCONTINUED | OUTPATIENT
Start: 2022-02-05 | End: 2022-02-05

## 2022-02-05 RX ORDER — FUROSEMIDE 20 MG/1
20 TABLET ORAL ONCE
Status: ACTIVE | OUTPATIENT
Start: 2022-02-05 | End: 2022-02-06

## 2022-02-05 RX ADMIN — BUSPIRONE HYDROCHLORIDE 5 MG: 5 TABLET ORAL at 08:50

## 2022-02-05 RX ADMIN — ACETAMINOPHEN 650 MG: 325 TABLET ORAL at 15:14

## 2022-02-05 RX ADMIN — METOPROLOL TARTRATE 12.5 MG: 25 TABLET, FILM COATED ORAL at 08:49

## 2022-02-05 RX ADMIN — ESCITALOPRAM 10 MG: 10 TABLET, FILM COATED ORAL at 08:49

## 2022-02-05 RX ADMIN — ZOLPIDEM TARTRATE 5 MG: 5 TABLET ORAL at 00:27

## 2022-02-05 RX ADMIN — BUPROPION HYDROCHLORIDE 100 MG: 100 TABLET, FILM COATED, EXTENDED RELEASE ORAL at 08:49

## 2022-02-05 RX ADMIN — APIXABAN 5 MG: 5 TABLET, FILM COATED ORAL at 17:36

## 2022-02-05 RX ADMIN — APIXABAN 5 MG: 5 TABLET, FILM COATED ORAL at 08:49

## 2022-02-05 RX ADMIN — TAMSULOSIN HYDROCHLORIDE 0.4 MG: 0.4 CAPSULE ORAL at 21:10

## 2022-02-05 RX ADMIN — ZOLPIDEM TARTRATE 5 MG: 5 TABLET ORAL at 21:18

## 2022-02-05 RX ADMIN — SODIUM CHLORIDE, PRESERVATIVE FREE 10 ML: 5 INJECTION INTRAVENOUS at 21:10

## 2022-02-05 RX ADMIN — ATORVASTATIN CALCIUM 10 MG: 10 TABLET, FILM COATED ORAL at 08:49

## 2022-02-05 RX ADMIN — METOPROLOL TARTRATE 12.5 MG: 25 TABLET, FILM COATED ORAL at 21:10

## 2022-02-05 NOTE — PROGRESS NOTES
Problem: Mobility Impaired (Adult and Pediatric)  Goal: *Acute Goals and Plan of Care (Insert Text)  Description: FUNCTIONAL STATUS PRIOR TO ADMISSION: Patient was modified independent using a single point cane for functional mobility. HOME SUPPORT PRIOR TO ADMISSION: The patient lived with daughter and wife but did not require assist. He was receiving home health, doing well with PT/OT. Mostly needing wound care. Physical Therapy Goals  Initiated 2/5/2022  1. Patient will move from supine to sit and sit to supine  in bed with independence within 7 day(s). 2.  Patient will transfer from bed to chair and chair to bed with modified independence using the least restrictive device within 7 day(s). 3.  Patient will perform sit to stand with modified independence within 7 day(s). 4.  Patient will ambulate with modified independence for 150 feet with the least restrictive device within 7 day(s). 5. Patient will be able to complete 6 MWT with normal HR and keeping O2>90% on RA within 7 days. Outcome: Progressing Towards Goal     PHYSICAL THERAPY EVALUATION  Patient: Crystal Ochoa (28 y.o. male)  Date: 2/5/2022  Primary Diagnosis: Elevated troponin [R77.8]        Precautions:        ASSESSMENT  Based on the objective data described below, the patient presents with new onset a-fib and decreased activity tolerance. Troponins are trending downward and Patient cleared to be seen ny nursing staff. Plan is for echocardiogram today. Awaiting covid rule out. Patient recently had cystectomy, resulting in wounds on his lower abdomin. He had been in rehab for 4 weeks post surgical procedure and had been doing well with HHPT for the last 2 weeks. Today, decreased activity tolerance and SOB are biggest limiting factors, though O2 stable on RA. Discussed pacing techniques as well as the importance of remaining mobile while hospitalized.  Patient wanted to do standing exercises, however secondary to cardiac status, this therapist recommended that he limit anything strenuous. Instead Patient is encouraged to perform seated exercises and walk with nursing staff short distances while monitoring SOB and fatigue. Recommend home with continued HHPT when medically appropriate. Will follow up Monday as appropriate. Current Level of Function Impacting Discharge (mobility/balance): decreased activity tolerance    Functional Outcome Measure: The patient scored 19/28 on the Tinetti balance outcome measure which is indicative of moderate fall risk. Other factors to consider for discharge: good family support     Patient will benefit from skilled therapy intervention to address the above noted impairments. PLAN :  Recommendations and Planned Interventions: bed mobility training, transfer training, gait training, therapeutic exercises, neuromuscular re-education, patient and family training/education, and therapeutic activities      Frequency/Duration: Patient will be followed by physical therapy:  3 times a week to address goals. Recommendation for discharge: (in order for the patient to meet his/her long term goals)  Physical therapy at least 2 days/week in the home     This discharge recommendation:  Has not yet been discussed the attending provider and/or case management    IF patient discharges home will need the following DME: patient owns DME required for discharge         SUBJECTIVE:   Patient stated I was doing well with therapy, and I dont want to have to start all over.     OBJECTIVE DATA SUMMARY:   HISTORY:    Past Medical History:   Diagnosis Date    Arrhythmia     LBBB    Arthritis     Asthma     MILD    Cancer ([facility] [address]     scc top of head and nose    Cancer ([facility] [address] 2015    BLADDER    COVID-19 vaccine series completed     [address] 1-28-21 AND 2-25-21    Depression with anxiety     Hypertension     Other unknown and unspecified cause of morbidity or mortality     history of pulmonary sarcoid     Posterior cervical adenopathy, benign 11/19/2018    Pulmonary sarcoidosis ([facility] [address]     Unspecified sleep apnea     cpap     Past Surgical History:   Procedure Laterality Date    HX CATARACT REMOVAL Bilateral     HX HERNIA REPAIR Right AS A CHILD    INGUINAL    HX HERNIA REPAIR Left 1990    INGUIBAL    HX KNEE REPLACEMENT Left 2008    HX ORTHOPAEDIC Right     BONE SPURS REMOVED FROM FOOT    HX OTHER SURGICAL      scc removed top of head and nose    HX OTHER SURGICAL  2005    benign lump removed from thyroid    HX OTHER SURGICAL Right 2020    SKIN CANCER RELMOVED FROM LEG    HX UROLOGICAL  06/2020    CYSTO    IR INSERT TUNL CVC W PORT OVER 5 YEARS  7/16/2021    [place] CARDIAC SURG PROCEDURE UNLIST  01/22/2021    CARDIAC CATH    [place] REVISE KNEE JOINT REPLACE,ALL PARTS Left 2019       Personal factors and/or comorbidities impacting plan of care: bladder CA, left knee replacement, HTN    Home Situation  Home Environment: Apartment  # Steps to Enter: 0 (elevator access)  One/Two Story Residence: One story  Living Alone: No  Support Systems: Spouse/Significant Other,Child(jennifer)  Patient Expects to be Discharged to[de-identified] Home  Current DME Used/Available at Home: Cane, straight,Raised toilet seat,Grab bars    EXAMINATION/PRESENTATION/DECISION MAKING:     Hearing: Auditory  Auditory Impairment: Hard of hearing, bilateral    Strength:    Strength:  Within functional limits    Tone & Sensation:   Tone: Normal  Sensation: Intact    Coordination:  Coordination: Within functional limits    Functional Mobility:    Transfers:  Sit to Stand: Stand-by assistance  Stand to Sit: Stand-by assistance  Bed to Chair: Stand-by assistance    Balance:   Sitting: Intact  Standing: Impaired  Standing - Static: Good  Standing - Dynamic : Fair    Ambulation/Gait Training:  Distance (ft): 40 Feet (ft)  Assistive Device: Gait belt;Cane, straight  Ambulation - Level of Assistance: Stand-by assistance  Gait Abnormalities: Decreased step clearance;Trunk sway increased  Base of Support: Center of gravity altered; Widened  Speed/Mary: Slow  Step Length: Right shortened;Left shortened      Therapeutic Exercises:   Patient asked to stop all standing exercises as they seem to exacerbate abnormal HR. Patient agreeable to gentle seated exercises and walking with supervision of staff instead. Functional Measure:  Tinetti test:    Sitting Balance: 1  Arises: 1  Attempts to Rise: 2  Immediate Standing Balance: 1  Standing Balance: 1  Nudged: 2  Eyes Closed: 0  Turn 360 Degrees - Continuous/Discontinuous: 1  Turn 360 Degrees - Steady/Unsteady: 1  Sitting Down: 1  Balance Score: 11 Balance total score  Indication of Gait: 1  R Step Length/Height: 1  L Step Length/Height: 1  R Foot Clearance: 1  L Foot Clearance: 1  Step Symmetry: 1  Step Continuity: 1  Path: 1  Trunk: 0  Walking Time: 0  Gait Score: 8 Gait total score  Total Score: 19/28 Overall total score         Tinetti Tool Score Risk of Falls  <19 = High Fall Risk  19-24 = Moderate Fall Risk  25-28 = Low Fall Risk  Tinetti ME. Performance-Oriented Assessment of Mobility Problems in Elderly Patients. [facility]; [id number].  (Scoring Description: PT Bulletin Feb. 10, 1993)    Older adults: Joe Byers et al, 2009; n = 1000 [facility] elderly evaluated with ABC, KARLEY, ADL, and IADL)  · Mean KARLEY score for males aged 69-68 years = 26.21(3.40)  · Mean KARLEY score for females age 69-68 years = 25.16(4.30)  · Mean KARLEY score for males over 80 years = 23.29(6.02)  · Mean KARLEY score for females over 80 years = 17.20(8.32)           Physical Therapy Evaluation Charge Determination   History Examination Presentation Decision-Making   MEDIUM  Complexity : 1-2 comorbidities / personal factors will impact the outcome/ POC  LOW Complexity : 1-2 Standardized tests and measures addressing body structure, function, activity limitation and / or participation in recreation  LOW Complexity : Stable, uncomplicated  LOW Complexity : FOTO score of       Based on the above components, the patient evaluation is determined to be of the following complexity level: LOW     Pain Rating:  No pain reported with mobility. Activity Tolerance:   Fair, SpO2 stable on RA, requires rest breaks, and observed SOB with activity    After treatment patient left in no apparent distress:   Supine in bed and Call bell within reach    COMMUNICATION/EDUCATION:   The patients plan of care was discussed with: Registered nurse. Fall prevention education was provided and the patient/caregiver indicated understanding., Patient/family have participated as able in goal setting and plan of care. , and Patient/family agree to work toward stated goals and plan of care.     Thank you for this referral.  Debbie Mack, PT, DPT  Geriatric Clinical Specialist     Time Calculation: 25 mins

## 2022-02-05 NOTE — ROUTINE PROCESS
TRANSFER - OUT REPORT:    Verbal report given to Jaime Ny RN(name) on Galo Noble  being transferred to (unit) for routine progression of care       Report consisted of patients Situation, Background, Assessment and   Recommendations(SBAR). Information from the following report(s) SBAR, Kardex, ED Summary, MAR, Recent Results and Cardiac Rhythm NSR was reviewed with the receiving nurse. Lines:   Venous Access Device port 07/16/21 Upper chest (subclavicular area, right (Active)       Peripheral IV 02/04/22 Right Antecubital (Active)   Site Assessment Clean, dry, & intact 02/04/22 1605   Phlebitis Assessment 0 02/04/22 1605   Infiltration Assessment 0 02/04/22 1605   Dressing Status Clean, dry, & intact 02/04/22 1605   Dressing Type Transparent 02/04/22 1605   Hub Color/Line Status Pink;Patent; Flushed 02/04/22 1605   Action Taken Blood drawn 02/04/22 1605   Alcohol Cap Used No 02/04/22 1605        Opportunity for questions and clarification was provided.       Patient transported with:   Monitor RN

## 2022-02-05 NOTE — H&P
History & Physical    Primary Care Provider: Laury Cox MD  Source of Information: Patient AND CHART REVIEW    History of Presenting Illness:   Fadi Rasheed is a 69 y.o. male with hx of etoh abuse, pAfib, sarcoid, mdd w/ catarino, dyslipidemia, bph, history of bladder cancer status post recent cystectomy with urostomy with post op course complicated by afib and sepsis who presents to hospital after recent discharge from rehab with complaints of shortness of breath. Reports intermittent dyspnea, malaise and fatigue with very minimal exertion. Patient states he is unsure of this is due to his overall deconditioning or from a cardiac reason. He contacted his PCP who recommended he be seen in ER. The patient denies any fever, chills, chest or abdominal pain, nausea, vomiting, cough, congestion, recent illness, palpitations, or dysuria. Remarkable vitals on ER Presentations: hr 103,   Labs Remarkable for: cr 1.77, trop 87, bnp 4,453,  hgb 8.1  ER Images: cxr: improving infiltrates  ER rx: 1L NS bolus, asa 324     Review of Systems:  A comprehensive review of systems was negative except for that written in the History of Present Illness.      Past Medical History:   Diagnosis Date    Arrhythmia     LBBB    Arthritis     Asthma     MILD    Cancer ([address]     scc top of head and nose    Cancer ([address] 2015    BLADDER    COVID-19 vaccine series completed     [facility] VACCINE 1-28-21 AND 2-25-21    Depression with anxiety     Hypertension     Other unknown and unspecified cause of morbidity or mortality     history of pulmonary sarcoid     Posterior cervical adenopathy, benign 11/19/2018    Pulmonary sarcoidosis ([facility] [address]     Unspecified sleep apnea     cpap      Past Surgical History:   Procedure Laterality Date    HX CATARACT REMOVAL Bilateral     HX HERNIA REPAIR Right AS A CHILD    INGUINAL    HX HERNIA REPAIR Left 1990    INGUIBAL    HX KNEE REPLACEMENT Left 2008    HX ORTHOPAEDIC Right     BONE SPURS REMOVED FROM FOOT    HX OTHER SURGICAL      scc removed top of head and nose    HX OTHER SURGICAL  2005    benign lump removed from thyroid    HX OTHER SURGICAL Right 2020    SKIN CANCER RELMOVED FROM LEG    HX UROLOGICAL  06/2020    CYSTO    IR INSERT TUNL CVC W PORT OVER 5 YEARS  7/16/2021    [place] CARDIAC SURG PROCEDURE UNLIST  01/22/2021    CARDIAC CATH    [place] REVISE KNEE JOINT REPLACE,ALL PARTS Left 2019     Prior to Admission medications    Medication Sig Start Date End Date Taking? Authorizing Provider   buPROPion SR [facility] SR) 100 mg SR tablet Take 100 mg by mouth daily. Provider, Historical   acetaminophen (Tylenol Extra Strength) 500 mg tablet Take 1,000 mg by mouth every six (6) hours as needed for Pain. Provider, Historical   tamsulosin (FLOMAX) 0.4 mg capsule Take 0.4 mg by mouth nightly. Provider, Historical   busPIRone (BUSPAR) 5 mg tablet Take 5 mg by mouth daily. Provider, Historical   simvastatin (ZOCOR) 20 mg tablet Take 20 mg by mouth nightly. Provider, Historical   escitalopram oxalate (LEXAPRO) 10 mg tablet Take 10 mg by mouth daily. Provider, Historical     Allergies   Allergen Reactions    Pcn [Penicillins] Hives     Patient screened for any delayed non-IgE-mediated reaction to PCN.         Patient notes the following:    NO SEVERE NON-IGE MEDIATED REACTIONS      Family History   Problem Relation Age of Onset    Cancer Mother         breast with mets    Dementia Mother     Heart Disease Father     Cancer Sister         breast    Lung Disease Brother     No Known Problems Brother     Anesth Problems Neg Hx         SOCIAL HISTORY:  Patient resides:  Independently x   Assisted Living    SNF    With family care x      Smoking history:   None x   Former    Chronic      Alcohol history:   None    Social    Chronic x     Ambulates:   Independently x   w/cane    w/walker    w/wc    CODE STATUS:  DNR    Full x   Other      Objective: Physical Exam:     Visit Vitals  /75   Pulse 95   Temp 97 °F (36.1 °C)   Resp 28   SpO2 98%      O2 Device: None (Room air)    General:  Alert, cooperative, no distress, appears stated age. Head:  Normocephalic, without obvious abnormality, atraumatic. Eyes:  Conjunctivae/corneas clear. PERRL, EOMs intact. Nose: Nares normal. Septum midline. Mucosa normal.        Neck: Supple, symmetrical, trachea midline, no carotid bruit and no JVD. Lungs:   Clear to auscultation bilaterally. Chest wall:  No tenderness or deformity. Heart:  Regular rate and rhythm, S1, S2 normal   Abdomen:   Soft, non-tender. Bowel sounds normal. No masses,  No organomegaly. Right lower quadrant urostomy site is clean. Extremities: Extremities normal, atraumatic, no cyanosis or edema. Pulses: 2+ and symmetric all extremities. Skin: Skin color, texture, turgor normal. No rashes or lesions   Neurologic: CNII-XII intact. EKG: Sinus tachycardia  Data Review:     Recent Days:  Recent Labs     02/04/22  1735 02/02/22  1620   WBC 6.7 5.9   HGB 8.1* 8.4*   HCT 25.9* 27.2*    247     Recent Labs     02/04/22  1601 02/02/22  1620   * 136   K 4.0 4.1    109*   CO2 23 23   * 119*   BUN 51* 57*   CREA 1.77* 1.99*   CA 9.6 9.3   ALB 2.7* 2.7*   ALT 13 13     No results for input(s): PH, PCO2, PO2, HCO3, FIO2 in the last 72 hours.     24 Hour Results:  Recent Results (from the past 24 hour(s))   EKG, 12 LEAD, INITIAL    Collection Time: 02/04/22  3:48 PM   Result Value Ref Range    Ventricular Rate 102 BPM    Atrial Rate 102 BPM    P-R Interval 186 ms    QRS Duration 140 ms    Q-T Interval 394 ms    QTC Calculation (Bezet) 513 ms    Calculated P Axis 55 degrees    Calculated R Axis -52 degrees    Calculated T Axis 92 degrees    Diagnosis       Sinus tachycardia with premature atrial complexes  Left axis deviation  Left bundle branch block  Abnormal ECG  When compared with ECG of 13-DEC-2021 11:13,  No significant change was found  Confirmed by Mala Martinez MD. ([id number]) on 2/4/2022 8:10:85 PM     METABOLIC PANEL, COMPREHENSIVE    Collection Time: 02/04/22  4:01 PM   Result Value Ref Range    Sodium 135 (L) 136 - 145 mmol/L    Potassium 4.0 3.5 - 5.1 mmol/L    Chloride 107 97 - 108 mmol/L    CO2 23 21 - 32 mmol/L    Anion gap 5 5 - 15 mmol/L    Glucose 104 (H) 65 - 100 mg/dL    BUN 51 (H) 6 - 20 MG/DL    Creatinine 1.77 (H) 0.70 - 1.30 MG/DL    BUN/Creatinine ratio 29 (H) 12 - 20      GFR est AA 46 (L) >60 ml/min/1.73m2    GFR est non-AA 38 (L) >60 ml/min/1.73m2    Calcium 9.6 8.5 - 10.1 MG/DL    Bilirubin, total 0.3 0.2 - 1.0 MG/DL    ALT (SGPT) 13 12 - 78 U/L    AST (SGOT) 10 (L) 15 - 37 U/L    Alk. phosphatase 70 45 - 117 U/L    Protein, total 7.8 6.4 - 8.2 g/dL    Albumin 2.7 (L) 3.5 - 5.0 g/dL    Globulin 5.1 (H) 2.0 - 4.0 g/dL    A-G Ratio 0.5 (L) 1.1 - 2.2     TROPONIN-HIGH SENSITIVITY    Collection Time: 02/04/22  4:01 PM   Result Value Ref Range    Troponin-High Sensitivity 87 (HH) 0 - 76 ng/L   SAMPLES BEING HELD    Collection Time: 02/04/22  4:01 PM   Result Value Ref Range    SAMPLES BEING HELD 1BLU, 1RED     COMMENT        Add-on orders for these samples will be processed based on acceptable specimen integrity and analyte stability, which may vary by analyte.    D DIMER    Collection Time: 02/04/22  4:01 PM   Result Value Ref Range    D-dimer 0.54 0.00 - 0.65 mg/L FEU   NT-PRO BNP    Collection Time: 02/04/22  4:01 PM   Result Value Ref Range    NT pro-BNP 4,453 (H) <450 PG/ML   CBC WITH AUTOMATED DIFF    Collection Time: 02/04/22  5:35 PM   Result Value Ref Range    WBC 6.7 4.1 - 11.1 K/uL    RBC 2.66 (L) 4.10 - 5.70 M/uL    HGB 8.1 (L) 12.1 - 17.0 g/dL    HCT 25.9 (L) 36.6 - 50.3 %    MCV 97.4 80.0 - 99.0 FL    MCH 30.5 26.0 - 34.0 PG    MCHC 31.3 30.0 - 36.5 g/dL    RDW 16.5 (H) 11.5 - 14.5 %    PLATELET 424 607 - 744 K/uL    MPV 9.0 8.9 - 12.9 FL    NRBC 0.0 0  WBC    ABSOLUTE NRBC 0.00 0.00 - 0.01 K/uL    NEUTROPHILS 80 (H) 32 - 75 %    LYMPHOCYTES 6 (L) 12 - 49 %    MONOCYTES 10 5 - 13 %    EOSINOPHILS 2 0 - 7 %    BASOPHILS 1 0 - 1 %    IMMATURE GRANULOCYTES 1 (H) 0.0 - 0.5 %    ABS. NEUTROPHILS 5.3 1.8 - 8.0 K/UL    ABS. LYMPHOCYTES 0.4 (L) 0.8 - 3.5 K/UL    ABS. MONOCYTES 0.7 0.0 - 1.0 K/UL    ABS. EOSINOPHILS 0.1 0.0 - 0.4 K/UL    ABS. BASOPHILS 0.1 0.0 - 0.1 K/UL    ABS. IMM. GRANS. 0.1 (H) 0.00 - 0.04 K/UL    DF SMEAR SCANNED      RBC COMMENTS ANISOCYTOSIS  1+             Imaging:     Assessment:     Crystal Ochoa is a 69 y.o. male with hx of etoh abuse, pAfib, sarcoid,mdd w/ catarino, dyslipidemia, history of bladder cancer status post recent cystectomy with urostomy with post op course complicated by afib and sepsis who is admitted for nstemi       Plan:       Elevated Trop  -Elevated troponin may be secondary to chronic A. fib and nondiagnostic  -Trend tropes  -Obtain echo  -Cardiology consult    Dyspnea  -Likely multifactorial in setting of A. fib and deconditioning since surgery  -Last echo 12/2021 - Estimated LVEF is 65 - 70%. Normal cavity size, wall thickness and systolic function (ejection fraction normal). Wall motion: normal.  -Chest x-ray shows improved likely pulmonary scattered from previous admission  -Procalcitonin overall downtrending; refuting pneumonia  -Check respiratory viral panel for completion  -Repeat echo pending    Paroxysmal Afib  -Start low-dose metoprolol  -Echo in a.m.  -Monitor and replete lites    MDD w/ CATARINO  -Home Lexapro, Wellbutrin and BuSpar    Dyslipidemia  -Home Lipitor    Bladder Cancer s/p Cystectomy w/ Urostomy  -Stable.  Heme-onc and urology follow-up advised patient    Anemia  -Obtain iron panel and ferritin    History of alcohol abuse  -Check EtOH to monitor closely for withdrawal symptoms          FEN/GI -  [email or web address]  Activity - as tolerated  DVT prophylaxis - ELIQUIS  GI prophylaxis -  NI  Disposition - HOME    CODE STATUS:  FULL CODE Signed By: Alina Hutchinson MD     February 4, 2022

## 2022-02-05 NOTE — ROUTINE PROCESS
Bedside shift change report given to ana linares rn (oncoming nurse) by Neyda Cage (offgoing nurse). Report included the following information SBAR and Kardex.

## 2022-02-06 ENCOUNTER — APPOINTMENT (OUTPATIENT)
Dept: NON INVASIVE DIAGNOSTICS | Age: 77
DRG: 310 | End: 2022-02-06
Attending: STUDENT IN AN ORGANIZED HEALTH CARE EDUCATION/TRAINING PROGRAM
Payer: MEDICARE

## 2022-02-06 VITALS
HEART RATE: 82 BPM | TEMPERATURE: 97.8 F | RESPIRATION RATE: 18 BRPM | OXYGEN SATURATION: 98 % | SYSTOLIC BLOOD PRESSURE: 116 MMHG | BODY MASS INDEX: 32.16 KG/M2 | WEIGHT: 230.6 LBS | DIASTOLIC BLOOD PRESSURE: 80 MMHG

## 2022-02-06 LAB
ANION GAP SERPL CALC-SCNC: 6 MMOL/L (ref 5–15)
BASOPHILS # BLD: 0 K/UL (ref 0–0.1)
BASOPHILS NFR BLD: 1 % (ref 0–1)
BUN SERPL-MCNC: 34 MG/DL (ref 6–20)
BUN/CREAT SERPL: 21 (ref 12–20)
CALCIUM SERPL-MCNC: 9.1 MG/DL (ref 8.5–10.1)
CHLORIDE SERPL-SCNC: 108 MMOL/L (ref 97–108)
CO2 SERPL-SCNC: 21 MMOL/L (ref 21–32)
CREAT SERPL-MCNC: 1.63 MG/DL (ref 0.7–1.3)
DIFFERENTIAL METHOD BLD: ABNORMAL
EOSINOPHIL # BLD: 0.2 K/UL (ref 0–0.4)
EOSINOPHIL NFR BLD: 4 % (ref 0–7)
ERYTHROCYTE [DISTWIDTH] IN BLOOD BY AUTOMATED COUNT: 16.2 % (ref 11.5–14.5)
GLUCOSE SERPL-MCNC: 138 MG/DL (ref 65–100)
HCT VFR BLD AUTO: 26.7 % (ref 36.6–50.3)
HGB BLD-MCNC: 8.3 G/DL (ref 12.1–17)
IMM GRANULOCYTES # BLD AUTO: 0 K/UL (ref 0–0.04)
IMM GRANULOCYTES NFR BLD AUTO: 0 % (ref 0–0.5)
LYMPHOCYTES # BLD: 0.5 K/UL (ref 0.8–3.5)
LYMPHOCYTES NFR BLD: 10 % (ref 12–49)
MCH RBC QN AUTO: 29.9 PG (ref 26–34)
MCHC RBC AUTO-ENTMCNC: 31.1 G/DL (ref 30–36.5)
MCV RBC AUTO: 96 FL (ref 80–99)
MONOCYTES # BLD: 0.5 K/UL (ref 0–1)
MONOCYTES NFR BLD: 10 % (ref 5–13)
NEUTS SEG # BLD: 4.1 K/UL (ref 1.8–8)
NEUTS SEG NFR BLD: 75 % (ref 32–75)
NRBC # BLD: 0 K/UL (ref 0–0.01)
NRBC BLD-RTO: 0 PER 100 WBC
PLATELET # BLD AUTO: 218 K/UL (ref 150–400)
PMV BLD AUTO: 8.8 FL (ref 8.9–12.9)
POTASSIUM SERPL-SCNC: 3.9 MMOL/L (ref 3.5–5.1)
RBC # BLD AUTO: 2.78 M/UL (ref 4.1–5.7)
SODIUM SERPL-SCNC: 135 MMOL/L (ref 136–145)
WBC # BLD AUTO: 5.4 K/UL (ref 4.1–11.1)

## 2022-02-06 PROCEDURE — 85025 COMPLETE CBC W/AUTO DIFF WBC: CPT

## 2022-02-06 PROCEDURE — 97535 SELF CARE MNGMENT TRAINING: CPT

## 2022-02-06 PROCEDURE — 36415 COLL VENOUS BLD VENIPUNCTURE: CPT

## 2022-02-06 PROCEDURE — 97165 OT EVAL LOW COMPLEX 30 MIN: CPT

## 2022-02-06 PROCEDURE — 94760 N-INVAS EAR/PLS OXIMETRY 1: CPT

## 2022-02-06 PROCEDURE — 80048 BASIC METABOLIC PNL TOTAL CA: CPT

## 2022-02-06 PROCEDURE — 74011250637 HC RX REV CODE- 250/637: Performed by: STUDENT IN AN ORGANIZED HEALTH CARE EDUCATION/TRAINING PROGRAM

## 2022-02-06 RX ORDER — METOPROLOL TARTRATE 25 MG/1
12.5 TABLET, FILM COATED ORAL EVERY 12 HOURS
Qty: 30 TABLET | Refills: 0 | Status: SHIPPED | OUTPATIENT
Start: 2022-02-06

## 2022-02-06 RX ORDER — METOPROLOL TARTRATE 25 MG/1
12.5 TABLET, FILM COATED ORAL EVERY 12 HOURS
Qty: 60 TABLET | Refills: 0 | Status: SHIPPED | OUTPATIENT
Start: 2022-02-06 | End: 2022-02-06

## 2022-02-06 RX ADMIN — ATORVASTATIN CALCIUM 10 MG: 10 TABLET, FILM COATED ORAL at 08:31

## 2022-02-06 RX ADMIN — METOPROLOL TARTRATE 12.5 MG: 25 TABLET, FILM COATED ORAL at 08:30

## 2022-02-06 RX ADMIN — APIXABAN 5 MG: 5 TABLET, FILM COATED ORAL at 08:31

## 2022-02-06 RX ADMIN — ACETAMINOPHEN 650 MG: 325 TABLET ORAL at 08:30

## 2022-02-06 RX ADMIN — BUSPIRONE HYDROCHLORIDE 5 MG: 5 TABLET ORAL at 08:31

## 2022-02-06 RX ADMIN — ESCITALOPRAM 10 MG: 10 TABLET, FILM COATED ORAL at 08:31

## 2022-02-06 RX ADMIN — BUPROPION HYDROCHLORIDE 100 MG: 100 TABLET, FILM COATED, EXTENDED RELEASE ORAL at 08:31

## 2022-02-06 NOTE — PROGRESS NOTES
OCCUPATIONAL THERAPY EVALUATION/DISCHARGE  Patient: Dillon Tamez (6 y.o. male)  Date: 2/6/2022  Primary Diagnosis: Elevated troponin [R77.8]       Precautions:   Fall    ASSESSMENT  Based on the objective data described below, the patient presents with general debility following prolonged illness. He was at his PCP who recommend he come to the ED to evaluate for a-fib. Pt currently on remote telemetry but now on room air. Sats at rest 98%, and following ambulation 95%. HR stable throughout intervention and remained in the 80's. Pt does report mild light headed feeling with standing but resolves after a few seconds. Pt demonstrating appropriate safety awareness and stopping with standing to allow symptoms to resolve or to sit to reevaluate tasks. He continued to work with [facility] PT and OT prior to admission and was working to regain his overall strength and endurance. At this time, pt is independent with energy conservation training provided regarding long recovery and diagnosis of bladder cancer, independent with safety recommendations for home, and will have support at home. Recommend pt to return home and resume prior services. No skilled OT needs identified at this time. Per records, pt was admitted in Dec. 2021 for s/p radical cystoprostatectomy, urethrectomy, bilateral pelvic lymphadenectomy, repair of umbilical hernia, and ileal conduit urinary diversion on 12/6 by Dr Leanne Garsia for bladder cancer. Pt distal wound opened on 12/10 and has been getting packed w/ saline soaked drsgs. Pt returned to the OR for surgical closure of wound on 12/16/21. Current Level of Function (ADLs/self-care): supervision to setup    Functional Outcome Measure: The patient scored 70 on the Barthel Index outcome measure which is indicative of 30% impairment with ADL activities. Other factors to consider for discharge: see nursing notes     PLAN :  Recommend with staff: OOB to chair TID for meals.   Recommend progression to and from bathroom with use of walker v. cane and gait belt for toileting. Recommendation for discharge: (in order for the patient to meet his/her long term goals)  To be determined: resume prior services    This discharge recommendation:  Has not yet been discussed the attending provider and/or case management    IF patient discharges home will need the following DME: none       SUBJECTIVE:   Patient stated I am still working on getting my strength back. Some days are good, and some days are not.     OBJECTIVE DATA SUMMARY:   HISTORY:   Past Medical History:   Diagnosis Date    Arrhythmia     LBBB    Arthritis     Asthma     MILD    Cancer ([facility]     scc top of head and nose    Cancer ([facility] 2015    BLADDER    COVID-19 vaccine series completed     [facility] VACCINE 1-28-21 AND 2-25-21    Depression with anxiety     Hypertension     Other unknown and unspecified cause of morbidity or mortality     history of pulmonary sarcoid     Posterior cervical adenopathy, benign 11/19/2018    Pulmonary sarcoidosis ([facility]     Unspecified sleep apnea     cpap     Past Surgical History:   Procedure Laterality Date    HX CATARACT REMOVAL Bilateral     HX HERNIA REPAIR Right AS A CHILD    INGUINAL    HX HERNIA REPAIR Left 1990    INGUIBAL    HX KNEE REPLACEMENT Left 2008    HX ORTHOPAEDIC Right     BONE SPURS REMOVED FROM FOOT    HX OTHER SURGICAL      scc removed top of head and nose    HX OTHER SURGICAL  2005    benign lump removed from thyroid    HX OTHER SURGICAL Right 2020    SKIN CANCER RELMOVED FROM LEG    HX UROLOGICAL  06/2020    CYSTO    IR INSERT TUNL CVC W PORT OVER 5 YEARS  7/16/2021    TN CARDIAC SURG PROCEDURE UNLIST  01/22/2021    CARDIAC CATH    TN REVISE KNEE JOINT REPLACE,ALL PARTS Left 2019       Prior Level of Function/Environment/Context: Pt is independent at home but does continue to work with [facility] PT and OT for general recovery from extensive surgery in Dec. 2021. Expanded or extensive additional review of patient history:   Home Situation  Home Environment: Apartment  # Steps to Enter: 0  One/Two Story Residence: One story  Living Alone: No  Support Systems: Child(jennifer)  Patient Expects to be Discharged to[de-identified] Home  Current DME Used/Available at Home: [address], [place], straight,Raised toilet seat,Grab bars  Tub or Shower Type: Shower    Hand dominance: Right    EXAMINATION OF PERFORMANCE DEFICITS:  Cognitive/Behavioral Status:  Neurologic State: Alert  Orientation Level: Oriented X4  Cognition: Appropriate for age attention/concentration  Perception: Appears intact  Perseveration: No perseveration noted  Safety/Judgement: Good awareness of safety precautions    Skin: see nursing notes    Edema: none noted    Hearing: Auditory  Auditory Impairment: Hard of hearing, bilateral    Vision/Perceptual:                           Acuity: Within Defined Limits         Range of Motion:    AROM: Within functional limits  PROM: Within functional limits                      Strength:    Strength: Within functional limits                Coordination:  Coordination: Within functional limits  Fine Motor Skills-Upper: Right Intact; Left Intact    Gross Motor Skills-Upper: Right Intact; Left Intact    Tone & Sensation:    Tone: Normal  Sensation: Intact                      Balance:  Sitting: Intact  Standing: Intact; With support  Standing - Static: Good  Standing - Dynamic : Good    Functional Mobility and Transfers for ADLs:  Bed Mobility:  Supine to Sit:  (recieved in chair)  Sit to Supine:  (remained in chair)    Transfers:  Sit to Stand: Supervision  Stand to Sit: Supervision  Bed to Chair: Supervision  Bathroom Mobility: Supervision/set up  Toilet Transfer : Supervision    ADL Assessment:  Feeding: Independent    Oral Facial Hygiene/Grooming: Independent    Bathing: Setup    Upper Body Dressing: Setup    Lower Body Dressing: Setup    Toileting: Supervision                ADL Intervention and task modifications:   Pt receive din chair and fully dressed. Pt reporting he dressed himself this am in hopes of discharging home today. Pt eager to work with OT and asking to walk. Pt mobilized around the unit with can with no LOB, HR in the 80's on telemetry monitor and on room air this date. O2 remained 95% following ambulation. He does report mild light headed feeling with initially standing but resolves after a brief moment. He returned to room following intervention and remained sitting in chair. Energy conservation training:  Pts also participated with energy conservation training including but not limited to the following:  rest as needed throughout the day, avoid sitting for long periods of time to prevent fatigue, use good posture when sitting and standing, plan rest periods during the day, and prioritize tasks to ensure optimal energy to complete all needed tasks. Home Safety Education:  Pts participated with home safety training discussion this am to include but not limited to the following:  Educated pts to ensure their floors and stairs provide a clear path to ensure safety with ambulation, discussed the use of night lights in bathrooms and hallways to illuminate pathway at night, having a phone available at all times to allow pts to call for help if left alone, removing scatter rugs from floors, and educated pts that if they are getting up and they do not feel well to return to sitting and call for assist versus trying to walk to their destination. Cognitive Retraining  Safety/Judgement: Good awareness of safety precautions      Functional Measure:    Barthel Index:  Bathin  Bladder: 0 (urostomy)  Bowels: 10  Groomin  Dressing: 10  Feeding: 10  Mobility: 10  Stairs: 0  Toilet Use: 10  Transfer (Bed to Chair and Back): 15  Total: 70/100      The Barthel ADL Index: Guidelines  1.  The index should be used as a record of what a patient does, not as a record of what a patient could do. 2. The main aim is to establish degree of independence from any help, physical or verbal, however minor and for whatever reason. 3. The need for supervision renders the patient not independent. 4. A patient's performance should be established using the best available evidence. Asking the patient, friends/relatives and nurses are the usual sources, but direct observation and common sense are also important. However direct testing is not needed. 5. Usually the patient's performance over the preceding 24-48 hours is important, but occasionally longer periods will be relevant. 6. Middle categories imply that the patient supplies over 50 per cent of the effort. 7. Use of aids to be independent is allowed. Score Interpretation (from [facility])    Independent   60-79 Minimally independent   40-59 Partially dependent   20-39 Very dependent   <20 Totally dependent     -Dmitriy Diaz., Barthel, D.W. (1965). Functional evaluation: the Barthel Index. [facility] ([address] (1997). The Barthel activities of daily living index: self-reporting versus actual performance in the old (> or = 75 years). Journal of [address] [place] [address] 457), [facility], JLAURE, Jaime Jenkins., Laury Mae. (1999). Measuring the change in disability after inpatient rehabilitation; comparison of the responsiveness of the Barthel Index and Functional [place] Measure. Journal of Neurology, Neurosurgery, and Psychiatry, 66(4), 132-875. Brenda Santo, N.J.A, BOGDAN Valentino, & JEM LancasterA. (2004) Assessment of post-stroke quality of life in cost-effectiveness studies: The usefulness of the Barthel Index and the EuroQoL-5D.  Quality of Life Research, 15, 572-75         Occupational Therapy Evaluation Charge Determination   History Examination Decision-Making   LOW Complexity : Brief history review  LOW Complexity : 1-3 performance deficits relating to physical, cognitive , or psychosocial skils that result in activity limitations and / or participation restrictions  LOW Complexity : No comorbidities that affect functional and no verbal or physical assistance needed to complete eval tasks       Based on the above components, the patient evaluation is determined to be of the following complexity level: LOW   Pain Rating:  No pain    Activity Tolerance:   Good    After treatment patient left in no apparent distress:    Sitting in chair and Call bell within reach    COMMUNICATION/EDUCATION:   The patients plan of care was discussed with: Physical therapist and Registered nurse.      Thank you for this referral.  Catrina Tan OT  Time Calculation: 18 mins

## 2022-02-06 NOTE — PROGRESS NOTES
[facility] Adult  Hospitalist Group                                                                                          Hospitalist Progress Note  Herminia Nissen, MD  Answering service: [phone] OR [phone] from in house phone        Date of Service:  2022  NAME:  Garry Sahni  :  1945  MRN:  [id number]      Admission Summary:   69 y.o. male with hx of etoh abuse, pAfib, sarcoid, mdd w/ catarino, dyslipidemia, bph, history of bladder cancer status post recent cystectomy with urostomy with post op course complicated by afib and sepsis who presents to hospital after recent discharge from rehab with complaints of shortness of breath       Interval history / Subjective:   Patient seen and examined. Patient states that he was seen by occupational therapist -states that his BP usually 100s and yesterday was 120s and HR was 90s-therapist spoke to PCP office -recommended ER eval  States that he worked with therapy and felt OK  He feels he is deconditioned ,still recovering    Has seen urology,oncology last week  Seeing urology next week     Assessment & Plan:      # Shortness of breath/fatigue -  Likely multifactorial - anemia, recent surgery/hospitalization etc CXR improvement  interstitial infiltrates-?  Changes due to sarcoidosis  Repeat echo ordered at admission  -resp viral PCR negative  On room air,worked with PT    Chronic anemia:  Hb have been ranging 7-8 since December  -hb 7.1 today,repet tomorrow, no obvious evidence of bleeding    #Paroxysmal Atrial fib:  -on metoprolol and eliquis    # HTN  -on metoprolol    #Depression  -lexapro,bupropion  and buspar    #Muscle invasive bladder cancer   -received chemotherapy,s/p radical urethro cystoprostatectomy  Was started on immunotherapy last week    # pulmonary sarcoidosis  CKD 3-renal function stable       Code status: full  Prophylaxis: [address] discussed with: patient,nurse  Anticipated Disposition: home     Hospital Problems Date Reviewed: 2/3/2022          Codes Class Noted POA    Elevated troponin ICD-10-CM: R77.8  ICD-9-CM: 790.6  2/4/2022 Unknown                Review of Systems:   As per HPI      Vital Signs:    Last 24hrs VS reviewed since prior progress note. Most recent are:  Visit Vitals  /81 (BP 1 Location: Left upper arm, BP Patient Position: At rest)   Pulse 90   Temp 97.8 °F (36.6 °C)   Resp 18   SpO2 96%       No intake or output data in the 24 hours ending 02/05/22 2021     Physical Examination:     I had a face to face encounter with this patient and independently examined them on 2/5/2022 as outlined below:          Constitutional:  No acute distress, cooperative, pleasant    ENT:  Oral mucosa moist, EOMI,anicteri sclera   Resp:  CTA bilaterally. No wheezing, No accessory muscle use. CV:  Regular rhythm, normal rate, no murmurs    GI:  Soft, non distended, non tender, RLQ bag,dressing +lower midabdomen,rmoactive bowel sounds, no hepatosplenomegaly     Musculoskeletal:  No LE edema    Neurologic:  Moves all extremities. AAOx3, CN II-XII reviewed            Data Review:    Review and/or order of clinical lab test  Review and/or order of tests in the radiology section of CPT  Review and/or order of tests in the medicine section of CPT      Labs:     Recent Labs     02/05/22  0339 02/04/22  1735   WBC 4.8 6.7   HGB 7.1* 8.1*   HCT 23.5* 25.9*    207     Recent Labs     02/05/22  0339 02/04/22  1601    135*   K 3.5 4.0   * 107   CO2 21 23   BUN 44* 51*   CREA 1.64* 1.77*   * 104*   CA 8.5 9.6   MG  --  2.1     Recent Labs     02/04/22  1601   ALT 13   AP 70   TBILI 0.3   TP 7.8   ALB 2.7*   GLOB 5.1*     No results for input(s): INR, PTP, APTT, INREXT in the last 72 hours. Recent Labs     02/04/22  1601   TIBC 211*   PSAT 11*   FERR 409*      No results found for: FOL, RBCF   No results for input(s): PH, PCO2, PO2 in the last 72 hours.   No results for input(s): CPK, CKNDX, TROIQ in the last 72 hours.     No lab exists for component: CPKMB  No results found for: CHOL, CHOLX, CHLST, CHOLV, HDL, HDLP, LDL, LDLC, DLDLP, TGLX, TRIGL, TRIGP, CHHD, CHHDX  Lab Results   Component Value Date/Time    Glucose (POC) 143 (H) 12/11/2021 01:02 AM    Glucose (POC) 93 12/01/2009 07:10 AM     Lab Results   Component Value Date/Time    Color YELLOW/STRAW 12/11/2021 03:03 AM    Appearance CLEAR 12/11/2021 03:03 AM    Specific gravity >1.030 12/11/2021 03:03 AM    Specific gravity 1.016 06/17/2021 09:56 AM    pH (UA) 6.5 12/11/2021 03:03 AM    Protein 100 (A) 12/11/2021 03:03 AM    Glucose Negative 12/11/2021 03:03 AM    Ketone Negative 12/11/2021 03:03 AM    Bilirubin Negative 12/11/2021 03:03 AM    Urobilinogen 1.0 12/11/2021 03:03 AM    Nitrites Positive (A) 12/11/2021 03:03 AM    Leukocyte Esterase MODERATE (A) 12/11/2021 03:03 AM    Epithelial cells FEW 12/11/2021 03:03 AM    Bacteria 1+ (A) 12/11/2021 03:03 AM    WBC  12/11/2021 03:03 AM    RBC  12/11/2021 03:03 AM         Medications Reviewed:     Current Facility-Administered Medications   Medication Dose Route Frequency    apixaban (ELIQUIS) tablet 5 mg  5 mg Oral BID    buPROPion SR (WELLBUTRIN SR) tablet 100 mg  100 mg Oral DAILY    busPIRone (BUSPAR) tablet 5 mg  5 mg Oral DAILY    escitalopram oxalate (LEXAPRO) tablet 10 mg  10 mg Oral DAILY    atorvastatin (LIPITOR) tablet 10 mg  10 mg Oral DAILY    tamsulosin (FLOMAX) capsule 0.4 mg  0.4 mg Oral QHS    metoprolol tartrate (LOPRESSOR) tablet 12.5 mg  12.5 mg Oral Q12H    sodium chloride (NS) flush 5-40 mL  5-40 mL IntraVENous Q8H    sodium chloride (NS) flush 5-40 mL  5-40 mL IntraVENous PRN    acetaminophen (TYLENOL) tablet 650 mg  650 mg Oral Q6H PRN    Or    acetaminophen (TYLENOL) suppository 650 mg  650 mg Rectal Q6H PRN    polyethylene glycol (MIRALAX) packet 17 g  17 g Oral DAILY PRN    ondansetron (ZOFRAN ODT) tablet 4 mg  4 mg Oral Q8H PRN    Or    ondansetron (ZOFRAN) injection 4 mg  4 mg IntraVENous Q6H PRN    zolpidem (AMBIEN) tablet 5 mg  5 mg Oral QHS PRN     ______________________________________________________________________  EXPECTED LENGTH OF STAY: - - -  ACTUAL LENGTH OF STAY:          1                 Erik Brandon MD

## 2022-02-06 NOTE — PROGRESS NOTES
0900 Pt requesting discharge. Holland Terry MD notified. Wound care consult placed for \"leaky\" urostomy bag. Patient does not need any assistance with urostomy if d/c'd home this evening as a home health nurse will come by to see him. 1200 Surgical incisions assessed with Holland Terry. Incisions cleaned with NS and dried. See chart for more details. RN offered to flush urostomy; Patient says he \"flushed it already\". 1350 Discharge instructions, appointments, medications reviewed with patient. Educational materials about sarcoidosis given to patient. Patient has no further questions at this time and will call the numbers provided if any arrive. Pt feels comfortable leaving. PIV removed with tip intact; telemetry discontinued. Patient wheeled to ED entrance for discharge. Patient picked up by spouse.

## 2022-02-06 NOTE — DISCHARGE INSTRUCTIONS
Discharge Instructions       PATIENT ID: Gloria Quinones  MRN: [id number]   YOB: 1945    DATE OF ADMISSION: 2/4/2022  6:39 PM    DATE OF DISCHARGE: 2/6/2022    PRIMARY CARE PROVIDER: Stephanie Ramirez MD     ATTENDING PHYSICIAN: Blaise Sofia MD  DISCHARGING PROVIDER: Pierre Parra MD    To contact this individual call [phone] and ask the  to page. If unavailable ask to be transferred the Adult Hospitalist Department. DISCHARGE DIAGNOSES -Paroxysmal atrial fib -shortness of breath, Recent     CONSULTATIONS: None    PROCEDURES/SURGERIES: * No surgery found *    PENDING TEST RESULTS:   At the time of discharge the following test results are still pending: none    FOLLOW UP APPOINTMENTS:   Follow-up Information     Follow up With Specialties Details Why Contact Info    Monserrat [address] [place] [address]      Lenard Matt MD Cardio Vascular Surgery, Clinical Cardiac Electrophysiology   [address] [place] [address] [address] [facility],[address]  [phone]             ADDITIONAL CARE RECOMMENDATIONS:   We prescribed metoprolol for heart rate control and eliquis to prevent stroke  -CBC,BMP in 3-5 days  Avoid NSAIDs like ibuprofen, naproxen etc  Follow up PCP,cardiologist if needed    DIET: Regular Diet      ACTIVITY: Activity as tolerated    WOUND CARE: na    EQUIPMENT needed: na      Radiology      DISCHARGE MEDICATIONS:   See Medication Reconciliation Form    · It is important that you take the medication exactly as they are prescribed. · Keep your medication in the bottles provided by the pharmacist and keep a list of the medication names, dosages, and times to be taken in your wallet. · Do not take other medications without consulting your doctor. NOTIFY YOUR PHYSICIAN FOR ANY OF THE FOLLOWING:   Fever over 101 degrees for 24 hours.    Chest pain, shortness of breath, fever, chills, nausea, vomiting, diarrhea, change in mentation, falling, weakness, bleeding. Severe pain or pain not relieved by medications. Or, any other signs or symptoms that you may have questions about.       DISPOSITION:  x  Home With:   OT  PT  HH  RN       SNF/Inpatient Rehab/LTAC    Independent/assisted living    Hospice    Other:         Signed:   Hiwot Lopez MD  2/6/2022  1:03 PM

## 2022-02-08 ENCOUNTER — TELEPHONE (OUTPATIENT)
Dept: ONCOLOGY | Age: 77
End: 2022-02-08

## 2022-02-08 NOTE — DISCHARGE SUMMARY
Discharge Summary       PATIENT ID: Garry Sahni  MRN: [id number]   YOB: 1945    DATE OF ADMISSION: 2/4/2022  6:39 PM    DATE OF DISCHARGE: 2/6/2022   PRIMARY CARE PROVIDER: Magnolia Key MD     ATTENDING PHYSICIAN: Gloria Bowers MD    DISCHARGING PROVIDER: Herminia Nissen, MD    To contact this individual call [phone] and ask the  to page. If unavailable ask to be transferred the Adult Hospitalist Department. CONSULTATIONS: None    PROCEDURES/SURGERIES: * No surgery found *    DISCHARGE DIAGNOSES:   ADMISSION SUMMARY AND HOSPITAL COURSE:     DISCHARGE DIAGNOSES / PLAN:    69 y. o. male with hx of etoh abuse, pAfib, sarcoid, mdd w/ catarino, dyslipidemia, bph, history of bladder cancer status post recent cystectomy with urostomy with post op course complicated by afib and sepsis who presents to hospital after recent discharge from rehab with complaints of shortness of breath     Shortness of breath/fatigue -  Likely multifactorial - anemia, recent surgery/hospitalization etc CXR improvement  interstitial infiltrates-? Changes due to sarcoidosis,Paroxysmal atrial fib with elevated HR  -resp viral PCR negative  On room air,worked with PT,HR controlled     Chronic anemia:  Hb have been ranging 7-8 since December  -hb 8 now , no obvious evidence of bleeding     #Paroxysmal Atrial fib:  -on eliquis  Metoprolol was started this admission, HR now controlled even with activity  He has paroxysmal atrial fib last admission,seen Dr.Onufer Díaz last admission      # HTN  -on metoprolol     #Depression  -lexapro,bupropion  and buspar     #Muscle invasive bladder cancer   -received chemotherapy,s/p radical urethro cystoprostatectomy  Was started on immunotherapy last week     # pulmonary sarcoidosis  CKD 3-renal function stable       XR CHEST PA LAT    Result Date: 2/4/2022  Clinical indication: Shortness of breath. Frontal and lateral views of the chest obtained, comparison December 11, 2021. There are heart size is normal. Some improvement in bilateral patchy infiltrates, no definite effusion or shift. Infusion catheter in place. Improvement in bilateral infiltrates. XR CHEST PA LAT    Result Date: 2/4/2022  Clinical indication: Shortness of breath. Frontal and lateral views of the chest obtained, comparison December 11, 2021. There are heart size is normal. Some improvement in bilateral patchy infiltrates, no definite effusion or shift. Infusion catheter in place. Improvement in bilateral infiltrates. Echo:  · LV: Estimated LVEF is 65 - 70%. Normal cavity size, wall thickness and systolic function (ejection fraction normal). Wall motion: normal.  · MV: Mitral valve thickening. Moderate mitral annular calcification. · LA: Moderately dilated left atrium. · Image quality for this study was suboptimal.            NOTIFY YOUR PHYSICIAN FOR ANY OF THE FOLLOWING:   Fever over 101 degrees for 24 hours. Chest pain, shortness of breath, fever, chills, nausea, vomiting, diarrhea, change in mentation, falling, weakness, bleeding. Severe pain or pain not relieved by medications, as well as any other signs or symptoms that you may have questions about. FOLLOW UP APPOINTMENTS:    Follow-up Information     Follow up With Specialties Details Why Contact Info    Denisha Quinones, [address] [facility]  [phone]      Agapito Chand MD Cardio Vascular Surgery, Clinical Cardiac Electrophysiology   [address] [place] [address],[place] [address]  [phone]                   DISCHARGE MEDICATIONS:  Discharge Medication List as of 2/6/2022  1:37 PM      CONTINUE these medications which have CHANGED    Details   metoprolol tartrate (LOPRESSOR) 25 mg tablet Take 0.5 Tablets by mouth every twelve (12) hours. , Normal, Disp-30 Tablet, R-0         CONTINUE these medications which have NOT CHANGED    Details   apixaban (Eliquis) 5 mg tablet Take 5 mg by mouth two (2) times a day., Historical Med      zolpidem (AMBIEN) 5 mg tablet Take 5 mg by mouth nightly as needed for Sleep., Historical Med      buPROPion SR (WELLBUTRIN SR) 100 mg SR tablet Take 100 mg by mouth daily. , Historical Med      acetaminophen (Tylenol Extra Strength) 500 mg tablet Take 1,000 mg by mouth every six (6) hours as needed for Pain., Historical Med      busPIRone (BUSPAR) 5 mg tablet Take 5 mg by mouth daily. , Historical Med      simvastatin (ZOCOR) 20 mg tablet Take 20 mg by mouth nightly., Historical Med      escitalopram oxalate (LEXAPRO) 10 mg tablet Take 10 mg by mouth daily. , Historical Med         STOP taking these medications       tamsulosin (FLOMAX) 0.4 mg capsule Comments:   Reason for Stopping:               DISPOSITION:  x  Home With:   OT  PT  HH  RN       Long term SNF/Inpatient Rehab    Independent/assisted living    Hospice    Other:       PATIENT CONDITION AT DISCHARGE:     Functional status    Poor     Deconditioned    x Independent      Cognition   x  Lucid     Forgetful     Dementia      Catheters/lines (plus indication)    Juarez     PICC     PEG    xx None      Code status     Full code     DNR      PHYSICAL EXAMINATION AT DISCHARGE:  Constitutional:  No acute distress, cooperative, pleasant    ENT:  Oral mucosa moist, EOMI,anicteri sclera   Resp:  CTA bilaterally. No wheezing, No accessory muscle use. CV:  Regular rhythm, normal rate, no murmurs    GI:  Soft, non distended, non tender, RLQ bag,dressing +lower midabdomen,rmoactive bowel sounds, no hepatosplenomegaly     Musculoskeletal:  No LE edema    Neurologic:  Moves all extremities.   AAOx3, CN II-XII reviewed           CHRONIC MEDICAL DIAGNOSES:  Problem List as of 2/6/2022 Date Reviewed: 2/3/2022          Codes Class Noted - Resolved    Elevated troponin ICD-10-CM: R77.8  ICD-9-CM: 790.6  2/4/2022 - Present        Paroxysmal A-fib (HCC) ICD-10-CM: I48.0  ICD-9-CM: 427.31  12/20/2021 - Present        Essential hypertension ICD-10-CM: I10  ICD-9-CM: 401.9  7/8/2021 - Present        Sarcoidosis ICD-10-CM: D86.9  ICD-9-CM: 076  7/8/2021 - Present        Class 1 obesity due to excess calories with serious comorbidity and body mass index (BMI) of 33.0 to 33.9 in adult ICD-10-CM: E66.09, Z68.33  ICD-9-CM: 278.00, V85.33  7/8/2021 - Present        Malignant neoplasm of urinary bladder (HCC) ICD-10-CM: C67.9  ICD-9-CM: 188.9  6/23/2021 - Present        Status post surgery ICD-10-CM: Z98.890  ICD-9-CM: V45.89  2/10/2021 - Present        Posterior cervical adenopathy, benign ICD-10-CM: R59.0  ICD-9-CM: 785.6  11/19/2018 - Present              30 minutes were spent with the patient on counseling and coordination of care    Signed:   Brandon Thompson MD  2/11/2022  10:08 AM    Cc:Graham Kinsey MD

## 2022-02-08 NOTE — TELEPHONE ENCOUNTER
Called Dr. Becky Brandon was in the hospital with [facility]  He was in Afib and is on Eliquis  Hb was 7 the    I will recheck his iron studies and evaluate for iron studies next visit  Also will start Retacrit

## 2022-02-08 NOTE — TELEPHONE ENCOUNTER
Dr. Jamaal Angulo called about our mutual PT wondering if he should be set up on iron infusions due to being severely anemic - Dr. Jamaal Angulo if he could have a call back to discuss and whoever is in the reception area at the time will transfer you back to him- [phone]

## 2022-02-09 RX ORDER — SODIUM CHLORIDE 9 MG/ML
10 INJECTION INTRAMUSCULAR; INTRAVENOUS; SUBCUTANEOUS AS NEEDED
Status: CANCELLED | OUTPATIENT
Start: 2022-03-03

## 2022-02-09 RX ORDER — EPINEPHRINE 1 MG/ML
0.3 INJECTION, SOLUTION, CONCENTRATE INTRAVENOUS AS NEEDED
Status: CANCELLED | OUTPATIENT
Start: 2022-03-03

## 2022-02-09 RX ORDER — HEPARIN 100 UNIT/ML
300-500 SYRINGE INTRAVENOUS AS NEEDED
Status: CANCELLED
Start: 2022-03-03

## 2022-02-09 RX ORDER — ACETAMINOPHEN 325 MG/1
650 TABLET ORAL AS NEEDED
Status: CANCELLED
Start: 2022-03-03

## 2022-02-09 RX ORDER — ONDANSETRON 2 MG/ML
8 INJECTION INTRAMUSCULAR; INTRAVENOUS AS NEEDED
Status: CANCELLED | OUTPATIENT
Start: 2022-03-03

## 2022-02-09 RX ORDER — HYDROCORTISONE SODIUM SUCCINATE 100 MG/2ML
100 INJECTION, POWDER, FOR SOLUTION INTRAMUSCULAR; INTRAVENOUS AS NEEDED
Status: CANCELLED | OUTPATIENT
Start: 2022-03-03

## 2022-02-09 RX ORDER — SODIUM CHLORIDE 9 MG/ML
25 INJECTION, SOLUTION INTRAVENOUS CONTINUOUS
Status: CANCELLED | OUTPATIENT
Start: 2022-03-03

## 2022-02-09 RX ORDER — ALBUTEROL SULFATE 0.83 MG/ML
2.5 SOLUTION RESPIRATORY (INHALATION) AS NEEDED
Status: CANCELLED
Start: 2022-03-03

## 2022-02-09 RX ORDER — SODIUM CHLORIDE 0.9 % (FLUSH) 0.9 %
10 SYRINGE (ML) INJECTION AS NEEDED
Status: CANCELLED | OUTPATIENT
Start: 2022-03-03

## 2022-02-09 RX ORDER — DIPHENHYDRAMINE HYDROCHLORIDE 50 MG/ML
50 INJECTION, SOLUTION INTRAMUSCULAR; INTRAVENOUS AS NEEDED
Status: CANCELLED
Start: 2022-03-03

## 2022-02-09 RX ORDER — DIPHENHYDRAMINE HYDROCHLORIDE 50 MG/ML
25 INJECTION, SOLUTION INTRAMUSCULAR; INTRAVENOUS AS NEEDED
Status: CANCELLED
Start: 2022-03-03

## 2022-02-11 ENCOUNTER — DOCUMENTATION ONLY (OUTPATIENT)
Dept: ONCOLOGY | Age: 77
End: 2022-02-11

## 2022-02-11 NOTE — PROGRESS NOTES
[facility]   Oncology Social Work  Encounter     Patient Name:  Tasha Johnston \"FGFI\"    Medical History: Muscle invasive bladder cancer- Mixed histology    Advance Directives: Patient does not have an advanced medical directive, and did not express interest in completing one today. Narrative:   Patient's wife, [facility], called to discuss challenges with care giving. She asked that this  call P#[phone]. She asked if she could visit to speak with this . Offered that she could come in to the office today, but she declined, due to conflicts in her schedule. Left a voicemail message back, offering that she can come in on Monday morning. Offered continued support as needed. Barriers to Care:   No barriers to care identified at this time. Plan:  Ongoing psychosocial support as desired by patient. Referral/Handouts:   No referrals placed at this time.    Carly Bingham LCSW

## 2022-02-13 NOTE — PROGRESS NOTES
Physician Progress Note      PATIENT:               Lian Go  CSN #:                  [id number]  :                       1945  ADMIT DATE:       2022 6:39 PM  [address] [place] DATE:        2022 1:50 PM  RESPONDING  PROVIDER #:        Kathie Geiger MD          QUERY TEXT:    Patient admitted with increased SOB, fatigue, noted to have atrial fibrillation and is maintained on Eliquis. If possible, please document in progress notes and discharge summary if you are evaluating and/or treating any of the following: The medical record reflects the following:  Risk Factors: AFib    Clinical Indicators:    Ada Armando PN -  Paroxysmal Atrial fib:  -on metoprolol and eliquis    Treatment: Aspirin 324mg PO x 1; Eliquis 5mg PO BID; Lopressor 12.5mg PO BID    Thank you,  Sakshi Durant RN, BSN, [facility], [place], [place]  Clinical Documentation  [phone] or [phone]  Options provided:  -- Secondary hypercoagulable state in a patient with atrial fibrillation  -- Other - I will add my own diagnosis  -- Disagree - Not applicable / Not valid  -- Disagree - Clinically unable to determine / Unknown  -- Refer to Clinical Documentation Reviewer    PROVIDER RESPONSE TEXT:    Provider disagreed with this query.     Query created by: Tashi Mast on 2/10/2022 7:19 AM      Electronically signed by:  Kathie Geiger MD 2022 4:07 PM

## 2022-02-24 ENCOUNTER — APPOINTMENT (OUTPATIENT)
Dept: INFUSION THERAPY | Age: 77
End: 2022-02-24

## 2022-02-25 RX ORDER — SODIUM CHLORIDE 0.9 % (FLUSH) 0.9 %
10 SYRINGE (ML) INJECTION AS NEEDED
Status: CANCELLED | OUTPATIENT
Start: 2022-03-31

## 2022-02-25 RX ORDER — SODIUM CHLORIDE 9 MG/ML
25 INJECTION, SOLUTION INTRAVENOUS CONTINUOUS
Status: CANCELLED | OUTPATIENT
Start: 2022-03-31

## 2022-02-25 RX ORDER — EPINEPHRINE 1 MG/ML
0.3 INJECTION, SOLUTION, CONCENTRATE INTRAVENOUS AS NEEDED
Status: CANCELLED | OUTPATIENT
Start: 2022-03-31

## 2022-02-25 RX ORDER — SODIUM CHLORIDE 9 MG/ML
10 INJECTION INTRAMUSCULAR; INTRAVENOUS; SUBCUTANEOUS AS NEEDED
Status: CANCELLED | OUTPATIENT
Start: 2022-03-31

## 2022-02-25 RX ORDER — DIPHENHYDRAMINE HYDROCHLORIDE 50 MG/ML
25 INJECTION, SOLUTION INTRAMUSCULAR; INTRAVENOUS AS NEEDED
Status: CANCELLED
Start: 2022-03-31

## 2022-02-25 RX ORDER — HYDROCORTISONE SODIUM SUCCINATE 100 MG/2ML
100 INJECTION, POWDER, FOR SOLUTION INTRAMUSCULAR; INTRAVENOUS AS NEEDED
Status: CANCELLED | OUTPATIENT
Start: 2022-03-31

## 2022-02-25 RX ORDER — ALBUTEROL SULFATE 0.83 MG/ML
2.5 SOLUTION RESPIRATORY (INHALATION) AS NEEDED
Status: CANCELLED
Start: 2022-03-31

## 2022-02-25 RX ORDER — ACETAMINOPHEN 325 MG/1
650 TABLET ORAL AS NEEDED
Status: CANCELLED
Start: 2022-03-31

## 2022-02-25 RX ORDER — DIPHENHYDRAMINE HYDROCHLORIDE 50 MG/ML
50 INJECTION, SOLUTION INTRAMUSCULAR; INTRAVENOUS AS NEEDED
Status: CANCELLED
Start: 2022-03-31

## 2022-02-25 RX ORDER — HEPARIN 100 UNIT/ML
300-500 SYRINGE INTRAVENOUS AS NEEDED
Status: CANCELLED
Start: 2022-03-31

## 2022-02-25 RX ORDER — ONDANSETRON 2 MG/ML
8 INJECTION INTRAMUSCULAR; INTRAVENOUS AS NEEDED
Status: CANCELLED | OUTPATIENT
Start: 2022-03-31

## 2022-03-02 ENCOUNTER — HOSPITAL ENCOUNTER (OUTPATIENT)
Dept: INFUSION THERAPY | Age: 77
Discharge: HOME | End: 2022-03-02
Payer: MEDICARE

## 2022-03-02 VITALS
RESPIRATION RATE: 18 BRPM | TEMPERATURE: 97.8 F | SYSTOLIC BLOOD PRESSURE: 114 MMHG | HEART RATE: 81 BPM | DIASTOLIC BLOOD PRESSURE: 70 MMHG

## 2022-03-02 LAB
ALBUMIN SERPL-MCNC: 3.4 G/DL (ref 3.5–5)
ALBUMIN/GLOB SERPL: 0.7 {RATIO} (ref 1.1–2.2)
ALP SERPL-CCNC: 59 U/L (ref 45–117)
ALT SERPL-CCNC: 18 U/L (ref 12–78)
ANION GAP SERPL CALC-SCNC: 3 MMOL/L (ref 5–15)
AST SERPL-CCNC: 13 U/L (ref 15–37)
BASOPHILS # BLD: 0.1 K/UL (ref 0–0.1)
BASOPHILS NFR BLD: 1 % (ref 0–1)
BILIRUB SERPL-MCNC: 0.4 MG/DL (ref 0.2–1)
BUN SERPL-MCNC: 57 MG/DL (ref 6–20)
BUN/CREAT SERPL: 32 (ref 12–20)
CALCIUM SERPL-MCNC: 10.1 MG/DL (ref 8.5–10.1)
CHLORIDE SERPL-SCNC: 109 MMOL/L (ref 97–108)
CO2 SERPL-SCNC: 24 MMOL/L (ref 21–32)
COMMENT, HOLDF: NORMAL
CREAT SERPL-MCNC: 1.76 MG/DL (ref 0.7–1.3)
DIFFERENTIAL METHOD BLD: ABNORMAL
EOSINOPHIL # BLD: 0.3 K/UL (ref 0–0.4)
EOSINOPHIL NFR BLD: 5 % (ref 0–7)
ERYTHROCYTE [DISTWIDTH] IN BLOOD BY AUTOMATED COUNT: 17.1 % (ref 11.5–14.5)
FERRITIN SERPL-MCNC: 392 NG/ML (ref 26–388)
GLOBULIN SER CALC-MCNC: 4.6 G/DL (ref 2–4)
GLUCOSE SERPL-MCNC: 104 MG/DL (ref 65–100)
HCT VFR BLD AUTO: 27.9 % (ref 36.6–50.3)
HGB BLD-MCNC: 8.5 G/DL (ref 12.1–17)
IMM GRANULOCYTES # BLD AUTO: 0 K/UL (ref 0–0.04)
IMM GRANULOCYTES NFR BLD AUTO: 0 % (ref 0–0.5)
IRON SATN MFR SERPL: 35 % (ref 20–50)
IRON SERPL-MCNC: 91 UG/DL (ref 35–150)
LYMPHOCYTES # BLD: 0.8 K/UL (ref 0.8–3.5)
LYMPHOCYTES NFR BLD: 13 % (ref 12–49)
MCH RBC QN AUTO: 29.6 PG (ref 26–34)
MCHC RBC AUTO-ENTMCNC: 30.5 G/DL (ref 30–36.5)
MCV RBC AUTO: 97.2 FL (ref 80–99)
MONOCYTES # BLD: 0.6 K/UL (ref 0–1)
MONOCYTES NFR BLD: 11 % (ref 5–13)
NEUTS SEG # BLD: 4.1 K/UL (ref 1.8–8)
NEUTS SEG NFR BLD: 70 % (ref 32–75)
NRBC # BLD: 0 K/UL (ref 0–0.01)
NRBC BLD-RTO: 0 PER 100 WBC
PLATELET # BLD AUTO: 225 K/UL (ref 150–400)
PMV BLD AUTO: 8.9 FL (ref 8.9–12.9)
POTASSIUM SERPL-SCNC: 4.3 MMOL/L (ref 3.5–5.1)
PROT SERPL-MCNC: 8 G/DL (ref 6.4–8.2)
RBC # BLD AUTO: 2.87 M/UL (ref 4.1–5.7)
RBC MORPH BLD: ABNORMAL
SAMPLES BEING HELD,HOLD: NORMAL
SODIUM SERPL-SCNC: 136 MMOL/L (ref 136–145)
TIBC SERPL-MCNC: 257 UG/DL (ref 250–450)
TSH SERPL DL<=0.05 MIU/L-ACNC: 1.48 UIU/ML (ref 0.36–3.74)
WBC # BLD AUTO: 5.9 K/UL (ref 4.1–11.1)

## 2022-03-02 PROCEDURE — 80053 COMPREHEN METABOLIC PANEL: CPT

## 2022-03-02 PROCEDURE — 84443 ASSAY THYROID STIM HORMONE: CPT

## 2022-03-02 PROCEDURE — 85025 COMPLETE CBC W/AUTO DIFF WBC: CPT

## 2022-03-02 PROCEDURE — 83540 ASSAY OF IRON: CPT

## 2022-03-02 PROCEDURE — 82728 ASSAY OF FERRITIN: CPT

## 2022-03-02 NOTE — PROGRESS NOTES
[facility] Short Visit Progress Note    3157 Pt admit to [address] [place] [address] for Labs ambulatory in stable condition. No new concerns voiced. Please review pending lab results in [address] [place] [address]. Patient Vitals for the past 12 hrs:   Temp Pulse Resp BP   03/02/22 1353 97.8 °F (36.6 °C) 81 18 114/70       Lab drawn in left AC. Tolerated well. Specimens sent to lab.      1405 Pt tolerated treatment well. D/c home ambulatory in no distress. Pt aware of next appointment scheduled for 3/3/22.

## 2022-03-03 ENCOUNTER — OFFICE VISIT (OUTPATIENT)
Dept: ONCOLOGY | Age: 77
End: 2022-03-03
Payer: MEDICARE

## 2022-03-03 ENCOUNTER — HOSPITAL ENCOUNTER (OUTPATIENT)
Dept: INFUSION THERAPY | Age: 77
Discharge: HOME | End: 2022-03-03
Payer: MEDICARE

## 2022-03-03 VITALS
HEART RATE: 77 BPM | RESPIRATION RATE: 22 BRPM | WEIGHT: 221 LBS | SYSTOLIC BLOOD PRESSURE: 95 MMHG | BODY MASS INDEX: 30.82 KG/M2 | OXYGEN SATURATION: 97 % | TEMPERATURE: 98.3 F | DIASTOLIC BLOOD PRESSURE: 65 MMHG

## 2022-03-03 VITALS — HEART RATE: 74 BPM | DIASTOLIC BLOOD PRESSURE: 82 MMHG | SYSTOLIC BLOOD PRESSURE: 133 MMHG

## 2022-03-03 DIAGNOSIS — Z51.12 ENCOUNTER FOR ANTINEOPLASTIC IMMUNOTHERAPY: ICD-10-CM

## 2022-03-03 DIAGNOSIS — D64.9 ANEMIA, UNSPECIFIED TYPE: ICD-10-CM

## 2022-03-03 DIAGNOSIS — C67.9 MALIGNANT NEOPLASM OF URINARY BLADDER, UNSPECIFIED SITE (HCC): Primary | ICD-10-CM

## 2022-03-03 DIAGNOSIS — Z95.828 PORT-A-CATH IN PLACE: ICD-10-CM

## 2022-03-03 PROCEDURE — G8417 CALC BMI ABV UP PARAM F/U: HCPCS | Performed by: INTERNAL MEDICINE

## 2022-03-03 PROCEDURE — 1101F PT FALLS ASSESS-DOCD LE1/YR: CPT | Performed by: INTERNAL MEDICINE

## 2022-03-03 PROCEDURE — 74011000250 HC RX REV CODE- 250: Performed by: REGISTERED NURSE

## 2022-03-03 PROCEDURE — G8752 SYS BP LESS 140: HCPCS | Performed by: INTERNAL MEDICINE

## 2022-03-03 PROCEDURE — G8432 DEP SCR NOT DOC, RNG: HCPCS | Performed by: INTERNAL MEDICINE

## 2022-03-03 PROCEDURE — G0463 HOSPITAL OUTPT CLINIC VISIT: HCPCS | Performed by: REGISTERED NURSE

## 2022-03-03 PROCEDURE — 1111F DSCHRG MED/CURRENT MED MERGE: CPT | Performed by: INTERNAL MEDICINE

## 2022-03-03 PROCEDURE — 74011000258 HC RX REV CODE- 258: Performed by: REGISTERED NURSE

## 2022-03-03 PROCEDURE — 99215 OFFICE O/P EST HI 40 MIN: CPT | Performed by: INTERNAL MEDICINE

## 2022-03-03 PROCEDURE — G8754 DIAS BP LESS 90: HCPCS | Performed by: INTERNAL MEDICINE

## 2022-03-03 PROCEDURE — 96413 CHEMO IV INFUSION 1 HR: CPT

## 2022-03-03 PROCEDURE — 96361 HYDRATE IV INFUSION ADD-ON: CPT

## 2022-03-03 PROCEDURE — G8536 NO DOC ELDER MAL SCRN: HCPCS | Performed by: INTERNAL MEDICINE

## 2022-03-03 PROCEDURE — 74011250636 HC RX REV CODE- 250/636: Performed by: REGISTERED NURSE

## 2022-03-03 PROCEDURE — G8427 DOCREV CUR MEDS BY ELIG CLIN: HCPCS | Performed by: INTERNAL MEDICINE

## 2022-03-03 RX ORDER — DIPHENHYDRAMINE HYDROCHLORIDE 50 MG/ML
25 INJECTION, SOLUTION INTRAMUSCULAR; INTRAVENOUS AS NEEDED
Status: CANCELLED
Start: 2022-03-11

## 2022-03-03 RX ORDER — SODIUM CHLORIDE 9 MG/ML
25 INJECTION, SOLUTION INTRAVENOUS CONTINUOUS
Status: DISPENSED | OUTPATIENT
Start: 2022-03-03 | End: 2022-03-03

## 2022-03-03 RX ORDER — ONDANSETRON 2 MG/ML
8 INJECTION INTRAMUSCULAR; INTRAVENOUS AS NEEDED
Status: ACTIVE | OUTPATIENT
Start: 2022-03-03 | End: 2022-03-03

## 2022-03-03 RX ORDER — ONDANSETRON 2 MG/ML
8 INJECTION INTRAMUSCULAR; INTRAVENOUS AS NEEDED
Status: CANCELLED | OUTPATIENT
Start: 2022-03-11

## 2022-03-03 RX ORDER — HEPARIN 100 UNIT/ML
300-500 SYRINGE INTRAVENOUS AS NEEDED
Status: ACTIVE | OUTPATIENT
Start: 2022-03-03 | End: 2022-03-03

## 2022-03-03 RX ORDER — ALBUTEROL SULFATE 0.83 MG/ML
2.5 SOLUTION RESPIRATORY (INHALATION) AS NEEDED
Status: ACTIVE | OUTPATIENT
Start: 2022-03-03 | End: 2022-03-03

## 2022-03-03 RX ORDER — DIPHENHYDRAMINE HYDROCHLORIDE 50 MG/ML
25 INJECTION, SOLUTION INTRAMUSCULAR; INTRAVENOUS AS NEEDED
Status: ACTIVE | OUTPATIENT
Start: 2022-03-03 | End: 2022-03-03

## 2022-03-03 RX ORDER — SODIUM CHLORIDE 9 MG/ML
10 INJECTION INTRAMUSCULAR; INTRAVENOUS; SUBCUTANEOUS AS NEEDED
Status: ACTIVE | OUTPATIENT
Start: 2022-03-03 | End: 2022-03-03

## 2022-03-03 RX ORDER — ACETAMINOPHEN 325 MG/1
650 TABLET ORAL AS NEEDED
Status: ACTIVE | OUTPATIENT
Start: 2022-03-03 | End: 2022-03-03

## 2022-03-03 RX ORDER — HEPARIN 100 UNIT/ML
300-500 SYRINGE INTRAVENOUS AS NEEDED
Status: CANCELLED
Start: 2022-03-11

## 2022-03-03 RX ORDER — SODIUM CHLORIDE 0.9 % (FLUSH) 0.9 %
10 SYRINGE (ML) INJECTION AS NEEDED
Status: DISPENSED | OUTPATIENT
Start: 2022-03-03 | End: 2022-03-03

## 2022-03-03 RX ORDER — ALBUTEROL SULFATE 0.83 MG/ML
2.5 SOLUTION RESPIRATORY (INHALATION) AS NEEDED
Status: CANCELLED
Start: 2022-03-11

## 2022-03-03 RX ORDER — EPINEPHRINE 1 MG/ML
0.3 INJECTION, SOLUTION, CONCENTRATE INTRAVENOUS AS NEEDED
Status: ACTIVE | OUTPATIENT
Start: 2022-03-03 | End: 2022-03-03

## 2022-03-03 RX ORDER — SODIUM CHLORIDE 9 MG/ML
10 INJECTION INTRAMUSCULAR; INTRAVENOUS; SUBCUTANEOUS AS NEEDED
Status: CANCELLED | OUTPATIENT
Start: 2022-03-11

## 2022-03-03 RX ORDER — SODIUM CHLORIDE 9 MG/ML
25 INJECTION, SOLUTION INTRAVENOUS CONTINUOUS
Status: CANCELLED | OUTPATIENT
Start: 2022-03-11

## 2022-03-03 RX ORDER — DIPHENHYDRAMINE HYDROCHLORIDE 50 MG/ML
50 INJECTION, SOLUTION INTRAMUSCULAR; INTRAVENOUS AS NEEDED
Status: CANCELLED
Start: 2022-03-11

## 2022-03-03 RX ORDER — EPINEPHRINE 1 MG/ML
0.3 INJECTION, SOLUTION, CONCENTRATE INTRAVENOUS AS NEEDED
Status: CANCELLED | OUTPATIENT
Start: 2022-03-11

## 2022-03-03 RX ORDER — HYDROCORTISONE SODIUM SUCCINATE 100 MG/2ML
100 INJECTION, POWDER, FOR SOLUTION INTRAMUSCULAR; INTRAVENOUS AS NEEDED
Status: ACTIVE | OUTPATIENT
Start: 2022-03-03 | End: 2022-03-03

## 2022-03-03 RX ORDER — HYDROCORTISONE SODIUM SUCCINATE 100 MG/2ML
100 INJECTION, POWDER, FOR SOLUTION INTRAMUSCULAR; INTRAVENOUS AS NEEDED
Status: CANCELLED | OUTPATIENT
Start: 2022-03-11

## 2022-03-03 RX ORDER — SODIUM CHLORIDE 0.9 % (FLUSH) 0.9 %
10 SYRINGE (ML) INJECTION AS NEEDED
Status: CANCELLED | OUTPATIENT
Start: 2022-03-11

## 2022-03-03 RX ORDER — ACETAMINOPHEN 325 MG/1
650 TABLET ORAL AS NEEDED
Status: CANCELLED
Start: 2022-03-11

## 2022-03-03 RX ORDER — DIPHENHYDRAMINE HYDROCHLORIDE 50 MG/ML
50 INJECTION, SOLUTION INTRAMUSCULAR; INTRAVENOUS AS NEEDED
Status: ACTIVE | OUTPATIENT
Start: 2022-03-03 | End: 2022-03-03

## 2022-03-03 RX ADMIN — SODIUM CHLORIDE 500 ML: 9 INJECTION, SOLUTION INTRAVENOUS at 12:35

## 2022-03-03 RX ADMIN — SODIUM CHLORIDE 480 MG: 9 INJECTION, SOLUTION INTRAVENOUS at 13:05

## 2022-03-03 RX ADMIN — HEPARIN 500 UNITS: 100 SYRINGE at 14:00

## 2022-03-03 RX ADMIN — EPOETIN ALFA-EPBX 10000 UNITS: 10000 INJECTION, SOLUTION INTRAVENOUS; SUBCUTANEOUS at 12:24

## 2022-03-03 RX ADMIN — SODIUM CHLORIDE, PRESERVATIVE FREE 10 ML: 5 INJECTION INTRAVENOUS at 14:00

## 2022-03-03 NOTE — PROGRESS NOTES
[facility] Progress Note    Date: March 3, 2022    Name: Aime Johnson    MRN: [id number]         : 1945    Mr. Darya Meraz Arrived ambulatory and in no distress for cycle 2 of Opdivo regimen. [facility] COVID-19 SCREENING      The patient was asked the following questions and answered as documented below:    Do you have any symptoms of COVID-19? SOB, coughing, fever, or generally not feeling well? NO  Have you been exposed to COVID-19 recently? NO  Have you had any recent contact with family/friend that has a pending COVID test? NO      Follow Up: Proceed with treatment    Assessment was completed, no acute issues at this time, no new complaints voiced. Port accessed without difficulty, labs drawn and processed. Chemotherapy Flowsheet 3/3/2022   Cycle C2   Date 3/3/2022   Drug / Regimen Opdivo   Pre Hydration -   Post Hydration -   Pre Meds -   Notes Given         Mr. Manohar Baker vitals were reviewed. Patient Vitals for the past 12 hrs:   Pulse BP   22 1354 74 133/82       Lines:   Venous Access Device port 21 Upper chest (subclavicular area, right (Active)   Central Line Being Utilized Yes 22 1235   Criteria for Appropriate Use Limited/no vessel suitable for conventional peripheral access 10/29/21 1300   Site Assessment Clean, dry, & intact 22 1235   Date of Last Dressing Change 22 1300   Dressing Status Clean, dry, & intact 22 1235   Dressing Type Transparent 22 1235   Action Taken Blood drawn 10/29/21 1300   Date Accessed (Medial Site) 22 1235   Access Time (Medial Site) 1220 22 1235   Access Needle Size (Site #1) 20 G 22 1235   Access Needle Length (Medial Site) 0.75 inches 22 1235   Positive Blood Return (Medial Site) Yes 22 1235   Action Taken (Medial Site) Flushed; Infusing 22 1235   Positive Blood Return (Lateral Site) Yes 22 1235   Action Taken (Lateral Site) De-accessed 22 1235   Alcohol Cap Used Yes 10/29/21 1300        Lab results were obtained and reviewed. Labs within parameter for treatment. Pre-medications  were administered as ordered and chemotherapy was initiated. Medications Administered       0.9% sodium chloride injection 10 mL       Admin Date  03/03/2022 Action  Given Dose  10 mL Route  IntraVENous Administered By  Winter Santizo RN              epoetin bhumi-epbx (RETACRIT) injection 10,000 Units       Admin Date  03/03/2022 Action  Given Dose  10,000 Units Route  SubCUTAneous Administered By  Winter Santizo RN              heparin (porcine) pf 300-500 Units       Admin Date  03/03/2022 Action  Given Dose  500 Units Route  InterCATHeter Administered By  Winter Santizo RN              nivolumab (OPDIVO) 480 mg in 0.9% sodium chloride 100 mL, overfill volume 10 mL IVPB       Admin Date  03/03/2022 Action  New Bag Dose  480 mg Rate  316 mL/hr Route  IntraVENous Administered By  Winter Santizo RN              sodium chloride 0.9 % bolus infusion 500 mL       Admin Date  03/03/2022 Action  New Bag Dose  500 mL Rate  500 mL/hr Route  IntraVENous Administered By  Winter Santizo RN                      Mr. Rosa Ness tolerated treatment well, port flushed and de accessed, patient was discharged from [place] [address] in stable condition at 1400.         Future Appointments   Date Time Provider Gregorio Ibarra   3/11/2022 11:00 AM G1 TD [facility]   3/31/2022 11:00 AM B3 TD MED [address] [facility]   3/31/2022 11:30 AM Madeline Bob  [address] [facility] AMB   4/28/2022 11:00 AM B3 TD MED [address] [facility]   5/26/2022 11:00 AM B3 TD MED [place] [facility]   6/23/2022 11:00 AM B3 TD MED [place] [facility]   7/21/2022 11:00 AM B3 DT MED [address]         Viviane Babinski, RN  March 3, 2022      Problem: Knowledge Deficit  Goal: *Verbalizes understanding of procedures and medications  Outcome: Progressing Towards Goal     Problem: Patient Education:  Go to Education Activity  Goal: Patient/Family Education  Outcome: Progressing Towards Goal

## 2022-03-03 NOTE — PROGRESS NOTES
[facility] [place] [facility] [place] [address] [address], [address], [address] [place] [address] Cheryl  W: [phone]  F: [phone]    Reason for Visit:   Emma Gibbs is a 69 y.o. male who is seen in follow-up of Muscle invasive bladder cancer- Mixed histology    Treatment History:   · 3/1/2021: CT IVP: Multiple abdominal, iliac, mediastinal nodes unchanged from 2019 consistent with h/o sarcoidosis, Thickened R lateral bladder wall. · 6/23/2021: Cystoscopy and TURBT- Papillary changes were noted within the prostatic urethra ,  papillary tumors seen emanating from the surface of the left hemitrigone, There were also diffuse changes in the floor of the bladder- Low grade urothelial carcinoma of the prostatic urethra, R lateral bladder wall with HG Muscle invasive urothelial carcinoma and squamous differentiation+ CIS, Left brenda trigone HG non invasive urothelial carcinoma  · 8/5/21- 10/21/2021: Cisplatin + Gemzar x 4   · 9/14/2021: CT was stable. · 12/6/2021: Radical urethro-cystoprostatectomy   · 2/3/22: Nivolumab    History of Present Illness:   Patient is a 69 y.o. male with PMH as below including sarcoidosis who is seen for evaluation of bladder cancer. He has a history of nonmuscle invasive bladder cancer in 2015, underwent 2 induction courses of BCG, had a non muscle invasive recurrence in 2019 and had re induction with BCG. [place] 6/[place] [facility] not show any recurrence. In March 2021 he had a positive FISH and was seen by Dr. Rosette Kwok. Had a CT IVP 3/2021 which showed some Bladder thickening. 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 Grade 4 neutropenia after cycle 1. Completed 4 cycles and seen after surgery done 12/6/2021. He comes today for cycle 2 of Nivolumab. Has frequent BMs and this has not changed since starting Nivolumab. This is not diarrhea and has been present since his surgery. No rash but has some itching. Denies SOB, CP. Has a lot of fatigue. He does not drink any water. No other complaints. Mother and sister had breast cancer and brother had kidney cancer     Past Medical History:   Diagnosis Date    Arrhythmia     LBBB    Arthritis     Asthma     MILD    Cancer ([address]     scc top of head and nose    Cancer ([address]     BLADDER    COVID-19 vaccine series completed     [facility] VACCINE 21 AND 21    Depression with anxiety     Hypertension     Other unknown and unspecified cause of morbidity or mortality     history of pulmonary sarcoid     Posterior cervical adenopathy, benign 2018    [address]monary [facility]osis [address] [address]     Unspecified sleep apnea     cpap      Past Surgical History:   Procedure Laterality Date    HX CATARACT REMOVAL Bilateral     HX HERNIA REPAIR Right AS A CHILD    INGUINAL    HX HERNIA REPAIR Left     INGUIBAL    HX KNEE REPLACEMENT Left     HX ORTHOPAEDIC Right     BONE SPURS REMOVED FROM FOOT    HX OTHER SURGICAL      scc removed top of head and nose    HX OTHER SURGICAL      benign lump removed from thyroid    HX OTHER SURGICAL Right     SKIN CANCER RELMOVED FROM LEG    HX UROLOGICAL  2020    CYSTO    IR INSERT TUNL CVC W PORT OVER 5 YEARS  2021    MS CARDIAC SURG PROCEDURE UNLIST  2021    CARDIAC CATH    MS REVISE KNEE JOINT REPLACE,ALL PARTS Left       Social History     Tobacco Use    Smoking status: Former Smoker     Packs/day: 0.50     Years: 2.00     Pack years: 1.00     Quit date: 1965     Years since quittin.5    Smokeless tobacco: Never Used   Substance Use Topics    Alcohol use:  Yes     Alcohol/week: 2.0 standard drinks     Types: 2 Glasses of wine per week     Comment: DAILY      Family History   Problem Relation Age of Onset    Cancer Mother         breast with mets    Dementia Mother     Heart Disease Father  Cancer Sister         breast    Lung Disease Brother     No Known Problems Brother     Anesth Problems Neg Hx      Current Outpatient Medications   Medication Sig    apixaban (Eliquis) 5 mg tablet Take 5 mg by mouth two (2) times a day.  metoprolol tartrate (LOPRESSOR) 25 mg tablet Take 0.5 Tablets by mouth every twelve (12) hours.  zolpidem (AMBIEN) 5 mg tablet Take 5 mg by mouth nightly as needed for Sleep.  buPROPion SR (WELLBUTRIN SR) 100 mg SR tablet Take 100 mg by mouth daily.  acetaminophen (Tylenol Extra Strength) 500 mg tablet Take 1,000 mg by mouth every six (6) hours as needed for Pain.  busPIRone (BUSPAR) 5 mg tablet Take 5 mg by mouth daily.  simvastatin (ZOCOR) 20 mg tablet Take 20 mg by mouth nightly.  escitalopram oxalate (LEXAPRO) 10 mg tablet Take 10 mg by mouth daily. No current facility-administered medications for this visit.      Facility-Administered Medications Ordered in Other Visits   Medication Dose Route Frequency    epoetin bhumi-epbx (RETACRIT) injection 10,000 Units  10,000 Units SubCUTAneous ONCE    0.9% sodium chloride infusion  25 mL/hr IntraVENous CONTINUOUS    nivolumab (OPDIVO) 480 mg in 0.9% sodium chloride 100 mL, overfill volume 10 mL IVPB  480 mg IntraVENous ONCE    sodium chloride (NS) flush 10 mL  10 mL IntraVENous PRN    0.9% sodium chloride injection 10 mL  10 mL IntraVENous PRN    heparin (porcine) pf 300-500 Units  300-500 Units InterCATHeter PRN    sodium chloride 0.9 % bolus infusion 500 mL  500 mL IntraVENous PRN    diphenhydrAMINE (BENADRYL) injection 25 mg  25 mg IntraVENous PRN    famotidine (PF) (PEPCID) 20 mg in 0.9% sodium chloride 10 mL injection  20 mg IntraVENous PRN    acetaminophen (TYLENOL) tablet 650 mg  650 mg Oral PRN    meperidine (DEMEROL) injection 25 mg  25 mg IntraVENous PRN    ondansetron (ZOFRAN) injection 8 mg  8 mg IntraVENous PRN    sodium chloride 0.9 % bolus infusion 500 mL  500 mL IntraVENous PRN  EPINEPHrine HCl (PF) (ADRENALIN) 1 mg/mL (1 mL) injection 0.3 mg  0.3 mg SubCUTAneous PRN    hydrocortisone Sod Succ (PF) (SOLU-CORTEF) injection 100 mg  100 mg IntraVENous PRN    diphenhydrAMINE (BENADRYL) injection 50 mg  50 mg IntraVENous PRN    albuterol (PROVENTIL VENTOLIN) nebulizer solution 2.5 mg  2.5 mg Inhalation PRN    sodium chloride 0.9 % bolus infusion 500 mL  500 mL IntraVENous ONCE      Allergies   Allergen Reactions    Pcn [Penicillins] Hives     Patient screened for any delayed non-IgE-mediated reaction to PCN.         Patient notes the following:    NO SEVERE NON-IGE MEDIATED REACTIONS        Review of Systems: A complete review of systems was obtained, negative except as described above. Physical Exam:     Visit Vitals  BP 95/65 (BP 1 Location: Left upper arm, BP Patient Position: Sitting)   Pulse 77   Temp 98.3 °F (36.8 °C)   Resp 22   Wt 221 lb (100.2 kg)   SpO2 97%   BMI 30.82 kg/m²     ECOG PS: 1  General: No distress  Eyes: PERRL, anicteric sclerae  HENT: Atraumatic, PAC in place   Neck: Supple  Skin: No rashes, ecchymoses, or petechiae. Normal temperature, turgor, and texture. GI: Has a urostomy bag  Psych: Alert, oriented, appropriate affect, normal judgment/insight    Results:     Lab Results   Component Value Date/Time    WBC 5.9 03/02/2022 02:00 PM    HGB 8.5 (L) 03/02/2022 02:00 PM    HCT 27.9 (L) 03/02/2022 02:00 PM    PLATELET 793 36/23/4483 02:00 PM    MCV 97.2 03/02/2022 02:00 PM    ABS.  NEUTROPHILS 4.1 03/02/2022 02:00 PM     Lab Results   Component Value Date/Time    Sodium 136 03/02/2022 02:00 PM    Potassium 4.3 03/02/2022 02:00 PM    Chloride 109 (H) 03/02/2022 02:00 PM    CO2 24 03/02/2022 02:00 PM    Glucose 104 (H) 03/02/2022 02:00 PM    BUN 57 (H) 03/02/2022 02:00 PM    Creatinine 1.76 (H) 03/02/2022 02:00 PM    GFR est AA 46 (L) 03/02/2022 02:00 PM    GFR est non-AA 38 (L) 03/02/2022 02:00 PM    Calcium 10.1 03/02/2022 02:00 PM    Glucose (POC) 143 (H) 12/11/2021 01:02 AM     Lab Results   Component Value Date/Time    Bilirubin, total 0.4 03/02/2022 02:00 PM    ALT (SGPT) 18 03/02/2022 02:00 PM    Alk. phosphatase 59 03/02/2022 02:00 PM    Protein, total 8.0 03/02/2022 02:00 PM    Albumin 3.4 (L) 03/02/2022 02:00 PM    Globulin 4.6 (H) 03/02/2022 02:00 PM       External   Records reviewed and summarized above.   Pathology report(s) reviewed    12/21/2021     SPECIMEN       Procedure: Radical urethro-cystoprostatectomy       TUMOR       Tumor Site:                Trigone, Dome, Right lateral wall, Left   Ureteral                                  orifice, Right bladder neck        Histologic Type:     Papillary urothelial carcinoma, invasive and   non-invasive, and carcinoma in situ       Histologic Grade:                High-grade       Tumor Size: Cannot be determined:      Multiple tumors       Tumor Extension:           Tumor invades adjacent structures -   Prostate                                  (transmural invasion from the bladder   tumor)       Lymphovascular Invasion:      Present       Tumor Configuration:           Papillary, Flat       MARGINS       Margins:                     Uninvolved by invasive carcinoma and   carcinoma in situ /                                  noninvasive urothelial carcinoma    LYMPH NODES       Number of Lymph Nodes Involved:      2           Size of Largest Metastatic Deposit:      1 cm               Site:                     Left Pelvic Lymph Nodes           Extranodal Extension:           Present       Number of Lymph Nodes Examined:      5     PATHOLOGIC STAGE CLASSIFICATION (pTNM, AJCC 8th Edition)       TNM Descriptors:                m (multiple primary tumors)       Primary Tumor (pT):                pT4a       Regional Lymph Nodes (pN):           pN2     Carcinoma in situ URETHRA: RESECTION       Tumor Site:                     Urethra       SPECIMEN       Procedure:                     Total urethrectomy    TUMOR     Tumor Site:                     Prostatic urethra       Histologic Type:                       Papillary urothelial carcinoma,   invasive       Histologic Grade:                     High-grade       Tumor Extension:                Tumor invades adjacent structures -   Prostate       Lymphovascular Invasion:           Present       MARGINS       Margins:                          Uninvolved by invasive carcinoma and   carcinoma in situ /                                       noninvasive urothelial carcinoma       LYMPH NODES (These represent the same lymph nodes reported in the BLADDER   Resection Template)       Number of Lymph Nodes Involved:      2           Size of Largest Metastatic Deposit:      1 cm               Site:                     Left Pelvic Lymph node       Number of Lymph Nodes Examined:      5      PATHOLOGIC STAGE CLASSIFICATION (pTNM, AJCC 8th Edition)       TNM Descriptors:                m (multiple primary tumors within the   prostatic urethra)       Primary Tumor (pT):                pT2       Regional Lymph Nodes (pN):           pN2                     4.  Right pelvic lymph nodes, lymph node dissection:        One benign lymph node with noncaseating granulomatous inflammation,   compatible with patients history of sarcoidosis   One benign lymph node, no histopathologic changes   No tumor seen     5.  Left pelvic lymph nodes, lymph node dissection:        Metastatic urothelial carcinoma in two of three lymph nodes, 1 cm in   greatest dimension with extracapsular extension   Noncaseating granulomatous inflammation, compatible with patient's history   of sarcoidosis     Radiology report(s) reviewed above. CT CAP 7/19/21:   IMPRESSION  1. While the bladder is decompressed by Juarez is thick-walled and irregular  2. No evidence of metastatic disease to the abdomen or pelvis  3.  Extensive bilateral hilar and mediastinal adenopathy with patchy perihilar  airspace disease with associated nodular peribronchial cuffing. Findings can be  seen with sarcoidosis. Differentiating adenopathy from sarcoidosis for  metastatic disease is difficult       9/2021 CT     IMPRESSION     1. Mild bladder wall thickening posteriorly and along the right lateral bladder  wall, nonspecific. No evidence of metastatic disease within the chest, abdomen,  or pelvis. 2. Extensive mediastinal and bilateral hilar lymphadenopathy with lymph node  calcifications, most compatible with known sarcoidosis. No significant change. 3. Bilateral perihilar airspace opacities have improved. No new pulmonary  pathology identified. 4. Severe diverticulosis of the colon. No definite superimposed acute  Diverticulitis. 12/11/2021     IMPRESSION     1. Interval radical cystoprostatectomy with ileal conduit urinary diversion. 2. No postoperative complication identified. 3. Dense bilateral nephrograms in this patient who is status post contrast  injection the previous day. This suggests ATN. Assessment:   1) Muscle invasive bladder cancer- With squamous differentiation     [id number]  S/p 4 cycles of NAC with Cisplatin+ gemzar complicated by grade 4 neutropenia  S/P Radical urethro-cystoprostatectomy 12/6/2021- pT4N2 (invaded prostate), HG papillar, +LVI  Path stage-  Stage IIIB     I discussed the final path  Disease at high risk for local and distant recurrence  Margins negative but nodes had EPE    Today is cycle 2.     2) Urethral TCC    S/P Total urethrectomy 12/6  HG Papillary [id number]  Margins negative  EPE  Present  See above    Will also refer to Rad Onc to discuss whether RT is indictated at a later date. 3) Sarcoidosis  Follows with pulmonary  Stable.      4) Obesity    5) Anemia  Worsening  Likely secondary to CKD   Will give injectafer x 1 and start retracrit today     6) Encounter for high risk medication like immunotherapy  Labs reviewed     Plan:     · Proceed today with cycle 2 of Nivolumab q4 week dosing for 1 year · CBC, CMP, TSH per protocol  · Scans in 1 month to be ordered at next visit   · Will refer to rad onc at a future visit  · SW following  · See urology as scheduled   · Start Retacrit  · Injectafer x 1     RTC in 4 weeks     I appreciate the opportunity to participate in Mr. Caryl Ramirez care. I personally saw and evaluated the patient and performed the key components of medical decision making. The history, physical exam, and documentation were performed by Charlene Covarrubias NP. I reviewed and verified the above documentation and modified it as needed.       Signed By: Isha Haywood MD

## 2022-03-11 ENCOUNTER — HOSPITAL ENCOUNTER (OUTPATIENT)
Dept: INFUSION THERAPY | Age: 77
Discharge: HOME | End: 2022-03-11
Payer: MEDICARE

## 2022-03-11 VITALS
RESPIRATION RATE: 18 BRPM | HEART RATE: 88 BPM | SYSTOLIC BLOOD PRESSURE: 120 MMHG | DIASTOLIC BLOOD PRESSURE: 80 MMHG | TEMPERATURE: 97 F

## 2022-03-11 DIAGNOSIS — D64.9 ANEMIA, UNSPECIFIED TYPE: Primary | ICD-10-CM

## 2022-03-11 PROCEDURE — 74011000250 HC RX REV CODE- 250: Performed by: REGISTERED NURSE

## 2022-03-11 PROCEDURE — 96365 THER/PROPH/DIAG IV INF INIT: CPT

## 2022-03-11 PROCEDURE — 74011250636 HC RX REV CODE- 250/636: Performed by: REGISTERED NURSE

## 2022-03-11 RX ORDER — HEPARIN 100 UNIT/ML
300-500 SYRINGE INTRAVENOUS AS NEEDED
Status: ACTIVE | OUTPATIENT
Start: 2022-03-11 | End: 2022-03-11

## 2022-03-11 RX ORDER — SODIUM CHLORIDE 0.9 % (FLUSH) 0.9 %
10 SYRINGE (ML) INJECTION AS NEEDED
Status: DISPENSED | OUTPATIENT
Start: 2022-03-11 | End: 2022-03-11

## 2022-03-11 RX ORDER — SODIUM CHLORIDE 9 MG/ML
10 INJECTION INTRAMUSCULAR; INTRAVENOUS; SUBCUTANEOUS AS NEEDED
Status: ACTIVE | OUTPATIENT
Start: 2022-03-11 | End: 2022-03-11

## 2022-03-11 RX ORDER — SODIUM CHLORIDE 9 MG/ML
25 INJECTION, SOLUTION INTRAVENOUS CONTINUOUS
Status: DISPENSED | OUTPATIENT
Start: 2022-03-11 | End: 2022-03-11

## 2022-03-11 RX ADMIN — SODIUM CHLORIDE 25 ML/HR: 9 INJECTION, SOLUTION INTRAVENOUS at 12:02

## 2022-03-11 RX ADMIN — HEPARIN 500 UNITS: 100 SYRINGE at 12:39

## 2022-03-11 RX ADMIN — SODIUM CHLORIDE, PRESERVATIVE FREE 10 ML: 5 INJECTION INTRAVENOUS at 12:39

## 2022-03-11 RX ADMIN — FERRIC CARBOXYMALTOSE INJECTION 750 MG: 50 INJECTION, SOLUTION INTRAVENOUS at 12:02

## 2022-03-18 PROBLEM — E66.811 CLASS 1 OBESITY DUE TO EXCESS CALORIES WITH SERIOUS COMORBIDITY AND BODY MASS INDEX (BMI) OF 33.0 TO 33.9 IN ADULT: Status: ACTIVE | Noted: 2021-07-08

## 2022-03-18 PROBLEM — R59.0 POSTERIOR CERVICAL ADENOPATHY: Status: ACTIVE | Noted: 2018-11-19

## 2022-03-18 PROBLEM — E66.09 CLASS 1 OBESITY DUE TO EXCESS CALORIES WITH SERIOUS COMORBIDITY AND BODY MASS INDEX (BMI) OF 33.0 TO 33.9 IN ADULT: Status: ACTIVE | Noted: 2021-07-08

## 2022-03-19 PROBLEM — I10 ESSENTIAL HYPERTENSION: Status: ACTIVE | Noted: 2021-07-08

## 2022-03-19 PROBLEM — C67.9 MALIGNANT NEOPLASM OF URINARY BLADDER (HCC): Status: ACTIVE | Noted: 2021-06-23

## 2022-03-19 PROBLEM — R79.89 ELEVATED TROPONIN: Status: ACTIVE | Noted: 2022-02-04

## 2022-03-19 PROBLEM — D86.9 SARCOIDOSIS: Status: ACTIVE | Noted: 2021-07-08

## 2022-03-19 PROBLEM — Z98.890 STATUS POST SURGERY: Status: ACTIVE | Noted: 2021-02-10

## 2022-03-19 PROBLEM — R77.8 ELEVATED TROPONIN: Status: ACTIVE | Noted: 2022-02-04

## 2022-03-19 PROBLEM — D64.9 ANEMIA: Status: ACTIVE | Noted: 2022-03-03

## 2022-03-20 PROBLEM — I48.0 PAROXYSMAL A-FIB (HCC): Status: ACTIVE | Noted: 2021-12-20

## 2022-03-30 ENCOUNTER — HOSPITAL ENCOUNTER (OUTPATIENT)
Dept: INFUSION THERAPY | Age: 77
Discharge: HOME | End: 2022-03-30
Payer: MEDICARE

## 2022-03-30 VITALS
RESPIRATION RATE: 18 BRPM | TEMPERATURE: 96.9 F | HEART RATE: 75 BPM | SYSTOLIC BLOOD PRESSURE: 122 MMHG | DIASTOLIC BLOOD PRESSURE: 67 MMHG | OXYGEN SATURATION: 95 %

## 2022-03-30 LAB
ALBUMIN SERPL-MCNC: 3.6 G/DL (ref 3.5–5)
ALBUMIN/GLOB SERPL: 0.8 {RATIO} (ref 1.1–2.2)
ALP SERPL-CCNC: 62 U/L (ref 45–117)
ALT SERPL-CCNC: 22 U/L (ref 12–78)
ANION GAP SERPL CALC-SCNC: 6 MMOL/L (ref 5–15)
AST SERPL-CCNC: 14 U/L (ref 15–37)
BASOPHILS # BLD: 0.1 K/UL (ref 0–0.1)
BASOPHILS NFR BLD: 1 % (ref 0–1)
BILIRUB SERPL-MCNC: 0.2 MG/DL (ref 0.2–1)
BUN SERPL-MCNC: 45 MG/DL (ref 6–20)
BUN/CREAT SERPL: 22 (ref 12–20)
CALCIUM SERPL-MCNC: 10.4 MG/DL (ref 8.5–10.1)
CHLORIDE SERPL-SCNC: 109 MMOL/L (ref 97–108)
CO2 SERPL-SCNC: 19 MMOL/L (ref 21–32)
CREAT SERPL-MCNC: 2.04 MG/DL (ref 0.7–1.3)
DIFFERENTIAL METHOD BLD: ABNORMAL
EOSINOPHIL # BLD: 0.1 K/UL (ref 0–0.4)
EOSINOPHIL NFR BLD: 2 % (ref 0–7)
ERYTHROCYTE [DISTWIDTH] IN BLOOD BY AUTOMATED COUNT: 17 % (ref 11.5–14.5)
GLOBULIN SER CALC-MCNC: 4.6 G/DL (ref 2–4)
GLUCOSE SERPL-MCNC: 102 MG/DL (ref 65–100)
HCT VFR BLD AUTO: 30.2 % (ref 36.6–50.3)
HGB BLD-MCNC: 9.6 G/DL (ref 12.1–17)
IMM GRANULOCYTES # BLD AUTO: 0 K/UL (ref 0–0.04)
IMM GRANULOCYTES NFR BLD AUTO: 0 % (ref 0–0.5)
LYMPHOCYTES # BLD: 0.7 K/UL (ref 0.8–3.5)
LYMPHOCYTES NFR BLD: 10 % (ref 12–49)
MCH RBC QN AUTO: 31.6 PG (ref 26–34)
MCHC RBC AUTO-ENTMCNC: 31.8 G/DL (ref 30–36.5)
MCV RBC AUTO: 99.3 FL (ref 80–99)
MONOCYTES # BLD: 0.7 K/UL (ref 0–1)
MONOCYTES NFR BLD: 10 % (ref 5–13)
NEUTS SEG # BLD: 5.3 K/UL (ref 1.8–8)
NEUTS SEG NFR BLD: 77 % (ref 32–75)
NRBC # BLD: 0 K/UL (ref 0–0.01)
NRBC BLD-RTO: 0 PER 100 WBC
PLATELET # BLD AUTO: 204 K/UL (ref 150–400)
PMV BLD AUTO: 9.1 FL (ref 8.9–12.9)
POTASSIUM SERPL-SCNC: 4.3 MMOL/L (ref 3.5–5.1)
PROT SERPL-MCNC: 8.2 G/DL (ref 6.4–8.2)
RBC # BLD AUTO: 3.04 M/UL (ref 4.1–5.7)
RBC MORPH BLD: ABNORMAL
RBC MORPH BLD: ABNORMAL
SODIUM SERPL-SCNC: 134 MMOL/L (ref 136–145)
TSH SERPL DL<=0.05 MIU/L-ACNC: 1.15 UIU/ML (ref 0.36–3.74)
WBC # BLD AUTO: 6.9 K/UL (ref 4.1–11.1)

## 2022-03-30 PROCEDURE — 85025 COMPLETE CBC W/AUTO DIFF WBC: CPT

## 2022-03-30 PROCEDURE — 80053 COMPREHEN METABOLIC PANEL: CPT

## 2022-03-30 PROCEDURE — 84443 ASSAY THYROID STIM HORMONE: CPT

## 2022-03-31 ENCOUNTER — DOCUMENTATION ONLY (OUTPATIENT)
Dept: ONCOLOGY | Age: 77
End: 2022-03-31

## 2022-03-31 ENCOUNTER — HOSPITAL ENCOUNTER (OUTPATIENT)
Dept: INFUSION THERAPY | Age: 77
Discharge: HOME | End: 2022-03-31
Payer: MEDICARE

## 2022-03-31 ENCOUNTER — OFFICE VISIT (OUTPATIENT)
Dept: ONCOLOGY | Age: 77
End: 2022-03-31
Payer: MEDICARE

## 2022-03-31 VITALS
HEART RATE: 68 BPM | OXYGEN SATURATION: 97 % | DIASTOLIC BLOOD PRESSURE: 84 MMHG | SYSTOLIC BLOOD PRESSURE: 133 MMHG | RESPIRATION RATE: 18 BRPM | TEMPERATURE: 96.4 F

## 2022-03-31 VITALS
DIASTOLIC BLOOD PRESSURE: 54 MMHG | RESPIRATION RATE: 18 BRPM | OXYGEN SATURATION: 96 % | HEART RATE: 76 BPM | TEMPERATURE: 98.2 F | SYSTOLIC BLOOD PRESSURE: 89 MMHG | BODY MASS INDEX: 30.27 KG/M2 | WEIGHT: 217 LBS

## 2022-03-31 DIAGNOSIS — C67.9 MALIGNANT NEOPLASM OF URINARY BLADDER, UNSPECIFIED SITE (HCC): Primary | ICD-10-CM

## 2022-03-31 PROCEDURE — 74011250636 HC RX REV CODE- 250/636: Performed by: REGISTERED NURSE

## 2022-03-31 PROCEDURE — G8536 NO DOC ELDER MAL SCRN: HCPCS | Performed by: INTERNAL MEDICINE

## 2022-03-31 PROCEDURE — 96413 CHEMO IV INFUSION 1 HR: CPT

## 2022-03-31 PROCEDURE — 74011250636 HC RX REV CODE- 250/636: Performed by: NURSE PRACTITIONER

## 2022-03-31 PROCEDURE — G8417 CALC BMI ABV UP PARAM F/U: HCPCS | Performed by: INTERNAL MEDICINE

## 2022-03-31 PROCEDURE — 1101F PT FALLS ASSESS-DOCD LE1/YR: CPT | Performed by: INTERNAL MEDICINE

## 2022-03-31 PROCEDURE — G8427 DOCREV CUR MEDS BY ELIG CLIN: HCPCS | Performed by: INTERNAL MEDICINE

## 2022-03-31 PROCEDURE — 74011000250 HC RX REV CODE- 250: Performed by: REGISTERED NURSE

## 2022-03-31 PROCEDURE — G8754 DIAS BP LESS 90: HCPCS | Performed by: INTERNAL MEDICINE

## 2022-03-31 PROCEDURE — 77030012965 HC NDL HUBR BBMI -A

## 2022-03-31 PROCEDURE — 99215 OFFICE O/P EST HI 40 MIN: CPT | Performed by: INTERNAL MEDICINE

## 2022-03-31 PROCEDURE — 96372 THER/PROPH/DIAG INJ SC/IM: CPT

## 2022-03-31 PROCEDURE — 74011000258 HC RX REV CODE- 258: Performed by: REGISTERED NURSE

## 2022-03-31 PROCEDURE — G8752 SYS BP LESS 140: HCPCS | Performed by: INTERNAL MEDICINE

## 2022-03-31 PROCEDURE — G8432 DEP SCR NOT DOC, RNG: HCPCS | Performed by: INTERNAL MEDICINE

## 2022-03-31 PROCEDURE — G0463 HOSPITAL OUTPT CLINIC VISIT: HCPCS | Performed by: NURSE PRACTITIONER

## 2022-03-31 RX ORDER — ONDANSETRON 2 MG/ML
8 INJECTION INTRAMUSCULAR; INTRAVENOUS AS NEEDED
Status: CANCELLED | OUTPATIENT
Start: 2022-05-31

## 2022-03-31 RX ORDER — HEPARIN 100 UNIT/ML
300-500 SYRINGE INTRAVENOUS AS NEEDED
Status: CANCELLED
Start: 2022-07-22

## 2022-03-31 RX ORDER — ONDANSETRON 2 MG/ML
8 INJECTION INTRAMUSCULAR; INTRAVENOUS AS NEEDED
Status: CANCELLED | OUTPATIENT
Start: 2022-04-28

## 2022-03-31 RX ORDER — ALBUTEROL SULFATE 0.83 MG/ML
2.5 SOLUTION RESPIRATORY (INHALATION) AS NEEDED
Status: CANCELLED
Start: 2022-05-31

## 2022-03-31 RX ORDER — SODIUM CHLORIDE 9 MG/ML
25 INJECTION, SOLUTION INTRAVENOUS CONTINUOUS
Status: DISPENSED | OUTPATIENT
Start: 2022-03-31 | End: 2022-03-31

## 2022-03-31 RX ORDER — ACETAMINOPHEN 325 MG/1
650 TABLET ORAL AS NEEDED
Status: ACTIVE | OUTPATIENT
Start: 2022-03-31 | End: 2022-03-31

## 2022-03-31 RX ORDER — HYDROCORTISONE SODIUM SUCCINATE 100 MG/2ML
100 INJECTION, POWDER, FOR SOLUTION INTRAMUSCULAR; INTRAVENOUS AS NEEDED
Status: CANCELLED | OUTPATIENT
Start: 2022-04-28

## 2022-03-31 RX ORDER — SODIUM CHLORIDE 9 MG/ML
10 INJECTION INTRAMUSCULAR; INTRAVENOUS; SUBCUTANEOUS AS NEEDED
Status: CANCELLED | OUTPATIENT
Start: 2022-07-05

## 2022-03-31 RX ORDER — DIPHENHYDRAMINE HYDROCHLORIDE 50 MG/ML
50 INJECTION, SOLUTION INTRAMUSCULAR; INTRAVENOUS AS NEEDED
Status: CANCELLED
Start: 2022-07-22

## 2022-03-31 RX ORDER — ALBUTEROL SULFATE 0.83 MG/ML
2.5 SOLUTION RESPIRATORY (INHALATION) AS NEEDED
Status: ACTIVE | OUTPATIENT
Start: 2022-03-31 | End: 2022-03-31

## 2022-03-31 RX ORDER — HYDROCORTISONE SODIUM SUCCINATE 100 MG/2ML
100 INJECTION, POWDER, FOR SOLUTION INTRAMUSCULAR; INTRAVENOUS AS NEEDED
Status: CANCELLED | OUTPATIENT
Start: 2022-07-22

## 2022-03-31 RX ORDER — EPINEPHRINE 1 MG/ML
0.3 INJECTION, SOLUTION, CONCENTRATE INTRAVENOUS AS NEEDED
Status: CANCELLED | OUTPATIENT
Start: 2022-07-22

## 2022-03-31 RX ORDER — SODIUM CHLORIDE 0.9 % (FLUSH) 0.9 %
10 SYRINGE (ML) INJECTION AS NEEDED
Status: CANCELLED | OUTPATIENT
Start: 2022-05-31

## 2022-03-31 RX ORDER — SODIUM CHLORIDE 9 MG/ML
10 INJECTION INTRAMUSCULAR; INTRAVENOUS; SUBCUTANEOUS AS NEEDED
Status: CANCELLED | OUTPATIENT
Start: 2022-04-28

## 2022-03-31 RX ORDER — SODIUM CHLORIDE 0.9 % (FLUSH) 0.9 %
10 SYRINGE (ML) INJECTION AS NEEDED
Status: DISPENSED | OUTPATIENT
Start: 2022-03-31 | End: 2022-03-31

## 2022-03-31 RX ORDER — DIPHENHYDRAMINE HYDROCHLORIDE 50 MG/ML
50 INJECTION, SOLUTION INTRAMUSCULAR; INTRAVENOUS AS NEEDED
Status: CANCELLED
Start: 2022-07-05

## 2022-03-31 RX ORDER — SODIUM CHLORIDE 0.9 % (FLUSH) 0.9 %
10 SYRINGE (ML) INJECTION AS NEEDED
Status: CANCELLED | OUTPATIENT
Start: 2022-04-28

## 2022-03-31 RX ORDER — ALBUTEROL SULFATE 0.83 MG/ML
2.5 SOLUTION RESPIRATORY (INHALATION) AS NEEDED
Status: CANCELLED
Start: 2022-07-22

## 2022-03-31 RX ORDER — ONDANSETRON 2 MG/ML
8 INJECTION INTRAMUSCULAR; INTRAVENOUS AS NEEDED
Status: CANCELLED | OUTPATIENT
Start: 2022-07-22

## 2022-03-31 RX ORDER — HEPARIN 100 UNIT/ML
300-500 SYRINGE INTRAVENOUS AS NEEDED
Status: CANCELLED
Start: 2022-05-31

## 2022-03-31 RX ORDER — DIPHENHYDRAMINE HYDROCHLORIDE 50 MG/ML
25 INJECTION, SOLUTION INTRAMUSCULAR; INTRAVENOUS AS NEEDED
Status: CANCELLED
Start: 2022-07-05

## 2022-03-31 RX ORDER — SODIUM CHLORIDE 0.9 % (FLUSH) 0.9 %
10 SYRINGE (ML) INJECTION AS NEEDED
Status: CANCELLED | OUTPATIENT
Start: 2022-07-22

## 2022-03-31 RX ORDER — SODIUM CHLORIDE 9 MG/ML
10 INJECTION INTRAMUSCULAR; INTRAVENOUS; SUBCUTANEOUS AS NEEDED
Status: CANCELLED | OUTPATIENT
Start: 2022-07-22

## 2022-03-31 RX ORDER — EPINEPHRINE 1 MG/ML
0.3 INJECTION, SOLUTION, CONCENTRATE INTRAVENOUS AS NEEDED
Status: ACTIVE | OUTPATIENT
Start: 2022-03-31 | End: 2022-03-31

## 2022-03-31 RX ORDER — ACETAMINOPHEN 325 MG/1
650 TABLET ORAL AS NEEDED
Status: CANCELLED
Start: 2022-07-22

## 2022-03-31 RX ORDER — HYDROCORTISONE SODIUM SUCCINATE 100 MG/2ML
100 INJECTION, POWDER, FOR SOLUTION INTRAMUSCULAR; INTRAVENOUS AS NEEDED
Status: CANCELLED | OUTPATIENT
Start: 2022-05-31

## 2022-03-31 RX ORDER — HEPARIN 100 UNIT/ML
300-500 SYRINGE INTRAVENOUS AS NEEDED
Status: CANCELLED
Start: 2022-04-28

## 2022-03-31 RX ORDER — SODIUM CHLORIDE 9 MG/ML
10 INJECTION INTRAMUSCULAR; INTRAVENOUS; SUBCUTANEOUS AS NEEDED
Status: CANCELLED | OUTPATIENT
Start: 2022-05-31

## 2022-03-31 RX ORDER — SODIUM CHLORIDE 9 MG/ML
25 INJECTION, SOLUTION INTRAVENOUS CONTINUOUS
Status: CANCELLED | OUTPATIENT
Start: 2022-04-28

## 2022-03-31 RX ORDER — EPINEPHRINE 1 MG/ML
0.3 INJECTION, SOLUTION, CONCENTRATE INTRAVENOUS AS NEEDED
Status: CANCELLED | OUTPATIENT
Start: 2022-05-31

## 2022-03-31 RX ORDER — DIPHENHYDRAMINE HYDROCHLORIDE 50 MG/ML
50 INJECTION, SOLUTION INTRAMUSCULAR; INTRAVENOUS AS NEEDED
Status: CANCELLED
Start: 2022-05-31

## 2022-03-31 RX ORDER — ACETAMINOPHEN 325 MG/1
650 TABLET ORAL AS NEEDED
Status: CANCELLED
Start: 2022-05-31

## 2022-03-31 RX ORDER — ONDANSETRON 2 MG/ML
8 INJECTION INTRAMUSCULAR; INTRAVENOUS AS NEEDED
Status: ACTIVE | OUTPATIENT
Start: 2022-03-31 | End: 2022-03-31

## 2022-03-31 RX ORDER — DIPHENHYDRAMINE HYDROCHLORIDE 50 MG/ML
50 INJECTION, SOLUTION INTRAMUSCULAR; INTRAVENOUS AS NEEDED
Status: CANCELLED
Start: 2022-04-28

## 2022-03-31 RX ORDER — EPINEPHRINE 1 MG/ML
0.3 INJECTION, SOLUTION, CONCENTRATE INTRAVENOUS AS NEEDED
Status: CANCELLED | OUTPATIENT
Start: 2022-07-05

## 2022-03-31 RX ORDER — SODIUM CHLORIDE 9 MG/ML
25 INJECTION, SOLUTION INTRAVENOUS CONTINUOUS
Status: CANCELLED | OUTPATIENT
Start: 2022-07-22

## 2022-03-31 RX ORDER — HEPARIN 100 UNIT/ML
300-500 SYRINGE INTRAVENOUS AS NEEDED
Status: ACTIVE | OUTPATIENT
Start: 2022-03-31 | End: 2022-03-31

## 2022-03-31 RX ORDER — DIPHENHYDRAMINE HYDROCHLORIDE 50 MG/ML
50 INJECTION, SOLUTION INTRAMUSCULAR; INTRAVENOUS AS NEEDED
Status: ACTIVE | OUTPATIENT
Start: 2022-03-31 | End: 2022-03-31

## 2022-03-31 RX ORDER — ACETAMINOPHEN 325 MG/1
650 TABLET ORAL AS NEEDED
Status: CANCELLED
Start: 2022-07-05

## 2022-03-31 RX ORDER — DIPHENHYDRAMINE HYDROCHLORIDE 50 MG/ML
25 INJECTION, SOLUTION INTRAMUSCULAR; INTRAVENOUS AS NEEDED
Status: ACTIVE | OUTPATIENT
Start: 2022-03-31 | End: 2022-03-31

## 2022-03-31 RX ORDER — EPINEPHRINE 1 MG/ML
0.3 INJECTION, SOLUTION, CONCENTRATE INTRAVENOUS AS NEEDED
Status: CANCELLED | OUTPATIENT
Start: 2022-04-28

## 2022-03-31 RX ORDER — ONDANSETRON 2 MG/ML
8 INJECTION INTRAMUSCULAR; INTRAVENOUS AS NEEDED
Status: CANCELLED | OUTPATIENT
Start: 2022-07-05

## 2022-03-31 RX ORDER — HYDROCORTISONE SODIUM SUCCINATE 100 MG/2ML
100 INJECTION, POWDER, FOR SOLUTION INTRAMUSCULAR; INTRAVENOUS AS NEEDED
Status: ACTIVE | OUTPATIENT
Start: 2022-03-31 | End: 2022-03-31

## 2022-03-31 RX ORDER — ALBUTEROL SULFATE 0.83 MG/ML
2.5 SOLUTION RESPIRATORY (INHALATION) AS NEEDED
Status: CANCELLED
Start: 2022-04-28

## 2022-03-31 RX ORDER — ALBUTEROL SULFATE 0.83 MG/ML
2.5 SOLUTION RESPIRATORY (INHALATION) AS NEEDED
Status: CANCELLED
Start: 2022-07-05

## 2022-03-31 RX ORDER — HEPARIN 100 UNIT/ML
300-500 SYRINGE INTRAVENOUS AS NEEDED
Status: CANCELLED
Start: 2022-07-05

## 2022-03-31 RX ORDER — SODIUM CHLORIDE 0.9 % (FLUSH) 0.9 %
10 SYRINGE (ML) INJECTION AS NEEDED
Status: CANCELLED | OUTPATIENT
Start: 2022-07-05

## 2022-03-31 RX ORDER — DIPHENHYDRAMINE HYDROCHLORIDE 50 MG/ML
25 INJECTION, SOLUTION INTRAMUSCULAR; INTRAVENOUS AS NEEDED
Status: CANCELLED
Start: 2022-07-22

## 2022-03-31 RX ORDER — ACETAMINOPHEN 325 MG/1
650 TABLET ORAL AS NEEDED
Status: CANCELLED
Start: 2022-04-28

## 2022-03-31 RX ORDER — HYDROCORTISONE SODIUM SUCCINATE 100 MG/2ML
100 INJECTION, POWDER, FOR SOLUTION INTRAMUSCULAR; INTRAVENOUS AS NEEDED
Status: CANCELLED | OUTPATIENT
Start: 2022-07-05

## 2022-03-31 RX ORDER — SODIUM CHLORIDE 9 MG/ML
25 INJECTION, SOLUTION INTRAVENOUS CONTINUOUS
Status: CANCELLED | OUTPATIENT
Start: 2022-05-31

## 2022-03-31 RX ORDER — DIPHENHYDRAMINE HYDROCHLORIDE 50 MG/ML
25 INJECTION, SOLUTION INTRAMUSCULAR; INTRAVENOUS AS NEEDED
Status: CANCELLED
Start: 2022-04-28

## 2022-03-31 RX ORDER — DIPHENHYDRAMINE HYDROCHLORIDE 50 MG/ML
25 INJECTION, SOLUTION INTRAMUSCULAR; INTRAVENOUS AS NEEDED
Status: CANCELLED
Start: 2022-05-31

## 2022-03-31 RX ORDER — SODIUM CHLORIDE 9 MG/ML
25 INJECTION, SOLUTION INTRAVENOUS CONTINUOUS
Status: CANCELLED | OUTPATIENT
Start: 2022-07-05

## 2022-03-31 RX ADMIN — EPOETIN ALFA-EPBX 10000 UNITS: 10000 INJECTION, SOLUTION INTRAVENOUS; SUBCUTANEOUS at 13:07

## 2022-03-31 RX ADMIN — SODIUM CHLORIDE, PRESERVATIVE FREE 10 ML: 5 INJECTION INTRAVENOUS at 14:21

## 2022-03-31 RX ADMIN — SODIUM CHLORIDE 1000 ML: 9 INJECTION, SOLUTION INTRAVENOUS at 12:19

## 2022-03-31 RX ADMIN — SODIUM CHLORIDE 480 MG: 9 INJECTION, SOLUTION INTRAVENOUS at 13:38

## 2022-03-31 RX ADMIN — HEPARIN 500 UNITS: 100 SYRINGE at 14:21

## 2022-03-31 NOTE — PROGRESS NOTES
Adelfo Brewer is a 69 y.o. male    Chief Complaint   Patient presents with    Chemotherapy     Muscle invasive bladder cancer- Mixed histology       1. Have you been to the ER, urgent care clinic since your last visit? Hospitalized since your last visit? No    2. Have you seen or consulted any other health care providers outside of the [address] [place] [address] since your last visit? Include any pap smears or colon screening.  [facility]- Dr. Emy Lund

## 2022-03-31 NOTE — PROGRESS NOTES
[facility] [place] [address] [address], [address]  W: [phone]  F: [phone]    Reason for Visit:   Gloria Quinones is a 69 y.o. male who is seen in follow-up of Muscle invasive bladder cancer- Mixed histology    Treatment History:   · 3/1/2021: CT IVP: Multiple abdominal, iliac, mediastinal nodes unchanged from 2019 consistent with h/o sarcoidosis, Thickened R lateral bladder wall. · 6/23/2021: Cystoscopy and TURBT- Papillary changes were noted within the prostatic urethra ,  papillary tumors seen emanating from the surface of the left hemitrigone, There were also diffuse changes in the floor of the bladder- Low grade urothelial carcinoma of the prostatic urethra, R lateral bladder wall with HG Muscle invasive urothelial carcinoma and squamous differentiation+ CIS, Left brenda trigone HG non invasive urothelial carcinoma  · 8/5/21- 10/21/2021: Cisplatin + Gemzar x 4   · 9/14/2021: CT was stable. · 12/6/2021: Radical urethro-cystoprostatectomy   · 2/3/22: Adjuvant nivolumab    History of Present Illness:   Patient is a 69 y.o. male with PMH as below including sarcoidosis who is seen for evaluation of bladder cancer. He has a history of nonmuscle invasive bladder cancer in 2015, underwent 2 induction courses of BCG, had a non muscle invasive recurrence in 2019 and had re induction with BCG. Cystoscopy 6/2020 at [facility] did not show any recurrence. In March 2021 he had a positive FISH and was seen by Dr. Niall Orozco. Had a CT IVP 3/2021 which showed some Bladder thickening. 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 Grade 4 neutropenia after cycle 1. Completed 4 cycles and seen after surgery done 12/6/2021. He comes today for cycle 3 of Nivolumab.  He saw his Urologist yesterday for right flank pain and swelling. There is concern for possible hernia around stoma. Notes this is painful and tender to touch. He is wearing stoma belt. Last week this started. No fevers. Redness/bleeding. Tylenol for pain; workign for now. Bowels are challenging; constipation vs diarrhea up to 5-6x. No loose stools in last few days. No rash, no SOB/CP. Not much energy. Mother and sister had breast cancer and brother had kidney cancer     Past Medical History:   Diagnosis Date    Arrhythmia     LBBB    Arthritis     Asthma     MILD    Cancer ([address]     scc top of head and nose    Cancer ([address]     BLADDER    COVID-19 vaccine series completed     [facility] VACCINE 21 AND 21    Depression with anxiety     Hypertension     Other unknown and unspecified cause of morbidity or mortality     history of pulmonary sarcoid     Posterior cervical adenopathy, benign 2018    [facility]monary sarcoidosis [facility]     Unspecified sleep apnea     cpap      Past Surgical History:   Procedure Laterality Date    HX CATARACT REMOVAL Bilateral     HX HERNIA REPAIR Right AS A CHILD    INGUINAL    HX HERNIA REPAIR Left     INGUIBAL    HX KNEE REPLACEMENT Left     HX ORTHOPAEDIC Right     BONE SPURS REMOVED FROM FOOT    HX OTHER SURGICAL      scc removed top of head and nose    HX OTHER SURGICAL  2005    benign lump removed from thyroid    HX OTHER SURGICAL Right 2020    SKIN CANCER RELMOVED FROM LEG    HX UROLOGICAL  2020    CYSTO    IR INSERT TUNL CVC W PORT OVER 5 YEARS  2021    ID CARDIAC SURG PROCEDURE UNLIST  2021    CARDIAC CATH    ID REVISE KNEE JOINT REPLACE,ALL PARTS Left       Social History     Tobacco Use    Smoking status: Former Smoker     Packs/day: 0.50     Years: 2.00     Pack years: 1.00     Quit date: 1965     Years since quittin.7    Smokeless tobacco: Never Used   Substance Use Topics    Alcohol use:  Yes     Alcohol/week: 2.0 standard drinks     Types: 2 Glasses of wine per week     Comment: DAILY      Family History   Problem Relation Age of Onset    Cancer Mother         breast with mets    Dementia Mother     Heart Disease Father     Cancer Sister         breast    Lung Disease Brother     No Known Problems Brother     Anesth Problems Neg Hx      Current Outpatient Medications   Medication Sig    apixaban (Eliquis) 5 mg tablet Take 5 mg by mouth two (2) times a day.  metoprolol tartrate (LOPRESSOR) 25 mg tablet Take 0.5 Tablets by mouth every twelve (12) hours.  zolpidem (AMBIEN) 5 mg tablet Take 5 mg by mouth nightly as needed for Sleep.  buPROPion SR (WELLBUTRIN SR) 100 mg SR tablet Take 100 mg by mouth daily.  acetaminophen (Tylenol Extra Strength) 500 mg tablet Take 1,000 mg by mouth every six (6) hours as needed for Pain.  busPIRone (BUSPAR) 5 mg tablet Take 5 mg by mouth daily.  simvastatin (ZOCOR) 20 mg tablet Take 20 mg by mouth nightly.  escitalopram oxalate (LEXAPRO) 10 mg tablet Take 10 mg by mouth daily. No current facility-administered medications for this visit. Allergies   Allergen Reactions    Pcn [Penicillins] Hives     Patient screened for any delayed non-IgE-mediated reaction to PCN.         Patient notes the following:    NO SEVERE NON-IGE MEDIATED REACTIONS        Review of Systems: A complete review of systems was obtained, negative except as described above. Physical Exam:     Visit Vitals  BP (!) 89/54 (BP 1 Location: Left upper arm, BP Patient Position: Sitting)   Pulse 76   Temp 98.2 °F (36.8 °C)   Resp 18   Wt 217 lb (98.4 kg)   SpO2 96%   BMI 30.27 kg/m²     ECOG PS: 1  General: No distress  Eyes: PERRL, anicteric sclerae  HENT: Atraumatic, PAC in place   Neck: Supple  Skin: No rashes, ecchymoses, or petechiae. Normal temperature, turgor, and texture.   GI: Has a urostomy bag  Psych: Alert, oriented, appropriate affect, normal judgment/insight    Results:     Lab Results   Component Value Date/Time    WBC 6.9 03/30/2022 04:15 PM    HGB 9.6 (L) 03/30/2022 04:15 PM    HCT 30.2 (L) 03/30/2022 04:15 PM    PLATELET 819 20/53/6248 04:15 PM    MCV 99.3 (H) 03/30/2022 04:15 PM    ABS. NEUTROPHILS 5.3 03/30/2022 04:15 PM     Lab Results   Component Value Date/Time    Sodium 134 (L) 03/30/2022 04:15 PM    Potassium 4.3 03/30/2022 04:15 PM    Chloride 109 (H) 03/30/2022 04:15 PM    CO2 19 (L) 03/30/2022 04:15 PM    Glucose 102 (H) 03/30/2022 04:15 PM    BUN 45 (H) 03/30/2022 04:15 PM    Creatinine 2.04 (H) 03/30/2022 04:15 PM    GFR est AA 39 (L) 03/30/2022 04:15 PM    GFR est non-AA 32 (L) 03/30/2022 04:15 PM    Calcium 10.4 (H) 03/30/2022 04:15 PM    Glucose (POC) 143 (H) 12/11/2021 01:02 AM     Lab Results   Component Value Date/Time    Bilirubin, total 0.2 03/30/2022 04:15 PM    ALT (SGPT) 22 03/30/2022 04:15 PM    Alk. phosphatase 62 03/30/2022 04:15 PM    Protein, total 8.2 03/30/2022 04:15 PM    Albumin 3.6 03/30/2022 04:15 PM    Globulin 4.6 (H) 03/30/2022 04:15 PM       External   Records reviewed and summarized above.   Pathology report(s) reviewed    12/21/2021     SPECIMEN       Procedure: Radical urethro-cystoprostatectomy       TUMOR       Tumor Site:                Trigone, Dome, Right lateral wall, Left   Ureteral                                  orifice, Right bladder neck        Histologic Type:     Papillary urothelial carcinoma, invasive and   non-invasive, and carcinoma in situ       Histologic Grade:                High-grade       Tumor Size: Cannot be determined:      Multiple tumors       Tumor Extension:           Tumor invades adjacent structures -   Prostate                                  (transmural invasion from the bladder   tumor)       Lymphovascular Invasion:      Present       Tumor Configuration:           Papillary, Flat       MARGINS       Margins:                     Uninvolved by invasive carcinoma and   carcinoma in situ /                                  noninvasive urothelial carcinoma    LYMPH NODES       Number of Lymph Nodes Involved:      2           Size of Largest Metastatic Deposit:      1 cm               Site:                     Left Pelvic Lymph Nodes           Extranodal Extension:           Present       Number of Lymph Nodes Examined:      5     PATHOLOGIC STAGE CLASSIFICATION (pTNM, AJCC 8th Edition)       TNM Descriptors:                m (multiple primary tumors)       Primary Tumor (pT):                pT4a       Regional Lymph Nodes (pN):           pN2     Carcinoma in situ URETHRA: RESECTION       Tumor Site:                     Urethra       SPECIMEN       Procedure:                     Total urethrectomy    TUMOR       Tumor Site:                     Prostatic urethra       Histologic Type:                       Papillary urothelial carcinoma,   invasive       Histologic Grade:                     High-grade       Tumor Extension:                Tumor invades adjacent structures -   Prostate       Lymphovascular Invasion:           Present       MARGINS       Margins:                          Uninvolved by invasive carcinoma and   carcinoma in situ /                                       noninvasive urothelial carcinoma       LYMPH NODES (These represent the same lymph nodes reported in the BLADDER   Resection Template)       Number of Lymph Nodes Involved:      2           Size of Largest Metastatic Deposit:      1 cm               Site:                     Left Pelvic Lymph node       Number of Lymph Nodes Examined:      5      PATHOLOGIC STAGE CLASSIFICATION (pTNM, AJCC 8th Edition)       TNM Descriptors:                m (multiple primary tumors within the   prostatic urethra)       Primary Tumor (pT):                pT2       Regional Lymph Nodes (pN):           pN2                     4.  Right pelvic lymph nodes, lymph node dissection:        One benign lymph node with noncaseating granulomatous inflammation,   compatible with patients history of sarcoidosis One benign lymph node, no histopathologic changes   No tumor seen     5.  Left pelvic lymph nodes, lymph node dissection:        Metastatic urothelial carcinoma in two of three lymph nodes, 1 cm in   greatest dimension with extracapsular extension   Noncaseating granulomatous inflammation, compatible with patient's history   of sarcoidosis     Radiology report(s) reviewed above. CT CAP 7/19/21:   IMPRESSION  1. While the bladder is decompressed by Juarez is thick-walled and irregular  2. No evidence of metastatic disease to the abdomen or pelvis  3. Extensive bilateral hilar and mediastinal adenopathy with patchy perihilar  airspace disease with associated nodular peribronchial cuffing. Findings can be  seen with sarcoidosis. Differentiating adenopathy from sarcoidosis for  metastatic disease is difficult       9/2021 CT     IMPRESSION     1. Mild bladder wall thickening posteriorly and along the right lateral bladder  wall, nonspecific. No evidence of metastatic disease within the chest, abdomen,  or pelvis. 2. Extensive mediastinal and bilateral hilar lymphadenopathy with lymph node  calcifications, most compatible with known sarcoidosis. No significant change. 3. Bilateral perihilar airspace opacities have improved. No new pulmonary  pathology identified. 4. Severe diverticulosis of the colon. No definite superimposed acute  Diverticulitis. 12/11/2021     IMPRESSION     1. Interval radical cystoprostatectomy with ileal conduit urinary diversion. 2. No postoperative complication identified. 3. Dense bilateral nephrograms in this patient who is status post contrast  injection the previous day. This suggests ATN.     Assessment:   1) Muscle invasive bladder cancer- With squamous differentiation     [id number]  S/p 4 cycles of NAC with Cisplatin+ gemzar complicated by grade 4 neutropenia  S/P Radical urethro-cystoprostatectomy 12/6/2021- pT4N2 (invaded prostate), HG papillar, +LVI  Path stage-  Stage IIIB  Disease at high risk for local and distant recurrence  Margins negative but nodes had EPE    Today is cycle 3 nivolumab; proceed as planned. Is tolerating this well. Labs    2) Urethral TCC    S/P Total urethrectomy 12/6  HG Papillary [id number]  Margins negative  EPE  Present  See above    Will also refer to Rad Onc to discuss whether RT is indictated at a later date. 3) Sarcoidosis  Follows with pulmonary  Stable. 4) Obesity    5) Anemia    Likely secondary to CKD   Status post injectafer x 1 on 3/11/2022. He was also started on Retacrit 10,000 units on 3/3/2022 and is being given monthly. Anemia has improved. 6) Dehydration  Hypotension, dizziness, mild decrease in sodium   Creatinine bump  1L NS today; push fluids    7) Encounter for high risk medication like immunotherapy  Labs reviewed   She is at higher risk for recurrence. Plan:     · Proceed today with cycle 3 of Nivolumab q4 week dosing for 1 year   · CBC, CMP, TSH per protocol  · CT CAP restaging ordered  · 1L NS bolus  · Will refer to rad onc at a future visit  · SW following  · See urology as scheduled   · Continue Retacrit 10,000 units monthly  · Goal hemoglobin is 10 g or less. Hold if hemoglobin more than 10 g/dL      RTC in 4 weeks with scans    I appreciate the opportunity to participate in Mr. Sinan Colby care. I personally saw and evaluated the patient and performed the key components of medical decision making. The history, physical exam, and documentation were performed by Robyn Britton NP. I reviewed and verified the above documentation and modified it as needed.       Signed By: Sabina Gutiérrez MD

## 2022-03-31 NOTE — PROGRESS NOTES
[employer]  Oncology Social Work  Encounter     Patient Name:  Rosario Smithort History: Muscle invasive bladder cancer- Mixed histology    Advance Directives: Patient does not have an advanced medical directive, and did not express interest in completing one today. Narrative:   Met with the patient and his wife during his office visit today. The patient's wife shared that he has been feeling depressed. He shared that he recently learned that he has a hernia, and has started wearing an abdominal binder. Provided a list of behavioral health resources compiled by this , including emergency numbers for each county, suicide hotline numbers, cancer support hotlines, and local providers for psychiatry and counseling. Also provided information for the Ostomy Association of [facility], explaining that they have monthly meetings.      Provided this 's contact information as well as information regarding the complementary services provided by the [facility]. Explained that meditation, yoga, relationship counseling, and music therapy, are being offered virtually. Barriers to Care:   Adjustment to illness and ostomy     Plan:  Ongoing psychosocial support as desired by patient.      Referral/Handouts:   Behavioral health referral  Complementary therapies referral  Support Groups referral (Ostomy)   Nguyễn Bingham LCSW

## 2022-03-31 NOTE — PROGRESS NOTES
OPIC Chemo Progress Note    Date: 2022    Name: Peyman Padilla    MRN: [id number]         : 1945    [address] Mr. Bradley Matt Arrived to [facility] for Opdivo/Retacrit ambulatory in stable condition. Assessment was completed, no acute issues at this time, no new complaints voiced. Port accessed with positive blood return. Went to provider appointment with Medical Oncology. Hydration ordered per provider. Chemotherapy Flowsheet 3/31/2022   Cycle C3   Date 3/31/2022   Drug / Regimen Opdivo   Pre Hydration -   Post Hydration -   Pre Meds -   Notes given + Retacrit       Mr. Yousuf Chaudhry vitals were reviewed. Patient Vitals for the past 12 hrs:   Temp Pulse Resp BP SpO2   22 1422  68  133/84    22 1208 (!) 96.4 °F (35.8 °C) 70 18 122/78 97 %     Pre-medications  were administered as ordered and chemotherapy was initiated. Medications Administered     epoetin bhumi-epbx (RETACRIT) injection 10,000 Units     Admin Date  2022 Action  Given Dose  10,000 Units Route  SubCUTAneous Administered By  Katharine Salazar RN          heparin (porcine) pf 300-500 Units     Admin Date  2022 Action  Given Dose  500 Units Route  InterCATHeter Administered By  Katharine Salazar RN          nivolumab (OPDIVO) 480 mg in 0.9% sodium chloride 100 mL, overfill volume 10 mL IVPB     Admin Date  2022 Action  New Bag Dose  480 mg Rate  316 mL/hr Route  IntraVENous Administered By  Katharine Salazar RN          sodium chloride (NS) flush 10 mL     Admin Date  2022 Action  Given Dose  10 mL Route  IntraVENous Administered By  Katharine Salazar RN          sodium chloride 0.9 % bolus infusion 1,000 mL     Admin Date  2022 Action  New Bag Dose  1,000 mL Rate  1,000 mL/hr Route  IntraVENous Administered By  Katharine Salazar RN               Retacrit given in the right arm SQ    1425 Patient tolerated treatment well. Port maintained positive blood return throughout treatment.  Port flushed, heparinized and de accessed per protocol. Patient was discharged [facility] in stable condition. Patient aware of next appointment.      Future Appointments   Date Time Provider Gregorio Ibarra   4/28/2022 11:00 AM B3 TD MED [facility]   4/28/2022 11:15 AM Vicenta Campos  N [facility]   5/26/2022 11:00 AM B3 TD  Green . [facility]   6/23/2022 11:00 [facility]   7/21/2022 11:00 AM B3 TD MED CIRA Pulido  March 31, 2022

## 2022-04-07 ENCOUNTER — APPOINTMENT (OUTPATIENT)
Dept: INFUSION THERAPY | Age: 77
End: 2022-04-07

## 2022-04-18 ENCOUNTER — HOSPITAL ENCOUNTER (OUTPATIENT)
Dept: WOUND CARE | Age: 77
Discharge: HOME | End: 2022-04-18
Payer: MEDICARE

## 2022-04-18 VITALS
HEART RATE: 71 BPM | DIASTOLIC BLOOD PRESSURE: 72 MMHG | TEMPERATURE: 97.3 F | SYSTOLIC BLOOD PRESSURE: 116 MMHG | RESPIRATION RATE: 16 BRPM

## 2022-04-18 PROCEDURE — 99213 OFFICE O/P EST LOW 20 MIN: CPT

## 2022-04-18 RX ORDER — BISMUTH SUBSALICYLATE 262 MG
1 TABLET,CHEWABLE ORAL DAILY
COMMUNITY

## 2022-04-18 RX ORDER — SULFAMETHOXAZOLE AND TRIMETHOPRIM 800; 160 MG/1; MG/1
1 TABLET ORAL 2 TIMES DAILY
COMMUNITY
End: 2022-04-29 | Stop reason: ALTCHOICE

## 2022-04-18 NOTE — WOUND CARE
04/18/22 1146   Wound Peristomal Right #1 04/18/22   Date First Assessed/Time First Assessed: 04/18/22 1145   Present on Hospital Admission: Yes  Wound Approximate Age at First Assessment (Weeks): 4 weeks  Primary Wound Type: Traumatic  Location: Peristomal  Wound Location Orientation: Right  Wound Desc. ..    Wound Image    Wound Etiology Traumatic   Dressing Status Old drainage noted   Cleansed   (tap water)   Dressing/Treatment   (ostomy appliance)   Wound Length (cm) 3.5 cm   Wound Width (cm) 6.5 cm   Wound Depth (cm) 0.2 cm   Wound Surface Area (cm^2) 22.75 cm^2   Wound Volume (cm^3) 4.55 cm^3   Wound Assessment Pink/red   Drainage Amount Moderate   Drainage Description Serosanguinous   Wound Odor None   Vickie-Wound/Incision Assessment Intact   Edges Defined edges   Wound Thickness Description Full thickness     Visit Vitals  /72 (BP 1 Location: Right upper arm, BP Patient Position: At rest;Reclining)   Pulse 71   Temp 97.3 °F (36.3 °C)   Resp 16

## 2022-04-18 NOTE — WOUND CARE
Discharge Instructions/Wound Orders  [facility]  [address]  [place], [address] [facility]  Telephone: [phone]    NAME:  Joyce Ponce OF BIRTH:  1945  MEDICAL RECORD NUMBER:  [id number]  DATE:  4/18/2022  Ostomy Care Orders:  1) Remove old bag and clean peristomal skin (including wound) with tap water, or clean in shower, then pat dry thoroughly. 2) Cover wound with calcium alginate precut for you, then secure with thin douderm as per diagram.  3) Apply Dalton 1 piece cut to fit soft convex bag to 30mm, remove plastic backing and apply a small bead of paste at the opening. 4) Fold the bag like a taco and place over the stoma site (over top of the dressing). 5) Remove paper backing from tape border and secure with ostomy belt. 6) Change 2 x week and as needed for leaking. Supply List  Dalton [address] bag  Stoma paste  [place] ostomy belt # 8821  Urinary drainage bag      Dietary:  [x] Diet as tolerated: [] Calorie Diabetic Diet:Low carb and no Sugar [] No Added Salt:[x] Increase Protein: [] Other:Limit the amount of liquid you are drinking and avoid drinking in between meals   Activity:  [x] Activity as tolerated:  [] Patient has no activity restrictions     [] Strict Bedrest: [] Remain off Work:     [] May return to full duty work:                                   [] Return to work with restrictions:             Return Appointment:  [x] Return Appointment: With Kelin Canchola  in  Wednesday 04/20/22 3:30 PM   [] Ordered tests:    Electronically signed Yamel Roberto RN on 4/18/2022 at 11:52 AM     [address] [place] [address] Information: Should you experience any significant changes in your wound(s) or have questions about your wound care, please contact the [address] [place] [address] at [address] [place] [address] Street 8:00 am - 4:30.   If you need help with your wound outside these hours and cannot wait until we are again available, contact your PCP or go to the hospital emergency room. PLEASE NOTE: IF YOU ARE UNABLE TO OBTAIN WOUND SUPPLIES, CONTINUE TO USE THE SUPPLIES YOU HAVE AVAILABLE UNTIL YOU ARE ABLE TO REACH US. IT IS MOST IMPORTANT TO KEEP THE WOUND COVERED AT ALL TIMES.      CWON Signature:_______________________    Date: ___________ Time:  ____________

## 2022-04-18 NOTE — WOUND CARE
OSTOMY Assessment    Stoma Tissue Assessment: __x_Post-op ___Follow-up       Type of Diversion: _x__New___ Established ___Revision___Permanent____Temporary    _____  Ileostomy   _____  Colostomy: ____ Ascending, ____ Transverse, _____.  Sigmoid   _____  Katerina Pass   __x___  Ileal Conduit   _____  Mucous Fistula     Appearance of Ostomy:   __x_ Shanti Kb /Moist/Viable ___ Dark Red ___ Pink ___ Sloughing ___Necrotic____Pallor ____Red  ___Dry ____Moist    Stoma Size: ____30_____ (mm) __x_Round ___ Oval ___Irregular     Stoma Height:   ____Flush ____Retracted __x__Budded ____Edematous ____Prolapse     Stoma Location  ____ Left Side          __x_Right Side     _____Umbilicus  _____Incision Line  _x___ Above Belt       ____ Below Belt    Abdominal Contours  ____ Firm ____ Flat ____Flabby _x__Soft _x__Round ___ Hard ___ Other       Stoma Function: _x_Yes __No  Output:   __x__ Yellow ____Amber ____ Pink(Hematuria) ____ Tea Colored   ____ Clots ____Foul Odor ____ Mucous     Peristomal skin: Large peristomal wound, see wound documentation  ___ Intact ___ Irritant Dermatitis ___ Allergic Contact Dermatitis ___Candidiasis ___Caput Medusae ___Folliculitis ___Mechanical Trauma ___Mucosal Transplantation    ___Pyoderma Gangrenosum ___Hyperplasia ___Radiation Trauma ___Allergies mpling  ___ Dimpling ____Blistered ____Fragile ____Macerated ___Peeling  ___Ulceration  ___ Fungal ___Peristomal Hernia ___Non-blanchable ___ Hypergranulation      Stoma Complications: See above  __Excessive Bleeding __Ischemia __ Abscess __Necrosis __Prolapse   x__Hernia __ Retraction __Stenosis __Mucosal Separation __Melanosis Coli   __Laceration __Other     Application for Patient    Wafer and pouch used during assessment and education __Dalton [address] one piece soft convex cut to fit__________    Pouch System Recommended:   _x_One-Piece __Two-Piece ___Custom     Flat:   ___Pre-cut   __x_Cut-to fit     Convexity: ___Shallow ___Deep_x__ Flexible ___Creative Convexity   ___Pre-cut ___Cut to [employer]     Accessory Products Plain alginate, thin douderm  __ Adhesive Seals __Adhesive Remover Wipes __Barrier [employer] __Barrier Wipes   __Deodorizer __Liquid Adhesive _x_ Paste __ Powder __Strip   _x_ Support Belt __Tape      Education for Patient & Family  1. Booklet of information on specific ostomy given and some verbal discussion of patients ostomy and expectations. 2. Instruction/demostration of ostomy wafer & pouch change. 3. Discuss concerns/ answer questions. 4. Provide follow up education in clinic if needed.        PICTURE INSERT:

## 2022-04-18 NOTE — WOUND CARE
Clinical Level of Care Assessment    Outpatient Ostomy Care      NAME:  Donn Bazzi OF BIRTH:  1945  MEDICAL RECORD NUMBER:  [id number]   DATE:  4/18/2022      Patient Vandana Miranda Assessment- Document in Flowsheet I&O   Points   Review of chart []   0   Assess Complete Ostomy tab in Navigator for assessment of; stoma status, peristomal skin, presence of hernia/stool consistency/diet/related medications   Simple adjustments to pouch size/pouch system, new stoma pattern, accessory addition/deletion. []   1   Assess Complete Ostomy tab in Navigator for assessment of; stoma status, peristomal skin, presence of hernia/stool consistency/diet/related medications   Moderate adjustments to pouch size/pouch system, new stoma pattern, accessory addition/deletion. Observe patient/caregiver with hands-on care. 1-2 adjustments to pouch size/system/skin care/accessory addition or deletion. []   2   Assess Complete Ostomy tab in Navigator for assessment of; stoma status, peristomal skin, presence of hernia/stool consistency/diet/related medications   Complex adjustments to pouch size/pouch system, new stoma pattern, accessory addition/deletion. 3 or more complex adjustments to pouch size/system/skin care/accessory addition or deletion. Observe patient/caregiver with hands-on care. Assess patient/patient abdomen for optimal pre-marked stoma site. Assess patient abdomen for type of hernia belt/accessory needed. [x]   3         Ambulation Status Documented in [facility] Clinical Note  Status Definition Points   Independent Independently able to ambulate. Fully able (without any assistance) to get on/off exam table/chair. [x]   0   Minimal Physical Assistance Requires physical assistance of one person to ambulate and/or position patient to be examined. Includes necessary physical assistance to position lower extremities on/off stool.    []   1   Moderate Physical Assistance Requires at least one staff member to physically assist patient in ambulating into treatment room, and/or on off chair/bed. Requires assistance to bathroom. []   2   Full Assistance Requires assistance of at least two staff members to transfer patient into treatment room and/or on/off bed/chair. \"Total Transfer\". Unable to use bathroom requires bedside commode and/or bedpan []   3       Teaching Effort Documented in [facility] Clinical Note  Effort Definition Points   No Teaching  []   0   General Initial/Simple lesson:  Assess readiness to learn, assess patient learning style to determine educational flow/special needs for learning. Teaching related to 1-3 topics  Documentation in [facility] completed. []   1   Intermediate Assess readiness to learn, assess patient learning style to determine educational flow/special needs for learning. Teaching related to 3-4 topics. Hernia belt application and care considerations  Documentation in [facility] completed. [x]   2   Complex Assess readiness to learn, assess patient learning style to determine educational flow/special needs for learning. Teaching of greater than 5 additional topics   Pre-operative ostomy education with review of written resources for patient/family/caregiver as needed. Demonstration/return demonstration of ostomy irrigation  Documentation in [facility] completed. []   3     Patient Assessment and Planning in [facility] Clinical Note   Planning Definition Points   Simple Simple pouch change procedure completed and reviewed with patient/family/caregiver   Documentation in [facility] completed. []   1   Intermediate Moderate level of follow-up needs:   Pouch change/discharge procedure revised and reviewed with patient/caregiver. Communications with outside resources; i.e. Telephone calls to Surgeon/ PCP, family/caregiver, home health, ECF. Documentation in [facility] completed.      [x]   2   Complex Complex level of instructions/changes:   Family/Caregiver learning/demonstration/return demonstration visit. Pouching/discharge procedure revised/reviewed with patient/family/caregiver. Contact with outside resources; i.e. communication with Surgeon/ PCP, home health, ECF. Contact/referral to ostomy appliance supplier for new or additional products. Review when to call [facility] or schedule follow-up visit. Referral to Emergency Department   Documentation in [facility] completed. []   3       Is this the Patient's First Visit with [facility] @ [facility]? Yes    Is this Patient Established to this [facility] within the last 3 years?    Yes             Clinical Level of Care      Points  0-3  Level 1 []     Points  4-6  Level 2 []     Points  7-8  Level 3 [x]     Points  9-10  Level 4 []     Points  11-12  Level 5 []       Electronically signed by Ariel Cornelius RN on 4/18/2022 at 12:07 PM

## 2022-04-20 ENCOUNTER — HOSPITAL ENCOUNTER (OUTPATIENT)
Dept: WOUND CARE | Age: 77
Discharge: HOME | End: 2022-04-20
Payer: MEDICARE

## 2022-04-20 PROCEDURE — 99212 OFFICE O/P EST SF 10 MIN: CPT

## 2022-04-20 NOTE — WOUND CARE
OSTOMY Assessment    Stoma Tissue Assessment: _x__Post-op ___Follow-up       Type of Diversion: ___New_x__ Established ___Revision___Permanent____Temporary    _____  Ileostomy   _____  Colostomy: ____ Ascending, ____ Transverse, _____.  Sigmoid   _____  Aria Rounds   __x__  Ileal Conduit   _____  Mucous Fistula     Appearance of Ostomy:   _x__ Kaley Camper /Moist/Viable ___ Dark Red ___ Pink ___ Sloughing ___Necrotic____Pallor ____Red  ___Dry ____Moist    Stoma Size: ___30______ (mm) ___Round ___ Oval ___Irregular     Stoma Height:   ____Flush ____Retracted __x__Budded ____Edematous ____Prolapse     Stoma Location  ____ Left Side          __x__Right Side     _____Umbilicus  _____Incision Line  __x__ Above Belt       ____ Below Belt    Abdominal Contours  ____ Firm ____ Flat ____Flabby __x_Soft _x__Round ___ Hard ___ Other     Stoma Function: _x_Yes __No  Output:   _x__ Yellow ____Amber ____ Pink(Hematuria) ____ Tea Colored   ____ Clots ____Foul Odor ____ Mucous     Peristomal skin: Large peristomal wound (see wound flowsheet for dimensions etc)  ___ Intact ___ Irritant Dermatitis ___ Allergic Contact Dermatitis ___Candidiasis ___Caput Medusae ___Folliculitis ___Mechanical Trauma ___Mucosal Transplantation    ___Pyoderma Gangrenosum ___Hyperplasia ___Radiation Trauma ___Allergies mpling  ___ Dimpling ____Blistered ____Fragile ____Macerated ___Peeling  ___Ulceration  ___ Fungal ___Peristomal Hernia ___Non-blanchable ___ Hypergranulation      Stoma Complications:   __Excessive Bleeding __Ischemia __ Abscess __Necrosis __Prolapse   x__Hernia __ Retraction __Stenosis __Mucosal Separation __Melanosis Coli   __Laceration __Other     Application for Patient    Wafer and pouch used during assessment and education _Dalton [address]1928 one piece soft convex cut to fit___________    Pouch System Recommended:   x__One-Piece __Two-Piece ___Custom     Flat:   ___Pre-cut   _x__Cut-to fit     Convexity: ___Shallow ___Deep__x_ Flexible ___Creative Convexity   ___Pre-cut ___Cut to [employer]     Accessory Products   __ Adhesive Seals __Adhesive Remover Wipes __Barrier [employer] _x_Barrier Wipes   __Deodorizer __Liquid Adhesive _x_ Paste __ Powder __Strip   _x_ Support Belt __Tape      Education for Patient & Family  1. Booklet of information on specific ostomy given and some verbal discussion of patients ostomy and expectations. 2. Instruction/demostration of ostomy wafer & pouch change. 3. Discuss concerns/ answer questions. 4. Provide follow up education in clinic if needed.        PICTURE INSERT:

## 2022-04-20 NOTE — WOUND CARE
Clinical Level of Care Assessment    Outpatient Ostomy Care      NAME:  Donn Bazzi OF BIRTH:  1945  MEDICAL RECORD NUMBER:  [id number]   DATE:  4/20/2022      Patient Vandana Miranda Assessment- Document in Flowsheet I&O   Points   Review of chart []   0   Assess Complete Ostomy tab in Navigator for assessment of; stoma status, peristomal skin, presence of hernia/stool consistency/diet/related medications   Simple adjustments to pouch size/pouch system, new stoma pattern, accessory addition/deletion. [x]   1   Assess Complete Ostomy tab in Navigator for assessment of; stoma status, peristomal skin, presence of hernia/stool consistency/diet/related medications   Moderate adjustments to pouch size/pouch system, new stoma pattern, accessory addition/deletion. Observe patient/caregiver with hands-on care. 1-2 adjustments to pouch size/system/skin care/accessory addition or deletion. []   2   Assess Complete Ostomy tab in Navigator for assessment of; stoma status, peristomal skin, presence of hernia/stool consistency/diet/related medications   Complex adjustments to pouch size/pouch system, new stoma pattern, accessory addition/deletion. 3 or more complex adjustments to pouch size/system/skin care/accessory addition or deletion. Observe patient/caregiver with hands-on care. Assess patient/patient abdomen for optimal pre-marked stoma site. Assess patient abdomen for type of hernia belt/accessory needed. []   3         Ambulation Status Documented in [facility] Clinical Note  Status Definition Points   Independent Independently able to ambulate. Fully able (without any assistance) to get on/off exam table/chair. [x]   0   Minimal Physical Assistance Requires physical assistance of one person to ambulate and/or position patient to be examined. Includes necessary physical assistance to position lower extremities on/off stool.    []   1   Moderate Physical Assistance Requires at least one staff member to physically assist patient in ambulating into treatment room, and/or on off chair/bed. Requires assistance to bathroom. []   2   Full Assistance Requires assistance of at least two staff members to transfer patient into treatment room and/or on/off bed/chair. \"Total Transfer\". Unable to use bathroom requires bedside commode and/or bedpan []   3       Teaching Effort Documented in [facility] Clinical Note  Effort Definition Points   No Teaching  []   0   General Initial/Simple lesson:  Assess readiness to learn, assess patient learning style to determine educational flow/special needs for learning. Teaching related to 1-3 topics  Documentation in [facility] completed. []   1   Intermediate Assess readiness to learn, assess patient learning style to determine educational flow/special needs for learning. Teaching related to 3-4 topics. Hernia belt application and care considerations  Documentation in [facility] completed. []   2   Complex Assess readiness to learn, assess patient learning style to determine educational flow/special needs for learning. Teaching of greater than 5 additional topics   Pre-operative ostomy education with review of written resources for patient/family/caregiver as needed. Demonstration/return demonstration of ostomy irrigation  Documentation in [facility] completed. [x]   3     Patient Assessment and Planning in [facility] Clinical Note   Planning Definition Points   Simple Simple pouch change procedure completed and reviewed with patient/family/caregiver   Documentation in [facility] completed. []   1   Intermediate Moderate level of follow-up needs:   Pouch change/discharge procedure revised and reviewed with patient/caregiver. Communications with outside resources; i.e. Telephone calls to Surgeon/ PCP, family/caregiver, home health, ECF. Documentation in [facility] completed.      [x]   2   Complex Complex level of instructions/changes:   Family/Caregiver learning/demonstration/return demonstration visit. Pouching/discharge procedure revised/reviewed with patient/family/caregiver. Contact with outside resources; i.e. communication with Surgeon/ PCP, home health, ECF. Contact/referral to ostomy appliance supplier for new or additional products. Review when to call [facility] or schedule follow-up visit. Referral to Emergency Department   Documentation in [facility] completed. []   3       Is this the Patient's First Visit with [facility] @ [facility]? No    Is this Patient Established to this [facility] within the last 3 years?    Yes:               Clinical Level of Care      Points  0-3  Level 1 []     Points  4-6  Level 2 [x]     Points  7-8  Level 3 []     Points  9-10  Level 4 []     Points  11-12  Level 5 []       Electronically signed by [unfilled] on 4/20/2022 at 5:15 PM

## 2022-04-20 NOTE — WOUND CARE
Discharge Instructions/Wound Orders  [address], [address] [place] [address] [id number]  Telephone: [phone]    NAME:  Mahi Negron OF BIRTH:  1945  MEDICAL RECORD NUMBER:  [id number]  DATE:  4/20/2022     Ostomy Care Orders:  1) Remove old bag and clean peristomal skin (including wound) with tap water, or clean in shower, then pat dry thoroughly. 2) Cover wound with calcium alginate precut for you, then secure with thin douderm as per diagram.  3) Apply Harlan 1 piece cut to fit soft convex bag to 30mm, remove plastic backing and apply a small bead of paste at the opening. 4) Fold the bag like a taco and place over the stoma site (over top of the dressing). 5) Remove paper backing from tape border and secure with ostomy belt. 6) Change 2 x week and as needed for leaking.     Supply List  Dalton 45674 bag  Stoma paste  Harlan ostomy belt # [id number]  Urinary drainage bag     Wife was present at today's visit and she was given step by step instructions in wound care and appliance change procedure as well. She verbalized understanding and reluctantly agreed to assist patient with the process.      Dietary:  [x]? Diet as tolerated:   []? Calorie Diabetic Diet:Low carb and no Sugar       []? No Added Salt:[x]? Increase Protein:       []? Other:Limit the amount of liquid you are drinking and avoid drinking in between meals              Activity:  [x]? Activity as tolerated:  []? Patient has no activity restrictions     []? Strict Bedrest:           []? Remain off Work:     []? May return to full duty work:                                   []? Return to work with restrictions:              [address]   Return Appointment:  [x]? Return Appointment: With Kelin Ludwig  in  Wednesday 04/20/22 3:30 PM   []?  Ordered tests:      Laurent Rowe, Clinical Manager

## 2022-04-22 ENCOUNTER — HOSPITAL ENCOUNTER (OUTPATIENT)
Dept: CT IMAGING | Age: 77
Discharge: HOME | End: 2022-04-22
Attending: NURSE PRACTITIONER
Payer: MEDICARE

## 2022-04-22 DIAGNOSIS — C67.9 MALIGNANT NEOPLASM OF URINARY BLADDER, UNSPECIFIED SITE (HCC): ICD-10-CM

## 2022-04-22 PROCEDURE — 71250 CT THORAX DX C-: CPT

## 2022-04-22 PROCEDURE — 74176 CT ABD & PELVIS W/O CONTRAST: CPT

## 2022-04-25 RX ORDER — ALBUTEROL SULFATE 0.83 MG/ML
2.5 SOLUTION RESPIRATORY (INHALATION) AS NEEDED
Status: CANCELLED
Start: 2022-05-03

## 2022-04-25 RX ORDER — ONDANSETRON 2 MG/ML
8 INJECTION INTRAMUSCULAR; INTRAVENOUS AS NEEDED
Status: CANCELLED | OUTPATIENT
Start: 2022-05-03

## 2022-04-25 RX ORDER — SODIUM CHLORIDE 0.9 % (FLUSH) 0.9 %
10 SYRINGE (ML) INJECTION AS NEEDED
Status: CANCELLED | OUTPATIENT
Start: 2022-05-03

## 2022-04-25 RX ORDER — EPINEPHRINE 1 MG/ML
0.3 INJECTION, SOLUTION, CONCENTRATE INTRAVENOUS AS NEEDED
Status: CANCELLED | OUTPATIENT
Start: 2022-05-03

## 2022-04-25 RX ORDER — HYDROCORTISONE SODIUM SUCCINATE 100 MG/2ML
100 INJECTION, POWDER, FOR SOLUTION INTRAMUSCULAR; INTRAVENOUS AS NEEDED
Status: CANCELLED | OUTPATIENT
Start: 2022-05-03

## 2022-04-25 RX ORDER — ACETAMINOPHEN 325 MG/1
650 TABLET ORAL AS NEEDED
Status: CANCELLED
Start: 2022-05-03

## 2022-04-25 RX ORDER — DIPHENHYDRAMINE HYDROCHLORIDE 50 MG/ML
50 INJECTION, SOLUTION INTRAMUSCULAR; INTRAVENOUS AS NEEDED
Status: CANCELLED
Start: 2022-05-03

## 2022-04-25 RX ORDER — SODIUM CHLORIDE 9 MG/ML
25 INJECTION, SOLUTION INTRAVENOUS CONTINUOUS
Status: CANCELLED | OUTPATIENT
Start: 2022-05-03

## 2022-04-25 RX ORDER — HEPARIN 100 UNIT/ML
300-500 SYRINGE INTRAVENOUS AS NEEDED
Status: CANCELLED
Start: 2022-05-03

## 2022-04-25 RX ORDER — SODIUM CHLORIDE 9 MG/ML
10 INJECTION INTRAMUSCULAR; INTRAVENOUS; SUBCUTANEOUS AS NEEDED
Status: CANCELLED | OUTPATIENT
Start: 2022-05-03

## 2022-04-25 RX ORDER — DIPHENHYDRAMINE HYDROCHLORIDE 50 MG/ML
25 INJECTION, SOLUTION INTRAMUSCULAR; INTRAVENOUS AS NEEDED
Status: CANCELLED
Start: 2022-05-03

## 2022-04-28 ENCOUNTER — APPOINTMENT (OUTPATIENT)
Dept: INFUSION THERAPY | Age: 77
End: 2022-04-28

## 2022-04-29 ENCOUNTER — HOSPITAL ENCOUNTER (OUTPATIENT)
Dept: WOUND CARE | Age: 77
Discharge: HOME | End: 2022-04-29
Payer: MEDICARE

## 2022-04-29 VITALS
DIASTOLIC BLOOD PRESSURE: 68 MMHG | HEART RATE: 65 BPM | RESPIRATION RATE: 18 BRPM | SYSTOLIC BLOOD PRESSURE: 123 MMHG | TEMPERATURE: 97.4 F

## 2022-04-29 PROCEDURE — 99213 OFFICE O/P EST LOW 20 MIN: CPT

## 2022-04-29 NOTE — WOUND CARE
OSTOMY Assessment    Stoma Tissue Assessment: _x__Post-op ___Follow-up       Type of Diversion: _x__New___ Established ___Revision___Permanent____Temporary    _____  Ileostomy   _____  Colostomy: ____ Ascending, ____ Transverse, _____.  Sigmoid   _____  Fred Class   __x___  Ileal Conduit   _____  Mucous Fistula     Appearance of Ostomy:   __x_ Myla Flank /Moist/Viable ___ Dark Red ___ Pink ___ Sloughing ___Necrotic____Pallor ____Red  ___Dry ____Moist    Stoma Size: ___30______ (mm) ___Round ___ Oval ___Irregular     Stoma Height:   ____Flush ____Retracted __x__Budded ____Edematous ____Prolapse     Stoma Location  ____ Left Side          _x___Right Side     _____Umbilicus  _____Incision Line  __x__ Above Belt       ____ Below Belt    Abdominal Contours  ____ Firm ____ Flat ____Flabby ___Soft _x_Round _x__ Hard ___ Other     Stoma Function: _x_Yes __No  Output:   _x__ Yellow ____Amber ____ Pink(Hematuria) ____ Tea Colored   ____ Clots ____Foul Odor ____ Mucous     Peristomal skin: Large peristomal wound (see wound flowsheet)  ___ Intact ___ Irritant Dermatitis ___ Allergic Contact Dermatitis ___Candidiasis ___Caput Medusae ___Folliculitis ___Mechanical Trauma ___Mucosal Transplantation    ___Pyoderma Gangrenosum ___Hyperplasia ___Radiation Trauma ___Allergies mpling  ___ Dimpling ____Blistered ____Fragile ____Macerated ___Peeling  ___Ulceration  ___ Fungal ___Peristomal Hernia ___Non-blanchable ___ Hypergranulation      Stoma Complications:   __Excessive Bleeding __Ischemia __ Abscess __Necrosis __Prolapse   _x_Hernia __ Retraction __Stenosis __Mucosal Separation __Melanosis Coli   __Laceration __Other     Application for Patient    Wafer and pouch used during assessment and education __Dalton [address] one piece soft convex__________    Pouch System Recommended:   _x_One-Piece __Two-Piece ___Custom     Flat:   ___Pre-cut   _x__Cut-to fit     Convexity: ___Shallow ___Deep_x__ Flexible ___Creative Convexity   ___Pre-cut _x__Cut to [employer]     Accessory Products  Plain alginate,  Extra thin duoderm  __ Adhesive Seals __Adhesive Remover Wipes __Barrier [employer] __Barrier Wipes   __Deodorizer __Liquid Adhesive _x_ Paste _x_ Powder __Strip   _x_ Support Belt __Tape      Education for Patient & Family  1. Booklet of information on specific ostomy given and some verbal discussion of patients ostomy and expectations. 2. Instruction/demostration of ostomy wafer & pouch change. 3. Discuss concerns/ answer questions. 4. Provide follow up education in clinic if needed.        PICTURE INSERT:

## 2022-04-29 NOTE — WOUND CARE
04/29/22 1119   Wound Peristomal Right #1 04/18/22   Date First Assessed/Time First Assessed: 04/18/22 1145   Present on Hospital Admission: Yes  Wound Approximate Age at First Assessment (Weeks): 4 weeks  Primary Wound Type: Traumatic  Location: Peristomal  Wound Location Orientation: Right  Wound Desc. .. Wound Image    Dressing Status Old drainage noted   Cleansed Other (Comment)  (Tap water)   Dressing/Treatment Alginate;Hydrocolloid; Other (Comment)  (Alginate, Duoderm extra thin & Urostomy appliance.)   Wound Length (cm) 4.5 cm   Wound Width (cm) 6.5 cm   Wound Depth (cm) 0.2 cm   Wound Surface Area (cm^2) 29.25 cm^2   Change in Wound Size % -28.57   Wound Volume (cm^3) 5.85 cm^3   Wound Healing % -29   Wound Assessment Pink/red   Drainage Amount Moderate   Drainage Description Serosanguinous   Wound Odor None   Vickie-Wound/Incision Assessment Intact   Edges Flat/open edges   Wound Thickness Description Full thickness     Visit Vitals  /68 (BP 1 Location: Right upper arm, BP Patient Position: Sitting)   Pulse 65   Temp 97.4 °F (36.3 °C)   Resp 18

## 2022-04-29 NOTE — WOUND CARE
Discharge Instructions/Wound Orders  [facility]  [address], [address]  Telephone: [phone]    NAME:  Dennis Calderón OF BIRTH:  1945  MEDICAL RECORD NUMBER:  [id number]  DATE:  4/29/2022  Ostomy Care Orders:  1) Remove old bag and clean peristomal skin (including wound) with tap water, or clean in shower, then pat dry thoroughly. 2) Treat with crusting using stoma powder then No sting skin prep, repeat x 2.  3) Cover wound with calcium alginate precut for you, then secure with thin douderm as per diagram.  4) Apply Logan 1 piece cut to fit soft convex bag to 30mm, remove plastic backing and apply a small bead of paste at the opening. 5) Fold the bag like a taco and place over the stoma site (over top of the dressing). 6) Remove paper backing from tape border and secure with ostomy belt. 7) Change 2 x week and as needed for leaking. Dietary:  [x] Diet as tolerated: [] Calorie Diabetic Diet:Low carb and no Sugar [] No Added Salt:[x] Increase Protein: [] Other:Limit the amount of liquid you are drinking and avoid drinking in between meals   Activity:  [x] Activity as tolerated:  [] Patient has no activity restrictions     [] Strict Bedrest: [] Remain off Work:     [] May return to full duty work:                                   [] Return to work with restrictions:             Return Appointment:  [x] Return Appointment: With Kelin Silva  in  1 Week(s)  [] Ordered tests:    Electronically signed Isha Jarvis RN on 4/29/2022 at 11:36 AM     Brittani Catherine 281: Should you experience any significant changes in your wound(s) or have questions about your wound care, please contact the [address] [place] [address] at [address] [place] [address] 8:00 am - 4:30.   If you need help with your wound outside these hours and cannot wait until we are again available, contact your PCP or go to the hospital emergency room.     PLEASE NOTE: IF YOU ARE UNABLE TO OBTAIN WOUND SUPPLIES, CONTINUE TO USE THE SUPPLIES YOU HAVE AVAILABLE UNTIL YOU ARE ABLE TO REACH US. IT IS MOST IMPORTANT TO KEEP THE WOUND COVERED AT ALL TIMES.      CWON Signature:_______________________    Date: ___________ Time:  ____________

## 2022-04-29 NOTE — WOUND CARE
Clinical Level of Care Assessment    Outpatient Ostomy Care      NAME:  Ricardo Manley OF BIRTH:  1945  MEDICAL RECORD NUMBER:  [id number]   DATE:  4/29/2022      Patient Eric Ty Assessment- Document in Flowsheet I&O   Points   Review of chart []   0   Assess Complete Ostomy tab in Navigator for assessment of; stoma status, peristomal skin, presence of hernia/stool consistency/diet/related medications   Simple adjustments to pouch size/pouch system, new stoma pattern, accessory addition/deletion. []   1   Assess Complete Ostomy tab in Navigator for assessment of; stoma status, peristomal skin, presence of hernia/stool consistency/diet/related medications   Moderate adjustments to pouch size/pouch system, new stoma pattern, accessory addition/deletion. Observe patient/caregiver with hands-on care. 1-2 adjustments to pouch size/system/skin care/accessory addition or deletion. [x]   2   Assess Complete Ostomy tab in Navigator for assessment of; stoma status, peristomal skin, presence of hernia/stool consistency/diet/related medications   Complex adjustments to pouch size/pouch system, new stoma pattern, accessory addition/deletion. 3 or more complex adjustments to pouch size/system/skin care/accessory addition or deletion. Observe patient/caregiver with hands-on care. Assess patient/patient abdomen for optimal pre-marked stoma site. Assess patient abdomen for type of hernia belt/accessory needed. []   3         Ambulation Status Documented in CN Clinical Note  Status Definition Points   Independent Independently able to ambulate. Fully able (without any assistance) to get on/off exam table/chair. [x]   0   Minimal Physical Assistance Requires physical assistance of one person to ambulate and/or position patient to be examined. Includes necessary physical assistance to position lower extremities on/off stool.    []   1   Moderate Physical Assistance Requires at least one staff member to physically assist patient in ambulating into treatment room, and/or on off chair/bed. Requires assistance to bathroom. []   2   Full Assistance Requires assistance of at least two staff members to transfer patient into treatment room and/or on/off bed/chair. \"Total Transfer\". Unable to use bathroom requires bedside commode and/or bedpan []   3       Teaching Effort Documented in [facility] Clinical Note  Effort Definition Points   No Teaching  []   0   General Initial/Simple lesson:  Assess readiness to learn, assess patient learning style to determine educational flow/special needs for learning. Teaching related to 1-3 topics  Documentation in [facility] completed. []   1   Intermediate Assess readiness to learn, assess patient learning style to determine educational flow/special needs for learning. Teaching related to 3-4 topics. Hernia belt application and care considerations  Documentation in [facility] completed. [x]   2   Complex Assess readiness to learn, assess patient learning style to determine educational flow/special needs for learning. Teaching of greater than 5 additional topics   Pre-operative ostomy education with review of written resources for patient/family/caregiver as needed. Demonstration/return demonstration of ostomy irrigation  Documentation in [facility] completed. []   3     Patient Assessment and Planning in [facility] Clinical Note   Planning Definition Points   Simple Simple pouch change procedure completed and reviewed with patient/family/caregiver   Documentation in [facility] completed. []   1   Intermediate Moderate level of follow-up needs:   Pouch change/discharge procedure revised and reviewed with patient/caregiver. Communications with outside resources; i.e. Telephone calls to Surgeon/ PCP, family/caregiver, home health, ECF. Documentation in [facility] completed.      []   2   Complex Complex level of instructions/changes:   Family/Caregiver learning/demonstration/return demonstration visit. Pouching/discharge procedure revised/reviewed with patient/family/caregiver. Contact with outside resources; i.e. communication with Surgeon/ PCP, home health, ECF. Contact/referral to ostomy appliance supplier for new or additional products. Review when to call [facility] or schedule follow-up visit. Referral to Emergency Department   Documentation in [facility] completed. [x]   3       Is this the Patient's First Visit with [facility]ANE @ [facility]? No    Is this Patient Established to this [facility] within the last 3 years?    Yes:  None             Clinical Level of Care      Points  0-3  Level 1 []     Points  4-6  Level 2 []     Points  7-8  Level 3 [x]     Points  9-10  Level 4 []     Points  11-12  Level 5 []       Electronically signed by [unfilled] on 4/29/2022 at 12:00 PM

## 2022-05-02 ENCOUNTER — APPOINTMENT (OUTPATIENT)
Dept: INFUSION THERAPY | Age: 77
End: 2022-05-02

## 2022-05-02 ENCOUNTER — HOSPITAL ENCOUNTER (OUTPATIENT)
Dept: INFUSION THERAPY | Age: 77
Discharge: HOME | End: 2022-05-02
Payer: MEDICARE

## 2022-05-02 VITALS
RESPIRATION RATE: 18 BRPM | DIASTOLIC BLOOD PRESSURE: 79 MMHG | SYSTOLIC BLOOD PRESSURE: 134 MMHG | TEMPERATURE: 96.9 F | HEART RATE: 74 BPM | OXYGEN SATURATION: 94 %

## 2022-05-02 LAB
ALBUMIN SERPL-MCNC: 3.6 G/DL (ref 3.5–5)
ALBUMIN/GLOB SERPL: 0.9 {RATIO} (ref 1.1–2.2)
ALP SERPL-CCNC: 58 U/L (ref 45–117)
ALT SERPL-CCNC: 24 U/L (ref 12–78)
ANION GAP SERPL CALC-SCNC: 9 MMOL/L (ref 5–15)
AST SERPL-CCNC: 13 U/L (ref 15–37)
BASOPHILS # BLD: 0 K/UL (ref 0–0.1)
BASOPHILS NFR BLD: 1 % (ref 0–1)
BILIRUB SERPL-MCNC: 0.2 MG/DL (ref 0.2–1)
BUN SERPL-MCNC: 34 MG/DL (ref 6–20)
BUN/CREAT SERPL: 19 (ref 12–20)
CALCIUM SERPL-MCNC: 9.7 MG/DL (ref 8.5–10.1)
CHLORIDE SERPL-SCNC: 112 MMOL/L (ref 97–108)
CO2 SERPL-SCNC: 17 MMOL/L (ref 21–32)
CREAT SERPL-MCNC: 1.76 MG/DL (ref 0.7–1.3)
DIFFERENTIAL METHOD BLD: ABNORMAL
EOSINOPHIL # BLD: 0.2 K/UL (ref 0–0.4)
EOSINOPHIL NFR BLD: 5 % (ref 0–7)
ERYTHROCYTE [DISTWIDTH] IN BLOOD BY AUTOMATED COUNT: 14.8 % (ref 11.5–14.5)
GLOBULIN SER CALC-MCNC: 4.2 G/DL (ref 2–4)
GLUCOSE SERPL-MCNC: 88 MG/DL (ref 65–100)
HCT VFR BLD AUTO: 32.3 % (ref 36.6–50.3)
HGB BLD-MCNC: 10.3 G/DL (ref 12.1–17)
IMM GRANULOCYTES # BLD AUTO: 0 K/UL (ref 0–0.04)
IMM GRANULOCYTES NFR BLD AUTO: 0 % (ref 0–0.5)
LYMPHOCYTES # BLD: 0.6 K/UL (ref 0.8–3.5)
LYMPHOCYTES NFR BLD: 14 % (ref 12–49)
MCH RBC QN AUTO: 31.7 PG (ref 26–34)
MCHC RBC AUTO-ENTMCNC: 31.9 G/DL (ref 30–36.5)
MCV RBC AUTO: 99.4 FL (ref 80–99)
MONOCYTES # BLD: 0.7 K/UL (ref 0–1)
MONOCYTES NFR BLD: 15 % (ref 5–13)
NEUTS SEG # BLD: 2.9 K/UL (ref 1.8–8)
NEUTS SEG NFR BLD: 65 % (ref 32–75)
NRBC # BLD: 0 K/UL (ref 0–0.01)
NRBC BLD-RTO: 0 PER 100 WBC
PLATELET # BLD AUTO: 186 K/UL (ref 150–400)
PMV BLD AUTO: 8.7 FL (ref 8.9–12.9)
POTASSIUM SERPL-SCNC: 4 MMOL/L (ref 3.5–5.1)
PROT SERPL-MCNC: 7.8 G/DL (ref 6.4–8.2)
RBC # BLD AUTO: 3.25 M/UL (ref 4.1–5.7)
RBC MORPH BLD: ABNORMAL
SODIUM SERPL-SCNC: 138 MMOL/L (ref 136–145)
TSH SERPL DL<=0.05 MIU/L-ACNC: 2.18 UIU/ML (ref 0.36–3.74)
WBC # BLD AUTO: 4.4 K/UL (ref 4.1–11.1)

## 2022-05-02 PROCEDURE — 84443 ASSAY THYROID STIM HORMONE: CPT

## 2022-05-02 PROCEDURE — 80053 COMPREHEN METABOLIC PANEL: CPT

## 2022-05-02 PROCEDURE — 36415 COLL VENOUS BLD VENIPUNCTURE: CPT

## 2022-05-02 PROCEDURE — 85025 COMPLETE CBC W/AUTO DIFF WBC: CPT

## 2022-05-03 ENCOUNTER — OFFICE VISIT (OUTPATIENT)
Dept: ONCOLOGY | Age: 77
End: 2022-05-03
Payer: MEDICARE

## 2022-05-03 ENCOUNTER — HOSPITAL ENCOUNTER (OUTPATIENT)
Dept: INFUSION THERAPY | Age: 77
Discharge: HOME | End: 2022-05-03
Payer: MEDICARE

## 2022-05-03 VITALS
HEIGHT: 71 IN | SYSTOLIC BLOOD PRESSURE: 131 MMHG | HEART RATE: 66 BPM | TEMPERATURE: 97.5 F | DIASTOLIC BLOOD PRESSURE: 79 MMHG | RESPIRATION RATE: 18 BRPM | BODY MASS INDEX: 30.38 KG/M2 | WEIGHT: 217 LBS

## 2022-05-03 VITALS
DIASTOLIC BLOOD PRESSURE: 74 MMHG | WEIGHT: 217 LBS | TEMPERATURE: 97.5 F | HEART RATE: 100 BPM | RESPIRATION RATE: 21 BRPM | OXYGEN SATURATION: 95 % | SYSTOLIC BLOOD PRESSURE: 110 MMHG | BODY MASS INDEX: 30.27 KG/M2

## 2022-05-03 DIAGNOSIS — C67.9 MALIGNANT NEOPLASM OF URINARY BLADDER, UNSPECIFIED SITE (HCC): Primary | ICD-10-CM

## 2022-05-03 PROCEDURE — 96413 CHEMO IV INFUSION 1 HR: CPT

## 2022-05-03 PROCEDURE — 74011000250 HC RX REV CODE- 250: Performed by: NURSE PRACTITIONER

## 2022-05-03 PROCEDURE — 74011000258 HC RX REV CODE- 258: Performed by: NURSE PRACTITIONER

## 2022-05-03 PROCEDURE — G8427 DOCREV CUR MEDS BY ELIG CLIN: HCPCS | Performed by: INTERNAL MEDICINE

## 2022-05-03 PROCEDURE — 1101F PT FALLS ASSESS-DOCD LE1/YR: CPT | Performed by: INTERNAL MEDICINE

## 2022-05-03 PROCEDURE — 74011250636 HC RX REV CODE- 250/636: Performed by: NURSE PRACTITIONER

## 2022-05-03 PROCEDURE — G8536 NO DOC ELDER MAL SCRN: HCPCS | Performed by: INTERNAL MEDICINE

## 2022-05-03 PROCEDURE — 77030012965 HC NDL HUBR BBMI -A

## 2022-05-03 PROCEDURE — G8417 CALC BMI ABV UP PARAM F/U: HCPCS | Performed by: INTERNAL MEDICINE

## 2022-05-03 PROCEDURE — G8432 DEP SCR NOT DOC, RNG: HCPCS | Performed by: INTERNAL MEDICINE

## 2022-05-03 PROCEDURE — 99215 OFFICE O/P EST HI 40 MIN: CPT | Performed by: INTERNAL MEDICINE

## 2022-05-03 PROCEDURE — G0463 HOSPITAL OUTPT CLINIC VISIT: HCPCS | Performed by: INTERNAL MEDICINE

## 2022-05-03 PROCEDURE — G8754 DIAS BP LESS 90: HCPCS | Performed by: INTERNAL MEDICINE

## 2022-05-03 PROCEDURE — G8752 SYS BP LESS 140: HCPCS | Performed by: INTERNAL MEDICINE

## 2022-05-03 RX ORDER — ACETAMINOPHEN 325 MG/1
650 TABLET ORAL AS NEEDED
Status: ACTIVE | OUTPATIENT
Start: 2022-05-03 | End: 2022-05-03

## 2022-05-03 RX ORDER — SODIUM CHLORIDE 9 MG/ML
25 INJECTION, SOLUTION INTRAVENOUS CONTINUOUS
Status: DISPENSED | OUTPATIENT
Start: 2022-05-03 | End: 2022-05-03

## 2022-05-03 RX ORDER — SODIUM CHLORIDE 0.9 % (FLUSH) 0.9 %
10 SYRINGE (ML) INJECTION AS NEEDED
Status: DISPENSED | OUTPATIENT
Start: 2022-05-03 | End: 2022-05-03

## 2022-05-03 RX ORDER — HEPARIN 100 UNIT/ML
300-500 SYRINGE INTRAVENOUS AS NEEDED
Status: ACTIVE | OUTPATIENT
Start: 2022-05-03 | End: 2022-05-03

## 2022-05-03 RX ORDER — ONDANSETRON 2 MG/ML
8 INJECTION INTRAMUSCULAR; INTRAVENOUS AS NEEDED
Status: ACTIVE | OUTPATIENT
Start: 2022-05-03 | End: 2022-05-03

## 2022-05-03 RX ORDER — DIPHENHYDRAMINE HYDROCHLORIDE 50 MG/ML
25 INJECTION, SOLUTION INTRAMUSCULAR; INTRAVENOUS AS NEEDED
Status: ACTIVE | OUTPATIENT
Start: 2022-05-03 | End: 2022-05-03

## 2022-05-03 RX ORDER — SODIUM CHLORIDE 9 MG/ML
10 INJECTION INTRAMUSCULAR; INTRAVENOUS; SUBCUTANEOUS AS NEEDED
Status: ACTIVE | OUTPATIENT
Start: 2022-05-03 | End: 2022-05-03

## 2022-05-03 RX ADMIN — SODIUM CHLORIDE, PRESERVATIVE FREE 10 ML: 5 INJECTION INTRAVENOUS at 12:00

## 2022-05-03 RX ADMIN — SODIUM CHLORIDE 25 ML/HR: 9 INJECTION, SOLUTION INTRAVENOUS at 10:45

## 2022-05-03 RX ADMIN — SODIUM CHLORIDE, PRESERVATIVE FREE 10 ML: 5 INJECTION INTRAVENOUS at 10:35

## 2022-05-03 RX ADMIN — SODIUM CHLORIDE 480 MG: 9 INJECTION, SOLUTION INTRAVENOUS at 11:18

## 2022-05-03 RX ADMIN — SODIUM CHLORIDE, PRESERVATIVE FREE 10 ML: 5 INJECTION INTRAVENOUS at 10:36

## 2022-05-03 RX ADMIN — HEPARIN 500 UNITS: 100 SYRINGE at 12:00

## 2022-05-03 NOTE — PROGRESS NOTES
\[facility]\"" VISIT NOTE    1020  Pt arrived at [facility] ambulatory and in no distress for C4 of Opdivo/Retacrit. Retacrit HELD today for HGB 10.3. Assessment completed, no acute complaints at this time. Pt denies COVID symptoms and recent exposure. Right chest port accessed with 0.75 in villa no difficulty. Positive blood return noted. Labs reviewed from yesterday and are within treatment parameters. Medications received:  NS at [facility] IV    Patient Vitals for the past 12 hrs:   Temp Pulse Resp BP   05/03/22 1156 -- 66 18 131/79   05/03/22 1022 97.5 °F (36.4 °C) 70 18 117/84     Tolerated treatment well, no adverse reaction noted. Port de-accessed and flushed per protocol. Positive blood return noted. 1205  D/C'd from [facility] ambulatory and in no distress. Next appointment is 5/31/22.

## 2022-05-03 NOTE — PROGRESS NOTES
[facility] [place] [facility] [place] [address] [address], [address], [address] [place] [address] Andrae  W: [phone]  F: [phone]    Reason for Visit:   Ira Marx is a 69 y.o. male who is seen in follow-up of Muscle invasive bladder cancer- Mixed histology    Treatment History:   · 3/1/2021: CT IVP: Multiple abdominal, iliac, mediastinal nodes unchanged from 2019 consistent with h/o sarcoidosis, Thickened R lateral bladder wall. · 6/23/2021: Cystoscopy and TURBT- Papillary changes were noted within the prostatic urethra ,  papillary tumors seen emanating from the surface of the left hemitrigone, There were also diffuse changes in the floor of the bladder- Low grade urothelial carcinoma of the prostatic urethra, R lateral bladder wall with HG Muscle invasive urothelial carcinoma and squamous differentiation+ CIS, Left brenda trigone HG non invasive urothelial carcinoma  · 8/5/21- 10/21/2021: Cisplatin + Gemzar x 4   · 9/14/2021: CT was stable. · 12/6/2021: Radical urethro-cystoprostatectomy   · 2/3/22: Adjuvant nivolumab  · 4/22/2022: CT CHATO     History of Present Illness:   Patient is a 69 y.o. male with PMH as below including sarcoidosis who is seen for evaluation of bladder cancer. He has a history of nonmuscle invasive bladder cancer in 2015, underwent 2 induction courses of BCG, had a non muscle invasive recurrence in 2019 and had re induction with BCG. Cystoscopy 6/2020 at [facility] did not show any recurrence. In March 2021 he had a positive FISH and was seen by Dr. Munroe. Had a CT IVP 3/2021 which showed some Bladder thickening. 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 Grade 4 neutropenia after cycle 1. Completed 4 cycles and seen after surgery done 12/6/2021. He comes today for cycle 4 of Nivolumab. Comes with scans. He has well.  Has MENJIVAR. Has no cough, has had no change MENJIVAR, rare diarrhea, has no skin rash, has some bleeding from the stoma and is seeing wound clinic due to an ulcer. Mother and sister had breast cancer and brother had kidney cancer     Past Medical History:   Diagnosis Date    Arrhythmia     LBBB    Arthritis     Asthma     MILD    Cancer ([address]     scc top of head and nose    Cancer ([address]     BLADDER    COVID-19 vaccine series completed     [facility] VACCINE 21 AND 21    Depression with anxiety     Hypertension     Other unknown and unspecified cause of morbidity or mortality     history of pulmonary sarcoid     Posterior cervical adenopathy, benign 2018    [address]monary sarc[address]osis [address] [address] [facility]     Unspecified sleep apnea     cpap      Past Surgical History:   Procedure Laterality Date    HX CATARACT REMOVAL Bilateral     HX HERNIA REPAIR Right AS A CHILD    INGUINAL    HX HERNIA REPAIR Left     INGUIBAL    HX KNEE REPLACEMENT Left     HX ORTHOPAEDIC Right     BONE SPURS REMOVED FROM FOOT    HX OTHER SURGICAL      scc removed top of head and nose    HX OTHER SURGICAL      benign lump removed from thyroid    HX OTHER SURGICAL Right     SKIN CANCER RELMOVED FROM LEG    HX UROLOGICAL  2020    CYSTO    IR INSERT TUNL CVC W PORT OVER 5 YEARS  2021    IL CARDIAC SURG PROCEDURE UNLIST  2021    CARDIAC CATH    IL REVISE KNEE JOINT REPLACE,ALL PARTS Left       Social History     Tobacco Use    Smoking status: Former Smoker     Packs/day: 0.50     Years: 2.00     Pack years: 1.00     Quit date: 1965     Years since quittin.7    Smokeless tobacco: Never Used   Substance Use Topics    Alcohol use:  Yes     Alcohol/week: 2.0 standard drinks     Types: 2 Glasses of wine per week     Comment: DAILY      Family History   Problem Relation Age of Onset    Cancer Mother         breast with mets    Dementia Mother     Heart Disease Father     Cancer Sister breast    Lung Disease Brother     No Known Problems Brother     Anesth Problems Neg Hx      Current Outpatient Medications   Medication Sig    multivitamin (ONE A DAY) tablet Take 1 Tablet by mouth daily.  metoprolol tartrate (LOPRESSOR) 25 mg tablet Take 0.5 Tablets by mouth every twelve (12) hours.  zolpidem (AMBIEN) 5 mg tablet Take 5 mg by mouth nightly as needed for Sleep.  buPROPion SR (WELLBUTRIN SR) 100 mg SR tablet Take 100 mg by mouth daily.  acetaminophen (Tylenol Extra Strength) 500 mg tablet Take 1,000 mg by mouth every six (6) hours as needed for Pain.  simvastatin (ZOCOR) 20 mg tablet Take 20 mg by mouth nightly.  escitalopram oxalate (LEXAPRO) 10 mg tablet Take 10 mg by mouth daily. No current facility-administered medications for this visit. Allergies   Allergen Reactions    Pcn [Penicillins] Hives     Patient screened for any delayed non-IgE-mediated reaction to PCN.         Patient notes the following:    NO SEVERE NON-IGE MEDIATED REACTIONS        Review of Systems: A complete review of systems was obtained, negative except as described above. Physical Exam:     Visit Vitals  /74 (BP 1 Location: Left upper arm, BP Patient Position: Sitting)   Pulse 100   Temp 97.5 °F (36.4 °C)   Resp 21   Wt 217 lb (98.4 kg)   SpO2 95%   BMI 30.27 kg/m²     ECOG PS: 1  General: No distress  Eyes: PERRL, anicteric sclerae  HENT: Atraumatic, PAC in place   Neck: Supple  Skin: No rashes, ecchymoses, or petechiae. Normal temperature, turgor, and texture. GI: Has a urostomy bag  Psych: Alert, oriented, appropriate affect, normal judgment/insight    Results:     Lab Results   Component Value Date/Time    WBC 4.4 05/02/2022 05:15 PM    HGB 10.3 (L) 05/02/2022 05:15 PM    HCT 32.3 (L) 05/02/2022 05:15 PM    PLATELET 085 92/72/7103 05:15 PM    MCV 99.4 (H) 05/02/2022 05:15 PM    ABS.  NEUTROPHILS 2.9 05/02/2022 05:15 PM     Lab Results   Component Value Date/Time    Sodium 138 05/02/2022 05:15 PM    Potassium 4.0 05/02/2022 05:15 PM    Chloride 112 (H) 05/02/2022 05:15 PM    CO2 17 (L) 05/02/2022 05:15 PM    Glucose 88 05/02/2022 05:15 PM    BUN 34 (H) 05/02/2022 05:15 PM    Creatinine 1.76 (H) 05/02/2022 05:15 PM    GFR est AA 46 (L) 05/02/2022 05:15 PM    GFR est non-AA 38 (L) 05/02/2022 05:15 PM    Calcium 9.7 05/02/2022 05:15 PM    Glucose (POC) 143 (H) 12/11/2021 01:02 AM     Lab Results   Component Value Date/Time    Bilirubin, total 0.2 05/02/2022 05:15 PM    ALT (SGPT) 24 05/02/2022 05:15 PM    Alk. phosphatase 58 05/02/2022 05:15 PM    Protein, total 7.8 05/02/2022 05:15 PM    Albumin 3.6 05/02/2022 05:15 PM    Globulin 4.2 (H) 05/02/2022 05:15 PM       External   Records reviewed and summarized above.   Pathology report(s) reviewed    12/21/2021     SPECIMEN       Procedure: Radical urethro-cystoprostatectomy       TUMOR       Tumor Site:                Trigone, Dome, Right lateral wall, Left   Ureteral                                  orifice, Right bladder neck        Histologic Type:     Papillary urothelial carcinoma, invasive and   non-invasive, and carcinoma in situ       Histologic Grade:                High-grade       Tumor Size: Cannot be determined:      Multiple tumors       Tumor Extension:           Tumor invades adjacent structures -   Prostate                                  (transmural invasion from the bladder   tumor)       Lymphovascular Invasion:      Present       Tumor Configuration:           Papillary, Flat       MARGINS       Margins:                     Uninvolved by invasive carcinoma and   carcinoma in situ /                                  noninvasive urothelial carcinoma    LYMPH NODES       Number of Lymph Nodes Involved:      2           Size of Largest Metastatic Deposit:      1 cm               Site:                     Left Pelvic Lymph Nodes           Extranodal Extension:           Present       Number of Lymph Nodes Examined:      5 PATHOLOGIC STAGE CLASSIFICATION (pTNM, AJCC 8th Edition)       TNM Descriptors:                m (multiple primary tumors)       Primary Tumor (pT):                pT4a       Regional Lymph Nodes (pN):           pN2     Carcinoma in situ URETHRA: RESECTION       Tumor Site:                     Urethra       SPECIMEN       Procedure:                     Total urethrectomy    TUMOR       Tumor Site:                     Prostatic urethra       Histologic Type:                       Papillary urothelial carcinoma,   invasive       Histologic Grade:                     High-grade       Tumor Extension:                Tumor invades adjacent structures -   Prostate       Lymphovascular Invasion:           Present       MARGINS       Margins:                          Uninvolved by invasive carcinoma and   carcinoma in situ /                                       noninvasive urothelial carcinoma       LYMPH NODES (These represent the same lymph nodes reported in the BLADDER   Resection Template)       Number of Lymph Nodes Involved:      2           Size of Largest Metastatic Deposit:      1 cm               Site:                     Left Pelvic Lymph node       Number of Lymph Nodes Examined:      5      PATHOLOGIC STAGE CLASSIFICATION (pTNM, AJCC 8th Edition)       TNM Descriptors:                m (multiple primary tumors within the   prostatic urethra)       Primary Tumor (pT):                pT2       Regional Lymph Nodes (pN):           pN2                     4.  Right pelvic lymph nodes, lymph node dissection:        One benign lymph node with noncaseating granulomatous inflammation,   compatible with patients history of sarcoidosis   One benign lymph node, no histopathologic changes   No tumor seen     5.  Left pelvic lymph nodes, lymph node dissection:        Metastatic urothelial carcinoma in two of three lymph nodes, 1 cm in   greatest dimension with extracapsular extension   Noncaseating granulomatous inflammation, compatible with patient's history   of sarcoidosis     Radiology report(s) reviewed above. CT CAP 7/19/21:   IMPRESSION  1. While the bladder is decompressed by Juarez is thick-walled and irregular  2. No evidence of metastatic disease to the abdomen or pelvis  3. Extensive bilateral hilar and mediastinal adenopathy with patchy perihilar  airspace disease with associated nodular peribronchial cuffing. Findings can be  seen with sarcoidosis. Differentiating adenopathy from sarcoidosis for  metastatic disease is difficult       9/2021 CT     IMPRESSION     1. Mild bladder wall thickening posteriorly and along the right lateral bladder  wall, nonspecific. No evidence of metastatic disease within the chest, abdomen,  or pelvis. 2. Extensive mediastinal and bilateral hilar lymphadenopathy with lymph node  calcifications, most compatible with known sarcoidosis. No significant change. 3. Bilateral perihilar airspace opacities have improved. No new pulmonary  pathology identified. 4. Severe diverticulosis of the colon. No definite superimposed acute  Diverticulitis. 12/11/2021     IMPRESSION     1. Interval radical cystoprostatectomy with ileal conduit urinary diversion. 2. No postoperative complication identified. 3. Dense bilateral nephrograms in this patient who is status post contrast  injection the previous day. This suggests ATN. 4/2022      IMPRESSION     Status post radical cystoprostatectomy with ileal conduit urinary diversion. Mild left-sided hydronephrosis status post removal of bilateral nephroureteral  stents. Chronic pulmonary parenchymal scarring and nodularity, not significantly  changed.     No definitive evidence of new/recurrent metastatic disease in the chest, abdomen  or pelvis.     Assessment:   1) Muscle invasive bladder cancer- With squamous differentiation     [id number]  S/p 4 cycles of NAC with Cisplatin+ gemzar complicated by grade 4 neutropenia  S/P Radical urethro-cystoprostatectomy 12/6/2021- pT4N2 (invaded prostate), HG papillar, +LVI  Path stage-  Stage IIIB  Disease at high risk for local and distant recurrence  Margins negative but nodes had EPE    Today is cycle 4 nivolumab; proceed as planned. Is tolerating this well. Labs stable  CT scans 4/2022 reviewed and CHATO  Clinically improved     2) Urethral TCC    S/P Total urethrectomy 12/6  HG Papillary [id number]  Margins negative  EPE  Present  See above    Will also refer to Rad Onc to discuss whether RT is indictated at a later date- Discussed this today, he prefers Dr. Dominic Rodriguez    3) Sarcoidosis  Follows with pulmonary  Stable. 4) Obesity    5) Anemia    Likely secondary to CKD   Status post injectafer x 1 on 3/11/2022. He was also started on Retacrit 10,000 units on 3/3/2022 and is being given monthly. Anemia has improved but he has some bleeding through his stomal ulcer so we will follow labs     6) Encounter for high risk medication like immunotherapy  Labs reviewed   She is at higher risk for recurrence. Plan:     · Proceed today with cycle 4 of Nivolumab q4 week dosing for 1 year   · CBC, CMP, TSH per protocol  · Refer [facility] Brooke - Dr. Dominic Rodriguez   · Follow with wound clinic   · See urology as scheduled   · Continue Retacrit 10,000 units monthly  · Goal hemoglobin is 10 g or less. Hold if hemoglobin more than 10 g/dL    RTC 4 weeks     I appreciate the opportunity to participate in Mr. Jamal aBtres shannon.       Signed By: Perry Kimble MD

## 2022-05-03 NOTE — PROGRESS NOTES
Peyman Pdailla is a 69 y.o. male    Chief Complaint   Patient presents with    Chemotherapy      Muscle invasive bladder cancer- Mixed histology       1. Have you been to the ER, urgent care clinic since your last visit? Hospitalized since your last visit? No    2. Have you seen or consulted any other health care providers outside of the [address] [place] [address] since your last visit? Include any pap smears or colon screening.  No

## 2022-05-04 ENCOUNTER — OFFICE VISIT (OUTPATIENT)
Dept: SURGERY | Age: 77
End: 2022-05-04
Payer: MEDICARE

## 2022-05-04 ENCOUNTER — TELEPHONE (OUTPATIENT)
Dept: ONCOLOGY | Age: 77
End: 2022-05-04

## 2022-05-04 VITALS
HEIGHT: 71 IN | DIASTOLIC BLOOD PRESSURE: 79 MMHG | RESPIRATION RATE: 18 BRPM | BODY MASS INDEX: 30.38 KG/M2 | OXYGEN SATURATION: 96 % | SYSTOLIC BLOOD PRESSURE: 119 MMHG | WEIGHT: 217 LBS | HEART RATE: 78 BPM | TEMPERATURE: 97.8 F

## 2022-05-04 DIAGNOSIS — K43.2 INCISIONAL HERNIA, WITHOUT OBSTRUCTION OR GANGRENE: Primary | ICD-10-CM

## 2022-05-04 DIAGNOSIS — K43.5 PARASTOMAL HERNIA WITHOUT OBSTRUCTION OR GANGRENE: ICD-10-CM

## 2022-05-04 PROCEDURE — G8427 DOCREV CUR MEDS BY ELIG CLIN: HCPCS | Performed by: SURGERY

## 2022-05-04 PROCEDURE — 1101F PT FALLS ASSESS-DOCD LE1/YR: CPT | Performed by: SURGERY

## 2022-05-04 PROCEDURE — G8510 SCR DEP NEG, NO PLAN REQD: HCPCS | Performed by: SURGERY

## 2022-05-04 PROCEDURE — 99203 OFFICE O/P NEW LOW 30 MIN: CPT | Performed by: SURGERY

## 2022-05-04 PROCEDURE — G8417 CALC BMI ABV UP PARAM F/U: HCPCS | Performed by: SURGERY

## 2022-05-04 PROCEDURE — G8752 SYS BP LESS 140: HCPCS | Performed by: SURGERY

## 2022-05-04 PROCEDURE — G8754 DIAS BP LESS 90: HCPCS | Performed by: SURGERY

## 2022-05-04 PROCEDURE — G8536 NO DOC ELDER MAL SCRN: HCPCS | Performed by: SURGERY

## 2022-05-04 NOTE — LETTER
5/6/2022    Patient: Rolanda Whelan   YOB: 1945   Date of Visit: 5/4/2022     Matt Fuentes, [facility],[address], [address] [address]  Via Fax: [phone]    Dear Matt Fuentes MD,      Thank you for referring Mr. Jono Barksdale to [facility] [address] for evaluation. My notes for this consultation are attached. If you have questions, please do not hesitate to call me. I look forward to following your patient along with you.       Sincerely,    Aramis Estrada MD

## 2022-05-04 NOTE — PROGRESS NOTES
[facility] at [facility] History and Physical    History of Present Illness:      Randy Beasley is a 69 y.o. male who who has a history of bladder cancer with open radical cystectomy with ileal conduit by Dr. Elva Carr about 6 months ago. His postoperative course complicated with wound infection and dehiscence of the abdominal wound requiring reclosure. He is coming to see me today about a possible parastomal hernia. He said he does notice a slight bulge around the area of the stoma. He does not seem to have any pain at the stoma site or his incision site. He does not notice any bulge at his incision site currently.     Past Medical History:   Diagnosis Date    Arrhythmia     LBBB    Arthritis     Asthma     MILD    Cancer ([facility]     scc top of head and nose    Cancer ([facility] 2015    BLADDER    COVID-19 vaccine series completed     [facility] VACCINE 1-28-21 AND 2-25-21    Depression with anxiety     Hypertension     Other unknown and unspecified cause of morbidity or mortality     history of pulmonary sarcoid     Posterior cervical adenopathy, benign 11/19/2018    Pulmonary sarcoidosis ([facility]     Unspecified sleep apnea     cpap       Past Surgical History:   Procedure Laterality Date    HX CATARACT REMOVAL Bilateral     HX HERNIA REPAIR Right AS A CHILD    INGUINAL    HX HERNIA REPAIR Left 1990    INGUIBAL    HX KNEE REPLACEMENT Left 2008    HX ORTHOPAEDIC Right     BONE SPURS REMOVED FROM FOOT    HX OTHER SURGICAL      scc removed top of head and nose    HX OTHER SURGICAL  2005    benign lump removed from thyroid    HX OTHER SURGICAL Right 2020    SKIN CANCER RELMOVED FROM LEG    HX UROLOGICAL  06/2020    CYSTO    IR INSERT TUNL CVC W PORT OVER 5 YEARS  7/16/2021    SC CARDIAC SURG PROCEDURE UNLIST  01/22/2021    CARDIAC CATH    SC REVISE KNEE JOINT REPLACE,ALL PARTS Left 2019         Current Outpatient Medications:     multivitamin (ONE A DAY) tablet, Take 1 Tablet by mouth daily. , Disp: , Rfl:     metoprolol tartrate (LOPRESSOR) 25 mg tablet, Take 0.5 Tablets by mouth every twelve (12) hours. , Disp: 30 Tablet, Rfl: 0    buPROPion SR (WELLBUTRIN SR) 100 mg SR tablet, Take 100 mg by mouth daily. , Disp: , Rfl:     simvastatin (ZOCOR) 20 mg tablet, Take 20 mg by mouth nightly., Disp: , Rfl:     escitalopram oxalate (LEXAPRO) 10 mg tablet, Take 10 mg by mouth daily. , Disp: , Rfl:     zolpidem (AMBIEN) 5 mg tablet, Take 5 mg by mouth nightly as needed for Sleep., Disp: , Rfl:     acetaminophen (Tylenol Extra Strength) 500 mg tablet, Take 1,000 mg by mouth every six (6) hours as needed for Pain. (Patient not taking: Reported on 2022), Disp: , Rfl:   No current facility-administered medications for this visit. Allergies   Allergen Reactions    Pcn [Penicillins] Hives     Patient screened for any delayed non-IgE-mediated reaction to PCN.         Patient notes the following:    NO SEVERE NON-IGE MEDIATED REACTIONS       Social History     Socioeconomic History    Marital status:      Spouse name: Not on file    Number of children: Not on file    Years of education: Not on file    Highest education level: Not on file   Occupational History    Not on file   Tobacco Use    Smoking status: Former Smoker     Packs/day: 0.50     Years: 2.00     Pack years: 1.00     Quit date: 1965     Years since quittin.7    Smokeless tobacco: Never Used   Vaping Use    Vaping Use: Never used   Substance and Sexual Activity    Alcohol use:  Yes     Alcohol/week: 2.0 standard drinks     Types: 2 Glasses of wine per week     Comment: DAILY    Drug use: No    Sexual activity: Not Currently   Other Topics Concern     Service Not Asked    Blood Transfusions Not Asked    Caffeine Concern Not Asked    Occupational Exposure Not Asked    Hobby Hazards Not Asked    Sleep Concern Not Asked    Stress Concern Not Asked    Weight Concern Not Asked    Special Diet Not Asked    Back Care Not Asked    Exercise Not Asked    Bike Helmet Not Asked   [address] Not Asked    Self-Exams Not Asked   Social History Narrative    Not on file     Social Determinants of Health     Financial Resource Strain:     Difficulty of Paying Living Expenses: Not on file   Food Insecurity: No Food Insecurity    Worried About Running Out of Food in the Last Year: Never true    Tee of Food in the Last Year: Never true   Transportation Needs: No Transportation Needs    Lack of Transportation (Medical): No    Lack of Transportation (Non-Medical):  No   Physical Activity:     Days of Exercise per Week: Not on file    Minutes of Exercise per Session: Not on file   Stress:     Feeling of Stress : Not on file   Social Connections: Unknown    Frequency of Communication with Friends and Family: Not on file    Frequency of Social Gatherings with Friends and Family: Not on file    Attends Mandaeism Services: More than 4 times per year    Active Member of [address] [place] [address] or Organizations: Not on file    Attends Club or Organization Meetings: Not on file    Marital Status:    Intimate Partner Violence:     Fear of Current or Ex-Partner: Not on file    Emotionally Abused: Not on file    Physically Abused: Not on file    Sexually Abused: Not on file   Housing Stability:     Unable to Pay for Housing in the Last Year: Not on file    Number of [place] in the Last Year: Not on file    Unstable Housing in the Last Year: Not on file       Family History   Problem Relation Age of Onset    Cancer Mother         breast with mets    Dementia Mother     Heart Disease Father     Cancer Sister         breast    Lung Disease Brother     No Known Problems Brother     Anesth Problems Neg Hx        ROS   Constitutional: negative  Ears, Nose, Mouth, Throat, and Face: negative  Respiratory: negative  Cardiovascular: negative  Gastrointestinal: negative  Genitourinary:negative  Integument/Breast: negative  Hematologic/Lymphatic: negative  Behavioral/Psychiatric: negative  Allergic/Immunologic: negative      Physical Exam:     Visit Vitals  /79 (BP 1 Location: Right arm, BP Patient Position: Sitting, BP Cuff Size: Adult)   Pulse 78   Temp 97.8 °F (36.6 °C) (Oral)   Resp 18   Ht 5' 11\" (1.803 m)   Wt 217 lb (98.4 kg)   SpO2 96%   BMI 30.27 kg/m²       General - alert and oriented, no apparent distress  HEENT - no jaundice, no hearing imparement  Pulm - CTAB, no C/W/R  CV - RRR, no M/R/G  Abd -soft, nondistended, bowel sounds present, midline incision well-healed, no palpable hernia at midline incision, ileal conduit with normal appearance but slight bulge at the top portion of it  Ext - pulses intact in UE and LE bilaterally, no edema  Skin - supple, no rashes  Psychiatric - normal affect, good mood    Labs  Lab Results   Component Value Date/Time    Sodium 138 05/02/2022 05:15 PM    Potassium 4.0 05/02/2022 05:15 PM    Chloride 112 (H) 05/02/2022 05:15 PM    CO2 17 (L) 05/02/2022 05:15 PM    Anion gap 9 05/02/2022 05:15 PM    Glucose 88 05/02/2022 05:15 PM    BUN 34 (H) 05/02/2022 05:15 PM    Creatinine 1.76 (H) 05/02/2022 05:15 PM    BUN/Creatinine ratio 19 05/02/2022 05:15 PM    GFR est AA 46 (L) 05/02/2022 05:15 PM    GFR est non-AA 38 (L) 05/02/2022 05:15 PM    Calcium 9.7 05/02/2022 05:15 PM    Bilirubin, total 0.2 05/02/2022 05:15 PM    Alk.  phosphatase 58 05/02/2022 05:15 PM    Protein, total 7.8 05/02/2022 05:15 PM    Albumin 3.6 05/02/2022 05:15 PM    Globulin 4.2 (H) 05/02/2022 05:15 PM    A-G Ratio 0.9 (L) 05/02/2022 05:15 PM    ALT (SGPT) 24 05/02/2022 05:15 PM    AST (SGOT) 13 (L) 05/02/2022 05:15 PM     Lab Results   Component Value Date/Time    WBC 4.4 05/02/2022 05:15 PM    HGB 10.3 (L) 05/02/2022 05:15 PM    HCT 32.3 (L) 05/02/2022 05:15 PM    PLATELET 478 53/94/0810 05:15 PM    MCV 99.4 (H) 05/02/2022 05:15 PM         Imaging  CT abdomen pelvis-FINDINGS:      CHEST WALL: No mass or axillary lymphadenopathy. THYROID: No nodule. MEDIASTINUM: Calcified and enlarged mediastinal lymph nodes are not changed. JUAN LUIS: No mass or lymphadenopathy. THORACIC AORTA: Normal in caliber. MAIN PULMONARY ARTERY: Normal in caliber. TRACHEA/BRONCHI: Patent. ESOPHAGUS: No wall thickening or dilatation. HEART: Normal in size. PLEURA: Bilateral pleural calcification. LUNGS: Bilateral scarring and nodular opacities are unchanged, chronic in  nature. LIVER: Hepatic steatosis  BILIARY TREE: Gallbladder is within normal limits. CBD is not dilated. SPLEEN: within normal limits. PANCREAS: No mass or ductal dilatation. ADRENALS: Unremarkable. KIDNEYS: Mild left-sided hydroureteronephrosis status post removal of  nephroureteral stents on the right and on the left. Ileal conduit. STOMACH: Unremarkable. SMALL BOWEL: No dilatation or wall thickening. COLON: Diverticulosis. No obstruction. APPENDIX: Unremarkable  PERITONEUM: No ascites or pneumoperitoneum. RETROPERITONEUM: Prominent retroperitoneal nodule at 3-60 is not changed. REPRODUCTIVE ORGANS: Prostatectomy minimal fluid in the right hemipelvis is  nonspecific. URINARY BLADDER: Prior cystectomy. No new pelvic sidewall mass. No new  adenopathy. BONES: Spondylolysis and spondylolisthesis at L5-S1. ABDOMINAL WALL: No mass or hernia. ADDITIONAL COMMENTS: No fracture.     IMPRESSION     Status post radical cystoprostatectomy with ileal conduit urinary diversion. Mild left-sided hydronephrosis status post removal of bilateral nephroureteral  stents. Chronic pulmonary parenchymal scarring and nodularity, not significantly  changed.     No definitive evidence of new/recurrent metastatic disease in the chest, abdomen  or pelvis.   I have reviewed and agree with all of the pertinent images    Assessment:     Park Dooley is a 69 y.o. 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 On exam he has a slight bulge at the stoma area but on CT scan I do not see any sign of a parastomal hernia. He just recently had a follow-up CT scan for his oncology follow-up. ICD ileal conduit by itself coming up through the abdominal wall with a little bit of fatty tissue which is likely just the mesentery. I do not see a true parastomal hernia component. He does have some rectus diastases and widening due to the previous hernia repair and wound infection he had postoperative. Does not appear to be any bowel coming up into her hernia defect. At this point he really does not have any symptoms and I do not think parastomal or incisional hernia repair is indicated. He is also on immunotherapy for this bladder cancer and he should continue with that. He is following up with radiation oncology for possible need for radiation. He should continue with his oncology treatments and follow-up with me as needed. Isrrael Richard MD    Greater than half of the time: 30 minutes was used in counciling the patient about diagnosis and treatment plan    Mr. Ifeoma Brandt has a reminder for a \"due or due soon\" health maintenance. I have asked that he contact his primary care provider for follow-up on this health maintenance.

## 2022-05-04 NOTE — TELEPHONE ENCOUNTER
Itz pt, HIPAA verified x2. Informed pt of appt with Rad Onc on 5/9/22, arrival of 1:15 for a 1:30 appt. Address and number given to Rad Onc. Pt voiced understanding and has no questions or concerns at this time.

## 2022-05-04 NOTE — PROGRESS NOTES
1. Have you been to the ER, urgent care clinic since your last visit? Hospitalized since your last visit? No    2. Have you seen or consulted any other health care providers outside of the [address] [place] [address] since your last visit? Include any pap smears or colon screening.  No

## 2022-05-05 ENCOUNTER — HOSPITAL ENCOUNTER (OUTPATIENT)
Dept: WOUND CARE | Age: 77
Discharge: HOME | End: 2022-05-05
Payer: MEDICARE

## 2022-05-05 VITALS — RESPIRATION RATE: 18 BRPM | SYSTOLIC BLOOD PRESSURE: 123 MMHG | TEMPERATURE: 98 F | DIASTOLIC BLOOD PRESSURE: 70 MMHG

## 2022-05-05 PROCEDURE — 99213 OFFICE O/P EST LOW 20 MIN: CPT

## 2022-05-05 NOTE — WOUND CARE
Discharge Instructions/Wound Orders  [facility]  [address], [address] [facility]  Telephone: [phone]    NAME:  Morteza Lambert OF BIRTH:  1945  MEDICAL RECORD NUMBER:  [id number]  DATE:  5/5/2022  Ostomy Care Orders:  1) Remove old bag and clean peristomal skin (including wound) with tap water, or clean in shower, then pat dry thoroughly. 2) Treat with crusting using stoma powder then No sting skin prep, repeat x 2.  3) Cover wound with calcium alginate precut for you, then secure with thin douderm as per diagram.  4) Shape barrier to size, flatten inner border and place over stoma site. 5) Apply [place] 1 piece cut to fit soft convex bag to 30mm, remove plastic backing. 6) Fold the bag like a taco and place over the stoma site (over top of the dressing). 7) Remove paper backing from tape border and secure with ostomy belt. 8) Change 2 x week and as needed for leaking. Dietary:  [x] Diet as tolerated: [] Calorie Diabetic Diet:Low carb and no Sugar [] No Added Salt:[x] Increase Protein: [] Other:Limit the amount of liquid you are drinking and avoid drinking in between meals   Activity:  [x] Activity as tolerated:  [] Patient has no activity restrictions     [] Strict Bedrest: [] Remain off Work:     [] May return to full duty work:                                   [] Return to work with restrictions:             Return Appointment:  [x] Return Appointment: With Pat Elba Cooks  in  1 Week(s)  [] Ordered tests:    Electronically signed Leora Escobar RN on 5/5/2022 at 11:39 AM     Brittani Catherine 281: Should you experience any significant changes in your wound(s) or have questions about your wound care, please contact the [address] [place] [address] at [address] [place] [address] 8:00 am - 4:30.   If you need help with your wound outside these hours and cannot wait until we are again available, contact your PCP or go to the hospital emergency room. PLEASE NOTE: IF YOU ARE UNABLE TO OBTAIN WOUND SUPPLIES, CONTINUE TO USE THE SUPPLIES YOU HAVE AVAILABLE UNTIL YOU ARE ABLE TO REACH US. IT IS MOST IMPORTANT TO KEEP THE WOUND COVERED AT ALL TIMES.      CWON Signature:_______________________    Date: ___________ Time:  ____________

## 2022-05-05 NOTE — WOUND CARE
OSTOMY Assessment    Stoma Tissue Assessment: _x__Post-op ___Follow-up       Type of Diversion: _x__New___ Established ___Revision___Permanent____Temporary    _____  Ileostomy   _____  Colostomy: ____ Ascending, ____ Transverse, _____.  Sigmoid   _____  Monika Reyna   __x___  Ileal Conduit   _____  Mucous Fistula     Appearance of Ostomy:   _x__ Jory Limbo /Moist/Viable ___ Dark Red ___ Pink ___ Sloughing ___Necrotic____Pallor ____Red  ___Dry ____Moist    Stoma Size: ___30______ (mm) ___Round ___ [place] [address] ___Irregular     Stoma Height:   ____Flush ____Retracted __x__Budded ____Edematous ____Prolapse     Stoma Location  ____ Left Side          _x___Right Side     _____Umbilicus  _____Incision Line  __x__ Above Belt       ____ Below Belt    Abdominal Contours  ____ Firm ____ Flat ____Flabby _x__Soft __x_Round ___ Hard ___ Other     Stoma Function: x__Yes __No  Output:   __x__ Yellow ____Amber ____ Pink(Hematuria) ____ Tea Colored   ____ Clots ____Foul Odor ____ Mucous     Peristomal skin: Large peristomal wound (see wound documentation)  ___ Intact ___ Irritant Dermatitis ___ Allergic Contact Dermatitis ___Candidiasis ___Caput Medusae ___Folliculitis ___Mechanical Trauma ___Mucosal Transplantation    ___Pyoderma Gangrenosum ___Hyperplasia ___Radiation Trauma ___Allergies mpling  ___ Dimpling ____Blistered ____Fragile ____Macerated ___Peeling  ___Ulceration  ___ Fungal ___Peristomal Hernia ___Non-blanchable ___ Hypergranulation      Stoma Complications:   __Excessive Bleeding __Ischemia __ Abscess __Necrosis __Prolapse   _x_Hernia __ Retraction __Stenosis __Mucosal Separation __Melanosis Coli   __Laceration __Other     Application for Patient    Wafer and pouch used during assessment and education ___[place] [address] one piece soft convex_________    Pouch System Recommended:   _x_One-Piece __Two-Piece ___Custom     Flat:   ___Pre-cut   _x__Cut-to fit     Convexity: ___Shallow ___Deep_xx__ Flexible ___Creative Convexity ___Pre-cut ___Cut to [employer]     Accessory Products  Plain alginate, extra thin douderm  __ Adhesive Seals __Adhesive Remover Wipes __Barrier [employer] x__Barrier Wipes   __Deodorizer __Liquid Adhesive __ Paste x__ Powder __Strip   _x_ Support Belt __Tape      Education for Patient & Family  1. Booklet of information on specific ostomy given and some verbal discussion of patients ostomy and expectations. 2. Instruction/demostration of ostomy wafer & pouch change. 3. Discuss concerns/ answer questions. 4. Provide follow up education in clinic if needed.        PICTURE INSERT:

## 2022-05-05 NOTE — WOUND CARE
Clinical Level of Care Assessment    Outpatient Ostomy Care      NAME:  Paz Coats OF BIRTH:  1945  MEDICAL RECORD NUMBER:  [id number]   DATE:  5/5/2022      Patient Doreen Patricio Assessment- Document in Flowsheet I&O   Points   Review of chart []   0   Assess Complete Ostomy tab in Navigator for assessment of; stoma status, peristomal skin, presence of hernia/stool consistency/diet/related medications   Simple adjustments to pouch size/pouch system, new stoma pattern, accessory addition/deletion. []   1   Assess Complete Ostomy tab in Navigator for assessment of; stoma status, peristomal skin, presence of hernia/stool consistency/diet/related medications   Moderate adjustments to pouch size/pouch system, new stoma pattern, accessory addition/deletion. Observe patient/caregiver with hands-on care. 1-2 adjustments to pouch size/system/skin care/accessory addition or deletion. []   2   Assess Complete Ostomy tab in Navigator for assessment of; stoma status, peristomal skin, presence of hernia/stool consistency/diet/related medications   Complex adjustments to pouch size/pouch system, new stoma pattern, accessory addition/deletion. 3 or more complex adjustments to pouch size/system/skin care/accessory addition or deletion. Observe patient/caregiver with hands-on care. Assess patient/patient abdomen for optimal pre-marked stoma site. Assess patient abdomen for type of hernia belt/accessory needed. [x]   3         Ambulation Status Documented in CN Clinical Note  Status Definition Points   Independent Independently able to ambulate. Fully able (without any assistance) to get on/off exam table/chair. [x]   0   Minimal Physical Assistance Requires physical assistance of one person to ambulate and/or position patient to be examined. Includes necessary physical assistance to position lower extremities on/off stool.    []   1   Moderate Physical Assistance Requires at least one staff member to physically assist patient in ambulating into treatment room, and/or on off chair/bed. Requires assistance to bathroom. []   2   Full Assistance Requires assistance of at least two staff members to transfer patient into treatment room and/or on/off bed/chair. \"Total Transfer\". Unable to use bathroom requires bedside commode and/or bedpan []   3       Teaching Effort Documented in [facility] Clinical Note  Effort Definition Points   No Teaching  []   0   General Initial/Simple lesson:  Assess readiness to learn, assess patient learning style to determine educational flow/special needs for learning. Teaching related to 1-3 topics  Documentation in [facility] completed. []   1   Intermediate Assess readiness to learn, assess patient learning style to determine educational flow/special needs for learning. Teaching related to 3-4 topics. Hernia belt application and care considerations  Documentation in [facility] completed. [x]   2   Complex Assess readiness to learn, assess patient learning style to determine educational flow/special needs for learning. Teaching of greater than 5 additional topics   Pre-operative ostomy education with review of written resources for patient/family/caregiver as needed. Demonstration/return demonstration of ostomy irrigation  Documentation in [facility] completed. []   3     Patient Assessment and Planning in [facility] Clinical Note   Planning Definition Points   Simple Simple pouch change procedure completed and reviewed with patient/family/caregiver   Documentation in [facility] completed. []   1   Intermediate Moderate level of follow-up needs:   Pouch change/discharge procedure revised and reviewed with patient/caregiver. Communications with outside resources; i.e. Telephone calls to Surgeon/ PCP, family/caregiver, home health, ECF. Documentation in [facility] completed.      []   2   Complex Complex level of instructions/changes:   Family/Caregiver learning/demonstration/return demonstration visit. Pouching/discharge procedure revised/reviewed with patient/family/caregiver. Contact with outside resources; i.e. communication with Surgeon/ PCP, home health, ECF. Contact/referral to ostomy appliance supplier for new or additional products. Review when to call [facility] or schedule follow-up visit. Referral to Emergency Department   Documentation in [facility] completed. [x]   3       Is this the Patient's First Visit with [facility] @ [facility]? No    Is this Patient Established to this [facility] within the last 3 years?    Yes             Clinical Level of Care      Points  0-3  Level 1 []     Points  4-6  Level 2 []     Points  7-8  Level 3 [x]     Points  9-10  Level 4 []     Points  11-12  Level 5 []       Electronically signed by Yamel Roberto RN on 5/5/2022 at 11:50 AM

## 2022-05-09 ENCOUNTER — HOSPITAL ENCOUNTER (OUTPATIENT)
Dept: RADIATION THERAPY | Age: 77
Discharge: HOME | End: 2022-05-09

## 2022-05-12 ENCOUNTER — HOSPITAL ENCOUNTER (OUTPATIENT)
Dept: WOUND CARE | Age: 77
Discharge: HOME | End: 2022-05-12
Payer: MEDICARE

## 2022-05-12 VITALS
DIASTOLIC BLOOD PRESSURE: 74 MMHG | TEMPERATURE: 98.2 F | SYSTOLIC BLOOD PRESSURE: 130 MMHG | HEART RATE: 68 BPM | RESPIRATION RATE: 18 BRPM

## 2022-05-12 PROCEDURE — 99212 OFFICE O/P EST SF 10 MIN: CPT

## 2022-05-12 NOTE — WOUND CARE
OSTOMY Assessment    Stoma Tissue Assessment: ___Post-op _x__Follow-up       Type of Diversion: ___New_x__ Established ___Revision___Permanent____Temporary    _____  Ileostomy   _____  Colostomy: ____ Ascending, ____ Transverse, _____.  Sigmoid   _____  Marinus Salts   _x____  Ileal Conduit   _____  Mucous Fistula     Appearance of Ostomy:   _x__ Shalini Reeve /Moist/Viable ___ Dark Red ___ Pink ___ Sloughing ___Necrotic____Pallor ____Red  ___Dry ____Moist    Stoma Size: __30_______ (mm) ___Round ___ Layo Cifuentes ___Irregular     Stoma Height:   ____Flush ____Retracted __x__Budded ____Edematous ____Prolapse     Stoma Location  ____ Left Side          _x___Right Side     _____Umbilicus  _____Incision Line  __x__ Above Belt       ____ Below Belt    Abdominal Contours  ____ Firm ____ Flat ____Flabby _x__Soft _x__Round ___ Hard ___ Other     Stoma Function: x__Yes __No  Output:   ___x_ Yellow ____Amber ____ Pink(Hematuria) ____ Tea Colored   ____ Clots ____Foul Odor ____ Mucous     Peristomal skin:  Large peristomal wound (see wound flowsheet)  ___ Intact ___ Irritant Dermatitis ___ Allergic Contact Dermatitis ___Candidiasis ___Caput Medusae ___Folliculitis ___Mechanical Trauma ___Mucosal Transplantation    ___Pyoderma Gangrenosum ___Hyperplasia ___Radiation Trauma ___Allergies mpling  ___ Dimpling ____Blistered ____Fragile ____Macerated ___Peeling  ___Ulceration  ___ Fungal ___Peristomal Hernia ___Non-blanchable ___ Hypergranulation      Stoma Complications:   __Excessive Bleeding __Ischemia __ Abscess __Necrosis __Prolapse   x__Hernia __ Retraction __Stenosis __Mucosal Separation __Melanosis Coli   __Laceration __Other     Application for Patient    Wafer and pouch used during assessment and education ___[place] [id number]_________    Pouch System Recommended:   _x_One-Piece __Two-Piece ___Custom     Flat:   ___Pre-cut   _x__Cut-to fit     Convexity: ___Shallow ___Deep__x_ Flexible ___Creative Convexity   ___Pre-cut ___Cut to [employer]  Plain alginate, duoderm extra thin  _x_ Adhesive Seals __Adhesive Remover Wipes __Barrier [employer] _x_Barrier Wipes   __Deodorizer __Liquid Adhesive __ Paste _x_ Powder __Strip   _x_ Support Belt __Tape      Education for Patient & Family  1. Booklet of information on specific ostomy given and some verbal discussion of patients ostomy and expectations. 2. Instruction/demostration of ostomy wafer & pouch change. 3. Discuss concerns/ answer questions. 4. Provide follow up education in clinic if needed.        PICTURE INSERT:

## 2022-05-12 NOTE — WOUND CARE
Patient in to see ostomy nurse. Dressing applied per order. 05/12/22 1113   Wound Peristomal Right #1 04/18/22   Date First Assessed/Time First Assessed: 04/18/22 1145   Present on Hospital Admission: Yes  Wound Approximate Age at First Assessment (Weeks): 4 weeks  Primary Wound Type: Traumatic  Location: Peristomal  Wound Location Orientation: Right  Wound Desc. .. Wound Image    Wound Etiology Traumatic   Dressing Status New dressing applied   Cleansed   (tap water)   Dressing/Treatment Alginate;Hydrocolloid  (crusted with stoma powder and non sting skin prep, uro bag)   Wound Length (cm) 2.2 cm   Wound Width (cm) 5.2 cm   Wound Depth (cm) 0.1 cm   Wound Surface Area (cm^2) 11.44 cm^2   Change in Wound Size % 49.71   Wound Volume (cm^3) 1.144 cm^3   Wound Healing % 75   Wound Assessment Pink/red; Hyper granulation tissue   Drainage Amount Moderate   Drainage Description Serosanguinous   Wound Odor None   Vickie-Wound/Incision Assessment Fragile   Edges Flat/open edges   Wound Thickness Description Full thickness   Pain 1   Pain Scale 1 Numeric (0 - 10)   Pain Intensity 1 3   Patient Stated Pain Goal 0   Pain Reassessment 1 Yes   Pain Location 1 Abdomen   Pain Orientation 1 Lower   Pain Description 1 Burning     Visit Vitals  /74   Pulse 68   Temp 98.2 °F (36.8 °C)   Resp 18

## 2022-05-12 NOTE — WOUND CARE
Clinical Level of Care Assessment    Outpatient Ostomy Care      NAME:  Morteza Lambert OF BIRTH:  1945  MEDICAL RECORD NUMBER:  [id number]   DATE:  5/12/2022      Patient Alyne Pack Assessment- Document in Flowsheet I&O   Points   Review of chart []   0   Assess Complete Ostomy tab in Navigator for assessment of; stoma status, peristomal skin, presence of hernia/stool consistency/diet/related medications   Simple adjustments to pouch size/pouch system, new stoma pattern, accessory addition/deletion. []   1   Assess Complete Ostomy tab in Navigator for assessment of; stoma status, peristomal skin, presence of hernia/stool consistency/diet/related medications   Moderate adjustments to pouch size/pouch system, new stoma pattern, accessory addition/deletion. Observe patient/caregiver with hands-on care. 1-2 adjustments to pouch size/system/skin care/accessory addition or deletion. [x]   2   Assess Complete Ostomy tab in Navigator for assessment of; stoma status, peristomal skin, presence of hernia/stool consistency/diet/related medications   Complex adjustments to pouch size/pouch system, new stoma pattern, accessory addition/deletion. 3 or more complex adjustments to pouch size/system/skin care/accessory addition or deletion. Observe patient/caregiver with hands-on care. Assess patient/patient abdomen for optimal pre-marked stoma site. Assess patient abdomen for type of hernia belt/accessory needed. []   3         Ambulation Status Documented in [facility] Clinical Note  Status Definition Points   Independent Independently able to ambulate. Fully able (without any assistance) to get on/off exam table/chair. [x]   0   Minimal Physical Assistance Requires physical assistance of one person to ambulate and/or position patient to be examined. Includes necessary physical assistance to position lower extremities on/off stool.    []   1   Moderate Physical Assistance Requires at least one staff member to physically assist patient in ambulating into treatment room, and/or on off chair/bed. Requires assistance to bathroom. []   2   Full Assistance Requires assistance of at least two staff members to transfer patient into treatment room and/or on/off bed/chair. \"Total Transfer\". Unable to use bathroom requires bedside commode and/or bedpan []   3       Teaching Effort Documented in [facility] Clinical Note  Effort Definition Points   No Teaching  []   0   General Initial/Simple lesson:  Assess readiness to learn, assess patient learning style to determine educational flow/special needs for learning. Teaching related to 1-3 topics  Documentation in [facility] completed. []   1   Intermediate Assess readiness to learn, assess patient learning style to determine educational flow/special needs for learning. Teaching related to 3-4 topics. Hernia belt application and care considerations  Documentation in [facility] completed. [x]   2   Complex Assess readiness to learn, assess patient learning style to determine educational flow/special needs for learning. Teaching of greater than 5 additional topics   Pre-operative ostomy education with review of written resources for patient/family/caregiver as needed. Demonstration/return demonstration of ostomy irrigation  Documentation in [facility] completed. []   3     Patient Assessment and Planning in [facility] Clinical Note   Planning Definition Points   Simple Simple pouch change procedure completed and reviewed with patient/family/caregiver   Documentation in [facility] completed. []   1   Intermediate Moderate level of follow-up needs:   Pouch change/discharge procedure revised and reviewed with patient/caregiver. Communications with outside resources; i.e. Telephone calls to Surgeon/ PCP, family/caregiver, home health, ECF. Documentation in [facility] completed.      [x]   2   Complex Complex level of instructions/changes:   Family/Caregiver learning/demonstration/return demonstration visit. Pouching/discharge procedure revised/reviewed with patient/family/caregiver. Contact with outside resources; i.e. communication with Surgeon/ PCP, home health, ECF. Contact/referral to ostomy appliance supplier for new or additional products. Review when to call [facility] or schedule follow-up visit. Referral to Emergency Department   Documentation in [facility] completed. []   3       Is this the Patient's First Visit with [facility] @ [facility]? No    Is this Patient Established to this [facility] within the last 3 years?    Yes             Clinical Level of Care      Points  0-3  Level 1 []     Points  4-6  Level 2 [x]     Points  7-8  Level 3 []     Points  9-10  Level 4 []     Points  11-12  Level 5 []       Electronically signed by Thomas Jones RN on 5/12/2022 at 11:22 AM

## 2022-05-12 NOTE — WOUND CARE
Discharge Instructions/Wound Orders  [place] [id number] [facility], [address] [facility]  Telephone: [phone]    NAME:  Estela Hazard OF BIRTH:  1945  MEDICAL RECORD NUMBER:  [id number]  DATE:  5/12/2022  Ostomy Care Orders:  Ostomy Care Orders:  1) Remove old bag and clean peristomal skin (including wound) with tap water, or clean in shower, then pat dry thoroughly. 2) Treat with crusting using stoma powder then No sting skin prep, repeat x 2.  3) Cover wound with calcium alginate precut for you, then secure with thin douderm as per diagram.  4) Shape barrier to size, flatten inner border and place over stoma site. 5) Apply Dalton 1 piece cut to fit soft convex bag to 30mm, remove plastic backing. 6) Fold the bag like a taco and place over the stoma site (over top of the dressing). 7) Remove paper backing from tape border and secure with ostomy belt. 8) Change 2 x week and as needed for leaking. Dietary:  [x] Diet as tolerated: [] Calorie Diabetic Diet:Low carb and no Sugar [] No Added Salt:[x] Increase Protein: [] Other:Limit the amount of liquid you are drinking and avoid drinking in between meals   Activity:  [x] Activity as tolerated:  [] Patient has no activity restrictions     [] Strict Bedrest: [] Remain off Work:     [] May return to full duty work:                                   [] Return to work with restrictions:             Return Appointment:  [x] Return Appointment: With Kelin Perea  in  1 Week(s)  [] Ordered tests:    Electronically signed Michela Morley RN on 5/12/2022 at 11:27 AM     Brittani Catherine 281: Should you experience any significant changes in your wound(s) or have questions about your wound care, please contact the [address] [place] [address] at [address] [place] [address] 8:00 am - 4:30.   If you need help with your wound outside these hours and cannot wait until we are again available, contact your PCP or go to the hospital emergency room. PLEASE NOTE: IF YOU ARE UNABLE TO OBTAIN WOUND SUPPLIES, CONTINUE TO USE THE SUPPLIES YOU HAVE AVAILABLE UNTIL YOU ARE ABLE TO REACH US. IT IS MOST IMPORTANT TO KEEP THE WOUND COVERED AT ALL TIMES.      CWON Signature:_______________________    Date: ___________ Time:  ____________

## 2022-05-19 ENCOUNTER — HOSPITAL ENCOUNTER (OUTPATIENT)
Dept: WOUND CARE | Age: 77
Discharge: HOME | End: 2022-05-19
Payer: MEDICARE

## 2022-05-19 ENCOUNTER — TELEPHONE (OUTPATIENT)
Dept: ONCOLOGY | Age: 77
End: 2022-05-19

## 2022-05-19 VITALS
HEART RATE: 75 BPM | DIASTOLIC BLOOD PRESSURE: 75 MMHG | TEMPERATURE: 97.7 F | SYSTOLIC BLOOD PRESSURE: 127 MMHG | RESPIRATION RATE: 16 BRPM

## 2022-05-19 DIAGNOSIS — C67.9 MALIGNANT NEOPLASM OF URINARY BLADDER, UNSPECIFIED SITE (HCC): Primary | ICD-10-CM

## 2022-05-19 PROCEDURE — 99212 OFFICE O/P EST SF 10 MIN: CPT

## 2022-05-19 RX ORDER — PREDNISONE 20 MG/1
60 TABLET ORAL
Qty: 15 TABLET | Refills: 0 | Status: SHIPPED | OUTPATIENT
Start: 2022-05-19 | End: 2022-05-24 | Stop reason: SDUPTHER

## 2022-05-19 NOTE — WOUND CARE
Discharge Instructions/Wound Orders  [facility]  [address]  [place], [address] [facility]  Telephone: [phone]    NAME:  Lulu Carrasco OF BIRTH:  1945  MEDICAL RECORD NUMBER:  [id number]  DATE:  5/19/2022    Ostomy Care Orders:  1) Remove old bag and clean peristomal skin (including wound) with tap water, or clean in shower, then pat dry thoroughly. 2) Treat with crusting using stoma powder then No sting skin prep, repeat x 2.  3) Cover wound with calcium alginate precut for you, then secure with thin douderm as per diagram.  4) Shape barrier to size, flatten inner border and place over stoma site. 5) Apply Dalton 1 piece cut to fit soft convex bag to 30mm, remove plastic backing. 6) Fold the bag like a taco and place over the stoma site (over top of the dressing). 7) Remove paper backing from tape border and secure with ostomy belt. 8) Change 2 x week and as needed for leaking. Dietary:  [x] Diet as tolerated: [] Calorie Diabetic Diet:Low carb and no Sugar [] No Added Salt:[x] Increase Protein: [] Other:Limit the amount of liquid you are drinking and avoid drinking in between meals   Activity:  [x] Activity as tolerated:  [] Patient has no activity restrictions     [] Strict Bedrest: [] Remain off Work:     [] May return to full duty work:                                   [] Return to work with restrictions:             Return Appointment:  [x] Return Appointment: With Kelin Martinez  in  1 Week(s)  [] Ordered tests:    Electronically signed Zena Pereyra RN on 5/19/2022 at 11:33 AM     Brittani Catherine 281: Should you experience any significant changes in your wound(s) or have questions about your wound care, please contact the [address] [place] [address] at [address] [place] [address] Street 8:00 am - 4:30.   If you need help with your wound outside these hours and cannot wait until we are again available, contact your PCP or go to the hospital emergency room. PLEASE NOTE: IF YOU ARE UNABLE TO OBTAIN WOUND SUPPLIES, CONTINUE TO USE THE SUPPLIES YOU HAVE AVAILABLE UNTIL YOU ARE ABLE TO REACH US. IT IS MOST IMPORTANT TO KEEP THE WOUND COVERED AT ALL TIMES.      CWON Signature:_______________________    Date: ___________ Time:  ____________

## 2022-05-19 NOTE — WOUND CARE
OSTOMY Assessment    Stoma Tissue Assessment: ___Post-op __x_Follow-up       Type of Diversion: ___New___ Established ___Revision_x__Permanent____Temporary    _____  Ileostomy   _____  Colostomy: ____ Ascending, ____ Transverse, _____. Sigmoid   _____  Charlsie Abu   ___x__  Ileal Conduit   _____  Mucous Fistula     Appearance of Ostomy:   _x__ Marlon Raring /Moist/Viable ___ Dark Red ___ Pink ___ Sloughing ___Necrotic____Pallor ____Red  ___Dry ____Moist    Stoma Size: _____30____ (mm) _x__Round ___ Oval ___Irregular     Stoma Height:   ____Flush ____Retracted __x__Budded ____Edematous ____Prolapse     Stoma Location  ____ Left Side          __x__Right Side     _____Umbilicus  _____Incision Line  __x__ Above Belt       ____ Below Belt    Abdominal Contours  ____ Firm ____ Flat ____Flabby ___Soft _x__Round ___ Hard ___ Other       Stoma Function: _x_Yes __No  Output:   __x__ Yellow ____Amber ____ Pink(Hematuria) ____ Tea Colored   ____ Clots ____Foul Odor ____ Mucous     Peristomal skin: Large peristomal wound.  See wound flowsheet  ___ Intact ___ Irritant Dermatitis ___ Allergic Contact Dermatitis ___Candidiasis ___Caput Medusae ___Folliculitis ___Mechanical Trauma ___Mucosal Transplantation    ___Pyoderma Gangrenosum ___Hyperplasia ___Radiation Trauma ___Allergies mpling  ___ Dimpling ____Blistered ____Fragile ____Macerated ___Peeling  ___Ulceration  ___ Fungal ___Peristomal Hernia ___Non-blanchable ___ Hypergranulation      Stoma Complications:   __Excessive Bleeding __Ischemia __ Abscess __Necrosis __Prolapse   _x_Hernia __ Retraction __Stenosis __Mucosal Separation __Melanosis Coli   __Laceration __Other     Application for Patient    Wafer and pouch used during assessment and education _D[place] [id number],___________    Pouch System Recommended:   _x_One-Piece __Two-Piece ___Custom     Flat:   ___Pre-cut   ___Cut-to fit     Convexity: ___Shallow ___Deep___ Flexible ___Creative Convexity   ___Pre-cut _x__Cut to [employer]   _x_ Adhesive Seals __Adhesive Remover Wipes __Barrier [employer] _x_Barrier Wipes   __Deodorizer __Liquid Adhesive __ Paste _x_ Powder __Strip   _x_ Support Belt __Tape      Education for Patient & Family  1. Booklet of information on specific ostomy given and some verbal discussion of patients ostomy and expectations. 2. Instruction/demostration of ostomy wafer & pouch change. 3. Discuss concerns/ answer questions. 4. Provide follow up education in clinic if needed.        PICTURE INSERT:

## 2022-05-19 NOTE — TELEPHONE ENCOUNTER
Oncology Pharmacist Note:    Michaela Duncan a  66 y. o.male  diagnosed with bladder cancer. Mr. Lemus is being treated with nivolumab. Prednisone 60 mg daily x 5 days sent to [facility] per request of Dr. Marylee Lower for rash. Eulice Ganser, PharmD, BCPS, BCOP    For Pharmacy Admin Tracking Only     CPA in place:  Yes   Recommendation Provided To: Provider: 1 via Verbally to provider    Intervention Detail: New Rx: 1, reason: Needs Additional Therapy     Intervention Accepted By: Provider: 1   Time Spent (min): 10

## 2022-05-19 NOTE — WOUND CARE
Clinical Level of Care Assessment    Outpatient Ostomy Care      NAME:  Melany Doan OF BIRTH:  1945  MEDICAL RECORD NUMBER:  [id number]   DATE:  5/19/2022      Patient Rut Johansen Assessment- Document in Flowsheet I&O   Points   Review of chart []   0   Assess Complete Ostomy tab in Navigator for assessment of; stoma status, peristomal skin, presence of hernia/stool consistency/diet/related medications   Simple adjustments to pouch size/pouch system, new stoma pattern, accessory addition/deletion. []   1   Assess Complete Ostomy tab in Navigator for assessment of; stoma status, peristomal skin, presence of hernia/stool consistency/diet/related medications   Moderate adjustments to pouch size/pouch system, new stoma pattern, accessory addition/deletion. Observe patient/caregiver with hands-on care. 1-2 adjustments to pouch size/system/skin care/accessory addition or deletion. [x]   2   Assess Complete Ostomy tab in Navigator for assessment of; stoma status, peristomal skin, presence of hernia/stool consistency/diet/related medications   Complex adjustments to pouch size/pouch system, new stoma pattern, accessory addition/deletion. 3 or more complex adjustments to pouch size/system/skin care/accessory addition or deletion. Observe patient/caregiver with hands-on care. Assess patient/patient abdomen for optimal pre-marked stoma site. Assess patient abdomen for type of hernia belt/accessory needed. []   3         Ambulation Status Documented in [facility] Clinical Note  Status Definition Points   Independent Independently able to ambulate. Fully able (without any assistance) to get on/off exam table/chair. [x]   0   Minimal Physical Assistance Requires physical assistance of one person to ambulate and/or position patient to be examined. Includes necessary physical assistance to position lower extremities on/off stool.    []   1   Moderate Physical Assistance Requires at least one staff member to physically assist patient in ambulating into treatment room, and/or on off chair/bed. Requires assistance to bathroom. []   2   Full Assistance Requires assistance of at least two staff members to transfer patient into treatment room and/or on/off bed/chair. \"Total Transfer\". Unable to use bathroom requires bedside commode and/or bedpan []   3       Teaching Effort Documented in [facility] Clinical Note  Effort Definition Points   No Teaching  []   0   General Initial/Simple lesson:  Assess readiness to learn, assess patient learning style to determine educational flow/special needs for learning. Teaching related to 1-3 topics  Documentation in [facility] completed. []   1   Intermediate Assess readiness to learn, assess patient learning style to determine educational flow/special needs for learning. Teaching related to 3-4 topics. Hernia belt application and care considerations  Documentation in [facility] completed. [x]   2   Complex Assess readiness to learn, assess patient learning style to determine educational flow/special needs for learning. Teaching of greater than 5 additional topics   Pre-operative ostomy education with review of written resources for patient/family/caregiver as needed. Demonstration/return demonstration of ostomy irrigation  Documentation in [facility] completed. []   3     Patient Assessment and Planning in [facility] Clinical Note   Planning Definition Points   Simple Simple pouch change procedure completed and reviewed with patient/family/caregiver   Documentation in [facility] completed. []   1   Intermediate Moderate level of follow-up needs:   Pouch change/discharge procedure revised and reviewed with patient/caregiver. Communications with outside resources; i.e. Telephone calls to Surgeon/ PCP, family/caregiver, home health, ECF. Documentation in [facility] completed.      [x]   2   Complex Complex level of instructions/changes:   Family/Caregiver learning/demonstration/return demonstration visit. Pouching/discharge procedure revised/reviewed with patient/family/caregiver. Contact with outside resources; i.e. communication with Surgeon/ PCP, home health, ECF. Contact/referral to ostomy appliance supplier for new or additional products. Review when to call [facility] or schedule follow-up visit. Referral to Emergency Department   Documentation in [facility] completed. []   3       Is this the Patient's First Visit with [facility] @ [facility]? No    Is this Patient Established to this [facility] within the last 3 years?    Yes:  None               Clinical Level of Care      Points  0-3  Level 1 []     Points  4-6  Level 2 [x]     Points  7-8  Level 3 []     Points  9-10  Level 4 []     Points  11-12  Level 5 []       Electronically signed by @MARY ANN@ on 5/19/2022 at 11:43 AM

## 2022-05-19 NOTE — TELEPHONE ENCOUNTER
Patient called stating he is experiencing a severe rash and would like to come in to see the doctor. Please follow up with the patient.

## 2022-05-19 NOTE — TELEPHONE ENCOUNTER
1350:  Returned call to patient and confirmed x2 identifiers   Patient reporting the following:  Rash- first noticed about 10 days ago; has spread to bilateral legs and arms   Sites are itchy and painful on occasion   Is taking and OTC antihistamine daily and using usaryn cream OTC as well   Occasional fever noticed in afternoons     Wound care saw patient recently and had recommended he contact our office   Informed patient would call back with plan of care     1440:  Called patient and confirmed x2 identifiers  Informed of the following per MD Richardson request:  prednisone 60 mg daily for 5 days;  Rx to be sent to pharmacy on file; informed to take with food   benadryl 25 mg daily   Pepcid or other OTC PPI to be taken daily   Skin hydration  After 5 days to call office with an update   Patient verbalized understanding and agreed with plan   No new questions or concerns at this time

## 2022-05-24 ENCOUNTER — TELEPHONE (OUTPATIENT)
Dept: ONCOLOGY | Age: 77
End: 2022-05-24

## 2022-05-24 DIAGNOSIS — C67.9 MALIGNANT NEOPLASM OF URINARY BLADDER, UNSPECIFIED SITE (HCC): ICD-10-CM

## 2022-05-24 RX ORDER — PREDNISONE 20 MG/1
60 TABLET ORAL
Qty: 15 TABLET | Refills: 0 | Status: SHIPPED | OUTPATIENT
Start: 2022-05-24 | End: 2022-05-27 | Stop reason: SDUPTHER

## 2022-05-24 NOTE — TELEPHONE ENCOUNTER
5/24/22  1130:  Patient called and provided the following updates:  Rash is still present   Sites are not as red but seems to have slightly spread to legs more   Sites affected are bilateral arms, legs, and abdominal region   Itching present; confirmed benadryl regimen and has helped some   Antihistamine use confirmed as well     Patient wishes to know if needs to come into office or if there are other recommendations     Informed would notify MD Dickson Reilly and would call with updates     [phone]:  Called patient and confirmed x2 identifiers   Patient unable to do virtual visit at 1 pm 5/25/22 to be assessed   Informed patient to try to attach images of rash to My Chart   Informed MD Dickson Reilly is recommending to continue prednisone daily; would sent script to pharmacy on file   Patient verbalized understanding and agreed with plan     1345:  Called patient and provided the following plan of care per MD Bai request:  Prednisone Rx sent to pharmacy on file  Hydrocortisone cream OTC between skin hydration   Can take benadryl 25 mg daily   Informed to call Friday 5/27/22 to provide updates on rash     Patient verbalized understanding   No new questions or concerns at this time

## 2022-05-24 NOTE — TELEPHONE ENCOUNTER
Oncology Pharmacist Note:     Yamila Necessary a  66 y. o.male  diagnosed with bladder cancer. Mr. Lemus is being treated with nivolumab.     Prednisone 60 mg daily until Friday days sent to [facility] per request of Dr. Linda Barrow for rash.     Rey Quintana, PharmD, [facility] [place] [address] in place:  Yes   Recommendation Provided To: Patient/Caregiver: 1 via In person   Intervention Detail: Refill(s) Provided     Intervention Accepted By: Patient/Caregiver: 1   Time Spent (min): 10

## 2022-05-25 ENCOUNTER — HOSPITAL ENCOUNTER (OUTPATIENT)
Dept: WOUND CARE | Age: 77
Discharge: HOME | End: 2022-05-25
Payer: MEDICARE

## 2022-05-25 VITALS
TEMPERATURE: 97 F | RESPIRATION RATE: 16 BRPM | DIASTOLIC BLOOD PRESSURE: 76 MMHG | HEART RATE: 60 BPM | SYSTOLIC BLOOD PRESSURE: 116 MMHG

## 2022-05-25 PROCEDURE — 99212 OFFICE O/P EST SF 10 MIN: CPT

## 2022-05-25 NOTE — WOUND CARE
Clinical Level of Care Assessment    Outpatient Ostomy Care      NAME:  Ana Holguin OF BIRTH:  1945  MEDICAL RECORD NUMBER:  [id number]   DATE:  5/25/2022      Patient Liban Deluca Assessment- Document in Flowsheet I&O   Points   Review of chart []   0   Assess Complete Ostomy tab in Navigator for assessment of; stoma status, peristomal skin, presence of hernia/stool consistency/diet/related medications   Simple adjustments to pouch size/pouch system, new stoma pattern, accessory addition/deletion. []   1   Assess Complete Ostomy tab in Navigator for assessment of; stoma status, peristomal skin, presence of hernia/stool consistency/diet/related medications   Moderate adjustments to pouch size/pouch system, new stoma pattern, accessory addition/deletion. Observe patient/caregiver with hands-on care. 1-2 adjustments to pouch size/system/skin care/accessory addition or deletion. [x]   2   Assess Complete Ostomy tab in Navigator for assessment of; stoma status, peristomal skin, presence of hernia/stool consistency/diet/related medications   Complex adjustments to pouch size/pouch system, new stoma pattern, accessory addition/deletion. 3 or more complex adjustments to pouch size/system/skin care/accessory addition or deletion. Observe patient/caregiver with hands-on care. Assess patient/patient abdomen for optimal pre-marked stoma site. Assess patient abdomen for type of hernia belt/accessory needed. []   3         Ambulation Status Documented in CN Clinical Note  Status Definition Points   Independent Independently able to ambulate. Fully able (without any assistance) to get on/off exam table/chair. [x]   0   Minimal Physical Assistance Requires physical assistance of one person to ambulate and/or position patient to be examined. Includes necessary physical assistance to position lower extremities on/off stool.    []   1   Moderate Physical Assistance Requires at least one staff member to physically assist patient in ambulating into treatment room, and/or on off chair/bed. Requires assistance to bathroom. []   2   Full Assistance Requires assistance of at least two staff members to transfer patient into treatment room and/or on/off bed/chair. \"Total Transfer\". Unable to use bathroom requires bedside commode and/or bedpan []   3       Teaching Effort Documented in [facility] Clinical Note  Effort Definition Points   No Teaching  []   0   General Initial/Simple lesson:  Assess readiness to learn, assess patient learning style to determine educational flow/special needs for learning. Teaching related to 1-3 topics  Documentation in [facility] completed. []   1   Intermediate Assess readiness to learn, assess patient learning style to determine educational flow/special needs for learning. Teaching related to 3-4 topics. Hernia belt application and care considerations  Documentation in [facility] completed. [x]   2   Complex Assess readiness to learn, assess patient learning style to determine educational flow/special needs for learning. Teaching of greater than 5 additional topics   Pre-operative ostomy education with review of written resources for patient/family/caregiver as needed. Demonstration/return demonstration of ostomy irrigation  Documentation in [facility] completed. []   3     Patient Assessment and Planning in [facility] Clinical Note   Planning Definition Points   Simple Simple pouch change procedure completed and reviewed with patient/family/caregiver   Documentation in [facility] completed. []   1   Intermediate Moderate level of follow-up needs:   Pouch change/discharge procedure revised and reviewed with patient/caregiver. Communications with outside resources; i.e. Telephone calls to Surgeon/ PCP, family/caregiver, home health, ECF. Documentation in [facility] completed.      [x]   2   Complex Complex level of instructions/changes:   Family/Caregiver learning/demonstration/return demonstration visit. Pouching/discharge procedure revised/reviewed with patient/family/caregiver. Contact with outside resources; i.e. communication with Surgeon/ PCP, home health, ECF. Contact/referral to ostomy appliance supplier for new or additional products. Review when to call [facility] or schedule follow-up visit. Referral to Emergency Department   Documentation in [facility] completed. []   3       Is this the Patient's First Visit with [facility] @ [facility]? No    Is this Patient Established to this [facility] within the last 3 years?    Yes             Clinical Level of Care      Points  0-3  Level 1 []     Points  4-6  Level 2 [x]     Points  7-8  Level 3 []     Points  9-10  Level 4 []     Points  11-12  Level 5 []       Electronically signed by Mady Benson RN on 5/25/2022 at 11:59 AM

## 2022-05-25 NOTE — WOUND CARE
05/25/22 1151   Wound Peristomal Right #1 04/18/22   Date First Assessed/Time First Assessed: 04/18/22 1145   Present on Hospital Admission: Yes  Wound Approximate Age at First Assessment (Weeks): 4 weeks  Primary Wound Type: Traumatic  Location: Peristomal  Wound Location Orientation: Right  Wound Desc. ..    Wound Image    Wound Etiology Traumatic   Dressing Status New dressing applied   Cleansed   (tap water)   Dressing/Treatment Alginate;Hydrocolloid  (crusted the periarea and urostomy bag)   Wound Length (cm) 1.8 cm   Wound Width (cm) 2 cm   Wound Depth (cm) 0.1 cm   Wound Surface Area (cm^2) 3.6 cm^2   Change in Wound Size % 84.18   Wound Volume (cm^3) 0.36 cm^3   Wound Healing % 92   Wound Assessment Pink/red;Granulation tissue   Drainage Amount Moderate   Drainage Description Serosanguinous   Wound Odor None   Vickie-Wound/Incision Assessment Fragile   Edges Flat/open edges   Wound Thickness Description Full thickness   Pain 1   Pain Scale 1 Numeric (0 - 10)   Pain Intensity 1 0   Patient Stated Pain Goal 0   Pain Reassessment 1 Yes     Visit Vitals  /76   Pulse 60   Temp 97 °F (36.1 °C)   Resp 16

## 2022-05-25 NOTE — WOUND CARE
Discharge Instructions/Wound Orders  [facility]  [address]  [place], [address]  Telephone: [phone]    NAME:  David Livingston OF BIRTH:  1945  MEDICAL RECORD NUMBER:  [id number]  DATE:  5/25/2022    Ostomy Care Orders:  1) Remove old bag and clean peristomal skin (including wound) with tap water, or clean in shower, then pat dry thoroughly. 2) Treat with crusting using stoma powder then No sting skin prep, repeat x 2.  3) Cover wound with calcium alginate precut for you, then secure with thin douderm as per diagram.  4) Shape barrier to size, flatten inner border and place over stoma site. 5) Apply [place] 1 piece cut to fit soft convex bag to 30mm, remove plastic backing. 6) Fold the bag like a taco and place over the stoma site (over top of the dressing). 7) Remove paper backing from tape border and secure with ostomy belt. 8) Change 2 x week and as needed for leaking. Dietary:  [] Diet as tolerated: [] Calorie Diabetic Diet:Low carb and no Sugar [] No Added Salt:[] Increase Protein: [] Other:Limit the amount of liquid you are drinking and avoid drinking in between meals   Activity:  [] Activity as tolerated:  [] Patient has no activity restrictions     [] Strict Bedrest: [] Remain off Work:     [] May return to full duty work:                                   [] Return to work with restrictions:             Return Appointment:  [] Return Appointment: With Kelin Armendariz  in  2 Week(s)  [] Ordered tests:    Electronically signed Janey Mata RN on 5/25/2022 at [address]: Should you experience any significant changes in your wound(s) or have questions about your wound care, please contact the [address] [place] [address] at [address] [place] [address] 8:00 am - 4:30.   If you need help with your wound outside these hours and cannot wait until we are again available, contact your PCP or go to the [facility] emergency room. PLEASE NOTE: IF YOU ARE UNABLE TO OBTAIN WOUND SUPPLIES, CONTINUE TO USE THE SUPPLIES YOU HAVE AVAILABLE UNTIL YOU ARE ABLE TO REACH US. IT IS MOST IMPORTANT TO KEEP THE WOUND COVERED AT ALL TIMES.      CWON Signature:_______________________    Date: ___________ Time:  ____________

## 2022-05-25 NOTE — WOUND CARE
OSTOMY Assessment    Stoma Tissue Assessment: ___Post-op _x__Follow-up       Type of Diversion: ___New___ Established ___Revision__x_Permanent____Temporary    _____  Ileostomy   _____  Colostomy: ____ Ascending, ____ Transverse, _____. Sigmoid   _____  Serena Code   __x___  Ileal Conduit   _____  Mucous Fistula     Appearance of Ostomy:   __x_ Ashwini Marietta /Moist/Viable ___ Dark Red ___ Pink ___ Sloughing ___Necrotic____Pallor ____Red  ___Dry ____Moist    Stoma Size: ____30_____ (mm) _x__Round ___ Oval ___Irregular     Stoma Height:   ____Flush ____Retracted _x___Budded ____Edematous ____Prolapse     Stoma Location  ____ Left Side          __x__Right Side     _____Umbilicus  _____Incision Line  __x__ Above Belt       ____ Below Belt    Abdominal Contours  ____ Firm ____ Flat ____Flabby ___Soft __x_Round ___ Hard ___ Other    GI Stoma Function: _x__ Yes ___No   Output:   ____Sero-Sanguenous ____Sanguenous____ Bilious ____ Liquid ____Semi-liquid ____ Pasty ____ Formed ___Soft ___Firm ____ Black____Brown ____Foul Odor     Stoma Function: __Yes __No  Output:   __x__ Yellow ____Amber ____ Pink(Hematuria) ____ Tea Colored   ____ Clots ____Foul Odor ____ Mucous     Peristomal skin: Large peristomal wound.  See wound flowsheet  ___ Intact ___ Irritant Dermatitis ___ Allergic Contact Dermatitis ___Candidiasis ___Caput Medusae ___Folliculitis ___Mechanical Trauma ___Mucosal Transplantation    ___Pyoderma Gangrenosum ___Hyperplasia ___Radiation Trauma ___Allergies mpling  ___ Dimpling ____Blistered ____Fragile ____Macerated ___Peeling  ___Ulceration  ___ Fungal ___Peristomal Hernia ___Non-blanchable ___ Hypergranulation      Stoma Complications:   __Excessive Bleeding __Ischemia __ Abscess __Necrosis __Prolapse   _x_Hernia __ Retraction __Stenosis __Mucosal Separation __Melanosis Coli   __Laceration __Other     Application for Patient    Wafer and pouch used during assessment and education __[place] [id number]__________    Pouch System Recommended:   x__One-Piece __Two-Piece ___Custom     Flat:   ___Pre-cut   __Cut-to fit     Convexity: ___Shallow ___Deep___ Flexible ___Creative Convexity   ___Pre-cut __x_Cut to Fit     Accessory Products   _x_ Adhesive Seals __Adhesive Remover Wipes __Barrier Wafer _x_Barrier Wipes   __Deodorizer __Liquid Adhesive __ Paste _x_ Powder __Strip   _x_ Support Belt __Tape      Education for Patient & Family  1. Booklet of information on specific ostomy given and some verbal discussion of patients ostomy and expectations. 2. Instruction/demostration of ostomy wafer & pouch change. 3. Discuss concerns/ answer questions. 4. Provide follow up education in clinic if needed.        PICTURE INSERT:

## 2022-05-26 ENCOUNTER — APPOINTMENT (OUTPATIENT)
Dept: INFUSION THERAPY | Age: 77
End: 2022-05-26

## 2022-05-27 ENCOUNTER — TELEPHONE (OUTPATIENT)
Dept: ONCOLOGY | Age: 77
End: 2022-05-27

## 2022-05-27 DIAGNOSIS — C67.9 MALIGNANT NEOPLASM OF URINARY BLADDER, UNSPECIFIED SITE (HCC): ICD-10-CM

## 2022-05-27 RX ORDER — PREDNISONE 20 MG/1
40 TABLET ORAL
Qty: 14 TABLET | Refills: 0 | Status: SHIPPED | OUTPATIENT
Start: 2022-05-27 | End: 2022-06-03

## 2022-05-27 NOTE — TELEPHONE ENCOUNTER
1102:  Called patient to re-assess rash   No answer   LVM to call office back     1115:  Returned call to patient and confirmed x2 identifiers   Patient stated rash is present but has not spread anymore   Will provide update to MD Bai     1143:  Called patient and confirmed x2 identifiers   Informed patient per MD Bai request to reduce prednisone to 40 daily until seen in office again next week   Patient verbalized understanding   No new questions or concerns at this time

## 2022-05-27 NOTE — TELEPHONE ENCOUNTER
Called pt about getting him set up for labs. No answer left VM requesting return call to give appt date and time.

## 2022-05-31 ENCOUNTER — OFFICE VISIT (OUTPATIENT)
Dept: ONCOLOGY | Age: 77
End: 2022-05-31
Payer: MEDICARE

## 2022-05-31 VITALS
BODY MASS INDEX: 30.27 KG/M2 | TEMPERATURE: 98.1 F | DIASTOLIC BLOOD PRESSURE: 75 MMHG | WEIGHT: 217 LBS | SYSTOLIC BLOOD PRESSURE: 119 MMHG | HEART RATE: 74 BPM | OXYGEN SATURATION: 96 % | RESPIRATION RATE: 20 BRPM

## 2022-05-31 DIAGNOSIS — C67.9 MALIGNANT NEOPLASM OF URINARY BLADDER, UNSPECIFIED SITE (HCC): Primary | ICD-10-CM

## 2022-05-31 DIAGNOSIS — R21 RASH: ICD-10-CM

## 2022-05-31 PROCEDURE — G8536 NO DOC ELDER MAL SCRN: HCPCS | Performed by: INTERNAL MEDICINE

## 2022-05-31 PROCEDURE — G0463 HOSPITAL OUTPT CLINIC VISIT: HCPCS | Performed by: INTERNAL MEDICINE

## 2022-05-31 PROCEDURE — G8432 DEP SCR NOT DOC, RNG: HCPCS | Performed by: INTERNAL MEDICINE

## 2022-05-31 PROCEDURE — 99215 OFFICE O/P EST HI 40 MIN: CPT | Performed by: INTERNAL MEDICINE

## 2022-05-31 PROCEDURE — 1101F PT FALLS ASSESS-DOCD LE1/YR: CPT | Performed by: INTERNAL MEDICINE

## 2022-05-31 PROCEDURE — 1123F ACP DISCUSS/DSCN MKR DOCD: CPT | Performed by: INTERNAL MEDICINE

## 2022-05-31 PROCEDURE — G8417 CALC BMI ABV UP PARAM F/U: HCPCS | Performed by: INTERNAL MEDICINE

## 2022-05-31 PROCEDURE — G8427 DOCREV CUR MEDS BY ELIG CLIN: HCPCS | Performed by: INTERNAL MEDICINE

## 2022-05-31 PROCEDURE — G8752 SYS BP LESS 140: HCPCS | Performed by: INTERNAL MEDICINE

## 2022-05-31 PROCEDURE — G8754 DIAS BP LESS 90: HCPCS | Performed by: INTERNAL MEDICINE

## 2022-05-31 RX ORDER — PANTOPRAZOLE SODIUM 40 MG/1
40 TABLET, DELAYED RELEASE ORAL DAILY
Qty: 30 TABLET | Refills: 0 | Status: SHIPPED | OUTPATIENT
Start: 2022-05-31 | End: 2022-10-19

## 2022-05-31 RX ORDER — EPINEPHRINE 1 MG/ML
0.3 INJECTION, SOLUTION, CONCENTRATE INTRAVENOUS AS NEEDED
Status: CANCELLED | OUTPATIENT
Start: 2022-06-21

## 2022-05-31 RX ORDER — HEPARIN 100 UNIT/ML
300-500 SYRINGE INTRAVENOUS AS NEEDED
Status: CANCELLED
Start: 2022-06-21

## 2022-05-31 RX ORDER — DIPHENHYDRAMINE HYDROCHLORIDE 50 MG/ML
25 INJECTION, SOLUTION INTRAMUSCULAR; INTRAVENOUS AS NEEDED
Status: CANCELLED
Start: 2022-06-21

## 2022-05-31 RX ORDER — SODIUM CHLORIDE 0.9 % (FLUSH) 0.9 %
10 SYRINGE (ML) INJECTION AS NEEDED
Status: CANCELLED | OUTPATIENT
Start: 2022-06-21

## 2022-05-31 RX ORDER — SODIUM CHLORIDE 9 MG/ML
10 INJECTION INTRAMUSCULAR; INTRAVENOUS; SUBCUTANEOUS AS NEEDED
Status: CANCELLED | OUTPATIENT
Start: 2022-06-21

## 2022-05-31 RX ORDER — PREDNISONE 10 MG/1
30 TABLET ORAL
Qty: 15 TABLET | Refills: 0 | Status: SHIPPED | OUTPATIENT
Start: 2022-05-31 | End: 2022-06-08 | Stop reason: SDUPTHER

## 2022-05-31 RX ORDER — ONDANSETRON 2 MG/ML
8 INJECTION INTRAMUSCULAR; INTRAVENOUS AS NEEDED
Status: CANCELLED | OUTPATIENT
Start: 2022-06-21

## 2022-05-31 RX ORDER — DIPHENHYDRAMINE HYDROCHLORIDE 50 MG/ML
50 INJECTION, SOLUTION INTRAMUSCULAR; INTRAVENOUS AS NEEDED
Status: CANCELLED
Start: 2022-06-21

## 2022-05-31 RX ORDER — HYDROCORTISONE SODIUM SUCCINATE 100 MG/2ML
100 INJECTION, POWDER, FOR SOLUTION INTRAMUSCULAR; INTRAVENOUS AS NEEDED
Status: CANCELLED | OUTPATIENT
Start: 2022-06-21

## 2022-05-31 RX ORDER — SODIUM CHLORIDE 9 MG/ML
25 INJECTION, SOLUTION INTRAVENOUS CONTINUOUS
Status: CANCELLED | OUTPATIENT
Start: 2022-06-21

## 2022-05-31 RX ORDER — ALBUTEROL SULFATE 0.83 MG/ML
2.5 SOLUTION RESPIRATORY (INHALATION) AS NEEDED
Status: CANCELLED
Start: 2022-06-21

## 2022-05-31 RX ORDER — ACETAMINOPHEN 325 MG/1
650 TABLET ORAL AS NEEDED
Status: CANCELLED
Start: 2022-06-21

## 2022-05-31 NOTE — PROGRESS NOTES
[facility] at [address], [id number] [place] [address] Road, [address] [place] [address]  W: [phone]  F: [phone]    Reason for Visit:   Lalo Pierson is a 69 y.o. male who is seen in follow-up of Muscle invasive bladder cancer- Mixed histology    Treatment History:   · 3/1/2021: CT IVP: Multiple abdominal, iliac, mediastinal nodes unchanged from 2019 consistent with h/o sarcoidosis, Thickened R lateral bladder wall. · 6/23/2021: Cystoscopy and TURBT- Papillary changes were noted within the prostatic urethra ,  papillary tumors seen emanating from the surface of the left hemitrigone, There were also diffuse changes in the floor of the bladder- Low grade urothelial carcinoma of the prostatic urethra, R lateral bladder wall with HG Muscle invasive urothelial carcinoma and squamous differentiation+ CIS, Left brenda trigone HG non invasive urothelial carcinoma  · 8/5/21- 10/21/2021: Cisplatin + Gemzar x 4   · 9/14/2021: CT was stable. · 12/6/2021: Radical urethro-cystoprostatectomy   · 2/3/22: Adjuvant nivolumab  · 4/22/2022: CT CHATO     History of Present Illness:   Patient is a 69 y.o. male with PMH as below including sarcoidosis who is seen for evaluation of bladder cancer. He has a history of nonmuscle invasive bladder cancer in 2015, underwent 2 induction courses of BCG, had a non muscle invasive recurrence in 2019 and had re induction with BCG. Cystoscopy 6/2020 at [facility] did not show any recurrence. In March 2021 he had a positive FISH and was seen by Dr. Robin Phillips. Had a CT IVP 3/2021 which showed some Bladder thickening. 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 Grade 4 neutropenia after cycle 1. Completed 4 cycles and seen after surgery done 12/6/2021. He comes today for follow up.  He was scheduled for cycle 5 Nivolumab, however, developed a rash on 21 and was started on prednisone 60mg PO daily. He is using eucerin and hydrocortosone cream. He is taking benadryl PO twice daily. This is mostly on his arms/legs bilaterally but he does have some regions on his chest. His prednisone dose was decreased on 22 to 40mg PO daily. He denies insomnia, adverse SE to steroids at this time. He is [place] for two weeks on 22. Past Medical History:   Diagnosis Date    Arrhythmia     LBBB    Arthritis     Asthma     MILD    Cancer ([address]     scc top of head and nose    Cancer ([facility] 2015    BLADDER    COVID-19 vaccine series completed     [facility] VACCINE 21 AND 21    Depression with anxiety     Hypertension     Other unknown and unspecified cause of morbidity or mortality     history of pulmonary sarcoid     Posterior cervical adenopathy, benign 2018    Pulmonary sarcoidosis ([facility]     Unspecified sleep apnea     cpap      Past Surgical History:   Procedure Laterality Date    HX CATARACT REMOVAL Bilateral     HX HERNIA REPAIR Right AS A CHILD    INGUINAL    HX HERNIA REPAIR Left     INGUIBAL    HX KNEE REPLACEMENT Left     HX ORTHOPAEDIC Right     BONE SPURS REMOVED FROM FOOT    HX OTHER SURGICAL      scc removed top of head and nose    HX OTHER SURGICAL      benign lump removed from thyroid    HX OTHER SURGICAL Right 2020    SKIN CANCER RELMOVED FROM LEG    HX UROLOGICAL  2020    CYSTO    IR INSERT TUNL CVC W PORT OVER 5 YEARS  2021    SC CARDIAC SURG PROCEDURE UNLIST  2021    CARDIAC CATH    SC REVISE KNEE JOINT REPLACE,ALL PARTS Left       Social History     Tobacco Use    Smoking status: Former Smoker     Packs/day: 0.50     Years: 2.00     Pack years: 1.00     Quit date: 1965     Years since quittin.8    Smokeless tobacco: Never Used   Substance Use Topics    Alcohol use:  Yes     Alcohol/week: 2.0 standard drinks     Types: 2 Glasses of wine per week     Comment: DAILY      Family History   Problem Relation Age of Onset    Cancer Mother         breast with mets    Dementia Mother     Heart Disease Father     Cancer Sister         breast    Lung Disease Brother     No Known Problems Brother     Anesth Problems Neg Hx      Current Outpatient Medications   Medication Sig    predniSONE (DELTASONE) 20 mg tablet Take 2 Tablets by mouth daily (with breakfast) for 7 days.  multivitamin (ONE A DAY) tablet Take 1 Tablet by mouth daily.  metoprolol tartrate (LOPRESSOR) 25 mg tablet Take 0.5 Tablets by mouth every twelve (12) hours.  zolpidem (AMBIEN) 5 mg tablet Take 5 mg by mouth nightly as needed for Sleep.  buPROPion SR (WELLBUTRIN SR) 100 mg SR tablet Take 100 mg by mouth daily.  acetaminophen (Tylenol Extra Strength) 500 mg tablet Take 1,000 mg by mouth every six (6) hours as needed for Pain.  simvastatin (ZOCOR) 20 mg tablet Take 20 mg by mouth nightly.  escitalopram oxalate (Lexapro) 10 mg tablet Take 10 mg by mouth daily. No current facility-administered medications for this visit. Allergies   Allergen Reactions    Pcn [Penicillins] Hives     Patient screened for any delayed non-IgE-mediated reaction to PCN.         Patient notes the following:    NO SEVERE NON-IGE MEDIATED REACTIONS        Review of Systems: A complete review of systems was obtained, negative except as described above.     Physical Exam:     Visit Vitals  /75 (BP 1 Location: Left upper arm, BP Patient Position: Sitting)   Pulse 74   Temp 98.1 °F (36.7 °C)   Resp 20   Wt 217 lb (98.4 kg)   SpO2 96%   BMI 30.27 kg/m²     ECOG PS: 1  General: No distress  Eyes: PERRL, anicteric sclerae  HENT: Atraumatic   Neck: Supple  Skin: erythematous patchy rash noted bilateral arms, chest; some small pustules upper arms bilaterally  GI: Has a urostomy bag  Psych: Alert, oriented, appropriate affect, normal judgment/insight    Results:     Lab Results   Component Value Date/Time    WBC 4.4 05/02/2022 05:15 PM    HGB 10.3 (L) 05/02/2022 05:15 PM    HCT 32.3 (L) 05/02/2022 05:15 PM    PLATELET 096 11/06/8981 05:15 PM    MCV 99.4 (H) 05/02/2022 05:15 PM    ABS. NEUTROPHILS 2.9 05/02/2022 05:15 PM     Lab Results   Component Value Date/Time    Sodium 138 05/02/2022 05:15 PM    Potassium 4.0 05/02/2022 05:15 PM    Chloride 112 (H) 05/02/2022 05:15 PM    CO2 17 (L) 05/02/2022 05:15 PM    Glucose 88 05/02/2022 05:15 PM    BUN 34 (H) 05/02/2022 05:15 PM    Creatinine 1.76 (H) 05/02/2022 05:15 PM    GFR est AA 46 (L) 05/02/2022 05:15 PM    GFR est non-AA 38 (L) 05/02/2022 05:15 PM    Calcium 9.7 05/02/2022 05:15 PM    Glucose (POC) 143 (H) 12/11/2021 01:02 AM     Lab Results   Component Value Date/Time    Bilirubin, total 0.2 05/02/2022 05:15 PM    ALT (SGPT) 24 05/02/2022 05:15 PM    Alk. phosphatase 58 05/02/2022 05:15 PM    Protein, total 7.8 05/02/2022 05:15 PM    Albumin 3.6 05/02/2022 05:15 PM    Globulin 4.2 (H) 05/02/2022 05:15 PM       External   Records reviewed and summarized above.   Pathology report(s) reviewed    12/21/2021     SPECIMEN       Procedure: Radical urethro-cystoprostatectomy       TUMOR       Tumor Site:                Trigone, Dome, Right lateral wall, Left   Ureteral                                  orifice, Right bladder neck        Histologic Type:     Papillary urothelial carcinoma, invasive and   non-invasive, and carcinoma in situ       Histologic Grade:                High-grade       Tumor Size: Cannot be determined:      Multiple tumors       Tumor Extension:           Tumor invades adjacent structures -   Prostate                                  (transmural invasion from the bladder   tumor)       Lymphovascular Invasion:      Present       Tumor Configuration:           Papillary, Flat       MARGINS       Margins:                     Uninvolved by invasive carcinoma and   carcinoma in situ /                                  noninvasive urothelial carcinoma    LYMPH NODES       Number of Lymph Nodes Involved:      2           Size of Largest Metastatic Deposit:      1 cm               Site:                     Left Pelvic Lymph Nodes           Extranodal Extension:           Present       Number of Lymph Nodes Examined:      5     PATHOLOGIC STAGE CLASSIFICATION (pTNM, AJCC 8th Edition)       TNM Descriptors:                m (multiple primary tumors)       Primary Tumor (pT):                pT4a       Regional Lymph Nodes (pN):           pN2     Carcinoma in situ URETHRA: RESECTION       Tumor Site:                     Urethra       SPECIMEN       Procedure:                     Total urethrectomy    TUMOR       Tumor Site:                     Prostatic urethra       Histologic Type:                       Papillary urothelial carcinoma,   invasive       Histologic Grade:                     High-grade       Tumor Extension:                Tumor invades adjacent structures -   Prostate       Lymphovascular Invasion:           Present       MARGINS       Margins:                          Uninvolved by invasive carcinoma and   carcinoma in situ /                                       noninvasive urothelial carcinoma       LYMPH NODES (These represent the same lymph nodes reported in the BLADDER   Resection Template)       Number of Lymph Nodes Involved:      2           Size of Largest Metastatic Deposit:      1 cm               Site:                     Left Pelvic Lymph node       Number of Lymph Nodes Examined:      5      PATHOLOGIC STAGE CLASSIFICATION (pTNM, AJCC 8th Edition)       TNM Descriptors:                m (multiple primary tumors within the   prostatic urethra)       Primary Tumor (pT):                pT2       Regional Lymph Nodes (pN):           pN2                     4.  Right pelvic lymph nodes, lymph node dissection:        One benign lymph node with noncaseating granulomatous inflammation,   compatible with patients history of sarcoidosis   One benign lymph node, no histopathologic changes   No tumor seen     5.  Left pelvic lymph nodes, lymph node dissection:        Metastatic urothelial carcinoma in two of three lymph nodes, 1 cm in   greatest dimension with extracapsular extension   Noncaseating granulomatous inflammation, compatible with patient's history   of sarcoidosis     Radiology report(s) reviewed above. CT CAP 7/19/21:   IMPRESSION  1. While the bladder is decompressed by Juarez is thick-walled and irregular  2. No evidence of metastatic disease to the abdomen or pelvis  3. Extensive bilateral hilar and mediastinal adenopathy with patchy perihilar  airspace disease with associated nodular peribronchial cuffing. Findings can be  seen with sarcoidosis. Differentiating adenopathy from sarcoidosis for  metastatic disease is difficult       9/2021 CT     IMPRESSION     1. Mild bladder wall thickening posteriorly and along the right lateral bladder  wall, nonspecific. No evidence of metastatic disease within the chest, abdomen,  or pelvis. 2. Extensive mediastinal and bilateral hilar lymphadenopathy with lymph node  calcifications, most compatible with known sarcoidosis. No significant change. 3. Bilateral perihilar airspace opacities have improved. No new pulmonary  pathology identified. 4. Severe diverticulosis of the colon. No definite superimposed acute  Diverticulitis. 12/11/2021     IMPRESSION     1. Interval radical cystoprostatectomy with ileal conduit urinary diversion. 2. No postoperative complication identified. 3. Dense bilateral nephrograms in this patient who is status post contrast  injection the previous day. This suggests ATN. 4/2022      IMPRESSION     Status post radical cystoprostatectomy with ileal conduit urinary diversion. Mild left-sided hydronephrosis status post removal of bilateral nephroureteral  stents.   Chronic pulmonary parenchymal scarring and nodularity, not significantly  changed.     No definitive evidence of new/recurrent metastatic disease in the chest, abdomen  or pelvis. Assessment:   1) Muscle invasive bladder cancer- With squamous differentiation     [id number]  S/p 4 cycles of NAC with Cisplatin+ gemzar complicated by grade 4 neutropenia  S/P Radical urethro-cystoprostatectomy 12/6/2021- pT4N2 (invaded prostate), HG papillar, +LVI  Path stage-  Stage IIIB  Disease at high risk for local and distant recurrence  Margins negative but nodes had EPE    CT scans 4/2022 reviewed and CHATO    Developed post cycle 4 grade 2 immune-mediated rash; HD prednisone initiated 5/19/22. Currently on 40mg PO and will taper as below. Responding. Resume immunotherapy once prednisone at 10mg daily dosing. Understands that the risk of recurrent skin toxicity is 50%    2) Urethral TCC    S/P Total urethrectomy 12/6  HG Papillary [id number]  Margins negative  EPE  Present  See above    Has been evaluated by Rad Onc to discuss whether RT is indictated at a later date      3) Sarcoidosis  Follows with pulmonary  Stable     4) Obesity    5) Anemia    Likely secondary to CKD   Status post injectafer x 1 on 3/11/2022. He was also started on Retacrit 10,000 units on 3/3/2022 and is being given monthly. Anemia has improved but he has some bleeding through his stomal ulcer so we will follow labs     6) immune mediated rash  Was grade 2  Improved with steroids. Continue taper    7) Encounter for high risk medication like immunotherapy  Grade 2 immune mediated rash; prednisone as below      Plan:     · Delay cycle 5 of Nivolumab until prednisone at 10mg daily. As we do resume we will plan on every 2-week dosing given recent skin toxicity.   Initial plan was for 1 year  · Continue prednisone 40mg until this Friday  · Start 30mg prednisone on 6/3/22 for 5 days, then titrate to 20mg for 5 days, then 10mg he sees us  · CBC, CMP, TSH per protocol  · Reviewed recommendations from Dr. Won Rose 5/9/22 visit  · Follow with wound clinic   · See urology as scheduled   · Pepcid daily  · Continue Retacrit 10,000 units monthly  · Goal hemoglobin is 10 g or less. Hold if hemoglobin more than 10 g/dL    RTC as scheduled when prednisone to 10mg daily    I appreciate the opportunity to participate in [facility]. [facility]. I personally saw and evaluated the patient and performed the key components of medical decision making. The history, physical exam, and documentation were performed by Sosa Boothe NP. I reviewed and verified the above documentation and modified it as needed.         Signed By: Marcela Parry MD

## 2022-05-31 NOTE — PROGRESS NOTES
Bernadette Monge is a 69 y.o. male    Chief Complaint   Patient presents with    Chemotherapy      Muscle invasive bladder cancer- Mixed histology       1. Have you been to the ER, urgent care clinic since your last visit? Hospitalized since your last visit? No    2. Have you seen or consulted any other health care providers outside of the [address] [place] [address] since your last visit? Include any pap smears or colon screening.  No

## 2022-05-31 NOTE — Clinical Note
To make sure he is taking Pepcid Tentatively plan for follow-up in 3 weeks on nivolumab.   This would have to be every 2-week dosing and not every 4-week dosing so please change plans    Mally please check on patient weekly

## 2022-06-01 ENCOUNTER — TELEPHONE (OUTPATIENT)
Dept: ONCOLOGY | Age: 77
End: 2022-06-01

## 2022-06-01 NOTE — TELEPHONE ENCOUNTER
1519 Left 2nd message on cell phone of patient after voice designation. Informed patient that prescription for Protonix has been called in to [facility] in  [facility]. Follow instructions on script. Call back with questions and to let us know you received the message.

## 2022-06-08 ENCOUNTER — HOSPITAL ENCOUNTER (OUTPATIENT)
Dept: WOUND CARE | Age: 77
Discharge: HOME | End: 2022-06-08
Payer: MEDICARE

## 2022-06-08 VITALS
SYSTOLIC BLOOD PRESSURE: 135 MMHG | TEMPERATURE: 97.5 F | DIASTOLIC BLOOD PRESSURE: 76 MMHG | HEART RATE: 69 BPM | RESPIRATION RATE: 16 BRPM

## 2022-06-08 DIAGNOSIS — C67.9 MALIGNANT NEOPLASM OF URINARY BLADDER, UNSPECIFIED SITE (HCC): ICD-10-CM

## 2022-06-08 PROCEDURE — 99212 OFFICE O/P EST SF 10 MIN: CPT

## 2022-06-08 RX ORDER — PREDNISONE 10 MG/1
TABLET ORAL
Qty: 20 TABLET | Refills: 0 | Status: SHIPPED | OUTPATIENT
Start: 2022-06-08 | End: 2022-06-22 | Stop reason: ALTCHOICE

## 2022-06-08 NOTE — WOUND CARE
Discharge Instructions/Wound Orders  [facility]  [address] [facility], [address] [facility]  Telephone: [phone]    NAME:  Ricardo Manley OF BIRTH:  1945  MEDICAL RECORD NUMBER:  [id number]  DATE:  6/8/2022  Ostomy Care Orders:  1) Remove old bag and clean peristomal skin with tap water & paper towels, then dry thoroughly. 2) Crust peristomal wound with stoma powder and No Sting skin prep, then cover with a small piece of alginate and thin duoderm. 3) Shape Barrier ring to size, flatten inner edge and place over stoma site. 4) Place ostomy bag cut to 30mm, secure tape edges and attach ostomy belt. 5) Change 2 x week and as needed for leaking. Dietary:  [x] Diet as tolerated: [] Calorie Diabetic Diet:Low carb and no Sugar [] No Added Salt:[x] Increase Protein: [] Other:Limit the amount of liquid you are drinking and avoid drinking in between meals   Activity:  [x] Activity as tolerated:  [] Patient has no activity restrictions     [] Strict Bedrest: [] Remain off Work:     [] May return to full duty work:                                   [] Return to work with restrictions:             Return Appointment:  [x] Return Appointment: With Kelin Hendrickson  in  2 Week(s)  [] Ordered tests:    Electronically signed Sabina Portillo RN on 6/8/2022 at 11:43 AM     Brittani Catherine 281: Should you experience any significant changes in your wound(s) or have questions about your wound care, please contact the [address] [place] [address] at [address] [place] [address] 8:00 am - 4:30. If you need help with your wound outside these hours and cannot wait until we are again available, contact your PCP or go to the hospital emergency room. PLEASE NOTE: IF YOU ARE UNABLE TO OBTAIN WOUND SUPPLIES, CONTINUE TO USE THE SUPPLIES YOU HAVE AVAILABLE UNTIL YOU ARE ABLE TO REACH US. IT IS MOST IMPORTANT TO KEEP THE WOUND COVERED AT ALL TIMES.      CWON Signature:_______________________    Date: ___________ Time:  ____________

## 2022-06-08 NOTE — TELEPHONE ENCOUNTER
Oncology Pharmacist Note:     Pretty Villalobos a  66 y. o.male  diagnosed with bladder cancer. Mr. Lemus is being treated with nivolumab.     Patient on prednisone taper - 20mg for 5 days starting 6/8, then 10mg until follow-up in office on 6/21. Refilled prednisone to get through to [address] [facility], PharmD, [facility]PS, [address] [facility] in place:  Yes   Recommendation Provided To: Patient/Caregiver: 1 via Telephone   Intervention Detail: Refill(s) Provided      Intervention Accepted By: Patient/Caregiver: 1   Time Spent (min): 10

## 2022-06-08 NOTE — WOUND CARE
OSTOMY Assessment    Stoma Tissue Assessment: ___Post-op __x_Follow-up       Type of Diversion: ___New_x__ Established ___Revision___Permanent____Temporary    _____  Ileostomy   _____  Colostomy: ____ Ascending, ____ Transverse, _____. Sigmoid   _____  Palmetto Fluke   ___x__  Ileal Conduit   _____  Mucous Fistula     Appearance of Ostomy:   __x_ Boncarbo Radish /Moist/Viable ___ Dark Red ___ Pink ___ Sloughing ___Necrotic____Pallor ____Red  ___Dry __x__Moist    Stoma Size: ____25_____ (mm) _x__Round ___ Oval ___Irregular     Stoma Height:   ____Flush ____Retracted __x__Budded ____Edematous ____Prolapse     Stoma Location  ____ Left Side          __x__Right Side     _____Umbilicus  _____Incision Line  ____ Above Belt       ____ Below Belt    Abdominal Contours  ____ Firm ____ Flat __x__Flabby ___Soft _x__Round ___ Hard ___ Other       Stoma Function: _x_Yes __No  Output:   ___x_ Yellow ____Amber ____ Pink(Hematuria) ____ Tea Colored   ____ Clots ____Foul Odor ____ Mucous     Peristomal skin: See Wound Assessment for Peristomal Wound Details    Peristomal wound dressing - Crusted w/Stoma Powder & Non sting skin prep, Apply Alginate, Covered w/Thin Duoderm prior to   Application of [place] [address]29 Urostomy Pouch    Stoma Complications:   __Excessive Bleeding __Ischemia __ Abscess __Necrosis __Prolapse   _x_Hernia __ Retraction __Stenosis __Mucosal Separation __Melanosis Coli   __Laceration __Other     Application for Patient    Wafer and pouch used during assessment and education __[place] [address]_    Pouch System Recommended:   _x_One-Piece __Two-Piece ___Custom     Flat:   ___Pre-cut   ___Cut-to fit     Convexity: ___Shallow ___Deep_x__ Flexible ___Creative Convexity   ___Pre-cut _x__Cut to [employer]     Accessory Products   __ Adhesive Seals __Adhesive Remover Wipes _x_Barrier [employer] _x_Barrier Wipes   __Deodorizer __Liquid Adhesive __ Paste _x_ Powder __Strip   _x_ Support Belt __Tape      Education for Patient & Family  1. Booklet of information on specific ostomy given and some verbal discussion of patients ostomy and expectations. 2. Instruction/demostration of ostomy wafer & pouch change. 3. Discuss concerns/ answer questions. 4. Provide follow up education in clinic if needed.        PICTURE INSERT:

## 2022-06-08 NOTE — TELEPHONE ENCOUNTER
Patient left voicemail requesting a refill of \"Prednisone 20mg\". Please follow up with the patient.

## 2022-06-08 NOTE — TELEPHONE ENCOUNTER
[facility] and left a VM that refill request for prednisone has been sent to pharmacy.

## 2022-06-08 NOTE — WOUND CARE
Clinical Level of Care Assessment    Outpatient Ostomy Care      NAME:  Julio Darling OF BIRTH:  1945  MEDICAL RECORD NUMBER:  [id number]   DATE:  6/8/2022      Patient Ken Clark Assessment- Document in Flowsheet I&O   Points   Review of chart []   0   Assess Complete Ostomy tab in Navigator for assessment of; stoma status, peristomal skin, presence of hernia/stool consistency/diet/related medications   Simple adjustments to pouch size/pouch system, new stoma pattern, accessory addition/deletion. []   1   Assess Complete Ostomy tab in Navigator for assessment of; stoma status, peristomal skin, presence of hernia/stool consistency/diet/related medications   Moderate adjustments to pouch size/pouch system, new stoma pattern, accessory addition/deletion. Observe patient/caregiver with hands-on care. 1-2 adjustments to pouch size/system/skin care/accessory addition or deletion. [x]   2   Assess Complete Ostomy tab in Navigator for assessment of; stoma status, peristomal skin, presence of hernia/stool consistency/diet/related medications   Complex adjustments to pouch size/pouch system, new stoma pattern, accessory addition/deletion. 3 or more complex adjustments to pouch size/system/skin care/accessory addition or deletion. Observe patient/caregiver with hands-on care. Assess patient/patient abdomen for optimal pre-marked stoma site. Assess patient abdomen for type of hernia belt/accessory needed. []   3         Ambulation Status Documented in CN Clinical Note  Status Definition Points   Independent Independently able to ambulate. Fully able (without any assistance) to get on/off exam table/chair. [x]   0   Minimal Physical Assistance Requires physical assistance of one person to ambulate and/or position patient to be examined. Includes necessary physical assistance to position lower extremities on/off stool.    []   1   Moderate Physical Assistance Requires at least one staff member to physically assist patient in ambulating into treatment room, and/or on off chair/bed. Requires assistance to bathroom. []   2   Full Assistance Requires assistance of at least two staff members to transfer patient into treatment room and/or on/off bed/chair. \"Total Transfer\". Unable to use bathroom requires bedside commode and/or bedpan []   3       Teaching Effort Documented in [facility] Clinical Note  Effort Definition Points   No Teaching  []   0   General Initial/Simple lesson:  Assess readiness to learn, assess patient learning style to determine educational flow/special needs for learning. Teaching related to 1-3 topics  Documentation in [facility] completed. []   1   Intermediate Assess readiness to learn, assess patient learning style to determine educational flow/special needs for learning. Teaching related to 3-4 topics. Hernia belt application and care considerations  Documentation in [facility] completed. [x]   2   Complex Assess readiness to learn, assess patient learning style to determine educational flow/special needs for learning. Teaching of greater than 5 additional topics   Pre-operative ostomy education with review of written resources for patient/family/caregiver as needed. Demonstration/return demonstration of ostomy irrigation  Documentation in [facility] completed. []   3     Patient Assessment and Planning in [facility] Clinical Note   Planning Definition Points   Simple Simple pouch change procedure completed and reviewed with patient/family/caregiver   Documentation in [facility] completed. [x]   1   Intermediate Moderate level of follow-up needs:   Pouch change/discharge procedure revised and reviewed with patient/caregiver. Communications with outside resources; i.e. Telephone calls to Surgeon/ PCP, family/caregiver, home health, ECF. Documentation in [facility] completed.      []   2   Complex Complex level of instructions/changes:   Family/Caregiver learning/demonstration/return demonstration visit. Pouching/discharge procedure revised/reviewed with patient/family/caregiver. Contact with outside resources; i.e. communication with Surgeon/ PCP, home health, ECF. Contact/referral to ostomy appliance supplier for new or additional products. Review when to call [facility] or schedule follow-up visit. Referral to Emergency Department   Documentation in [facility] completed. []   3       Is this the Patient's First Visit with [facility] @ [facility]? No    Is this Patient Established to this [facility] within the last 3 years?    Yes             Clinical Level of Care      Points  0-3  Level 1 []     Points  4-6  Level 2 [x]     Points  7-8  Level 3 []     Points  9-10  Level 4 []     Points  11-12  Level 5 []       Electronically signed by Zena Pereyra RN on 6/8/2022 at 1:32 PM

## 2022-06-13 ENCOUNTER — TELEPHONE (OUTPATIENT)
Dept: ONCOLOGY | Age: 77
End: 2022-06-13

## 2022-06-13 DIAGNOSIS — C67.9 MALIGNANT NEOPLASM OF URINARY BLADDER, UNSPECIFIED SITE (HCC): Primary | ICD-10-CM

## 2022-06-13 NOTE — TELEPHONE ENCOUNTER
Patient left voicemail requesting a referral for Palliative Care Evaluation at [facility]. Patient would like the appointment within the next 2 -3 weeks.   Please follow up with the caller at:  [phone]

## 2022-06-14 ENCOUNTER — TELEPHONE (OUTPATIENT)
Dept: PALLATIVE CARE | Age: 77
End: 2022-06-14

## 2022-06-14 NOTE — TELEPHONE ENCOUNTER
[employer] Palliative Medicine Office  Nursing Note  [phone]  Fax [phone]      Name:  Jericho Velasco  YOB: 1945    Received outpatient Palliative Medicine referral from Robbie Gross NP to see patient for symptom management and supportive care. Chart  reviewed. Jericho Velasco is a 69 y.o. male with invasive bladder cancer. Patient's most recent office visit with Dr. Meghana Monaco was on 5/31/22. See her note for complete HPI, treatment history, and plan. Per Dr. Sheri Osei 5/31/22 note: \"Patient has a history of nonmuscle invasive bladder cancer in 2015, underwent 2 induction courses of BCG, had a non muscle invasive recurrence in 2019 and had re induction with BCG. Cystoscopy 6/2020 at [facility] did not show any recurrence. In March 2021 he had a positive FISH and was seen by Dr. Pamela Edward. Had a CT IVP 3/2021 which showed some Bladder thickening. 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 Grade 4 neutropenia after cycle 1. Completed 4 cycles and seen after surgery done 12/6/2021. \"      ACP:     No ACP documents on file. Nurse called patient to [facility] outpatient Palliative Medicine services. No answer, left message requesting call back.       CELESTINE ByrneN, RN-BC, [facility]

## 2022-06-14 NOTE — TELEPHONE ENCOUNTER
[facility] [facility] and spoke to Anastasia Allen RN and confirmed that she received pts referral. Confirmed and she will contact pt this afternoon. I called pt and made him aware that they will be reaching out to him with an appt date/time this afternoon. Pt voiced understanding and has no further questions or concerns at this time.

## 2022-06-20 ENCOUNTER — HOSPITAL ENCOUNTER (OUTPATIENT)
Dept: INFUSION THERAPY | Age: 77
Discharge: HOME | End: 2022-06-20
Payer: MEDICARE

## 2022-06-20 VITALS
RESPIRATION RATE: 20 BRPM | SYSTOLIC BLOOD PRESSURE: 109 MMHG | TEMPERATURE: 97.9 F | HEART RATE: 69 BPM | DIASTOLIC BLOOD PRESSURE: 74 MMHG

## 2022-06-20 DIAGNOSIS — C67.9 MALIGNANT NEOPLASM OF URINARY BLADDER, UNSPECIFIED SITE (HCC): Primary | ICD-10-CM

## 2022-06-20 LAB
ALBUMIN SERPL-MCNC: 3.6 G/DL (ref 3.5–5)
ALBUMIN/GLOB SERPL: 1.1 {RATIO} (ref 1.1–2.2)
ALP SERPL-CCNC: 44 U/L (ref 45–117)
ALT SERPL-CCNC: 27 U/L (ref 12–78)
ANION GAP SERPL CALC-SCNC: 4 MMOL/L (ref 5–15)
AST SERPL-CCNC: 14 U/L (ref 15–37)
BASOPHILS # BLD: 0.1 K/UL (ref 0–0.1)
BASOPHILS NFR BLD: 1 % (ref 0–1)
BILIRUB SERPL-MCNC: 0.6 MG/DL (ref 0.2–1)
BUN SERPL-MCNC: 37 MG/DL (ref 6–20)
BUN/CREAT SERPL: 21 (ref 12–20)
CALCIUM SERPL-MCNC: 8.7 MG/DL (ref 8.5–10.1)
CHLORIDE SERPL-SCNC: 110 MMOL/L (ref 97–108)
CO2 SERPL-SCNC: 25 MMOL/L (ref 21–32)
CREAT SERPL-MCNC: 1.73 MG/DL (ref 0.7–1.3)
DIFFERENTIAL METHOD BLD: ABNORMAL
EOSINOPHIL # BLD: 0.1 K/UL (ref 0–0.4)
EOSINOPHIL NFR BLD: 2 % (ref 0–7)
ERYTHROCYTE [DISTWIDTH] IN BLOOD BY AUTOMATED COUNT: 14.6 % (ref 11.5–14.5)
GLOBULIN SER CALC-MCNC: 3.2 G/DL (ref 2–4)
GLUCOSE SERPL-MCNC: 96 MG/DL (ref 65–100)
HCT VFR BLD AUTO: 34.6 % (ref 36.6–50.3)
HGB BLD-MCNC: 11.3 G/DL (ref 12.1–17)
IMM GRANULOCYTES # BLD AUTO: 0 K/UL (ref 0–0.04)
IMM GRANULOCYTES NFR BLD AUTO: 0 % (ref 0–0.5)
LYMPHOCYTES # BLD: 0.6 K/UL (ref 0.8–3.5)
LYMPHOCYTES NFR BLD: 11 % (ref 12–49)
MCH RBC QN AUTO: 34.2 PG (ref 26–34)
MCHC RBC AUTO-ENTMCNC: 32.7 G/DL (ref 30–36.5)
MCV RBC AUTO: 104.8 FL (ref 80–99)
MONOCYTES # BLD: 0.5 K/UL (ref 0–1)
MONOCYTES NFR BLD: 10 % (ref 5–13)
NEUTS SEG # BLD: 3.8 K/UL (ref 1.8–8)
NEUTS SEG NFR BLD: 76 % (ref 32–75)
NRBC # BLD: 0 K/UL (ref 0–0.01)
NRBC BLD-RTO: 0 PER 100 WBC
PLATELET # BLD AUTO: 144 K/UL (ref 150–400)
PMV BLD AUTO: 8.6 FL (ref 8.9–12.9)
POTASSIUM SERPL-SCNC: 4.5 MMOL/L (ref 3.5–5.1)
PROT SERPL-MCNC: 6.8 G/DL (ref 6.4–8.2)
RBC # BLD AUTO: 3.3 M/UL (ref 4.1–5.7)
RBC MORPH BLD: ABNORMAL
SODIUM SERPL-SCNC: 139 MMOL/L (ref 136–145)
WBC # BLD AUTO: 5.1 K/UL (ref 4.1–11.1)

## 2022-06-20 PROCEDURE — 85025 COMPLETE CBC W/AUTO DIFF WBC: CPT

## 2022-06-20 PROCEDURE — 80053 COMPREHEN METABOLIC PANEL: CPT

## 2022-06-20 PROCEDURE — 36415 COLL VENOUS BLD VENIPUNCTURE: CPT

## 2022-06-20 RX ORDER — HEPARIN 100 UNIT/ML
300-500 SYRINGE INTRAVENOUS AS NEEDED
Status: DISCONTINUED | OUTPATIENT
Start: 2022-06-20 | End: 2022-06-21 | Stop reason: HOSPADM

## 2022-06-20 RX ORDER — SODIUM CHLORIDE 0.9 % (FLUSH) 0.9 %
10 SYRINGE (ML) INJECTION AS NEEDED
Status: DISCONTINUED | OUTPATIENT
Start: 2022-06-20 | End: 2022-06-21 | Stop reason: HOSPADM

## 2022-06-20 RX ORDER — SODIUM CHLORIDE 9 MG/ML
10 INJECTION INTRAMUSCULAR; INTRAVENOUS; SUBCUTANEOUS AS NEEDED
Status: DISCONTINUED | OUTPATIENT
Start: 2022-06-20 | End: 2022-06-21 | Stop reason: HOSPADM

## 2022-06-20 NOTE — PROGRESS NOTES
[facility] Progress Note    Date: 2022    Name: Guerita Camilo    MRN: [id number]         : 1945      Pt arrived ambulatory and in no distress, for lab visit. Labs drawn via R AC vein, patient tolerated well. Visit Vitals  /74   Pulse 69   Temp 97.9 °F (36.6 °C)   Resp 20       Lab results were obtained and reviewed. Recent Results (from the past 12 hour(s))   METABOLIC PANEL, COMPREHENSIVE    Collection Time: 22  1:05 PM   Result Value Ref Range    Sodium 139 136 - 145 mmol/L    Potassium 4.5 3.5 - 5.1 mmol/L    Chloride 110 (H) 97 - 108 mmol/L    CO2 25 21 - 32 mmol/L    Anion gap 4 (L) 5 - 15 mmol/L    Glucose 96 65 - 100 mg/dL    BUN 37 (H) 6 - 20 MG/DL    Creatinine 1.73 (H) 0.70 - 1.30 MG/DL    BUN/Creatinine ratio 21 (H) 12 - 20      GFR est AA 47 (L) >60 ml/min/1.73m2    GFR est non-AA 39 (L) >60 ml/min/1.73m2    Calcium 8.7 8.5 - 10.1 MG/DL    Bilirubin, total 0.6 0.2 - 1.0 MG/DL    ALT (SGPT) 27 12 - 78 U/L    AST (SGOT) 14 (L) 15 - 37 U/L    Alk. phosphatase 44 (L) 45 - 117 U/L    Protein, total 6.8 6.4 - 8.2 g/dL    Albumin 3.6 3.5 - 5.0 g/dL    Globulin 3.2 2.0 - 4.0 g/dL    A-G Ratio 1.1 1.1 - 2.2     CBC WITH AUTOMATED DIFF    Collection Time: 22  1:05 PM   Result Value Ref Range    WBC 5.1 4.1 - 11.1 K/uL    RBC 3.30 (L) 4.10 - 5.70 M/uL    HGB 11.3 (L) 12.1 - 17.0 g/dL    HCT 34.6 (L) 36.6 - 50.3 %    .8 (H) 80.0 - 99.0 FL    MCH 34.2 (H) 26.0 - 34.0 PG    MCHC 32.7 30.0 - 36.5 g/dL    RDW 14.6 (H) 11.5 - 14.5 %    PLATELET 448 (L) 627 - 400 K/uL    MPV 8.6 (L) 8.9 - 12.9 FL    NRBC 0.0 0  WBC    ABSOLUTE NRBC 0.00 0.00 - 0.01 K/uL    NEUTROPHILS 76 (H) 32 - 75 %    LYMPHOCYTES 11 (L) 12 - 49 %    MONOCYTES 10 5 - 13 %    EOSINOPHILS 2 0 - 7 %    BASOPHILS 1 0 - 1 %    IMMATURE GRANULOCYTES 0 0.0 - 0.5 %    ABS. NEUTROPHILS 3.8 1.8 - 8.0 K/UL    ABS. LYMPHOCYTES 0.6 (L) 0.8 - 3.5 K/UL    ABS. MONOCYTES 0.5 0.0 - 1.0 K/UL    ABS.  EOSINOPHILS 0.1 0.0 - 0.4 K/UL    ABS. BASOPHILS 0.1 0.0 - 0.1 K/UL    ABS. IMM. GRANS. 0.0 0.00 - 0.04 K/UL    DF SMEAR SCANNED      RBC COMMENTS MACROCYTOSIS  1+               Departed OPI ambulatory and in no distress. He is aware of upcoming appointments.     Future Appointments   Date Time Provider Gregorio Stacey   6/21/2022 11:00 AM E3 TD MED [facility] H   6/21/2022 11:15 AM [place] [facility],  [address] BS AMB   6/22/2022 11:00 AM [facility]OMY [facility]URSE [facility]   7/4/2022  1:30 PM H3 TD LAB [facility]   7/5/2022 11:00 AM B3 TD MED TX [facility]   7/18/2022  1:00 PM H3 TD LAB [facility]   7/19/2022 11:00 AM B1 TD MED [facility] H   8/1/2022  1:30 PM H3 TD LAB [facility]   8/2/2022 11:00 AM F4 TD MED TX [facility]   8/15/2022  1:00 PM H3 TD LAB [facility]   8/16/2022 11:00 AM F4 TD MED TX [facility]   8/29/2022  1:30 PM H3 TD LAB [facility]   8/30/2022 11:00 AM F4 TD MED TX [facility]   9/12/2022  1:00 PM H3 TD LAB [facility]   9/13/2022 11:00 AM F4 TD MED [facility]       Pio Byrne RN  June 20, 2022

## 2022-06-21 ENCOUNTER — OFFICE VISIT (OUTPATIENT)
Dept: ONCOLOGY | Age: 77
End: 2022-06-21
Payer: MEDICARE

## 2022-06-21 ENCOUNTER — HOSPITAL ENCOUNTER (OUTPATIENT)
Dept: INFUSION THERAPY | Age: 77
Discharge: HOME | End: 2022-06-21
Payer: MEDICARE

## 2022-06-21 VITALS
DIASTOLIC BLOOD PRESSURE: 75 MMHG | BODY MASS INDEX: 31.38 KG/M2 | OXYGEN SATURATION: 96 % | WEIGHT: 225 LBS | RESPIRATION RATE: 20 BRPM | SYSTOLIC BLOOD PRESSURE: 116 MMHG | HEART RATE: 88 BPM | TEMPERATURE: 98.4 F

## 2022-06-21 VITALS — HEART RATE: 81 BPM | SYSTOLIC BLOOD PRESSURE: 136 MMHG | DIASTOLIC BLOOD PRESSURE: 67 MMHG | RESPIRATION RATE: 18 BRPM

## 2022-06-21 DIAGNOSIS — C67.9 MALIGNANT NEOPLASM OF URINARY BLADDER, UNSPECIFIED SITE (HCC): Primary | ICD-10-CM

## 2022-06-21 DIAGNOSIS — D64.9 ANEMIA, UNSPECIFIED TYPE: ICD-10-CM

## 2022-06-21 DIAGNOSIS — R21 RASH: ICD-10-CM

## 2022-06-21 PROCEDURE — 1123F ACP DISCUSS/DSCN MKR DOCD: CPT | Performed by: NURSE PRACTITIONER

## 2022-06-21 PROCEDURE — 74011250636 HC RX REV CODE- 250/636: Performed by: NURSE PRACTITIONER

## 2022-06-21 PROCEDURE — G8754 DIAS BP LESS 90: HCPCS | Performed by: NURSE PRACTITIONER

## 2022-06-21 PROCEDURE — 74011000258 HC RX REV CODE- 258: Performed by: NURSE PRACTITIONER

## 2022-06-21 PROCEDURE — 74011000250 HC RX REV CODE- 250: Performed by: INTERNAL MEDICINE

## 2022-06-21 PROCEDURE — 96413 CHEMO IV INFUSION 1 HR: CPT

## 2022-06-21 PROCEDURE — G8752 SYS BP LESS 140: HCPCS | Performed by: NURSE PRACTITIONER

## 2022-06-21 PROCEDURE — G8427 DOCREV CUR MEDS BY ELIG CLIN: HCPCS | Performed by: NURSE PRACTITIONER

## 2022-06-21 PROCEDURE — G0463 HOSPITAL OUTPT CLINIC VISIT: HCPCS | Performed by: NURSE PRACTITIONER

## 2022-06-21 PROCEDURE — 77030012965 HC NDL HUBR BBMI -A

## 2022-06-21 PROCEDURE — 74011250636 HC RX REV CODE- 250/636: Performed by: INTERNAL MEDICINE

## 2022-06-21 PROCEDURE — 1101F PT FALLS ASSESS-DOCD LE1/YR: CPT | Performed by: NURSE PRACTITIONER

## 2022-06-21 PROCEDURE — G8417 CALC BMI ABV UP PARAM F/U: HCPCS | Performed by: NURSE PRACTITIONER

## 2022-06-21 PROCEDURE — G8536 NO DOC ELDER MAL SCRN: HCPCS | Performed by: NURSE PRACTITIONER

## 2022-06-21 PROCEDURE — 99213 OFFICE O/P EST LOW 20 MIN: CPT | Performed by: NURSE PRACTITIONER

## 2022-06-21 PROCEDURE — G8432 DEP SCR NOT DOC, RNG: HCPCS | Performed by: NURSE PRACTITIONER

## 2022-06-21 RX ORDER — SODIUM CHLORIDE 9 MG/ML
10 INJECTION INTRAMUSCULAR; INTRAVENOUS; SUBCUTANEOUS AS NEEDED
Status: DISCONTINUED | OUTPATIENT
Start: 2022-06-21 | End: 2022-06-22 | Stop reason: HOSPADM

## 2022-06-21 RX ORDER — HEPARIN 100 UNIT/ML
500 SYRINGE INTRAVENOUS AS NEEDED
Status: DISCONTINUED | OUTPATIENT
Start: 2022-06-21 | End: 2022-06-22 | Stop reason: HOSPADM

## 2022-06-21 RX ORDER — SODIUM CHLORIDE 9 MG/ML
25 INJECTION, SOLUTION INTRAVENOUS CONTINUOUS
Status: DISCONTINUED | OUTPATIENT
Start: 2022-06-21 | End: 2022-06-22 | Stop reason: HOSPADM

## 2022-06-21 RX ADMIN — SODIUM CHLORIDE 240 MG: 9 INJECTION, SOLUTION INTRAVENOUS at 12:53

## 2022-06-21 RX ADMIN — HEPARIN 500 UNITS: 100 SYRINGE at 13:45

## 2022-06-21 RX ADMIN — SODIUM CHLORIDE, PRESERVATIVE FREE 10 ML: 5 INJECTION INTRAVENOUS at 12:34

## 2022-06-21 RX ADMIN — SODIUM CHLORIDE 25 ML/HR: 900 INJECTION, SOLUTION INTRAVENOUS at 12:45

## 2022-06-21 NOTE — PROGRESS NOTES
Mariana Foley is a 69 y.o. male    Chief Complaint   Patient presents with    Follow-up     Muscle invasive bladder cancer- mixed histology       1. Have you been to the ER, urgent care clinic since your last visit? Hospitalized since your last visit? No    2. Have you seen or consulted any other health care providers outside of the [address] [place] [address] since your last visit? Include any pap smears or colon screening.    Yes  [facility]

## 2022-06-21 NOTE — PROGRESS NOTES
[facility] Chemo Progress Note    Date: 2022    Name: Guerita Camilo    MRN: [id number]         : 1945    1100 Mr. Melissa Bocanegra Arrived to [facility] for C5 Opdivo ambulatory in stable condition. Assessment was completed, no acute issues at this time, no new complaints voiced. Port accessed with positive blood return. Labs drawn and sent for processing. Went to provider appointment with Medical Oncology    Chemotherapy Flowsheet 2022   Cycle C5   Date 2022   Drug / Regimen Opdivo   Pre Hydration -   Post Hydration -   Pre Meds -   Notes -             Mr. Kishore Sarah vitals were reviewed. Patient Vitals for the past 12 hrs:   Pulse Resp BP   22 1345 81 18 136/67   22 1221 88 18 116/75           Pre-medications  were administered as ordered and chemotherapy was initiated. Medications Administered     0.9% sodium chloride infusion     Admin Date  2022 Action  New Bag Dose  25 mL/hr Rate  25 mL/hr Route  IntraVENous Administered By  Negar Mathis RN          0.9% sodium chloride injection 10 mL     Admin Date  2022 Action  Given Dose  10 mL Route  IntraVENous Administered By  Carin Kam          heparin (porcine) pf 500 Units     Admin Date  2022 Action  Given Dose  500 Units Route  IntraVENous Administered By  Negar Mathis RN          nivolumab (OPDIVO) 240 mg in 0.9% sodium chloride 100 mL, overfill volume 10 mL IVPB     Admin Date  2022 Action  New Bag Dose  240 mg Rate  268 mL/hr Route  IntraVENous Administered By  Negar Mathis RN                Two nurses verified prior to administering:  Drug name, Drug dose, Infusion volume or drug volume when prepared in a syringe, Rate of administration, Route of administration, Expiration dates and/or times, Appearance and physical integrity of the drugs, Rate set on infusion pump, when used, and Sequencing of drug administration. 1345 Patient tolerated treatment well.   Port maintained positive blood return throughout treatment. Port flushed, heparinized and de accessed per protocol. Patient was discharged from [facility] in stable condition. Patient aware of next appointment.      Future Appointments   Date Time Provider Gregorio Ibarra   6/22/2022 11:00 AM [facility] OSTOMY NURSE [facility]   7/4/2022 11:00 AM H3 TD LAB [facility]   7/5/2022 11:00 AM B3 TD  [address] [facility]   7/5/2022 11:15 AM Justin Thakur, [place] [address] BS AMB   8/1/2022  1:30 PM H3 TD LAB [facility]   8/2/2022 11:00 AM F4 TD MED 137[facility] H   8/15/2022  1:00 PM H3 TD LAB [facility]   8/16/2022 11:00 AM F4 TD MED TX [facility]   8/29/2022  1:30 PM H3 TD LAB [facility]   8/30/2022 11:00 AM F4 TD MED [place] [facility]   9/12/2022  1:00 PM H3 TD LAB [facility]   9/13/2022 11:00 AM F4 TD  [facility], RN  June 21, 2022

## 2022-06-21 NOTE — PROGRESS NOTES
[facility] [place] [address] [address], [address]  W: [phone]  F: [phone]    Reason for Visit:   Micky Easley is a 69 y.o. male who is seen in follow-up of Muscle invasive bladder cancer- mixed histology    Treatment History:   · 3/1/2021: CT IVP: Multiple abdominal, iliac, mediastinal nodes unchanged from 2019 consistent with h/o sarcoidosis, Thickened R lateral bladder wall. · 6/23/2021: Cystoscopy and TURBT- Papillary changes were noted within the prostatic urethra ,  papillary tumors seen emanating from the surface of the left hemitrigone, There were also diffuse changes in the floor of the bladder- Low grade urothelial carcinoma of the prostatic urethra, R lateral bladder wall with HG Muscle invasive urothelial carcinoma and squamous differentiation+ CIS, Left brenda trigone HG non invasive urothelial carcinoma  · 8/5/21- 10/21/2021: Cisplatin + Gemzar x 4   · 9/14/2021: CT was stable. · 12/6/2021: Radical urethro-cystoprostatectomy   · 2/3/22: Adjuvant nivolumab  · 4/22/2022: CT CHATO     History of Present Illness:   Patient is a 69 y.o. male with PMH as below including sarcoidosis who is seen for evaluation of bladder cancer. He has a history of nonmuscle invasive bladder cancer in 2015, underwent 2 induction courses of BCG, had a non muscle invasive recurrence in 2019 and had re induction with BCG. Cystoscopy 6/2020 at [facility] did not show any recurrence. In March 2021 he had a positive FISH and was seen by Dr. Dion Rodriguez. Had a CT IVP 3/2021 which showed some Bladder thickening. 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 Grade 4 neutropenia after cycle 1. Completed 4 cycles and seen after surgery done 12/6/2021. He comes today for follow up and nivolumab.  Rash is improving; some residual regions on bilateral arms. His last prednisone was on 22 and has been using eucerin cream as needed. This is not longer itchy, no new regions. No new shortness of breath, diarrhea. No fevers, chills. He is leaving [place] trip for two weeks on 22. He will be back on the 30. Past Medical History:   Diagnosis Date    Arrhythmia     LBBB    Arthritis     Asthma     MILD    Cancer ([address]     scc top of head and nose    Cancer ([address] 2015    BLADDER    COVID-19 vaccine series completed     [facility] VACCINE 21 AND 21    Depression with anxiety     Hypertension     Other unknown and unspecified cause of morbidity or mortality     history of pulmonary sarcoid     Posterior cervical adenopathy, benign 2018    Pulmonary sarcoidosis ([address]     Unspecified sleep apnea     cpap      Past Surgical History:   Procedure Laterality Date    HX CATARACT REMOVAL Bilateral     HX HERNIA REPAIR Right AS A CHILD    INGUINAL    HX HERNIA REPAIR Left     INGUIBAL    HX KNEE REPLACEMENT Left     HX ORTHOPAEDIC Right     BONE SPURS REMOVED FROM FOOT    HX OTHER SURGICAL      scc removed top of head and nose    HX OTHER SURGICAL      benign lump removed from thyroid    HX OTHER SURGICAL Right     SKIN CANCER RELMOVED FROM LEG    HX UROLOGICAL  2020    CYSTO    IR INSERT TUNL CVC W PORT OVER 5 YEARS  2021    NH CARDIAC SURG PROCEDURE UNLIST  2021    CARDIAC CATH    NH REVISE KNEE JOINT REPLACE,ALL PARTS Left       Social History     Tobacco Use    Smoking status: Former Smoker     Packs/day: 0.50     Years: 2.00     Pack years: 1.00     Quit date: 1965     Years since quittin.8    Smokeless tobacco: Never Used   Substance Use Topics    Alcohol use:  Yes     Alcohol/week: 2.0 standard drinks     Types: 2 Glasses of wine per week     Comment: DAILY      Family History   Problem Relation Age of Onset    Cancer Mother         breast with mets    Dementia Mother     Heart Disease Father     Cancer Sister         breast    Lung Disease Brother     No Known Problems Brother     Anesth Problems Neg Hx      Current Outpatient Medications   Medication Sig    predniSONE (DELTASONE) 10 mg tablet Take 20 mg daily for 5 days then 10 mg daily (Patient taking differently: 10 mg. Take two 10 mg tablet (20 mg)  daily for 5 days then 10 mg daily)    pantoprazole (PROTONIX) 40 mg tablet Take 1 Tablet by mouth daily.  multivitamin (ONE A DAY) tablet Take 1 Tablet by mouth daily.  metoprolol tartrate (LOPRESSOR) 25 mg tablet Take 0.5 Tablets by mouth every twelve (12) hours.  zolpidem (AMBIEN) 5 mg tablet Take 5 mg by mouth nightly as needed for Sleep.  buPROPion SR (WELLBUTRIN SR) 100 mg SR tablet Take 100 mg by mouth daily.  acetaminophen (Tylenol Extra Strength) 500 mg tablet Take 1,000 mg by mouth every six (6) hours as needed for Pain.  simvastatin (ZOCOR) 20 mg tablet Take 20 mg by mouth nightly.  escitalopram oxalate (Lexapro) 10 mg tablet Take 10 mg by mouth daily. No current facility-administered medications for this visit. Facility-Administered Medications Ordered in Other Visits   Medication Dose Route Frequency    0.9% sodium chloride injection 10 mL  10 mL IntraVENous PRN    heparin (porcine) pf 500 Units  500 Units IntraVENous PRN    0.9% sodium chloride infusion  25 mL/hr IntraVENous CONTINUOUS      Allergies   Allergen Reactions    Pcn [Penicillins] Hives     Patient screened for any delayed non-IgE-mediated reaction to PCN.         Patient notes the following:    NO SEVERE NON-IGE MEDIATED REACTIONS        Review of Systems: A complete review of systems was obtained, negative except as described above.     Physical Exam:     Visit Vitals  /75 (BP 1 Location: Right arm, BP Patient Position: Sitting)   Pulse 88   Temp 98.4 °F (36.9 °C)   Resp 20   Wt 225 lb (102.1 kg)   SpO2 96%   BMI 31.38 kg/m²     ECOG PS: 1  General: No distress  Eyes: PERRL, anicteric sclerae  HENT: Atraumatic   Neck: Supple  Skin: erythematous patchy rash noted bilateral arms, much improved from last visit  GI: Has a urostomy bag  Psych: Alert, oriented, appropriate affect, normal judgment/insight    Results:     Lab Results   Component Value Date/Time    WBC 5.1 06/20/2022 01:05 PM    HGB 11.3 (L) 06/20/2022 01:05 PM    HCT 34.6 (L) 06/20/2022 01:05 PM    PLATELET 374 (L) 38/35/4628 01:05 PM    .8 (H) 06/20/2022 01:05 PM    ABS. NEUTROPHILS 3.8 06/20/2022 01:05 PM     Lab Results   Component Value Date/Time    Sodium 139 06/20/2022 01:05 PM    Potassium 4.5 06/20/2022 01:05 PM    Chloride 110 (H) 06/20/2022 01:05 PM    CO2 25 06/20/2022 01:05 PM    Glucose 96 06/20/2022 01:05 PM    BUN 37 (H) 06/20/2022 01:05 PM    Creatinine 1.73 (H) 06/20/2022 01:05 PM    GFR est AA 47 (L) 06/20/2022 01:05 PM    GFR est non-AA 39 (L) 06/20/2022 01:05 PM    Calcium 8.7 06/20/2022 01:05 PM    Glucose (POC) 143 (H) 12/11/2021 01:02 AM     Lab Results   Component Value Date/Time    Bilirubin, total 0.6 06/20/2022 01:05 PM    ALT (SGPT) 27 06/20/2022 01:05 PM    Alk. phosphatase 44 (L) 06/20/2022 01:05 PM    Protein, total 6.8 06/20/2022 01:05 PM    Albumin 3.6 06/20/2022 01:05 PM    Globulin 3.2 06/20/2022 01:05 PM       External   Records reviewed and summarized above.   Pathology report(s) reviewed    12/21/2021     SPECIMEN       Procedure: Radical urethro-cystoprostatectomy       TUMOR       Tumor Site:                Trigone, Dome, Right lateral wall, Left   Ureteral                                  orifice, Right bladder neck        Histologic Type:     Papillary urothelial carcinoma, invasive and   non-invasive, and carcinoma in situ       Histologic Grade:                High-grade       Tumor Size: Cannot be determined:      Multiple tumors       Tumor Extension:           Tumor invades adjacent structures -   Prostate                                  (transmural invasion from the bladder   tumor)       Lymphovascular Invasion:      Present       Tumor Configuration:           Papillary, Flat       MARGINS       Margins:                     Uninvolved by invasive carcinoma and   carcinoma in situ /                                  noninvasive urothelial carcinoma    LYMPH NODES       Number of Lymph Nodes Involved:      2           Size of Largest Metastatic Deposit:      1 cm               Site:                     Left Pelvic Lymph Nodes           Extranodal Extension:           Present       Number of Lymph Nodes Examined:      5     PATHOLOGIC STAGE CLASSIFICATION (pTNM, AJCC 8th Edition)       TNM Descriptors:                m (multiple primary tumors)       Primary Tumor (pT):                pT4a       Regional Lymph Nodes (pN):           pN2     Carcinoma in situ URETHRA: RESECTION       Tumor Site:                     Urethra       SPECIMEN       Procedure:                     Total urethrectomy    TUMOR       Tumor Site:                     Prostatic urethra       Histologic Type:                       Papillary urothelial carcinoma,   invasive       Histologic Grade:                     High-grade       Tumor Extension:                Tumor invades adjacent structures -   Prostate       Lymphovascular Invasion:           Present       MARGINS       Margins:                          Uninvolved by invasive carcinoma and   carcinoma in situ /                                       noninvasive urothelial carcinoma       LYMPH NODES (These represent the same lymph nodes reported in the BLADDER   Resection Template)       Number of Lymph Nodes Involved:      2           Size of Largest Metastatic Deposit:      1 cm               Site:                     Left Pelvic Lymph node       Number of Lymph Nodes Examined:      5      PATHOLOGIC STAGE CLASSIFICATION (pTNM, AJCC 8th Edition)       TNM Descriptors:                m (multiple primary tumors within the prostatic urethra)       Primary Tumor (pT):                pT2       Regional Lymph Nodes (pN):           pN2                     4.  Right pelvic lymph nodes, lymph node dissection:        One benign lymph node with noncaseating granulomatous inflammation,   compatible with patients history of sarcoidosis   One benign lymph node, no histopathologic changes   No tumor seen     5.  Left pelvic lymph nodes, lymph node dissection:        Metastatic urothelial carcinoma in two of three lymph nodes, 1 cm in   greatest dimension with extracapsular extension   Noncaseating granulomatous inflammation, compatible with patient's history   of sarcoidosis     Radiology report(s) reviewed above. CT CAP 7/19/21:   IMPRESSION  1. While the bladder is decompressed by Juarez is thick-walled and irregular  2. No evidence of metastatic disease to the abdomen or pelvis  3. Extensive bilateral hilar and mediastinal adenopathy with patchy perihilar  airspace disease with associated nodular peribronchial cuffing. Findings can be  seen with sarcoidosis. Differentiating adenopathy from sarcoidosis for  metastatic disease is difficult       9/2021 CT     IMPRESSION     1. Mild bladder wall thickening posteriorly and along the right lateral bladder  wall, nonspecific. No evidence of metastatic disease within the chest, abdomen,  or pelvis. 2. Extensive mediastinal and bilateral hilar lymphadenopathy with lymph node  calcifications, most compatible with known sarcoidosis. No significant change. 3. Bilateral perihilar airspace opacities have improved. No new pulmonary  pathology identified. 4. Severe diverticulosis of the colon. No definite superimposed acute  Diverticulitis. 12/11/2021     IMPRESSION     1. Interval radical cystoprostatectomy with ileal conduit urinary diversion. 2. No postoperative complication identified. 3. Dense bilateral nephrograms in this patient who is status post contrast  injection the previous day. This suggests ATN. 4/2022      IMPRESSION     Status post radical cystoprostatectomy with ileal conduit urinary diversion. Mild left-sided hydronephrosis status post removal of bilateral nephroureteral  stents. Chronic pulmonary parenchymal scarring and nodularity, not significantly  changed.     No definitive evidence of new/recurrent metastatic disease in the chest, abdomen  or pelvis. Assessment:   1) Muscle invasive bladder cancer- With squamous differentiation     [id number]  S/p 4 cycles of NAC with Cisplatin+ gemzar complicated by grade 4 neutropenia  S/P Radical urethro-cystoprostatectomy 12/6/2021- pT4N2 (invaded prostate), HG papillar, +LVI  Path stage-  Stage IIIB  Disease at high risk for local and distant recurrence  Margins negative but nodes had EPE    CT scans 4/2022 reviewed and CHATO    Developed post cycle 4 grade 2 immune-mediated rash; HD prednisone initiated 5/19/22 and last dose 6/18/22  Proceed today with Nivolumab q2w  Advised if return or worsening of rash that we will initiate steroids and that he may need continued low dose prednisone at that time    2) Urethral TCC    S/P Total urethrectomy 12/6  HG Papillary [id number]  Margins negative  EPE  Present  See above    Has been evaluated by Rad Onc to discuss whether RT is indictated at a later date      1) Sarcoidosis  Follows with pulmonary  Stable     4) Obesity    5) Anemia    Likely secondary to CKD   Status post injectafer x 1 on 3/11/2022. He was also started on Retacrit 10,000 units on 3/3/2022 and is being given monthly.     Anemia has improved but he has some bleeding through his stomal ulcer so we will follow labs     6) immune mediated rash  Was grade 2 improved with steroids    7) Encounter for high risk medication like immunotherapy  Grade 2 immune mediated rash s/p prednisone course      Plan:     · Proceed with 5 of Nivolumab; moving forward administer with every 2 week dosing for plan of 1 year  · CBC, CMP, TSH per protocol   · Advised to call if rash resumes or worsens  · Reviewed recommendations from Dr. Belle Vegas 5/9/22 visit  · Follow with wound clinic   · See urology as scheduled   · Pepcid daily  · Continue Retacrit 10,000 units monthly  · Goal hemoglobin is 10 g or less. Hold if hemoglobin more than 10 g/dL  · Nessa on Monday     I appreciate the opportunity to participate in Mr. Devante ortega.     RTC 2 weeks      Signed By: Satish Tobias NP

## 2022-06-22 ENCOUNTER — HOSPITAL ENCOUNTER (OUTPATIENT)
Dept: WOUND CARE | Age: 77
Discharge: HOME | End: 2022-06-22
Payer: MEDICARE

## 2022-06-22 VITALS
DIASTOLIC BLOOD PRESSURE: 69 MMHG | TEMPERATURE: 98.1 F | HEART RATE: 77 BPM | RESPIRATION RATE: 16 BRPM | SYSTOLIC BLOOD PRESSURE: 145 MMHG

## 2022-06-22 PROCEDURE — 99212 OFFICE O/P EST SF 10 MIN: CPT

## 2022-06-22 NOTE — WOUND CARE
OSTOMY Assessment    Stoma Tissue Assessment: ___Post-op _x__Follow-up       Type of Diversion: ___New_x__ Established ___Revision___Permanent____Temporary    _____  Ileostomy   _____  Colostomy: ____ Ascending, ____ Transverse, _____.  Sigmoid   _____  Yeimy Sages   __x___  Ileal Conduit   _____  Mucous Fistula     Appearance of Ostomy:   _x__ Shahla Nordmann /Moist/Viable ___ Dark Red ___ Pink ___ Sloughing ___Necrotic____Pallor ____Red  ___Dry ____Moist    Stoma Size: ____30_____ (mm) _x__Round ___ Oval ___Irregular     Stoma Height:   ____Flush ____Retracted __x__Budded ____Edematous ____Prolapse     Stoma Location  ____ Left Side          _x___Right Side     _____Umbilicus  _____Incision Line  __x__ Above Belt       ____ Below Belt    Abdominal Contours  __x__ Firm ____ Flat ____Flabby ___Soft __x_Round ___ Hard ___ Other     Stoma Function: _x_Yes __No  Output:   _x___ Yellow ____Amber ____ Pink(Hematuria) ____ Tea Colored   ____ Clots ____Foul Odor ____ Mucous     Peristomal skin:  See Wound Flowsheet for peristomal wound info  ___ Intact ___ Irritant Dermatitis ___ Allergic Contact Dermatitis ___Candidiasis ___Caput Medusae ___Folliculitis ___Mechanical Trauma ___Mucosal Transplantation    ___Pyoderma Gangrenosum ___Hyperplasia ___Radiation Trauma ___Allergies mpling  ___ Dimpling ____Blistered ____Fragile ____Macerated ___Peeling  ___Ulceration  ___ Fungal ___Peristomal Hernia ___Non-blanchable ___ Hypergranulation      Stoma Complications:   __Excessive Bleeding __Ischemia __ Abscess __Necrosis _x_Prolapse   _x_Hernia __ Retraction __Stenosis __Mucosal Separation __Melanosis Coli   __Laceration __Other     Application for Patient    Wafer and pouch used during assessment and education ____[place] [address] one piece soft convex cut to fit________    Pouch System Recommended:   x__One-Piece __Two-Piece ___Custom     Flat:   ___Pre-cut   ___Cut-to fit     Convexity: ___Shallow ___Deep__x_ Flexible ___Creative Convexity   ___Pre-cut _x__Cut to Fit     Accessory Products Plain alginate and extra thin duoderm for wound  _x_ Adhesive Seals __Adhesive Remover Wipes __Barrier [employer] x__Barrier Wipes   __Deodorizer __Liquid Adhesive __ Paste _x_ Powder __Strip   x__ Support Belt __Tape      Education for Patient & Family  1. Booklet of information on specific ostomy given and some verbal discussion of patients ostomy and expectations. 2. Instruction/demostration of ostomy wafer & pouch change. 3. Discuss concerns/ answer questions. 4. Provide follow up education in clinic if needed.        PICTURE INSERT:

## 2022-06-22 NOTE — WOUND CARE
Discharge Instructions/Wound Orders  [facility]  [address], [address] [facility]  Telephone: [phone]    NAME:  Bill Bolanos OF BIRTH:  1945  MEDICAL RECORD NUMBER:  [id number]  DATE:  6/22/2022  Ostomy Care Orders:  1) Remove old bag and clean peristomal skin with tap water & paper towels, then dry thoroughly. 2) Crust peristomal wound with stoma powder and No Sting skin prep, then cover with a small piece of alginate and thin duoderm. 3) Shape Barrier ring to size, flatten inner edge and place over stoma site. 4) Place ostomy bag cut to 30mm, secure tape edges and attach ostomy belt. 5) Change 2 x week and as needed for leaking. Dietary:  [x] Diet as tolerated: [] Calorie Diabetic Diet:Low carb and no Sugar [] No Added Salt:[x] Increase Protein: [] Other:Limit the amount of liquid you are drinking and avoid drinking in between meals   Activity:  [x] Activity as tolerated:  [] Patient has no activity restrictions     [] Strict Bedrest: [] Remain off Work:     [] May return to full duty work:                                   [] Return to work with restrictions:             Return Appointment:  [x] Return Appointment: With Kelin Boudreaux  in  1 Week(s)  [] Ordered tests:    Electronically signed Dasia Esparza RN on 6/22/2022 at 1:18 PM     Brittani Catherine 281: Should you experience any significant changes in your wound(s) or have questions about your wound care, please contact the [address] [place] [address] at [address] [place] [address] 8:00 am - 4:30. If you need help with your wound outside these hours and cannot wait until we are again available, contact your PCP or go to the hospital emergency room. PLEASE NOTE: IF YOU ARE UNABLE TO OBTAIN WOUND SUPPLIES, CONTINUE TO USE THE SUPPLIES YOU HAVE AVAILABLE UNTIL YOU ARE ABLE TO REACH US. IT IS MOST IMPORTANT TO KEEP THE WOUND COVERED AT ALL TIMES.      CWON Signature:_______________________    Date: ___________ Time:  ____________

## 2022-06-22 NOTE — WOUND CARE
Clinical Level of Care Assessment    Outpatient Ostomy Care      NAME:  Donn Bazzi OF BIRTH:  1945  MEDICAL RECORD NUMBER:  [id number]   DATE:  6/22/2022      Patient Vandana Miranda Assessment- Document in Flowsheet I&O   Points   Review of chart []   0   Assess Complete Ostomy tab in Navigator for assessment of; stoma status, peristomal skin, presence of hernia/stool consistency/diet/related medications   Simple adjustments to pouch size/pouch system, new stoma pattern, accessory addition/deletion. []   1   Assess Complete Ostomy tab in Navigator for assessment of; stoma status, peristomal skin, presence of hernia/stool consistency/diet/related medications   Moderate adjustments to pouch size/pouch system, new stoma pattern, accessory addition/deletion. Observe patient/caregiver with hands-on care. 1-2 adjustments to pouch size/system/skin care/accessory addition or deletion. [x]   2   Assess Complete Ostomy tab in Navigator for assessment of; stoma status, peristomal skin, presence of hernia/stool consistency/diet/related medications   Complex adjustments to pouch size/pouch system, new stoma pattern, accessory addition/deletion. 3 or more complex adjustments to pouch size/system/skin care/accessory addition or deletion. Observe patient/caregiver with hands-on care. Assess patient/patient abdomen for optimal pre-marked stoma site. Assess patient abdomen for type of hernia belt/accessory needed. []   3         Ambulation Status Documented in CN Clinical Note  Status Definition Points   Independent Independently able to ambulate. Fully able (without any assistance) to get on/off exam table/chair. [x]   0   Minimal Physical Assistance Requires physical assistance of one person to ambulate and/or position patient to be examined. Includes necessary physical assistance to position lower extremities on/off stool.    []   1   Moderate Physical Assistance Requires at least one staff member to physically assist patient in ambulating into treatment room, and/or on off chair/bed. Requires assistance to bathroom. []   2   Full Assistance Requires assistance of at least two staff members to transfer patient into treatment room and/or on/off bed/chair. \"Total Transfer\". Unable to use bathroom requires bedside commode and/or bedpan []   3       Teaching Effort Documented in [facility] Clinical Note  Effort Definition Points   No Teaching  []   0   General Initial/Simple lesson:  Assess readiness to learn, assess patient learning style to determine educational flow/special needs for learning. Teaching related to 1-3 topics  Documentation in [facility] completed. []   1   Intermediate Assess readiness to learn, assess patient learning style to determine educational flow/special needs for learning. Teaching related to 3-4 topics. Hernia belt application and care considerations  Documentation in [facility] completed. [x]   2   Complex Assess readiness to learn, assess patient learning style to determine educational flow/special needs for learning. Teaching of greater than 5 additional topics   Pre-operative ostomy education with review of written resources for patient/family/caregiver as needed. Demonstration/return demonstration of ostomy irrigation  Documentation in [facility] completed. []   3     Patient Assessment and Planning in [facility] Clinical Note   Planning Definition Points   Simple Simple pouch change procedure completed and reviewed with patient/family/caregiver   Documentation in [facility] completed. []   1   Intermediate Moderate level of follow-up needs:   Pouch change/discharge procedure revised and reviewed with patient/caregiver. Communications with outside resources; i.e. Telephone calls to Surgeon/ PCP, family/caregiver, home health, ECF. Documentation in [facility] completed.      [x]   2   Complex Complex level of instructions/changes:   Family/Caregiver learning/demonstration/return demonstration visit. Pouching/discharge procedure revised/reviewed with patient/family/caregiver. Contact with outside resources; i.e. communication with Surgeon/ PCP, home health, ECF. Contact/referral to ostomy appliance supplier for new or additional products. Review when to call [facility] or schedule follow-up visit. Referral to Emergency Department   Documentation in [facility] completed. []   3       Is this the Patient's First Visit with [facility] @ [facility]? No    Is this Patient Established to this [facility] within the last 3 years?    Yes             Clinical Level of Care      Points  0-3  Level 1 []     Points  4-6  Level 2 [x]     Points  7-8  Level 3 []     Points  9-10  Level 4 []     Points  11-12  Level 5 []       Electronically signed by Kathy Francisco RN on 6/22/2022 at 1:19 PM

## 2022-06-23 ENCOUNTER — APPOINTMENT (OUTPATIENT)
Dept: INFUSION THERAPY | Age: 77
End: 2022-06-23

## 2022-06-27 ENCOUNTER — APPOINTMENT (OUTPATIENT)
Dept: INFUSION THERAPY | Age: 77
End: 2022-06-27

## 2022-06-27 ENCOUNTER — TELEPHONE (OUTPATIENT)
Dept: ONCOLOGY | Age: 77
End: 2022-06-27

## 2022-06-27 DIAGNOSIS — R21 RASH: Primary | ICD-10-CM

## 2022-06-27 DIAGNOSIS — C67.9 MALIGNANT NEOPLASM OF URINARY BLADDER, UNSPECIFIED SITE (HCC): ICD-10-CM

## 2022-06-27 RX ORDER — PREDNISONE 10 MG/1
TABLET ORAL
Qty: 60 TABLET | Refills: 0 | Status: SHIPPED | OUTPATIENT
Start: 2022-06-27 | End: 2022-07-11

## 2022-06-27 NOTE — TELEPHONE ENCOUNTER
Patient called stating that the rash has returned on his arms, and a little on his legs following last weeks treatment, patient thinks he may need prednisone again.   Please call the patient at:  [phone]

## 2022-06-27 NOTE — TELEPHONE ENCOUNTER
1131  Returned pts calll, HIPAA verified x2. Pt made aware that a script for prednisone has been sent to his pharmacy and dosage instructions have been given. Pt has no further questions or concerns at this time.

## 2022-06-28 ENCOUNTER — APPOINTMENT (OUTPATIENT)
Dept: INFUSION THERAPY | Age: 77
End: 2022-06-28

## 2022-06-29 ENCOUNTER — HOSPITAL ENCOUNTER (OUTPATIENT)
Dept: WOUND CARE | Age: 77
Discharge: HOME | End: 2022-06-29
Payer: MEDICARE

## 2022-06-29 VITALS
HEART RATE: 78 BPM | DIASTOLIC BLOOD PRESSURE: 60 MMHG | RESPIRATION RATE: 16 BRPM | TEMPERATURE: 97.8 F | SYSTOLIC BLOOD PRESSURE: 118 MMHG

## 2022-06-29 PROCEDURE — 99212 OFFICE O/P EST SF 10 MIN: CPT

## 2022-06-29 NOTE — WOUND CARE
Clinical Level of Care Assessment    Outpatient Ostomy Care      NAME:  Jessica Busby OF BIRTH:  1945  MEDICAL RECORD NUMBER:  [id number]   DATE:  6/29/2022      Patient [place] Fee Assessment- Document in Flowsheet I&O   Points   Review of chart []   0   Assess Complete Ostomy tab in Navigator for assessment of; stoma status, peristomal skin, presence of hernia/stool consistency/diet/related medications   Simple adjustments to pouch size/pouch system, new stoma pattern, accessory addition/deletion. []   1   Assess Complete Ostomy tab in Navigator for assessment of; stoma status, peristomal skin, presence of hernia/stool consistency/diet/related medications   Moderate adjustments to pouch size/pouch system, new stoma pattern, accessory addition/deletion. Observe patient/caregiver with hands-on care. 1-2 adjustments to pouch size/system/skin care/accessory addition or deletion. [x]   2   Assess Complete Ostomy tab in Navigator for assessment of; stoma status, peristomal skin, presence of hernia/stool consistency/diet/related medications   Complex adjustments to pouch size/pouch system, new stoma pattern, accessory addition/deletion. 3 or more complex adjustments to pouch size/system/skin care/accessory addition or deletion. Observe patient/caregiver with hands-on care. Assess patient/patient abdomen for optimal pre-marked stoma site. Assess patient abdomen for type of hernia belt/accessory needed. []   3         Ambulation Status Documented in CN Clinical Note  Status Definition Points   Independent Independently able to ambulate. Fully able (without any assistance) to get on/off exam table/chair. [x]   0   Minimal Physical Assistance Requires physical assistance of one person to ambulate and/or position patient to be examined. Includes necessary physical assistance to position lower extremities on/off stool.    []   1   Moderate Physical Assistance Requires at least one staff member to physically assist patient in ambulating into treatment room, and/or on off chair/bed. Requires assistance to bathroom. []   2   Full Assistance Requires assistance of at least two staff members to transfer patient into treatment room and/or on/off bed/chair. \"Total Transfer\". Unable to use bathroom requires bedside commode and/or bedpan []   3       Teaching Effort Documented in [facility] Clinical Note  Effort Definition Points   No Teaching  []   0   General Initial/Simple lesson:  Assess readiness to learn, assess patient learning style to determine educational flow/special needs for learning. Teaching related to 1-3 topics  Documentation in [facility] completed. []   1   Intermediate Assess readiness to learn, assess patient learning style to determine educational flow/special needs for learning. Teaching related to 3-4 topics. Hernia belt application and care considerations  Documentation in [facility] completed. [x]   2   Complex Assess readiness to learn, assess patient learning style to determine educational flow/special needs for learning. Teaching of greater than 5 additional topics   Pre-operative ostomy education with review of written resources for patient/family/caregiver as needed. Demonstration/return demonstration of ostomy irrigation  Documentation in [facility] completed. []   3     Patient Assessment and Planning in [facility] Clinical Note   Planning Definition Points   Simple Simple pouch change procedure completed and reviewed with patient/family/caregiver   Documentation in [facility] completed. []   1   Intermediate Moderate level of follow-up needs:   Pouch change/discharge procedure revised and reviewed with patient/caregiver. Communications with outside resources; i.e. Telephone calls to Surgeon/ PCP, family/caregiver, home health, ECF. Documentation in [facility] completed.      [x]   2   Complex Complex level of instructions/changes:   Family/Caregiver learning/demonstration/return demonstration visit. Pouching/discharge procedure revised/reviewed with patient/family/caregiver. Contact with outside resources; i.e. communication with Surgeon/ PCP, home health, ECF. Contact/referral to ostomy appliance supplier for new or additional products. Review when to call [facility] or schedule follow-up visit. Referral to Emergency Department   Documentation in [facility] completed. []   3       Is this the Patient's First Visit with [facility] @ [facility]? No    Is this Patient Established to this [facility] within the last 3 years?    Yes             Clinical Level of Care      Points  0-3  Level 1 []     Points  4-6  Level 2 [x]     Points  7-8  Level 3 []     Points  9-10  Level 4 []     Points  11-12  Level 5 []       Electronically signed by Dorcas Hanson RN on 6/29/2022 at 1:15 PM

## 2022-06-29 NOTE — WOUND CARE
Discharge Instructions/Wound Orders  [facility]  [address], [address] [facility]  Telephone: [phone]    NAME:  Mp Given OF BIRTH:  1945  MEDICAL RECORD NUMBER:  [id number]  DATE:  6/29/2022  Ostomy Care Orders:  1) Remove old bag and clean peristomal skin with tap water & paper towels, then dry thoroughly. 2) Crust peristomal wound with stoma powder and No Sting skin prep, then cover with a small piece of alginate and thin duoderm. 3) Shape Barrier ring to size, flatten inner edge and place over stoma site. 4) Place ostomy bag cut to 30mm, secure tape edges and attach ostomy belt. 5) Change 2 x week and as needed for leaking.     Dietary:  [x] Diet as tolerated: [] Calorie Diabetic Diet:Low carb and no Sugar [] No Added Salt:[x] Increase Protein: [] Other:Limit the amount of liquid you are drinking and avoid drinking in between meals   Activity:  [x] Activity as tolerated:  [] Patient has no activity restrictions     [] Strict Bedrest: [] Remain off Work:     [] May return to full duty work:                                   [] Return to work with restrictions:             Return Appointment:  [x] Return Appointment: With Kelin Thomas  in  07/11/2022 Week(s)  [] Ordered tests:    Electronically signed Lashaun Feliciano RN on 6/29/2022 at 11:27 AM     Brittani Catherine 281: Should you experience any significant changes in your wound(s) or have questions about your wound care, please contact the [address] [place] [address] at [address] [place] [address] 8:00 am - 4:30. If you need help with your wound outside these hours and cannot wait until we are again available, contact your PCP or go to the hospital emergency room. PLEASE NOTE: IF YOU ARE UNABLE TO OBTAIN WOUND SUPPLIES, CONTINUE TO USE THE SUPPLIES YOU HAVE AVAILABLE UNTIL YOU ARE ABLE TO REACH US. IT IS MOST IMPORTANT TO KEEP THE WOUND COVERED AT ALL TIMES. CWON Signature:_______________________    Date: ___________ Time:  ____________

## 2022-06-29 NOTE — WOUND CARE
OSTOMY Assessment    Stoma Tissue Assessment: ___Post-op _x__Follow-up       Type of Diversion: ___New__x_ Established ___Revision___Permanent____Temporary    _____  Ileostomy   _____  Colostomy: ____ Ascending, ____ Transverse, _____.  Sigmoid   _____  Grady Gaudier   __x___  Ileal Conduit   _____  Mucous Fistula     Appearance of Ostomy:   _x__ Bright Red /Moist/Viable ___ Dark Red ___ Pink ___ Sloughing ___Necrotic____Pallor ____Red  ___Dry ____Moist    Stoma Size: _____32____ (mm) _x__Round ___ Oval ___Irregular     Stoma Height:   ____Flush ____Retracted __x__Budded ____Edematous ____Prolapse     Stoma Location  ____ Left Side          _x___Right Side     _____Umbilicus  _____Incision Line  _x___ Above Belt       ____ Below Belt    Abdominal Contours  ____ Firm ____ Flat ____Flabby ___Soft __x_Round ___ Hard ___ Other     Stoma Function: _x_Yes __No  Output:   _x___ Yellow ____Amber ____ Pink(Hematuria) ____ Tea Colored   ____ Clots ____Foul Odor ____ Mucous     Peristomal skin: See Flowsheet for peristomal wound documentation  ___ Intact ___ Irritant Dermatitis ___ Allergic Contact Dermatitis ___Candidiasis ___Caput Medusae ___Folliculitis ___Mechanical Trauma ___Mucosal Transplantation    ___Pyoderma Gangrenosum ___Hyperplasia ___Radiation Trauma ___Allergies mpling  ___ Dimpling ____Blistered ____Fragile ____Macerated ___Peeling  ___Ulceration  ___ Fungal ___Peristomal Hernia ___Non-blanchable ___ Hypergranulation      Stoma Complications:   __Excessive Bleeding __Ischemia __ Abscess __Necrosis __Prolapse   _x_Hernia __ Retraction __Stenosis __Mucosal Separation __Melanosis Coli   __Laceration _x_Other Peristomal wound    Application for Patient    Wafer and pouch used during assessment and education ___[place] [id number]_________    Pouch System Recommended:   _x_One-Piece __Two-Piece ___Custom     Flat:   ___Pre-cut   _x__Cut-to fit     Convexity: ___Shallow ___Deep_x__ Flexible ___Creative Convexity   ___Pre-cut _xCut to [employer]     Accessory Products   _x_ [employer] __Adhesive Remover Wipes __Barrier [employer] _x_Barrier Wipes   __Deodorizer __Liquid Adhesive __ Paste _x_ Powder _x_Strip [facility] [id number]  _x_ Support Belt __Tape      Education for Patient & Family  1. Booklet of information on specific ostomy given and some verbal discussion of patients ostomy and expectations. 2. Instruction/demostration of ostomy wafer & pouch change. 3. Discuss concerns/ answer questions. 4. Provide follow up education in clinic if needed.        PICTURE INSERT:

## 2022-07-04 ENCOUNTER — HOSPITAL ENCOUNTER (OUTPATIENT)
Dept: INFUSION THERAPY | Age: 77
Discharge: HOME | End: 2022-07-04
Payer: MEDICARE

## 2022-07-04 VITALS
SYSTOLIC BLOOD PRESSURE: 128 MMHG | DIASTOLIC BLOOD PRESSURE: 79 MMHG | TEMPERATURE: 98.3 F | RESPIRATION RATE: 20 BRPM | HEART RATE: 86 BPM | OXYGEN SATURATION: 98 %

## 2022-07-04 DIAGNOSIS — C67.9 MALIGNANT NEOPLASM OF URINARY BLADDER, UNSPECIFIED SITE (HCC): Primary | ICD-10-CM

## 2022-07-04 LAB
ALBUMIN SERPL-MCNC: 3.6 G/DL (ref 3.5–5)
ALBUMIN/GLOB SERPL: 1 {RATIO} (ref 1.1–2.2)
ALP SERPL-CCNC: 47 U/L (ref 45–117)
ALT SERPL-CCNC: 32 U/L (ref 12–78)
ANION GAP SERPL CALC-SCNC: 6 MMOL/L (ref 5–15)
AST SERPL-CCNC: 13 U/L (ref 15–37)
BASOPHILS # BLD: 0.1 K/UL (ref 0–0.1)
BASOPHILS NFR BLD: 1 % (ref 0–1)
BILIRUB SERPL-MCNC: 0.5 MG/DL (ref 0.2–1)
BUN SERPL-MCNC: 43 MG/DL (ref 6–20)
BUN/CREAT SERPL: 21 (ref 12–20)
CALCIUM SERPL-MCNC: 9.5 MG/DL (ref 8.5–10.1)
CHLORIDE SERPL-SCNC: 112 MMOL/L (ref 97–108)
CO2 SERPL-SCNC: 23 MMOL/L (ref 21–32)
CREAT SERPL-MCNC: 2.01 MG/DL (ref 0.7–1.3)
DIFFERENTIAL METHOD BLD: ABNORMAL
EOSINOPHIL # BLD: 0.1 K/UL (ref 0–0.4)
EOSINOPHIL NFR BLD: 1 % (ref 0–7)
ERYTHROCYTE [DISTWIDTH] IN BLOOD BY AUTOMATED COUNT: 14.5 % (ref 11.5–14.5)
GLOBULIN SER CALC-MCNC: 3.6 G/DL (ref 2–4)
GLUCOSE SERPL-MCNC: 93 MG/DL (ref 65–100)
HCT VFR BLD AUTO: 34.4 % (ref 36.6–50.3)
HGB BLD-MCNC: 11.3 G/DL (ref 12.1–17)
IMM GRANULOCYTES # BLD AUTO: 0.1 K/UL (ref 0–0.04)
IMM GRANULOCYTES NFR BLD AUTO: 1 % (ref 0–0.5)
LYMPHOCYTES # BLD: 0.7 K/UL (ref 0.8–3.5)
LYMPHOCYTES NFR BLD: 10 % (ref 12–49)
MCH RBC QN AUTO: 34.1 PG (ref 26–34)
MCHC RBC AUTO-ENTMCNC: 32.8 G/DL (ref 30–36.5)
MCV RBC AUTO: 103.9 FL (ref 80–99)
MONOCYTES # BLD: 0.5 K/UL (ref 0–1)
MONOCYTES NFR BLD: 7 % (ref 5–13)
NEUTS SEG # BLD: 5 K/UL (ref 1.8–8)
NEUTS SEG NFR BLD: 80 % (ref 32–75)
NRBC # BLD: 0 K/UL (ref 0–0.01)
NRBC BLD-RTO: 0 PER 100 WBC
PLATELET # BLD AUTO: 222 K/UL (ref 150–400)
PMV BLD AUTO: 8.7 FL (ref 8.9–12.9)
POTASSIUM SERPL-SCNC: 4.4 MMOL/L (ref 3.5–5.1)
PROT SERPL-MCNC: 7.2 G/DL (ref 6.4–8.2)
RBC # BLD AUTO: 3.31 M/UL (ref 4.1–5.7)
RBC MORPH BLD: ABNORMAL
RBC MORPH BLD: ABNORMAL
SODIUM SERPL-SCNC: 141 MMOL/L (ref 136–145)
TSH SERPL DL<=0.05 MIU/L-ACNC: 1.63 UIU/ML (ref 0.36–3.74)
WBC # BLD AUTO: 6.5 K/UL (ref 4.1–11.1)

## 2022-07-04 PROCEDURE — 84443 ASSAY THYROID STIM HORMONE: CPT

## 2022-07-04 PROCEDURE — 85025 COMPLETE CBC W/AUTO DIFF WBC: CPT

## 2022-07-04 PROCEDURE — 36415 COLL VENOUS BLD VENIPUNCTURE: CPT

## 2022-07-04 PROCEDURE — 80053 COMPREHEN METABOLIC PANEL: CPT

## 2022-07-04 NOTE — PROGRESS NOTES
Outpatient Infusion Center Short Visit Progress Note    Patient admitted to [facility] for peripheral pre-treatment labs ambulatory in stable condition. No new concerns voiced. Vital Signs:  Visit Vitals  /79 (BP 1 Location: Right upper arm, BP Patient Position: At rest;Sitting)   Pulse 86   Temp 98.3 °F (36.8 °C)   Resp 20   SpO2 98%         Right arm peripheral lab drawn and sent for processing. Declined AVS    Patient tolerated treatment well. Patient discharged from [place] [address] ambulatory in no distress at 1050. Patient aware of next appointment.     Future Appointments   Date Time Provider Gregorio Ibarra   7/5/2022 11:00 AM B3 TD MED TX [facility]   7/5/2022 11:15 AM Minor Mcdaniels,  [address] [facility] AMB   7/11/2022 11:00 AM [facility] OSTOMY NURSE [facility]   8/1/2022  1:30 PM H3 TD LAB [facility]   8/2/2022 11:00 AM F4 TD MED [address]   8/15/2022  1:00 PM H3 TD LAB [facility]   8/16/2022 11:00 AM F4 TD MED [place] [facility]   8/29/2022  1:30 PM H3 TD LAB [facility]   8/30/2022 11:00 AM F4 TD MED [address] [facility]   9/12/2022  1:00 PM H3 TD LAB [facility]   9/13/2022 11:00 AM F4 TD MED TX [facility]

## 2022-07-05 ENCOUNTER — HOSPITAL ENCOUNTER (OUTPATIENT)
Dept: INFUSION THERAPY | Age: 77
Discharge: HOME | End: 2022-07-05

## 2022-07-05 ENCOUNTER — OFFICE VISIT (OUTPATIENT)
Dept: ONCOLOGY | Age: 77
End: 2022-07-05
Payer: MEDICARE

## 2022-07-05 VITALS
TEMPERATURE: 98.3 F | HEART RATE: 72 BPM | SYSTOLIC BLOOD PRESSURE: 128 MMHG | RESPIRATION RATE: 18 BRPM | DIASTOLIC BLOOD PRESSURE: 76 MMHG

## 2022-07-05 VITALS
SYSTOLIC BLOOD PRESSURE: 128 MMHG | DIASTOLIC BLOOD PRESSURE: 76 MMHG | HEART RATE: 72 BPM | WEIGHT: 227 LBS | TEMPERATURE: 98.3 F | BODY MASS INDEX: 31.66 KG/M2 | RESPIRATION RATE: 18 BRPM | OXYGEN SATURATION: 96 %

## 2022-07-05 DIAGNOSIS — C67.9 MALIGNANT NEOPLASM OF URINARY BLADDER, UNSPECIFIED SITE (HCC): Primary | ICD-10-CM

## 2022-07-05 DIAGNOSIS — R21 RASH: ICD-10-CM

## 2022-07-05 DIAGNOSIS — D64.9 ANEMIA, UNSPECIFIED TYPE: ICD-10-CM

## 2022-07-05 DIAGNOSIS — C67.9 MALIGNANT NEOPLASM OF URINARY BLADDER, UNSPECIFIED SITE (HCC): ICD-10-CM

## 2022-07-05 PROCEDURE — G8427 DOCREV CUR MEDS BY ELIG CLIN: HCPCS | Performed by: NURSE PRACTITIONER

## 2022-07-05 PROCEDURE — G0463 HOSPITAL OUTPT CLINIC VISIT: HCPCS | Performed by: NURSE PRACTITIONER

## 2022-07-05 PROCEDURE — G8417 CALC BMI ABV UP PARAM F/U: HCPCS | Performed by: NURSE PRACTITIONER

## 2022-07-05 PROCEDURE — G8536 NO DOC ELDER MAL SCRN: HCPCS | Performed by: NURSE PRACTITIONER

## 2022-07-05 PROCEDURE — G8432 DEP SCR NOT DOC, RNG: HCPCS | Performed by: NURSE PRACTITIONER

## 2022-07-05 PROCEDURE — 99213 OFFICE O/P EST LOW 20 MIN: CPT | Performed by: NURSE PRACTITIONER

## 2022-07-05 PROCEDURE — 1101F PT FALLS ASSESS-DOCD LE1/YR: CPT | Performed by: NURSE PRACTITIONER

## 2022-07-05 PROCEDURE — G8754 DIAS BP LESS 90: HCPCS | Performed by: NURSE PRACTITIONER

## 2022-07-05 PROCEDURE — G8752 SYS BP LESS 140: HCPCS | Performed by: NURSE PRACTITIONER

## 2022-07-05 PROCEDURE — 1123F ACP DISCUSS/DSCN MKR DOCD: CPT | Performed by: NURSE PRACTITIONER

## 2022-07-05 RX ORDER — EPINEPHRINE 1 MG/ML
0.3 INJECTION, SOLUTION, CONCENTRATE INTRAVENOUS AS NEEDED
Status: CANCELLED | OUTPATIENT
Start: 2022-07-08

## 2022-07-05 RX ORDER — ONDANSETRON 2 MG/ML
8 INJECTION INTRAMUSCULAR; INTRAVENOUS AS NEEDED
Status: CANCELLED | OUTPATIENT
Start: 2022-07-08

## 2022-07-05 RX ORDER — ALBUTEROL SULFATE 0.83 MG/ML
2.5 SOLUTION RESPIRATORY (INHALATION) AS NEEDED
Status: CANCELLED
Start: 2022-07-08

## 2022-07-05 RX ORDER — HEPARIN 100 UNIT/ML
300-500 SYRINGE INTRAVENOUS AS NEEDED
Status: CANCELLED
Start: 2022-07-08

## 2022-07-05 RX ORDER — PREDNISONE 10 MG/1
10 TABLET ORAL
Qty: 60 TABLET | Refills: 0 | Status: SHIPPED | OUTPATIENT
Start: 2022-07-05 | End: 2022-10-11 | Stop reason: SDUPTHER

## 2022-07-05 RX ORDER — ACETAMINOPHEN 325 MG/1
650 TABLET ORAL AS NEEDED
Status: CANCELLED
Start: 2022-07-08

## 2022-07-05 RX ORDER — DIPHENHYDRAMINE HYDROCHLORIDE 50 MG/ML
50 INJECTION, SOLUTION INTRAMUSCULAR; INTRAVENOUS AS NEEDED
Status: CANCELLED
Start: 2022-07-08

## 2022-07-05 RX ORDER — SODIUM CHLORIDE 9 MG/ML
10 INJECTION INTRAMUSCULAR; INTRAVENOUS; SUBCUTANEOUS AS NEEDED
Status: CANCELLED | OUTPATIENT
Start: 2022-07-08

## 2022-07-05 RX ORDER — DIPHENHYDRAMINE HYDROCHLORIDE 50 MG/ML
25 INJECTION, SOLUTION INTRAMUSCULAR; INTRAVENOUS AS NEEDED
Status: CANCELLED
Start: 2022-07-08

## 2022-07-05 RX ORDER — SODIUM CHLORIDE 0.9 % (FLUSH) 0.9 %
10 SYRINGE (ML) INJECTION AS NEEDED
Status: CANCELLED | OUTPATIENT
Start: 2022-07-08

## 2022-07-05 RX ORDER — HYDROCORTISONE SODIUM SUCCINATE 100 MG/2ML
100 INJECTION, POWDER, FOR SOLUTION INTRAMUSCULAR; INTRAVENOUS AS NEEDED
Status: CANCELLED | OUTPATIENT
Start: 2022-07-08

## 2022-07-05 RX ORDER — SODIUM CHLORIDE 9 MG/ML
25 INJECTION, SOLUTION INTRAVENOUS CONTINUOUS
Status: CANCELLED | OUTPATIENT
Start: 2022-07-08

## 2022-07-05 NOTE — PROGRESS NOTES
Kathleen Busby is a 69 y.o. male    Chief Complaint   Patient presents with    Follow-up      Muscle invasive bladder cancer- mixed histology       1. Have you been to the ER, urgent care clinic since your last visit? Hospitalized since your last visit? No    2. Have you seen or consulted any other health care providers outside of the [address] [place] [address] since your last visit? Include any pap smears or colon screening.  Yes, [facility]

## 2022-07-05 NOTE — PROGRESS NOTES
[facility] [place] [address] [address], [address]  W: [phone]  F: [phone]    Reason for Visit:   Keturah Schlatter is a 69 y.o. male who is seen in follow-up of Muscle invasive bladder cancer- mixed histology    Treatment History:   · 3/1/2021: CT IVP: Multiple abdominal, iliac, mediastinal nodes unchanged from 2019 consistent with h/o sarcoidosis, Thickened R lateral bladder wall. · 6/23/2021: Cystoscopy and TURBT- Papillary changes were noted within the prostatic urethra ,  papillary tumors seen emanating from the surface of the left hemitrigone, There were also diffuse changes in the floor of the bladder- Low grade urothelial carcinoma of the prostatic urethra, R lateral bladder wall with HG Muscle invasive urothelial carcinoma and squamous differentiation+ CIS, Left brenda trigone HG non invasive urothelial carcinoma  · 8/5/21- 10/21/2021: Cisplatin + Gemzar x 4   · 9/14/2021: CT was stable. · 12/6/2021: Radical urethro-cystoprostatectomy   · 2/3/22: Adjuvant nivolumab  · 4/22/2022: CT CHATO     History of Present Illness:   Patient is a 69 y.o. male with PMH as below including sarcoidosis who is seen for evaluation of bladder cancer. He has a history of nonmuscle invasive bladder cancer in 2015, underwent 2 induction courses of BCG, had a non muscle invasive recurrence in 2019 and had re induction with BCG. [place] 6/[place] [facility] [place] not show any recurrence. In March 2021 he had a positive FISH and was seen by Dr. Katelynn Mcneill. Had a CT IVP 3/2021 which showed some Bladder thickening. 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 Grade 4 neutropenia after cycle 1. Completed 4 cycles and seen after surgery done 12/6/2021. He comes today for follow up and nivolumab.  Rash returned with resuming nivolumab cycle 5 and he continues on prednisone taper; 20mg today and will titrate down to 10mg tomorrow. Rash is not itchy, no new regions. No new shortness of breath, diarrhea. No fevers, chills. He had evusheld; he had some nausea and felt ill for about 24 hours. He is leaving for [place] trip on 22 - 22. Past Medical History:   Diagnosis Date    Arrhythmia     LBBB    Arthritis     Asthma     MILD    Cancer ([address]     scc top of head and nose    Cancer ([address] 2015    BLADDER    COVID-19 vaccine series completed     [facility] VACCINE 21 AND 21    Depression with anxiety     Hypertension     Other unknown and unspecified cause of morbidity or mortality     history of pulmonary sarcoid     Posterior cervical adenopathy, benign 2018    [facility]     Unspecified sleep apnea     cpap      Past Surgical History:   Procedure Laterality Date    HX CATARACT REMOVAL Bilateral     HX HERNIA REPAIR Right AS A CHILD    INGUINAL    HX HERNIA REPAIR Left     INGUIBAL    HX KNEE REPLACEMENT Left     HX ORTHOPAEDIC Right     BONE SPURS REMOVED FROM FOOT    HX OTHER SURGICAL      scc removed top of head and nose    HX OTHER SURGICAL      benign lump removed from thyroid    HX OTHER SURGICAL Right 2020    SKIN CANCER RELMOVED FROM LEG    HX UROLOGICAL  2020    CYSTO    IR INSERT TUNL CVC W PORT OVER 5 YEARS  2021    ID CARDIAC SURG PROCEDURE UNLIST  2021    CARDIAC CATH    ID REVISE KNEE JOINT REPLACE,ALL PARTS Left       Social History     Tobacco Use    Smoking status: Former Smoker     Packs/day: 0.50     Years: 2.00     Pack years: 1.00     Quit date: 1965     Years since quittin.9    Smokeless tobacco: Never Used   Substance Use Topics    Alcohol use:  Yes     Alcohol/week: 2.0 standard drinks     Types: 2 Glasses of wine per week     Comment: DAILY      Family History   Problem Relation Age of Onset    Cancer Mother breast with mets    Dementia Mother     Heart Disease Father     Cancer Sister         breast    Lung Disease Brother     No Known Problems Brother     Anesth Problems Neg Hx      Current Outpatient Medications   Medication Sig    predniSONE (DELTASONE) 10 mg tablet Take 10 mg by mouth daily (with breakfast).  predniSONE (DELTASONE) 10 mg tablet Take 30mg (three tabs) daily for 5 days followed by 20mg (two tabs) daily for 5 days followed by 10mg (one tab) daily    pantoprazole (PROTONIX) 40 mg tablet Take 1 Tablet by mouth daily.  multivitamin (ONE A DAY) tablet Take 1 Tablet by mouth daily.  metoprolol tartrate (LOPRESSOR) 25 mg tablet Take 0.5 Tablets by mouth every twelve (12) hours.  zolpidem (AMBIEN) 5 mg tablet Take 5 mg by mouth nightly as needed for Sleep.  buPROPion SR (WELLBUTRIN SR) 100 mg SR tablet Take 100 mg by mouth daily.  acetaminophen (Tylenol Extra Strength) 500 mg tablet Take 1,000 mg by mouth every six (6) hours as needed for Pain.  simvastatin (ZOCOR) 20 mg tablet Take 20 mg by mouth nightly.  escitalopram oxalate (Lexapro) 10 mg tablet Take 10 mg by mouth daily. No current facility-administered medications for this visit. Allergies   Allergen Reactions    Pcn [Penicillins] Hives     Patient screened for any delayed non-IgE-mediated reaction to PCN.         Patient notes the following:    NO SEVERE NON-IGE MEDIATED REACTIONS        Review of Systems: A complete review of systems was obtained, negative except as described above.     Physical Exam:     Visit Vitals  /76 (BP 1 Location: Left upper arm, BP Patient Position: Sitting)   Pulse 72   Temp 98.3 °F (36.8 °C)   Resp 18   Wt 227 lb (103 kg)   SpO2 96%   BMI 31.66 kg/m²     ECOG PS: 1  General: No distress  Eyes: PERRL, anicteric sclerae  HENT: Atraumatic   Neck: Supple  Skin: erythematous patchy rash noted bilateral arms   GI: Has a urostomy bag  Psych: Alert, oriented, appropriate affect, normal judgment/insight    Results:     Lab Results   Component Value Date/Time    WBC 6.5 07/04/2022 10:48 AM    HGB 11.3 (L) 07/04/2022 10:48 AM    HCT 34.4 (L) 07/04/2022 10:48 AM    PLATELET 184 78/31/9776 10:48 AM    .9 (H) 07/04/2022 10:48 AM    ABS. NEUTROPHILS 5.0 07/04/2022 10:48 AM     Lab Results   Component Value Date/Time    Sodium 141 07/04/2022 10:48 AM    Potassium 4.4 07/04/2022 10:48 AM    Chloride 112 (H) 07/04/2022 10:48 AM    CO2 23 07/04/2022 10:48 AM    Glucose 93 07/04/2022 10:48 AM    BUN 43 (H) 07/04/2022 10:48 AM    Creatinine 2.01 (H) 07/04/2022 10:48 AM    GFR est AA 39 (L) 07/04/2022 10:48 AM    GFR est non-AA 32 (L) 07/04/2022 10:48 AM    Calcium 9.5 07/04/2022 10:48 AM    Glucose (POC) 143 (H) 12/11/2021 01:02 AM     Lab Results   Component Value Date/Time    Bilirubin, total 0.5 07/04/2022 10:48 AM    ALT (SGPT) 32 07/04/2022 10:48 AM    Alk. phosphatase 47 07/04/2022 10:48 AM    Protein, total 7.2 07/04/2022 10:48 AM    Albumin 3.6 07/04/2022 10:48 AM    Globulin 3.6 07/04/2022 10:48 AM       External   Records reviewed and summarized above.   Pathology report(s) reviewed    12/21/2021     SPECIMEN       Procedure: Radical urethro-cystoprostatectomy       TUMOR       Tumor Site:                Trigone, Dome, Right lateral wall, Left   Ureteral                                  orifice, Right bladder neck        Histologic Type:     Papillary urothelial carcinoma, invasive and   non-invasive, and carcinoma in situ       Histologic Grade:                High-grade       Tumor Size: Cannot be determined:      Multiple tumors       Tumor Extension:           Tumor invades adjacent structures -   Prostate                                  (transmural invasion from the bladder   tumor)       Lymphovascular Invasion:      Present       Tumor Configuration:           Papillary, Flat       MARGINS       Margins:                     Uninvolved by invasive carcinoma and   carcinoma in situ /                                  noninvasive urothelial carcinoma    LYMPH NODES       Number of Lymph Nodes Involved:      2           Size of Largest Metastatic Deposit:      1 cm               Site:                     Left Pelvic Lymph Nodes           Extranodal Extension:           Present       Number of Lymph Nodes Examined:      5     PATHOLOGIC STAGE CLASSIFICATION (pTNM, AJCC 8th Edition)       TNM Descriptors:                m (multiple primary tumors)       Primary Tumor (pT):                pT4a       Regional Lymph Nodes (pN):           pN2     Carcinoma in situ URETHRA: RESECTION       Tumor Site:                     Urethra       SPECIMEN       Procedure:                     Total urethrectomy    TUMOR       Tumor Site:                     Prostatic urethra       Histologic Type:                       Papillary urothelial carcinoma,   invasive       Histologic Grade:                     High-grade       Tumor Extension:                Tumor invades adjacent structures -   Prostate       Lymphovascular Invasion:           Present       MARGINS       Margins:                          Uninvolved by invasive carcinoma and   carcinoma in situ /                                       noninvasive urothelial carcinoma       LYMPH NODES (These represent the same lymph nodes reported in the BLADDER   Resection Template)       Number of Lymph Nodes Involved:      2           Size of Largest Metastatic Deposit:      1 cm               Site:                     Left Pelvic Lymph node       Number of Lymph Nodes Examined:      5      PATHOLOGIC STAGE CLASSIFICATION (pTNM, AJCC 8th Edition)       TNM Descriptors:                m (multiple primary tumors within the   prostatic urethra)       Primary Tumor (pT):                pT2       Regional Lymph Nodes (pN):           pN2                     4.  Right pelvic lymph nodes, lymph node dissection:        One benign lymph node with noncaseating granulomatous inflammation,   compatible with patients history of sarcoidosis   One benign lymph node, no histopathologic changes   No tumor seen     5.  Left pelvic lymph nodes, lymph node dissection:        Metastatic urothelial carcinoma in two of three lymph nodes, 1 cm in   greatest dimension with extracapsular extension   Noncaseating granulomatous inflammation, compatible with patient's history   of sarcoidosis     Radiology report(s) reviewed above. CT CAP 7/19/21:   IMPRESSION  1. While the bladder is decompressed by Juarez is thick-walled and irregular  2. No evidence of metastatic disease to the abdomen or pelvis  3. Extensive bilateral hilar and mediastinal adenopathy with patchy perihilar  airspace disease with associated nodular peribronchial cuffing. Findings can be  seen with sarcoidosis. Differentiating adenopathy from sarcoidosis for  metastatic disease is difficult       9/2021 CT     IMPRESSION     1. Mild bladder wall thickening posteriorly and along the right lateral bladder  wall, nonspecific. No evidence of metastatic disease within the chest, abdomen,  or pelvis. 2. Extensive mediastinal and bilateral hilar lymphadenopathy with lymph node  calcifications, most compatible with known sarcoidosis. No significant change. 3. Bilateral perihilar airspace opacities have improved. No new pulmonary  pathology identified. 4. Severe diverticulosis of the colon. No definite superimposed acute  Diverticulitis. 12/11/2021     IMPRESSION     1. Interval radical cystoprostatectomy with ileal conduit urinary diversion. 2. No postoperative complication identified. 3. Dense bilateral nephrograms in this patient who is status post contrast  injection the previous day. This suggests ATN. 4/2022      IMPRESSION     Status post radical cystoprostatectomy with ileal conduit urinary diversion. Mild left-sided hydronephrosis status post removal of bilateral nephroureteral  stents.   Chronic pulmonary parenchymal scarring and nodularity, not significantly  changed.     No definitive evidence of new/recurrent metastatic disease in the chest, abdomen  or pelvis. Assessment:   1) Muscle invasive bladder cancer- With squamous differentiation     [id number]  S/p 4 cycles of NAC with Cisplatin+ gemzar complicated by grade 4 neutropenia  S/P Radical urethro-cystoprostatectomy 12/6/2021- pT4N2 (invaded prostate), HG papillar, +LVI  Path stage-  Stage IIIB  Disease at high risk for local and distant recurrence  Margins negative but nodes had EPE    CT scans 4/2022 reviewed and CHATO    Developed post cycle 4 grade 2 immune-mediated rash; HD prednisone initiated 5/19/22 and last dose 6/18/22  Rash returned with last cycle (cycle 5) and resumed prednisone course. He is currently on 20mg dose and will titrate down to prednisone 10mg tomorrow; labs reviewed and plan for treatment Friday 7/8/22    He will need continued low dose prednisone at 10mg and script sent  CT CAP restaging ordered and will have done upon return from [place] trip    2) Urethral TCC    S/P Total urethrectomy 12/6  HG Papillary [id number]  Margins negative  EPE  Present  See above    Has been evaluated by Rad Onc to discuss whether RT is indictated at a later date    1) Sarcoidosis  Follows with pulmonary  Stable     4) Obesity    5) Anemia    Likely secondary to CKD   Status post injectafer x 1 on 3/11/2022. He was also started on Retacrit 10,000 units on 3/3/2022 and is being given monthly.     Anemia has improved but he has some bleeding through his stomal ulcer so we will follow labs     6) Immune mediated rash  grade 2 improved with steroids  Returned with cycle 5  Continue 10mg prednisone daily    7) Encounter for high risk medication like immunotherapy  Grade 2 immune mediated rash on prednisone; continue 10mg        Plan:     · Proceed with 6 of Nivolumab with every 2 week dosing for plan of 1 year  · Delay treatment to Friday and titrate to 10mg prednisone tomorrow  · CBC, CMP, TSH per protocol   · Reviewed recommendations from Dr. Andrea Garcia 5/9/22 visit  · Follow with wound clinic   · See urology as scheduled   · Pepcid daily  · Continue Retacrit 10,000 units monthly  · Goal hemoglobin is 10g or less. Hold if hemoglobin more than 10 g/dL   · Evusheld completed   · CT CAP restaging ordered to be completed upon return from trip    I appreciate the opportunity to participate in Mr. Jesse ortega.     Signed By: Neyda Zarco NP

## 2022-07-08 ENCOUNTER — HOSPITAL ENCOUNTER (OUTPATIENT)
Dept: INFUSION THERAPY | Age: 77
Discharge: HOME | End: 2022-07-08
Payer: MEDICARE

## 2022-07-08 VITALS
SYSTOLIC BLOOD PRESSURE: 134 MMHG | HEART RATE: 95 BPM | DIASTOLIC BLOOD PRESSURE: 75 MMHG | TEMPERATURE: 98 F | RESPIRATION RATE: 18 BRPM

## 2022-07-08 DIAGNOSIS — C67.9 MALIGNANT NEOPLASM OF URINARY BLADDER, UNSPECIFIED SITE (HCC): Primary | ICD-10-CM

## 2022-07-08 PROCEDURE — 74011000258 HC RX REV CODE- 258: Performed by: NURSE PRACTITIONER

## 2022-07-08 PROCEDURE — 74011250636 HC RX REV CODE- 250/636: Performed by: NURSE PRACTITIONER

## 2022-07-08 PROCEDURE — 96413 CHEMO IV INFUSION 1 HR: CPT

## 2022-07-08 PROCEDURE — 77030012965 HC NDL HUBR BBMI -A

## 2022-07-08 PROCEDURE — 74011000250 HC RX REV CODE- 250: Performed by: NURSE PRACTITIONER

## 2022-07-08 RX ORDER — SODIUM CHLORIDE 0.9 % (FLUSH) 0.9 %
10 SYRINGE (ML) INJECTION AS NEEDED
Status: DISPENSED | OUTPATIENT
Start: 2022-07-08 | End: 2022-07-08

## 2022-07-08 RX ORDER — SODIUM CHLORIDE 9 MG/ML
25 INJECTION, SOLUTION INTRAVENOUS CONTINUOUS
Status: DISPENSED | OUTPATIENT
Start: 2022-07-08 | End: 2022-07-08

## 2022-07-08 RX ORDER — SODIUM CHLORIDE 9 MG/ML
10 INJECTION INTRAMUSCULAR; INTRAVENOUS; SUBCUTANEOUS AS NEEDED
Status: ACTIVE | OUTPATIENT
Start: 2022-07-08 | End: 2022-07-08

## 2022-07-08 RX ORDER — HEPARIN 100 UNIT/ML
300-500 SYRINGE INTRAVENOUS AS NEEDED
Status: ACTIVE | OUTPATIENT
Start: 2022-07-08 | End: 2022-07-08

## 2022-07-08 RX ADMIN — SODIUM CHLORIDE 25 ML/HR: 9 INJECTION, SOLUTION INTRAVENOUS at 12:34

## 2022-07-08 RX ADMIN — SODIUM CHLORIDE, PRESERVATIVE FREE 10 ML: 5 INJECTION INTRAVENOUS at 11:45

## 2022-07-08 RX ADMIN — HEPARIN 500 UNITS: 100 SYRINGE at 13:18

## 2022-07-08 RX ADMIN — SODIUM CHLORIDE 240 MG: 9 INJECTION, SOLUTION INTRAVENOUS at 12:34

## 2022-07-08 NOTE — PROGRESS NOTES
OPIC Chemo Progress Note    Date: 2022    Name: Farnaz Mullins    MRN: [id number]         : 1945    1130 Mr. Jhon Jang Arrived to [facility] [address] ambulatory in stable condition. Assessment was completed, no acute issues at this time, no new complaints voiced. Port accessed with positive blood return by access RN. Labs drawn and sent for processing. Chemotherapy Flowsheet 2022   Cycle C6   Date 2022   Drug / Regimen opdivo   Pre Hydration -   Post Hydration -   Pre Meds given   Notes given       Mr. Frances Araujo vitals were reviewed. Patient Vitals for the past 12 hrs:   Temp Pulse Resp BP   22 1316 -- 95 -- 134/75   22 1146 98 °F (36.7 °C) 86 18 (!) 116/98       Pre-medications  were administered as ordered and chemotherapy was initiated.   Medications Administered     0.9% sodium chloride infusion     Admin Date  2022 Action  New Bag Dose  25 mL/hr Rate  25 mL/hr Route  IntraVENous Administered By  Radha Linton RN          0.9% sodium chloride injection 10 mL     Admin Date  2022 Action  Given Dose  10 mL Route  IntraVENous Administered By  Fritz Aragon RN          heparin (porcine) pf 300-500 Units     Admin Date  2022 Action  Given Dose  500 Units Route  InterCATHeter Administered By  Radha Linton RN          nivolumab (OPDIVO) 240 mg in 0.9% sodium chloride 100 mL, overfill volume 10 mL IVPB     Admin Date  2022 Action  New Bag Dose  240 mg Rate  268 mL/hr Route  IntraVENous Administered By  Radha Linton RN          sodium chloride (NS) flush 10 mL     Admin Date  2022 Action  Given Dose  10 mL Route  IntraVENous Administered By  Fritz Aragon RN                Two nurses verified prior to administering:  Drug name, Drug dose, Infusion volume or drug volume when prepared in a syringe, Rate of administration, Route of administration, Expiration dates and/or times, Appearance and physical integrity of the drugs, Rate set on infusion pump, when used, and Sequencing of drug administration. 1320 Patient tolerated treatment well. Port maintained positive blood return throughout treatment. Port flushed, heparinized and de accessed per protocol. Patient was discharged from [facility] in stable condition. Patient aware of next appointment.      Future Appointments   Date Time Provider Gregorio Ibrara   7/11/2022 11:00 AM [facility]  OSTOMY NURSE [facility]   8/1/2022 10:15 AM [facility] ER 2 [facility]   8/1/2022 10:45 AM [facility] CT ER 2 [facility]   8/1/2022  1:30 PM H3 TD LAB [facility]   8/2/2022 11:00 AM F4 TD MED [address]   8/4/2022  9:00 AM Raman, Ricarda Goodell,  [address] [facility] AMB   8/15/2022  1:00 PM H3 TD LAB [facility]   8/16/2022 11:00 AM F4 TD MED [address]   8/29/2022  1:30 PM H3 TD LAB [facility]   8/30/2022 11:00 AM F4 TD MED [place] [facility]   9/12/2022  1:00 PM H3 TD LAB [facility]   9/13/2022 11:00 AM F4 TD MED [address], RN  July 8, 2022

## 2022-07-11 ENCOUNTER — HOSPITAL ENCOUNTER (OUTPATIENT)
Dept: WOUND CARE | Age: 77
Discharge: HOME | End: 2022-07-11
Payer: MEDICARE

## 2022-07-11 VITALS
TEMPERATURE: 98.5 F | SYSTOLIC BLOOD PRESSURE: 132 MMHG | DIASTOLIC BLOOD PRESSURE: 78 MMHG | HEART RATE: 75 BPM | RESPIRATION RATE: 16 BRPM

## 2022-07-11 PROCEDURE — 99212 OFFICE O/P EST SF 10 MIN: CPT

## 2022-07-11 NOTE — WOUND CARE
Discharge Instructions/Wound Orders  [facility]  [address]  [address], [address]  Telephone: [phone]    NAME:  Boris Angelo OF BIRTH:  1945  MEDICAL RECORD NUMBER:  [id number]  DATE:  7/11/2022    Ostomy Care Orders:  1) Remove old bag and clean peristomal skin with tap water & paper towels, then dry thoroughly. 2) Crust peristomal wound with stoma powder and No Sting skin prep, then cover with a small piece of alginate and thin duoderm. 3) Shape Barrier ring to size, flatten inner edge and place over stoma site. 4) Place ostomy bag cut to 30mm, secure tape edges and attach ostomy belt. 5) Change 2 x week and as needed for leaking.     Dietary:  [x] Diet as tolerated: [] Calorie Diabetic Diet:Low carb and no Sugar [] No Added Salt:[x] Increase Protein: [] Other:Limit the amount of liquid you are drinking and avoid drinking in between meals   Activity:  [x] Activity as tolerated:  [] Patient has no activity restrictions     [] Strict Bedrest: [] Remain off Work:     [] May return to full duty work:                                   [] Return to work with restrictions:             Return Appointment:  [x] Return Appointment: With Kelin Olivares  in  August 3rd 2022  [] Ordered tests:    Electronically signed Maria Isabel Alberts RN on 7/11/2022 at 11:36 AM     Brittani Catherine 281: Should you experience any significant changes in your wound(s) or have questions about your wound care, please contact the [address] [place] [address] at [address] [place] [address] 8:00 am - 4:30. If you need help with your wound outside these hours and cannot wait until we are again available, contact your PCP or go to the hospital emergency room. PLEASE NOTE: IF YOU ARE UNABLE TO OBTAIN WOUND SUPPLIES, CONTINUE TO USE THE SUPPLIES YOU HAVE AVAILABLE UNTIL YOU ARE ABLE TO REACH US. IT IS MOST IMPORTANT TO KEEP THE WOUND COVERED AT ALL TIMES. CWON Signature:_______________________    Date: ___________ Time:  ____________

## 2022-07-11 NOTE — WOUND CARE
Clinical Level of Care Assessment    Outpatient Ostomy Care      NAME:  Dante Cuellar OF BIRTH:  1945  MEDICAL RECORD NUMBER:  [id number]   DATE:  7/11/2022      Patient Rashmi Arrington Assessment- Document in Flowsheet I&O   Points   Review of chart []   0   Assess Complete Ostomy tab in Navigator for assessment of; stoma status, peristomal skin, presence of hernia/stool consistency/diet/related medications   Simple adjustments to pouch size/pouch system, new stoma pattern, accessory addition/deletion. []   1   Assess Complete Ostomy tab in Navigator for assessment of; stoma status, peristomal skin, presence of hernia/stool consistency/diet/related medications   Moderate adjustments to pouch size/pouch system, new stoma pattern, accessory addition/deletion. Observe patient/caregiver with hands-on care. 1-2 adjustments to pouch size/system/skin care/accessory addition or deletion. [x]   2   Assess Complete Ostomy tab in Navigator for assessment of; stoma status, peristomal skin, presence of hernia/stool consistency/diet/related medications   Complex adjustments to pouch size/pouch system, new stoma pattern, accessory addition/deletion. 3 or more complex adjustments to pouch size/system/skin care/accessory addition or deletion. Observe patient/caregiver with hands-on care. Assess patient/patient abdomen for optimal pre-marked stoma site. Assess patient abdomen for type of hernia belt/accessory needed. []   3         Ambulation Status Documented in [facility] Clinical Note  Status Definition Points   Independent Independently able to ambulate. Fully able (without any assistance) to get on/off exam table/chair. [x]   0   Minimal Physical Assistance Requires physical assistance of one person to ambulate and/or position patient to be examined. Includes necessary physical assistance to position lower extremities on/off stool.    []   1   Moderate Physical Assistance Requires at least one staff member to physically assist patient in ambulating into treatment room, and/or on off chair/bed. Requires assistance to bathroom. []   2   Full Assistance Requires assistance of at least two staff members to transfer patient into treatment room and/or on/off bed/chair. \"Total Transfer\". Unable to use bathroom requires bedside commode and/or bedpan []   3       Teaching Effort Documented in [facility] Clinical Note  Effort Definition Points   No Teaching  []   0   General Initial/Simple lesson:  Assess readiness to learn, assess patient learning style to determine educational flow/special needs for learning. Teaching related to 1-3 topics  Documentation in [facility] completed. []   1   Intermediate Assess readiness to learn, assess patient learning style to determine educational flow/special needs for learning. Teaching related to 3-4 topics. Hernia belt application and care considerations  Documentation in [facility] completed. [x]   2   Complex Assess readiness to learn, assess patient learning style to determine educational flow/special needs for learning. Teaching of greater than 5 additional topics   Pre-operative ostomy education with review of written resources for patient/family/caregiver as needed. Demonstration/return demonstration of ostomy irrigation  Documentation in [facility] completed. []   3     Patient Assessment and Planning in [facility] Clinical Note   Planning Definition Points   Simple Simple pouch change procedure completed and reviewed with patient/family/caregiver   Documentation in [facility] completed. []   1   Intermediate Moderate level of follow-up needs:   Pouch change/discharge procedure revised and reviewed with patient/caregiver. Communications with outside resources; i.e. Telephone calls to Surgeon/ PCP, family/caregiver, home health, ECF. Documentation in [facility] completed.      [x]   2   Complex Complex level of instructions/changes:   Family/Caregiver learning/demonstration/return demonstration visit. Pouching/discharge procedure revised/reviewed with patient/family/caregiver. Contact with outside resources; i.e. communication with Surgeon/ PCP, home health, ECF. Contact/referral to ostomy appliance supplier for new or additional products. Review when to call [facility] or schedule follow-up visit. Referral to Emergency Department   Documentation in [facility] completed. []   3       Is this the Patient's First Visit with [facility] @ [facility]? No    Is this Patient Established to this [facility] within the last 3 years?    Yes             Clinical Level of Care      Points  0-3  Level 1 []     Points  4-6  Level 2 [x]     Points  7-8  Level 3 []     Points  9-10  Level 4 []     Points  11-12  Level 5 []       Electronically signed by Darlyn Alegria RN on 7/11/2022 at 11:44 AM

## 2022-07-11 NOTE — WOUND CARE
OSTOMY Assessment    Stoma Tissue Assessment: ___Post-op _x__Follow-up       Type of Diversion: ___New_x__ Established ___Revision___Permanent____Temporary    _____  Ileostomy   _____  Colostomy: ____ Ascending, ____ Transverse, _____.  Sigmoid   _____  Salas Fillers   __x___  Ileal Conduit   _____  Mucous Fistula     Appearance of Ostomy:   _x__ Lyudmila Bores /Moist/Viable ___ Dark Red ___ Pink ___ Sloughing ___Necrotic____Pallor ____Red  ___Dry ____Moist    Stoma Size: __32_______ (mm) ___Round __x_ [place] Lamin ___Irregular     Stoma Height:   ____Flush ____Retracted _x___Budded ____Edematous ____Prolapse     Stoma Location  ____ Left Side          __x__Right Side     _____Umbilicus  _____Incision Line  __x__ Above Belt       ____ Below Belt    Abdominal Contours  ____ Firm ____ Flat ____Flabby _x__Soft __x_Round ___ Hard ___ Other     Stoma Function: _x_Yes __No  Output:   __x__ Yellow ____Amber ____ Pink(Hematuria) ____ Tea Colored   ____ Clots ____Foul Odor ____ Mucous     Peristomal skin: See Wound Flowsheet for wound documentation  ___ Intact ___ Irritant Dermatitis ___ Allergic Contact Dermatitis ___Candidiasis ___Caput Medusae ___Folliculitis ___Mechanical Trauma ___Mucosal Transplantation    ___Pyoderma Gangrenosum ___Hyperplasia ___Radiation Trauma ___Allergies mpling  ___ Dimpling ____Blistered ____Fragile ____Macerated ___Peeling  ___Ulceration  ___ Fungal ___Peristomal Hernia ___Non-blanchable ___ Hypergranulation      Stoma Complications:   __Excessive Bleeding __Ischemia __ Abscess __Necrosis __Prolapse   _x_Hernia __ Retraction __Stenosis __Mucosal Separation __Melanosis Coli   __Laceration __Other     Application for Patient    Wafer and pouch used during assessment and education _[place] [id number]___________    Pouch System Recommended:   _x_One-Piece __Two-Piece ___Custom     Flat:   ___Pre-cut   ___Cut-to fit     Convexity: ___Shallow ___Deep_x__ Flexible ___Creative Convexity   ___Pre-cut __x_Cut to [employer]  Plain alginate, extra thin duoderm  __ Adhesive Seals __Adhesive Remover Wipes __Barrier [employer] _x_Barrier Wipes   __Deodorizer __Liquid Adhesive __ Paste __ Powder __Strip   __ Support Belt __Tape      Education for Patient & Family  1. Booklet of information on specific ostomy given and some verbal discussion of patients ostomy and expectations. 2. Instruction/demostration of ostomy wafer & pouch change. 3. Discuss concerns/ answer questions. 4. Provide follow up education in clinic if needed.        PICTURE INSERT:

## 2022-07-18 ENCOUNTER — APPOINTMENT (OUTPATIENT)
Dept: INFUSION THERAPY | Age: 77
End: 2022-07-18
Payer: MEDICARE

## 2022-07-18 ENCOUNTER — APPOINTMENT (OUTPATIENT)
Dept: INFUSION THERAPY | Age: 77
End: 2022-07-18

## 2022-07-19 ENCOUNTER — APPOINTMENT (OUTPATIENT)
Dept: INFUSION THERAPY | Age: 77
End: 2022-07-19

## 2022-07-21 ENCOUNTER — APPOINTMENT (OUTPATIENT)
Dept: INFUSION THERAPY | Age: 77
End: 2022-07-21

## 2022-07-25 ENCOUNTER — APPOINTMENT (OUTPATIENT)
Dept: INFUSION THERAPY | Age: 77
End: 2022-07-25

## 2022-07-25 RX ORDER — SODIUM CHLORIDE 9 MG/ML
25 INJECTION, SOLUTION INTRAVENOUS CONTINUOUS
Status: CANCELLED | OUTPATIENT
Start: 2022-08-02

## 2022-07-25 RX ORDER — ONDANSETRON 2 MG/ML
8 INJECTION INTRAMUSCULAR; INTRAVENOUS AS NEEDED
Status: CANCELLED | OUTPATIENT
Start: 2022-08-02

## 2022-07-25 RX ORDER — ACETAMINOPHEN 325 MG/1
650 TABLET ORAL AS NEEDED
Status: CANCELLED
Start: 2022-08-02

## 2022-07-25 RX ORDER — DIPHENHYDRAMINE HYDROCHLORIDE 50 MG/ML
25 INJECTION, SOLUTION INTRAMUSCULAR; INTRAVENOUS AS NEEDED
Status: CANCELLED
Start: 2022-08-02

## 2022-07-25 RX ORDER — SODIUM CHLORIDE 9 MG/ML
10 INJECTION INTRAMUSCULAR; INTRAVENOUS; SUBCUTANEOUS AS NEEDED
Status: CANCELLED | OUTPATIENT
Start: 2022-08-02

## 2022-07-25 RX ORDER — HYDROCORTISONE SODIUM SUCCINATE 100 MG/2ML
100 INJECTION, POWDER, FOR SOLUTION INTRAMUSCULAR; INTRAVENOUS AS NEEDED
Status: CANCELLED | OUTPATIENT
Start: 2022-08-02

## 2022-07-25 RX ORDER — EPINEPHRINE 1 MG/ML
0.3 INJECTION, SOLUTION, CONCENTRATE INTRAVENOUS AS NEEDED
Status: CANCELLED | OUTPATIENT
Start: 2022-08-02

## 2022-07-25 RX ORDER — DIPHENHYDRAMINE HYDROCHLORIDE 50 MG/ML
50 INJECTION, SOLUTION INTRAMUSCULAR; INTRAVENOUS AS NEEDED
Status: CANCELLED
Start: 2022-08-02

## 2022-07-25 RX ORDER — ALBUTEROL SULFATE 0.83 MG/ML
2.5 SOLUTION RESPIRATORY (INHALATION) AS NEEDED
Status: CANCELLED
Start: 2022-08-02

## 2022-07-25 RX ORDER — HEPARIN 100 UNIT/ML
300-500 SYRINGE INTRAVENOUS AS NEEDED
Status: CANCELLED
Start: 2022-08-02

## 2022-07-25 RX ORDER — SODIUM CHLORIDE 0.9 % (FLUSH) 0.9 %
10 SYRINGE (ML) INJECTION AS NEEDED
Status: CANCELLED | OUTPATIENT
Start: 2022-08-02

## 2022-07-26 ENCOUNTER — APPOINTMENT (OUTPATIENT)
Dept: INFUSION THERAPY | Age: 77
End: 2022-07-26

## 2022-08-01 ENCOUNTER — APPOINTMENT (OUTPATIENT)
Dept: INFUSION THERAPY | Age: 77
End: 2022-08-01

## 2022-08-03 ENCOUNTER — HOSPITAL ENCOUNTER (OUTPATIENT)
Dept: WOUND CARE | Age: 77
Discharge: HOME | End: 2022-08-03
Payer: MEDICARE

## 2022-08-03 VITALS
DIASTOLIC BLOOD PRESSURE: 78 MMHG | RESPIRATION RATE: 16 BRPM | TEMPERATURE: 98.5 F | SYSTOLIC BLOOD PRESSURE: 134 MMHG | HEART RATE: 76 BPM

## 2022-08-03 PROCEDURE — 99212 OFFICE O/P EST SF 10 MIN: CPT

## 2022-08-03 NOTE — WOUND CARE
08/03/22 1127   Wound Peristomal Right #1 04/18/22   Date First Assessed/Time First Assessed: 04/18/22 1145   Present on Hospital Admission: Yes  Wound Approximate Age at First Assessment (Weeks): 4 weeks  Primary Wound Type: Traumatic  Location: Peristomal  Wound Location Orientation: Right  Wound Desc. ..    Wound Image    Wound Etiology Traumatic   Dressing Status New dressing applied   Cleansed   (Tap water)   Dressing/Treatment Alginate;Hydrocolloid  (ekain seal and urostomy)   Wound Length (cm) 2 cm   Wound Width (cm) 1.2 cm   Wound Depth (cm) 0.1 cm   Wound Surface Area (cm^2) 2.4 cm^2   Change in Wound Size % 89.45   Wound Volume (cm^3) 0.24 cm^3   Wound Healing % 95   Wound Assessment Granulation tissue   Drainage Amount Moderate   Drainage Description Serosanguinous   Wound Odor None   Vickie-Wound/Incision Assessment Fragile   Edges Flat/open edges   Wound Thickness Description Full thickness   Pain 1   Pain Scale 1 Numeric (0 - 10)   Pain Intensity 1 0   Patient Stated Pain Goal 0   Visit Vitals  /78   Pulse 76   Temp 98.5 °F (36.9 °C)   Resp 16

## 2022-08-03 NOTE — WOUND CARE
Clinical Level of Care Assessment    Outpatient Ostomy Care      NAME:  Essence Garsia OF BIRTH:  1945  MEDICAL RECORD NUMBER:  [id number]   DATE:  8/3/2022      Patient Renate Maher Assessment- Document in Flowsheet I&O   Points   Review of chart []   0   Assess Complete Ostomy tab in Navigator for assessment of; stoma status, peristomal skin, presence of hernia/stool consistency/diet/related medications   Simple adjustments to pouch size/pouch system, new stoma pattern, accessory addition/deletion. []   1   Assess Complete Ostomy tab in Navigator for assessment of; stoma status, peristomal skin, presence of hernia/stool consistency/diet/related medications   Moderate adjustments to pouch size/pouch system, new stoma pattern, accessory addition/deletion. Observe patient/caregiver with hands-on care. 1-2 adjustments to pouch size/system/skin care/accessory addition or deletion. [x]   2   Assess Complete Ostomy tab in Navigator for assessment of; stoma status, peristomal skin, presence of hernia/stool consistency/diet/related medications   Complex adjustments to pouch size/pouch system, new stoma pattern, accessory addition/deletion. 3 or more complex adjustments to pouch size/system/skin care/accessory addition or deletion. Observe patient/caregiver with hands-on care. Assess patient/patient abdomen for optimal pre-marked stoma site. Assess patient abdomen for type of hernia belt/accessory needed. []   3         Ambulation Status Documented in [facility] Clinical Note  Status Definition Points   Independent Independently able to ambulate. Fully able (without any assistance) to get on/off exam table/chair. [x]   0   Minimal Physical Assistance Requires physical assistance of one person to ambulate and/or position patient to be examined. Includes necessary physical assistance to position lower extremities on/off stool.    []   1   Moderate Physical Assistance Requires at least one staff member to physically assist patient in ambulating into treatment room, and/or on off chair/bed. Requires assistance to bathroom. []   2   Full Assistance Requires assistance of at least two staff members to transfer patient into treatment room and/or on/off bed/chair. \"Total Transfer\". Unable to use bathroom requires bedside commode and/or bedpan []   3       Teaching Effort Documented in [facility] Clinical Note  Effort Definition Points   No Teaching  []   0   General Initial/Simple lesson:  Assess readiness to learn, assess patient learning style to determine educational flow/special needs for learning. Teaching related to 1-3 topics  Documentation in [facility] completed. []   1   Intermediate Assess readiness to learn, assess patient learning style to determine educational flow/special needs for learning. Teaching related to 3-4 topics. Hernia belt application and care considerations  Documentation in [facility] completed. [x]   2   Complex Assess readiness to learn, assess patient learning style to determine educational flow/special needs for learning. Teaching of greater than 5 additional topics   Pre-operative ostomy education with review of written resources for patient/family/caregiver as needed. Demonstration/return demonstration of ostomy irrigation  Documentation in [facility] completed. []   3     Patient Assessment and Planning in [facility] Clinical Note   Planning Definition Points   Simple Simple pouch change procedure completed and reviewed with patient/family/caregiver   Documentation in [facility] completed. []   1   Intermediate Moderate level of follow-up needs:   Pouch change/discharge procedure revised and reviewed with patient/caregiver. Communications with outside resources; i.e. Telephone calls to Surgeon/ PCP, family/caregiver, home health, ECF. Documentation in [facility] completed.      [x]   2   Complex Complex level of instructions/changes:   Family/Caregiver learning/demonstration/return demonstration visit. Pouching/discharge procedure revised/reviewed with patient/family/caregiver. Contact with outside resources; i.e. communication with Surgeon/ PCP, home health, ECF. Contact/referral to ostomy appliance supplier for new or additional products. Review when to call [facility] or schedule follow-up visit. Referral to Emergency Department   Documentation in [facility] completed. []   3       Is this the Patient's First Visit with [facility] @ [facility]? No    Is this Patient Established to this [facility] within the last 3 years?    Yes             Clinical Level of Care      Points  0-3  Level 1 []     Points  4-6  Level 2 [x]     Points  7-8  Level 3 []     Points  9-10  Level 4 []     Points  11-12  Level 5 []       Electronically signed by Jeffrey Contreras RN on 8/3/2022 at 11:47 AM

## 2022-08-03 NOTE — PROGRESS NOTES
[facility] [place] [address] [address]ana Vargas, [address], [address] [place] [address]  W: [phone]  F: [phone]    Reason for Visit:   Marco A Norman is a 69 y.o. male who is seen in follow-up of Muscle invasive bladder cancer- Mixed histology    Treatment History:   3/1/2021: CT IVP: Multiple abdominal, iliac, mediastinal nodes unchanged from 2019 consistent with h/o sarcoidosis, Thickened R lateral bladder wall. 6/23/2021: Cystoscopy and TURBT- Papillary changes were noted within the prostatic urethra ,  papillary tumors seen emanating from the surface of the left hemitrigone, There were also diffuse changes in the floor of the bladder- Low grade urothelial carcinoma of the prostatic urethra, R lateral bladder wall with HG Muscle invasive urothelial carcinoma and squamous differentiation+ CIS, Left brenda trigone HG non invasive urothelial carcinoma  8/5/21- 10/21/2021: Cisplatin + Gemzar x 4   9/14/2021: CT was stable. 12/6/2021: Radical urethro-cystoprostatectomy   2/3/22: Adjuvant nivolumab  4/22/2022: CT CHATO   8/2022 CT CAP CHATO     History of Present Illness:   Patient is a 69 y.o. male with PMH as below including sarcoidosis who is seen for evaluation of bladder cancer. He has a history of nonmuscle invasive bladder cancer in 2015, underwent 2 induction courses of BCG, had a non muscle invasive recurrence in 2019 and had re induction with BCG. Cystoscopy 6/2020 at [facility] did not show any recurrence. In March 2021 he had a positive FISH and was seen by Dr. Katie Hall. Had a CT IVP 3/2021 which showed some Bladder thickening. 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 Grade 4 neutropenia after cycle 1. Completed 4 cycles and seen after surgery done 12/6/2021. He comes today for follow up on Nivolumab. Rash is about the same.  Taking prednisone 10mg daily. Denies SOB, CP, cough, fevers/chills, bleeding. No complaints. Past Medical History:   Diagnosis Date    Arrhythmia     LBBB    Arthritis     Asthma     MILD    Cancer ([facility] [address]     scc top of head and nose    Cancer ([facility] [address]     BLADDER    COVID-19 vaccine series completed     [address] 21 AND 21    Depression with anxiety     Hypertension     Other unknown and unspecified cause of morbidity or mortality     history of pulmonary sarcoid     Posterior cervical adenopathy, benign 2018    Pulmonary sarcoidosis ([facility] [address]     Unspecified sleep apnea     cpap      Past Surgical History:   Procedure Laterality Date    HX CATARACT REMOVAL Bilateral     HX HERNIA REPAIR Right AS A CHILD    INGUINAL    HX HERNIA REPAIR Left     INGUIBAL    HX KNEE REPLACEMENT Left     HX ORTHOPAEDIC Right     BONE SPURS REMOVED FROM FOOT    HX OTHER SURGICAL      scc removed top of head and nose    HX OTHER SURGICAL  2005    benign lump removed from thyroid    HX OTHER SURGICAL Right 2020    SKIN CANCER RELMOVED FROM LEG    HX UROLOGICAL  2020    CYSTO    IR INSERT TUNL CVC W PORT OVER 5 YEARS  2021    [place] CARDIAC SURG PROCEDURE UNLIST  2021    CARDIAC CATH    [place] REVISE KNEE JOINT REPLACE,ALL PARTS Left       Social History     Tobacco Use    Smoking status: Former     Packs/day: 0.50     Years: 2.00     Pack years: 1.00     Types: Cigarettes     Quit date: 1965     Years since quittin.0     Passive exposure: Never    Smokeless tobacco: Never   Substance Use Topics    Alcohol use:  Yes     Alcohol/week: 2.0 standard drinks     Types: 2 Glasses of wine per week     Comment: DAILY      Family History   Problem Relation Age of Onset    Cancer Mother         breast with mets    Dementia Mother     Heart Disease Father     Cancer Sister         breast    Lung Disease Brother     No Known Problems Brother     Anesth Problems Neg Hx      Current Outpatient Medications Medication Sig    lidocaine-prilocaine (EMLA) topical cream Apply  to affected area as needed for Pain. Apply a thick layer over port a cath 30-60 minutes prior to port access    predniSONE (DELTASONE) 10 mg tablet Take 10 mg by mouth daily (with breakfast). pantoprazole (PROTONIX) 40 mg tablet Take 1 Tablet by mouth daily. multivitamin (ONE A DAY) tablet Take 1 Tablet by mouth daily. metoprolol tartrate (LOPRESSOR) 25 mg tablet Take 0.5 Tablets by mouth every twelve (12) hours. zolpidem (AMBIEN) 5 mg tablet Take 5 mg by mouth nightly as needed for Sleep. buPROPion SR (WELLBUTRIN SR) 100 mg SR tablet Take 100 mg by mouth daily. acetaminophen (TYLENOL) 500 mg tablet Take 1,000 mg by mouth every six (6) hours as needed for Pain.    simvastatin (ZOCOR) 20 mg tablet Take 20 mg by mouth nightly. escitalopram oxalate (Lexapro) 10 mg tablet Take 10 mg by mouth daily. No current facility-administered medications for this visit.      Facility-Administered Medications Ordered in Other Visits   Medication Dose Route Frequency    epoetin bhumi-epbx (RETACRIT) injection 10,000 Units  10,000 Units SubCUTAneous ONCE    0.9% sodium chloride infusion  25 mL/hr IntraVENous CONTINUOUS    nivolumab (OPDIVO) 240 mg in 0.9% sodium chloride 100 mL, overfill volume 10 mL IVPB  240 mg IntraVENous ONCE    sodium chloride (NS) flush 10 mL  10 mL IntraVENous PRN    0.9% sodium chloride injection 10 mL  10 mL IntraVENous PRN    heparin (porcine) pf 300-500 Units  300-500 Units InterCATHeter PRN    sodium chloride 0.9 % bolus infusion 500 mL  500 mL IntraVENous PRN    diphenhydrAMINE (BENADRYL) injection 25 mg  25 mg IntraVENous PRN    famotidine (PF) (PEPCID) 20 mg in 0.9% sodium chloride 10 mL injection  20 mg IntraVENous PRN    acetaminophen (TYLENOL) tablet 650 mg  650 mg Oral PRN    meperidine (DEMEROL) injection 25 mg  25 mg IntraVENous PRN    ondansetron (ZOFRAN) injection 8 mg  8 mg IntraVENous PRN      Allergies Allergen Reactions    Pcn [Penicillins] Hives     Patient screened for any delayed non-IgE-mediated reaction to PCN. Patient notes the following:    NO SEVERE NON-IGE MEDIATED REACTIONS        Review of Systems: A complete review of systems was obtained, negative except as described above. Physical Exam:     Visit Vitals  /74 (BP 1 Location: Right arm, BP Patient Position: Sitting)   Pulse 97   Temp 98.2 °F (36.8 °C)   Resp 18   Wt 229 lb (103.9 kg)   SpO2 95%   BMI 31.94 kg/m²     ECOG PS: 1  General: No distress  Eyes: PERRL, anicteric sclerae  HENT: Atraumatic   Neck: Supple  Skin: resolving macular erythematous rash noted on b/l arms & chest   GI: Has a urostomy bag  Psych: Alert, oriented, appropriate affect, normal judgment/insight    Results:     Lab Results   Component Value Date/Time    WBC 6.3 08/15/2022 01:06 PM    HGB 11.2 (L) 08/15/2022 01:06 PM    HCT 33.3 (L) 08/15/2022 01:06 PM    PLATELET 289 82/50/3062 01:06 PM    .1 (H) 08/15/2022 01:06 PM    ABS. NEUTROPHILS 4.8 08/15/2022 01:06 PM     Lab Results   Component Value Date/Time    Sodium 138 08/15/2022 01:06 PM    Potassium 4.5 08/15/2022 01:06 PM    Chloride 107 08/15/2022 01:06 PM    CO2 28 08/15/2022 01:06 PM    Glucose 88 08/15/2022 01:06 PM    BUN 37 (H) 08/15/2022 01:06 PM    Creatinine 2.05 (H) 08/15/2022 01:06 PM    GFR est AA 38 (L) 08/15/2022 01:06 PM    GFR est non-AA 32 (L) 08/15/2022 01:06 PM    Calcium 10.3 (H) 08/15/2022 01:06 PM    Glucose (POC) 143 (H) 12/11/2021 01:02 AM     Lab Results   Component Value Date/Time    Bilirubin, total 0.4 08/15/2022 01:06 PM    ALT (SGPT) 30 08/15/2022 01:06 PM    Alk. phosphatase 56 08/15/2022 01:06 PM    Protein, total 7.2 08/15/2022 01:06 PM    Albumin 3.2 (L) 08/15/2022 01:06 PM    Globulin 4.0 08/15/2022 01:06 PM       External   Records reviewed and summarized above.   Pathology report(s) reviewed    12/21/2021     SPECIMEN       Procedure: Radical urethro-cystoprostatectomy       TUMOR       Tumor Site:                Trigone, Dome, Right lateral wall, Left   Ureteral                                  orifice, Right bladder neck        Histologic Type:     Papillary urothelial carcinoma, invasive and   non-invasive, and carcinoma in situ       Histologic Grade:                High-grade       Tumor Size: Cannot be determined:      Multiple tumors       Tumor Extension:           Tumor invades adjacent structures -   Prostate                                  (transmural invasion from the bladder   tumor)       Lymphovascular Invasion:      Present       Tumor Configuration:           Papillary, Flat       MARGINS       Margins:                     Uninvolved by invasive carcinoma and   carcinoma in situ /                                  noninvasive urothelial carcinoma    LYMPH NODES       Number of Lymph Nodes Involved:      2           Size of Largest Metastatic Deposit:      1 cm               Site:                     Left Pelvic Lymph Nodes           Extranodal Extension:           Present       Number of Lymph Nodes Examined:      5     PATHOLOGIC STAGE CLASSIFICATION (pTNM, AJCC 8th Edition)       TNM Descriptors:                m (multiple primary tumors)       Primary Tumor (pT):                pT4a       Regional Lymph Nodes (pN):           pN2     Carcinoma in situ URETHRA: RESECTION       Tumor Site:                     Urethra       SPECIMEN       Procedure:                      Total urethrectomy    TUMOR       Tumor Site:                     Prostatic urethra       Histologic Type:                       Papillary urothelial carcinoma,   invasive       Histologic Grade:                     High-grade       Tumor Extension:                Tumor invades adjacent structures -   Prostate       Lymphovascular Invasion:           Present       MARGINS       Margins:                          Uninvolved by invasive carcinoma and   carcinoma in situ / noninvasive urothelial carcinoma       LYMPH NODES (These represent the same lymph nodes reported in the BLADDER   Resection Template)       Number of Lymph Nodes Involved:      2           Size of Largest Metastatic Deposit:      1 cm               Site:                     Left Pelvic Lymph node       Number of Lymph Nodes Examined:      5      PATHOLOGIC STAGE CLASSIFICATION (pTNM, AJCC 8th Edition)       TNM Descriptors:                m (multiple primary tumors within the   prostatic urethra)       Primary Tumor (pT):                pT2       Regional Lymph Nodes (pN):           pN2                     4.  Right pelvic lymph nodes, lymph node dissection:        One benign lymph node with noncaseating granulomatous inflammation,   compatible with patients history of sarcoidosis   One benign lymph node, no histopathologic changes   No tumor seen     5. Left pelvic lymph nodes, lymph node dissection:        Metastatic urothelial carcinoma in two of three lymph nodes, 1 cm in   greatest dimension with extracapsular extension   Noncaseating granulomatous inflammation, compatible with patient's history   of sarcoidosis     Radiology report(s) reviewed above. CT CAP 7/19/21:   IMPRESSION  1. While the bladder is decompressed by Juarez is thick-walled and irregular  2. No evidence of metastatic disease to the abdomen or pelvis  3. Extensive bilateral hilar and mediastinal adenopathy with patchy perihilar  airspace disease with associated nodular peribronchial cuffing. Findings can be  seen with sarcoidosis. Differentiating adenopathy from sarcoidosis for  metastatic disease is difficult       9/2021 CT     IMPRESSION     1. Mild bladder wall thickening posteriorly and along the right lateral bladder  wall, nonspecific. No evidence of metastatic disease within the chest, abdomen,  or pelvis.   2. Extensive mediastinal and bilateral hilar lymphadenopathy with lymph node  calcifications, most compatible with known sarcoidosis. No significant change. 3. Bilateral perihilar airspace opacities have improved. No new pulmonary  pathology identified. 4. Severe diverticulosis of the colon. No definite superimposed acute  Diverticulitis. 12/11/2021     IMPRESSION     1. Interval radical cystoprostatectomy with ileal conduit urinary diversion. 2. No postoperative complication identified. 3. Dense bilateral nephrograms in this patient who is status post contrast  injection the previous day. This suggests ATN. 4/2022      IMPRESSION     Status post radical cystoprostatectomy with ileal conduit urinary diversion. Mild left-sided hydronephrosis status post removal of bilateral nephroureteral  stents. Chronic pulmonary parenchymal scarring and nodularity, not significantly  changed. No definitive evidence of new/recurrent metastatic disease in the chest, abdomen  or pelvis. Assessment:   1) Muscle invasive bladder cancer- With squamous differentiation     [id number]  S/p 4 cycles of NAC with Cisplatin+ gemzar complicated by grade 4 neutropenia  S/P Radical urethro-cystoprostatectomy 12/6/2021- pT4N2 (invaded prostate), HG papillar, +LVI  Path stage-  Stage IIIB  Disease at high risk for local and distant recurrence  Margins negative but nodes had EPE    CT scans 8/2022 reviewed and CHATO  Developed post cycle 4 grade 2 immune-mediated rash; HD prednisone initiated 5/19/22 and tapered off to 10 mg when grade 1. Has not recurred. 2) Urethral TCC  S/P Total urethrectomy 12/6  HG Papillary [id number]  Margins negative  EPE  Present  See above    Has been evaluated by Rad Onc to discuss whether RT is indictated at a later date    1) Sarcoidosis  Follows with pulmonary  Stable     4) Obesity    5) Anemia  Likely secondary to CKD   Status post injectafer x 1 on 3/11/2022.     He was also started on Retacrit 10,000 units on 3/3/2022   Improved     6) immune mediated rash  Was grade 2, now grade 1   Improved with steroids  Continue Prednisone 10mg daily     7) Encounter for high risk medication like immunotherapy  Rash has improved to grade 1  Continue prednisone 10mg daily   Now on q2 week dosing     Plan:     Continue Nivolumab every 2-week dosing   10mg prednisone   CBC, CMP, TSH per protocol  Reviewed recommendations from Dr. Carloz Ramos 5/9/22 visit- consider RT after completion of Nivolumab  Follow with wound clinic   See urology as scheduled   Pepcid daily  Continue Retacrit 10,000 units monthly  Goal hemoglobin is 10 g or less. Hold if hemoglobin more than 10 g/dL    RTC 1 month  OPIC every 2 weeks    I appreciate the opportunity to participate [facility]. I personally saw and evaluated the patient and performed the key components of medical decision making. The history, physical exam, and documentation were performed by Marco A Kaur NP. I reviewed and verified the above documentation and modified it as needed.           Signed By: Shital Mayers MD

## 2022-08-03 NOTE — WOUND CARE
OSTOMY Assessment    Stoma Tissue Assessment: __x_Post-op ___Follow-up       Type of Diversion: ___New__x_ Established ___Revision___Permanent____Temporary    _____  Ileostomy   _____  Colostomy: ____ Ascending, ____ Transverse, _____.  Sigmoid   __x___  Jinger Bird   _____  Ileal Conduit   _____  Mucous Fistula     Appearance of Ostomy:   __x_ [place] /Moist/Viable ___ Dark Red ___ Pink ___ Sloughing ___Necrotic____Pallor ____Red  ___Dry ____Moist    Stoma Size: ____32_____ (mm) __x_Round ___ [place] ___Irregular     Stoma Height:   ____Flush ____Retracted ____Budded ____Edematous __x__Prolapse     Stoma Location  ____ Left Side          __x__Right Side     _____Umbilicus  _____Incision Line  __x__ Above Belt       ____ Below Belt    Abdominal Contours  ____ Firm ____ Flat ____Flabby _x__Soft __x_Round ___ Hard ___ Other     Stoma Function: _x_Yes __No  Output:   __x__ Yellow ____Amber ____ Pink(Hematuria) ____ Tea Colored   ____ Clots ____Foul Odor ____ Mucous     Peristomal skin: See Flowsheet for peristomal wound documentation  ___ Intact ___ Irritant Dermatitis ___ Allergic Contact Dermatitis ___Candidiasis ___Caput Medusae ___Folliculitis ___Mechanical Trauma ___Mucosal Transplantation    ___Pyoderma Gangrenosum ___Hyperplasia ___Radiation Trauma ___Allergies mpling  ___ Dimpling ____Blistered ____Fragile ____Macerated ___Peeling  ___Ulceration  ___ Fungal ___Peristomal Hernia ___Non-blanchable ___ Hypergranulation      Stoma Complications:   __Excessive Bleeding __Ischemia __ Abscess __Necrosis __Prolapse   __Hernia __ Retraction __Stenosis __Mucosal Separation __Melanosis Coli   __Laceration __Other     Application for Patient    Wafer and pouch used during assessment and education ____[place] [id number]________    Pouch System Recommended:   _x_One-Piece __Two-Piece ___Custom     Flat:   ___Pre-cut   ___Cut-to fit     Convexity: ___Shallow ___Deep__x_ Flexible ___Creative Convexity   ___Pre-cut _x__Cut to [employer]   __ Adhesive Seals __Adhesive Remover Wipes __Barrier [employer] _x_Barrier Wipes   __Deodorizer __Liquid Adhesive __ Paste _x_ Powder _x_Strip Coloplast arcs  _x_ Support Belt __Tape      Education for Patient & Family  1. Booklet of information on specific ostomy given and some verbal discussion of patients ostomy and expectations. 2. Instruction/demostration of ostomy wafer & pouch change. 3. Discuss concerns/ answer questions. 4. Provide follow up education in clinic if needed.        PICTURE INSERT:

## 2022-08-08 RX ORDER — ONDANSETRON 2 MG/ML
8 INJECTION INTRAMUSCULAR; INTRAVENOUS AS NEEDED
Status: CANCELLED | OUTPATIENT
Start: 2022-08-16

## 2022-08-08 RX ORDER — DIPHENHYDRAMINE HYDROCHLORIDE 50 MG/ML
25 INJECTION, SOLUTION INTRAMUSCULAR; INTRAVENOUS AS NEEDED
Status: CANCELLED
Start: 2022-08-16

## 2022-08-08 RX ORDER — SODIUM CHLORIDE 0.9 % (FLUSH) 0.9 %
10 SYRINGE (ML) INJECTION AS NEEDED
Status: CANCELLED | OUTPATIENT
Start: 2022-08-16

## 2022-08-08 RX ORDER — EPINEPHRINE 1 MG/ML
0.3 INJECTION, SOLUTION, CONCENTRATE INTRAVENOUS AS NEEDED
Status: CANCELLED | OUTPATIENT
Start: 2022-08-16

## 2022-08-08 RX ORDER — DIPHENHYDRAMINE HYDROCHLORIDE 50 MG/ML
50 INJECTION, SOLUTION INTRAMUSCULAR; INTRAVENOUS AS NEEDED
Status: CANCELLED
Start: 2022-08-16

## 2022-08-08 RX ORDER — HYDROCORTISONE SODIUM SUCCINATE 100 MG/2ML
100 INJECTION, POWDER, FOR SOLUTION INTRAMUSCULAR; INTRAVENOUS AS NEEDED
Status: CANCELLED | OUTPATIENT
Start: 2022-08-16

## 2022-08-08 RX ORDER — ALBUTEROL SULFATE 0.83 MG/ML
2.5 SOLUTION RESPIRATORY (INHALATION) AS NEEDED
Status: CANCELLED
Start: 2022-08-16

## 2022-08-08 RX ORDER — SODIUM CHLORIDE 9 MG/ML
10 INJECTION INTRAMUSCULAR; INTRAVENOUS; SUBCUTANEOUS AS NEEDED
Status: CANCELLED | OUTPATIENT
Start: 2022-08-16

## 2022-08-08 RX ORDER — ACETAMINOPHEN 325 MG/1
650 TABLET ORAL AS NEEDED
Status: CANCELLED
Start: 2022-08-16

## 2022-08-08 RX ORDER — SODIUM CHLORIDE 9 MG/ML
25 INJECTION, SOLUTION INTRAVENOUS CONTINUOUS
Status: CANCELLED | OUTPATIENT
Start: 2022-08-16

## 2022-08-08 RX ORDER — HEPARIN 100 UNIT/ML
300-500 SYRINGE INTRAVENOUS AS NEEDED
Status: CANCELLED
Start: 2022-08-16

## 2022-08-10 ENCOUNTER — HOSPITAL ENCOUNTER (OUTPATIENT)
Dept: CT IMAGING | Age: 77
Discharge: HOME | End: 2022-08-10
Attending: NURSE PRACTITIONER
Payer: MEDICARE

## 2022-08-10 DIAGNOSIS — C67.9 MALIGNANT NEOPLASM OF URINARY BLADDER, UNSPECIFIED SITE (HCC): ICD-10-CM

## 2022-08-10 PROCEDURE — 74176 CT ABD & PELVIS W/O CONTRAST: CPT

## 2022-08-10 PROCEDURE — 71250 CT THORAX DX C-: CPT

## 2022-08-15 ENCOUNTER — APPOINTMENT (OUTPATIENT)
Dept: INFUSION THERAPY | Age: 77
End: 2022-08-15

## 2022-08-15 ENCOUNTER — HOSPITAL ENCOUNTER (OUTPATIENT)
Dept: INFUSION THERAPY | Age: 77
Discharge: HOME | End: 2022-08-15
Payer: MEDICARE

## 2022-08-15 VITALS
SYSTOLIC BLOOD PRESSURE: 99 MMHG | TEMPERATURE: 98 F | RESPIRATION RATE: 18 BRPM | HEART RATE: 91 BPM | DIASTOLIC BLOOD PRESSURE: 69 MMHG

## 2022-08-15 DIAGNOSIS — C67.9 MALIGNANT NEOPLASM OF URINARY BLADDER, UNSPECIFIED SITE (HCC): Primary | ICD-10-CM

## 2022-08-15 LAB
ALBUMIN SERPL-MCNC: 3.2 G/DL (ref 3.5–5)
ALBUMIN/GLOB SERPL: 0.8 {RATIO} (ref 1.1–2.2)
ALP SERPL-CCNC: 56 U/L (ref 45–117)
ALT SERPL-CCNC: 30 U/L (ref 12–78)
ANION GAP SERPL CALC-SCNC: 3 MMOL/L (ref 5–15)
AST SERPL-CCNC: 18 U/L (ref 15–37)
BASOPHILS # BLD: 0.1 K/UL (ref 0–0.1)
BASOPHILS NFR BLD: 1 % (ref 0–1)
BILIRUB SERPL-MCNC: 0.4 MG/DL (ref 0.2–1)
BUN SERPL-MCNC: 37 MG/DL (ref 6–20)
BUN/CREAT SERPL: 18 (ref 12–20)
CALCIUM SERPL-MCNC: 10.3 MG/DL (ref 8.5–10.1)
CHLORIDE SERPL-SCNC: 107 MMOL/L (ref 97–108)
CO2 SERPL-SCNC: 28 MMOL/L (ref 21–32)
CREAT SERPL-MCNC: 2.05 MG/DL (ref 0.7–1.3)
DIFFERENTIAL METHOD BLD: ABNORMAL
EOSINOPHIL # BLD: 0.3 K/UL (ref 0–0.4)
EOSINOPHIL NFR BLD: 4 % (ref 0–7)
ERYTHROCYTE [DISTWIDTH] IN BLOOD BY AUTOMATED COUNT: 13.1 % (ref 11.5–14.5)
GLOBULIN SER CALC-MCNC: 4 G/DL (ref 2–4)
GLUCOSE SERPL-MCNC: 88 MG/DL (ref 65–100)
HCT VFR BLD AUTO: 33.3 % (ref 36.6–50.3)
HGB BLD-MCNC: 11.2 G/DL (ref 12.1–17)
IMM GRANULOCYTES # BLD AUTO: 0.1 K/UL (ref 0–0.04)
IMM GRANULOCYTES NFR BLD AUTO: 1 % (ref 0–0.5)
LYMPHOCYTES # BLD: 0.3 K/UL (ref 0.8–3.5)
LYMPHOCYTES NFR BLD: 5 % (ref 12–49)
MCH RBC QN AUTO: 35 PG (ref 26–34)
MCHC RBC AUTO-ENTMCNC: 33.6 G/DL (ref 30–36.5)
MCV RBC AUTO: 104.1 FL (ref 80–99)
MONOCYTES # BLD: 0.7 K/UL (ref 0–1)
MONOCYTES NFR BLD: 11 % (ref 5–13)
NEUTS SEG # BLD: 4.8 K/UL (ref 1.8–8)
NEUTS SEG NFR BLD: 78 % (ref 32–75)
NRBC # BLD: 0 K/UL (ref 0–0.01)
NRBC BLD-RTO: 0 PER 100 WBC
PLATELET # BLD AUTO: 168 K/UL (ref 150–400)
PMV BLD AUTO: 9 FL (ref 8.9–12.9)
POTASSIUM SERPL-SCNC: 4.5 MMOL/L (ref 3.5–5.1)
PROT SERPL-MCNC: 7.2 G/DL (ref 6.4–8.2)
RBC # BLD AUTO: 3.2 M/UL (ref 4.1–5.7)
RBC MORPH BLD: ABNORMAL
SODIUM SERPL-SCNC: 138 MMOL/L (ref 136–145)
WBC # BLD AUTO: 6.3 K/UL (ref 4.1–11.1)

## 2022-08-15 PROCEDURE — 85025 COMPLETE CBC W/AUTO DIFF WBC: CPT

## 2022-08-15 PROCEDURE — 36415 COLL VENOUS BLD VENIPUNCTURE: CPT

## 2022-08-15 PROCEDURE — 80053 COMPREHEN METABOLIC PANEL: CPT

## 2022-08-15 NOTE — PROGRESS NOTES
[facility] Progress Note    Date: August 15, 2022    Name: Mouna Snider    MRN: [id number]         : 1945      Pt arrived ambulatory and in no distress, for lab visit. Labs drawn via R AC vein, patient tolerated well. Visit Vitals  BP 99/69   Pulse 91   Temp 98 °F (36.7 °C)   Resp 18       Lab results were obtained and reviewed. No results found for this or any previous visit (from the past 12 hour(s)). Patient tolerated pre-treatment lab draw well. Iván Guerin He is aware of upcoming appointments. Pt departed [facility] ambulatory and in no distress.     Future Appointments   Date Time Provider Gregorio Ibarra   2022 11:00 AM F4 TD MED [address] [place] [address]   2022 11:15 AM Falguni Bai  [address] BS AMB   2022  1:30 PM H3 TD LAB [facility]   2022 11:00 AM F4 TD MED [address] [place] [address]   2022 11:00 AM [facility] OSTOMY NURSE [facility]   2022  1:00 PM H3 TD LAB [facility]   2022 11:00 AM F4 TD MED [address] [place] [address]       Nishant Stanley RN  August 15, 2022

## 2022-08-16 ENCOUNTER — APPOINTMENT (OUTPATIENT)
Dept: INFUSION THERAPY | Age: 77
End: 2022-08-16

## 2022-08-16 ENCOUNTER — OFFICE VISIT (OUTPATIENT)
Dept: ONCOLOGY | Age: 77
End: 2022-08-16
Payer: MEDICARE

## 2022-08-16 ENCOUNTER — HOSPITAL ENCOUNTER (OUTPATIENT)
Dept: INFUSION THERAPY | Age: 77
Discharge: HOME | End: 2022-08-16
Payer: MEDICARE

## 2022-08-16 VITALS — SYSTOLIC BLOOD PRESSURE: 139 MMHG | DIASTOLIC BLOOD PRESSURE: 75 MMHG | HEART RATE: 91 BPM

## 2022-08-16 VITALS
RESPIRATION RATE: 18 BRPM | WEIGHT: 229 LBS | HEART RATE: 97 BPM | TEMPERATURE: 98.2 F | DIASTOLIC BLOOD PRESSURE: 74 MMHG | OXYGEN SATURATION: 95 % | SYSTOLIC BLOOD PRESSURE: 111 MMHG | BODY MASS INDEX: 31.94 KG/M2

## 2022-08-16 DIAGNOSIS — Z51.12 ENCOUNTER FOR ANTINEOPLASTIC IMMUNOTHERAPY: ICD-10-CM

## 2022-08-16 DIAGNOSIS — C67.9 MALIGNANT NEOPLASM OF URINARY BLADDER, UNSPECIFIED SITE (HCC): Primary | ICD-10-CM

## 2022-08-16 DIAGNOSIS — R21 RASH: ICD-10-CM

## 2022-08-16 DIAGNOSIS — Z95.828 PORT-A-CATH IN PLACE: ICD-10-CM

## 2022-08-16 PROCEDURE — G8427 DOCREV CUR MEDS BY ELIG CLIN: HCPCS | Performed by: INTERNAL MEDICINE

## 2022-08-16 PROCEDURE — G0463 HOSPITAL OUTPT CLINIC VISIT: HCPCS | Performed by: INTERNAL MEDICINE

## 2022-08-16 PROCEDURE — 77030012965 HC NDL HUBR BBMI -A

## 2022-08-16 PROCEDURE — 1101F PT FALLS ASSESS-DOCD LE1/YR: CPT | Performed by: INTERNAL MEDICINE

## 2022-08-16 PROCEDURE — G8536 NO DOC ELDER MAL SCRN: HCPCS | Performed by: INTERNAL MEDICINE

## 2022-08-16 PROCEDURE — G8432 DEP SCR NOT DOC, RNG: HCPCS | Performed by: INTERNAL MEDICINE

## 2022-08-16 PROCEDURE — 74011250636 HC RX REV CODE- 250/636: Performed by: REGISTERED NURSE

## 2022-08-16 PROCEDURE — G8752 SYS BP LESS 140: HCPCS | Performed by: INTERNAL MEDICINE

## 2022-08-16 PROCEDURE — 74011000250 HC RX REV CODE- 250: Performed by: REGISTERED NURSE

## 2022-08-16 PROCEDURE — 74011000258 HC RX REV CODE- 258: Performed by: REGISTERED NURSE

## 2022-08-16 PROCEDURE — G8417 CALC BMI ABV UP PARAM F/U: HCPCS | Performed by: INTERNAL MEDICINE

## 2022-08-16 PROCEDURE — 1123F ACP DISCUSS/DSCN MKR DOCD: CPT | Performed by: INTERNAL MEDICINE

## 2022-08-16 PROCEDURE — 96413 CHEMO IV INFUSION 1 HR: CPT

## 2022-08-16 PROCEDURE — G8754 DIAS BP LESS 90: HCPCS | Performed by: INTERNAL MEDICINE

## 2022-08-16 PROCEDURE — 99215 OFFICE O/P EST HI 40 MIN: CPT | Performed by: INTERNAL MEDICINE

## 2022-08-16 RX ORDER — SODIUM CHLORIDE 9 MG/ML
25 INJECTION, SOLUTION INTRAVENOUS CONTINUOUS
Status: DISPENSED | OUTPATIENT
Start: 2022-08-16 | End: 2022-08-16

## 2022-08-16 RX ORDER — ACETAMINOPHEN 325 MG/1
650 TABLET ORAL AS NEEDED
Status: ACTIVE | OUTPATIENT
Start: 2022-08-16 | End: 2022-08-16

## 2022-08-16 RX ORDER — SODIUM CHLORIDE 0.9 % (FLUSH) 0.9 %
10 SYRINGE (ML) INJECTION AS NEEDED
Status: DISPENSED | OUTPATIENT
Start: 2022-08-16 | End: 2022-08-16

## 2022-08-16 RX ORDER — HEPARIN 100 UNIT/ML
300-500 SYRINGE INTRAVENOUS AS NEEDED
Status: ACTIVE | OUTPATIENT
Start: 2022-08-16 | End: 2022-08-16

## 2022-08-16 RX ORDER — ONDANSETRON 2 MG/ML
8 INJECTION INTRAMUSCULAR; INTRAVENOUS AS NEEDED
Status: ACTIVE | OUTPATIENT
Start: 2022-08-16 | End: 2022-08-16

## 2022-08-16 RX ORDER — SODIUM CHLORIDE 9 MG/ML
10 INJECTION INTRAMUSCULAR; INTRAVENOUS; SUBCUTANEOUS AS NEEDED
Status: ACTIVE | OUTPATIENT
Start: 2022-08-16 | End: 2022-08-16

## 2022-08-16 RX ORDER — LIDOCAINE AND PRILOCAINE 25; 25 MG/G; MG/G
CREAM TOPICAL AS NEEDED
Qty: 30 G | Refills: 6 | Status: SHIPPED | OUTPATIENT
Start: 2022-08-16

## 2022-08-16 RX ORDER — DIPHENHYDRAMINE HYDROCHLORIDE 50 MG/ML
25 INJECTION, SOLUTION INTRAMUSCULAR; INTRAVENOUS AS NEEDED
Status: ACTIVE | OUTPATIENT
Start: 2022-08-16 | End: 2022-08-16

## 2022-08-16 RX ADMIN — SODIUM CHLORIDE, PRESERVATIVE FREE 10 ML: 5 INJECTION INTRAVENOUS at 12:41

## 2022-08-16 RX ADMIN — SODIUM CHLORIDE, PRESERVATIVE FREE 10 ML: 5 INJECTION INTRAVENOUS at 13:24

## 2022-08-16 RX ADMIN — SODIUM CHLORIDE 240 MG: 9 INJECTION, SOLUTION INTRAVENOUS at 12:42

## 2022-08-16 RX ADMIN — SODIUM CHLORIDE 25 ML/HR: 9 INJECTION, SOLUTION INTRAVENOUS at 12:39

## 2022-08-16 RX ADMIN — HEPARIN 500 UNITS: 100 SYRINGE at 13:24

## 2022-08-16 NOTE — PROGRESS NOTES
Bon Sheppard is a 69 y.o. male    Chief Complaint   Patient presents with    Follow-up      Muscle invasive bladder cancer- Mixed histology          1. Have you been to the ER, urgent care clinic since your last visit? Hospitalized since your last visit? No    2. Have you seen or consulted any other health care providers outside of the [address] [place] [address] since your last visit? Include any pap smears or colon screening.  No

## 2022-08-16 NOTE — PROGRESS NOTES
OPIC Chemo Progress Note    200 Mr. Susan Thompson [facility] [address] in stable condition. Assessment was completed, no acute issues at this time, no new complaints voiced. Port accessed without difficulty, with positive BR. Labs reviewed from 8/15 and WDL for treatment. NS infusing KVO. Chemotherapy Flowsheet 8/16/2022   Cycle C7   Date 8/16/2022   Drug / Regimen Opdivo   Pre Hydration -   Post Hydration -   Pre Meds -   Notes given     Mr. Jaylen Iqbal vitals were reviewed. Patient Vitals for the past 12 hrs:   Pulse BP   08/16/22 1323 91 139/75     Pre-medications  were administered as ordered and chemotherapy was initiated. Medications Administered       0.9% sodium chloride infusion       Admin Date  08/16/2022 Action  New Bag Dose  25 mL/hr Rate  25 mL/hr Route  IntraVENous Administered By  Lopez Goodwin RN              heparin (porcine) pf 300-500 Units       Admin Date  08/16/2022 Action  Given Dose  500 Units Route  InterCATHeter Administered By  Lopez Goodwin RN              nivolumab (OPDIVO) 240 mg in 0.9% sodium chloride 100 mL, overfill volume 10 mL IVPB       Admin Date  08/16/2022 Action  New Bag Dose  240 mg Rate  268 mL/hr Route  IntraVENous Administered By  Lopez Goodwin RN              sodium chloride (NS) flush 10 mL       Admin Date  08/16/2022 Action  Given Dose  10 mL Route  IntraVENous Administered By  Lopez Goodwin RN               Admin Date  08/16/2022 Action  Given Dose  10 mL Route  IntraVENous Administered By  Lopez Goodwin RN                  Two nurses verified prior to administering:  Drug name, Drug dose, Infusion volume or drug volume when prepared in a syringe, Rate of administration, Route of administration, Expiration dates and/or times, Appearance and physical integrity of the drugs, Rate set on infusion pump, when used, and Sequencing of drug administration. 1325 Patient tolerated treatment well.   Port maintained positive blood return throughout treatment. Port flushed, heparinized and de accessed per protocol. Patient was discharged from [facility] in stable condition. Patient aware of next appointment.      Future Appointments   Date Time Provider Gregorio Ibarra   8[facility]2022  1:30 PM [facility]   8/30/2022 11:00 AM F4 TD MED [facility]   9/7/2022 11:00 AM [facility]URSE [facility]   9/12/2022  1:00 PM [facility]   9/13/2022 11:00 AM F4 TD MED [facility]   9/13/2022 11:15 AM Nasim Rosario  [address] BS THU Reynolds RN  August 16, 2022

## 2022-08-22 ENCOUNTER — APPOINTMENT (OUTPATIENT)
Dept: INFUSION THERAPY | Age: 77
End: 2022-08-22

## 2022-08-23 ENCOUNTER — APPOINTMENT (OUTPATIENT)
Dept: INFUSION THERAPY | Age: 77
End: 2022-08-23

## 2022-08-23 RX ORDER — EPINEPHRINE 1 MG/ML
0.3 INJECTION, SOLUTION, CONCENTRATE INTRAVENOUS AS NEEDED
Status: CANCELLED | OUTPATIENT
Start: 2022-08-30

## 2022-08-23 RX ORDER — ALBUTEROL SULFATE 0.83 MG/ML
2.5 SOLUTION RESPIRATORY (INHALATION) AS NEEDED
Status: CANCELLED
Start: 2022-08-30

## 2022-08-23 RX ORDER — DIPHENHYDRAMINE HYDROCHLORIDE 50 MG/ML
50 INJECTION, SOLUTION INTRAMUSCULAR; INTRAVENOUS AS NEEDED
Status: CANCELLED
Start: 2022-08-30

## 2022-08-23 RX ORDER — HYDROCORTISONE SODIUM SUCCINATE 100 MG/2ML
100 INJECTION, POWDER, FOR SOLUTION INTRAMUSCULAR; INTRAVENOUS AS NEEDED
Status: CANCELLED | OUTPATIENT
Start: 2022-08-30

## 2022-08-29 ENCOUNTER — HOSPITAL ENCOUNTER (OUTPATIENT)
Dept: INFUSION THERAPY | Age: 77
Discharge: HOME | End: 2022-08-29
Payer: MEDICARE

## 2022-08-29 ENCOUNTER — APPOINTMENT (OUTPATIENT)
Dept: INFUSION THERAPY | Age: 77
End: 2022-08-29

## 2022-08-29 VITALS
HEART RATE: 94 BPM | DIASTOLIC BLOOD PRESSURE: 74 MMHG | TEMPERATURE: 97.7 F | RESPIRATION RATE: 18 BRPM | SYSTOLIC BLOOD PRESSURE: 120 MMHG

## 2022-08-29 DIAGNOSIS — C67.9 MALIGNANT NEOPLASM OF URINARY BLADDER, UNSPECIFIED SITE (HCC): Primary | ICD-10-CM

## 2022-08-29 LAB
ALBUMIN SERPL-MCNC: 3.4 G/DL (ref 3.5–5)
ALBUMIN/GLOB SERPL: 0.9 {RATIO} (ref 1.1–2.2)
ALP SERPL-CCNC: 50 U/L (ref 45–117)
ALT SERPL-CCNC: 30 U/L (ref 12–78)
ANION GAP SERPL CALC-SCNC: 5 MMOL/L (ref 5–15)
AST SERPL-CCNC: 21 U/L (ref 15–37)
BASOPHILS # BLD: 0.1 K/UL (ref 0–0.1)
BASOPHILS NFR BLD: 1 % (ref 0–1)
BILIRUB SERPL-MCNC: 0.3 MG/DL (ref 0.2–1)
BUN SERPL-MCNC: 38 MG/DL (ref 6–20)
BUN/CREAT SERPL: 18 (ref 12–20)
CALCIUM SERPL-MCNC: 9.8 MG/DL (ref 8.5–10.1)
CHLORIDE SERPL-SCNC: 108 MMOL/L (ref 97–108)
CO2 SERPL-SCNC: 24 MMOL/L (ref 21–32)
CREAT SERPL-MCNC: 2.09 MG/DL (ref 0.7–1.3)
DIFFERENTIAL METHOD BLD: ABNORMAL
EOSINOPHIL # BLD: 0.1 K/UL (ref 0–0.4)
EOSINOPHIL NFR BLD: 1 % (ref 0–7)
ERYTHROCYTE [DISTWIDTH] IN BLOOD BY AUTOMATED COUNT: 13.4 % (ref 11.5–14.5)
GLOBULIN SER CALC-MCNC: 3.7 G/DL (ref 2–4)
GLUCOSE SERPL-MCNC: 130 MG/DL (ref 65–100)
HCT VFR BLD AUTO: 33.6 % (ref 36.6–50.3)
HGB BLD-MCNC: 11.3 G/DL (ref 12.1–17)
IMM GRANULOCYTES # BLD AUTO: 0.1 K/UL (ref 0–0.04)
IMM GRANULOCYTES NFR BLD AUTO: 1 % (ref 0–0.5)
LYMPHOCYTES # BLD: 0.5 K/UL (ref 0.8–3.5)
LYMPHOCYTES NFR BLD: 9 % (ref 12–49)
MCH RBC QN AUTO: 35.2 PG (ref 26–34)
MCHC RBC AUTO-ENTMCNC: 33.6 G/DL (ref 30–36.5)
MCV RBC AUTO: 104.7 FL (ref 80–99)
MONOCYTES # BLD: 0.4 K/UL (ref 0–1)
MONOCYTES NFR BLD: 8 % (ref 5–13)
NEUTS SEG # BLD: 4.4 K/UL (ref 1.8–8)
NEUTS SEG NFR BLD: 80 % (ref 32–75)
NRBC # BLD: 0 K/UL (ref 0–0.01)
NRBC BLD-RTO: 0 PER 100 WBC
PLATELET # BLD AUTO: 211 K/UL (ref 150–400)
PMV BLD AUTO: 8.7 FL (ref 8.9–12.9)
POTASSIUM SERPL-SCNC: 4.9 MMOL/L (ref 3.5–5.1)
PROT SERPL-MCNC: 7.1 G/DL (ref 6.4–8.2)
RBC # BLD AUTO: 3.21 M/UL (ref 4.1–5.7)
RBC MORPH BLD: ABNORMAL
SODIUM SERPL-SCNC: 137 MMOL/L (ref 136–145)
TSH SERPL DL<=0.05 MIU/L-ACNC: 0.98 UIU/ML (ref 0.36–3.74)
WBC # BLD AUTO: 5.6 K/UL (ref 4.1–11.1)

## 2022-08-29 PROCEDURE — 36415 COLL VENOUS BLD VENIPUNCTURE: CPT

## 2022-08-29 PROCEDURE — 85025 COMPLETE CBC W/AUTO DIFF WBC: CPT

## 2022-08-29 PROCEDURE — 80053 COMPREHEN METABOLIC PANEL: CPT

## 2022-08-29 PROCEDURE — 84443 ASSAY THYROID STIM HORMONE: CPT

## 2022-08-29 NOTE — PROGRESS NOTES
1425 Patient arrived to [facility] for pre-chemo labs. Labs drawn peripherally from left [facility] [place] and sent for processing. Visit Vitals  /74 (BP 1 Location: Right upper arm, BP Patient Position: Sitting)   Pulse 94   Temp 97.7 °F (36.5 °C)   Resp 18     1440 Patient tolerated procedure well. D/C ambulatory home in no distress.      Future Appointments   Date Time Provider Gregorio Ibarra   8/30/2022 11:00 AM F4 TD MED [address] [place] '' [facility]   9/7/2022 11:00 AM [facility] OSTOMY NURSE [facility]   9/12/2022  1:00 PM [facility]   9/13/2022 11:00 AM F4 TD MED [address] [place] [facility]   9/13/2022 11:15 AM Mc Bai  [address] BS AMB

## 2022-08-30 ENCOUNTER — HOSPITAL ENCOUNTER (OUTPATIENT)
Dept: INFUSION THERAPY | Age: 77
Discharge: HOME | End: 2022-08-30
Payer: MEDICARE

## 2022-08-30 VITALS
SYSTOLIC BLOOD PRESSURE: 125 MMHG | DIASTOLIC BLOOD PRESSURE: 78 MMHG | TEMPERATURE: 96.6 F | HEART RATE: 81 BPM | RESPIRATION RATE: 16 BRPM

## 2022-08-30 DIAGNOSIS — C67.9 MALIGNANT NEOPLASM OF URINARY BLADDER, UNSPECIFIED SITE (HCC): Primary | ICD-10-CM

## 2022-08-30 PROCEDURE — 96413 CHEMO IV INFUSION 1 HR: CPT

## 2022-08-30 PROCEDURE — 74011000258 HC RX REV CODE- 258: Performed by: INTERNAL MEDICINE

## 2022-08-30 PROCEDURE — 74011000250 HC RX REV CODE- 250: Performed by: INTERNAL MEDICINE

## 2022-08-30 PROCEDURE — 74011250636 HC RX REV CODE- 250/636: Performed by: INTERNAL MEDICINE

## 2022-08-30 PROCEDURE — 77030012965 HC NDL HUBR BBMI -A

## 2022-08-30 RX ORDER — SODIUM CHLORIDE 9 MG/ML
10 INJECTION INTRAMUSCULAR; INTRAVENOUS; SUBCUTANEOUS AS NEEDED
Status: ACTIVE | OUTPATIENT
Start: 2022-08-30 | End: 2022-08-30

## 2022-08-30 RX ORDER — ACETAMINOPHEN 325 MG/1
650 TABLET ORAL AS NEEDED
Status: ACTIVE | OUTPATIENT
Start: 2022-08-30 | End: 2022-08-30

## 2022-08-30 RX ORDER — ONDANSETRON 2 MG/ML
8 INJECTION INTRAMUSCULAR; INTRAVENOUS AS NEEDED
Status: ACTIVE | OUTPATIENT
Start: 2022-08-30 | End: 2022-08-30

## 2022-08-30 RX ORDER — SODIUM CHLORIDE 9 MG/ML
25 INJECTION, SOLUTION INTRAVENOUS CONTINUOUS
Status: DISPENSED | OUTPATIENT
Start: 2022-08-30 | End: 2022-08-30

## 2022-08-30 RX ORDER — DIPHENHYDRAMINE HYDROCHLORIDE 50 MG/ML
25 INJECTION, SOLUTION INTRAMUSCULAR; INTRAVENOUS AS NEEDED
Status: ACTIVE | OUTPATIENT
Start: 2022-08-30 | End: 2022-08-30

## 2022-08-30 RX ORDER — HEPARIN 100 UNIT/ML
300-500 SYRINGE INTRAVENOUS AS NEEDED
Status: ACTIVE | OUTPATIENT
Start: 2022-08-30 | End: 2022-08-30

## 2022-08-30 RX ORDER — SODIUM CHLORIDE 0.9 % (FLUSH) 0.9 %
10 SYRINGE (ML) INJECTION AS NEEDED
Status: DISPENSED | OUTPATIENT
Start: 2022-08-30 | End: 2022-08-30

## 2022-08-30 RX ADMIN — SODIUM CHLORIDE 25 ML/HR: 9 INJECTION, SOLUTION INTRAVENOUS at 12:06

## 2022-08-30 RX ADMIN — SODIUM CHLORIDE 240 MG: 9 INJECTION, SOLUTION INTRAVENOUS at 12:33

## 2022-08-30 RX ADMIN — SODIUM CHLORIDE, PRESERVATIVE FREE 10 ML: 5 INJECTION INTRAVENOUS at 11:20

## 2022-08-30 RX ADMIN — HEPARIN 500 UNITS: 100 SYRINGE at 13:16

## 2022-08-30 RX ADMIN — SODIUM CHLORIDE, PRESERVATIVE FREE 10 ML: 5 INJECTION INTRAVENOUS at 13:15

## 2022-08-30 NOTE — PROGRESS NOTES
[facility] Chemo Progress Note    1105 Mr. Fabienne Yeh Arrived to [facility] for C8 Opdivo ambulatory in stable condition. Assessment was completed, no acute issues at this time, no new complaints voiced. Port accessed without difficulty, with positive BR. Labs reviewed from 8/29 and WDL for treatment. NS infusing KVO. Chemotherapy Flowsheet 8/30/2022   Cycle C8   Date 8/30/2022   Drug / Regimen Opdivo   Pre Hydration -   Post Hydration -   Pre Meds -   Notes given     Mr. Jaimes Field vitals were reviewed. Patient Vitals for the past 12 hrs:   Temp Pulse Resp BP   08/30/22 1315 -- 81 -- 125/78   08/30/22 1111 (!) 96.6 °F (35.9 °C) 84 16 116/76       Pre-medications  were administered as ordered and chemotherapy was initiated.    Medications Administered       0.9% sodium chloride infusion       Admin Date  08/30/2022 Action  New Bag Dose  25 mL/hr Rate  25 mL/hr Route  IntraVENous Administered By  Glory Laboy RN              0.9% sodium chloride injection 10 mL       Admin Date  08/30/2022 Action  Given Dose  10 mL Route  IntraVENous Administered By  Anna Gardner RN              heparin (porcine) pf 300-500 Units       Admin Date  08/30/2022 Action  Given Dose  500 Units Route  InterCATHeter Administered By  Glory Laboy RN              nivolumab (OPDIVO) 240 mg in 0.9% sodium chloride 100 mL, overfill volume 10 mL IVPB       Admin Date  08/30/2022 Action  New Bag Dose  240 mg Rate  268 mL/hr Route  IntraVENous Administered By  Glory Laboy RN              sodium chloride (NS) flush 10 mL       Admin Date  08/30/2022 Action  Given Dose  10 mL Route  IntraVENous Administered By  Anna Gardner RN               Admin Date  08/30/2022 Action  Given Dose  10 mL Route  IntraVENous Administered By  Glory Laboy RN                    Two nurses verified prior to administering:  Drug name, Drug dose, Infusion volume or drug volume when prepared in a syringe, Rate of administration, Route of administration, Expiration dates and/or times, Appearance and physical integrity of the drugs, Rate set on infusion pump, when used, and Sequencing of drug administration. 1320 Patient tolerated treatment well. Port maintained positive blood return throughout treatment. Port flushed, heparinized and de accessed per protocol. Patient was discharged from [facility] in stable condition. Patient aware of next appointment.      Future Appointments   Date Time Provider Gregorio Ibarra   9/7/2022 11:00 AM [facility] OSTOMY NURSE [facility]   9/12/2022  1:00 PM [facility]   9/13/2022 11:00 AM F4 TD MED [facility]   9/13/2022 11:15 AM Josefina Jones  N Jacob Moses BS AMB     Burley Hamman, RN  August 30, 2022

## 2022-09-06 RX ORDER — DIPHENHYDRAMINE HYDROCHLORIDE 50 MG/ML
50 INJECTION, SOLUTION INTRAMUSCULAR; INTRAVENOUS AS NEEDED
Status: CANCELLED
Start: 2022-09-13

## 2022-09-06 RX ORDER — DIPHENHYDRAMINE HYDROCHLORIDE 50 MG/ML
25 INJECTION, SOLUTION INTRAMUSCULAR; INTRAVENOUS AS NEEDED
Status: CANCELLED
Start: 2022-09-13

## 2022-09-06 RX ORDER — ONDANSETRON 2 MG/ML
8 INJECTION INTRAMUSCULAR; INTRAVENOUS AS NEEDED
Status: CANCELLED | OUTPATIENT
Start: 2022-09-13

## 2022-09-06 RX ORDER — SODIUM CHLORIDE 9 MG/ML
10 INJECTION INTRAMUSCULAR; INTRAVENOUS; SUBCUTANEOUS AS NEEDED
Status: CANCELLED | OUTPATIENT
Start: 2022-09-13

## 2022-09-06 RX ORDER — HEPARIN 100 UNIT/ML
300-500 SYRINGE INTRAVENOUS AS NEEDED
Status: CANCELLED
Start: 2022-09-13

## 2022-09-06 RX ORDER — ACETAMINOPHEN 325 MG/1
650 TABLET ORAL AS NEEDED
Status: CANCELLED
Start: 2022-09-13

## 2022-09-06 RX ORDER — SODIUM CHLORIDE 0.9 % (FLUSH) 0.9 %
10 SYRINGE (ML) INJECTION AS NEEDED
Status: CANCELLED | OUTPATIENT
Start: 2022-09-13

## 2022-09-06 RX ORDER — HYDROCORTISONE SODIUM SUCCINATE 100 MG/2ML
100 INJECTION, POWDER, FOR SOLUTION INTRAMUSCULAR; INTRAVENOUS AS NEEDED
Status: CANCELLED | OUTPATIENT
Start: 2022-09-13

## 2022-09-06 RX ORDER — SODIUM CHLORIDE 9 MG/ML
25 INJECTION, SOLUTION INTRAVENOUS CONTINUOUS
Status: CANCELLED | OUTPATIENT
Start: 2022-09-13

## 2022-09-06 RX ORDER — ALBUTEROL SULFATE 0.83 MG/ML
2.5 SOLUTION RESPIRATORY (INHALATION) AS NEEDED
Status: CANCELLED
Start: 2022-09-13

## 2022-09-06 RX ORDER — EPINEPHRINE 1 MG/ML
0.3 INJECTION, SOLUTION, CONCENTRATE INTRAVENOUS AS NEEDED
Status: CANCELLED | OUTPATIENT
Start: 2022-09-13

## 2022-09-07 ENCOUNTER — HOSPITAL ENCOUNTER (OUTPATIENT)
Dept: WOUND CARE | Age: 77
Discharge: HOME | End: 2022-09-07
Payer: MEDICARE

## 2022-09-07 VITALS
DIASTOLIC BLOOD PRESSURE: 92 MMHG | TEMPERATURE: 98.1 F | RESPIRATION RATE: 16 BRPM | SYSTOLIC BLOOD PRESSURE: 150 MMHG | HEART RATE: 81 BPM

## 2022-09-07 PROCEDURE — 99212 OFFICE O/P EST SF 10 MIN: CPT

## 2022-09-07 NOTE — WOUND CARE
Discharge Instructions/Wound Orders  [facility]  [address]  Telephone: [phone]    NAME:  Masood Prado OF BIRTH:  1945  MEDICAL RECORD NUMBER:  [id number]  DATE:  9/7/2022    Ostomy Care Orders:  1) Remove old bag and clean peristomal skin with tap water & paper towels, then dry thoroughly. 2) Crust peristomal wound with stoma powder and No Sting skin prep, then cover with a small piece of alginate and thin duoderm. 3) Shape Barrier ring to size, flatten inner edge and place over stoma site. 4) Place ostomy bag cut to 30mm, secure tape edges and attach ostomy belt. 5) Change 2 x week and as needed for leaking. Dietary:  [x] Diet as tolerated: [] Calorie Diabetic Diet:Low carb and no Sugar [] No Added Salt:[x] Increase Protein: [] Other:Limit the amount of liquid you are drinking and avoid drinking in between meals   Activity:  [x] Activity as tolerated:  [] Patient has no activity restrictions     [] Strict Bedrest: [] Remain off Work:     [] May return to full duty work:                                   [] Return to work with restrictions:             Return Appointment:  [x] Return Appointment: With Kelin Grey  in  2 Week(s)  [] Ordered tests:    Electronically signed Mervin Valentin RN on 9/7/2022 at 12:27 PM     Brittani Catherine 281: Should you experience any significant changes in your wound(s) or have questions about your wound care, please contact the [address] [place] [address] at [address] [place] [address] 8:00 am - 4:30. If you need help with your wound outside these hours and cannot wait until we are again available, contact your PCP or go to the hospital emergency room. PLEASE NOTE: IF YOU ARE UNABLE TO OBTAIN WOUND SUPPLIES, CONTINUE TO USE THE SUPPLIES YOU HAVE AVAILABLE UNTIL YOU ARE ABLE TO REACH US. IT IS MOST IMPORTANT TO KEEP THE WOUND COVERED AT ALL TIMES.      CWON Signature:_______________________    Date: ___________ Time:  ____________

## 2022-09-07 NOTE — WOUND CARE
OSTOMY Assessment    Stoma Tissue Assessment: ___Post-op _x__Follow-up       Type of Diversion: ___New_x__ Established ___Revision___Permanent____Temporary    _____  Ileostomy   _____  Colostomy: ____ Ascending, ____ Transverse, _____.  Sigmoid   _____  Avanell Sathish   ___x__  Ileal Conduit   _____  Mucous Fistula     Appearance of Ostomy:   __x_ Darrel Prince George /Moist/Viable ___ Dark Red ___ Pink ___ Sloughing ___Necrotic____Pallor ____Red  ___Dry ____Moist    Stoma Size: ___35______ (mm) ___Round ___ Marshall Edvin ___Irregular     Stoma Height:   ____Flush ____Retracted ____Budded ____Edematous _x___Prolapse     Stoma Location  ____ Left Side          _x___Right Side     _____Umbilicus  _____Incision Line  ___x_ Above Belt       ____ Below Belt    Abdominal Contours  ____ Firm ____ Flat ____Flabby ___Soft __x_Round ___ Hard ___ Other     Stoma Function: _x_Yes __No  Output:   __x__ Yellow ____Amber ____ Pink(Hematuria) ____ Tea Colored   ____ Clots ____Foul Odor ____ Mucous     Peristomal skin: See wound assessment for peristomal wound  ___ Intact ___ Irritant Dermatitis ___ Allergic Contact Dermatitis ___Candidiasis ___Caput Medusae ___Folliculitis ___Mechanical Trauma ___Mucosal Transplantation    ___Pyoderma Gangrenosum ___Hyperplasia ___Radiation Trauma ___Allergies mpling  ___ Dimpling ____Blistered ____Fragile ____Macerated ___Peeling  ___Ulceration  ___ Fungal ___Peristomal Hernia ___Non-blanchable ___ Hypergranulation      Stoma Complications: Peristomal wound  __Excessive Bleeding __Ischemia __ Abscess __Necrosis __Prolapse   __Hernia __ Retraction __Stenosis __Mucosal Separation __Melanosis Coli   __Laceration __Other     Application for Patient    Wafer and pouch used during assessment and education ___[place] [id number]_________    Pouch System Recommended:   _x_One-Piece __Two-Piece ___Custom     Flat:   ___Pre-cut   ___Cut-to fit     Convexity: ___Shallow ___Deep_x__ Flexible ___Creative Convexity   ___Pre-cut __x_Cut to Fit     Accessory Products Plain alginate, thin duoderm  __ Adhesive Seals __Adhesive Remover Wipes __Barrier [employer] _x_Barrier Wipes   __Deodorizer __Liquid Adhesive __ Paste __ Powder _x_Strip   _x_ Support Belt __Tape      Education for Patient & Family  1. Booklet of information on specific ostomy given and some verbal discussion of patients ostomy and expectations. 2. Instruction/demostration of ostomy wafer & pouch change. 3. Discuss concerns/ answer questions. 4. Provide follow up education in clinic if needed.        PICTURE INSERT:

## 2022-09-07 NOTE — WOUND CARE
Clinical Level of Care Assessment    Outpatient Ostomy Care      NAME:  Cheyanne Childers OF BIRTH:  1945  MEDICAL RECORD NUMBER:  [id number]   DATE:  9/7/2022      Patient Marlene Pollack Assessment- Document in Flowsheet I&O   Points   Review of chart []   0   Assess Complete Ostomy tab in Navigator for assessment of; stoma status, peristomal skin, presence of hernia/stool consistency/diet/related medications   Simple adjustments to pouch size/pouch system, new stoma pattern, accessory addition/deletion. []   1   Assess Complete Ostomy tab in Navigator for assessment of; stoma status, peristomal skin, presence of hernia/stool consistency/diet/related medications   Moderate adjustments to pouch size/pouch system, new stoma pattern, accessory addition/deletion. Observe patient/caregiver with hands-on care. 1-2 adjustments to pouch size/system/skin care/accessory addition or deletion. [x]   2   Assess Complete Ostomy tab in Navigator for assessment of; stoma status, peristomal skin, presence of hernia/stool consistency/diet/related medications   Complex adjustments to pouch size/pouch system, new stoma pattern, accessory addition/deletion. 3 or more complex adjustments to pouch size/system/skin care/accessory addition or deletion. Observe patient/caregiver with hands-on care. Assess patient/patient abdomen for optimal pre-marked stoma site. Assess patient abdomen for type of hernia belt/accessory needed. []   3         Ambulation Status Documented in CN Clinical Note  Status Definition Points   Independent Independently able to ambulate. Fully able (without any assistance) to get on/off exam table/chair. [x]   0   Minimal Physical Assistance Requires physical assistance of one person to ambulate and/or position patient to be examined. Includes necessary physical assistance to position lower extremities on/off stool.    []   1   Moderate Physical Assistance Requires at least one staff member to physically assist patient in ambulating into treatment room, and/or on off chair/bed. Requires assistance to bathroom. []   2   Full Assistance Requires assistance of at least two staff members to transfer patient into treatment room and/or on/off bed/chair. \"Total Transfer\". Unable to use bathroom requires bedside commode and/or bedpan []   3       Teaching Effort Documented in [facility] Clinical Note  Effort Definition Points   No Teaching  []   0   General Initial/Simple lesson:  Assess readiness to learn, assess patient learning style to determine educational flow/special needs for learning. Teaching related to 1-3 topics  Documentation in [facility] completed. []   1   Intermediate Assess readiness to learn, assess patient learning style to determine educational flow/special needs for learning. Teaching related to 3-4 topics. Hernia belt application and care considerations  Documentation in [facility] completed. [x]   2   Complex Assess readiness to learn, assess patient learning style to determine educational flow/special needs for learning. Teaching of greater than 5 additional topics   Pre-operative ostomy education with review of written resources for patient/family/caregiver as needed. Demonstration/return demonstration of ostomy irrigation  Documentation in [facility] completed. []   3     Patient Assessment and Planning in [facility] Clinical Note   Planning Definition Points   Simple Simple pouch change procedure completed and reviewed with patient/family/caregiver   Documentation in [facility] completed. []   1   Intermediate Moderate level of follow-up needs:   Pouch change/discharge procedure revised and reviewed with patient/caregiver. Communications with outside resources; i.e. Telephone calls to Surgeon/ PCP, family/caregiver, home health, ECF. Documentation in [facility] completed.      [x]   2   Complex Complex level of instructions/changes:   Family/Caregiver learning/demonstration/return demonstration visit. Pouching/discharge procedure revised/reviewed with patient/family/caregiver. Contact with outside resources; i.e. communication with Surgeon/ PCP, home health, ECF. Contact/referral to ostomy appliance supplier for new or additional products. Review when to call [facility] or schedule follow-up visit. Referral to Emergency Department   Documentation in [facility] completed. []   3       Is this the Patient's First Visit with [facility] @ [facility]? No    Is this Patient Established to this [facility] within the last 3 years?    Yes             Clinical Level of Care      Points  0-3  Level 1 []     Points  4-6  Level 2 [x]     Points  7-8  Level 3 []     Points  9-10  Level 4 []     Points  11-12  Level 5 []       Electronically signed by Timo Luz RN on 9/7/2022 at 5:06 PM

## 2022-09-07 NOTE — WOUND CARE
09/07/22 1214   Wound Peristomal Right #1 04/18/22   Date First Assessed/Time First Assessed: 04/18/22 1145   Present on Hospital Admission: Yes  Wound Approximate Age at First Assessment (Weeks): 4 weeks  Primary Wound Type: Traumatic  Location: Peristomal  Wound Location Orientation: Right  Wound Desc. ..    Wound Image    Wound Etiology Traumatic   Dressing Status New dressing applied   Cleansed   (tap water)   Dressing/Treatment Alginate;Hydrocolloid  (eakins, urostomy bag)   Wound Length (cm) 1.5 cm   Wound Width (cm) 1.7 cm   Wound Depth (cm) 0.1 cm   Wound Surface Area (cm^2) 2.55 cm^2   Change in Wound Size % 88.79   Wound Volume (cm^3) 0.255 cm^3   Wound Healing % 94   Wound Assessment Granulation tissue   Drainage Amount Moderate   Drainage Description Serosanguinous   Wound Odor None   Vickie-Wound/Incision Assessment Fragile   Edges Flat/open edges   Wound Thickness Description Full thickness   Pain 1   Pain Scale 1 Numeric (0 - 10)   Pain Intensity 1 0

## 2022-09-12 ENCOUNTER — APPOINTMENT (OUTPATIENT)
Dept: INFUSION THERAPY | Age: 77
End: 2022-09-12
Payer: MEDICARE

## 2022-09-12 ENCOUNTER — APPOINTMENT (OUTPATIENT)
Dept: INFUSION THERAPY | Age: 77
End: 2022-09-12

## 2022-09-12 ENCOUNTER — HOSPITAL ENCOUNTER (OUTPATIENT)
Dept: INFUSION THERAPY | Age: 77
Discharge: HOME | End: 2022-09-12
Payer: MEDICARE

## 2022-09-12 VITALS
DIASTOLIC BLOOD PRESSURE: 78 MMHG | RESPIRATION RATE: 18 BRPM | SYSTOLIC BLOOD PRESSURE: 111 MMHG | TEMPERATURE: 97 F | HEART RATE: 80 BPM

## 2022-09-12 LAB
ALBUMIN SERPL-MCNC: 3.8 G/DL (ref 3.5–5)
ALBUMIN/GLOB SERPL: 1.3 {RATIO} (ref 1.1–2.2)
ALP SERPL-CCNC: 38 U/L (ref 45–117)
ALT SERPL-CCNC: 29 U/L (ref 12–78)
ANION GAP SERPL CALC-SCNC: 8 MMOL/L (ref 5–15)
AST SERPL-CCNC: 17 U/L (ref 15–37)
BASOPHILS # BLD: 0.1 K/UL (ref 0–0.1)
BASOPHILS NFR BLD: 1 % (ref 0–1)
BILIRUB SERPL-MCNC: 0.5 MG/DL (ref 0.2–1)
BUN SERPL-MCNC: 44 MG/DL (ref 6–20)
BUN/CREAT SERPL: 22 (ref 12–20)
CALCIUM SERPL-MCNC: 9.3 MG/DL (ref 8.5–10.1)
CHLORIDE SERPL-SCNC: 108 MMOL/L (ref 97–108)
CO2 SERPL-SCNC: 23 MMOL/L (ref 21–32)
CREAT SERPL-MCNC: 2.02 MG/DL (ref 0.7–1.3)
DIFFERENTIAL METHOD BLD: ABNORMAL
EOSINOPHIL # BLD: 0.1 K/UL (ref 0–0.4)
EOSINOPHIL NFR BLD: 1 % (ref 0–7)
ERYTHROCYTE [DISTWIDTH] IN BLOOD BY AUTOMATED COUNT: 14 % (ref 11.5–14.5)
GLOBULIN SER CALC-MCNC: 3 G/DL (ref 2–4)
GLUCOSE SERPL-MCNC: 109 MG/DL (ref 65–100)
HCT VFR BLD AUTO: 32.6 % (ref 36.6–50.3)
HGB BLD-MCNC: 10.8 G/DL (ref 12.1–17)
IMM GRANULOCYTES # BLD AUTO: 0.1 K/UL (ref 0–0.04)
IMM GRANULOCYTES NFR BLD AUTO: 1 % (ref 0–0.5)
LYMPHOCYTES # BLD: 0.4 K/UL (ref 0.8–3.5)
LYMPHOCYTES NFR BLD: 7 % (ref 12–49)
MCH RBC QN AUTO: 34.8 PG (ref 26–34)
MCHC RBC AUTO-ENTMCNC: 33.1 G/DL (ref 30–36.5)
MCV RBC AUTO: 105.2 FL (ref 80–99)
MONOCYTES # BLD: 0.4 K/UL (ref 0–1)
MONOCYTES NFR BLD: 6 % (ref 5–13)
NEUTS SEG # BLD: 5.1 K/UL (ref 1.8–8)
NEUTS SEG NFR BLD: 84 % (ref 32–75)
NRBC # BLD: 0 K/UL (ref 0–0.01)
NRBC BLD-RTO: 0 PER 100 WBC
PLATELET # BLD AUTO: 154 K/UL (ref 150–400)
PMV BLD AUTO: 8.7 FL (ref 8.9–12.9)
POTASSIUM SERPL-SCNC: 4.8 MMOL/L (ref 3.5–5.1)
PROT SERPL-MCNC: 6.8 G/DL (ref 6.4–8.2)
RBC # BLD AUTO: 3.1 M/UL (ref 4.1–5.7)
RBC MORPH BLD: ABNORMAL
SODIUM SERPL-SCNC: 139 MMOL/L (ref 136–145)
WBC # BLD AUTO: 6.2 K/UL (ref 4.1–11.1)

## 2022-09-12 PROCEDURE — 85025 COMPLETE CBC W/AUTO DIFF WBC: CPT

## 2022-09-12 PROCEDURE — 36415 COLL VENOUS BLD VENIPUNCTURE: CPT

## 2022-09-12 PROCEDURE — 80053 COMPREHEN METABOLIC PANEL: CPT

## 2022-09-12 NOTE — PROGRESS NOTES
Outpatient Infusion Center - Chemotherapy Progress Note    0398 Pt admit to [facility] for Pre-chemo labs ambulatory in stable condition. Assessment completed. No new concerns voiced. Labs drawn peripherally and sent for processing. Visit Vitals  /78   Pulse 80   Temp 97 °F (36.1 °C)   Resp 18        1330  D/c home ambulatory in no distress. Pt aware of next [facility] appointment scheduled for 09/13/2022.     Please review labs in CC once resulted

## 2022-09-13 ENCOUNTER — HOSPITAL ENCOUNTER (OUTPATIENT)
Dept: INFUSION THERAPY | Age: 77
Discharge: HOME | End: 2022-09-13
Payer: MEDICARE

## 2022-09-13 ENCOUNTER — OFFICE VISIT (OUTPATIENT)
Dept: ONCOLOGY | Age: 77
End: 2022-09-13
Payer: MEDICARE

## 2022-09-13 ENCOUNTER — APPOINTMENT (OUTPATIENT)
Dept: INFUSION THERAPY | Age: 77
End: 2022-09-13

## 2022-09-13 VITALS
TEMPERATURE: 98 F | DIASTOLIC BLOOD PRESSURE: 64 MMHG | SYSTOLIC BLOOD PRESSURE: 139 MMHG | HEART RATE: 90 BPM | RESPIRATION RATE: 18 BRPM

## 2022-09-13 VITALS
TEMPERATURE: 98 F | DIASTOLIC BLOOD PRESSURE: 64 MMHG | OXYGEN SATURATION: 97 % | BODY MASS INDEX: 31.94 KG/M2 | HEART RATE: 90 BPM | SYSTOLIC BLOOD PRESSURE: 139 MMHG | WEIGHT: 229 LBS | RESPIRATION RATE: 18 BRPM

## 2022-09-13 DIAGNOSIS — C67.9 MALIGNANT NEOPLASM OF URINARY BLADDER, UNSPECIFIED SITE (HCC): Primary | ICD-10-CM

## 2022-09-13 DIAGNOSIS — Z51.12 ENCOUNTER FOR ANTINEOPLASTIC IMMUNOTHERAPY: ICD-10-CM

## 2022-09-13 DIAGNOSIS — Z95.828 PORT-A-CATH IN PLACE: ICD-10-CM

## 2022-09-13 PROCEDURE — G8752 SYS BP LESS 140: HCPCS | Performed by: INTERNAL MEDICINE

## 2022-09-13 PROCEDURE — 96413 CHEMO IV INFUSION 1 HR: CPT

## 2022-09-13 PROCEDURE — 1101F PT FALLS ASSESS-DOCD LE1/YR: CPT | Performed by: INTERNAL MEDICINE

## 2022-09-13 PROCEDURE — G8432 DEP SCR NOT DOC, RNG: HCPCS | Performed by: INTERNAL MEDICINE

## 2022-09-13 PROCEDURE — 74011000258 HC RX REV CODE- 258: Performed by: INTERNAL MEDICINE

## 2022-09-13 PROCEDURE — 1123F ACP DISCUSS/DSCN MKR DOCD: CPT | Performed by: INTERNAL MEDICINE

## 2022-09-13 PROCEDURE — G8754 DIAS BP LESS 90: HCPCS | Performed by: INTERNAL MEDICINE

## 2022-09-13 PROCEDURE — G8417 CALC BMI ABV UP PARAM F/U: HCPCS | Performed by: INTERNAL MEDICINE

## 2022-09-13 PROCEDURE — G8536 NO DOC ELDER MAL SCRN: HCPCS | Performed by: INTERNAL MEDICINE

## 2022-09-13 PROCEDURE — 77030012965 HC NDL HUBR BBMI -A

## 2022-09-13 PROCEDURE — G8427 DOCREV CUR MEDS BY ELIG CLIN: HCPCS | Performed by: INTERNAL MEDICINE

## 2022-09-13 PROCEDURE — 74011250636 HC RX REV CODE- 250/636: Performed by: INTERNAL MEDICINE

## 2022-09-13 PROCEDURE — 74011000250 HC RX REV CODE- 250: Performed by: INTERNAL MEDICINE

## 2022-09-13 PROCEDURE — 99214 OFFICE O/P EST MOD 30 MIN: CPT | Performed by: INTERNAL MEDICINE

## 2022-09-13 PROCEDURE — G0463 HOSPITAL OUTPT CLINIC VISIT: HCPCS | Performed by: INTERNAL MEDICINE

## 2022-09-13 RX ORDER — SODIUM CHLORIDE 0.9 % (FLUSH) 0.9 %
10 SYRINGE (ML) INJECTION AS NEEDED
Status: DISPENSED | OUTPATIENT
Start: 2022-09-13 | End: 2022-09-13

## 2022-09-13 RX ORDER — SODIUM CHLORIDE 9 MG/ML
25 INJECTION, SOLUTION INTRAVENOUS CONTINUOUS
Status: DISPENSED | OUTPATIENT
Start: 2022-09-13 | End: 2022-09-13

## 2022-09-13 RX ORDER — HEPARIN 100 UNIT/ML
300-500 SYRINGE INTRAVENOUS AS NEEDED
Status: DISPENSED | OUTPATIENT
Start: 2022-09-13 | End: 2022-09-13

## 2022-09-13 RX ORDER — SODIUM CHLORIDE 9 MG/ML
10 INJECTION INTRAMUSCULAR; INTRAVENOUS; SUBCUTANEOUS AS NEEDED
Status: ACTIVE | OUTPATIENT
Start: 2022-09-13 | End: 2022-09-13

## 2022-09-13 RX ADMIN — HEPARIN 500 UNITS: 100 SYRINGE at 13:07

## 2022-09-13 RX ADMIN — SODIUM CHLORIDE 25 ML/HR: 900 INJECTION, SOLUTION INTRAVENOUS at 12:25

## 2022-09-13 RX ADMIN — SODIUM CHLORIDE, PRESERVATIVE FREE 10 ML: 5 INJECTION INTRAVENOUS at 11:00

## 2022-09-13 RX ADMIN — SODIUM CHLORIDE 240 MG: 9 INJECTION, SOLUTION INTRAVENOUS at 12:30

## 2022-09-13 NOTE — PROGRESS NOTES
Outpatient Infusion Center - Chemotherapy Progress Note    1100 Pt admit to [facility] for Opdivo/C9 ambulatory in stable condition. Assessment completed by access RN. No new concerns voiced. Port accessed by access RN with positive blood return. Labs drawn prior to today's visit; WNL. Port flushed and capped; Pt upstairs to MD appointment. 1205 Return to [facility]; Port flushed w/ positive blood return; NS infusing; per orders, Retacrit not needed today    Visit Vitals  /64   Pulse 90   Temp 98 °F (36.7 °C)   Resp 18       Medications Administered       0.9% sodium chloride infusion       Admin Date  09/13/2022 Action  New Bag Dose  25 mL/hr Rate  25 mL/hr Route  IntraVENous Administered By  Sofi Delacruz RN              0.9% sodium chloride injection 10 mL       Admin Date  09/13/2022 Action  Given Dose  10 mL Route  IntraVENous Administered By  Lester Tucker RN              heparin (porcine) pf 300-500 Units       Admin Date  09/13/2022 Action  Given Dose  500 Units Route  InterCATHeter Administered By  Sofi Delacruz RN              nivolumab (OPDIVO) 240 mg in 0.9% sodium chloride 100 mL, overfill volume 10 mL IVPB       Admin Date  09/13/2022 Action  New Bag Dose  240 mg Rate  268 mL/hr Route  IntraVENous Administered By  Sofi Delacruz RN              sodium chloride (NS) flush 10 mL       Admin Date  09/13/2022 Action  Given Dose  10 mL Route  IntraVENous Administered By  Lester Tucker RN                        Two nurses verified prior to administering:, Drug name, Drug dose, Infusion volume or drug volume when prepared in a syringe, Rate of administration, Route of administration, Expiration dates and/or times, Appearance and physical integrity of the drugs, Rate set on infusion pump, when used, Sequencing of drug administration         1310 Pt tolerated treatment well.  Port maintained positive blood return throughout treatment, flushed with positive blood return at conclusion, heparinized and de-accessed. D/c home ambulatory in no distress. No future appointment entered as of today; MD office made aware. Pt aware that he will be contacted by MD office regarding further [facility] appointments.      Please review labs in CC from 09/12/2022

## 2022-09-13 NOTE — PROGRESS NOTES
Kitty Maciel is a 69 y.o. male    Chief Complaint   Patient presents with    Follow-up     Muscle invasive bladder cancer- Mixed histology       1. Have you been to the ER, urgent care clinic since your last visit? Hospitalized since your last visit? No    2. Have you seen or consulted any other health care providers outside of the [address] [place] [address] since your last visit? Include any pap smears or colon screening.  No

## 2022-09-13 NOTE — PROGRESS NOTES
[facility] [place] [address] [address], [address] [address], [address] [place] [address]  W: [phone]  F: [phone]    Reason for Visit:   Maddie Pineda is a 69 y.o. male who is seen in follow-up of Muscle invasive bladder cancer- Mixed histology    Treatment History:   3/1/2021: CT IVP: Multiple abdominal, iliac, mediastinal nodes unchanged from 2019 consistent with h/o sarcoidosis, Thickened R lateral bladder wall. 6/23/2021: Cystoscopy and TURBT- Papillary changes were noted within the prostatic urethra ,  papillary tumors seen emanating from the surface of the left hemitrigone, There were also diffuse changes in the floor of the bladder- Low grade urothelial carcinoma of the prostatic urethra, R lateral bladder wall with HG Muscle invasive urothelial carcinoma and squamous differentiation+ CIS, Left brenda trigone HG non invasive urothelial carcinoma  8/5/21- 10/21/2021: Cisplatin + Gemzar x 4   9/14/2021: CT was stable. 12/6/2021: Radical urethro-cystoprostatectomy   2/3/22: Adjuvant nivolumab  4/22/2022: CT CHATO   8/2022 CT CAP CHATO     History of Present Illness:   Patient is a 69 y.o. male with PMH as below including sarcoidosis who is seen for evaluation of bladder cancer. He has a history of nonmuscle invasive bladder cancer in 2015, underwent 2 induction courses of BCG, had a non muscle invasive recurrence in 2019 and had re induction with BCG. Cystoscopy 6/2020 at [facility] did not show any recurrence. In March 2021 he had a positive FISH and was seen by Dr. Levi Mack. Had a CT IVP 3/2021 which showed some Bladder thickening. 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 Grade 4 neutropenia after cycle 1. Completed 4 cycles and seen after surgery done 12/6/2021. He comes today for follow up on Nivolumab. Rash is about the same.  Taking prednisone 10mg daily. Denies SOB, CP, cough, fevers/chills, bleeding. No complaints. Past Medical History:   Diagnosis Date    Arrhythmia     LBBB    Arthritis     Asthma     MILD    Cancer ([facility]     scc top of head and nose    Cancer ([facility]     BLADDER    COVID-19 vaccine series completed     [address] 21 AND 21    Depression with anxiety     Hypertension     Other unknown and unspecified cause of morbidity or mortality     history of pulmonary sarcoid     Posterior cervical adenopathy, benign 2018    Pulmonary sarcoidosis ([facility]     Unspecified sleep apnea     cpap      Past Surgical History:   Procedure Laterality Date    HX CATARACT REMOVAL Bilateral     HX HERNIA REPAIR Right AS A CHILD    INGUINAL    HX HERNIA REPAIR Left     INGUIBAL    HX KNEE REPLACEMENT Left     HX ORTHOPAEDIC Right     BONE SPURS REMOVED FROM FOOT    HX OTHER SURGICAL      scc removed top of head and nose    HX OTHER SURGICAL  2005    benign lump removed from thyroid    HX OTHER SURGICAL Right 2020    SKIN CANCER RELMOVED FROM LEG    HX UROLOGICAL  2020    CYSTO    IR INSERT TUNL CVC W PORT OVER 5 YEARS  2021    DC CARDIAC SURG PROCEDURE UNLIST  2021    CARDIAC CATH    DC REVISE KNEE JOINT REPLACE,ALL PARTS Left       Social History     Tobacco Use    Smoking status: Former     Packs/day: 0.50     Years: 2.00     Pack years: 1.00     Types: Cigarettes     Quit date: 1965     Years since quittin.1     Passive exposure: Never    Smokeless tobacco: Never   Substance Use Topics    Alcohol use:  Yes     Alcohol/week: 2.0 standard drinks     Types: 2 Glasses of wine per week     Comment: DAILY      Family History   Problem Relation Age of Onset    Cancer Mother         breast with mets    Dementia Mother     Heart Disease Father     Cancer Sister         breast    Lung Disease Brother     No Known Problems Brother     Anesth Problems Neg Hx      Current Outpatient Medications Medication Sig    lidocaine-prilocaine (EMLA) topical cream Apply  to affected area as needed for Pain. Apply a thick layer over port a cath 30-60 minutes prior to port access    predniSONE (DELTASONE) 10 mg tablet Take 10 mg by mouth daily (with breakfast). multivitamin (ONE A DAY) tablet Take 1 Tablet by mouth daily. metoprolol tartrate (LOPRESSOR) 25 mg tablet Take 0.5 Tablets by mouth every twelve (12) hours. zolpidem (AMBIEN) 5 mg tablet Take 5 mg by mouth nightly as needed for Sleep. buPROPion SR (WELLBUTRIN SR) 100 mg SR tablet Take 100 mg by mouth daily. acetaminophen (TYLENOL) 500 mg tablet Take 1,000 mg by mouth every six (6) hours as needed for Pain.    simvastatin (ZOCOR) 20 mg tablet Take 20 mg by mouth nightly. escitalopram oxalate (Lexapro) 10 mg tablet Take 10 mg by mouth daily. pantoprazole (PROTONIX) 40 mg tablet Take 1 Tablet by mouth daily. No current facility-administered medications for this visit. Facility-Administered Medications Ordered in Other Visits   Medication Dose Route Frequency    0.9% sodium chloride infusion  25 mL/hr IntraVENous CONTINUOUS    nivolumab (OPDIVO) 240 mg in 0.9% sodium chloride 100 mL, overfill volume 10 mL IVPB  240 mg IntraVENous ONCE    sodium chloride (NS) flush 10 mL  10 mL IntraVENous PRN    0.9% sodium chloride injection 10 mL  10 mL IntraVENous PRN    heparin (porcine) pf 300-500 Units  300-500 Units InterCATHeter PRN      Allergies   Allergen Reactions    Pcn [Penicillins] Hives     Patient screened for any delayed non-IgE-mediated reaction to PCN. Patient notes the following:    NO SEVERE NON-IGE MEDIATED REACTIONS        Review of Systems: A complete review of systems was obtained, negative except as described above.     Physical Exam:     Visit Vitals  /64 (BP 1 Location: Left upper arm, BP Patient Position: Sitting)   Pulse 90   Temp 98 °F (36.7 °C)   Resp 18   Wt 229 lb (103.9 kg)   SpO2 97%   BMI 31.94 kg/m² ECOG PS: 1  General: No distress  Eyes: PERRL, anicteric sclerae  HENT: Atraumatic   Neck: Supple  Skin: resolving macular erythematous rash noted on b/l arms & chest   GI: Has a urostomy bag  Psych: Alert, oriented, appropriate affect, normal judgment/insight    Results:     Lab Results   Component Value Date/Time    WBC 6.2 09/12/2022 01:28 PM    HGB 10.8 (L) 09/12/2022 01:28 PM    HCT 32.6 (L) 09/12/2022 01:28 PM    PLATELET 295 07/98/5570 01:28 PM    .2 (H) 09/12/2022 01:28 PM    ABS. NEUTROPHILS 5.1 09/12/2022 01:28 PM     Lab Results   Component Value Date/Time    Sodium 139 09/12/2022 01:28 PM    Potassium 4.8 09/12/2022 01:28 PM    Chloride 108 09/12/2022 01:28 PM    CO2 23 09/12/2022 01:28 PM    Glucose 109 (H) 09/12/2022 01:28 PM    BUN 44 (H) 09/12/2022 01:28 PM    Creatinine 2.02 (H) 09/12/2022 01:28 PM    GFR est AA 39 (L) 09/12/2022 01:28 PM    GFR est non-AA 32 (L) 09/12/2022 01:28 PM    Calcium 9.3 09/12/2022 01:28 PM    Glucose (POC) 143 (H) 12/11/2021 01:02 AM     Lab Results   Component Value Date/Time    Bilirubin, total 0.5 09/12/2022 01:28 PM    ALT (SGPT) 29 09/12/2022 01:28 PM    Alk. phosphatase 38 (L) 09/12/2022 01:28 PM    Protein, total 6.8 09/12/2022 01:28 PM    Albumin 3.8 09/12/2022 01:28 PM    Globulin 3.0 09/12/2022 01:28 PM       External   Records reviewed and summarized above.   Pathology report(s) reviewed    12/21/2021     SPECIMEN       Procedure: Radical urethro-cystoprostatectomy       TUMOR       Tumor Site:                Christy Julio, Right lateral wall, Left   Ureteral                                  orifice, Right bladder neck        Histologic Type:     Papillary urothelial carcinoma, invasive and   non-invasive, and carcinoma in situ       Histologic Grade:                High-grade       Tumor Size: Cannot be determined:      Multiple tumors       Tumor Extension:           Tumor invades adjacent structures -   Prostate (transmural invasion from the bladder   tumor)       Lymphovascular Invasion:      Present       Tumor Configuration:           Papillary, Flat       MARGINS       Margins:                     Uninvolved by invasive carcinoma and   carcinoma in situ /                                  noninvasive urothelial carcinoma    LYMPH NODES       Number of Lymph Nodes Involved:      2           Size of Largest Metastatic Deposit:      1 cm               Site:                     Left Pelvic Lymph Nodes           Extranodal Extension:           Present       Number of Lymph Nodes Examined:      5     PATHOLOGIC STAGE CLASSIFICATION (pTNM, AJCC 8th Edition)       TNM Descriptors:                m (multiple primary tumors)       Primary Tumor (pT):                pT4a       Regional Lymph Nodes (pN):           pN2     Carcinoma in situ URETHRA: RESECTION       Tumor Site:                     Urethra       SPECIMEN       Procedure:                      Total urethrectomy    TUMOR       Tumor Site:                     Prostatic urethra       Histologic Type:                       Papillary urothelial carcinoma,   invasive       Histologic Grade:                     High-grade       Tumor Extension:                Tumor invades adjacent structures -   Prostate       Lymphovascular Invasion:           Present       MARGINS       Margins:                          Uninvolved by invasive carcinoma and   carcinoma in situ /                                       noninvasive urothelial carcinoma       LYMPH NODES (These represent the same lymph nodes reported in the BLADDER   Resection Template)       Number of Lymph Nodes Involved:      2           Size of Largest Metastatic Deposit:      1 cm               Site:                     Left Pelvic Lymph node       Number of Lymph Nodes Examined:      5      PATHOLOGIC STAGE CLASSIFICATION (pTNM, AJCC 8th Edition)       TNM Descriptors:                m (multiple primary tumors within the prostatic urethra)       Primary Tumor (pT):                pT2       Regional Lymph Nodes (pN):           pN2                     4.  Right pelvic lymph nodes, lymph node dissection:        One benign lymph node with noncaseating granulomatous inflammation,   compatible with patients history of sarcoidosis   One benign lymph node, no histopathologic changes   No tumor seen     5. Left pelvic lymph nodes, lymph node dissection:        Metastatic urothelial carcinoma in two of three lymph nodes, 1 cm in   greatest dimension with extracapsular extension   Noncaseating granulomatous inflammation, compatible with patient's history   of sarcoidosis     Radiology report(s) reviewed above. CT CAP 7/19/21:   IMPRESSION  1. While the bladder is decompressed by Juarez is thick-walled and irregular  2. No evidence of metastatic disease to the abdomen or pelvis  3. Extensive bilateral hilar and mediastinal adenopathy with patchy perihilar  airspace disease with associated nodular peribronchial cuffing. Findings can be  seen with sarcoidosis. Differentiating adenopathy from sarcoidosis for  metastatic disease is difficult       9/2021 CT     IMPRESSION     1. Mild bladder wall thickening posteriorly and along the right lateral bladder  wall, nonspecific. No evidence of metastatic disease within the chest, abdomen,  or pelvis. 2. Extensive mediastinal and bilateral hilar lymphadenopathy with lymph node  calcifications, most compatible with known sarcoidosis. No significant change. 3. Bilateral perihilar airspace opacities have improved. No new pulmonary  pathology identified. 4. Severe diverticulosis of the colon. No definite superimposed acute  Diverticulitis. 12/11/2021     IMPRESSION     1. Interval radical cystoprostatectomy with ileal conduit urinary diversion. 2. No postoperative complication identified. 3. Dense bilateral nephrograms in this patient who is status post contrast  injection the previous day. This suggests ATN. 4/2022      IMPRESSION     Status post radical cystoprostatectomy with ileal conduit urinary diversion. Mild left-sided hydronephrosis status post removal of bilateral nephroureteral  stents. Chronic pulmonary parenchymal scarring and nodularity, not significantly  changed. No definitive evidence of new/recurrent metastatic disease in the chest, abdomen  or pelvis. Assessment:   1) Muscle invasive bladder cancer- With squamous differentiation     [id number]  S/p 4 cycles of NAC with Cisplatin+ gemzar complicated by grade 4 neutropenia  S/P Radical urethro-cystoprostatectomy 12/6/2021- pT4N2 (invaded prostate), HG papillar, +LVI  Path stage-  Stage IIIB  Disease at high risk for local and distant recurrence  Margins negative but nodes had EPE    CT scans 8/2022 reviewed and CHATO  Developed post cycle 4 grade 2 immune-mediated rash; HD prednisone initiated 5/19/22 and tapered off to 10 mg when grade 1. Has not recurred. 2) Urethral TCC  S/P Total urethrectomy 12/6  HG Papillary [id number]  Margins negative  EPE  Present  See above    Has been evaluated by Rad Onc to discuss whether RT is indictated at a later date    1) Sarcoidosis  Follows with pulmonary  Stable     4) Obesity    5) Anemia  Likely secondary to CKD   Status post injectafer x 1 on 3/11/2022.     He was also started on Retacrit 10,000 units on 3/3/2022   Improved     6) immune mediated rash  Was grade 2, now grade 1   Improved with steroids  Continue Prednisone 10mg daily     7) Encounter for high risk medication like immunotherapy  Rash has improved to grade 1  Continue prednisone 10mg daily   Now on q2 week dosing     Plan:     Continue Nivolumab every 2-week dosing   10mg prednisone   CBC, CMP, TSH per protocol  Reviewed recommendations from Dr. Andreia Mcduffie 5/9/22 visit- consider RT after completion of Nivolumab  Follow with wound clinic   See urology as scheduled   Pepcid daily  Continue Retacrit 10,000 units monthly  Goal hemoglobin is 10 g or less. Hold if hemoglobin more than 10 g/dL    RTC 1 month  OPIC every 2 weeks    I appreciate the opportunity to participate in Mr. Felicita Vela care. I personally saw and evaluated the patient and performed the key components of medical decision making. The history, physical exam, and documentation were performed by Carolina Sawyer NP. I reviewed and verified the above documentation and modified it as needed.           Signed By: Tresia Burkitt, MD

## 2022-09-19 ENCOUNTER — APPOINTMENT (OUTPATIENT)
Dept: INFUSION THERAPY | Age: 77
End: 2022-09-19

## 2022-09-20 ENCOUNTER — APPOINTMENT (OUTPATIENT)
Dept: INFUSION THERAPY | Age: 77
End: 2022-09-20

## 2022-09-20 RX ORDER — DIPHENHYDRAMINE HYDROCHLORIDE 50 MG/ML
50 INJECTION, SOLUTION INTRAMUSCULAR; INTRAVENOUS AS NEEDED
Status: CANCELLED
Start: 2022-09-27

## 2022-09-20 RX ORDER — ALBUTEROL SULFATE 0.83 MG/ML
2.5 SOLUTION RESPIRATORY (INHALATION) AS NEEDED
Status: CANCELLED
Start: 2022-09-27

## 2022-09-20 RX ORDER — SODIUM CHLORIDE 9 MG/ML
10 INJECTION INTRAMUSCULAR; INTRAVENOUS; SUBCUTANEOUS AS NEEDED
Status: CANCELLED | OUTPATIENT
Start: 2022-09-27

## 2022-09-20 RX ORDER — EPINEPHRINE 1 MG/ML
0.3 INJECTION, SOLUTION, CONCENTRATE INTRAVENOUS AS NEEDED
Status: CANCELLED | OUTPATIENT
Start: 2022-09-27

## 2022-09-20 RX ORDER — SODIUM CHLORIDE 0.9 % (FLUSH) 0.9 %
10 SYRINGE (ML) INJECTION AS NEEDED
Status: CANCELLED | OUTPATIENT
Start: 2022-09-27

## 2022-09-20 RX ORDER — ACETAMINOPHEN 325 MG/1
650 TABLET ORAL AS NEEDED
Status: CANCELLED
Start: 2022-09-27

## 2022-09-20 RX ORDER — HEPARIN 100 UNIT/ML
300-500 SYRINGE INTRAVENOUS AS NEEDED
Status: CANCELLED
Start: 2022-09-27

## 2022-09-20 RX ORDER — DIPHENHYDRAMINE HYDROCHLORIDE 50 MG/ML
25 INJECTION, SOLUTION INTRAMUSCULAR; INTRAVENOUS AS NEEDED
Status: CANCELLED
Start: 2022-09-27

## 2022-09-20 RX ORDER — SODIUM CHLORIDE 9 MG/ML
25 INJECTION, SOLUTION INTRAVENOUS CONTINUOUS
Status: CANCELLED | OUTPATIENT
Start: 2022-09-27

## 2022-09-20 RX ORDER — ONDANSETRON 2 MG/ML
8 INJECTION INTRAMUSCULAR; INTRAVENOUS AS NEEDED
Status: CANCELLED | OUTPATIENT
Start: 2022-09-27

## 2022-09-20 RX ORDER — HYDROCORTISONE SODIUM SUCCINATE 100 MG/2ML
100 INJECTION, POWDER, FOR SOLUTION INTRAMUSCULAR; INTRAVENOUS AS NEEDED
Status: CANCELLED | OUTPATIENT
Start: 2022-09-27

## 2022-09-21 ENCOUNTER — HOSPITAL ENCOUNTER (OUTPATIENT)
Dept: WOUND CARE | Age: 77
Discharge: HOME | End: 2022-09-21
Payer: MEDICARE

## 2022-09-21 VITALS
SYSTOLIC BLOOD PRESSURE: 111 MMHG | DIASTOLIC BLOOD PRESSURE: 64 MMHG | RESPIRATION RATE: 16 BRPM | HEART RATE: 86 BPM | TEMPERATURE: 97.7 F

## 2022-09-21 PROCEDURE — 99212 OFFICE O/P EST SF 10 MIN: CPT

## 2022-09-21 NOTE — WOUND CARE
09/21/22 1115   Wound Peristomal Right #1 04/18/22   Date First Assessed/Time First Assessed: 04/18/22 1145   Present on Hospital Admission: Yes  Wound Approximate Age at First Assessment (Weeks): 4 weeks  Primary Wound Type: Traumatic  Location: Peristomal  Wound Location Orientation: Right  Wound Desc. ..    Wound Image    Wound Etiology Traumatic   Dressing Status New dressing applied   Cleansed   (tap water)   Dressing/Treatment Alginate;Hydrocolloid   Wound Length (cm) 1.9 cm   Wound Width (cm) 1.2 cm   Wound Depth (cm) 0.1 cm   Wound Surface Area (cm^2) 2.28 cm^2   Change in Wound Size % 89.98   Wound Volume (cm^3) 0.228 cm^3   Wound Healing % 95   Wound Assessment Pink/red;Epithelialization   Drainage Amount Moderate   Drainage Description Serosanguinous   Wound Odor None   Vickie-Wound/Incision Assessment Fragile   Edges Flat/open edges   Wound Thickness Description Full thickness     Visit Vitals  /64   Pulse 86   Temp 97.7 °F (36.5 °C)   Resp 16

## 2022-09-21 NOTE — WOUND CARE
Clinical Level of Care Assessment    Outpatient Ostomy Care      NAME:  Dede Sanford OF BIRTH:  1945  MEDICAL RECORD NUMBER:  [id number]   DATE:  9/21/2022      Patient Rashi Bower Assessment- Document in Flowsheet I&O   Points   Review of chart []   0   Assess Complete Ostomy tab in Navigator for assessment of; stoma status, peristomal skin, presence of hernia/stool consistency/diet/related medications   Simple adjustments to pouch size/pouch system, new stoma pattern, accessory addition/deletion. []   1   Assess Complete Ostomy tab in Navigator for assessment of; stoma status, peristomal skin, presence of hernia/stool consistency/diet/related medications   Moderate adjustments to pouch size/pouch system, new stoma pattern, accessory addition/deletion. Observe patient/caregiver with hands-on care. 1-2 adjustments to pouch size/system/skin care/accessory addition or deletion. [x]   2   Assess Complete Ostomy tab in Navigator for assessment of; stoma status, peristomal skin, presence of hernia/stool consistency/diet/related medications   Complex adjustments to pouch size/pouch system, new stoma pattern, accessory addition/deletion. 3 or more complex adjustments to pouch size/system/skin care/accessory addition or deletion. Observe patient/caregiver with hands-on care. Assess patient/patient abdomen for optimal pre-marked stoma site. Assess patient abdomen for type of hernia belt/accessory needed. []   3         Ambulation Status Documented in CN Clinical Note  Status Definition Points   Independent Independently able to ambulate. Fully able (without any assistance) to get on/off exam table/chair. [x]   0   Minimal Physical Assistance Requires physical assistance of one person to ambulate and/or position patient to be examined. Includes necessary physical assistance to position lower extremities on/off stool.    []   1   Moderate Physical Assistance Requires at least one staff member to physically assist patient in ambulating into treatment room, and/or on off chair/bed. Requires assistance to bathroom. []   2   Full Assistance Requires assistance of at least two staff members to transfer patient into treatment room and/or on/off bed/chair. \"Total Transfer\". Unable to use bathroom requires bedside commode and/or bedpan []   3       Teaching Effort Documented in [facility] Clinical Note  Effort Definition Points   No Teaching  []   0   General Initial/Simple lesson:  Assess readiness to learn, assess patient learning style to determine educational flow/special needs for learning. Teaching related to 1-3 topics  Documentation in [facility] completed. []   1   Intermediate Assess readiness to learn, assess patient learning style to determine educational flow/special needs for learning. Teaching related to 3-4 topics. Hernia belt application and care considerations  Documentation in [facility] completed. [x]   2   Complex Assess readiness to learn, assess patient learning style to determine educational flow/special needs for learning. Teaching of greater than 5 additional topics   Pre-operative ostomy education with review of written resources for patient/family/caregiver as needed. Demonstration/return demonstration of ostomy irrigation  Documentation in [facility] completed. []   3     Patient Assessment and Planning in [facility] Clinical Note   Planning Definition Points   Simple Simple pouch change procedure completed and reviewed with patient/family/caregiver   Documentation in [facility] completed. []   1   Intermediate Moderate level of follow-up needs:   Pouch change/discharge procedure revised and reviewed with patient/caregiver. Communications with outside resources; i.e. Telephone calls to Surgeon/ PCP, family/caregiver, home health, ECF. Documentation in [facility] completed.      [x]   2   Complex Complex level of instructions/changes:   Family/Caregiver learning/demonstration/return demonstration visit. Pouching/discharge procedure revised/reviewed with patient/family/caregiver. Contact with outside resources; i.e. communication with Surgeon/ PCP, home health, ECF. Contact/referral to ostomy appliance supplier for new or additional products. Review when to call [facility] or schedule follow-up visit. Referral to Emergency Department   Documentation in [facility] completed. []   3       Is this the Patient's First Visit with [facility] @ [facility]? No    Is this Patient Established to this [facility] within the last 3 years?    Yes             Clinical Level of Care      Points  0-3  Level 1 []     Points  4-6  Level 2 [x]     Points  7-8  Level 3 []     Points  9-10  Level 4 []     Points  11-12  Level 5 []       Electronically signed by Richie Barrera RN on 9/21/2022 at 1:13 PM

## 2022-09-21 NOTE — DISCHARGE INSTRUCTIONS
Discharge Instructions/Wound Orders  [facility], [address] [facility]  Telephone: [phone]    NAME:  Tracie Stein OF BIRTH:  1945  MEDICAL RECORD NUMBER:  [id number]  DATE:  [de-identified]  Ostomy Care Orders:  1)Remove old bag and cleanse stoma and peristomal skin with tap water, dry thoroughly. 2) Crust open wound with stoma powder and No Sting skin prep, cover with a small piece of plain alginate and thin duoderm. 3) Shape Saul ring to size and place over stoma site. 4) Cut [place] bag [id number] to size and place over stoma site. 5) Remove paper from tape, smooth out tape border, then apply [employer] strips. 6) Attach ostomy belt. 7) Change 2 x week and as needed for leaking. Dietary:  [x] Diet as tolerated: [] Calorie Diabetic Diet:Low carb and no Sugar [] No Added Salt:[x] Increase Protein: [] Other:Limit the amount of liquid you are drinking and avoid drinking in between meals   Activity:  [x] Activity as tolerated:  [] Patient has no activity restrictions     [] Strict Bedrest: [] Remain off Work:     [] May return to full duty work:                                   [] Return to work with restrictions:             Return Appointment:  [x] Return Appointment: With Kelin Harper  in  2 Week(s)  [] Ordered tests:    Electronically signed Meenakshi Campoverde RN on 9/21/2022 at 11:29 AM     Brittani Catherine 281: Should you experience any significant changes in your wound(s) or have questions about your wound care, please contact the [address] [place] [address] at [address] [place] [address] 8:00 am - 4:30. If you need help with your wound outside these hours and cannot wait until we are again available, contact your PCP or go to the hospital emergency room. PLEASE NOTE: IF YOU ARE UNABLE TO OBTAIN WOUND SUPPLIES, CONTINUE TO USE THE SUPPLIES YOU HAVE AVAILABLE UNTIL YOU ARE ABLE TO REACH US.  IT IS MOST IMPORTANT TO KEEP THE WOUND COVERED AT ALL TIMES.      MARTHAON Signature:_______________________    Date: ___________ Time:  ____________

## 2022-09-21 NOTE — WOUND CARE
OSTOMY Assessment    Stoma Tissue Assessment: ___Post-op __x_Follow-up       Type of Diversion: ___New__x_ Established ___Revision___Permanent____Temporary    _____  Ileostomy   _____  Colostomy: ____ Ascending, ____ Transverse, _____.  Sigmoid   ___x__  Reatha Balloon   _____  Ileal Conduit   _____  Mucous Fistula     Appearance of Ostomy:   _x__ Bright Red /Moist/Viable ___ Dark Red ___ Pink ___ Sloughing ___Necrotic____Pallor ____Red  ___Dry ____Moist    Stoma Size: _________ (mm) _x__Round ___ Jose Grist ___Irregular     Stoma Height:   ____Flush ____Retracted ____Budded ____Edematous __x__Prolapse     Stoma Location  ____ Left Side          __x__Right Side     _____Umbilicus  _____Incision Line  ___x_ Above Belt       ____ Below Belt    Abdominal Contours  __x__ Firm ____ Flat ____Flabby ___Soft __x_Round ___ Hard ___ Other     Stoma Function: _x_Yes __No  Output:   __x__ Yellow ____Amber ____ Pink(Hematuria) ____ Tea Colored   ____ Clots ____Foul Odor ____ Mucous     Peristomal skin: Peristomal wound (see flowsheet)  ___ Intact ___ Irritant Dermatitis ___ Allergic Contact Dermatitis ___Candidiasis ___Caput Medusae ___Folliculitis ___Mechanical Trauma ___Mucosal Transplantation    ___Pyoderma Gangrenosum ___Hyperplasia ___Radiation Trauma ___Allergies mpling  ___ Dimpling ____Blistered ____Fragile ____Macerated ___Peeling  ___Ulceration  ___ Fungal ___Peristomal Hernia ___Non-blanchable ___ Hypergranulation      Stoma Complications:   __Excessive Bleeding __Ischemia __ Abscess __Necrosis __Prolapse   _x_Hernia __ Retraction __Stenosis __Mucosal Separation __Melanosis Coli   __Laceration __Other     Application for Patient    Wafer and pouch used during assessment and education __Dalton [id number]__________    Pouch System Recommended:   _x_One-Piece __Two-Piece ___Custom     Flat:   ___Pre-cut   __x_Cut-to fit     Convexity: ___Shallow ___Deep___ Flexible ___Creative Convexity   ___Pre-cut ___Cut to Fit     Accessory Products   _x_ [employer] __Adhesive Remover Wipes __Barrier [employer] _x_Barrier Wipes   __Deodorizer __Liquid Adhesive __ Paste _x_ Powder _x_Strip   _x_ Support Belt __Tape      Education for Patient & Family  1. Booklet of information on specific ostomy given and some verbal discussion of patients ostomy and expectations. 2. Instruction/demostration of ostomy wafer & pouch change. 3. Discuss concerns/ answer questions. 4. Provide follow up education in clinic if needed.        PICTURE INSERT:

## 2022-09-26 ENCOUNTER — HOSPITAL ENCOUNTER (OUTPATIENT)
Dept: INFUSION THERAPY | Age: 77
Discharge: HOME | End: 2022-09-26
Payer: MEDICARE

## 2022-09-26 DIAGNOSIS — C67.9 MALIGNANT NEOPLASM OF URINARY BLADDER, UNSPECIFIED SITE (HCC): Primary | ICD-10-CM

## 2022-09-26 LAB
ALBUMIN SERPL-MCNC: 3.6 G/DL (ref 3.5–5)
ALBUMIN/GLOB SERPL: 1.2 {RATIO} (ref 1.1–2.2)
ALP SERPL-CCNC: 34 U/L (ref 45–117)
ALT SERPL-CCNC: 24 U/L (ref 12–78)
ANION GAP SERPL CALC-SCNC: 5 MMOL/L (ref 5–15)
AST SERPL-CCNC: 16 U/L (ref 15–37)
BASOPHILS # BLD: 0.1 K/UL (ref 0–0.1)
BASOPHILS NFR BLD: 1 % (ref 0–1)
BILIRUB SERPL-MCNC: 0.5 MG/DL (ref 0.2–1)
BUN SERPL-MCNC: 41 MG/DL (ref 6–20)
BUN/CREAT SERPL: 21 (ref 12–20)
CALCIUM SERPL-MCNC: 9.4 MG/DL (ref 8.5–10.1)
CHLORIDE SERPL-SCNC: 112 MMOL/L (ref 97–108)
CO2 SERPL-SCNC: 24 MMOL/L (ref 21–32)
CREAT SERPL-MCNC: 1.94 MG/DL (ref 0.7–1.3)
DIFFERENTIAL METHOD BLD: ABNORMAL
EOSINOPHIL # BLD: 0.2 K/UL (ref 0–0.4)
EOSINOPHIL NFR BLD: 3 % (ref 0–7)
ERYTHROCYTE [DISTWIDTH] IN BLOOD BY AUTOMATED COUNT: 13.5 % (ref 11.5–14.5)
GLOBULIN SER CALC-MCNC: 3.1 G/DL (ref 2–4)
GLUCOSE SERPL-MCNC: 115 MG/DL (ref 65–100)
HCT VFR BLD AUTO: 34.9 % (ref 36.6–50.3)
HGB BLD-MCNC: 11.5 G/DL (ref 12.1–17)
IMM GRANULOCYTES # BLD AUTO: 0 K/UL (ref 0–0.04)
IMM GRANULOCYTES NFR BLD AUTO: 0 % (ref 0–0.5)
LYMPHOCYTES # BLD: 0.6 K/UL (ref 0.8–3.5)
LYMPHOCYTES NFR BLD: 12 % (ref 12–49)
MCH RBC QN AUTO: 35.1 PG (ref 26–34)
MCHC RBC AUTO-ENTMCNC: 33 G/DL (ref 30–36.5)
MCV RBC AUTO: 106.4 FL (ref 80–99)
MONOCYTES # BLD: 0.6 K/UL (ref 0–1)
MONOCYTES NFR BLD: 11 % (ref 5–13)
NEUTS SEG # BLD: 3.6 K/UL (ref 1.8–8)
NEUTS SEG NFR BLD: 73 % (ref 32–75)
NRBC # BLD: 0 K/UL (ref 0–0.01)
NRBC BLD-RTO: 0 PER 100 WBC
PLATELET # BLD AUTO: 166 K/UL (ref 150–400)
PMV BLD AUTO: 9.1 FL (ref 8.9–12.9)
POTASSIUM SERPL-SCNC: 4.2 MMOL/L (ref 3.5–5.1)
PROT SERPL-MCNC: 6.7 G/DL (ref 6.4–8.2)
RBC # BLD AUTO: 3.28 M/UL (ref 4.1–5.7)
RBC MORPH BLD: ABNORMAL
SODIUM SERPL-SCNC: 141 MMOL/L (ref 136–145)
WBC # BLD AUTO: 5.1 K/UL (ref 4.1–11.1)

## 2022-09-26 PROCEDURE — 85025 COMPLETE CBC W/AUTO DIFF WBC: CPT

## 2022-09-26 PROCEDURE — 36415 COLL VENOUS BLD VENIPUNCTURE: CPT

## 2022-09-26 PROCEDURE — 80053 COMPREHEN METABOLIC PANEL: CPT

## 2022-09-27 ENCOUNTER — HOSPITAL ENCOUNTER (OUTPATIENT)
Dept: INFUSION THERAPY | Age: 77
Discharge: HOME | End: 2022-09-27
Payer: MEDICARE

## 2022-09-27 VITALS
TEMPERATURE: 98.7 F | SYSTOLIC BLOOD PRESSURE: 128 MMHG | OXYGEN SATURATION: 95 % | HEART RATE: 82 BPM | RESPIRATION RATE: 18 BRPM | DIASTOLIC BLOOD PRESSURE: 80 MMHG

## 2022-09-27 VITALS
TEMPERATURE: 97.2 F | SYSTOLIC BLOOD PRESSURE: 101 MMHG | DIASTOLIC BLOOD PRESSURE: 66 MMHG | HEART RATE: 101 BPM | RESPIRATION RATE: 16 BRPM

## 2022-09-27 DIAGNOSIS — C67.9 MALIGNANT NEOPLASM OF URINARY BLADDER, UNSPECIFIED SITE (HCC): Primary | ICD-10-CM

## 2022-09-27 PROCEDURE — 74011250636 HC RX REV CODE- 250/636: Performed by: INTERNAL MEDICINE

## 2022-09-27 PROCEDURE — 74011000258 HC RX REV CODE- 258: Performed by: INTERNAL MEDICINE

## 2022-09-27 PROCEDURE — 74011000250 HC RX REV CODE- 250: Performed by: INTERNAL MEDICINE

## 2022-09-27 PROCEDURE — 96413 CHEMO IV INFUSION 1 HR: CPT

## 2022-09-27 PROCEDURE — 77030012965 HC NDL HUBR BBMI -A

## 2022-09-27 RX ORDER — ACETAMINOPHEN 325 MG/1
650 TABLET ORAL AS NEEDED
Status: DISCONTINUED | OUTPATIENT
Start: 2022-09-27 | End: 2022-09-28 | Stop reason: HOSPADM

## 2022-09-27 RX ORDER — SODIUM CHLORIDE 9 MG/ML
10 INJECTION INTRAMUSCULAR; INTRAVENOUS; SUBCUTANEOUS AS NEEDED
Status: DISCONTINUED | OUTPATIENT
Start: 2022-09-27 | End: 2022-09-28 | Stop reason: HOSPADM

## 2022-09-27 RX ORDER — DIPHENHYDRAMINE HYDROCHLORIDE 50 MG/ML
25 INJECTION, SOLUTION INTRAMUSCULAR; INTRAVENOUS AS NEEDED
Status: DISCONTINUED | OUTPATIENT
Start: 2022-09-27 | End: 2022-09-28 | Stop reason: HOSPADM

## 2022-09-27 RX ORDER — SODIUM CHLORIDE 9 MG/ML
25 INJECTION, SOLUTION INTRAVENOUS CONTINUOUS
Status: DISCONTINUED | OUTPATIENT
Start: 2022-09-27 | End: 2022-09-28 | Stop reason: HOSPADM

## 2022-09-27 RX ORDER — SODIUM CHLORIDE 0.9 % (FLUSH) 0.9 %
10 SYRINGE (ML) INJECTION AS NEEDED
Status: DISCONTINUED | OUTPATIENT
Start: 2022-09-27 | End: 2022-09-28 | Stop reason: HOSPADM

## 2022-09-27 RX ORDER — HEPARIN 100 UNIT/ML
300-500 SYRINGE INTRAVENOUS AS NEEDED
Status: DISCONTINUED | OUTPATIENT
Start: 2022-09-27 | End: 2022-09-28 | Stop reason: HOSPADM

## 2022-09-27 RX ORDER — ONDANSETRON 2 MG/ML
8 INJECTION INTRAMUSCULAR; INTRAVENOUS AS NEEDED
Status: DISCONTINUED | OUTPATIENT
Start: 2022-09-27 | End: 2022-09-28 | Stop reason: HOSPADM

## 2022-09-27 RX ADMIN — HEPARIN 500 UNITS: 100 SYRINGE at 15:50

## 2022-09-27 RX ADMIN — SODIUM CHLORIDE 25 ML/HR: 9 INJECTION, SOLUTION INTRAVENOUS at 15:10

## 2022-09-27 RX ADMIN — SODIUM CHLORIDE, PRESERVATIVE FREE 10 ML: 5 INJECTION INTRAVENOUS at 13:35

## 2022-09-27 RX ADMIN — SODIUM CHLORIDE 240 MG: 900 INJECTION, SOLUTION INTRAVENOUS at 15:10

## 2022-09-27 NOTE — PROGRESS NOTES
[facility]patient [facility]fusion [facility] - Chemotherapy Progress Note    1330 Pt admit to [facility] for Opdivo/C10 ambulatory in stable condition. Assessment completed. No new concerns voiced. Port accessed with positive blood return. Labs drawn prior to today's visit; WNL. Line flushed, NS infusing. Visit Vitals  /66   Pulse (!) 101   Temp 97.2 °F (36.2 °C)   Resp 16       Medications Administered       0.9% sodium chloride infusion       Admin Date  09/27/2022 Action  New Bag Dose  25 mL/hr Rate  25 mL/hr Route  IntraVENous Administered By  Nitin Turner RN              0.9% sodium chloride injection 10 mL       Admin Date  09/27/2022 Action  Given Dose  10 mL Route  IntraVENous Administered By  Nitin Turner RN              heparin (porcine) pf 300-500 Units       Admin Date  09/27/2022 Action  Given Dose  500 Units Route  InterCATHeter Administered By  Nitin Turner RN              nivolumab (OPDIVO) 240 mg in 0.9% sodium chloride 100 mL, overfill volume 10 mL IVPB       Admin Date  09/27/2022 Action  New Bag Dose  240 mg Rate  268 mL/hr Route  IntraVENous Administered By  Niitn Turner RN              sodium chloride (NS) flush 10 mL       Admin Date  09/27/2022 Action  Given Dose  10 mL Route  IntraVENous Administered By  Nitin Turner RN                        Two nurses verified prior to administering:, Drug name, Drug dose, Infusion volume or drug volume when prepared in a syringe, Rate of administration, Route of administration, Expiration dates and/or times, Appearance and physical integrity of the drugs, Rate set on infusion pump, when used, Sequencing of drug administration         1550 Pt tolerated treatment well. Port maintained positive blood return throughout treatment, flushed with positive blood return at conclusion, heparinized and de-accessed. D/c home ambulatory in no distress. Pt aware of next [facility] appointment scheduled for 10/10/22.     Please see labs in CC from 09/26/22

## 2022-09-27 NOTE — PROGRESS NOTES
[facility] Progress Note    Date: 2022    Name: William Gracia    MRN: [id number]         : 1945      1030:  Pt arrived ambulatory and in no distress, for lab visit. Labs drawn via right AC, patient tolerated well. Visit Vitals  /80 (BP 1 Location: Left upper arm, BP Patient Position: Sitting)   Pulse 82   Temp 98.7 °F (37.1 °C)   Resp 18   SpO2 95%         Departed [facility] ambulatory and in no distress.     Future Appointments   Date Time Provider Gregorio Ibarra   2022  1:30 PM C1 TD MED [address] [facility]   10/5/2022 11:00 AM [facility] OSTOMY NURSE [facility]   10/10/2022 10:30 AM H3 TD LAB [facility]   10/11/2022 11:00 AM D4 TD MED [place] [facility]   10/11/2022 11:45 AM Shandra Bai  [address] [facility] AMB   10/24/2022 10:30 AM H3 TD LAB [facility]   10/25/2022 11:00 AM F4 TD MED TX [facility]   2022 10:30 AM H3 TD LAB [facility]   2022 11:00 AM C1 TD MED [facility]   2022 10:30 AM H3 TD LAB [facility]   2022 11:00 AM F4 TD MED [facility]   2022 10:30 AM H3 TD LAB [facility]   2022 11:00 AM F4 TD MED [address] [facility]       Henry Benitez  2022

## 2022-10-03 ENCOUNTER — TRANSCRIBE ORDER (OUTPATIENT)
Dept: SCHEDULING | Age: 77
End: 2022-10-03

## 2022-10-03 DIAGNOSIS — M47.812 CERVICAL SPONDYLOSIS: Primary | ICD-10-CM

## 2022-10-03 RX ORDER — DIPHENHYDRAMINE HYDROCHLORIDE 50 MG/ML
50 INJECTION, SOLUTION INTRAMUSCULAR; INTRAVENOUS AS NEEDED
Status: CANCELLED
Start: 2022-10-11

## 2022-10-03 RX ORDER — SODIUM CHLORIDE 9 MG/ML
25 INJECTION, SOLUTION INTRAVENOUS CONTINUOUS
Status: CANCELLED | OUTPATIENT
Start: 2022-10-11

## 2022-10-03 RX ORDER — SODIUM CHLORIDE 9 MG/ML
10 INJECTION INTRAMUSCULAR; INTRAVENOUS; SUBCUTANEOUS AS NEEDED
Status: CANCELLED | OUTPATIENT
Start: 2022-10-11

## 2022-10-03 RX ORDER — EPINEPHRINE 1 MG/ML
0.3 INJECTION, SOLUTION, CONCENTRATE INTRAVENOUS AS NEEDED
Status: CANCELLED | OUTPATIENT
Start: 2022-10-11

## 2022-10-03 RX ORDER — HYDROCORTISONE SODIUM SUCCINATE 100 MG/2ML
100 INJECTION, POWDER, FOR SOLUTION INTRAMUSCULAR; INTRAVENOUS AS NEEDED
Status: CANCELLED | OUTPATIENT
Start: 2022-10-11

## 2022-10-03 RX ORDER — SODIUM CHLORIDE 0.9 % (FLUSH) 0.9 %
10 SYRINGE (ML) INJECTION AS NEEDED
Status: CANCELLED | OUTPATIENT
Start: 2022-10-11

## 2022-10-03 RX ORDER — ACETAMINOPHEN 325 MG/1
650 TABLET ORAL AS NEEDED
Status: CANCELLED
Start: 2022-10-11

## 2022-10-03 RX ORDER — HEPARIN 100 UNIT/ML
300-500 SYRINGE INTRAVENOUS AS NEEDED
Status: CANCELLED
Start: 2022-10-11

## 2022-10-03 RX ORDER — DIPHENHYDRAMINE HYDROCHLORIDE 50 MG/ML
25 INJECTION, SOLUTION INTRAMUSCULAR; INTRAVENOUS AS NEEDED
Status: CANCELLED
Start: 2022-10-11

## 2022-10-03 RX ORDER — ONDANSETRON 2 MG/ML
8 INJECTION INTRAMUSCULAR; INTRAVENOUS AS NEEDED
Status: CANCELLED | OUTPATIENT
Start: 2022-10-11

## 2022-10-03 RX ORDER — ALBUTEROL SULFATE 0.83 MG/ML
2.5 SOLUTION RESPIRATORY (INHALATION) AS NEEDED
Status: CANCELLED
Start: 2022-10-11

## 2022-10-05 ENCOUNTER — HOSPITAL ENCOUNTER (OUTPATIENT)
Dept: WOUND CARE | Age: 77
Discharge: HOME | End: 2022-10-05
Payer: MEDICARE

## 2022-10-05 VITALS
TEMPERATURE: 98.3 F | SYSTOLIC BLOOD PRESSURE: 149 MMHG | DIASTOLIC BLOOD PRESSURE: 85 MMHG | HEART RATE: 82 BPM | RESPIRATION RATE: 16 BRPM

## 2022-10-05 PROCEDURE — 99212 OFFICE O/P EST SF 10 MIN: CPT

## 2022-10-05 NOTE — WOUND CARE
Clinical Level of Care Assessment    Outpatient Ostomy Care      NAME:  Cal Wesley OF BIRTH:  1945  MEDICAL RECORD NUMBER:  [id number]   DATE:  10/5/2022      Patient Nadege Medina Assessment- Document in Flowsheet I&O   Points   Review of chart []   0   Assess Complete Ostomy tab in Navigator for assessment of; stoma status, peristomal skin, presence of hernia/stool consistency/diet/related medications   Simple adjustments to pouch size/pouch system, new stoma pattern, accessory addition/deletion. []   1   Assess Complete Ostomy tab in Navigator for assessment of; stoma status, peristomal skin, presence of hernia/stool consistency/diet/related medications   Moderate adjustments to pouch size/pouch system, new stoma pattern, accessory addition/deletion. Observe patient/caregiver with hands-on care. 1-2 adjustments to pouch size/system/skin care/accessory addition or deletion. [x]   2   Assess Complete Ostomy tab in Navigator for assessment of; stoma status, peristomal skin, presence of hernia/stool consistency/diet/related medications   Complex adjustments to pouch size/pouch system, new stoma pattern, accessory addition/deletion. 3 or more complex adjustments to pouch size/system/skin care/accessory addition or deletion. Observe patient/caregiver with hands-on care. Assess patient/patient abdomen for optimal pre-marked stoma site. Assess patient abdomen for type of hernia belt/accessory needed. []   3         Ambulation Status Documented in [facility] Clinical Note  Status Definition Points   Independent Independently able to ambulate. Fully able (without any assistance) to get on/off exam table/chair. [x]   0   Minimal Physical Assistance Requires physical assistance of one person to ambulate and/or position patient to be examined. Includes necessary physical assistance to position lower extremities on/off stool.    []   1   Moderate Physical Assistance Requires at least one staff member to physically assist patient in ambulating into treatment room, and/or on off chair/bed. Requires assistance to bathroom. []   2   Full Assistance Requires assistance of at least two staff members to transfer patient into treatment room and/or on/off bed/chair. \"Total Transfer\". Unable to use bathroom requires bedside commode and/or bedpan []   3       Teaching Effort Documented in [facility] Clinical Note  Effort Definition Points   No Teaching  []   0   General Initial/Simple lesson:  Assess readiness to learn, assess patient learning style to determine educational flow/special needs for learning. Teaching related to 1-3 topics  Documentation in [facility] completed. []   1   Intermediate Assess readiness to learn, assess patient learning style to determine educational flow/special needs for learning. Teaching related to 3-4 topics. Hernia belt application and care considerations  Documentation in [facility] completed. [x]   2   Complex Assess readiness to learn, assess patient learning style to determine educational flow/special needs for learning. Teaching of greater than 5 additional topics   Pre-operative ostomy education with review of written resources for patient/family/caregiver as needed. Demonstration/return demonstration of ostomy irrigation  Documentation in [facility] completed. []   3     Patient Assessment and Planning in [facility] Clinical Note   Planning Definition Points   Simple Simple pouch change procedure completed and reviewed with patient/family/caregiver   Documentation in [facility] completed. []   1   Intermediate Moderate level of follow-up needs:   Pouch change/discharge procedure revised and reviewed with patient/caregiver. Communications with outside resources; i.e. Telephone calls to Surgeon/ PCP, family/caregiver, home health, ECF. Documentation in [facility] completed.      [x]   2   Complex Complex level of instructions/changes:   Family/Caregiver learning/demonstration/return demonstration visit. Pouching/discharge procedure revised/reviewed with patient/family/caregiver. Contact with outside resources; i.e. communication with Surgeon/ PCP, home health, ECF. Contact/referral to ostomy appliance supplier for new or additional products. Review when to call [facility] or schedule follow-up visit. Referral to Emergency Department   Documentation in [facility] completed. []   3       Is this the Patient's First Visit with [facility] @ [facility]? No    Is this Patient Established to this [facility] within the last 3 years?    Yes             Clinical Level of Care      Points  0-3  Level 1 []     Points  4-6  Level 2 [x]     Points  7-8  Level 3 []     Points  9-10  Level 4 []     Points  11-12  Level 5 []       Electronically signed by Toan Medrano RN on 10/5/2022 at 12:56 PM

## 2022-10-05 NOTE — WOUND CARE
10/05/22 1112   Wound Peristomal Right #1 04/18/22   Date First Assessed/Time First Assessed: 04/18/22 1145   Present on Hospital Admission: Yes  Wound Approximate Age at First Assessment (Weeks): 4 weeks  Primary Wound Type: Traumatic  Location: Peristomal  Wound Location Orientation: Right  Wound Desc. ..    Wound Image    Wound Etiology Traumatic   Dressing Status New dressing applied   Cleansed   (tap water)   Dressing/Treatment Alginate;Hydrocolloid   Wound Length (cm) 1.4 cm   Wound Width (cm) 2.4 cm   Wound Depth (cm) 0.1 cm   Wound Surface Area (cm^2) 3.36 cm^2   Change in Wound Size % 85.23   Wound Volume (cm^3) 0.336 cm^3   Wound Healing % 93   Wound Assessment Epithelialization;Pink/red   Drainage Amount Moderate   Drainage Description Serosanguinous   Wound Odor None   Vickie-Wound/Incision Assessment Fragile   Edges Flat/open edges   Wound Thickness Description Full thickness

## 2022-10-05 NOTE — WOUND CARE
Discharge Instructions/Wound Orders  [facility]  [address], [address] [facility]  Telephone: [phone]    NAME:  Rubina Lora OF BIRTH:  1945  MEDICAL RECORD NUMBER:  [id number]  DATE:  10/5/2022  Ostomy Care Orders:  1) Remove old bag and clean peristomal skin with tap water & paper towels, then dry thoroughly. 2) Crust peristomal wound with stoma powder and No Sting skin prep, then cover with a small piece of alginate and thin duoderm. 3) Shape Barrier ring to size, flatten inner edge and place over stoma site. 4) Place ostomy bag cut to 30mm, secure tape edges and attach ostomy belt. 5) Change 2 x week and as needed for leaking. Supply LIst  [employer] 05593 one piece bag  Dalton 7805 2 inch barrier ring  Stoma powder  Coloplast barrier arcs #[id number]  Plain alginate dressing for wound  Extra thin duoderm 2 x 4 for wound  Ostomy belt # 6930   No Sting skin prep pads  Adhesive remover pads    Dietary:  [x] Diet as tolerated: [] Calorie Diabetic Diet:Low carb and no Sugar [] No Added Salt:[x] Increase Protein: [] Other:Limit the amount of liquid you are drinking and avoid drinking in between meals   Activity:  [x] Activity as tolerated:  [] Patient has no activity restrictions     [] Strict Bedrest: [] Remain off Work:     [] May return to full duty work:                                   [] Return to work with restrictions:             Return Appointment:  [x] Return Appointment: With Kelin Anders  in  2 Week(s)  [] Ordered tests:    Electronically signed Jaz Freedman RN on 10/5/2022 at 11:30 AM     Brittani Catherine 281: Should you experience any significant changes in your wound(s) or have questions about your wound care, please contact the [address] [place] [address] at [address] [place] [address] 8:00 am - 4:30.   If you need help with your wound outside these hours and cannot wait until we are again available, contact your PCP or go to the hospital emergency room. PLEASE NOTE: IF YOU ARE UNABLE TO OBTAIN WOUND SUPPLIES, CONTINUE TO USE THE SUPPLIES YOU HAVE AVAILABLE UNTIL YOU ARE ABLE TO REACH US. IT IS MOST IMPORTANT TO KEEP THE WOUND COVERED AT ALL TIMES.      CWON Signature:_______________________    Date: ___________ Time:  ____________

## 2022-10-05 NOTE — WOUND CARE
OSTOMY Assessment    Stoma Tissue Assessment: ___Post-op __x_Follow-up       Type of Diversion: ___New_x__ Established ___Revision___Permanent____Temporary    _____  Ileostomy   _____  Colostomy: ____ Ascending, ____ Transverse, _____.  Sigmoid   _____  Kori Pique   __x___  Ileal Conduit   _____  Mucous Fistula     Appearance of Ostomy:   __x_ [place] /Moist/Viable ___ Dark Red ___ Pink ___ Sloughing ___Necrotic____Pallor ____Red  ___Dry ____Moist    Stoma Size: ___30______ (mm) __x_Round ___ Daniel Ferraris ___Irregular     Stoma Height:   ____Flush ____Retracted ____Budded ____Edematous __x__Prolapse     Stoma Location  ____ Left Side          __x__Right Side     _____Umbilicus  _____Incision Line  __x__ Above Belt       ____ Below Belt    Abdominal Contours Rotund abdomen  ____ Firm ____ Flat ____Flabby ___Soft _x__Round ___ Hard ___ Other     Stoma Function: _x_Yes __No  Output:   __x__ Yellow ____Amber ____ Pink(Hematuria) ____ Tea Colored   ____ Clots ____Foul Odor ____ Mucous     Peristomal skin:  Peristomal wound - see wound addendum  ___ Intact ___ Irritant Dermatitis ___ Allergic Contact Dermatitis ___Candidiasis ___Caput Medusae ___Folliculitis ___Mechanical Trauma ___Mucosal Transplantation    ___Pyoderma Gangrenosum ___Hyperplasia ___Radiation Trauma ___Allergies mpling  ___ Dimpling ____Blistered ____Fragile ____Macerated ___Peeling  ___Ulceration  ___ Fungal ___Peristomal Hernia ___Non-blanchable ___ Hypergranulation      Stoma Complications:   __Excessive Bleeding __Ischemia __ Abscess __Necrosis __Prolapse   _x_Hernia __ Retraction __Stenosis __Mucosal Separation __Melanosis Coli   __Laceration __Other     Application for Patient    Wafer and pouch used during assessment and education __Dalton [address]138__________    Pouch System Recommended:   _x_One-Piece __Two-Piece ___Custom     Flat:   ___Pre-cut   _x__Cut-to fit     Convexity: ___Shallow ___Deep___ Flexible ___Creative Convexity   ___Pre-cut __x_Cut to Fit     Accessory Products   _x_ [employer] _x_Adhesive Remover Wipes _x_Barrier [employer] __Barrier Wipes   __Deodorizer __Liquid Adhesive __ Paste _x_ Powder _x_Strip   _x_ Support Belt __Tape      Education for Patient & Family  1. Booklet of information on specific ostomy given and some verbal discussion of patients ostomy and expectations. 2. Instruction/demostration of ostomy wafer & pouch change. 3. Discuss concerns/ answer questions. 4. Provide follow up education in clinic if needed.        PICTURE INSERT:

## 2022-10-10 ENCOUNTER — HOSPITAL ENCOUNTER (OUTPATIENT)
Dept: INFUSION THERAPY | Age: 77
Discharge: HOME | End: 2022-10-10
Payer: MEDICARE

## 2022-10-10 ENCOUNTER — APPOINTMENT (OUTPATIENT)
Dept: INFUSION THERAPY | Age: 77
End: 2022-10-10
Payer: MEDICARE

## 2022-10-10 VITALS
DIASTOLIC BLOOD PRESSURE: 61 MMHG | HEART RATE: 89 BPM | TEMPERATURE: 97.8 F | RESPIRATION RATE: 18 BRPM | OXYGEN SATURATION: 96 % | SYSTOLIC BLOOD PRESSURE: 123 MMHG

## 2022-10-10 DIAGNOSIS — C67.9 MALIGNANT NEOPLASM OF URINARY BLADDER, UNSPECIFIED SITE (HCC): Primary | ICD-10-CM

## 2022-10-10 LAB
ALBUMIN SERPL-MCNC: 3.8 G/DL (ref 3.5–5)
ALBUMIN/GLOB SERPL: 1.2 {RATIO} (ref 1.1–2.2)
ALP SERPL-CCNC: 36 U/L (ref 45–117)
ALT SERPL-CCNC: 26 U/L (ref 12–78)
ANION GAP SERPL CALC-SCNC: 5 MMOL/L (ref 5–15)
AST SERPL-CCNC: 13 U/L (ref 15–37)
BASOPHILS # BLD: 0.1 K/UL (ref 0–0.1)
BASOPHILS NFR BLD: 1 % (ref 0–1)
BILIRUB SERPL-MCNC: 0.5 MG/DL (ref 0.2–1)
BUN SERPL-MCNC: 43 MG/DL (ref 6–20)
BUN/CREAT SERPL: 20 (ref 12–20)
CALCIUM SERPL-MCNC: 9.5 MG/DL (ref 8.5–10.1)
CHLORIDE SERPL-SCNC: 111 MMOL/L (ref 97–108)
CO2 SERPL-SCNC: 25 MMOL/L (ref 21–32)
CREAT SERPL-MCNC: 2.15 MG/DL (ref 0.7–1.3)
DIFFERENTIAL METHOD BLD: ABNORMAL
EOSINOPHIL # BLD: 0.4 K/UL (ref 0–0.4)
EOSINOPHIL NFR BLD: 5 % (ref 0–7)
ERYTHROCYTE [DISTWIDTH] IN BLOOD BY AUTOMATED COUNT: 13.1 % (ref 11.5–14.5)
GLOBULIN SER CALC-MCNC: 3.2 G/DL (ref 2–4)
GLUCOSE SERPL-MCNC: 97 MG/DL (ref 65–100)
HCT VFR BLD AUTO: 36.4 % (ref 36.6–50.3)
HGB BLD-MCNC: 12 G/DL (ref 12.1–17)
IMM GRANULOCYTES # BLD AUTO: 0 K/UL (ref 0–0.04)
IMM GRANULOCYTES NFR BLD AUTO: 0 % (ref 0–0.5)
LYMPHOCYTES # BLD: 0.8 K/UL (ref 0.8–3.5)
LYMPHOCYTES NFR BLD: 12 % (ref 12–49)
MCH RBC QN AUTO: 35.2 PG (ref 26–34)
MCHC RBC AUTO-ENTMCNC: 33 G/DL (ref 30–36.5)
MCV RBC AUTO: 106.7 FL (ref 80–99)
MONOCYTES # BLD: 0.6 K/UL (ref 0–1)
MONOCYTES NFR BLD: 9 % (ref 5–13)
NEUTS SEG # BLD: 4.7 K/UL (ref 1.8–8)
NEUTS SEG NFR BLD: 73 % (ref 32–75)
NRBC # BLD: 0 K/UL (ref 0–0.01)
NRBC BLD-RTO: 0 PER 100 WBC
PLATELET # BLD AUTO: 173 K/UL (ref 150–400)
PMV BLD AUTO: 8.5 FL (ref 8.9–12.9)
POTASSIUM SERPL-SCNC: 4.3 MMOL/L (ref 3.5–5.1)
PROT SERPL-MCNC: 7 G/DL (ref 6.4–8.2)
RBC # BLD AUTO: 3.41 M/UL (ref 4.1–5.7)
SODIUM SERPL-SCNC: 141 MMOL/L (ref 136–145)
WBC # BLD AUTO: 6.5 K/UL (ref 4.1–11.1)

## 2022-10-10 PROCEDURE — 85025 COMPLETE CBC W/AUTO DIFF WBC: CPT

## 2022-10-10 PROCEDURE — 80053 COMPREHEN METABOLIC PANEL: CPT

## 2022-10-10 PROCEDURE — 36415 COLL VENOUS BLD VENIPUNCTURE: CPT

## 2022-10-11 ENCOUNTER — APPOINTMENT (OUTPATIENT)
Dept: INFUSION THERAPY | Age: 77
End: 2022-10-11

## 2022-10-11 ENCOUNTER — OFFICE VISIT (OUTPATIENT)
Dept: ONCOLOGY | Age: 77
End: 2022-10-11
Payer: MEDICARE

## 2022-10-11 ENCOUNTER — HOSPITAL ENCOUNTER (OUTPATIENT)
Dept: INFUSION THERAPY | Age: 77
Discharge: HOME | End: 2022-10-11
Payer: MEDICARE

## 2022-10-11 VITALS
WEIGHT: 236 LBS | SYSTOLIC BLOOD PRESSURE: 128 MMHG | RESPIRATION RATE: 18 BRPM | OXYGEN SATURATION: 94 % | HEART RATE: 82 BPM | DIASTOLIC BLOOD PRESSURE: 69 MMHG | TEMPERATURE: 98.3 F | BODY MASS INDEX: 32.92 KG/M2

## 2022-10-11 VITALS
BODY MASS INDEX: 32.92 KG/M2 | DIASTOLIC BLOOD PRESSURE: 80 MMHG | RESPIRATION RATE: 16 BRPM | WEIGHT: 236 LBS | TEMPERATURE: 98.3 F | SYSTOLIC BLOOD PRESSURE: 142 MMHG | HEART RATE: 82 BPM

## 2022-10-11 DIAGNOSIS — R21 RASH: ICD-10-CM

## 2022-10-11 DIAGNOSIS — C67.9 MALIGNANT NEOPLASM OF URINARY BLADDER, UNSPECIFIED SITE (HCC): Primary | ICD-10-CM

## 2022-10-11 DIAGNOSIS — Z95.828 PORT-A-CATH IN PLACE: ICD-10-CM

## 2022-10-11 DIAGNOSIS — Z51.12 ENCOUNTER FOR ANTINEOPLASTIC IMMUNOTHERAPY: ICD-10-CM

## 2022-10-11 PROCEDURE — 96413 CHEMO IV INFUSION 1 HR: CPT

## 2022-10-11 PROCEDURE — G8427 DOCREV CUR MEDS BY ELIG CLIN: HCPCS | Performed by: INTERNAL MEDICINE

## 2022-10-11 PROCEDURE — G8432 DEP SCR NOT DOC, RNG: HCPCS | Performed by: INTERNAL MEDICINE

## 2022-10-11 PROCEDURE — 77030012965 HC NDL HUBR BBMI -A

## 2022-10-11 PROCEDURE — G0463 HOSPITAL OUTPT CLINIC VISIT: HCPCS | Performed by: INTERNAL MEDICINE

## 2022-10-11 PROCEDURE — G8752 SYS BP LESS 140: HCPCS | Performed by: INTERNAL MEDICINE

## 2022-10-11 PROCEDURE — G8536 NO DOC ELDER MAL SCRN: HCPCS | Performed by: INTERNAL MEDICINE

## 2022-10-11 PROCEDURE — G8417 CALC BMI ABV UP PARAM F/U: HCPCS | Performed by: INTERNAL MEDICINE

## 2022-10-11 PROCEDURE — 1123F ACP DISCUSS/DSCN MKR DOCD: CPT | Performed by: INTERNAL MEDICINE

## 2022-10-11 PROCEDURE — 1101F PT FALLS ASSESS-DOCD LE1/YR: CPT | Performed by: INTERNAL MEDICINE

## 2022-10-11 PROCEDURE — 74011250636 HC RX REV CODE- 250/636: Performed by: INTERNAL MEDICINE

## 2022-10-11 PROCEDURE — 99214 OFFICE O/P EST MOD 30 MIN: CPT | Performed by: INTERNAL MEDICINE

## 2022-10-11 PROCEDURE — G8754 DIAS BP LESS 90: HCPCS | Performed by: INTERNAL MEDICINE

## 2022-10-11 PROCEDURE — 74011000258 HC RX REV CODE- 258: Performed by: INTERNAL MEDICINE

## 2022-10-11 PROCEDURE — 74011000250 HC RX REV CODE- 250: Performed by: INTERNAL MEDICINE

## 2022-10-11 RX ORDER — HEPARIN 100 UNIT/ML
300-500 SYRINGE INTRAVENOUS AS NEEDED
Status: ACTIVE | OUTPATIENT
Start: 2022-10-11 | End: 2022-10-11

## 2022-10-11 RX ORDER — SODIUM CHLORIDE 9 MG/ML
25 INJECTION, SOLUTION INTRAVENOUS CONTINUOUS
Status: DISPENSED | OUTPATIENT
Start: 2022-10-11 | End: 2022-10-11

## 2022-10-11 RX ORDER — SODIUM CHLORIDE 9 MG/ML
10 INJECTION INTRAMUSCULAR; INTRAVENOUS; SUBCUTANEOUS AS NEEDED
Status: ACTIVE | OUTPATIENT
Start: 2022-10-11 | End: 2022-10-11

## 2022-10-11 RX ORDER — SODIUM CHLORIDE 0.9 % (FLUSH) 0.9 %
10 SYRINGE (ML) INJECTION AS NEEDED
Status: DISPENSED | OUTPATIENT
Start: 2022-10-11 | End: 2022-10-11

## 2022-10-11 RX ORDER — PREDNISONE 10 MG/1
10 TABLET ORAL
Qty: 30 TABLET | Refills: 6 | Status: SHIPPED | OUTPATIENT
Start: 2022-10-11

## 2022-10-11 RX ADMIN — SODIUM CHLORIDE, PRESERVATIVE FREE 10 ML: 5 INJECTION INTRAVENOUS at 12:45

## 2022-10-11 RX ADMIN — HEPARIN 500 UNITS: 100 SYRINGE at 12:45

## 2022-10-11 RX ADMIN — SODIUM CHLORIDE, PRESERVATIVE FREE 10 ML: 5 INJECTION INTRAVENOUS at 12:10

## 2022-10-11 RX ADMIN — SODIUM CHLORIDE 240 MG: 900 INJECTION, SOLUTION INTRAVENOUS at 12:10

## 2022-10-11 RX ADMIN — SODIUM CHLORIDE 25 ML/HR: 9 INJECTION, SOLUTION INTRAVENOUS at 12:09

## 2022-10-11 NOTE — PROGRESS NOTES
Guillermina Mariscal is a 69 y.o. male    Chief Complaint   Patient presents with    Follow-up     Muscle invasive bladder cancer- Mixed histology       1. Have you been to the ER, urgent care clinic since your last visit? Hospitalized since your last visit? No    2. Have you seen or consulted any other health care providers outside of the [address] [place] [address] since your last visit? Include any pap smears or colon screening.  No

## 2022-10-11 NOTE — PROGRESS NOTES
[facility] [place] [facility] [place] [address] [address], [address]  W: [phone]  F: [phone]    Reason for Visit:   Ade Orr is a 69 y.o. male who is seen in follow-up of Muscle invasive bladder cancer- Mixed histology    Treatment History:   3/1/2021: CT IVP: Multiple abdominal, iliac, mediastinal nodes unchanged from 2019 consistent with h/o sarcoidosis, Thickened R lateral bladder wall. 6/23/2021: Cystoscopy and TURBT- Papillary changes were noted within the prostatic urethra ,  papillary tumors seen emanating from the surface of the left hemitrigone, There were also diffuse changes in the floor of the bladder- Low grade urothelial carcinoma of the prostatic urethra, R lateral bladder wall with HG Muscle invasive urothelial carcinoma and squamous differentiation+ CIS, Left brenda trigone HG non invasive urothelial carcinoma  8/5/21- 10/21/2021: Cisplatin + Gemzar x 4   9/14/2021: CT was stable. 12/6/2021: Radical urethro-cystoprostatectomy   2/3/22: Adjuvant nivolumab  4/22/2022: CT CHATO   8/2022 CT CAP CHATO     History of Present Illness:   Patient is a 69 y.o. male with PMH as below including sarcoidosis who is seen for evaluation of bladder cancer. He has a history of nonmuscle invasive bladder cancer in 2015, underwent 2 induction courses of BCG, had a non muscle invasive recurrence in 2019 and had re induction with BCG. Cystoscopy 6/2020 at [facility] did not show any recurrence. In March 2021 he had a positive FISH and was seen by Dr. Jory Ortiz. Had a CT IVP 3/2021 which showed some Bladder thickening. 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 Grade 4 neutropenia after cycle 1. Completed 4 cycles and seen after surgery done 12/6/2021. He comes today for follow up on Nivolumab. Rash is about the same.  Taking prednisone 10mg daily. Denies SOB, CP, cough, fevers/chills, bleeding. No complaints. Past Medical History:   Diagnosis Date    Arrhythmia     LBBB    Arthritis     Asthma     MILD    Cancer ([facility] [address]     scc top of head and nose    Cancer ([facility] [address]     BLADDER    COVID-19 vaccine series completed     [address] 21 AND 21    Depression with anxiety     Hypertension     Other unknown and unspecified cause of morbidity or mortality     history of pulmonary sarcoid     Posterior cervical adenopathy, benign 2018    Pulmonary sarcoidosis ([facility] [address]     Unspecified sleep apnea     cpap      Past Surgical History:   Procedure Laterality Date    HX CATARACT REMOVAL Bilateral     HX HERNIA REPAIR Right AS A CHILD    INGUINAL    HX HERNIA REPAIR Left     INGUIBAL    HX KNEE REPLACEMENT Left     HX ORTHOPAEDIC Right     BONE SPURS REMOVED FROM FOOT    HX OTHER SURGICAL      scc removed top of head and nose    HX OTHER SURGICAL  2005    benign lump removed from thyroid    HX OTHER SURGICAL Right 2020    SKIN CANCER RELMOVED FROM LEG    HX UROLOGICAL  2020    CYSTO    IR INSERT TUNL CVC W PORT OVER 5 YEARS  2021    CA CARDIAC SURG PROCEDURE UNLIST  2021    CARDIAC CATH    CA REVISE KNEE JOINT REPLACE,ALL PARTS Left       Social History     Tobacco Use    Smoking status: Former     Packs/day: 0.50     Years: 2.00     Pack years: 1.00     Types: Cigarettes     Quit date: 1965     Years since quittin.1     Passive exposure: Never    Smokeless tobacco: Never   Substance Use Topics    Alcohol use:  Yes     Alcohol/week: 2.0 standard drinks     Types: 2 Glasses of wine per week     Comment: DAILY      Family History   Problem Relation Age of Onset    Cancer Mother         breast with mets    Dementia Mother     Heart Disease Father     Cancer Sister         breast    Lung Disease Brother     No Known Problems Brother     Anesth Problems Neg Hx      Current Outpatient Medications Medication Sig    lidocaine-prilocaine (EMLA) topical cream Apply  to affected area as needed for Pain. Apply a thick layer over port a cath 30-60 minutes prior to port access    predniSONE (DELTASONE) 10 mg tablet Take 10 mg by mouth daily (with breakfast). multivitamin (ONE A DAY) tablet Take 1 Tablet by mouth daily. metoprolol tartrate (LOPRESSOR) 25 mg tablet Take 0.5 Tablets by mouth every twelve (12) hours. zolpidem (AMBIEN) 5 mg tablet Take 5 mg by mouth nightly as needed for Sleep. buPROPion SR (WELLBUTRIN SR) 100 mg SR tablet Take 100 mg by mouth daily. acetaminophen (TYLENOL) 500 mg tablet Take 1,000 mg by mouth every six (6) hours as needed for Pain.    simvastatin (ZOCOR) 20 mg tablet Take 20 mg by mouth nightly. escitalopram oxalate (Lexapro) 10 mg tablet Take 10 mg by mouth daily. pantoprazole (PROTONIX) 40 mg tablet Take 1 Tablet by mouth daily. No current facility-administered medications for this visit. Facility-Administered Medications Ordered in Other Visits   Medication Dose Route Frequency    0.9% sodium chloride infusion  25 mL/hr IntraVENous CONTINUOUS    nivolumab (OPDIVO) 240 mg in 0.9% sodium chloride 100 mL, overfill volume 10 mL IVPB  240 mg IntraVENous ONCE    sodium chloride (NS) flush 10 mL  10 mL IntraVENous PRN    0.9% sodium chloride injection 10 mL  10 mL IntraVENous PRN    heparin (porcine) pf 300-500 Units  300-500 Units InterCATHeter PRN      Allergies   Allergen Reactions    Pcn [Penicillins] Hives     Patient screened for any delayed non-IgE-mediated reaction to PCN. Patient notes the following:    NO SEVERE NON-IGE MEDIATED REACTIONS        Review of Systems: A complete review of systems was obtained, negative except as described above.     Physical Exam:     Visit Vitals  /69 (BP 1 Location: Left upper arm, BP Patient Position: Sitting)   Pulse 82   Temp 98.3 °F (36.8 °C)   Resp 18   Wt 236 lb (107 kg)   SpO2 94%   BMI 32.92 kg/m² ECOG PS: 1  General: No distress  Eyes: PERRL, anicteric sclerae  HENT: Atraumatic   Neck: Supple  Skin: resolving macular erythematous rash noted on b/l arms & chest   GI: Has a urostomy bag  Psych: Alert, oriented, appropriate affect, normal judgment/insight    Results:     Lab Results   Component Value Date/Time    WBC 6.5 10/10/2022 10:45 AM    HGB 12.0 (L) 10/10/2022 10:45 AM    HCT 36.4 (L) 10/10/2022 10:45 AM    PLATELET 148 42/06/2603 10:45 AM    .7 (H) 10/10/2022 10:45 AM    ABS. NEUTROPHILS 4.7 10/10/2022 10:45 AM     Lab Results   Component Value Date/Time    Sodium 141 10/10/2022 10:45 AM    Potassium 4.3 10/10/2022 10:45 AM    Chloride 111 (H) 10/10/2022 10:45 AM    CO2 25 10/10/2022 10:45 AM    Glucose 97 10/10/2022 10:45 AM    BUN 43 (H) 10/10/2022 10:45 AM    Creatinine 2.15 (H) 10/10/2022 10:45 AM    GFR est AA 41 (L) 09/26/2022 10:45 AM    GFR est non-AA 34 (L) 09/26/2022 10:45 AM    Calcium 9.5 10/10/2022 10:45 AM    Glucose (POC) 143 (H) 12/11/2021 01:02 AM     Lab Results   Component Value Date/Time    Bilirubin, total 0.5 10/10/2022 10:45 AM    ALT (SGPT) 26 10/10/2022 10:45 AM    Alk. phosphatase 36 (L) 10/10/2022 10:45 AM    Protein, total 7.0 10/10/2022 10:45 AM    Albumin 3.8 10/10/2022 10:45 AM    Globulin 3.2 10/10/2022 10:45 AM       External   Records reviewed and summarized above.   Pathology report(s) reviewed    12/21/2021     SPECIMEN       Procedure: Radical urethro-cystoprostatectomy       TUMOR       Tumor Site:                Vera Dill, Right lateral wall, Left   Ureteral                                  orifice, Right bladder neck        Histologic Type:     Papillary urothelial carcinoma, invasive and   non-invasive, and carcinoma in situ       Histologic Grade:                High-grade       Tumor Size: Cannot be determined:      Multiple tumors       Tumor Extension:           Tumor invades adjacent structures -   Prostate (transmural invasion from the bladder   tumor)       Lymphovascular Invasion:      Present       Tumor Configuration:           Papillary, Flat       MARGINS       Margins:                     Uninvolved by invasive carcinoma and   carcinoma in situ /                                  noninvasive urothelial carcinoma    LYMPH NODES       Number of Lymph Nodes Involved:      2           Size of Largest Metastatic Deposit:      1 cm               Site:                     Left Pelvic Lymph Nodes           Extranodal Extension:           Present       Number of Lymph Nodes Examined:      5     PATHOLOGIC STAGE CLASSIFICATION (pTNM, AJCC 8th Edition)       TNM Descriptors:                m (multiple primary tumors)       Primary Tumor (pT):                pT4a       Regional Lymph Nodes (pN):           pN2     Carcinoma in situ URETHRA: RESECTION       Tumor Site:                     Urethra       SPECIMEN       Procedure:                      Total urethrectomy    TUMOR       Tumor Site:                     Prostatic urethra       Histologic Type:                       Papillary urothelial carcinoma,   invasive       Histologic Grade:                     High-grade       Tumor Extension:                Tumor invades adjacent structures -   Prostate       Lymphovascular Invasion:           Present       MARGINS       Margins:                          Uninvolved by invasive carcinoma and   carcinoma in situ /                                       noninvasive urothelial carcinoma       LYMPH NODES (These represent the same lymph nodes reported in the BLADDER   Resection Template)       Number of Lymph Nodes Involved:      2           Size of Largest Metastatic Deposit:      1 cm               Site:                     Left Pelvic Lymph node       Number of Lymph Nodes Examined:      5      PATHOLOGIC STAGE CLASSIFICATION (pTNM, AJCC 8th Edition)       TNM Descriptors:                m (multiple primary tumors within the prostatic urethra)       Primary Tumor (pT):                pT2       Regional Lymph Nodes (pN):           pN2                     4.  Right pelvic lymph nodes, lymph node dissection:        One benign lymph node with noncaseating granulomatous inflammation,   compatible with patients history of sarcoidosis   One benign lymph node, no histopathologic changes   No tumor seen     5. Left pelvic lymph nodes, lymph node dissection:        Metastatic urothelial carcinoma in two of three lymph nodes, 1 cm in   greatest dimension with extracapsular extension   Noncaseating granulomatous inflammation, compatible with patient's history   of sarcoidosis     Radiology report(s) reviewed above. CT CAP 7/19/21:   IMPRESSION  1. While the bladder is decompressed by Juarez is thick-walled and irregular  2. No evidence of metastatic disease to the abdomen or pelvis  3. Extensive bilateral hilar and mediastinal adenopathy with patchy perihilar  airspace disease with associated nodular peribronchial cuffing. Findings can be  seen with sarcoidosis. Differentiating adenopathy from sarcoidosis for  metastatic disease is difficult       9/2021 CT     IMPRESSION     1. Mild bladder wall thickening posteriorly and along the right lateral bladder  wall, nonspecific. No evidence of metastatic disease within the chest, abdomen,  or pelvis. 2. Extensive mediastinal and bilateral hilar lymphadenopathy with lymph node  calcifications, most compatible with known sarcoidosis. No significant change. 3. Bilateral perihilar airspace opacities have improved. No new pulmonary  pathology identified. 4. Severe diverticulosis of the colon. No definite superimposed acute  Diverticulitis. 12/11/2021     IMPRESSION     1. Interval radical cystoprostatectomy with ileal conduit urinary diversion. 2. No postoperative complication identified. 3. Dense bilateral nephrograms in this patient who is status post contrast  injection the previous day. This suggests ATN. 4/2022      IMPRESSION     Status post radical cystoprostatectomy with ileal conduit urinary diversion. Mild left-sided hydronephrosis status post removal of bilateral nephroureteral  stents. Chronic pulmonary parenchymal scarring and nodularity, not significantly  changed. No definitive evidence of new/recurrent metastatic disease in the chest, abdomen  or pelvis. Assessment:   1) Muscle invasive bladder cancer- With squamous differentiation     [id number]  S/p 4 cycles of NAC with Cisplatin+ gemzar complicated by grade 4 neutropenia  S/P Radical urethro-cystoprostatectomy 12/6/2021- pT4N2 (invaded prostate), HG papillar, +LVI  Path stage-  Stage IIIB  Disease at high risk for local and distant recurrence  Margins negative but nodes had EPE    CT scans 8/2022 reviewed and CHATO  Developed post cycle 4 grade 2 immune-mediated rash; HD prednisone initiated 5/19/22 and tapered off to 10 mg when grade 1. Has not recurred. 2) Urethral TCC  S/P Total urethrectomy 12/6  HG Papillary [id number]  Margins negative  EPE  Present  See above    Has been evaluated by Rad Onc to discuss whether RT is indictated at a later date    1) Sarcoidosis  Follows with pulmonary  Stable     4) Obesity    5) Anemia  Likely secondary to CKD   Status post injectafer x 1 on 3/11/2022.     He was also started on Retacrit 10,000 units on 3/3/2022   Improved     6) immune mediated rash  Was grade 2, now grade 1   Improved with steroids  Continue Prednisone 10mg daily     7) Encounter for high risk medication like immunotherapy  Rash has improved to grade 1  Continue prednisone 10mg daily   Now on q2 week dosing     Plan:     Continue Nivolumab every 2-week dosing   10mg prednisone   CBC, CMP, TSH per protocol  Reviewed recommendations from Dr. Elio Santamaria 5/9/22 visit- consider RT after completion of Nivolumab  Follow with wound clinic   See urology as scheduled   Pepcid daily  Continue Retacrit 10,000 units monthly  Goal hemoglobin is 10 g or less. Hold if hemoglobin more than 10 g/dL  Repeat imaging end of Jan 2023    RTC 1 month  OPIC every 2 weeks    I appreciate the opportunity to participate in [facility]. I personally saw and evaluated the patient and performed the key components of medical decision making. The history, physical exam, and documentation were performed by Gregg Newberry NP. I reviewed and verified the above documentation and modified it as needed.     Signed By: Aiden Draper MD

## 2022-10-12 ENCOUNTER — HOSPITAL ENCOUNTER (OUTPATIENT)
Dept: MRI IMAGING | Age: 77
Discharge: HOME | End: 2022-10-12
Payer: MEDICARE

## 2022-10-12 DIAGNOSIS — M47.812 CERVICAL SPONDYLOSIS: ICD-10-CM

## 2022-10-12 PROCEDURE — 72141 MRI NECK SPINE W/O DYE: CPT

## 2022-10-17 RX ORDER — SODIUM CHLORIDE 9 MG/ML
25 INJECTION, SOLUTION INTRAVENOUS CONTINUOUS
Status: CANCELLED | OUTPATIENT
Start: 2022-10-25

## 2022-10-17 RX ORDER — ALBUTEROL SULFATE 0.83 MG/ML
2.5 SOLUTION RESPIRATORY (INHALATION) AS NEEDED
Status: CANCELLED
Start: 2022-10-25

## 2022-10-17 RX ORDER — ONDANSETRON 2 MG/ML
8 INJECTION INTRAMUSCULAR; INTRAVENOUS AS NEEDED
Status: CANCELLED | OUTPATIENT
Start: 2022-10-25

## 2022-10-17 RX ORDER — EPINEPHRINE 1 MG/ML
0.3 INJECTION, SOLUTION, CONCENTRATE INTRAVENOUS AS NEEDED
Status: CANCELLED | OUTPATIENT
Start: 2022-10-25

## 2022-10-17 RX ORDER — SODIUM CHLORIDE 0.9 % (FLUSH) 0.9 %
10 SYRINGE (ML) INJECTION AS NEEDED
Status: CANCELLED | OUTPATIENT
Start: 2022-10-25

## 2022-10-17 RX ORDER — HYDROCORTISONE SODIUM SUCCINATE 100 MG/2ML
100 INJECTION, POWDER, FOR SOLUTION INTRAMUSCULAR; INTRAVENOUS AS NEEDED
Status: CANCELLED | OUTPATIENT
Start: 2022-10-25

## 2022-10-17 RX ORDER — ACETAMINOPHEN 325 MG/1
650 TABLET ORAL AS NEEDED
Status: CANCELLED
Start: 2022-10-25

## 2022-10-17 RX ORDER — DIPHENHYDRAMINE HYDROCHLORIDE 50 MG/ML
25 INJECTION, SOLUTION INTRAMUSCULAR; INTRAVENOUS AS NEEDED
Status: CANCELLED
Start: 2022-10-25

## 2022-10-17 RX ORDER — DIPHENHYDRAMINE HYDROCHLORIDE 50 MG/ML
50 INJECTION, SOLUTION INTRAMUSCULAR; INTRAVENOUS AS NEEDED
Status: CANCELLED
Start: 2022-10-25

## 2022-10-17 RX ORDER — HEPARIN 100 UNIT/ML
300-500 SYRINGE INTRAVENOUS AS NEEDED
Status: CANCELLED
Start: 2022-10-25

## 2022-10-17 RX ORDER — SODIUM CHLORIDE 9 MG/ML
10 INJECTION INTRAMUSCULAR; INTRAVENOUS; SUBCUTANEOUS AS NEEDED
Status: CANCELLED | OUTPATIENT
Start: 2022-10-25

## 2022-10-19 ENCOUNTER — HOSPITAL ENCOUNTER (OUTPATIENT)
Dept: WOUND CARE | Age: 77
Discharge: HOME | End: 2022-10-19
Payer: MEDICARE

## 2022-10-19 VITALS
SYSTOLIC BLOOD PRESSURE: 154 MMHG | HEART RATE: 80 BPM | TEMPERATURE: 97.4 F | DIASTOLIC BLOOD PRESSURE: 94 MMHG | RESPIRATION RATE: 16 BRPM

## 2022-10-19 PROCEDURE — 99212 OFFICE O/P EST SF 10 MIN: CPT

## 2022-10-19 NOTE — WOUND CARE
Ostomy VIsit           10/19/22 1105   Wound Peristomal Right #1 04/18/22   Date First Assessed/Time First Assessed: 04/18/22 1145   Present on Hospital Admission: Yes  Wound Approximate Age at First Assessment (Weeks): 4 weeks  Primary Wound Type: Traumatic  Location: Peristomal  Wound Location Orientation: Right  Wound Desc. ..    Wound Image    Wound Etiology Traumatic   Dressing Status New dressing applied   Cleansed   (tap water)   Dressing/Treatment Hydrocolloid  (mesalt)   Wound Length (cm) 2.2 cm   Wound Width (cm) 2.4 cm   Wound Depth (cm) 0.1 cm   Wound Surface Area (cm^2) 5.28 cm^2   Change in Wound Size % 76.79   Wound Volume (cm^3) 0.528 cm^3   Wound Healing % 88   Wound Assessment Epithelialization   Drainage Amount Moderate   Drainage Description Serosanguinous   Wound Odor None   Vickie-Wound/Incision Assessment Fragile   Edges Flat/open edges   Wound Thickness Description Full thickness   Pain 1   Pain Scale 1 Numeric (0 - 10)   Pain Intensity 1 5   Patient Stated Pain Goal 0   Pain Reassessment 1 Yes   Pain Location 1 Neck   Pain Description 1 Aching   Pain Intervention(s) 1 Medication (see MAR)

## 2022-10-19 NOTE — WOUND CARE
Clinical Level of Care Assessment    Outpatient Ostomy Care      NAME:  Rubina Lora OF BIRTH:  1945  MEDICAL RECORD NUMBER:  [id number]   DATE:  10/19/2022      Patient Rosalind Foss Assessment- Document in Flowsheet I&O   Points   Review of chart []   0   Assess Complete Ostomy tab in Navigator for assessment of; stoma status, peristomal skin, presence of hernia/stool consistency/diet/related medications   Simple adjustments to pouch size/pouch system, new stoma pattern, accessory addition/deletion. []   1   Assess Complete Ostomy tab in Navigator for assessment of; stoma status, peristomal skin, presence of hernia/stool consistency/diet/related medications   Moderate adjustments to pouch size/pouch system, new stoma pattern, accessory addition/deletion. Observe patient/caregiver with hands-on care. 1-2 adjustments to pouch size/system/skin care/accessory addition or deletion. [x]   2   Assess Complete Ostomy tab in Navigator for assessment of; stoma status, peristomal skin, presence of hernia/stool consistency/diet/related medications   Complex adjustments to pouch size/pouch system, new stoma pattern, accessory addition/deletion. 3 or more complex adjustments to pouch size/system/skin care/accessory addition or deletion. Observe patient/caregiver with hands-on care. Assess patient/patient abdomen for optimal pre-marked stoma site. Assess patient abdomen for type of hernia belt/accessory needed. []   3         Ambulation Status Documented in [facility] Clinical Note  Status Definition Points   Independent Independently able to ambulate. Fully able (without any assistance) to get on/off exam table/chair. [x]   0   Minimal Physical Assistance Requires physical assistance of one person to ambulate and/or position patient to be examined. Includes necessary physical assistance to position lower extremities on/off stool.    []   1   Moderate Physical Assistance Requires at least one staff member to physically assist patient in ambulating into treatment room, and/or on off chair/bed. Requires assistance to bathroom. []   2   Full Assistance Requires assistance of at least two staff members to transfer patient into treatment room and/or on/off bed/chair. \"Total Transfer\". Unable to use bathroom requires bedside commode and/or bedpan []   3       Teaching Effort Documented in [facility] Clinical Note  Effort Definition Points   No Teaching  []   0   General Initial/Simple lesson:  Assess readiness to learn, assess patient learning style to determine educational flow/special needs for learning. Teaching related to 1-3 topics  Documentation in [facility] completed. []   1   Intermediate Assess readiness to learn, assess patient learning style to determine educational flow/special needs for learning. Teaching related to 3-4 topics. Hernia belt application and care considerations  Documentation in [facility] completed. [x]   2   Complex Assess readiness to learn, assess patient learning style to determine educational flow/special needs for learning. Teaching of greater than 5 additional topics   Pre-operative ostomy education with review of written resources for patient/family/caregiver as needed. Demonstration/return demonstration of ostomy irrigation  Documentation in [facility] completed. []   3     Patient Assessment and Planning in [facility] Clinical Note   Planning Definition Points   Simple Simple pouch change procedure completed and reviewed with patient/family/caregiver   Documentation in [facility] completed. []   1   Intermediate Moderate level of follow-up needs:   Pouch change/discharge procedure revised and reviewed with patient/caregiver. Communications with outside resources; i.e. Telephone calls to Surgeon/ PCP, family/caregiver, home health, ECF. Documentation in [facility] completed.      [x]   2   Complex Complex level of instructions/changes:   Family/Caregiver learning/demonstration/return demonstration visit. Pouching/discharge procedure revised/reviewed with patient/family/caregiver. Contact with outside resources; i.e. communication with Surgeon/ PCP, home health, ECF. Contact/referral to ostomy appliance supplier for new or additional products. Review when to call [facility] or schedule follow-up visit. Referral to Emergency Department   Documentation in [facility] completed. []   3       Is this the Patient's First Visit with [facility] @ [facility]? No    Is this Patient Established to this [facility] within the last 3 years?    Yes             Clinical Level of Care      Points  0-3  Level 1 []     Points  4-6  Level 2 [x]     Points  7-8  Level 3 []     Points  9-10  Level 4 []     Points  11-12  Level 5 []       Electronically signed by Ailyn Ayoub RN on 10/19/2022 at 11:33 AM

## 2022-10-19 NOTE — WOUND CARE
OSTOMY Assessment    Stoma Tissue Assessment: ___Post-op __x_Follow-up       Type of Diversion: ___New_x__ Established ___Revision___Permanent____Temporary    _____  Ileostomy   _____  Colostomy: ____ Ascending, ____ Transverse, _____.  Sigmoid   _____  Arianna Sax   ___x__  Ileal Conduit   _____  Mucous Fistula     Appearance of Ostomy:   __x_ Jennifer Livings /Moist/Viable ___ Dark Red ___ Pink ___ Sloughing ___Necrotic____Pallor ____Red  ___Dry ____Moist    Stoma Size: _____32____ (mm) ___Round ___ [place] ___Irregular     Stoma Height:   ____Flush ____Retracted ____Budded ____Edematous __x__Prolapse     Stoma Location  ____ Left Side          __x__Right Side     _____Umbilicus  _____Incision Line  __x__ Above Belt       ____ Below Belt    Abdominal Contours  __x__ Firm ____ Flat ____Flabby ___Soft _x__Round ___ Hard ___ Other     Stoma Function: _x_Yes __No  Output:   __x__ Yellow ____Amber ____ Pink(Hematuria) ____ Tea Colored   ____ Clots ____Foul Odor ____ Mucous     Peristomal skin: Peristomal wound see Wound Flowsheet  ___ Intact ___ Irritant Dermatitis ___ Allergic Contact Dermatitis ___Candidiasis ___Caput Medusae ___Folliculitis ___Mechanical Trauma ___Mucosal Transplantation    ___Pyoderma Gangrenosum ___Hyperplasia ___Radiation Trauma ___Allergies mpling  ___ Dimpling ____Blistered ____Fragile ____Macerated ___Peeling  ___Ulceration  ___ Fungal ___Peristomal Hernia ___Non-blanchable ___ Hypergranulation      Stoma Complications:   __Excessive Bleeding __Ischemia __ Abscess __Necrosis __Prolapse   x__Hernia __ Retraction __Stenosis __Mucosal Separation __Melanosis Coli   __Laceration __Other     Application for Patient    Wafer and pouch used during assessment and education ______[place] [id number]______    Pouch System Recommended:   __One-Piece __Two-Piece ___Custom     Flat:   ___Pre-cut   ___Cut-to fit     Convexity: ___Shallow ___Deep__x_ Flexible ___Creative Convexity   ___Pre-cut __x_Cut to Fit     Accessory Products  Mesalt  _x_ Adhesive Seals __Adhesive Remover Wipes __Barrier [employer] x__Barrier Wipes   __Deodorizer __Liquid Adhesive __ Paste _x_ Powder _x_Strip   x__ Support Belt __Tape      Education for Patient & Family  1. Booklet of information on specific ostomy given and some verbal discussion of patients ostomy and expectations. 2. Instruction/demostration of ostomy wafer & pouch change. 3. Discuss concerns/ answer questions. 4. Provide follow up education in clinic if needed.        PICTURE INSERT: Attending Only

## 2022-10-19 NOTE — WOUND CARE
Discharge Instructions/Wound Orders  [facility], [address] [facility]  Telephone: [phone]    NAME:  Adonis Angeles OF BIRTH:  1945  MEDICAL RECORD NUMBER:  [id number]  DATE:  10/19/2022  Ostomy Care Orders:  1) Remove old bag and clean with tap water & paper towels, dry thoroughly. 2) Crust wound area with stoma powder and NO Sting Skin prep. 3) Cut the new dressing Mesalt to size of wound, place over wound and secure with thin duoderm. 4) Shape jossue ring to size and place over stoma. 5) Cut Ostomy bag [place] [id number] to size and place over stoma site. 6) Attach ostomy belt, change 2 x week and as needed for leaking. Dietary:  [x] Diet as tolerated: [] Calorie Diabetic Diet:Low carb and no Sugar [] No Added Salt:[] Increase Protein: [] Other:Limit the amount of liquid you are drinking and avoid drinking in between meals   Activity:  [x] Activity as tolerated:  [] Patient has no activity restrictions     [] Strict Bedrest: [] Remain off Work:     [] May return to full duty work:                                   [] Return to work with restrictions:             Return Appointment:  [x] Return Appointment: With Kelin Bishop  in  2 Week(s)  [] Ordered tests:    Electronically signed Mariia Terrazas RN on 10/19/2022 at [address] [place] [address]: Should you experience any significant changes in your wound(s) or have questions about your wound care, please contact the [address] [place] [address] at [address] [place] [address] 8:00 am - 4:30. If you need help with your wound outside these hours and cannot wait until we are again available, contact your PCP or go to the hospital emergency room. PLEASE NOTE: IF YOU ARE UNABLE TO OBTAIN WOUND SUPPLIES, CONTINUE TO USE THE SUPPLIES YOU HAVE AVAILABLE UNTIL YOU ARE ABLE TO REACH US. IT IS MOST IMPORTANT TO KEEP THE WOUND COVERED AT ALL TIMES.      CWON Signature:_______________________    Date: ___________ Time:  ____________

## 2022-10-24 ENCOUNTER — HOSPITAL ENCOUNTER (OUTPATIENT)
Dept: INFUSION THERAPY | Age: 77
Discharge: HOME | End: 2022-10-24
Payer: MEDICARE

## 2022-10-24 DIAGNOSIS — C67.9 MALIGNANT NEOPLASM OF URINARY BLADDER, UNSPECIFIED SITE (HCC): Primary | ICD-10-CM

## 2022-10-24 LAB
ALBUMIN SERPL-MCNC: 3.6 G/DL (ref 3.5–5)
ALBUMIN/GLOB SERPL: 1.2 {RATIO} (ref 1.1–2.2)
ALP SERPL-CCNC: 33 U/L (ref 45–117)
ALT SERPL-CCNC: 29 U/L (ref 12–78)
ANION GAP SERPL CALC-SCNC: 6 MMOL/L (ref 5–15)
AST SERPL-CCNC: 14 U/L (ref 15–37)
BASOPHILS # BLD: 0.1 K/UL (ref 0–0.1)
BASOPHILS NFR BLD: 1 % (ref 0–1)
BILIRUB SERPL-MCNC: 0.3 MG/DL (ref 0.2–1)
BUN SERPL-MCNC: 35 MG/DL (ref 6–20)
BUN/CREAT SERPL: 18 (ref 12–20)
CALCIUM SERPL-MCNC: 8.9 MG/DL (ref 8.5–10.1)
CHLORIDE SERPL-SCNC: 112 MMOL/L (ref 97–108)
CO2 SERPL-SCNC: 23 MMOL/L (ref 21–32)
CREAT SERPL-MCNC: 1.96 MG/DL (ref 0.7–1.3)
DIFFERENTIAL METHOD BLD: ABNORMAL
EOSINOPHIL # BLD: 0.4 K/UL (ref 0–0.4)
EOSINOPHIL NFR BLD: 7 % (ref 0–7)
ERYTHROCYTE [DISTWIDTH] IN BLOOD BY AUTOMATED COUNT: 13.2 % (ref 11.5–14.5)
GLOBULIN SER CALC-MCNC: 2.9 G/DL (ref 2–4)
GLUCOSE SERPL-MCNC: 88 MG/DL (ref 65–100)
HCT VFR BLD AUTO: 35.2 % (ref 36.6–50.3)
HGB BLD-MCNC: 11.4 G/DL (ref 12.1–17)
IMM GRANULOCYTES # BLD AUTO: 0.1 K/UL (ref 0–0.04)
IMM GRANULOCYTES NFR BLD AUTO: 1 % (ref 0–0.5)
LYMPHOCYTES # BLD: 0.7 K/UL (ref 0.8–3.5)
LYMPHOCYTES NFR BLD: 14 % (ref 12–49)
MCH RBC QN AUTO: 34.8 PG (ref 26–34)
MCHC RBC AUTO-ENTMCNC: 32.4 G/DL (ref 30–36.5)
MCV RBC AUTO: 107.3 FL (ref 80–99)
MONOCYTES # BLD: 0.5 K/UL (ref 0–1)
MONOCYTES NFR BLD: 10 % (ref 5–13)
NEUTS SEG # BLD: 3.2 K/UL (ref 1.8–8)
NEUTS SEG NFR BLD: 67 % (ref 32–75)
NRBC # BLD: 0 K/UL (ref 0–0.01)
NRBC BLD-RTO: 0 PER 100 WBC
PLATELET # BLD AUTO: 157 K/UL (ref 150–400)
PLATELET COMMENTS,PCOM: ABNORMAL
PMV BLD AUTO: 9 FL (ref 8.9–12.9)
POTASSIUM SERPL-SCNC: 4.4 MMOL/L (ref 3.5–5.1)
PROT SERPL-MCNC: 6.5 G/DL (ref 6.4–8.2)
RBC # BLD AUTO: 3.28 M/UL (ref 4.1–5.7)
RBC MORPH BLD: ABNORMAL
SODIUM SERPL-SCNC: 141 MMOL/L (ref 136–145)
TSH SERPL DL<=0.05 MIU/L-ACNC: 1.39 UIU/ML (ref 0.36–3.74)
WBC # BLD AUTO: 5 K/UL (ref 4.1–11.1)

## 2022-10-24 PROCEDURE — 36415 COLL VENOUS BLD VENIPUNCTURE: CPT

## 2022-10-24 PROCEDURE — 80053 COMPREHEN METABOLIC PANEL: CPT

## 2022-10-24 PROCEDURE — 85025 COMPLETE CBC W/AUTO DIFF WBC: CPT

## 2022-10-24 PROCEDURE — 84443 ASSAY THYROID STIM HORMONE: CPT

## 2022-10-25 ENCOUNTER — HOSPITAL ENCOUNTER (OUTPATIENT)
Dept: INFUSION THERAPY | Age: 77
Discharge: HOME | End: 2022-10-25
Payer: MEDICARE

## 2022-10-25 VITALS
HEART RATE: 78 BPM | RESPIRATION RATE: 18 BRPM | TEMPERATURE: 98.6 F | SYSTOLIC BLOOD PRESSURE: 140 MMHG | BODY MASS INDEX: 33.75 KG/M2 | WEIGHT: 242 LBS | DIASTOLIC BLOOD PRESSURE: 89 MMHG

## 2022-10-25 DIAGNOSIS — C67.9 MALIGNANT NEOPLASM OF URINARY BLADDER, UNSPECIFIED SITE (HCC): Primary | ICD-10-CM

## 2022-10-25 PROCEDURE — 96413 CHEMO IV INFUSION 1 HR: CPT

## 2022-10-25 PROCEDURE — 74011000250 HC RX REV CODE- 250: Performed by: INTERNAL MEDICINE

## 2022-10-25 PROCEDURE — 74011250636 HC RX REV CODE- 250/636: Performed by: INTERNAL MEDICINE

## 2022-10-25 PROCEDURE — 77030012965 HC NDL HUBR BBMI -A

## 2022-10-25 PROCEDURE — 74011000258 HC RX REV CODE- 258: Performed by: INTERNAL MEDICINE

## 2022-10-25 RX ORDER — DIPHENHYDRAMINE HYDROCHLORIDE 50 MG/ML
25 INJECTION, SOLUTION INTRAMUSCULAR; INTRAVENOUS AS NEEDED
Status: ACTIVE | OUTPATIENT
Start: 2022-10-25 | End: 2022-10-25

## 2022-10-25 RX ORDER — ALBUTEROL SULFATE 0.83 MG/ML
2.5 SOLUTION RESPIRATORY (INHALATION) AS NEEDED
Status: ACTIVE | OUTPATIENT
Start: 2022-10-25 | End: 2022-10-25

## 2022-10-25 RX ORDER — SODIUM CHLORIDE 0.9 % (FLUSH) 0.9 %
10 SYRINGE (ML) INJECTION AS NEEDED
Status: DISPENSED | OUTPATIENT
Start: 2022-10-25 | End: 2022-10-25

## 2022-10-25 RX ORDER — SODIUM CHLORIDE 9 MG/ML
25 INJECTION, SOLUTION INTRAVENOUS CONTINUOUS
Status: DISPENSED | OUTPATIENT
Start: 2022-10-25 | End: 2022-10-25

## 2022-10-25 RX ORDER — HYDROCORTISONE SODIUM SUCCINATE 100 MG/2ML
100 INJECTION, POWDER, FOR SOLUTION INTRAMUSCULAR; INTRAVENOUS AS NEEDED
Status: ACTIVE | OUTPATIENT
Start: 2022-10-25 | End: 2022-10-25

## 2022-10-25 RX ORDER — HEPARIN 100 UNIT/ML
300-500 SYRINGE INTRAVENOUS AS NEEDED
Status: ACTIVE | OUTPATIENT
Start: 2022-10-25 | End: 2022-10-25

## 2022-10-25 RX ORDER — ACETAMINOPHEN 325 MG/1
650 TABLET ORAL AS NEEDED
Status: ACTIVE | OUTPATIENT
Start: 2022-10-25 | End: 2022-10-25

## 2022-10-25 RX ORDER — ONDANSETRON 2 MG/ML
8 INJECTION INTRAMUSCULAR; INTRAVENOUS AS NEEDED
Status: ACTIVE | OUTPATIENT
Start: 2022-10-25 | End: 2022-10-25

## 2022-10-25 RX ORDER — SODIUM CHLORIDE 9 MG/ML
10 INJECTION INTRAMUSCULAR; INTRAVENOUS; SUBCUTANEOUS AS NEEDED
Status: ACTIVE | OUTPATIENT
Start: 2022-10-25 | End: 2022-10-25

## 2022-10-25 RX ORDER — DIPHENHYDRAMINE HYDROCHLORIDE 50 MG/ML
50 INJECTION, SOLUTION INTRAMUSCULAR; INTRAVENOUS AS NEEDED
Status: ACTIVE | OUTPATIENT
Start: 2022-10-25 | End: 2022-10-25

## 2022-10-25 RX ORDER — EPINEPHRINE 1 MG/ML
0.3 INJECTION, SOLUTION, CONCENTRATE INTRAVENOUS AS NEEDED
Status: ACTIVE | OUTPATIENT
Start: 2022-10-25 | End: 2022-10-25

## 2022-10-25 RX ADMIN — HEPARIN 500 UNITS: 100 SYRINGE at 12:22

## 2022-10-25 RX ADMIN — SODIUM CHLORIDE, PRESERVATIVE FREE 10 ML: 5 INJECTION INTRAVENOUS at 12:21

## 2022-10-25 RX ADMIN — SODIUM CHLORIDE 25 ML/HR: 9 INJECTION, SOLUTION INTRAVENOUS at 11:40

## 2022-10-25 RX ADMIN — SODIUM CHLORIDE 240 MG: 9 INJECTION, SOLUTION INTRAVENOUS at 11:47

## 2022-10-25 NOTE — PROGRESS NOTES
OPIC Short Note                       Date: 2022    Name: Bobby Calzada    MRN: [id number]         : 1945      Pt admit to [facility] for [address] ambulatory in stable condition. Assessment completed and documented in flowsheets. No acute concerns at this time. Please review pending lab results in [address] [place] [address]. Mr. Cameron Borjas vitals were reviewed prior to and after treatment. Patient Vitals for the past 12 hrs:   Temp Pulse Resp BP   10/25/22 1110 98.6 °F (37 °C) 78 18 (!) 140/89         Lab results were obtained and reviewed. Labs within parameter for treatment. Medications given:   Medications Administered       0.9% sodium chloride infusion       Admin Date  10/25/2022 Action  New Bag Dose  25 mL/hr Rate  25 mL/hr Route  IntraVENous Administered By  Boogie Xavier RN              0.9% sodium chloride injection 10 mL       Admin Date  10/25/2022 Action  Given Dose  10 mL Route  IntraVENous Administered By  Boogie Xavier RN              heparin (porcine) pf 300-500 Units       Admin Date  10/25/2022 Action  Given Dose  500 Units Route  InterCATHeter Administered By  Boogie Xavier RN              nivolumab (OPDIVO) 240 mg in 0.9% sodium chloride 100 mL, overfill volume 10 mL IVPB       Admin Date  10/25/2022 Action  New Bag Dose  240 mg Rate  268 mL/hr Route  IntraVENous Administered By  Boogie Xavier RN                    Port accessed with positive blood return. Port flushed, heparinized and de-accessed per protocol. Mr. Rhonda Hoover tolerated the infusion, and had no complaints. Mr. Rhonda Hoover was discharged from [place] [address] in stable condition and is aware of future appointments.      Future Appointments   Date Time Provider Gregorio Ibarra   2022 11:30 AM [facility] OSTOMY NURSE [facility] REG   2022 10:30 AM H3 TD LAB [facility] H   2022 11:00 AM C1 TD MED [address] H   2022 11:15 AM Wilner Bai  [address] BS AMB   2022 11:00 AM [facility]  OSTOMY NURSE [facility]   11/21/2022 10:30 AM H3 TD LAB [facility]   11/22/2022 11:00 [facility]   12/5/2022 10:30 AM H3 TD LAB [facility]    12/6/2022 11:00 AM F4 TD MED [address] [facility]       Anahi Deluca RN  October 25, 2022  3:18 PM

## 2022-10-31 RX ORDER — HYDROCORTISONE SODIUM SUCCINATE 100 MG/2ML
100 INJECTION, POWDER, FOR SOLUTION INTRAMUSCULAR; INTRAVENOUS AS NEEDED
Status: CANCELLED | OUTPATIENT
Start: 2022-11-10

## 2022-10-31 RX ORDER — ALBUTEROL SULFATE 0.83 MG/ML
2.5 SOLUTION RESPIRATORY (INHALATION) AS NEEDED
Status: CANCELLED
Start: 2022-11-10

## 2022-10-31 RX ORDER — HEPARIN 100 UNIT/ML
300-500 SYRINGE INTRAVENOUS AS NEEDED
Status: CANCELLED
Start: 2022-11-10

## 2022-10-31 RX ORDER — SODIUM CHLORIDE 9 MG/ML
10 INJECTION INTRAMUSCULAR; INTRAVENOUS; SUBCUTANEOUS AS NEEDED
Status: CANCELLED | OUTPATIENT
Start: 2022-11-10

## 2022-10-31 RX ORDER — DIPHENHYDRAMINE HYDROCHLORIDE 50 MG/ML
50 INJECTION, SOLUTION INTRAMUSCULAR; INTRAVENOUS AS NEEDED
Status: CANCELLED
Start: 2022-11-10

## 2022-10-31 RX ORDER — EPINEPHRINE 1 MG/ML
0.3 INJECTION, SOLUTION, CONCENTRATE INTRAVENOUS AS NEEDED
Status: CANCELLED | OUTPATIENT
Start: 2022-11-10

## 2022-10-31 RX ORDER — ONDANSETRON 2 MG/ML
8 INJECTION INTRAMUSCULAR; INTRAVENOUS AS NEEDED
Status: CANCELLED | OUTPATIENT
Start: 2022-11-10

## 2022-10-31 RX ORDER — SODIUM CHLORIDE 0.9 % (FLUSH) 0.9 %
10 SYRINGE (ML) INJECTION AS NEEDED
Status: CANCELLED | OUTPATIENT
Start: 2022-11-10

## 2022-10-31 RX ORDER — DIPHENHYDRAMINE HYDROCHLORIDE 50 MG/ML
25 INJECTION, SOLUTION INTRAMUSCULAR; INTRAVENOUS AS NEEDED
Status: CANCELLED
Start: 2022-11-10

## 2022-10-31 RX ORDER — ACETAMINOPHEN 325 MG/1
650 TABLET ORAL AS NEEDED
Status: CANCELLED
Start: 2022-11-10

## 2022-10-31 RX ORDER — SODIUM CHLORIDE 9 MG/ML
25 INJECTION, SOLUTION INTRAVENOUS CONTINUOUS
Status: CANCELLED | OUTPATIENT
Start: 2022-11-10

## 2022-11-02 ENCOUNTER — HOSPITAL ENCOUNTER (OUTPATIENT)
Dept: WOUND CARE | Age: 77
Discharge: HOME | End: 2022-11-02
Payer: MEDICARE

## 2022-11-02 VITALS
TEMPERATURE: 98 F | SYSTOLIC BLOOD PRESSURE: 158 MMHG | RESPIRATION RATE: 18 BRPM | DIASTOLIC BLOOD PRESSURE: 87 MMHG | HEART RATE: 81 BPM

## 2022-11-02 PROCEDURE — 99212 OFFICE O/P EST SF 10 MIN: CPT

## 2022-11-02 NOTE — WOUND CARE
Discharge Instructions/Wound Orders  [facility]  [address]  [place], [address] [facility]  Telephone: [phone]    NAME:  Fouzia Mccracken OF BIRTH:  1945  MEDICAL RECORD NUMBER:  [id number]  DATE:  11/2/2022    Ostomy Care Orders:  1) Remove old bag and clean with tap water & paper towels, dry thoroughly. 2) Crust wound area with stoma powder and NO Sting Skin prep. 3) Cut the new dressing Mesalt to size of wound, place over wound and secure with thin duoderm. 4) Shape jossue ring to size and place over stoma. 5) Cut Ostomy bag [place] [id number] to size and place over stoma site. 6) Attach ostomy belt, change 2 x week and as needed for leaking. Dietary:  [x] Diet as tolerated: [] Calorie Diabetic Diet:Low carb and no Sugar [] No Added Salt:[] Increase Protein: [] Other:Limit the amount of liquid you are drinking and avoid drinking in between meals   Activity:  [x] Activity as tolerated:  [] Patient has no activity restrictions     [] Strict Bedrest: [] Remain off Work:     [] May return to full duty work:                                   [] Return to work with restrictions:             Return Appointment:  [x] Return Appointment: With Kelin Randall  in  2 Week(s)  [] Ordered tests:    Electronically signed Praneeth Echavarria RN on 11/2/2022 at 1:03 PM     Brittani Catherine [address]: Should you experience any significant changes in your wound(s) or have questions about your wound care, please contact the [address] [place] [address] at [address] [place] [address] 8:00 am - 4:30. If you need help with your wound outside these hours and cannot wait until we are again available, contact your PCP or go to the hospital emergency room. PLEASE NOTE: IF YOU ARE UNABLE TO OBTAIN WOUND SUPPLIES, CONTINUE TO USE THE SUPPLIES YOU HAVE AVAILABLE UNTIL YOU ARE ABLE TO REACH US. IT IS MOST IMPORTANT TO KEEP THE WOUND COVERED AT ALL TIMES.      CWON Signature:_______________________    Date: ___________ Time:  ____________

## 2022-11-02 NOTE — WOUND CARE
11/02/22 1148   Wound Peristomal Right #1 04/18/22   Date First Assessed/Time First Assessed: 04/18/22 1145   Present on Hospital Admission: Yes  Wound Approximate Age at First Assessment (Weeks): 4 weeks  Primary Wound Type: Traumatic  Location: Peristomal  Wound Location Orientation: Right  Wound Desc. .. Wound Image    Wound Etiology Traumatic   Dressing Status New dressing applied   Cleansed   (tap water)   Dressing/Treatment Hydrocolloid; Alginate with Ag   Wound Length (cm) 2.4 cm   Wound Width (cm) 2.7 cm   Wound Depth (cm) 0.1 cm   Wound Surface Area (cm^2) 6.48 cm^2   Change in Wound Size % 71.52   Wound Volume (cm^3) 0.648 cm^3   Wound Healing % 86   Wound Assessment Epithelialization   Drainage Amount Moderate   Drainage Description Serosanguinous   Wound Odor None   Vickie-Wound/Incision Assessment Fragile   Edges Flat/open edges   Wound Thickness Description Full thickness   Pain 1   Pain Scale 1 Numeric (0 - 10)   Pain Intensity 1 3   Pain Location 1   (stoma)   Pain Description 1 Sore

## 2022-11-02 NOTE — WOUND CARE
Clinical Level of Care Assessment    Outpatient Ostomy Care      NAME:  Lulu Painting OF BIRTH:  1945  MEDICAL RECORD NUMBER:  [id number]   DATE:  11/2/2022      Patient Jean Niño Assessment- Document in Flowsheet I&O   Points   Review of chart []   0   Assess Complete Ostomy tab in Navigator for assessment of; stoma status, peristomal skin, presence of hernia/stool consistency/diet/related medications   Simple adjustments to pouch size/pouch system, new stoma pattern, accessory addition/deletion. []   1   Assess Complete Ostomy tab in Navigator for assessment of; stoma status, peristomal skin, presence of hernia/stool consistency/diet/related medications   Moderate adjustments to pouch size/pouch system, new stoma pattern, accessory addition/deletion. Observe patient/caregiver with hands-on care. 1-2 adjustments to pouch size/system/skin care/accessory addition or deletion. [x]   2   Assess Complete Ostomy tab in Navigator for assessment of; stoma status, peristomal skin, presence of hernia/stool consistency/diet/related medications   Complex adjustments to pouch size/pouch system, new stoma pattern, accessory addition/deletion. 3 or more complex adjustments to pouch size/system/skin care/accessory addition or deletion. Observe patient/caregiver with hands-on care. Assess patient/patient abdomen for optimal pre-marked stoma site. Assess patient abdomen for type of hernia belt/accessory needed. []   3         Ambulation Status Documented in CN Clinical Note  Status Definition Points   Independent Independently able to ambulate. Fully able (without any assistance) to get on/off exam table/chair. [x]   0   Minimal Physical Assistance Requires physical assistance of one person to ambulate and/or position patient to be examined. Includes necessary physical assistance to position lower extremities on/off stool.    []   1   Moderate Physical Assistance Requires at least one staff member to physically assist patient in ambulating into treatment room, and/or on off chair/bed. Requires assistance to bathroom. []   2   Full Assistance Requires assistance of at least two staff members to transfer patient into treatment room and/or on/off bed/chair. \"Total Transfer\". Unable to use bathroom requires bedside commode and/or bedpan []   3       Teaching Effort Documented in [facility] Clinical Note  Effort Definition Points   No Teaching  []   0   General Initial/Simple lesson:  Assess readiness to learn, assess patient learning style to determine educational flow/special needs for learning. Teaching related to 1-3 topics  Documentation in [facility] completed. []   1   Intermediate Assess readiness to learn, assess patient learning style to determine educational flow/special needs for learning. Teaching related to 3-4 topics. Hernia belt application and care considerations  Documentation in [facility] completed. [x]   2   Complex Assess readiness to learn, assess patient learning style to determine educational flow/special needs for learning. Teaching of greater than 5 additional topics   Pre-operative ostomy education with review of written resources for patient/family/caregiver as needed. Demonstration/return demonstration of ostomy irrigation  Documentation in [facility] completed. []   3     Patient Assessment and Planning in [facility] Clinical Note   Planning Definition Points   Simple Simple pouch change procedure completed and reviewed with patient/family/caregiver   Documentation in [facility] completed. []   1   Intermediate Moderate level of follow-up needs:   Pouch change/discharge procedure revised and reviewed with patient/caregiver. Communications with outside resources; i.e. Telephone calls to Surgeon/ PCP, family/caregiver, home health, ECF. Documentation in [facility] completed.      [x]   2   Complex Complex level of instructions/changes:   Family/Caregiver learning/demonstration/return demonstration visit. Pouching/discharge procedure revised/reviewed with patient/family/caregiver. Contact with outside resources; i.e. communication with Surgeon/ PCP, home health, ECF. Contact/referral to ostomy appliance supplier for new or additional products. Review when to call [facility] or schedule follow-up visit. Referral to Emergency Department   Documentation in [facility] completed. []   3       Is this the Patient's First Visit with [facility] @ [facility]? No    Is this Patient Established to this [facility] within the last 3 years?    Yes             Clinical Level of Care      Points  0-3  Level 1 []     Points  4-6  Level 2 [x]     Points  7-8  Level 3 []     Points  9-10  Level 4 []     Points  11-12  Level 5 []       Electronically signed by Dwaine Richter RN on 11/2/2022 at 12:57 PM

## 2022-11-02 NOTE — WOUND CARE
OSTOMY Assessment    Stoma Tissue Assessment: ___Post-op __x_Follow-up       Type of Diversion: ___New___ Established ___Revision___Permanent____Temporary    _____  Ileostomy   _____  Colostomy: ____ Ascending, ____ Transverse, _____.  Sigmoid   _____  Luz Maria Jumbo   ___x__  Ileal Conduit   _____  Mucous Fistula     Appearance of Ostomy:   ___ Bright Red /Moist/Viable _x__ Dark Red ___ Pink ___ Sloughing ___Necrotic____Pallor ____Red  ___Dry ____Moist    Stoma Size: __38_______ (mm) __x_Round ___ Lulla Nickolas ___Irregular     Stoma Height:   ____Flush ____Retracted ____Budded ____Edematous __x__Prolapse     Stoma Location  ____ Left Side          __x__Right Side     _____Umbilicus  _____Incision Line  __x__ Above Belt       ____ Below Belt    Abdominal Contours Rotund abdomen  ____ Firm ____ Flat ____Flabby ___Soft __x_Round ___ Hard ___ Other     Stoma Function: x__Yes __No  Output:   __x__ Yellow ____Amber ____ Pink(Hematuria) ____ Tea Colored   ____ Clots ____Foul Odor ____ Mucous     Peristomal skin: See Wound Flowsheet for peristomal wound documentation  ___ Intact __x_ Irritant Dermatitis ___ Allergic Contact Dermatitis ___Candidiasis ___Caput Medusae ___Folliculitis ___Mechanical Trauma ___Mucosal Transplantation    ___Pyoderma Gangrenosum ___Hyperplasia ___Radiation Trauma ___Allergies mpling  ___ Dimpling ____Blistered ____Fragile ____Macerated ___Peeling  ___Ulceration  ___ Fungal ___Peristomal Hernia ___Non-blanchable ___ Hypergranulation      Stoma Complications:   __Excessive Bleeding __Ischemia __ Abscess __Necrosis __Prolapse   _x_Hernia __ Retraction __Stenosis __Mucosal Separation __Melanosis Coli   __Laceration __Other     Application for Patient    Wafer and pouch used during assessment and education ____[place] [id number]________    Pouch System Recommended:   __One-Piece __Two-Piece ___Custom     Flat:   ___Pre-cut   ___Cut-to fit     Convexity: ___Shallow ___Deep__x_ Flexible ___Creative Convexity ___Pre-cut _x__Cut to [employer]     Accessory Products   _x_ Adhesive Seals __Adhesive Remover Wipes __Barrier [employer] _x_Barrier Wipes   __Deodorizer __Liquid Adhesive __ Paste _x_ Powder _x_Strip   _x_ Support Belt __Tape      Education for Patient & Family  1. Booklet of information on specific ostomy given and some verbal discussion of patients ostomy and expectations. 2. Instruction/demostration of ostomy wafer & pouch change. 3. Discuss concerns/ answer questions. 4. Provide follow up education in clinic if needed.        PICTURE INSERT:

## 2022-11-07 ENCOUNTER — HOSPITAL ENCOUNTER (OUTPATIENT)
Dept: INFUSION THERAPY | Age: 77
End: 2022-11-07

## 2022-11-08 ENCOUNTER — HOSPITAL ENCOUNTER (OUTPATIENT)
Dept: INFUSION THERAPY | Age: 77
End: 2022-11-08
Payer: MEDICARE

## 2022-11-10 ENCOUNTER — HOSPITAL ENCOUNTER (OUTPATIENT)
Dept: INFUSION THERAPY | Age: 77
Discharge: HOME | End: 2022-11-10
Payer: MEDICARE

## 2022-11-10 ENCOUNTER — OFFICE VISIT (OUTPATIENT)
Dept: ONCOLOGY | Age: 77
End: 2022-11-10
Payer: MEDICARE

## 2022-11-10 VITALS
HEART RATE: 73 BPM | RESPIRATION RATE: 18 BRPM | SYSTOLIC BLOOD PRESSURE: 150 MMHG | WEIGHT: 243.4 LBS | TEMPERATURE: 98 F | OXYGEN SATURATION: 98 % | DIASTOLIC BLOOD PRESSURE: 98 MMHG | BODY MASS INDEX: 33.95 KG/M2

## 2022-11-10 VITALS
WEIGHT: 243 LBS | TEMPERATURE: 98 F | HEART RATE: 73 BPM | BODY MASS INDEX: 33.89 KG/M2 | DIASTOLIC BLOOD PRESSURE: 73 MMHG | RESPIRATION RATE: 18 BRPM | OXYGEN SATURATION: 96 % | SYSTOLIC BLOOD PRESSURE: 122 MMHG

## 2022-11-10 DIAGNOSIS — Z95.828 PORT-A-CATH IN PLACE: ICD-10-CM

## 2022-11-10 DIAGNOSIS — Z51.12 ENCOUNTER FOR ANTINEOPLASTIC IMMUNOTHERAPY: Primary | ICD-10-CM

## 2022-11-10 DIAGNOSIS — C67.9 MALIGNANT NEOPLASM OF URINARY BLADDER, UNSPECIFIED SITE (HCC): Primary | ICD-10-CM

## 2022-11-10 DIAGNOSIS — C67.9 MALIGNANT NEOPLASM OF URINARY BLADDER, UNSPECIFIED SITE (HCC): ICD-10-CM

## 2022-11-10 DIAGNOSIS — R21 RASH: ICD-10-CM

## 2022-11-10 LAB
ALBUMIN SERPL-MCNC: 3.6 G/DL (ref 3.5–5)
ALBUMIN/GLOB SERPL: 1.2 {RATIO} (ref 1.1–2.2)
ALP SERPL-CCNC: 37 U/L (ref 45–117)
ALT SERPL-CCNC: 32 U/L (ref 12–78)
ANION GAP SERPL CALC-SCNC: 7 MMOL/L (ref 5–15)
AST SERPL-CCNC: 15 U/L (ref 15–37)
BASOPHILS # BLD: 0.1 K/UL (ref 0–0.1)
BASOPHILS NFR BLD: 1 % (ref 0–1)
BILIRUB SERPL-MCNC: 0.4 MG/DL (ref 0.2–1)
BUN SERPL-MCNC: 42 MG/DL (ref 6–20)
BUN/CREAT SERPL: 20 (ref 12–20)
CALCIUM SERPL-MCNC: 8.6 MG/DL (ref 8.5–10.1)
CHLORIDE SERPL-SCNC: 110 MMOL/L (ref 97–108)
CO2 SERPL-SCNC: 24 MMOL/L (ref 21–32)
CREAT SERPL-MCNC: 2.12 MG/DL (ref 0.7–1.3)
DIFFERENTIAL METHOD BLD: ABNORMAL
EOSINOPHIL # BLD: 0.1 K/UL (ref 0–0.4)
EOSINOPHIL NFR BLD: 2 % (ref 0–7)
ERYTHROCYTE [DISTWIDTH] IN BLOOD BY AUTOMATED COUNT: 13.2 % (ref 11.5–14.5)
GLOBULIN SER CALC-MCNC: 3.1 G/DL (ref 2–4)
GLUCOSE SERPL-MCNC: 104 MG/DL (ref 65–100)
HCT VFR BLD AUTO: 34.6 % (ref 36.6–50.3)
HGB BLD-MCNC: 11.2 G/DL (ref 12.1–17)
IMM GRANULOCYTES # BLD AUTO: 0.1 K/UL (ref 0–0.04)
IMM GRANULOCYTES NFR BLD AUTO: 1 % (ref 0–0.5)
LYMPHOCYTES # BLD: 0.4 K/UL (ref 0.8–3.5)
LYMPHOCYTES NFR BLD: 7 % (ref 12–49)
MCH RBC QN AUTO: 35.3 PG (ref 26–34)
MCHC RBC AUTO-ENTMCNC: 32.4 G/DL (ref 30–36.5)
MCV RBC AUTO: 109.1 FL (ref 80–99)
MONOCYTES # BLD: 0.4 K/UL (ref 0–1)
MONOCYTES NFR BLD: 6 % (ref 5–13)
NEUTS SEG # BLD: 5.3 K/UL (ref 1.8–8)
NEUTS SEG NFR BLD: 83 % (ref 32–75)
NRBC # BLD: 0 K/UL (ref 0–0.01)
NRBC BLD-RTO: 0 PER 100 WBC
PLATELET # BLD AUTO: 163 K/UL (ref 150–400)
PMV BLD AUTO: 8.9 FL (ref 8.9–12.9)
POTASSIUM SERPL-SCNC: 4.7 MMOL/L (ref 3.5–5.1)
PROT SERPL-MCNC: 6.7 G/DL (ref 6.4–8.2)
RBC # BLD AUTO: 3.17 M/UL (ref 4.1–5.7)
RBC MORPH BLD: ABNORMAL
SODIUM SERPL-SCNC: 141 MMOL/L (ref 136–145)
WBC # BLD AUTO: 6.4 K/UL (ref 4.1–11.1)

## 2022-11-10 PROCEDURE — 80053 COMPREHEN METABOLIC PANEL: CPT

## 2022-11-10 PROCEDURE — G8754 DIAS BP LESS 90: HCPCS | Performed by: INTERNAL MEDICINE

## 2022-11-10 PROCEDURE — 85025 COMPLETE CBC W/AUTO DIFF WBC: CPT

## 2022-11-10 PROCEDURE — 1123F ACP DISCUSS/DSCN MKR DOCD: CPT | Performed by: INTERNAL MEDICINE

## 2022-11-10 PROCEDURE — 99214 OFFICE O/P EST MOD 30 MIN: CPT | Performed by: INTERNAL MEDICINE

## 2022-11-10 PROCEDURE — G8536 NO DOC ELDER MAL SCRN: HCPCS | Performed by: INTERNAL MEDICINE

## 2022-11-10 PROCEDURE — 1101F PT FALLS ASSESS-DOCD LE1/YR: CPT | Performed by: INTERNAL MEDICINE

## 2022-11-10 PROCEDURE — G8427 DOCREV CUR MEDS BY ELIG CLIN: HCPCS | Performed by: INTERNAL MEDICINE

## 2022-11-10 PROCEDURE — G0463 HOSPITAL OUTPT CLINIC VISIT: HCPCS | Performed by: INTERNAL MEDICINE

## 2022-11-10 PROCEDURE — 3074F SYST BP LT 130 MM HG: CPT | Performed by: INTERNAL MEDICINE

## 2022-11-10 PROCEDURE — 36415 COLL VENOUS BLD VENIPUNCTURE: CPT

## 2022-11-10 PROCEDURE — G8752 SYS BP LESS 140: HCPCS | Performed by: INTERNAL MEDICINE

## 2022-11-10 PROCEDURE — G8417 CALC BMI ABV UP PARAM F/U: HCPCS | Performed by: INTERNAL MEDICINE

## 2022-11-10 PROCEDURE — 74011000250 HC RX REV CODE- 250: Performed by: INTERNAL MEDICINE

## 2022-11-10 PROCEDURE — 74011000258 HC RX REV CODE- 258: Performed by: INTERNAL MEDICINE

## 2022-11-10 PROCEDURE — 96413 CHEMO IV INFUSION 1 HR: CPT

## 2022-11-10 PROCEDURE — 74011250636 HC RX REV CODE- 250/636: Performed by: INTERNAL MEDICINE

## 2022-11-10 PROCEDURE — 77030012965 HC NDL HUBR BBMI -A

## 2022-11-10 PROCEDURE — G8432 DEP SCR NOT DOC, RNG: HCPCS | Performed by: INTERNAL MEDICINE

## 2022-11-10 PROCEDURE — 3078F DIAST BP <80 MM HG: CPT | Performed by: INTERNAL MEDICINE

## 2022-11-10 RX ORDER — PREDNISONE 10 MG/1
10 TABLET ORAL
Qty: 30 TABLET | Refills: 6 | Status: SHIPPED | OUTPATIENT
Start: 2022-11-10

## 2022-11-10 RX ORDER — HEPARIN 100 UNIT/ML
300-500 SYRINGE INTRAVENOUS AS NEEDED
Status: ACTIVE | OUTPATIENT
Start: 2022-11-10 | End: 2022-11-10

## 2022-11-10 RX ORDER — SODIUM CHLORIDE 0.9 % (FLUSH) 0.9 %
10 SYRINGE (ML) INJECTION AS NEEDED
Status: DISPENSED | OUTPATIENT
Start: 2022-11-10 | End: 2022-11-10

## 2022-11-10 RX ORDER — SODIUM CHLORIDE 9 MG/ML
25 INJECTION, SOLUTION INTRAVENOUS CONTINUOUS
Status: DISCONTINUED | OUTPATIENT
Start: 2022-11-10 | End: 2022-11-11 | Stop reason: HOSPADM

## 2022-11-10 RX ADMIN — SODIUM CHLORIDE 240 MG: 9 INJECTION, SOLUTION INTRAVENOUS at 13:19

## 2022-11-10 RX ADMIN — SODIUM CHLORIDE 25 ML/HR: 9 INJECTION, SOLUTION INTRAVENOUS at 13:15

## 2022-11-10 RX ADMIN — SODIUM CHLORIDE, PRESERVATIVE FREE 10 ML: 5 INJECTION INTRAVENOUS at 13:58

## 2022-11-10 RX ADMIN — HEPARIN 500 UNITS: 100 SYRINGE at 13:59

## 2022-11-10 RX ADMIN — SODIUM CHLORIDE, PRESERVATIVE FREE 10 ML: 5 INJECTION INTRAVENOUS at 11:15

## 2022-11-10 NOTE — PROGRESS NOTES
William Gracia is a 69 y.o. male    Chief Complaint   Patient presents with    Follow-up     Muscle invasive bladder cancer- Mixed histology       1. Have you been to the ER, urgent care clinic since your last visit? Hospitalized since your last visit? No    2. Have you seen or consulted any other health care providers outside of the [address] [place] [address] since your last visit? Include any pap smears or colon screening.  Yes, Suzanne Brush

## 2022-11-10 NOTE — PROGRESS NOTES
Cancer Reading at [place] [address] Adriana Vargas, [address] [facility], [address] [place] [address] Cheryl  W: [phone]  F: [phone]    Reason for Visit:   Ade Orr is a 69 y.o. male who is seen in follow-up of Muscle invasive bladder cancer- Mixed histology    Treatment History:   3/1/2021: CT IVP: Multiple abdominal, iliac, mediastinal nodes unchanged from 2019 consistent with h/o sarcoidosis, Thickened R lateral bladder wall. 6/23/2021: Cystoscopy and TURBT- Papillary changes were noted within the prostatic urethra ,  papillary tumors seen emanating from the surface of the left hemitrigone, There were also diffuse changes in the floor of the bladder- Low grade urothelial carcinoma of the prostatic urethra, R lateral bladder wall with HG Muscle invasive urothelial carcinoma and squamous differentiation+ CIS, Left brenda trigone HG non invasive urothelial carcinoma  8/5/21- 10/21/2021: Cisplatin + Gemzar x 4   9/14/2021: CT was stable. 12/6/2021: Radical urethro-cystoprostatectomy   2/3/22: Adjuvant nivolumab  4/22/2022: CT CHATO   8/2022 CT CAP CHATO     History of Present Illness:   Patient is a 69 y.o. male with PMH as below including sarcoidosis who is seen for evaluation of bladder cancer. He has a history of nonmuscle invasive bladder cancer in 2015, underwent 2 induction courses of BCG, had a non muscle invasive recurrence in 2019 and had re induction with BCG. [place] [facility] [place]. In March 2021 he had a positive FISH and was seen by Dr. ROSLYN Hopper. Had a CT IVP 3/2021 which showed some Bladder thickening. 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 Grade 4 neutropenia after cycle 1. Completed 4 cycles and seen after surgery done 12/6/2021. He comes today for follow up on Nivolumab. Rash is about the same.  Taking prednisone 10mg daily. Has some diarrhea day after infusion - at most had 6 episodes but this resolves quickly and then BM return to normal. No new cough, SOB, fevers/chills. No bleeding. Past Medical History:   Diagnosis Date    Arrhythmia     LBBB    Arthritis     Asthma     MILD    Cancer ([facility] [address]     scc top of head and nose    Cancer ([facility] [address]     BLADDER    COVID-19 vaccine series completed     [facility] [address] 21 AND 21    Depression with anxiety     Hypertension     Other unknown and unspecified cause of morbidity or mortality     history of pulmonary sarcoid     Posterior cervical adenopathy, benign 2018    Pulmonary sarcoidosis ([facility] [address]     Unspecified sleep apnea     cpap      Past Surgical History:   Procedure Laterality Date    HX CATARACT REMOVAL Bilateral     HX HERNIA REPAIR Right AS A CHILD    INGUINAL    HX HERNIA REPAIR Left     INGUIBAL    HX KNEE REPLACEMENT Left     HX ORTHOPAEDIC Right     BONE SPURS REMOVED FROM FOOT    HX OTHER SURGICAL      scc removed top of head and nose    HX OTHER SURGICAL      benign lump removed from thyroid    HX OTHER SURGICAL Right 2020    SKIN CANCER RELMOVED FROM LEG    HX UROLOGICAL  2020    CYSTO    IR INSERT TUNL CVC W PORT OVER 5 YEARS  2021    WI CARDIAC SURG PROCEDURE UNLIST  2021    CARDIAC CATH    WI REVISE KNEE JOINT REPLACE,ALL PARTS Left       Social History     Tobacco Use    Smoking status: Former     Packs/day: 0.50     Years: 2.00     Pack years: 1.00     Types: Cigarettes     Quit date: 1965     Years since quittin.2     Passive exposure: Never    Smokeless tobacco: Never   Substance Use Topics    Alcohol use:  Yes     Alcohol/week: 2.0 standard drinks     Types: 2 Glasses of wine per week     Comment: DAILY      Family History   Problem Relation Age of Onset    Cancer Mother         breast with mets    Dementia Mother     Heart Disease Father     Cancer Sister         breast    Lung Disease Brother     No Known Problems Brother     Anesth Problems Neg Hx      Current Outpatient Medications   Medication Sig    predniSONE (DELTASONE) 10 mg tablet Take 10 mg by mouth daily (with breakfast). lidocaine-prilocaine (EMLA) topical cream Apply  to affected area as needed for Pain. Apply a thick layer over port a cath 30-60 minutes prior to port access    multivitamin (ONE A DAY) tablet Take 1 Tablet by mouth daily. metoprolol tartrate (LOPRESSOR) 25 mg tablet Take 0.5 Tablets by mouth every twelve (12) hours. zolpidem (AMBIEN) 5 mg tablet Take 5 mg by mouth nightly as needed for Sleep. buPROPion SR (WELLBUTRIN SR) 100 mg SR tablet Take 100 mg by mouth daily. acetaminophen (TYLENOL) 500 mg tablet Take 1,000 mg by mouth every six (6) hours as needed for Pain.    simvastatin (ZOCOR) 20 mg tablet Take 20 mg by mouth nightly. escitalopram oxalate (Lexapro) 10 mg tablet Take 10 mg by mouth daily. No current facility-administered medications for this visit. Facility-Administered Medications Ordered in Other Visits   Medication Dose Route Frequency    sodium chloride (NS) flush 10 mL  10 mL IntraVENous PRN    heparin (porcine) pf 300-500 Units  300-500 Units InterCATHeter PRN      Allergies   Allergen Reactions    Pcn [Penicillins] Hives     Patient screened for any delayed non-IgE-mediated reaction to PCN. Patient notes the following:    NO SEVERE NON-IGE MEDIATED REACTIONS        Review of Systems: A complete review of systems was obtained, negative except as described above.     Physical Exam:     Visit Vitals  /73 (BP 1 Location: Left upper arm, BP Patient Position: Sitting)   Pulse 73   Temp 98 °F (36.7 °C)   Resp 18   Wt 243 lb (110.2 kg)   SpO2 96%   BMI 33.89 kg/m²     ECOG PS: 1  General: No distress  Eyes: PERRL, anicteric sclerae  HENT: Atraumatic   Neck: Supple  Skin: resolving macular erythematous rash noted on b/l arms & chest   GI: Has a urostomy bag  Psych: Alert, oriented, appropriate affect, normal judgment/insight    Results:     Lab Results   Component Value Date/Time    WBC 5.0 10/24/2022 10:37 AM    HGB 11.4 (L) 10/24/2022 10:37 AM    HCT 35.2 (L) 10/24/2022 10:37 AM    PLATELET 933 58/83/9550 10:37 AM    .3 (H) 10/24/2022 10:37 AM    ABS. NEUTROPHILS 3.2 10/24/2022 10:37 AM     Lab Results   Component Value Date/Time    Sodium 141 10/24/2022 10:37 AM    Potassium 4.4 10/24/2022 10:37 AM    Chloride 112 (H) 10/24/2022 10:37 AM    CO2 23 10/24/2022 10:37 AM    Glucose 88 10/24/2022 10:37 AM    BUN 35 (H) 10/24/2022 10:37 AM    Creatinine 1.96 (H) 10/24/2022 10:37 AM    GFR est AA 41 (L) 09/26/2022 10:45 AM    GFR est non-AA 34 (L) 09/26/2022 10:45 AM    Calcium 8.9 10/24/2022 10:37 AM    Glucose (POC) 143 (H) 12/11/2021 01:02 AM     Lab Results   Component Value Date/Time    Bilirubin, total 0.3 10/24/2022 10:37 AM    ALT (SGPT) 29 10/24/2022 10:37 AM    Alk. phosphatase 33 (L) 10/24/2022 10:37 AM    Protein, total 6.5 10/24/2022 10:37 AM    Albumin 3.6 10/24/2022 10:37 AM    Globulin 2.9 10/24/2022 10:37 AM       External   Records reviewed and summarized above.   Pathology report(s) reviewed    12/21/2021     SPECIMEN       Procedure: Radical urethro-cystoprostatectomy       TUMOR       Tumor Site:                Frida Beto, Right lateral wall, Left   Ureteral                                  orifice, Right bladder neck        Histologic Type:     Papillary urothelial carcinoma, invasive and   non-invasive, and carcinoma in situ       Histologic Grade:                High-grade       Tumor Size: Cannot be determined:      Multiple tumors       Tumor Extension:           Tumor invades adjacent structures -   Prostate                                  (transmural invasion from the bladder   tumor)       Lymphovascular Invasion:      Present       Tumor Configuration:           Papillary, Flat       MARGINS       Margins:                     Uninvolved by invasive carcinoma and   carcinoma in situ /                                  noninvasive urothelial carcinoma    LYMPH NODES       Number of Lymph Nodes Involved:      2           Size of Largest Metastatic Deposit:      1 cm               Site:                     Left Pelvic Lymph Nodes           Extranodal Extension:           Present       Number of Lymph Nodes Examined:      5     PATHOLOGIC STAGE CLASSIFICATION (pTNM, AJCC 8th Edition)       TNM Descriptors:                m (multiple primary tumors)       Primary Tumor (pT):                pT4a       Regional Lymph Nodes (pN):           pN2     Carcinoma in situ URETHRA: RESECTION       Tumor Site:                     Urethra       SPECIMEN       Procedure:                      Total urethrectomy    TUMOR       Tumor Site:                     Prostatic urethra       Histologic Type:                       Papillary urothelial carcinoma,   invasive       Histologic Grade:                     High-grade       Tumor Extension:                Tumor invades adjacent structures -   Prostate       Lymphovascular Invasion:           Present       MARGINS       Margins:                          Uninvolved by invasive carcinoma and   carcinoma in situ /                                       noninvasive urothelial carcinoma       LYMPH NODES (These represent the same lymph nodes reported in the BLADDER   Resection Template)       Number of Lymph Nodes Involved:      2           Size of Largest Metastatic Deposit:      1 cm               Site:                     Left Pelvic Lymph node       Number of Lymph Nodes Examined:      5      PATHOLOGIC STAGE CLASSIFICATION (pTNM, AJCC 8th Edition)       TNM Descriptors:                m (multiple primary tumors within the   prostatic urethra)       Primary Tumor (pT):                pT2       Regional Lymph Nodes (pN):           pN2                     4.  Right pelvic lymph nodes, lymph node dissection:        One benign lymph node with noncaseating granulomatous inflammation,   compatible with patients history of sarcoidosis   One benign lymph node, no histopathologic changes   No tumor seen     5. Left pelvic lymph nodes, lymph node dissection:        Metastatic urothelial carcinoma in two of three lymph nodes, 1 cm in   greatest dimension with extracapsular extension   Noncaseating granulomatous inflammation, compatible with patient's history   of sarcoidosis     Radiology report(s) reviewed above. CT CAP 7/19/21:   IMPRESSION  1. While the bladder is decompressed by Juarez is thick-walled and irregular  2. No evidence of metastatic disease to the abdomen or pelvis  3. Extensive bilateral hilar and mediastinal adenopathy with patchy perihilar  airspace disease with associated nodular peribronchial cuffing. Findings can be  seen with sarcoidosis. Differentiating adenopathy from sarcoidosis for  metastatic disease is difficult       9/2021 CT     IMPRESSION     1. Mild bladder wall thickening posteriorly and along the right lateral bladder  wall, nonspecific. No evidence of metastatic disease within the chest, abdomen,  or pelvis. 2. Extensive mediastinal and bilateral hilar lymphadenopathy with lymph node  calcifications, most compatible with known sarcoidosis. No significant change. 3. Bilateral perihilar airspace opacities have improved. No new pulmonary  pathology identified. 4. Severe diverticulosis of the colon. No definite superimposed acute  Diverticulitis. 12/11/2021     IMPRESSION     1. Interval radical cystoprostatectomy with ileal conduit urinary diversion. 2. No postoperative complication identified. 3. Dense bilateral nephrograms in this patient who is status post contrast  injection the previous day. This suggests ATN. 4/2022      IMPRESSION     Status post radical cystoprostatectomy with ileal conduit urinary diversion.   Mild left-sided hydronephrosis status post removal of bilateral nephroureteral  stents. Chronic pulmonary parenchymal scarring and nodularity, not significantly  changed. No definitive evidence of new/recurrent metastatic disease in the chest, abdomen  or pelvis. Assessment:   1) Muscle invasive bladder cancer- With squamous differentiation     [id number]  S/p 4 cycles of NAC with Cisplatin+ gemzar complicated by grade 4 neutropenia  S/P Radical urethro-cystoprostatectomy 12/6/2021- pT4N2 (invaded prostate), HG papillar, +LVI  Path stage-  Stage IIIB  Disease at high risk for local and distant recurrence  Margins negative but nodes had EPE    CT scans 8/2022 reviewed and CHATO  Developed post cycle 4 grade 2 immune-mediated rash; HD prednisone initiated 5/19/22 and tapered off to 10 mg when grade 1. Has not recurred. 2) Urethral TCC  S/P Total urethrectomy 12/6  HG Papillary [id number]  Margins negative  EPE  Present  See above    Has been evaluated by Rad Onc to discuss whether RT is indictated at a later date    1) Sarcoidosis  Follows with pulmonary  Stable     4) Obesity    5) Anemia  Likely secondary to CKD   Status post injectafer x 1 on 3/11/2022. He was also started on Retacrit 10,000 units on 3/3/2022   Improved     6) immune mediated rash  Was grade 2, now grade 1   Improved with steroids  Continue Prednisone 10mg daily     7) Encounter for high risk medication like immunotherapy  Rash has improved to grade 1 , grade 1-2 diarrhea x 24 hours  Continue prednisone 10mg daily   Now on q2 week dosing     Plan:     Continue Nivolumab every 2-week dosing   10mg prednisone   CBC, CMP, TSH per protocol  Reviewed recommendations from Dr. Nikkie Reyes 5/9/22 visit- consider RT after completion of Nivolumab  Follow with wound clinic   See urology as scheduled   Pepcid daily  Continue Retacrit 10,000 units monthly  Goal hemoglobin is 10 g or less.   Hold if hemoglobin more than 10 g/dL  Repeat imaging end of Jan 2023  Imodium PRN    RTC 1 month  OPIC every 2 weeks    I appreciate the opportunity to participate [facility]. I personally saw and evaluated the patient and performed the key components of medical decision making. The history, physical exam, and documentation were performed by Elayne Boas NP. I reviewed and verified the above documentation and modified it as needed.     Signed By: Margarita Adame MD

## 2022-11-10 NOTE — PROGRESS NOTES
Outpatient Infusion Center - Chemotherapy Progress Note    1110 Pt admit to [facility] for Opdivo/C13 ambulatory in stable condition. Assessment completed. No new concerns voiced. Port accessed with positive blood return. Labs drawn and sent for processing. Pt left for follow up appt. Visit Vitals  BP (!) 150/98   Pulse 73   Temp 98 °F (36.7 °C)   Resp 18   Wt 110.4 kg (243 lb 6.4 oz)   SpO2 98%   BMI 33.95 kg/m²       Medications Administered       0.9% sodium chloride infusion       Admin Date  11/10/2022 Action  New Bag Dose  25 mL/hr Rate  25 mL/hr Route  IntraVENous Administered By  Arleen Krishnan RN              heparin (porcine) pf 300-500 Units       Admin Date  11/10/2022 Action  Given Dose  500 Units Route  InterCATHeter Administered By  Arleen Krishnan RN              nivolumab (OPDIVO) 240 mg in 0.9% sodium chloride 100 mL, overfill volume 10 mL IVPB       Admin Date  11/10/2022 Action  New Bag Dose  240 mg Rate  268 mL/hr Route  IntraVENous Administered By  Arleen Krishnan RN              sodium chloride (NS) flush 10 mL       Admin Date  11/10/2022 Action  Given Dose  10 mL Route  IntraVENous Administered By  Arleen Krishnan RN               Admin Date  11/10/2022 Action  Given Dose  10 mL Route  IntraVENous Administered By  Arleen Krishnan RN             Retacrit held for HGB=11.2    Two nurses verified prior to administering:, Drug name, Drug dose, Infusion volume or drug volume when prepared in a syringe, Rate of administration, Route of administration, Expiration dates and/or times, Appearance and physical integrity of the drugs, Rate set on infusion pump, when used, Sequencing of drug administration       1400 Pt tolerated treatment well. Port maintained positive blood return throughout treatment, flushed with positive blood return at conclusion, heparinized and de-accessed. D/c home ambulatory in no distress.  Pt aware of next [facility] appointment scheduled for   Future Appointments   Date Time Provider Gregorio Ibarra   11/16/2022 11:00 AM MRM WC OSTOMY NURSE [facility]   11/21/2022 10:30 [facility]   11/22/2022 11:00 AM F4 TD MED TX [facility] H   12/5/2022 10:30 AM H3 TD LAB [facility] H   12/6/2022 11:00 AM F4 [facility]   12/6/2022 11:15 AM Alfonso Bai  N Jacob Moses BS AMB       Recent Results (from the past 12 hour(s))   CBC WITH AUTOMATED DIFF    Collection Time: 11/10/22 11:19 AM   Result Value Ref Range    WBC 6.4 4.1 - 11.1 K/uL    RBC 3.17 (L) 4.10 - 5.70 M/uL    HGB 11.2 (L) 12.1 - 17.0 g/dL    HCT 34.6 (L) 36.6 - 50.3 %    .1 (H) 80.0 - 99.0 FL    MCH 35.3 (H) 26.0 - 34.0 PG    MCHC 32.4 30.0 - 36.5 g/dL    RDW 13.2 11.5 - 14.5 %    PLATELET 677 905 - 594 K/uL    MPV 8.9 8.9 - 12.9 FL    NRBC 0.0 0  WBC    ABSOLUTE NRBC 0.00 0.00 - 0.01 K/uL    NEUTROPHILS 83 (H) 32 - 75 %    LYMPHOCYTES 7 (L) 12 - 49 %    MONOCYTES 6 5 - 13 %    EOSINOPHILS 2 0 - 7 %    BASOPHILS 1 0 - 1 %    IMMATURE GRANULOCYTES 1 (H) 0.0 - 0.5 %    ABS. NEUTROPHILS 5.3 1.8 - 8.0 K/UL    ABS. LYMPHOCYTES 0.4 (L) 0.8 - 3.5 K/UL    ABS. MONOCYTES 0.4 0.0 - 1.0 K/UL    ABS. EOSINOPHILS 0.1 0.0 - 0.4 K/UL    ABS. BASOPHILS 0.1 0.0 - 0.1 K/UL    ABS. IMM. GRANS. 0.1 (H) 0.00 - 0.04 K/UL    DF SMEAR SCANNED      RBC COMMENTS MACROCYTOSIS  1+       METABOLIC PANEL, COMPREHENSIVE    Collection Time: 11/10/22 11:19 AM   Result Value Ref Range    Sodium 141 136 - 145 mmol/L    Potassium 4.7 3.5 - 5.1 mmol/L    Chloride 110 (H) 97 - 108 mmol/L    CO2 24 21 - 32 mmol/L    Anion gap 7 5 - 15 mmol/L    Glucose 104 (H) 65 - 100 mg/dL    BUN 42 (H) 6 - 20 MG/DL    Creatinine 2.12 (H) 0.70 - 1.30 MG/DL    BUN/Creatinine ratio 20 12 - 20      eGFR 32 (L) >60 ml/min/1.73m2    Calcium 8.6 8.5 - 10.1 MG/DL    Bilirubin, total 0.4 0.2 - 1.0 MG/DL    ALT (SGPT) 32 12 - 78 U/L    AST (SGOT) 15 15 - 37 U/L    Alk.  phosphatase 37 (L) 45 - 117 U/L    Protein, total 6.7 6.4 - 8.2 g/dL    Albumin 3.6 3.5 - 5.0 g/dL    Globulin 3.1 2.0 - 4.0 g/dL    A-G Ratio 1.2 1.1 - 2.2

## 2022-11-16 ENCOUNTER — HOSPITAL ENCOUNTER (OUTPATIENT)
Dept: WOUND CARE | Age: 77
Discharge: HOME | End: 2022-11-16
Payer: MEDICARE

## 2022-11-16 VITALS
TEMPERATURE: 98.6 F | DIASTOLIC BLOOD PRESSURE: 82 MMHG | SYSTOLIC BLOOD PRESSURE: 169 MMHG | RESPIRATION RATE: 16 BRPM | HEART RATE: 80 BPM

## 2022-11-16 PROCEDURE — 99212 OFFICE O/P EST SF 10 MIN: CPT

## 2022-11-16 NOTE — WOUND CARE
OSTOMY Assessment    Stoma Tissue Assessment: ___Post-op __x_Follow-up       Type of Diversion: ___New_x__ Established ___Revision___Permanent____Temporary    _____  Ileostomy   _____  Colostomy: ____ Ascending, ____ Transverse, _____.  Sigmoid   _____  [place]   ___x__  Ileal Conduit   _____  Mucous Fistula     Appearance of Ostomy:   _x__ Denise Preston /Moist/Viable ___ Dark Red ___ Pink ___ Sloughing ___Necrotic____Pallor ____Red  ___Dry ____Moist    Stoma Size: ____38_____ (mm) _x__Round ___ Oval ___Irregular     Stoma Height:   ____Flush ____Retracted ____Budded ____Edematous __x__Prolapse     Stoma Location  ____ Left Side          __x__Right Side     _____Umbilicus  _____Incision Line  _x___ Above Belt       ____ Below Belt    Abdominal Contours  ____ Firm ____ Flat ____Flabby ___Soft _x__Round ___ Hard ___ Other     Stoma Function: x__Yes __No  Output:   __x__ Yellow ____Amber ____ Pink(Hematuria) ____ Tea Colored   ____ Clots ____Foul Odor ____ Mucous     Peristomal skin: See Flowsheet for peristomal wound documentation  ___ Intact ___ Irritant Dermatitis ___ Allergic Contact Dermatitis ___Candidiasis ___Caput Medusae ___Folliculitis ___Mechanical Trauma ___Mucosal Transplantation    ___Pyoderma Gangrenosum ___Hyperplasia ___Radiation Trauma ___Allergies mpling  ___ Dimpling ____Blistered ____Fragile ____Macerated ___Peeling  ___Ulceration  ___ Fungal ___Peristomal Hernia ___Non-blanchable ___ Hypergranulation      Stoma Complications:   __Excessive Bleeding __Ischemia __ Abscess __Necrosis __Prolapse   _x_Hernia __ Retraction __Stenosis __Mucosal Separation __Melanosis Coli   __Laceration __Other     Application for Patient    Wafer and pouch used during assessment and education ___[id number]_________    Pouch System Recommended:   _x_One-Piece __Two-Piece ___Custom     Flat:   ___Pre-cut   ___Cut-to fit     Convexity: ___Shallow ___Deep__x_ Flexible ___Creative Convexity   ___Pre-cut ___Cut to Fit     Accessory Products   _x_ [employer] __Adhesive Remover Wipes __Barrier [employer] __Barrier Wipes   __Deodorizer __Liquid Adhesive __ Paste __ Powder _x_Strip   _x_ Support Belt __Tape      Education for Patient & Family  1. Booklet of information on specific ostomy given and some verbal discussion of patients ostomy and expectations. 2. Instruction/demostration of ostomy wafer & pouch change. 3. Discuss concerns/ answer questions. 4. Provide follow up education in clinic if needed.        PICTURE INSERT:

## 2022-11-16 NOTE — WOUND CARE
11/16/22 1115   Wound Peristomal Right #1 04/18/22   Date First Assessed/Time First Assessed: 04/18/22 1145   Present on Hospital Admission: Yes  Wound Approximate Age at First Assessment (Weeks): 4 weeks  Primary Wound Type: Traumatic  Location: Peristomal  Wound Location Orientation: Right  Wound Desc. .. Wound Image    Wound Etiology Traumatic   Dressing Status New dressing applied   Cleansed   (tap water)   Dressing/Treatment Hydrocolloid; Alginate with Ag   Wound Length (cm) 1.9 cm   Wound Width (cm) 1.9 cm   Wound Depth (cm) 0.1 cm   Wound Surface Area (cm^2) 3.61 cm^2   Change in Wound Size % 84.13   Wound Volume (cm^3) 0.361 cm^3   Wound Healing % 92   Wound Assessment Epithelialization   Drainage Amount Moderate   Drainage Description Serosanguinous   Wound Odor None   Vickie-Wound/Incision Assessment Fragile   Edges Flat/open edges   Wound Thickness Description Full thickness   Pain 1   Pain Scale 1 Numeric (0 - 10)   Pain Intensity 1 0

## 2022-11-16 NOTE — WOUND CARE
Clinical Level of Care Assessment    Outpatient Ostomy Care      NAME:  Maximus Cassidy OF BIRTH:  1945  MEDICAL RECORD NUMBER:  [id number]   DATE:  11/16/2022      Patient Jeanne Ying Assessment- Document in Flowsheet I&O   Points   Review of chart []   0   Assess Complete Ostomy tab in Navigator for assessment of; stoma status, peristomal skin, presence of hernia/stool consistency/diet/related medications   Simple adjustments to pouch size/pouch system, new stoma pattern, accessory addition/deletion. []   1   Assess Complete Ostomy tab in Navigator for assessment of; stoma status, peristomal skin, presence of hernia/stool consistency/diet/related medications   Moderate adjustments to pouch size/pouch system, new stoma pattern, accessory addition/deletion. Observe patient/caregiver with hands-on care. 1-2 adjustments to pouch size/system/skin care/accessory addition or deletion. [x]   2   Assess Complete Ostomy tab in Navigator for assessment of; stoma status, peristomal skin, presence of hernia/stool consistency/diet/related medications   Complex adjustments to pouch size/pouch system, new stoma pattern, accessory addition/deletion. 3 or more complex adjustments to pouch size/system/skin care/accessory addition or deletion. Observe patient/caregiver with hands-on care. Assess patient/patient abdomen for optimal pre-marked stoma site. Assess patient abdomen for type of hernia belt/accessory needed. []   3         Ambulation Status Documented in [facility] Clinical Note  Status Definition Points   Independent Independently able to ambulate. Fully able (without any assistance) to get on/off exam table/chair. [x]   0   Minimal Physical Assistance Requires physical assistance of one person to ambulate and/or position patient to be examined. Includes necessary physical assistance to position lower extremities on/off stool.    []   1   Moderate Physical Assistance Requires at least one staff member to physically assist patient in ambulating into treatment room, and/or on off chair/bed. Requires assistance to bathroom. []   2   Full Assistance Requires assistance of at least two staff members to transfer patient into treatment room and/or on/off bed/chair. \"Total Transfer\". Unable to use bathroom requires bedside commode and/or bedpan []   3       Teaching Effort Documented in [facility] Clinical Note  Effort Definition Points   No Teaching  []   0   General Initial/Simple lesson:  Assess readiness to learn, assess patient learning style to determine educational flow/special needs for learning. Teaching related to 1-3 topics  Documentation in [facility] completed. []   1   Intermediate Assess readiness to learn, assess patient learning style to determine educational flow/special needs for learning. Teaching related to 3-4 topics. Hernia belt application and care considerations  Documentation in [facility] completed. [x]   2   Complex Assess readiness to learn, assess patient learning style to determine educational flow/special needs for learning. Teaching of greater than 5 additional topics   Pre-operative ostomy education with review of written resources for patient/family/caregiver as needed. Demonstration/return demonstration of ostomy irrigation  Documentation in [facility] completed. []   3     Patient Assessment and Planning in [facility] Clinical Note   Planning Definition Points   Simple Simple pouch change procedure completed and reviewed with patient/family/caregiver   Documentation in [facility] completed. []   1   Intermediate Moderate level of follow-up needs:   Pouch change/discharge procedure revised and reviewed with patient/caregiver. Communications with outside resources; i.e. Telephone calls to Surgeon/ PCP, family/caregiver, home health, ECF. Documentation in [facility] completed.      [x]   2   Complex Complex level of instructions/changes:   Family/Caregiver learning/demonstration/return demonstration visit. Pouching/discharge procedure revised/reviewed with patient/family/caregiver. Contact with outside resources; i.e. communication with Surgeon/ PCP, home health, ECF. Contact/referral to ostomy appliance supplier for new or additional products. Review when to call [facility] or schedule follow-up visit. Referral to Emergency Department   Documentation in [facility] completed. []   3       Is this the Patient's First Visit with [facility] @ [facility]? No    Is this Patient Established to this [facility] within the last 3 years?    Yes             Clinical Level of Care      Points  0-3  Level 1 []     Points  4-6  Level 2 [x]     Points  7-8  Level 3 []     Points  9-10  Level 4 []     Points  11-12  Level 5 []       Electronically signed by Jaz Freedman RN on 11/16/2022 at 11:30 AM

## 2022-11-21 ENCOUNTER — APPOINTMENT (OUTPATIENT)
Dept: INFUSION THERAPY | Age: 77
End: 2022-11-21

## 2022-11-22 ENCOUNTER — APPOINTMENT (OUTPATIENT)
Dept: INFUSION THERAPY | Age: 77
End: 2022-11-22

## 2022-11-28 RX ORDER — DIPHENHYDRAMINE HYDROCHLORIDE 50 MG/ML
25 INJECTION, SOLUTION INTRAMUSCULAR; INTRAVENOUS AS NEEDED
Start: 2023-01-03

## 2022-11-28 RX ORDER — ALBUTEROL SULFATE 0.83 MG/ML
2.5 SOLUTION RESPIRATORY (INHALATION) AS NEEDED
Start: 2023-01-31

## 2022-11-28 RX ORDER — HYDROCORTISONE SODIUM SUCCINATE 100 MG/2ML
100 INJECTION, POWDER, FOR SOLUTION INTRAMUSCULAR; INTRAVENOUS AS NEEDED
OUTPATIENT
Start: 2022-12-20

## 2022-11-28 RX ORDER — HYDROCORTISONE SODIUM SUCCINATE 100 MG/2ML
100 INJECTION, POWDER, FOR SOLUTION INTRAMUSCULAR; INTRAVENOUS AS NEEDED
OUTPATIENT
Start: 2022-12-06

## 2022-11-28 RX ORDER — DIPHENHYDRAMINE HYDROCHLORIDE 50 MG/ML
50 INJECTION, SOLUTION INTRAMUSCULAR; INTRAVENOUS AS NEEDED
Start: 2023-01-31

## 2022-11-28 RX ORDER — SODIUM CHLORIDE 9 MG/ML
25 INJECTION, SOLUTION INTRAVENOUS CONTINUOUS
OUTPATIENT
Start: 2023-01-17

## 2022-11-28 RX ORDER — ONDANSETRON 2 MG/ML
8 INJECTION INTRAMUSCULAR; INTRAVENOUS AS NEEDED
OUTPATIENT
Start: 2022-12-06

## 2022-11-28 RX ORDER — HEPARIN 100 UNIT/ML
300-500 SYRINGE INTRAVENOUS AS NEEDED
Start: 2022-12-06

## 2022-11-28 RX ORDER — SODIUM CHLORIDE 9 MG/ML
25 INJECTION, SOLUTION INTRAVENOUS CONTINUOUS
OUTPATIENT
Start: 2022-12-06

## 2022-11-28 RX ORDER — SODIUM CHLORIDE 0.9 % (FLUSH) 0.9 %
10 SYRINGE (ML) INJECTION AS NEEDED
OUTPATIENT
Start: 2023-01-03

## 2022-11-28 RX ORDER — DIPHENHYDRAMINE HYDROCHLORIDE 50 MG/ML
25 INJECTION, SOLUTION INTRAMUSCULAR; INTRAVENOUS AS NEEDED
Start: 2022-12-06

## 2022-11-28 RX ORDER — ALBUTEROL SULFATE 0.83 MG/ML
2.5 SOLUTION RESPIRATORY (INHALATION) AS NEEDED
Start: 2023-01-03

## 2022-11-28 RX ORDER — ONDANSETRON 2 MG/ML
8 INJECTION INTRAMUSCULAR; INTRAVENOUS AS NEEDED
OUTPATIENT
Start: 2022-12-20

## 2022-11-28 RX ORDER — EPINEPHRINE 1 MG/ML
0.3 INJECTION, SOLUTION, CONCENTRATE INTRAVENOUS AS NEEDED
OUTPATIENT
Start: 2023-01-31

## 2022-11-28 RX ORDER — SODIUM CHLORIDE 9 MG/ML
25 INJECTION, SOLUTION INTRAVENOUS CONTINUOUS
OUTPATIENT
Start: 2023-01-03

## 2022-11-28 RX ORDER — ALBUTEROL SULFATE 0.83 MG/ML
2.5 SOLUTION RESPIRATORY (INHALATION) AS NEEDED
Start: 2022-12-06

## 2022-11-28 RX ORDER — SODIUM CHLORIDE 9 MG/ML
10 INJECTION INTRAMUSCULAR; INTRAVENOUS; SUBCUTANEOUS AS NEEDED
OUTPATIENT
Start: 2023-01-17

## 2022-11-28 RX ORDER — ACETAMINOPHEN 325 MG/1
650 TABLET ORAL AS NEEDED
Start: 2023-01-03

## 2022-11-28 RX ORDER — ONDANSETRON 2 MG/ML
8 INJECTION INTRAMUSCULAR; INTRAVENOUS AS NEEDED
OUTPATIENT
Start: 2023-01-17

## 2022-11-28 RX ORDER — HYDROCORTISONE SODIUM SUCCINATE 100 MG/2ML
100 INJECTION, POWDER, FOR SOLUTION INTRAMUSCULAR; INTRAVENOUS AS NEEDED
OUTPATIENT
Start: 2023-01-03

## 2022-11-28 RX ORDER — ACETAMINOPHEN 325 MG/1
650 TABLET ORAL AS NEEDED
Start: 2023-01-31

## 2022-11-28 RX ORDER — DIPHENHYDRAMINE HYDROCHLORIDE 50 MG/ML
25 INJECTION, SOLUTION INTRAMUSCULAR; INTRAVENOUS AS NEEDED
Start: 2023-01-17

## 2022-11-28 RX ORDER — DIPHENHYDRAMINE HYDROCHLORIDE 50 MG/ML
50 INJECTION, SOLUTION INTRAMUSCULAR; INTRAVENOUS AS NEEDED
Start: 2023-01-03

## 2022-11-28 RX ORDER — HEPARIN 100 UNIT/ML
300-500 SYRINGE INTRAVENOUS AS NEEDED
Start: 2022-12-20

## 2022-11-28 RX ORDER — ACETAMINOPHEN 325 MG/1
650 TABLET ORAL AS NEEDED
Start: 2023-01-17

## 2022-11-28 RX ORDER — DIPHENHYDRAMINE HYDROCHLORIDE 50 MG/ML
50 INJECTION, SOLUTION INTRAMUSCULAR; INTRAVENOUS AS NEEDED
Start: 2022-12-20

## 2022-11-28 RX ORDER — SODIUM CHLORIDE 9 MG/ML
10 INJECTION INTRAMUSCULAR; INTRAVENOUS; SUBCUTANEOUS AS NEEDED
OUTPATIENT
Start: 2023-01-03

## 2022-11-28 RX ORDER — SODIUM CHLORIDE 0.9 % (FLUSH) 0.9 %
10 SYRINGE (ML) INJECTION AS NEEDED
OUTPATIENT
Start: 2023-01-31

## 2022-11-28 RX ORDER — DIPHENHYDRAMINE HYDROCHLORIDE 50 MG/ML
50 INJECTION, SOLUTION INTRAMUSCULAR; INTRAVENOUS AS NEEDED
Start: 2023-01-17

## 2022-11-28 RX ORDER — SODIUM CHLORIDE 0.9 % (FLUSH) 0.9 %
10 SYRINGE (ML) INJECTION AS NEEDED
OUTPATIENT
Start: 2023-01-17

## 2022-11-28 RX ORDER — SODIUM CHLORIDE 0.9 % (FLUSH) 0.9 %
10 SYRINGE (ML) INJECTION AS NEEDED
OUTPATIENT
Start: 2022-12-20

## 2022-11-28 RX ORDER — ALBUTEROL SULFATE 0.83 MG/ML
2.5 SOLUTION RESPIRATORY (INHALATION) AS NEEDED
Start: 2023-01-17

## 2022-11-28 RX ORDER — DIPHENHYDRAMINE HYDROCHLORIDE 50 MG/ML
25 INJECTION, SOLUTION INTRAMUSCULAR; INTRAVENOUS AS NEEDED
Start: 2023-01-31

## 2022-11-28 RX ORDER — ALBUTEROL SULFATE 0.83 MG/ML
2.5 SOLUTION RESPIRATORY (INHALATION) AS NEEDED
Start: 2022-12-20

## 2022-11-28 RX ORDER — HYDROCORTISONE SODIUM SUCCINATE 100 MG/2ML
100 INJECTION, POWDER, FOR SOLUTION INTRAMUSCULAR; INTRAVENOUS AS NEEDED
OUTPATIENT
Start: 2023-01-17

## 2022-11-28 RX ORDER — SODIUM CHLORIDE 0.9 % (FLUSH) 0.9 %
10 SYRINGE (ML) INJECTION AS NEEDED
OUTPATIENT
Start: 2022-12-06

## 2022-11-28 RX ORDER — HEPARIN 100 UNIT/ML
300-500 SYRINGE INTRAVENOUS AS NEEDED
Start: 2023-01-31

## 2022-11-28 RX ORDER — DIPHENHYDRAMINE HYDROCHLORIDE 50 MG/ML
50 INJECTION, SOLUTION INTRAMUSCULAR; INTRAVENOUS AS NEEDED
Start: 2022-12-06

## 2022-11-28 RX ORDER — HEPARIN 100 UNIT/ML
300-500 SYRINGE INTRAVENOUS AS NEEDED
Start: 2023-01-17

## 2022-11-28 RX ORDER — EPINEPHRINE 1 MG/ML
0.3 INJECTION, SOLUTION, CONCENTRATE INTRAVENOUS AS NEEDED
OUTPATIENT
Start: 2022-12-20

## 2022-11-28 RX ORDER — DIPHENHYDRAMINE HYDROCHLORIDE 50 MG/ML
25 INJECTION, SOLUTION INTRAMUSCULAR; INTRAVENOUS AS NEEDED
Start: 2022-12-20

## 2022-11-28 RX ORDER — SODIUM CHLORIDE 9 MG/ML
10 INJECTION INTRAMUSCULAR; INTRAVENOUS; SUBCUTANEOUS AS NEEDED
OUTPATIENT
Start: 2022-12-20

## 2022-11-28 RX ORDER — ONDANSETRON 2 MG/ML
8 INJECTION INTRAMUSCULAR; INTRAVENOUS AS NEEDED
OUTPATIENT
Start: 2023-01-31

## 2022-11-28 RX ORDER — SODIUM CHLORIDE 9 MG/ML
25 INJECTION, SOLUTION INTRAVENOUS CONTINUOUS
OUTPATIENT
Start: 2022-12-20

## 2022-11-28 RX ORDER — EPINEPHRINE 1 MG/ML
0.3 INJECTION, SOLUTION, CONCENTRATE INTRAVENOUS AS NEEDED
OUTPATIENT
Start: 2023-01-03

## 2022-11-28 RX ORDER — SODIUM CHLORIDE 9 MG/ML
25 INJECTION, SOLUTION INTRAVENOUS CONTINUOUS
OUTPATIENT
Start: 2023-01-31

## 2022-11-28 RX ORDER — EPINEPHRINE 1 MG/ML
0.3 INJECTION, SOLUTION, CONCENTRATE INTRAVENOUS AS NEEDED
OUTPATIENT
Start: 2023-01-17

## 2022-11-28 RX ORDER — ONDANSETRON 2 MG/ML
8 INJECTION INTRAMUSCULAR; INTRAVENOUS AS NEEDED
OUTPATIENT
Start: 2023-01-03

## 2022-11-28 RX ORDER — ACETAMINOPHEN 325 MG/1
650 TABLET ORAL AS NEEDED
Start: 2022-12-06

## 2022-11-28 RX ORDER — ACETAMINOPHEN 325 MG/1
650 TABLET ORAL AS NEEDED
Start: 2022-12-20

## 2022-11-28 RX ORDER — SODIUM CHLORIDE 9 MG/ML
10 INJECTION INTRAMUSCULAR; INTRAVENOUS; SUBCUTANEOUS AS NEEDED
OUTPATIENT
Start: 2023-01-31

## 2022-11-28 RX ORDER — EPINEPHRINE 1 MG/ML
0.3 INJECTION, SOLUTION, CONCENTRATE INTRAVENOUS AS NEEDED
OUTPATIENT
Start: 2022-12-06

## 2022-11-28 RX ORDER — HYDROCORTISONE SODIUM SUCCINATE 100 MG/2ML
100 INJECTION, POWDER, FOR SOLUTION INTRAMUSCULAR; INTRAVENOUS AS NEEDED
OUTPATIENT
Start: 2023-01-31

## 2022-11-28 RX ORDER — SODIUM CHLORIDE 9 MG/ML
10 INJECTION INTRAMUSCULAR; INTRAVENOUS; SUBCUTANEOUS AS NEEDED
OUTPATIENT
Start: 2022-12-06

## 2022-11-28 RX ORDER — HEPARIN 100 UNIT/ML
300-500 SYRINGE INTRAVENOUS AS NEEDED
Start: 2023-01-03

## 2022-11-30 ENCOUNTER — HOSPITAL ENCOUNTER (OUTPATIENT)
Dept: WOUND CARE | Age: 77
Discharge: HOME | End: 2022-11-30
Payer: MEDICARE

## 2022-11-30 PROCEDURE — 99212 OFFICE O/P EST SF 10 MIN: CPT

## 2022-11-30 NOTE — WOUND CARE
OSTOMY Assessment    Stoma Tissue Assessment: ___Post-op _x__Follow-up       Type of Diversion: ___New__x_ Established ___Revision___Permanent____Temporary    _____  Ileostomy   _____  Colostomy: ____ Ascending, ____ Transverse, _____.  Sigmoid   __x___  Luz Maria Jumbo   _____  Ileal Conduit   _____  Mucous Fistula     Appearance of Ostomy:   _x__ Huan Kirsten /Moist/Viable ___ Dark Red ___ Pink ___ Sloughing ___Necrotic____Pallor ____Red  ___Dry ____Moist    Stoma Size: ____38_____ (mm) _x__Round ___ Lulla Nickolas ___Irregular     Stoma Height:   ____Flush ____Retracted ____Budded ____Edematous __x__Prolapse     Stoma Location  ____ Left Side          __x__Right Side     _____Umbilicus  _____Incision Line  ____ Above Belt       ____ Below Belt    Abdominal Contours  Rotund  __x__ Firm ____ Flat ____Flabby ___Soft ___Round ___ Hard ___ Other     Stoma Function: __Yes __No  Output:   __x__ Yellow ____Amber ____ Pink(Hematuria) ____ Tea Colored   ____ Clots ____Foul Odor ____ Mucous     Peristomal skin:  See Wound Addendum for wound documentation  ___ Intact ___ Irritant Dermatitis ___ Allergic Contact Dermatitis ___Candidiasis ___Caput Medusae ___Folliculitis ___Mechanical Trauma ___Mucosal Transplantation    ___Pyoderma Gangrenosum ___Hyperplasia ___Radiation Trauma ___Allergies mpling  ___ Dimpling ____Blistered ____Fragile ____Macerated ___Peeling  ___Ulceration  ___ Fungal ___Peristomal Hernia ___Non-blanchable ___ Hypergranulation      Stoma Complications:   __Excessive Bleeding __Ischemia __ Abscess __Necrosis _x_Prolapse   _x_Hernia __ Retraction __Stenosis __Mucosal Separation __Melanosis Coli   __Laceration __Other     Application for Patient    Wafer and pouch used during assessment and education _ [place] [id number]___________    Pouch System Recommended:   __One-Piece __Two-Piece ___Custom     Flat:   ___Pre-cut   ___Cut-to fit     Convexity: ___Shallow ___Deep__x_ Flexible ___Creative Convexity   ___Pre-cut ___Cut to [employer]   _x_ Adhesive Seals __Adhesive Remover Wipes __Barrier [employer] __Barrier Wipes   __Deodorizer __Liquid Adhesive __ Paste _x_ Powder _x_Strip   __x Support Belt __Tape      Education for Patient & Family  1. Booklet of information on specific ostomy given and some verbal discussion of patients ostomy and expectations. 2. Instruction/demostration of ostomy wafer & pouch change. 3. Discuss concerns/ answer questions. 4. Provide follow up education in clinic if needed.        PICTURE INSERT:

## 2022-11-30 NOTE — WOUND CARE
Clinical Level of Care Assessment    Outpatient Ostomy Care      NAME:  Analia Melendrez OF BIRTH:  1945  MEDICAL RECORD NUMBER:  [id number]   DATE:  11/30/2022      Patient Marimar Alsa Assessment- Document in Flowsheet I&O   Points   Review of chart []   0   Assess Complete Ostomy tab in Navigator for assessment of; stoma status, peristomal skin, presence of hernia/stool consistency/diet/related medications   Simple adjustments to pouch size/pouch system, new stoma pattern, accessory addition/deletion. []   1   Assess Complete Ostomy tab in Navigator for assessment of; stoma status, peristomal skin, presence of hernia/stool consistency/diet/related medications   Moderate adjustments to pouch size/pouch system, new stoma pattern, accessory addition/deletion. Observe patient/caregiver with hands-on care. 1-2 adjustments to pouch size/system/skin care/accessory addition or deletion. [x]   2   Assess Complete Ostomy tab in Navigator for assessment of; stoma status, peristomal skin, presence of hernia/stool consistency/diet/related medications   Complex adjustments to pouch size/pouch system, new stoma pattern, accessory addition/deletion. 3 or more complex adjustments to pouch size/system/skin care/accessory addition or deletion. Observe patient/caregiver with hands-on care. Assess patient/patient abdomen for optimal pre-marked stoma site. Assess patient abdomen for type of hernia belt/accessory needed. []   3         Ambulation Status Documented in [facility] Clinical Note  Status Definition Points   Independent Independently able to ambulate. Fully able (without any assistance) to get on/off exam table/chair. [x]   0   Minimal Physical Assistance Requires physical assistance of one person to ambulate and/or position patient to be examined. Includes necessary physical assistance to position lower extremities on/off stool.    []   1   Moderate Physical Assistance Requires at least one staff member to physically assist patient in ambulating into treatment room, and/or on off chair/bed. Requires assistance to bathroom. []   2   Full Assistance Requires assistance of at least two staff members to transfer patient into treatment room and/or on/off bed/chair. \"Total Transfer\". Unable to use bathroom requires bedside commode and/or bedpan []   3       Teaching Effort Documented in [facility] Clinical Note  Effort Definition Points   No Teaching  []   0   General Initial/Simple lesson:  Assess readiness to learn, assess patient learning style to determine educational flow/special needs for learning. Teaching related to 1-3 topics  Documentation in [facility] completed. []   1   Intermediate Assess readiness to learn, assess patient learning style to determine educational flow/special needs for learning. Teaching related to 3-4 topics. Hernia belt application and care considerations  Documentation in [facility] completed. [x]   2   Complex Assess readiness to learn, assess patient learning style to determine educational flow/special needs for learning. Teaching of greater than 5 additional topics   Pre-operative ostomy education with review of written resources for patient/family/caregiver as needed. Demonstration/return demonstration of ostomy irrigation  Documentation in [facility] completed. []   3     Patient Assessment and Planning in [facility] Clinical Note   Planning Definition Points   Simple Simple pouch change procedure completed and reviewed with patient/family/caregiver   Documentation in [facility] completed. []   1   Intermediate Moderate level of follow-up needs:   Pouch change/discharge procedure revised and reviewed with patient/caregiver. Communications with outside resources; i.e. Telephone calls to Surgeon/ PCP, family/caregiver, home health, ECF. Documentation in [facility] completed.      [x]   2   Complex Complex level of instructions/changes:   Family/Caregiver learning/demonstration/return demonstration visit. Pouching/discharge procedure revised/reviewed with patient/family/caregiver. Contact with outside resources; i.e. communication with Surgeon/ PCP, home health, ECF. Contact/referral to ostomy appliance supplier for new or additional products. Review when to call [facility] or schedule follow-up visit. Referral to Emergency Department   Documentation in [facility] completed. []   3       Is this the Patient's First Visit with [facility] @ [facility]? No    Is this Patient Established to this [facility] within the last 3 years?    Yes             Clinical Level of Care      Points  0-3  Level 1 []     Points  4-6  Level 2 [x]     Points  7-8  Level 3 []     Points  9-10  Level 4 []     Points  11-12  Level 5 []       Electronically signed by Joni Lucia RN on 11/30/2022 at 1:17 PM

## 2022-11-30 NOTE — WOUND CARE
Discharge Instructions/Wound Orders  [facility]  [address], [address] [facility]  Telephone: [phone]    NAME:  Arleen Braxton OF BIRTH:  1945  MEDICAL RECORD NUMBER:  [id number]  DATE:  11/30/2022  Ostomy Care Orders:  1) Remove old bag and clean with tap water & paper towels, dry thoroughly. 2) Crust wound area with stoma powder and NO Sting Skin prep. 3) Cut the new dressing Plain alginate to size of wound, place over wound and secure with thin duoderm. 4) Shape jossue ring to size and place over stoma. 5) Cut Ostomy bag [place] [id number] to size and place over stoma site. 6) Attach ostomy belt, change 2 x week and as needed for leaking. Dietary:  [x] Diet as tolerated: [] Calorie Diabetic Diet:Low carb and no Sugar [] No Added Salt:[x] Increase Protein: [] Other:Limit the amount of liquid you are drinking and avoid drinking in between meals   Activity:  [x] Activity as tolerated:  [] Patient has no activity restrictions     [] Strict Bedrest: [] Remain off Work:     [] May return to full duty work:                                   [] Return to work with restrictions:             Return Appointment:  [x] Return Appointment: With Kelin Gonzalez  in  2 Week(s)  [] Ordered tests:    Electronically signed Matt Shi RN on 11/30/2022 at 1:20 PM     Brittani Catherine 281: Should you experience any significant changes in your wound(s) or have questions about your wound care, please contact the [address] [place] [address] at [address] [place] [address] 8:00 am - 4:30. If you need help with your wound outside these hours and cannot wait until we are again available, contact your PCP or go to the hospital emergency room. PLEASE NOTE: IF YOU ARE UNABLE TO OBTAIN WOUND SUPPLIES, CONTINUE TO USE THE SUPPLIES YOU HAVE AVAILABLE UNTIL YOU ARE ABLE TO REACH US. IT IS MOST IMPORTANT TO KEEP THE WOUND COVERED AT ALL TIMES. CWON Signature:_______________________    Date: ___________ Time:  ____________

## 2022-11-30 NOTE — WOUND CARE
11/30/22 1258   Wound Peristomal Right #1 04/18/22   Date First Assessed/Time First Assessed: 04/18/22 1145   Present on Hospital Admission: Yes  Wound Approximate Age at First Assessment (Weeks): 4 weeks  Primary Wound Type: Traumatic  Location: Peristomal  Wound Location Orientation: Right  Wound Desc. ..    Wound Image    Wound Etiology Traumatic   Dressing Status Old drainage noted   Cleansed Cleansed with saline   Dressing/Treatment   (Plain alginate, duoderm, [place] [address])   Wound Length (cm) 1.6 cm   Wound Width (cm) 1.6 cm   Wound Depth (cm) 0.1 cm   Wound Surface Area (cm^2) 2.56 cm^2   Change in Wound Size % 88.75   Wound Volume (cm^3) 0.256 cm^3   Wound Healing % 94   Wound Assessment Pink/red   Drainage Amount Moderate   Drainage Description Serosanguinous   Wound Odor None   Vickie-Wound/Incision Assessment Fragile   Edges Flat/open edges   Wound Thickness Description Full thickness   Visit Vitals  BP (!) (P) 155/85 (BP 1 Location: Left upper arm)   Temp (P) 97.4 °F (36.3 °C)   Resp (P) 18 95

## 2022-11-30 NOTE — WOUND CARE
11/30/22 1209   Vital Signs   Temp 97.4 °F (36.3 °C)   Temp Source Temporal   Heart Rate Source Brachial   Resp Rate 18   Level of Consciousness 0   BP (!) 155/85   MAP (Calculated) 108   BP 1 Method Automatic   BP 1 Location Left upper arm   Pain 1   Pain Scale 1 Numeric (0 - 10)   Pain Intensity 1 0

## 2022-12-05 ENCOUNTER — HOSPITAL ENCOUNTER (OUTPATIENT)
Dept: INFUSION THERAPY | Age: 77
Discharge: HOME | End: 2022-12-05
Payer: MEDICARE

## 2022-12-05 DIAGNOSIS — C67.9 MALIGNANT NEOPLASM OF URINARY BLADDER, UNSPECIFIED SITE (HCC): Primary | ICD-10-CM

## 2022-12-05 LAB
ALBUMIN SERPL-MCNC: 3.7 G/DL (ref 3.5–5)
ALBUMIN/GLOB SERPL: 1.2 {RATIO} (ref 1.1–2.2)
ALP SERPL-CCNC: 40 U/L (ref 45–117)
ALT SERPL-CCNC: 27 U/L (ref 12–78)
ANION GAP SERPL CALC-SCNC: 7 MMOL/L (ref 5–15)
AST SERPL-CCNC: 13 U/L (ref 15–37)
BASOPHILS # BLD: 0.1 K/UL (ref 0–0.1)
BASOPHILS NFR BLD: 1 % (ref 0–1)
BILIRUB SERPL-MCNC: 0.7 MG/DL (ref 0.2–1)
BUN SERPL-MCNC: 35 MG/DL (ref 6–20)
BUN/CREAT SERPL: 20 (ref 12–20)
CALCIUM SERPL-MCNC: 9.2 MG/DL (ref 8.5–10.1)
CHLORIDE SERPL-SCNC: 111 MMOL/L (ref 97–108)
CO2 SERPL-SCNC: 23 MMOL/L (ref 21–32)
CREAT SERPL-MCNC: 1.73 MG/DL (ref 0.7–1.3)
DIFFERENTIAL METHOD BLD: ABNORMAL
EOSINOPHIL # BLD: 0.2 K/UL (ref 0–0.4)
EOSINOPHIL NFR BLD: 4 % (ref 0–7)
ERYTHROCYTE [DISTWIDTH] IN BLOOD BY AUTOMATED COUNT: 12.9 % (ref 11.5–14.5)
GLOBULIN SER CALC-MCNC: 3.1 G/DL (ref 2–4)
GLUCOSE SERPL-MCNC: 128 MG/DL (ref 65–100)
HCT VFR BLD AUTO: 39.5 % (ref 36.6–50.3)
HGB BLD-MCNC: 13 G/DL (ref 12.1–17)
IMM GRANULOCYTES # BLD AUTO: 0 K/UL (ref 0–0.04)
IMM GRANULOCYTES NFR BLD AUTO: 0 % (ref 0–0.5)
LYMPHOCYTES # BLD: 0.9 K/UL (ref 0.8–3.5)
LYMPHOCYTES NFR BLD: 17 % (ref 12–49)
MCH RBC QN AUTO: 35.4 PG (ref 26–34)
MCHC RBC AUTO-ENTMCNC: 32.9 G/DL (ref 30–36.5)
MCV RBC AUTO: 107.6 FL (ref 80–99)
MONOCYTES # BLD: 0.5 K/UL (ref 0–1)
MONOCYTES NFR BLD: 8 % (ref 5–13)
NEUTS SEG # BLD: 3.7 K/UL (ref 1.8–8)
NEUTS SEG NFR BLD: 70 % (ref 32–75)
NRBC # BLD: 0 K/UL (ref 0–0.01)
NRBC BLD-RTO: 0 PER 100 WBC
PLATELET # BLD AUTO: 191 K/UL (ref 150–400)
PMV BLD AUTO: 8.9 FL (ref 8.9–12.9)
POTASSIUM SERPL-SCNC: 3.9 MMOL/L (ref 3.5–5.1)
PROT SERPL-MCNC: 6.8 G/DL (ref 6.4–8.2)
RBC # BLD AUTO: 3.67 M/UL (ref 4.1–5.7)
SODIUM SERPL-SCNC: 141 MMOL/L (ref 136–145)
WBC # BLD AUTO: 5.4 K/UL (ref 4.1–11.1)

## 2022-12-05 PROCEDURE — 80053 COMPREHEN METABOLIC PANEL: CPT

## 2022-12-05 PROCEDURE — 36415 COLL VENOUS BLD VENIPUNCTURE: CPT

## 2022-12-05 PROCEDURE — 85025 COMPLETE CBC W/AUTO DIFF WBC: CPT

## 2022-12-05 NOTE — PROGRESS NOTES
Patient arrived for Pre-treatment labs. Labs drawn and sent for processing. Patient discharged in stable condition.

## 2022-12-06 ENCOUNTER — HOSPITAL ENCOUNTER (OUTPATIENT)
Dept: INFUSION THERAPY | Age: 77
End: 2022-12-06
Payer: MEDICARE

## 2022-12-09 ENCOUNTER — OFFICE VISIT (OUTPATIENT)
Dept: ONCOLOGY | Age: 77
End: 2022-12-09
Payer: MEDICARE

## 2022-12-09 ENCOUNTER — HOSPITAL ENCOUNTER (OUTPATIENT)
Dept: INFUSION THERAPY | Age: 77
Discharge: HOME | End: 2022-12-09
Payer: MEDICARE

## 2022-12-09 VITALS
SYSTOLIC BLOOD PRESSURE: 146 MMHG | HEIGHT: 71 IN | BODY MASS INDEX: 33.23 KG/M2 | WEIGHT: 237.4 LBS | TEMPERATURE: 97 F | HEART RATE: 93 BPM | DIASTOLIC BLOOD PRESSURE: 92 MMHG | OXYGEN SATURATION: 94 %

## 2022-12-09 DIAGNOSIS — C67.9 MALIGNANT NEOPLASM OF URINARY BLADDER, UNSPECIFIED SITE (HCC): Primary | ICD-10-CM

## 2022-12-09 DIAGNOSIS — Z95.828 PORT-A-CATH IN PLACE: ICD-10-CM

## 2022-12-09 DIAGNOSIS — Z51.12 ENCOUNTER FOR ANTINEOPLASTIC IMMUNOTHERAPY: ICD-10-CM

## 2022-12-09 PROCEDURE — 74011250636 HC RX REV CODE- 250/636: Performed by: REGISTERED NURSE

## 2022-12-09 PROCEDURE — 74011000258 HC RX REV CODE- 258: Performed by: REGISTERED NURSE

## 2022-12-09 PROCEDURE — 77030012965 HC NDL HUBR BBMI -A

## 2022-12-09 PROCEDURE — 96413 CHEMO IV INFUSION 1 HR: CPT

## 2022-12-09 RX ORDER — ONDANSETRON 2 MG/ML
8 INJECTION INTRAMUSCULAR; INTRAVENOUS AS NEEDED
Status: ACTIVE | OUTPATIENT
Start: 2022-12-09 | End: 2022-12-09

## 2022-12-09 RX ORDER — HEPARIN 100 UNIT/ML
300-500 SYRINGE INTRAVENOUS AS NEEDED
Status: ACTIVE | OUTPATIENT
Start: 2022-12-09 | End: 2022-12-09

## 2022-12-09 RX ORDER — SODIUM CHLORIDE 9 MG/ML
25 INJECTION, SOLUTION INTRAVENOUS CONTINUOUS
Status: DISPENSED | OUTPATIENT
Start: 2022-12-09 | End: 2022-12-09

## 2022-12-09 RX ORDER — SODIUM CHLORIDE 0.9 % (FLUSH) 0.9 %
10 SYRINGE (ML) INJECTION AS NEEDED
Status: DISPENSED | OUTPATIENT
Start: 2022-12-09 | End: 2022-12-09

## 2022-12-09 RX ORDER — DIPHENHYDRAMINE HYDROCHLORIDE 50 MG/ML
25 INJECTION, SOLUTION INTRAMUSCULAR; INTRAVENOUS AS NEEDED
Status: ACTIVE | OUTPATIENT
Start: 2022-12-09 | End: 2022-12-09

## 2022-12-09 RX ORDER — SODIUM CHLORIDE 9 MG/ML
10 INJECTION INTRAMUSCULAR; INTRAVENOUS; SUBCUTANEOUS AS NEEDED
Status: ACTIVE | OUTPATIENT
Start: 2022-12-09 | End: 2022-12-09

## 2022-12-09 RX ORDER — ACETAMINOPHEN 325 MG/1
650 TABLET ORAL AS NEEDED
Status: ACTIVE | OUTPATIENT
Start: 2022-12-09 | End: 2022-12-09

## 2022-12-09 RX ADMIN — SODIUM CHLORIDE 25 ML/HR: 9 INJECTION, SOLUTION INTRAVENOUS at 09:34

## 2022-12-09 RX ADMIN — HEPARIN 500 UNITS: 100 SYRINGE at 10:05

## 2022-12-09 RX ADMIN — SODIUM CHLORIDE 240 MG: 9 INJECTION, SOLUTION INTRAVENOUS at 09:34

## 2022-12-09 NOTE — PROGRESS NOTES
[facility] Chemotherapy Progress Note    Date: 2022    Name: Darrick Viramontes    MRN: [id number]         : 1945      0800 Pt admit to [facility] for [place] [address] ambulatory in stable condition. Assessment completed. No new concerns voiced. Port with positive blood return. Chemotherapy Flowsheet 2022   Cycle C14   Date 2022   Drug / Regimen Opdivo   Pre Hydration -   Post Hydration -   Pre Meds -   Notes given               Pre-medications  were administered as ordered and chemotherapy was initiated. Medications Administered       0.9% sodium chloride infusion       Admin Date  2022 Action  New Bag Dose  25 mL/hr Rate  25 mL/hr Route  IntraVENous Administered By  Austin Nolan RN              heparin (porcine) pf 300-500 Units       Admin Date  2022 Action  Given Dose  500 Units Route  InterCATHeter Administered By  Austin Nolan RN              nivolumab (OPDIVO) 240 mg in 0.9% sodium chloride 100 mL, overfill volume 10 mL IVPB       Admin Date  2022 Action  New Bag Dose  240 mg Rate  268 mL/hr Route  IntraVENous Administered By  Austin Nolan RN                    Two nurses verified prior to administering:Drug name, Drug doseInfusion volume or drug volume when prepared in a syringe, Rate of administration, Route of administration, Expiration dates and/or times, Appearance and physical integrity of the drugs, Rate set on infusion pump, when used, Sequencing of drug administration. 1010 Pt tolerated treatment well. Port maintained positive blood return throughout treatment. Flushed, heparinized and de-accessed per protocol. D/c home ambulatory in no distress.          Jesenia Padron RN  2022

## 2022-12-09 NOTE — PROGRESS NOTES
Tyrone Jones is a 69 y.o. male  Chief Complaint   Patient presents with    Follow-up     Muscle invasive bladder cancer- Mixed histology     1. Have you been to the ER, urgent care clinic since your last visit? Hospitalized since your last visit? No    2. Have you seen or consulted any other health care providers outside of the [address] [place] [address] since your last visit? Include any pap smears or colon screening. Yes, patient went to see [facility] for Spine Ablation and went to Dermatology for mole procedure.

## 2022-12-09 NOTE — PROGRESS NOTES
[facility] [place] [facility] [place] [address] [address], [address]  W: [phone]  F: [phone]    Reason for Visit:   Ho Rivers is a 69 y.o. male who is seen in follow-up of Muscle invasive bladder cancer- Mixed histology    Treatment History:   3/1/2021: CT IVP: Multiple abdominal, iliac, mediastinal nodes unchanged from 2019 consistent with h/o sarcoidosis, Thickened R lateral bladder wall. 6/23/2021: Cystoscopy and TURBT- Papillary changes were noted within the prostatic urethra ,  papillary tumors seen emanating from the surface of the left hemitrigone, There were also diffuse changes in the floor of the bladder- Low grade urothelial carcinoma of the prostatic urethra, R lateral bladder wall with HG Muscle invasive urothelial carcinoma and squamous differentiation+ CIS, Left brenda trigone HG non invasive urothelial carcinoma  8/5/21- 10/21/2021: Cisplatin + Gemzar x 4   9/14/2021: CT was stable. 12/6/2021: Radical urethro-cystoprostatectomy   2/3/22: Adjuvant nivolumab  4/22/2022: CT CHATO   8/2022 CT CAP CHATO     History of Present Illness:   Patient is a 69 y.o. male with PMH as below including sarcoidosis who is seen for evaluation of bladder cancer. He has a history of nonmuscle invasive bladder cancer in 2015, underwent 2 induction courses of BCG, had a non muscle invasive recurrence in 2019 and had re induction with BCG. [facility] did not show any recurrence. In March 2021 he had a positive FISH and was seen by Dr. Lilian Arellano. Had a CT IVP 3/2021 which showed some Bladder thickening. 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 Grade 4 neutropenia after cycle 1. Completed 4 cycles and seen after surgery done 12/6/2021. He comes today for follow up on Nivolumab. Rash is about the same.  Taking prednisone 10mg daily. BMs are at baseline. No new cough, SOB, fevers/chills. No bleeding. Past Medical History:   Diagnosis Date    Arrhythmia     LBBB    Arthritis     Asthma     MILD    Cancer ([facility] [address]     scc top of head and nose    Cancer ([facility] [address]     BLADDER    COVID-19 vaccine series completed     [address] 21 AND 21    Depression with anxiety     Hypertension     Other unknown and unspecified cause of morbidity or mortality     history of pulmonary sarcoid     Posterior cervical adenopathy, benign 2018    Pulmonary sarcoidosis ([facility] [address]     Unspecified sleep apnea     cpap      Past Surgical History:   Procedure Laterality Date    HX CATARACT REMOVAL Bilateral     HX HERNIA REPAIR Right AS A CHILD    INGUINAL    HX HERNIA REPAIR Left     INGUIBAL    HX KNEE REPLACEMENT Left     HX ORTHOPAEDIC Right     BONE SPURS REMOVED FROM FOOT    HX OTHER SURGICAL      scc removed top of head and nose    HX OTHER SURGICAL  2005    benign lump removed from thyroid    HX OTHER SURGICAL Right 2020    SKIN CANCER RELMOVED FROM LEG    HX UROLOGICAL  2020    CYSTO    IR INSERT TUNL CVC W PORT OVER 5 YEARS  2021    [place] CARDIAC SURG PROCEDURE UNLIST  2021    CARDIAC CATH    [place] REVISE KNEE JOINT REPLACE,ALL PARTS Left       Social History     Tobacco Use    Smoking status: Former     Packs/day: 0.50     Years: 2.00     Pack years: 1.00     Types: Cigarettes     Quit date: 1965     Years since quittin.3     Passive exposure: Never    Smokeless tobacco: Never   Substance Use Topics    Alcohol use:  Yes     Alcohol/week: 2.0 standard drinks     Types: 2 Glasses of wine per week     Comment: DAILY      Family History   Problem Relation Age of Onset    Cancer Mother         breast with mets    Dementia Mother     Heart Disease Father     Cancer Sister         breast    Lung Disease Brother     No Known Problems Brother     Anesth Problems Neg Hx      Current Outpatient Medications Medication Sig    predniSONE (DELTASONE) 10 mg tablet Take 10 mg by mouth daily (with breakfast). lidocaine-prilocaine (EMLA) topical cream Apply  to affected area as needed for Pain. Apply a thick layer over port a cath 30-60 minutes prior to port access    multivitamin (ONE A DAY) tablet Take 1 Tablet by mouth daily. metoprolol tartrate (LOPRESSOR) 25 mg tablet Take 0.5 Tablets by mouth every twelve (12) hours. zolpidem (AMBIEN) 5 mg tablet Take 5 mg by mouth nightly as needed for Sleep. buPROPion SR (WELLBUTRIN SR) 100 mg SR tablet Take 100 mg by mouth daily. acetaminophen (TYLENOL) 500 mg tablet Take 1,000 mg by mouth every six (6) hours as needed for Pain.    simvastatin (ZOCOR) 20 mg tablet Take 20 mg by mouth nightly. escitalopram oxalate (Lexapro) 10 mg tablet Take 10 mg by mouth daily. No current facility-administered medications for this visit. Allergies   Allergen Reactions    Pcn [Penicillins] Hives     Patient screened for any delayed non-IgE-mediated reaction to PCN. Patient notes the following:    NO SEVERE NON-IGE MEDIATED REACTIONS        Review of Systems: A complete review of systems was obtained, negative except as described above. Physical Exam:     Visit Vitals  BP (!) 146/92 (BP 1 Location: Right arm, BP Patient Position: Sitting)   Pulse 93   Temp 97 °F (36.1 °C) (Temporal)   Ht 5' 11\" (1.803 m)   Wt 237 lb 6.4 oz (107.7 kg)   SpO2 94%   BMI 33.11 kg/m²     ECOG PS: 1  General: No distress  Eyes: PERRL, anicteric sclerae  HENT: Atraumatic   Neck: Supple  Skin: resolving macular erythematous rash noted on b/l arms & chest   GI: Has a urostomy bag  Psych: Alert, oriented, appropriate affect, normal judgment/insight    Results:     Lab Results   Component Value Date/Time    WBC 5.4 12/05/2022 10:33 AM    HGB 13.0 12/05/2022 10:33 AM    HCT 39.5 12/05/2022 10:33 AM    PLATELET 943 99/08/3534 10:33 AM    .6 (H) 12/05/2022 10:33 AM    ABS.  NEUTROPHILS 3.7 12/05/2022 10:33 AM     Lab Results   Component Value Date/Time    Sodium 141 12/05/2022 10:33 AM    Potassium 3.9 12/05/2022 10:33 AM    Chloride 111 (H) 12/05/2022 10:33 AM    CO2 23 12/05/2022 10:33 AM    Glucose 128 (H) 12/05/2022 10:33 AM    BUN 35 (H) 12/05/2022 10:33 AM    Creatinine 1.73 (H) 12/05/2022 10:33 AM    GFR est AA 41 (L) 09/26/2022 10:45 AM    GFR est non-AA 34 (L) 09/26/2022 10:45 AM    Calcium 9.2 12/05/2022 10:33 AM    Glucose (POC) 143 (H) 12/11/2021 01:02 AM     Lab Results   Component Value Date/Time    Bilirubin, total 0.7 12/05/2022 10:33 AM    ALT (SGPT) 27 12/05/2022 10:33 AM    Alk. phosphatase 40 (L) 12/05/2022 10:33 AM    Protein, total 6.8 12/05/2022 10:33 AM    Albumin 3.7 12/05/2022 10:33 AM    Globulin 3.1 12/05/2022 10:33 AM       External   Records reviewed and summarized above.   Pathology report(s) reviewed    12/21/2021     SPECIMEN       Procedure: Radical urethro-cystoprostatectomy       TUMOR       Tumor Site:                Vallerie Aakash, Right lateral wall, Left   Ureteral                                  orifice, Right bladder neck        Histologic Type:     Papillary urothelial carcinoma, invasive and   non-invasive, and carcinoma in situ       Histologic Grade:                High-grade       Tumor Size: Cannot be determined:      Multiple tumors       Tumor Extension:           Tumor invades adjacent structures -   Prostate                                  (transmural invasion from the bladder   tumor)       Lymphovascular Invasion:      Present       Tumor Configuration:           Papillary, Flat       MARGINS       Margins:                     Uninvolved by invasive carcinoma and   carcinoma in situ /                                  noninvasive urothelial carcinoma    LYMPH NODES       Number of Lymph Nodes Involved:      2           Size of Largest Metastatic Deposit:      1 cm               Site:                     Left Pelvic Lymph Nodes Extranodal Extension:           Present       Number of Lymph Nodes Examined:      5     PATHOLOGIC STAGE CLASSIFICATION (pTNM, AJCC 8th Edition)       TNM Descriptors:                m (multiple primary tumors)       Primary Tumor (pT):                pT4a       Regional Lymph Nodes (pN):           pN2     Carcinoma in situ URETHRA: RESECTION       Tumor Site:                     Urethra       SPECIMEN       Procedure: Total urethrectomy    TUMOR       Tumor Site:                     Prostatic urethra       Histologic Type:                       Papillary urothelial carcinoma,   invasive       Histologic Grade:                     High-grade       Tumor Extension:                Tumor invades adjacent structures -   Prostate       Lymphovascular Invasion:           Present       MARGINS       Margins:                          Uninvolved by invasive carcinoma and   carcinoma in situ /                                       noninvasive urothelial carcinoma       LYMPH NODES (These represent the same lymph nodes reported in the BLADDER   Resection Template)       Number of Lymph Nodes Involved:      2           Size of Largest Metastatic Deposit:      1 cm               Site:                     Left Pelvic Lymph node       Number of Lymph Nodes Examined:      5      PATHOLOGIC STAGE CLASSIFICATION (pTNM, AJCC 8th Edition)       TNM Descriptors:                m (multiple primary tumors within the   prostatic urethra)       Primary Tumor (pT):                pT2       Regional Lymph Nodes (pN):           pN2                     4.  Right pelvic lymph nodes, lymph node dissection:        One benign lymph node with noncaseating granulomatous inflammation,   compatible with patients history of sarcoidosis   One benign lymph node, no histopathologic changes   No tumor seen     5.   Left pelvic lymph nodes, lymph node dissection:        Metastatic urothelial carcinoma in two of three lymph nodes, 1 cm in greatest dimension with extracapsular extension   Noncaseating granulomatous inflammation, compatible with patient's history   of sarcoidosis     Radiology report(s) reviewed above. CT CAP 7/19/21:   IMPRESSION  1. While the bladder is decompressed by Juarez is thick-walled and irregular  2. No evidence of metastatic disease to the abdomen or pelvis  3. Extensive bilateral hilar and mediastinal adenopathy with patchy perihilar  airspace disease with associated nodular peribronchial cuffing. Findings can be  seen with sarcoidosis. Differentiating adenopathy from sarcoidosis for  metastatic disease is difficult       9/2021 CT   IMPRESSION  1. Mild bladder wall thickening posteriorly and along the right lateral bladder  wall, nonspecific. No evidence of metastatic disease within the chest, abdomen,  or pelvis. 2. Extensive mediastinal and bilateral hilar lymphadenopathy with lymph node  calcifications, most compatible with known sarcoidosis. No significant change. 3. Bilateral perihilar airspace opacities have improved. No new pulmonary  pathology identified. 4. Severe diverticulosis of the colon. No definite superimposed acute  Diverticulitis. 12/11/2021     IMPRESSION     1. Interval radical cystoprostatectomy with ileal conduit urinary diversion. 2. No postoperative complication identified. 3. Dense bilateral nephrograms in this patient who is status post contrast  injection the previous day. This suggests ATN. 4/2022      IMPRESSION     Status post radical cystoprostatectomy with ileal conduit urinary diversion. Mild left-sided hydronephrosis status post removal of bilateral nephroureteral  stents. Chronic pulmonary parenchymal scarring and nodularity, not significantly  changed. No definitive evidence of new/recurrent metastatic disease in the chest, abdomen  or pelvis.     Assessment:   1) Muscle invasive bladder cancer- With squamous differentiation     [id number]  S/p 4 cycles of NAC with Cisplatin+ gemzar complicated by grade 4 neutropenia  S/P Radical urethro-cystoprostatectomy 12/6/2021- pT4N2 (invaded prostate), HG papillar, +LVI  Path stage-  Stage IIIB  Disease at high risk for local and distant recurrence  Margins negative but nodes had EPE    CT scans 8/2022 reviewed and CHATO  Developed post cycle 4 grade 2 immune-mediated rash; HD prednisone initiated 5/19/22 and tapered off to 10 mg when grade 1. Has not recurred. 2) Urethral TCC  S/P Total urethrectomy 12/6  HG Papillary [id number]  Margins negative  EPE  Present  See above    Has been evaluated by Rad Onc to discuss whether RT is indictated at a later date    1) Sarcoidosis  Follows with pulmonary  Stable     4) Obesity    5) Anemia  Likely secondary to CKD   Status post injectafer x 1 on 3/11/2022. He was also started on Retacrit 10,000 units on 3/3/2022   Improved     6) immune mediated rash  Was grade 2, now grade 1   Improved with steroids  Continue Prednisone 10mg daily     7) Encounter for high risk medication like immunotherapy  Rash has improved to grade 1 , grade 1-2 diarrhea x 24 hours  Continue prednisone 10mg daily   Now on q2 week dosing     Plan:     Continue Nivolumab every 2-week dosing   10mg prednisone   CBC, CMP, TSH per protocol  Reviewed recommendations from Dr. Thomas Sit 5/9/22 visit- consider RT after completion of Nivolumab  Follow with wound clinic   See urology as scheduled   Pepcid daily  Continue Retacrit 10,000 units monthly  Goal hemoglobin is 10 g or less. Hold if hemoglobin more than 10 g/dL  Repeat imaging end of Jan 2023  Imodium PRN    RTC 4 weeks   OPIC every 2 weeks    I appreciate the opportunity to participate in [facility]. [facility] shannon. I personally saw and evaluated the patient and performed the key components of medical decision making. The history, physical exam, and documentation were performed by Marquita Rivers NP.   I reviewed and verified the above documentation and modified it as needed.     Signed By: Balaji Ceja NP

## 2022-12-14 ENCOUNTER — HOSPITAL ENCOUNTER (OUTPATIENT)
Dept: WOUND CARE | Age: 77
Discharge: HOME | End: 2022-12-14
Payer: MEDICARE

## 2022-12-14 VITALS
SYSTOLIC BLOOD PRESSURE: 169 MMHG | DIASTOLIC BLOOD PRESSURE: 91 MMHG | RESPIRATION RATE: 18 BRPM | TEMPERATURE: 98 F | HEART RATE: 91 BPM

## 2022-12-14 PROCEDURE — 99212 OFFICE O/P EST SF 10 MIN: CPT

## 2022-12-14 NOTE — WOUND CARE
12/14/22 1218   Wound Peristomal Right #1 04/18/22   Date First Assessed/Time First Assessed: 04/18/22 1145   Present on Hospital Admission: Yes  Wound Approximate Age at First Assessment (Weeks): 4 weeks  Primary Wound Type: Traumatic  Location: Peristomal  Wound Location Orientation: Right  Wound Desc. ..    Wound Image    Wound Etiology Traumatic   Dressing Status Old drainage noted   Cleansed Cleansed with saline   Dressing/Treatment Alginate with Ag;Hydrocolloid  (silver nitrate)   Wound Length (cm) 1 cm   Wound Width (cm) 0.9 cm   Wound Depth (cm) 0.1 cm   Wound Surface Area (cm^2) 0.9 cm^2   Change in Wound Size % 96.04   Wound Volume (cm^3) 0.09 cm^3   Wound Healing % 98   Wound Assessment Pink/red   Drainage Amount Moderate   Drainage Description Serosanguinous   Wound Odor None   Vickie-Wound/Incision Assessment Fragile   Edges Flat/open edges   Wound Thickness Description Full thickness   Pain 1   Pain Scale 1 Numeric (0 - 10)   Pain Intensity 1 0

## 2022-12-14 NOTE — WOUND CARE
Discharge Instructions/Wound Orders  [facility]  [address]  [address], [address]  Telephone: [phone]    NAME:  Arleen Braxton OF BIRTH:  1945  MEDICAL RECORD NUMBER:  [id number]  DATE:  12/14/2022  Ostomy Care Orders:  1) Remove old bag and clean with tap water & paper towels, dry thoroughly. 2) Crust wound area with stoma powder and NO Sting Skin prep. 3) Cut the new dressing Plain alginate to size of wound, place over wound and secure with thin duoderm. 4) Shape jossue ring to size and place over stoma. 5) Cut Ostomy bag [address] [id number] to size and place over stoma site. 6) Attach ostomy belt, change 2 x week and as needed for leaking. Dietary:  [x] Diet as tolerated: [] Calorie Diabetic Diet:Low carb and no Sugar [] No Added Salt:[x] Increase Protein: [] Other:Limit the amount of liquid you are drinking and avoid drinking in between meals   Activity:  [x] Activity as tolerated:  [] Patient has no activity restrictions     [] Strict Bedrest: [] Remain off Work:     [] May return to full duty work:                                   [] Return to work with restrictions:             Return Appointment:  [x] Return Appointment: With Kelin Gonzalez  in  2 Week(s)  [] Ordered tests:    Electronically signed Matt Shi RN on 12/14/2022 at 12:28 PM     Brittani [address]: Should you experience any significant changes in your wound(s) or have questions about your wound care, please contact the [address] [place] [address] at [address] [place] [address] 8:00 am - 4:30. If you need help with your wound outside these hours and cannot wait until we are again available, contact your PCP or go to the hospital emergency room. PLEASE NOTE: IF YOU ARE UNABLE TO OBTAIN WOUND SUPPLIES, CONTINUE TO USE THE SUPPLIES YOU HAVE AVAILABLE UNTIL YOU ARE ABLE TO REACH US. IT IS MOST IMPORTANT TO KEEP THE WOUND COVERED AT ALL TIMES. CWON Signature:_______________________    Date: ___________ Time:  ____________

## 2022-12-14 NOTE — WOUND CARE
Clinical Level of Care Assessment    Outpatient Ostomy Care      NAME:  Maximus Cassidy OF BIRTH:  1945  MEDICAL RECORD NUMBER:  [id number]   DATE:  12/14/2022      Patient Jeanne Ying Assessment- Document in Flowsheet I&O   Points   Review of chart []   0   Assess Complete Ostomy tab in Navigator for assessment of; stoma status, peristomal skin, presence of hernia/stool consistency/diet/related medications   Simple adjustments to pouch size/pouch system, new stoma pattern, accessory addition/deletion. []   1   Assess Complete Ostomy tab in Navigator for assessment of; stoma status, peristomal skin, presence of hernia/stool consistency/diet/related medications   Moderate adjustments to pouch size/pouch system, new stoma pattern, accessory addition/deletion. Observe patient/caregiver with hands-on care. 1-2 adjustments to pouch size/system/skin care/accessory addition or deletion. [x]   2   Assess Complete Ostomy tab in Navigator for assessment of; stoma status, peristomal skin, presence of hernia/stool consistency/diet/related medications   Complex adjustments to pouch size/pouch system, new stoma pattern, accessory addition/deletion. 3 or more complex adjustments to pouch size/system/skin care/accessory addition or deletion. Observe patient/caregiver with hands-on care. Assess patient/patient abdomen for optimal pre-marked stoma site. Assess patient abdomen for type of hernia belt/accessory needed. []   3         Ambulation Status Documented in CN Clinical Note  Status Definition Points   Independent Independently able to ambulate. Fully able (without any assistance) to get on/off exam table/chair. [x]   0   Minimal Physical Assistance Requires physical assistance of one person to ambulate and/or position patient to be examined. Includes necessary physical assistance to position lower extremities on/off stool.    []   1   Moderate Physical Assistance Requires at least one staff member to physically assist patient in ambulating into treatment room, and/or on off chair/bed. Requires assistance to bathroom. []   2   Full Assistance Requires assistance of at least two staff members to transfer patient into treatment room and/or on/off bed/chair. \"Total Transfer\". Unable to use bathroom requires bedside commode and/or bedpan []   3       Teaching Effort Documented in [facility] Clinical Note  Effort Definition Points   No Teaching  []   0   General Initial/Simple lesson:  Assess readiness to learn, assess patient learning style to determine educational flow/special needs for learning. Teaching related to 1-3 topics  Documentation in [facility] completed. []   1   Intermediate Assess readiness to learn, assess patient learning style to determine educational flow/special needs for learning. Teaching related to 3-4 topics. Hernia belt application and care considerations  Documentation in [facility] completed. [x]   2   Complex Assess readiness to learn, assess patient learning style to determine educational flow/special needs for learning. Teaching of greater than 5 additional topics   Pre-operative ostomy education with review of written resources for patient/family/caregiver as needed. Demonstration/return demonstration of ostomy irrigation  Documentation in [facility] completed. []   3     Patient Assessment and Planning in [facility] Clinical Note   Planning Definition Points   Simple Simple pouch change procedure completed and reviewed with patient/family/caregiver   Documentation in [facility] completed. [x]   1   Intermediate Moderate level of follow-up needs:   Pouch change/discharge procedure revised and reviewed with patient/caregiver. Communications with outside resources; i.e. Telephone calls to Surgeon/ PCP, family/caregiver, home health, ECF. Documentation in [facility] completed.      []   2   Complex Complex level of instructions/changes:   Family/Caregiver learning/demonstration/return demonstration visit. Pouching/discharge procedure revised/reviewed with patient/family/caregiver. Contact with outside resources; i.e. communication with Surgeon/ PCP, home health, ECF. Contact/referral to ostomy appliance supplier for new or additional products. Review when to call [facility] or schedule follow-up visit. Referral to Emergency Department   Documentation in [facility] completed. []   3       Is this the Patient's First Visit with [facility] @ [facility]? No    Is this Patient Established to this [facility] within the last 3 years?    Yes             Clinical Level of Care      Points  0-3  Level 1 []     Points  4-6  Level 2 [x]     Points  7-8  Level 3 []     Points  9-10  Level 4 []     Points  11-12  Level 5 []       Electronically signed by Dede Blunt RN on 12/14/2022 at 2:19 PM

## 2022-12-14 NOTE — WOUND CARE
OSTOMY Assessment    Stoma Tissue Assessment: ___Post-op _x__Follow-up       Type of Diversion: ___New_x__ Established ___Revision___Permanent____Temporary    _____  Ileostomy   _____  Colostomy: ____ Ascending, ____ Transverse, _____.  Sigmoid   _____  Sapphire Proud   __x___  Ileal Conduit   _____  Mucous Fistula     Appearance of Ostomy:   __x_ Marylen Pries /Moist/Viable ___ Dark Red ___ Pink ___ Sloughing ___Necrotic____Pallor ____Red  ___Dry ____Moist    Stoma Size: _____38____ (mm) __x_Round ___ [place] Lento ___Irregular     Stoma Height:   ____Flush ____Retracted ____Budded ____Edematous ___x_Prolapse     Stoma Location  ____ Left Side          __x__Right Side     _____Umbilicus  _____Incision Line  __x__ Above Belt       ____ Below Belt    Abdominal Contours  ___x_ Firm ____ Flat ____Flabby ___Soft __x_Round ___ Hard ___ Other     Stoma Function: _x_Yes __No  Output:   _x___ Yellow ____Amber ____ Pink(Hematuria) ____ Tea Colored   ____ Clots ____Foul Odor ____ Mucous     Peristomal skin:  See flowsheet for peristomal wound information  ___ Intact __x_ Irritant Dermatitis ___ Allergic Contact Dermatitis ___Candidiasis ___Caput Medusae ___Folliculitis ___Mechanical Trauma ___Mucosal Transplantation    ___Pyoderma Gangrenosum ___Hyperplasia ___Radiation Trauma ___Allergies mpling  ___ Dimpling ____Blistered ____Fragile ____Macerated ___Peeling  ___Ulceration  ___ Fungal ___Peristomal Hernia ___Non-blanchable ___ Hypergranulation      Stoma Complications:   __Excessive Bleeding __Ischemia __ Abscess __Necrosis __Prolapse   _x_Hernia __ Retraction __Stenosis __Mucosal Separation __Melanosis Coli   __Laceration __Other     Application for Patient    Wafer and pouch used during assessment and education ___[place] [id number]_________    Pouch System Recommended:   _x_One-Piece __Two-Piece ___Custom     Flat:   ___Pre-cut   ___Cut-to fit     Convexity: ___Shallow ___Deep__x_ Flexible ___Creative Convexity   ___Pre-cut __x_Cut to [employer]   _x_ Adhesive Seals __Adhesive Remover Wipes __Barrier [employer] _x_Barrier Wipes   __Deodorizer __Liquid Adhesive __ Paste _x_ Powder _x_Strip   _x_ Support Belt __Tape __x_ Plain alginate   ___x__Extra thin douderm      Education for Patient & Family  1. Booklet of information on specific ostomy given and some verbal discussion of patients ostomy and expectations. 2. Instruction/demostration of ostomy wafer & pouch change. 3. Discuss concerns/ answer questions. 4. Provide follow up education in clinic if needed.        PICTURE INSERT:

## 2022-12-19 ENCOUNTER — HOSPITAL ENCOUNTER (OUTPATIENT)
Dept: INFUSION THERAPY | Age: 77
Discharge: HOME | End: 2022-12-19
Payer: MEDICARE

## 2022-12-19 DIAGNOSIS — C67.9 MALIGNANT NEOPLASM OF URINARY BLADDER, UNSPECIFIED SITE (HCC): Primary | ICD-10-CM

## 2022-12-19 LAB
ALBUMIN SERPL-MCNC: 3.7 G/DL (ref 3.5–5)
ALBUMIN/GLOB SERPL: 1.2 {RATIO} (ref 1.1–2.2)
ALP SERPL-CCNC: 40 U/L (ref 45–117)
ALT SERPL-CCNC: 32 U/L (ref 12–78)
ANION GAP SERPL CALC-SCNC: 7 MMOL/L (ref 5–15)
AST SERPL-CCNC: 19 U/L (ref 15–37)
BASOPHILS # BLD: 0 K/UL (ref 0–0.1)
BASOPHILS NFR BLD: 0 % (ref 0–1)
BILIRUB SERPL-MCNC: 0.3 MG/DL (ref 0.2–1)
BUN SERPL-MCNC: 43 MG/DL (ref 6–20)
BUN/CREAT SERPL: 20 (ref 12–20)
CALCIUM SERPL-MCNC: 9.2 MG/DL (ref 8.5–10.1)
CHLORIDE SERPL-SCNC: 114 MMOL/L (ref 97–108)
CHOLEST SERPL-MCNC: 181 MG/DL
CO2 SERPL-SCNC: 21 MMOL/L (ref 21–32)
CREAT SERPL-MCNC: 2.14 MG/DL (ref 0.7–1.3)
DIFFERENTIAL METHOD BLD: ABNORMAL
EOSINOPHIL # BLD: 0.1 K/UL (ref 0–0.4)
EOSINOPHIL NFR BLD: 1 % (ref 0–7)
ERYTHROCYTE [DISTWIDTH] IN BLOOD BY AUTOMATED COUNT: 12.5 % (ref 11.5–14.5)
GLOBULIN SER CALC-MCNC: 3.1 G/DL (ref 2–4)
GLUCOSE SERPL-MCNC: 124 MG/DL (ref 65–100)
HCT VFR BLD AUTO: 38.1 % (ref 36.6–50.3)
HDLC SERPL-MCNC: 81 MG/DL
HDLC SERPL: 2.2 {RATIO} (ref 0–5)
HGB BLD-MCNC: 12.3 G/DL (ref 12.1–17)
IMM GRANULOCYTES # BLD AUTO: 0 K/UL (ref 0–0.04)
IMM GRANULOCYTES NFR BLD AUTO: 0 % (ref 0–0.5)
LDLC SERPL CALC-MCNC: 64.6 MG/DL (ref 0–100)
LYMPHOCYTES # BLD: 0.4 K/UL (ref 0.8–3.5)
LYMPHOCYTES NFR BLD: 7 % (ref 12–49)
MCH RBC QN AUTO: 35.3 PG (ref 26–34)
MCHC RBC AUTO-ENTMCNC: 32.3 G/DL (ref 30–36.5)
MCV RBC AUTO: 109.5 FL (ref 80–99)
MONOCYTES # BLD: 0.4 K/UL (ref 0–1)
MONOCYTES NFR BLD: 7 % (ref 5–13)
NEUTS SEG # BLD: 4.7 K/UL (ref 1.8–8)
NEUTS SEG NFR BLD: 85 % (ref 32–75)
NRBC # BLD: 0 K/UL (ref 0–0.01)
NRBC BLD-RTO: 0 PER 100 WBC
PLATELET # BLD AUTO: 147 K/UL (ref 150–400)
PMV BLD AUTO: 9.1 FL (ref 8.9–12.9)
POTASSIUM SERPL-SCNC: 4.5 MMOL/L (ref 3.5–5.1)
PROT SERPL-MCNC: 6.8 G/DL (ref 6.4–8.2)
RBC # BLD AUTO: 3.48 M/UL (ref 4.1–5.7)
RBC MORPH BLD: ABNORMAL
SODIUM SERPL-SCNC: 142 MMOL/L (ref 136–145)
TRIGL SERPL-MCNC: 177 MG/DL (ref ?–150)
TSH SERPL DL<=0.05 MIU/L-ACNC: 0.98 UIU/ML (ref 0.36–3.74)
VLDLC SERPL CALC-MCNC: 35.4 MG/DL
WBC # BLD AUTO: 5.6 K/UL (ref 4.1–11.1)

## 2022-12-19 PROCEDURE — 80053 COMPREHEN METABOLIC PANEL: CPT

## 2022-12-19 PROCEDURE — 85025 COMPLETE CBC W/AUTO DIFF WBC: CPT

## 2022-12-19 PROCEDURE — 84443 ASSAY THYROID STIM HORMONE: CPT

## 2022-12-19 PROCEDURE — 80061 LIPID PANEL: CPT

## 2022-12-19 PROCEDURE — 36415 COLL VENOUS BLD VENIPUNCTURE: CPT

## 2022-12-19 NOTE — PROGRESS NOTES
[facility] Chemotherapy Progress Note    1440 Pt admit to [facility] for pre-chemo lab draw ambulatory in stable condition. Labs drawn peripherally and sent for processing. Pt aware of next [facility] appointment scheduled for 12/20/2022.     Please review labs in CC once resulted

## 2022-12-20 ENCOUNTER — HOSPITAL ENCOUNTER (OUTPATIENT)
Dept: INFUSION THERAPY | Age: 77
Discharge: HOME | End: 2022-12-20
Payer: MEDICARE

## 2022-12-20 VITALS
RESPIRATION RATE: 18 BRPM | DIASTOLIC BLOOD PRESSURE: 72 MMHG | HEIGHT: 71 IN | BODY MASS INDEX: 34.02 KG/M2 | TEMPERATURE: 98.8 F | SYSTOLIC BLOOD PRESSURE: 143 MMHG | HEART RATE: 93 BPM | WEIGHT: 243 LBS

## 2022-12-20 DIAGNOSIS — C67.9 MALIGNANT NEOPLASM OF URINARY BLADDER, UNSPECIFIED SITE (HCC): Primary | ICD-10-CM

## 2022-12-20 PROCEDURE — 74011250636 HC RX REV CODE- 250/636: Performed by: REGISTERED NURSE

## 2022-12-20 PROCEDURE — 74011000250 HC RX REV CODE- 250: Performed by: REGISTERED NURSE

## 2022-12-20 PROCEDURE — 77030012965 HC NDL HUBR BBMI -A

## 2022-12-20 PROCEDURE — 74011000258 HC RX REV CODE- 258: Performed by: REGISTERED NURSE

## 2022-12-20 PROCEDURE — 96413 CHEMO IV INFUSION 1 HR: CPT

## 2022-12-20 RX ORDER — HEPARIN 100 UNIT/ML
300-500 SYRINGE INTRAVENOUS AS NEEDED
Status: DISPENSED | OUTPATIENT
Start: 2022-12-20 | End: 2022-12-20

## 2022-12-20 RX ORDER — SODIUM CHLORIDE 9 MG/ML
25 INJECTION, SOLUTION INTRAVENOUS CONTINUOUS
Status: DISPENSED | OUTPATIENT
Start: 2022-12-20 | End: 2022-12-20

## 2022-12-20 RX ORDER — SODIUM CHLORIDE 0.9 % (FLUSH) 0.9 %
10 SYRINGE (ML) INJECTION AS NEEDED
Status: DISPENSED | OUTPATIENT
Start: 2022-12-20 | End: 2022-12-20

## 2022-12-20 RX ORDER — SODIUM CHLORIDE 9 MG/ML
10 INJECTION INTRAMUSCULAR; INTRAVENOUS; SUBCUTANEOUS AS NEEDED
Status: ACTIVE | OUTPATIENT
Start: 2022-12-20 | End: 2022-12-20

## 2022-12-20 RX ADMIN — SODIUM CHLORIDE 25 ML/HR: 9 INJECTION, SOLUTION INTRAVENOUS at 12:30

## 2022-12-20 RX ADMIN — SODIUM CHLORIDE, PRESERVATIVE FREE 10 ML: 5 INJECTION INTRAVENOUS at 11:45

## 2022-12-20 RX ADMIN — HEPARIN 500 UNITS: 100 SYRINGE at 13:15

## 2022-12-20 RX ADMIN — SODIUM CHLORIDE, PRESERVATIVE FREE 10 ML: 5 INJECTION INTRAVENOUS at 13:15

## 2022-12-20 RX ADMIN — SODIUM CHLORIDE 240 MG: 9 INJECTION, SOLUTION INTRAVENOUS at 12:40

## 2022-12-20 NOTE — PROGRESS NOTES
[facility] Chemotherapy Progress Note  Date: 2022  Name: Va Galindo  MRN: [id number]       : 1945      [1140] Pt admit to [address] [place] [address] for OPDIVO   Denies SOB, fever, cough, N/V. Labs drawn through port yesterday. Creatinine elevated, MD notified and verified approval to proceed with Opdivo treatment  Retacrit held due to Hemoglobin 12.3. MD notified and aware. Chest port accessed today for Opdivo infusion. Mr. Santana Sunshine vitals were reviewed. Patient Vitals for the past 12 hrs:   Temp Pulse Resp BP   22 1140 98.8 °F (37.1 °C) 93 18 (!) 143/72   BP reassessed at end of infusion- 157/96 HR 73. Education given and reinforced about BP     Lab results were obtained and reviewed. No results found for this or any previous visit (from the past 12 hour(s)). Pre-medications  were administered as ordered and chemotherapy was initiated.   Medications Administered       0.9% sodium chloride infusion       Admin Date  2022 Action  New Bag Dose  25 mL/hr Rate  25 mL/hr Route  IntraVENous Administered By  Angeline Arcos RN              0.9% sodium chloride injection 10 mL       Admin Date  2022 Action  Given Dose  10 mL Route  IntraVENous Administered By  Angeline Arcos RN               Admin Date  2022 Action  Given Dose  10 mL Route  IntraVENous Administered By  Angeline Arcos RN              heparin (porcine) pf 300-500 Units       Admin Date  2022 Action  Given Dose  500 Units Route  InterCATHeter Administered By  Angeline Arcos RN              nivolumab (OPDIVO) 240 mg in 0.9% sodium chloride 100 mL, overfill volume 10 mL IVPB       Admin Date  2022 Action  New Bag Dose  240 mg Rate  268 mL/hr Route  IntraVENous Administered By  Angeline Arcos RN                    Two nurses verified prior to administering:   Drug name, Drug dose, Infusion volume or drug volume when prepared in a syringe, Rate of administration, Route of administration, Expiration dates and/or times, Appearance and physical integrity of the drugs, Rate set on infusion pump, when used, Sequencing of drug administration. Port maintained positive blood return throughout treatment. Flushed, heparinized and de-accessed per protocol. Pt aware of next appointment scheduled. Tolerated infusion well without issue. Declined post infusion monitoring time. Education given and reinforced prior to departure.      Future Appointments   Date Time Provider Gregorio Ibarra   12/28/2022 11:15 AM [facility] OSTOMY NURSE [facility]   1/2/2023  9:00 AM H3 TD LAB [facility]   1/3/2023 11:00 AM E3 TD MED TX [facility]   1/3/2023 11:15 AM Mee Mcclelland  N Jacob Moses BS AMB   1/16/2023  9:30 AM H3 TD LAB [facility]   1/17/2023 10:00 AM A1 TD MED TX [facility]   1/30/2023  9:30 AM H3 TD LAB [facility]   1/31/2023  9:00 AM E3 TD MED TX [facility]   2/13/2023  9:30 AM H3 TD LAB [facility]   2/14/2023  8:30 AM F4 TD MED TX [facility]   2/28/2023 10:30 AM B3 TD MED 1818 DOMI Moses RN  December 20, 2022

## 2022-12-20 NOTE — PROGRESS NOTES
[facility] Progress Note      Chief Complaints:  - Back pain.      Reason for consultation:   - Management electrolytes disturbances.      Subjective:     Awake and alert in good spirits, breathing comfortably appetite is improving in no acute distress.    Patient denies any acute chest pain.  No skin rash.  No fever.  No headache.  Good appetite.      Objective:   Visit Vitals  /69   Pulse 89   Temp 98.4 °F (36.9 °C) (Oral)   Resp 16   Ht 5' 6\" (1.676 m)   Wt 121 kg (266 lb 12.1 oz)   SpO2 97%   BMI 43.06 kg/m²    Temp (24hrs), Av °F (36.7 °C), Min:97.5 °F (36.4 °C), Max:98.4 °F (36.9 °C)    General appearance: NAD, comfortable  HEENT: EOMI, MMM  Neck: supple, no JVD  Lungs: CTAB, no wheezing  Heart: RRR,  S1, S2 normal  Abdomen: +BS, soft, NT  Extremities: trace edema, no clubbing, no cyanosis  Skin: No rashes   Neurologic: AOx3, grossly non-focal      Medications:  Current Facility-Administered Medications   Medication Dose Route Frequency Provider Last Rate Last Admin   • furosemide (LASIX INJECT) injection 60 mg  60 mg Intravenous BID Jenny Lopes MD   60 mg at 22 09   • potassium CHLORIDE (KLOR-CON) packet 40 mEq  40 mEq Oral Daily with breakfast Dyan Anaya MD   40 mEq at 22 0901   • magnesium oxide (MAG-OX) tablet 400 mg  400 mg Oral Daily Dyan Anaya MD   400 mg at 22 0902   • dextrose 5 % infusion   Intravenous Continuous Jenny Lopes MD 40 mL/hr at 22 0511 New Bag at 22 0511   • insulin glargine (LANTUS) injection 20 Units  20 Units Subcutaneous Nightly Jenny Lopes MD   20 Units at 22 2154   • insulin lispro (ADMELOG, HumaLOG) injection 10 Units  10 Units Subcutaneous TID AC Jenny Lopes MD   10 Units at 22 1837   • sodium chloride 0.9 % flush bag 25 mL  25 mL Intravenous PRN Jeff Maher NP       • Phosphorus Standard Replacement Protocol   Does not apply See Admin Instructions Jeff FIORE  Outpatient Infusion Center Progress Note    1100 Pt admit to [facility] ambulatory in stable condition. Assessment completed. No new concerns voiced. Port accessed with positive blood return. IV attached to NS at [place]  /80   Pulse 88   Temp 97 °F (36.1 °C)   Resp 18       Medications:  Medications Administered     0.9% sodium chloride infusion     Admin Date  03/11/2022 Action  New Bag Dose  25 mL/hr Rate  25 mL/hr Route  IntraVENous Administered By  Alex Trinidad RN          ferric carboxymaltose (INJECTAFER) 750 mg in 0.9% sodium chloride 250 mL, overfill volume 25 mL IVPB     Admin Date  03/11/2022 Action  New Bag Dose  750 mg Rate  870 mL/hr Route  IntraVENous Administered By  Alex Trinidad RN          heparin (porcine) pf 300-500 Units     Admin Date  03/11/2022 Action  Given Dose  500 Units Route  InterCATHeter Administered By  Alex Trinidad RN          sodium chloride (NS) flush 10 mL     Admin Date  03/11/2022 Action  Given Dose  10 mL Route  IntraVENous Administered By  Alex Trinidad RN                1327 Pt tolerated treatment well. Patient stayed 30 minutes post infusion without incidence. Line flushed, heparinized and de-accessed. D/c home ambulatory in no distress.  Pt aware of next appointment scheduled for 3/31/22 MIRTHA Maher       • insulin lispro (ADMELOG,HumaLOG) - Correction Dose   Subcutaneous TID WC Jenny Lopes MD   1 Units at 12/19/22 1736   • megestrol (MEGACE) 40 mg/mL suspension 400 mg  400 mg Oral Daily Jenny Lopes MD   400 mg at 12/20/22 0922   • sodium chloride (PF) 0.9 % injection 10 mL  10 mL Injection PRN Hanane Wynne PA-C       • sodium chloride (PF) 0.9 % injection 10 mL  10 mL Injection 2 times per day Hanane Wynen PA-C   10 mL at 12/20/22 0902   • sodium chloride (PF) 0.9 % injection 20 mL  20 mL Injection PRN Hanane Wynne PA-C       • acetaminophen (TYLENOL) tablet 1,000 mg  1,000 mg Oral 2 times per day Yannick Lagos MD   1,000 mg at 12/19/22 2154   • enoxaparin (LOVENOX) injection 30 mg  30 mg Subcutaneous 2 times per day Yannick Lagos MD   30 mg at 12/20/22 0902   • HYDROcodone-acetaminophen (NORCO) 5-325 MG per tablet 1 tablet  1 tablet Oral Q4H PRN Yannick Lagos MD   1 tablet at 12/20/22 0555   • HYDROmorphone (DILAUDID) injection 0.4 mg  0.4 mg Intravenous Q2H PRN Yannick Lagos MD       • DAPTOmycin (CUBICIN) injection 480 mg  6 mg/kg (Adjusted) Intravenous Daily Hanane Wynne PA-C   480 mg at 12/20/22 0903   • piperacillin-tazobactam (ZOSYN) 3.375 g in sodium chloride 0.9 % 100 mL IVPB  3.375 g Intravenous 3 times per day Hanane Wynne PA-C 25 mL/hr at 12/20/22 0541 3.375 g at 12/20/22 0541   • lactulose (CHRONULAC) 10 GM/15ML solution 30 g  30 g Oral TID Aracely Mays MD   30 g at 12/17/22 1501   • rifAXIMin (XIFAXAN) tablet 550 mg  550 mg Oral 2 times per day Aracely Mays MD   550 mg at 12/20/22 0902   • amLODIPine (NORVASC) tablet 5 mg  5 mg Oral Daily Jenny Lopes MD   5 mg at 12/20/22 0902   • melatonin tablet 6 mg  6 mg Oral Nightly Subha Pham CNP   6 mg at 12/19/22 2154   • aspirin (ECOTRIN) enteric coated tablet 81 mg  81 mg Oral Daily Jenny Lopes MD   81 mg at 12/20/22 0902   • [Held by provider] atorvastatin (LIPITOR) tablet  20 mg  20 mg Oral Nightly Jenny Lopes MD   20 mg at 12/06/22 2030   • brimonidine (ALPHAGAN) 0.2 % ophthalmic solution 1 drop  1 drop Both Eyes TID Jenny Lopes MD   1 drop at 12/20/22 0903   • fluticasone (FLONASE) 50 MCG/ACT nasal spray 2 spray  2 spray Each Nare Daily Jenny Lopes MD   2 spray at 12/20/22 0903   • HYDROcodone-acetaminophen (NORCO)  MG per tablet 1 tablet  1 tablet Oral Q4H PRN Jenny Lopes MD   1 tablet at 12/19/22 0602   • [Held by provider] leflunomide (ARAVA) tablet 10 mg  10 mg Oral Daily Jenny Lopes MD   10 mg at 11/28/22 0921   • levothyroxine (SYNTHROID, LEVOTHROID) tablet 150 mcg  150 mcg Oral QAM Jenny Lopes MD   150 mcg at 12/20/22 0530   • lidocaine (XYLOCAINE) 5 % ointment   Topical TID PRN Jenny Lopes MD   Given at 12/16/22 0901   • pantoprazole (PROTONIX) EC tablet 40 mg  40 mg Oral QAM AC Jenny Lopes MD   40 mg at 12/20/22 0530   • oxybutynin (DITROPAN) tablet 5 mg  5 mg Oral BID Jneny Lopes MD   5 mg at 12/20/22 0902   • predniSONE (DELTASONE) tablet 5 mg  5 mg Oral Daily Jenny Lopes MD   5 mg at 12/20/22 0902   • dextrose 50 % injection 25 g  25 g Intravenous PRN Jenny Lopes MD       • dextrose 50 % injection 12.5 g  12.5 g Intravenous PRN Jenny Lopes MD   12.5 g at 12/16/22 0433   • glucagon (GLUCAGEN) injection 1 mg  1 mg Intramuscular PRN Jenny Lopes MD       • dextrose (GLUTOSE) 40 % gel 15 g  15 g Oral PRN Jenny Lopes MD       • dextrose (GLUTOSE) 40 % gel 30 g  30 g Oral PRN Jenny Lopes MD       • sodium chloride 0.9 % flush bag 25 mL  25 mL Intravenous PRN Jenny Lopes MD       • sodium chloride (PF) 0.9 % injection 2 mL  2 mL Intracatheter 2 times per day Jenny Lopes MD   2 mL at 12/20/22 0903   • sodium chloride 0.9 % flush bag 25 mL  25 mL Intravenous PRN Jenny Lopes MD       • insulin lispro (ADMELOG,HumaLOG) - Correction Dose   Subcutaneous Nightly Jenny Lopes MD   2 Units at 11/30/22 2203   • Potassium Standard Replacement Protocol (Levels 3.5 and lower)   Does not  apply See Admin Instructions Jenny Lopes MD       • Potassium Replacement (Levels 3.6 - 4)   Does not apply See Admin Instructions Jenny Lopes MD       • Magnesium Standard Replacement Protocol   Does not apply See Admin Instructions Jenny Lopes MD       • ondansetron (ZOFRAN) injection 4 mg  4 mg Intravenous BID PRN Jenny Lopes MD       • acetaminophen (TYLENOL) tablet 650 mg  650 mg Oral Q4H PRN Jenny Lopes MD   650 mg at 12/10/22 0916   • polyethylene glycol (MIRALAX) packet 17 g  17 g Oral Daily PRN Jenny Lopes MD   17 g at 12/08/22 0837   • docusate sodium-sennosides (SENOKOT S) 50-8.6 MG 2 tablet  2 tablet Oral Daily PRN Jenny Lopes MD       • albuterol inhaler 2 puff  2 puff Inhalation Q6H Resp PRN Jenny Lopes MD   2 puff at 11/27/22 0602   • [Held by provider] gabapentin (NEURONTIN) 250 MG/5ML solution 800 mg  800 mg Oral TID Jenny Lopes MD   800 mg at 11/30/22 0853   • enalapril (VASOTEC) tablet 10 mg  10 mg Oral Daily Barbara Fuentes MD   10 mg at 12/20/22 0902         Intake and Out:    Intake/Output Summary (Last 24 hours) at 12/20/2022 0930  Last data filed at 12/20/2022 0500  Gross per 24 hour   Intake --   Output 4235 ml   Net -4235 ml          Labs:  Recent Labs     12/18/22  0537 12/18/22  1728 12/19/22  0605 12/20/22  0538   SODIUM 131*  --  132* 133*   POTASSIUM 3.1* 4.1 3.6 3.4   CO2 21  --  23 23   ANIONGAP 11  --  10 10   GLUCOSE 108*  --  107* 79   BUN 12  --  11 13   CREATININE 0.44*  --  0.48* 0.55   BCRAT 27*  --  23 24   CALCIUM 8.2*  --  8.7 8.5   BILIRUBIN 2.1*  --  1.9* 1.8*   *  --  88* 85*   GPT 59  --  56 50   ALKPT 432*  --  426* 407*   GLOB 4.5*  --  4.7* 4.5*   AGR 0.3*  --  0.3* 0.3*        Recent Labs     12/18/22  0537 12/19/22  0605 12/20/22  0538   WBC 7.5 8.2 8.2   RBC 3.29* 3.37* 3.24*   HGB 10.2* 10.6* 10.2*   HCT 29.4* 30.5* 29.3*   * 128* 123*   MCV 89.4 90.5 90.4   MCH 31.0 31.5 31.5   MCHC 34.7 34.8 34.8   NRBCRE 0 0 0          Assessment/Plan:  CKD  stage II  -Cr level is around baseline  -No indications for RRT  -Patient with prerenal failure on the basis of volume depletion which is leveled off and improving    Case is discussed with her daughters.        Hypokalemia , continue with potassium supplement,, and currently the patient with hypomagnesemia, which is better ,continue same management.       Hyponatremia  -Na level is back to baseline  -No hypertonic IVF  -The patient is advised to restrict free water and increase intake of solid food.    Cardiovascular  -BP noted to be acceptable  -monitoring vital signs  -No LE edema     Anemia  -Hemoglobin levels noted to be more than 10 g/dL  -Monitoring H&H     Bone and Mineral Disease:  -Monitoring Ca, phophorus, and Mag level     Other Medical Problems  -Back pain: Follow-up with primary team recommendations.    Case is discussed with her daughters.     Thank you very much for the consultation and letting me participate in the patient's care.      By: DA Anaya MD;    12/20/2022, 9:30 AM

## 2022-12-29 ENCOUNTER — HOSPITAL ENCOUNTER (OUTPATIENT)
Dept: WOUND CARE | Age: 77
Discharge: HOME | End: 2022-12-29
Payer: MEDICARE

## 2022-12-29 VITALS
HEART RATE: 80 BPM | TEMPERATURE: 98.2 F | DIASTOLIC BLOOD PRESSURE: 89 MMHG | SYSTOLIC BLOOD PRESSURE: 166 MMHG | RESPIRATION RATE: 18 BRPM

## 2022-12-29 PROCEDURE — 99212 OFFICE O/P EST SF 10 MIN: CPT

## 2022-12-29 NOTE — WOUND CARE
Discharge Instructions/Wound Orders  [facility]  [address] [facility], [address] [facility]  Telephone: [phone]    NAME:  Alyssia Mcknight OF BIRTH:  1945  MEDICAL RECORD NUMBER:  [id number]  DATE:  12/29/2022    Ostomy Care Orders:  1) Remove old bag and clean with tap water & paper towels, dry thoroughly. 2) Crust wound area with stoma powder and NO Sting Skin prep. 3) Cut the new dressing Plain alginate to size of wound, place over wound and secure with thin duoderm. 4) Shape jossue ring to size and place over stoma. 5) Cut Ostomy bag [place] [id number] to size and place over stoma site. 6) Attach ostomy belt, change 2 x week and as needed for leaking. Dietary:  [x] Diet as tolerated: [] Calorie Diabetic Diet:Low carb and no Sugar [] No Added Salt:[] Increase Protein: [] Other:Limit the amount of liquid you are drinking and avoid drinking in between meals   Activity:  [x] Activity as tolerated:  [] Patient has no activity restrictions     [] Strict Bedrest: [] Remain off Work:     [] May return to full duty work:                                   [] Return to work with restrictions:             Return Appointment:  [x] Return Appointment: With Kelin Durant  in  2 Week(s)  [] Ordered tests:    Electronically signed Gabby Hernandez RN on 12/29/2022 at 11:41 AM     Brittani Catherine 281: Should you experience any significant changes in your wound(s) or have questions about your wound care, please contact the [address] [place] [address] at [address] [place] [address] 8:00 am - 4:30. If you need help with your wound outside these hours and cannot wait until we are again available, contact your PCP or go to the hospital emergency room. PLEASE NOTE: IF YOU ARE UNABLE TO OBTAIN WOUND SUPPLIES, CONTINUE TO USE THE SUPPLIES YOU HAVE AVAILABLE UNTIL YOU ARE ABLE TO REACH US. IT IS MOST IMPORTANT TO KEEP THE WOUND COVERED AT ALL TIMES. CWON Signature:_______________________    Date: ___________ Time:  ____________

## 2022-12-29 NOTE — WOUND CARE
OSTOMY Assessment    Stoma Tissue Assessment: ___Post-op _x__Follow-up       Type of Diversion: ___New_x__ Established ___Revision___Permanent____Temporary    _____  Ileostomy   _____  Colostomy: ____ Ascending, ____ Transverse, _____.  Sigmoid   _____  Suzzanna Gong   __x___  Ileal Conduit   _____  Mucous Fistula     Appearance of Ostomy:   _x__ Illona Scarce /Moist/Viable ___ Dark Red ___ Pink ___ Sloughing ___Necrotic____Pallor ____Red  ___Dry ____Moist    Stoma Size: ____32_____ (mm) __x_Round ___ Analia Raker ___Irregular     Stoma Height:   ____Flush ____Retracted ____Budded ____Edematous __x__Prolapse     Stoma Location  ____ Left Side          __x__Right Side     _____Umbilicus  _____Incision Line  ___x_ Above Belt       ____ Below Belt    Abdominal Contours  _x___ Firm ____ Flat ____Flabby ___Soft __x_Round ___ Hard ___ Other     Stoma Function: x__Yes __No  Output:   __x__ Yellow ____Amber ____ Pink(Hematuria) ____ Tea Colored   ____ Clots ____Foul Odor ____ Mucous     Peristomal skin: See Wound Flowsheet for wound documentation  ___ Intact ___ Irritant Dermatitis ___ Allergic Contact Dermatitis ___Candidiasis ___Caput Medusae ___Folliculitis ___Mechanical Trauma ___Mucosal Transplantation    ___Pyoderma Gangrenosum ___Hyperplasia ___Radiation Trauma ___Allergies mpling  ___ Dimpling ____Blistered ____Fragile ____Macerated ___Peeling  ___Ulceration  ___ Fungal ___Peristomal Hernia ___Non-blanchable ___ Hypergranulation      Stoma Complications:   __Excessive Bleeding __Ischemia __ Abscess __Necrosis _x_Prolapse   _x_Hernia __ Retraction __Stenosis __Mucosal Separation __Melanosis Coli   __Laceration __Other     Application for Patient    Wafer and pouch used during assessment and education ___[place] [id number]_________    Pouch System Recommended:   _x_One-Piece __Two-Piece ___Custom     Flat:   ___Pre-cut   ___Cut-to fit     Convexity: ___Shallow ___Deep__x_ Flexible ___Creative Convexity   ___Pre-cut __x_Cut to [employer]  Plain alginate, thin duoderm  _x_ Adhesive Seals __Adhesive Remover Wipes __Barrier [employer] __Barrier Wipes   __Deodorizer __Liquid Adhesive __ Paste __ Powder _x_Strip   _x_ Support Belt __Tape      Education for Patient & Family  1. Booklet of information on specific ostomy given and some verbal discussion of patients ostomy and expectations. 2. Instruction/demostration of ostomy wafer & pouch change. 3. Discuss concerns/ answer questions. 4. Provide follow up education in clinic if needed.        PICTURE INSERT:

## 2022-12-29 NOTE — WOUND CARE
12/29/22 1131   Wound Peristomal Right #1 04/18/22   Date First Assessed/Time First Assessed: 04/18/22 1145   Present on Hospital Admission: Yes  Wound Approximate Age at First Assessment (Weeks): 4 weeks  Primary Wound Type: Traumatic  Location: Peristomal  Wound Location Orientation: Right  Wound Desc. ..    Wound Image    Wound Etiology Traumatic   Dressing Status Old drainage noted   Cleansed Cleansed with saline   Wound Length (cm) 1 cm   Wound Width (cm) 1 cm   Wound Depth (cm) 0.1 cm   Wound Surface Area (cm^2) 1 cm^2   Change in Wound Size % 95.6   Wound Volume (cm^3) 0.1 cm^3   Wound Healing % 98   Wound Assessment Pink/red   Drainage Amount Small   Drainage Description Serosanguinous   Wound Odor None   Vickie-Wound/Incision Assessment Fragile   Edges Flat/open edges   Wound Thickness Description Full thickness   Visit Vitals  BP (!) 166/89 (BP 1 Location: Left upper arm)   Pulse 80   Temp 98.2 °F (36.8 °C)   Resp 18

## 2022-12-29 NOTE — WOUND CARE
Clinical Level of Care Assessment    Outpatient Ostomy Care      NAME:  Marychuy Gambino OF BIRTH:  1945  MEDICAL RECORD NUMBER:  [id number]   DATE:  12/29/2022      Patient Adry Cormier Assessment- Document in Flowsheet I&O   Points   Review of chart []   0   Assess Complete Ostomy tab in Navigator for assessment of; stoma status, peristomal skin, presence of hernia/stool consistency/diet/related medications   Simple adjustments to pouch size/pouch system, new stoma pattern, accessory addition/deletion. []   1   Assess Complete Ostomy tab in Navigator for assessment of; stoma status, peristomal skin, presence of hernia/stool consistency/diet/related medications   Moderate adjustments to pouch size/pouch system, new stoma pattern, accessory addition/deletion. Observe patient/caregiver with hands-on care. 1-2 adjustments to pouch size/system/skin care/accessory addition or deletion. [x]   2   Assess Complete Ostomy tab in Navigator for assessment of; stoma status, peristomal skin, presence of hernia/stool consistency/diet/related medications   Complex adjustments to pouch size/pouch system, new stoma pattern, accessory addition/deletion. 3 or more complex adjustments to pouch size/system/skin care/accessory addition or deletion. Observe patient/caregiver with hands-on care. Assess patient/patient abdomen for optimal pre-marked stoma site. Assess patient abdomen for type of hernia belt/accessory needed. []   3         Ambulation Status Documented in [facility] Clinical Note  Status Definition Points   Independent Independently able to ambulate. Fully able (without any assistance) to get on/off exam table/chair. [x]   0   Minimal Physical Assistance Requires physical assistance of one person to ambulate and/or position patient to be examined. Includes necessary physical assistance to position lower extremities on/off stool.    []   1   Moderate Physical Assistance Requires at least one staff member to physically assist patient in ambulating into treatment room, and/or on off chair/bed. Requires assistance to bathroom. []   2   Full Assistance Requires assistance of at least two staff members to transfer patient into treatment room and/or on/off bed/chair. \"Total Transfer\". Unable to use bathroom requires bedside commode and/or bedpan []   3       Teaching Effort Documented in [facility] Clinical Note  Effort Definition Points   No Teaching  []   0   General Initial/Simple lesson:  Assess readiness to learn, assess patient learning style to determine educational flow/special needs for learning. Teaching related to 1-3 topics  Documentation in [facility] completed. []   1   Intermediate Assess readiness to learn, assess patient learning style to determine educational flow/special needs for learning. Teaching related to 3-4 topics. Hernia belt application and care considerations  Documentation in [facility] completed. [x]   2   Complex Assess readiness to learn, assess patient learning style to determine educational flow/special needs for learning. Teaching of greater than 5 additional topics   Pre-operative ostomy education with review of written resources for patient/family/caregiver as needed. Demonstration/return demonstration of ostomy irrigation  Documentation in [facility] completed. []   3     Patient Assessment and Planning in [facility] Clinical Note   Planning Definition Points   Simple Simple pouch change procedure completed and reviewed with patient/family/caregiver   Documentation in [facility] completed. []   1   Intermediate Moderate level of follow-up needs:   Pouch change/discharge procedure revised and reviewed with patient/caregiver. Communications with outside resources; i.e. Telephone calls to Surgeon/ PCP, family/caregiver, home health, ECF. Documentation in [facility] completed.      [x]   2   Complex Complex level of instructions/changes:   Family/Caregiver learning/demonstration/return demonstration visit. Pouching/discharge procedure revised/reviewed with patient/family/caregiver. Contact with outside resources; i.e. communication with Surgeon/ PCP, home health, ECF. Contact/referral to ostomy appliance supplier for new or additional products. Review when to call [facility] or schedule follow-up visit. Referral to Emergency Department   Documentation in [facility] completed. []   3       Is this the Patient's First Visit with [facility] @ [facility]? No    Is this Patient Established to this [facility] within the last 3 years?    Yes             Clinical Level of Care      Points  0-3  Level 1 []     Points  4-6  Level 2 [x]     Points  7-8  Level 3 []     Points  9-10  Level 4 []     Points  11-12  Level 5 []       Electronically signed by Janis Polanco RN on 12/29/2022 at 11:44 AM

## 2023-01-02 ENCOUNTER — HOSPITAL ENCOUNTER (OUTPATIENT)
Dept: INFUSION THERAPY | Age: 78
Discharge: HOME | End: 2023-01-02
Payer: MEDICARE

## 2023-01-02 VITALS — SYSTOLIC BLOOD PRESSURE: 106 MMHG | TEMPERATURE: 97.7 F | DIASTOLIC BLOOD PRESSURE: 69 MMHG | RESPIRATION RATE: 18 BRPM

## 2023-01-02 DIAGNOSIS — C67.9 MALIGNANT NEOPLASM OF URINARY BLADDER, UNSPECIFIED SITE (HCC): Primary | ICD-10-CM

## 2023-01-02 LAB
ALBUMIN SERPL-MCNC: 3.8 G/DL (ref 3.5–5)
ALBUMIN/GLOB SERPL: 1.2 (ref 1.1–2.2)
ALP SERPL-CCNC: 36 U/L (ref 45–117)
ALT SERPL-CCNC: 28 U/L (ref 12–78)
ANION GAP SERPL CALC-SCNC: 7 MMOL/L (ref 5–15)
AST SERPL-CCNC: 17 U/L (ref 15–37)
BASOPHILS # BLD: 0.1 K/UL (ref 0–0.1)
BASOPHILS NFR BLD: 1 % (ref 0–1)
BILIRUB SERPL-MCNC: 0.6 MG/DL (ref 0.2–1)
BUN SERPL-MCNC: 42 MG/DL (ref 6–20)
BUN/CREAT SERPL: 22 (ref 12–20)
CALCIUM SERPL-MCNC: 9 MG/DL (ref 8.5–10.1)
CHLORIDE SERPL-SCNC: 112 MMOL/L (ref 97–108)
CO2 SERPL-SCNC: 22 MMOL/L (ref 21–32)
CREAT SERPL-MCNC: 1.93 MG/DL (ref 0.7–1.3)
DIFFERENTIAL METHOD BLD: ABNORMAL
EOSINOPHIL # BLD: 0.2 K/UL (ref 0–0.4)
EOSINOPHIL NFR BLD: 3 % (ref 0–7)
ERYTHROCYTE [DISTWIDTH] IN BLOOD BY AUTOMATED COUNT: 12.7 % (ref 11.5–14.5)
GLOBULIN SER CALC-MCNC: 3.2 G/DL (ref 2–4)
GLUCOSE SERPL-MCNC: 128 MG/DL (ref 65–100)
HCT VFR BLD AUTO: 37.8 % (ref 36.6–50.3)
HGB BLD-MCNC: 12.4 G/DL (ref 12.1–17)
IMM GRANULOCYTES # BLD AUTO: 0 K/UL (ref 0–0.04)
IMM GRANULOCYTES NFR BLD AUTO: 0 % (ref 0–0.5)
LYMPHOCYTES # BLD: 1 K/UL (ref 0.8–3.5)
LYMPHOCYTES NFR BLD: 16 % (ref 12–49)
MCH RBC QN AUTO: 34.1 PG (ref 26–34)
MCHC RBC AUTO-ENTMCNC: 32.8 G/DL (ref 30–36.5)
MCV RBC AUTO: 103.8 FL (ref 80–99)
MONOCYTES # BLD: 0.6 K/UL (ref 0–1)
MONOCYTES NFR BLD: 10 % (ref 5–13)
NEUTS SEG # BLD: 4.3 K/UL (ref 1.8–8)
NEUTS SEG NFR BLD: 70 % (ref 32–75)
NRBC # BLD: 0 K/UL (ref 0–0.01)
NRBC BLD-RTO: 0 PER 100 WBC
PLATELET # BLD AUTO: 202 K/UL (ref 150–400)
PMV BLD AUTO: 8.9 FL (ref 8.9–12.9)
POTASSIUM SERPL-SCNC: 3.9 MMOL/L (ref 3.5–5.1)
PROT SERPL-MCNC: 7 G/DL (ref 6.4–8.2)
RBC # BLD AUTO: 3.64 M/UL (ref 4.1–5.7)
SODIUM SERPL-SCNC: 141 MMOL/L (ref 136–145)
WBC # BLD AUTO: 6.1 K/UL (ref 4.1–11.1)

## 2023-01-02 PROCEDURE — 80053 COMPREHEN METABOLIC PANEL: CPT

## 2023-01-02 PROCEDURE — 85025 COMPLETE CBC W/AUTO DIFF WBC: CPT

## 2023-01-02 PROCEDURE — 36415 COLL VENOUS BLD VENIPUNCTURE: CPT

## 2023-01-02 NOTE — PROGRESS NOTES
[facility] Progress Note    Date: January 2, 2023        115: Pt arrived ambulatory to [facility] for Pre- Chemo Lab Work in stable condition. Assessment completed. Labs drawn peripherally from right [facility] and sent for processing. 2x2 and coban placed. Patient Vitals for the past 12 hrs:   Temp Resp BP   01/02/23 1116 97.7 °F (36.5 °C) 18 106/69         Mr. Angle Hernandez was discharged from [place] [address] in stable condition. Patient is aware of next scheduled [facility] appointment on 1/3/23.      Future Appointments   Date Time Provider Gregorio Ibarra   1/3/2023 11:00 AM E3 TD MED [address]   1/3/2023 11:15 AM Bianca Brito  [address] BS AMB   1/12/2023 11:30 AM MRM  OSTOMY NURSE [facility]   1/16/2023  9:30 AM H3 TD LAB [facility]   1/17/2023 10:00 AM A1 TD MED TX [facility]   1/30/2023  9:30 AM H3 TD LAB [facility]   1/31/2023  9:00 AM E3 TD MED TX [facility]   2/13/2023  9:30 AM H3 TD LAB [facility]   2/14/2023  8:30 AM F4 TD MED TX [facility]   2/28/2023 10:30 AM B3 TD MED 1207 CLARISSE Amin RN  January 2, 2023

## 2023-01-03 ENCOUNTER — HOSPITAL ENCOUNTER (OUTPATIENT)
Dept: INFUSION THERAPY | Age: 78
Discharge: HOME | End: 2023-01-03
Payer: MEDICARE

## 2023-01-03 ENCOUNTER — OFFICE VISIT (OUTPATIENT)
Dept: ONCOLOGY | Age: 78
End: 2023-01-03
Payer: MEDICARE

## 2023-01-03 VITALS
SYSTOLIC BLOOD PRESSURE: 131 MMHG | DIASTOLIC BLOOD PRESSURE: 75 MMHG | TEMPERATURE: 98.8 F | HEART RATE: 97 BPM | RESPIRATION RATE: 18 BRPM

## 2023-01-03 VITALS
BODY MASS INDEX: 33.47 KG/M2 | DIASTOLIC BLOOD PRESSURE: 70 MMHG | RESPIRATION RATE: 18 BRPM | TEMPERATURE: 98.8 F | HEART RATE: 107 BPM | SYSTOLIC BLOOD PRESSURE: 107 MMHG | OXYGEN SATURATION: 96 % | WEIGHT: 240 LBS

## 2023-01-03 DIAGNOSIS — C67.9 MALIGNANT NEOPLASM OF URINARY BLADDER, UNSPECIFIED SITE (HCC): Primary | ICD-10-CM

## 2023-01-03 DIAGNOSIS — Z51.12 ENCOUNTER FOR ANTINEOPLASTIC IMMUNOTHERAPY: ICD-10-CM

## 2023-01-03 DIAGNOSIS — Z95.828 PORT-A-CATH IN PLACE: ICD-10-CM

## 2023-01-03 PROCEDURE — G8427 DOCREV CUR MEDS BY ELIG CLIN: HCPCS | Performed by: INTERNAL MEDICINE

## 2023-01-03 PROCEDURE — 3078F DIAST BP <80 MM HG: CPT | Performed by: INTERNAL MEDICINE

## 2023-01-03 PROCEDURE — G0463 HOSPITAL OUTPT CLINIC VISIT: HCPCS | Performed by: INTERNAL MEDICINE

## 2023-01-03 PROCEDURE — 74011000258 HC RX REV CODE- 258: Performed by: REGISTERED NURSE

## 2023-01-03 PROCEDURE — 99214 OFFICE O/P EST MOD 30 MIN: CPT | Performed by: INTERNAL MEDICINE

## 2023-01-03 PROCEDURE — 74011250636 HC RX REV CODE- 250/636: Performed by: REGISTERED NURSE

## 2023-01-03 PROCEDURE — G8510 SCR DEP NEG, NO PLAN REQD: HCPCS | Performed by: INTERNAL MEDICINE

## 2023-01-03 PROCEDURE — G8536 NO DOC ELDER MAL SCRN: HCPCS | Performed by: INTERNAL MEDICINE

## 2023-01-03 PROCEDURE — 77030012965 HC NDL HUBR BBMI -A

## 2023-01-03 PROCEDURE — 74011000250 HC RX REV CODE- 250: Performed by: REGISTERED NURSE

## 2023-01-03 PROCEDURE — 1101F PT FALLS ASSESS-DOCD LE1/YR: CPT | Performed by: INTERNAL MEDICINE

## 2023-01-03 PROCEDURE — 3074F SYST BP LT 130 MM HG: CPT | Performed by: INTERNAL MEDICINE

## 2023-01-03 PROCEDURE — 1123F ACP DISCUSS/DSCN MKR DOCD: CPT | Performed by: INTERNAL MEDICINE

## 2023-01-03 PROCEDURE — 96413 CHEMO IV INFUSION 1 HR: CPT

## 2023-01-03 PROCEDURE — G8417 CALC BMI ABV UP PARAM F/U: HCPCS | Performed by: INTERNAL MEDICINE

## 2023-01-03 RX ORDER — ACETAMINOPHEN 325 MG/1
650 TABLET ORAL AS NEEDED
Status: ACTIVE | OUTPATIENT
Start: 2023-01-03 | End: 2023-01-03

## 2023-01-03 RX ORDER — HEPARIN 100 UNIT/ML
300-500 SYRINGE INTRAVENOUS AS NEEDED
Status: ACTIVE | OUTPATIENT
Start: 2023-01-03 | End: 2023-01-03

## 2023-01-03 RX ORDER — EPINEPHRINE 1 MG/ML
0.3 INJECTION, SOLUTION, CONCENTRATE INTRAVENOUS AS NEEDED
Status: ACTIVE | OUTPATIENT
Start: 2023-01-03 | End: 2023-01-03

## 2023-01-03 RX ORDER — ONDANSETRON 2 MG/ML
8 INJECTION INTRAMUSCULAR; INTRAVENOUS AS NEEDED
Status: ACTIVE | OUTPATIENT
Start: 2023-01-03 | End: 2023-01-03

## 2023-01-03 RX ORDER — DIPHENHYDRAMINE HYDROCHLORIDE 50 MG/ML
25 INJECTION, SOLUTION INTRAMUSCULAR; INTRAVENOUS AS NEEDED
Status: ACTIVE | OUTPATIENT
Start: 2023-01-03 | End: 2023-01-03

## 2023-01-03 RX ORDER — SODIUM CHLORIDE 9 MG/ML
25 INJECTION, SOLUTION INTRAVENOUS CONTINUOUS
Status: DISPENSED | OUTPATIENT
Start: 2023-01-03 | End: 2023-01-03

## 2023-01-03 RX ORDER — SODIUM CHLORIDE 0.9 % (FLUSH) 0.9 %
10 SYRINGE (ML) INJECTION AS NEEDED
Status: DISPENSED | OUTPATIENT
Start: 2023-01-03 | End: 2023-01-03

## 2023-01-03 RX ORDER — HYDROCORTISONE SODIUM SUCCINATE 100 MG/2ML
100 INJECTION, POWDER, FOR SOLUTION INTRAMUSCULAR; INTRAVENOUS AS NEEDED
Status: ACTIVE | OUTPATIENT
Start: 2023-01-03 | End: 2023-01-03

## 2023-01-03 RX ORDER — DIPHENHYDRAMINE HYDROCHLORIDE 50 MG/ML
50 INJECTION, SOLUTION INTRAMUSCULAR; INTRAVENOUS AS NEEDED
Status: ACTIVE | OUTPATIENT
Start: 2023-01-03 | End: 2023-01-03

## 2023-01-03 RX ORDER — SODIUM CHLORIDE 9 MG/ML
10 INJECTION INTRAVENOUS AS NEEDED
Status: ACTIVE | OUTPATIENT
Start: 2023-01-03 | End: 2023-01-03

## 2023-01-03 RX ORDER — ALBUTEROL SULFATE 0.83 MG/ML
2.5 SOLUTION RESPIRATORY (INHALATION) AS NEEDED
Status: ACTIVE | OUTPATIENT
Start: 2023-01-03 | End: 2023-01-03

## 2023-01-03 RX ADMIN — SODIUM CHLORIDE, PRESERVATIVE FREE 10 ML: 5 INJECTION INTRAVENOUS at 13:01

## 2023-01-03 RX ADMIN — SODIUM CHLORIDE 240 MG: 9 INJECTION, SOLUTION INTRAVENOUS at 12:27

## 2023-01-03 RX ADMIN — HEPARIN 500 UNITS: 100 SYRINGE at 13:02

## 2023-01-03 RX ADMIN — SODIUM CHLORIDE 25 ML/HR: 9 INJECTION, SOLUTION INTRAVENOUS at 11:58

## 2023-01-03 NOTE — PROGRESS NOTES
Naomi Torres is a 69 y.o. male    Chief Complaint   Patient presents with    Follow-up      Muscle invasive bladder cancer- Mixed histology       1. Have you been to the ER, urgent care clinic since your last visit? Hospitalized since your last visit? No    2. Have you seen or consulted any other health care providers outside of the [address] [place] [address] since your last visit? Include any pap smears or colon screening.  No

## 2023-01-03 NOTE — PROGRESS NOTES
[facility] [place] [facility] [place] [address] [address]  W: [phone]  F: [phone]    Reason for Visit:   Darren Leventhal is a 69 y.o. male who is seen in follow-up of Muscle invasive bladder cancer- Mixed histology    Treatment History:   3/1/2021: CT IVP: Multiple abdominal, iliac, mediastinal nodes unchanged from 2019 consistent with h/o sarcoidosis, Thickened R lateral bladder wall. 6/23/2021: Cystoscopy and TURBT- Papillary changes were noted within the prostatic urethra ,  papillary tumors seen emanating from the surface of the left hemitrigone, There were also diffuse changes in the floor of the bladder- Low grade urothelial carcinoma of the prostatic urethra, R lateral bladder wall with HG Muscle invasive urothelial carcinoma and squamous differentiation+ CIS, Left brenda trigone HG non invasive urothelial carcinoma  8/5/21- 10/21/2021: Cisplatin + Gemzar x 4   9/14/2021: CT was stable. 12/6/2021: Radical urethro-cystoprostatectomy   2/3/22: Adjuvant nivolumab  4/22/2022: CT CHATO   8/2022 CT CAP CHATO     History of Present Illness:   Patient is a 69 y.o. male with PMH as below including sarcoidosis who is seen for evaluation of bladder cancer. He has a history of nonmuscle invasive bladder cancer in 2015, underwent 2 induction courses of BCG, had a non muscle invasive recurrence in 2019 and had re induction with BCG. Cystoscopy 6/2020 at [facility] did not show any recurrence. In March 2021 he had a positive FISH and was seen by Dr. Karin Porras. Had a CT IVP 3/2021 which showed some Bladder thickening. 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 Grade 4 neutropenia after cycle 1. Completed 4 cycles and seen after surgery done 12/6/2021. He comes today for follow up on Nivolumab. Rash is about the same.  Taking prednisone 10mg daily. BMs are at baseline. No new cough, SOB, fevers/chills. No bleeding. Past Medical History:   Diagnosis Date    Arrhythmia     LBBB    Arthritis     Asthma     MILD    Cancer ([facility] [address]     scc top of head and nose    Cancer ([facility] [address]     BLADDER    COVID-19 vaccine series completed     [address] 21 AND 21    Depression with anxiety     Hypertension     Other unknown and unspecified cause of morbidity or mortality     history of pulmonary sarcoid     Posterior cervical adenopathy, benign 2018    Pulmonary sarcoidosis ([facility] [address]     Unspecified sleep apnea     cpap      Past Surgical History:   Procedure Laterality Date    HX CATARACT REMOVAL Bilateral     HX HERNIA REPAIR Right AS A CHILD    INGUINAL    HX HERNIA REPAIR Left     INGUIBAL    HX KNEE REPLACEMENT Left     HX ORTHOPAEDIC Right     BONE SPURS REMOVED FROM FOOT    HX OTHER SURGICAL      scc removed top of head and nose    HX OTHER SURGICAL  2005    benign lump removed from thyroid    HX OTHER SURGICAL Right 2020    SKIN CANCER RELMOVED FROM LEG    HX UROLOGICAL  2020    CYSTO    IR INSERT TUNL CVC W PORT OVER 5 YEARS  2021    IN CARDIAC SURG PROCEDURE UNLIST  2021    CARDIAC CATH    IN REVISE KNEE JOINT REPLACE,ALL PARTS Left       Social History     Tobacco Use    Smoking status: Former     Packs/day: 0.50     Years: 2.00     Pack years: 1.00     Types: Cigarettes     Quit date: 1965     Years since quittin.4     Passive exposure: Never    Smokeless tobacco: Never   Substance Use Topics    Alcohol use:  Yes     Alcohol/week: 2.0 standard drinks     Types: 2 Glasses of wine per week     Comment: DAILY      Family History   Problem Relation Age of Onset    Cancer Mother         breast with mets    Dementia Mother     Heart Disease Father     Cancer Sister         breast    Lung Disease Brother     No Known Problems Brother     Anesth Problems Neg Hx      Current Outpatient Medications Medication Sig    predniSONE (DELTASONE) 10 mg tablet Take 10 mg by mouth daily (with breakfast). lidocaine-prilocaine (EMLA) topical cream Apply  to affected area as needed for Pain. Apply a thick layer over port a cath 30-60 minutes prior to port access    multivitamin (ONE A DAY) tablet Take 1 Tablet by mouth daily. metoprolol tartrate (LOPRESSOR) 25 mg tablet Take 0.5 Tablets by mouth every twelve (12) hours. zolpidem (AMBIEN) 5 mg tablet Take 5 mg by mouth nightly as needed for Sleep. buPROPion SR (WELLBUTRIN SR) 100 mg SR tablet Take 100 mg by mouth daily. acetaminophen (TYLENOL) 500 mg tablet Take 1,000 mg by mouth every six (6) hours as needed for Pain.    simvastatin (ZOCOR) 20 mg tablet Take 20 mg by mouth nightly. escitalopram oxalate (Lexapro) 10 mg tablet Take 10 mg by mouth daily. No current facility-administered medications for this visit. Allergies   Allergen Reactions    Pcn [Penicillins] Hives     Patient screened for any delayed non-IgE-mediated reaction to PCN. Patient notes the following:    NO SEVERE NON-IGE MEDIATED REACTIONS        Review of Systems: A complete review of systems was obtained, negative except as described above. Physical Exam:     Visit Vitals  /70 (BP 1 Location: Right arm, BP Patient Position: Sitting)   Pulse (!) 107   Temp 98.8 °F (37.1 °C)   Resp 18   Wt 240 lb (108.9 kg)   SpO2 96%   BMI 33.47 kg/m²     ECOG PS: 1  General: No distress  Eyes: PERRL, anicteric sclerae  HENT: Atraumatic   Neck: Supple  Skin: resolving macular erythematous rash noted on b/l arms & chest   GI: Has a urostomy bag  Psych: Alert, oriented, appropriate affect, normal judgment/insight    Results:     Lab Results   Component Value Date/Time    WBC 6.1 01/02/2023 11:18 AM    HGB 12.4 01/02/2023 11:18 AM    HCT 37.8 01/02/2023 11:18 AM    PLATELET 001 40/70/7699 11:18 AM    .8 (H) 01/02/2023 11:18 AM    ABS.  NEUTROPHILS 4.3 01/02/2023 11:18 AM Lab Results   Component Value Date/Time    Sodium 141 01/02/2023 11:18 AM    Potassium 3.9 01/02/2023 11:18 AM    Chloride 112 (H) 01/02/2023 11:18 AM    CO2 22 01/02/2023 11:18 AM    Glucose 128 (H) 01/02/2023 11:18 AM    BUN 42 (H) 01/02/2023 11:18 AM    Creatinine 1.93 (H) 01/02/2023 11:18 AM    GFR est AA 41 (L) 09/26/2022 10:45 AM    GFR est non-AA 34 (L) 09/26/2022 10:45 AM    Calcium 9.0 01/02/2023 11:18 AM    Glucose (POC) 143 (H) 12/11/2021 01:02 AM     Lab Results   Component Value Date/Time    Bilirubin, total 0.6 01/02/2023 11:18 AM    ALT (SGPT) 28 01/02/2023 11:18 AM    Alk. phosphatase 36 (L) 01/02/2023 11:18 AM    Protein, total 7.0 01/02/2023 11:18 AM    Albumin 3.8 01/02/2023 11:18 AM    Globulin 3.2 01/02/2023 11:18 AM       External   Records reviewed and summarized above.   Pathology report(s) reviewed    12/21/2021     SPECIMEN       Procedure: Radical urethro-cystoprostatectomy       TUMOR       Tumor Site:                Rickford Contras, Right lateral wall, Left   Ureteral                                  orifice, Right bladder neck        Histologic Type:     Papillary urothelial carcinoma, invasive and   non-invasive, and carcinoma in situ       Histologic Grade:                High-grade       Tumor Size: Cannot be determined:      Multiple tumors       Tumor Extension:           Tumor invades adjacent structures -   Prostate                                  (transmural invasion from the bladder   tumor)       Lymphovascular Invasion:      Present       Tumor Configuration:           Papillary, Flat       MARGINS       Margins:                     Uninvolved by invasive carcinoma and   carcinoma in situ /                                  noninvasive urothelial carcinoma    LYMPH NODES       Number of Lymph Nodes Involved:      2           Size of Largest Metastatic Deposit:      1 cm               Site:                     Left Pelvic Lymph Nodes           Extranodal Extension: Present       Number of Lymph Nodes Examined:      5     PATHOLOGIC STAGE CLASSIFICATION (pTNM, AJCC 8th Edition)       TNM Descriptors:                m (multiple primary tumors)       Primary Tumor (pT):                pT4a       Regional Lymph Nodes (pN):           pN2     Carcinoma in situ URETHRA: RESECTION       Tumor Site:                     Urethra       SPECIMEN       Procedure: Total urethrectomy    TUMOR       Tumor Site:                     Prostatic urethra       Histologic Type:                       Papillary urothelial carcinoma,   invasive       Histologic Grade:                     High-grade       Tumor Extension:                Tumor invades adjacent structures -   Prostate       Lymphovascular Invasion:           Present       MARGINS       Margins:                          Uninvolved by invasive carcinoma and   carcinoma in situ /                                       noninvasive urothelial carcinoma       LYMPH NODES (These represent the same lymph nodes reported in the BLADDER   Resection Template)       Number of Lymph Nodes Involved:      2           Size of Largest Metastatic Deposit:      1 cm               Site:                     Left Pelvic Lymph node       Number of Lymph Nodes Examined:      5      PATHOLOGIC STAGE CLASSIFICATION (pTNM, AJCC 8th Edition)       TNM Descriptors:                m (multiple primary tumors within the   prostatic urethra)       Primary Tumor (pT):                pT2       Regional Lymph Nodes (pN):           pN2                     4.  Right pelvic lymph nodes, lymph node dissection:        One benign lymph node with noncaseating granulomatous inflammation,   compatible with patients history of sarcoidosis   One benign lymph node, no histopathologic changes   No tumor seen     5.   Left pelvic lymph nodes, lymph node dissection:        Metastatic urothelial carcinoma in two of three lymph nodes, 1 cm in   greatest dimension with extracapsular extension   Noncaseating granulomatous inflammation, compatible with patient's history   of sarcoidosis     Radiology report(s) reviewed above. CT CAP 7/19/21:   IMPRESSION  1. While the bladder is decompressed by Juarez is thick-walled and irregular  2. No evidence of metastatic disease to the abdomen or pelvis  3. Extensive bilateral hilar and mediastinal adenopathy with patchy perihilar  airspace disease with associated nodular peribronchial cuffing. Findings can be  seen with sarcoidosis. Differentiating adenopathy from sarcoidosis for  metastatic disease is difficult       9/2021 CT   IMPRESSION  1. Mild bladder wall thickening posteriorly and along the right lateral bladder  wall, nonspecific. No evidence of metastatic disease within the chest, abdomen,  or pelvis. 2. Extensive mediastinal and bilateral hilar lymphadenopathy with lymph node  calcifications, most compatible with known sarcoidosis. No significant change. 3. Bilateral perihilar airspace opacities have improved. No new pulmonary  pathology identified. 4. Severe diverticulosis of the colon. No definite superimposed acute  Diverticulitis. 12/11/2021     IMPRESSION     1. Interval radical cystoprostatectomy with ileal conduit urinary diversion. 2. No postoperative complication identified. 3. Dense bilateral nephrograms in this patient who is status post contrast  injection the previous day. This suggests ATN. 4/2022      IMPRESSION     Status post radical cystoprostatectomy with ileal conduit urinary diversion. Mild left-sided hydronephrosis status post removal of bilateral nephroureteral  stents. Chronic pulmonary parenchymal scarring and nodularity, not significantly  changed. No definitive evidence of new/recurrent metastatic disease in the chest, abdomen  or pelvis.     Assessment:   1) Muscle invasive bladder cancer- With squamous differentiation     [id number]  S/p 4 cycles of NAC with Cisplatin+ gemzar complicated by grade 4 neutropenia  S/P Radical urethro-cystoprostatectomy 12/6/2021- pT4N2 (invaded prostate), HG papillar, +LVI  Path stage-  Stage IIIB  Disease at high risk for local and distant recurrence  Margins negative but nodes had EPE    CT scans 8/2022 reviewed and CHATO  Developed post cycle 4 grade 2 immune-mediated rash; HD prednisone initiated 5/19/22 and tapered off to 10 mg when grade 1. Has not recurred. 2) Urethral TCC  S/P Total urethrectomy 12/6  HG Papillary [id number]  Margins negative  EPE  Present  See above    Has been evaluated by Rad Onc to discuss whether RT is indictated at a later date    1) Sarcoidosis  Follows with pulmonary  Stable     4) Obesity    5) Anemia  Likely secondary to CKD   Status post injectafer x 1 on 3/11/2022. He was also started on Retacrit 10,000 units on 3/3/2022   Improved     6) immune mediated rash  Was grade 2, now grade 1   Improved with steroids  Continue Prednisone 10mg daily     7) Encounter for high risk medication like immunotherapy  Rash has improved to grade 1 , grade 1-2 diarrhea x 24 hours  Continue prednisone 10mg daily   Now on q2 week dosing     Plan:     Continue Nivolumab every 2-week dosing ; last infusion will be 1/31/23   10mg prednisone   CBC, CMP, TSH per protocol  Reviewed recommendations from Dr. Marco Antonio Graham 5/9/22 visit- consider RT after completion of Nivolumab  Follow with wound clinic   See urology as scheduled   Pepcid daily  Continue Retacrit 10,000 units monthly  Goal hemoglobin is 10 g or less. Hold if hemoglobin more than 10 g/dL  Repeat imaging end of Jan 2023 ordered   Imodium PRN    RTC 4 weeks   [facility] every 2 weeks    I appreciate the opportunity to participate in Mr. Linda ortega. I personally saw and evaluated the patient and performed the key components of medical decision making. The history, physical exam, and documentation were performed by Renay Blake NP.   I reviewed and verified the above documentation and modified it as needed.     Signed By: Manasa Burton MD

## 2023-01-03 NOTE — PROGRESS NOTES
[facility] Progress Note    Date: January 3, 2023    Name: Galo Noble    MRN: [id number]         : 1945    Mr. Chucky Elliott Arrived ambulatory and in no distress for cycle 16 day 1 of Opdivo regimen. Follow Up: Proceed with treatment    Assessment was completed and documented in flowsheets. No acute concerns at this time. Port accessed without difficulty today, labs drawn and processed yesterday. Chemotherapy Flowsheet 1/3/2023   Cycle C16   Date 1/3/2023   Drug / Regimen Opdivo   Pre Hydration -   Post Hydration -   Pre Meds -   Notes given     Mr. Katerin Wolf vitals were reviewed. Patient Vitals for the past 12 hrs:   Temp Pulse Resp BP   23 1258 -- 97 -- 131/75   23 1154 98.8 °F (37.1 °C) 97 18 133/79     Lines:   Venous Access Device port 21 Upper chest (subclavicular area, right (Active)   Central Line Being Utilized Yes 23 1154   Criteria for Appropriate Use Irritant/vesicant medication 23 1154   Site Assessment Clean, dry, & intact 23 1154   Date of Last Dressing Change 23 1154   Dressing Status New 23 1301   Dressing Type Bandaid 23 1301   Action Taken Blood drawn 11/10/22 1100   Date Accessed (Medial Site) 22 1145   Access Time (Medial Site) 1145 22 1145   Access Needle Size (Site #1) 20 G 23 1154   Access Needle Length (Medial Site) 1 inch 23 1154   Positive Blood Return (Medial Site) Yes 23 1301   Action Taken (Medial Site) Flushed; De-accessed 23 1301   Access Needle Size (Lateral Site) 20 G 22 1336   Access Needle Length (Lateral Site) 0.75 inches 22 1336   Positive Blood Return (Lateral Site) Yes 22 1336   Action Taken (Lateral Site) Alcohol 22 1336   Alcohol Cap Used Yes 22 1145      Lab results were obtained and reviewed. Labs within parameter for treatment. Pre-medications  were administered as ordered and chemotherapy was initiated.   Medications Administered       0.9% sodium chloride infusion       Admin Date  01/03/2023 Action  New Bag Dose  25 mL/hr Rate  25 mL/hr Route  IntraVENous Administered By  Darrin Balloon              heparin (porcine) pf 300-500 Units       Admin Date  01/03/2023 Action  Given Dose  500 Units Route  InterCATHeter Administered By  Darrin Balloon              nivolumab (OPDIVO) 240 mg in 0.9% sodium chloride 100 mL, overfill volume 10 mL IVPB       Admin Date  01/03/2023 Action  New Bag Dose  240 mg Rate  268 mL/hr Route  IntraVENous Administered By  Darrin Balloon              sodium chloride (NS) flush 10 mL       Admin Date  01/03/2023 Action  Given Dose  10 mL Route  IntraVENous Administered By  Darrin Balloon             Two nurses verified prior to administering: Drug name, Drug dose, Infusion volume or drug volume when prepared in a syringe, Rate of administration, Route of administration, Expiration dates and/or times, Appearance and physical integrity of the drugs, Rate set on infusion pump, when used, and Sequencing of drug administration. Medication education and side effect management reinforced with patient. They verbalized understanding. Mr. Vitaliy Prajapati tolerated treatment well, port flushed and de accessed, patient was discharged from [place] [address] in stable condition. Patient is aware of upcoming appointments.       Future Appointments   Date Time Provider Gregorio Ibarra   1/12/2023 11:30 AM [facility] OSTOMY NURSE [facility]   1/16/2023  9:30 AM H3 TD LAB [facility]   1/17/2023 10:00 AM A1 TD MED [place] [facility]   1/30/2023  9:30 AM H3 TD LAB [facility]   1/31/2023  9:00 AM E3 TD MED [facility]   2/13/2023  9:30 AM H3 TD LAB [facility]   2/14/2023  8:30 AM F4 TD MED [place] [facility]   2/28/2023 10:30 AM B3 TD MED [facility]     Jason Torrez RN  January 3, 2023

## 2023-01-04 ENCOUNTER — APPOINTMENT (OUTPATIENT)
Dept: INFUSION THERAPY | Age: 78
End: 2023-01-04
Payer: MEDICARE

## 2023-01-06 ENCOUNTER — TELEPHONE (OUTPATIENT)
Dept: ONCOLOGY | Age: 78
End: 2023-01-06

## 2023-01-06 NOTE — TELEPHONE ENCOUNTER
Patient's spouse [facility]) called stating the patient had a fall on 1/4/23, patient declined emergency transport. Contact would like to speak with someone in the office, regarding a \"evaluation for assistant living\".   Contact can be reached at:  [phone]

## 2023-01-06 NOTE — TELEPHONE ENCOUNTER
[employer]  Oncology Social Work  Encounter     Patient Name:  Janak Vance \"Eliseo\"    Medical History: bladder cancer    Advance Directives: none on file     Narrative: Return call placed to patient's wife, Terri Jimenez. Spoke with Terri Jimenez and provided active listening as she reports concerns regarding patient's alcohol abuse, increased care needs and memory issues/forgetfulness. She is worried that she is no longer equipped to care for him anymore. Patient has long-term care insurance. Patient has an PCP appointment on 1/10 that she also plans on going to and will discuss her concerns there. Provided guidance and how to structure the conversation, language to use and managing goals and exsections. Encouraged self-care which she feels she does a good job of prioritizing. Per her request emailed the following to her at [email or web address]:     \"Think about what your goal is or what you hope the outcome is of this appointment. Keep the goal small. It could just be making his PCP aware of his drinking and how his functioning has been impaired. Here are some suggestions on framing the conversation:  Give specific reasons for your concerns based on what you see and feel. You can say, I'm worried about your drinking because I've noticed  Talk about the benefits of not drinking. Ie doing things together, not having to rely as much on people, etc.   Acknowledged the difficulty of drinking less/stopping and state that you'd like to make a plan to help him. Preparing/making a plan together means he has a say. Waiting until there is a crisis often means that the decisions will be made for him. Write everything down and bring it with you to the appointment so you don't have to rely on memory when you're feeling nervous and worried about the outcome. \"    Barriers to Care: caregiver burnout     Plan:  Encouraged caregiver self-care  Provided guidance on talking to patient and PCP about alcohol abuse concerns during appointment on 1/10    Referral/Handouts:   Primary Care referral    Thank you,   Greta Mccain, PENNYW

## 2023-01-12 ENCOUNTER — HOSPITAL ENCOUNTER (OUTPATIENT)
Dept: WOUND CARE | Age: 78
Discharge: HOME | End: 2023-01-12
Payer: MEDICARE

## 2023-01-12 VITALS
RESPIRATION RATE: 18 BRPM | TEMPERATURE: 98.1 F | HEART RATE: 74 BPM | SYSTOLIC BLOOD PRESSURE: 191 MMHG | DIASTOLIC BLOOD PRESSURE: 95 MMHG

## 2023-01-12 PROCEDURE — 99212 OFFICE O/P EST SF 10 MIN: CPT

## 2023-01-12 NOTE — WOUND CARE
Clinical Level of Care Assessment    Outpatient Ostomy Care      NAME:  Mildred Casillas OF BIRTH:  1945  MEDICAL RECORD NUMBER:  [id number]   DATE:  1/12/2023      Patient Ritchie Romero Assessment- Document in Flowsheet I&O   Points   Review of chart []   0   Assess Complete Ostomy tab in Navigator for assessment of; stoma status, peristomal skin, presence of hernia/stool consistency/diet/related medications   Simple adjustments to pouch size/pouch system, new stoma pattern, accessory addition/deletion. []   1   Assess Complete Ostomy tab in Navigator for assessment of; stoma status, peristomal skin, presence of hernia/stool consistency/diet/related medications   Moderate adjustments to pouch size/pouch system, new stoma pattern, accessory addition/deletion. Observe patient/caregiver with hands-on care. 1-2 adjustments to pouch size/system/skin care/accessory addition or deletion. [x]   2   Assess Complete Ostomy tab in Navigator for assessment of; stoma status, peristomal skin, presence of hernia/stool consistency/diet/related medications   Complex adjustments to pouch size/pouch system, new stoma pattern, accessory addition/deletion. 3 or more complex adjustments to pouch size/system/skin care/accessory addition or deletion. Observe patient/caregiver with hands-on care. Assess patient/patient abdomen for optimal pre-marked stoma site. Assess patient abdomen for type of hernia belt/accessory needed. []   3         Ambulation Status Documented in CN Clinical Note  Status Definition Points   Independent Independently able to ambulate. Fully able (without any assistance) to get on/off exam table/chair. [x]   0   Minimal Physical Assistance Requires physical assistance of one person to ambulate and/or position patient to be examined. Includes necessary physical assistance to position lower extremities on/off stool.    []   1   Moderate Physical Assistance Requires at least one staff member to physically assist patient in ambulating into treatment room, and/or on off chair/bed. Requires assistance to bathroom. []   2   Full Assistance Requires assistance of at least two staff members to transfer patient into treatment room and/or on/off bed/chair. \"Total Transfer\". Unable to use bathroom requires bedside commode and/or bedpan []   3       Teaching Effort Documented in [facility] Clinical Note  Effort Definition Points   No Teaching  []   0   General Initial/Simple lesson:  Assess readiness to learn, assess patient learning style to determine educational flow/special needs for learning. Teaching related to 1-3 topics  Documentation in [facility] completed. []   1   Intermediate Assess readiness to learn, assess patient learning style to determine educational flow/special needs for learning. Teaching related to 3-4 topics. Hernia belt application and care considerations  Documentation in [facility] completed. [x]   2   Complex Assess readiness to learn, assess patient learning style to determine educational flow/special needs for learning. Teaching of greater than 5 additional topics   Pre-operative ostomy education with review of written resources for patient/family/caregiver as needed. Demonstration/return demonstration of ostomy irrigation  Documentation in [facility] completed. []   3     Patient Assessment and Planning in [facility] Clinical Note   Planning Definition Points   Simple Simple pouch change procedure completed and reviewed with patient/family/caregiver   Documentation in [facility] completed. []   1   Intermediate Moderate level of follow-up needs:   Pouch change/discharge procedure revised and reviewed with patient/caregiver. Communications with outside resources; i.e. Telephone calls to Surgeon/ PCP, family/caregiver, home health, ECF. Documentation in [facility] completed.      [x]   2   Complex Complex level of instructions/changes:   Family/Caregiver learning/demonstration/return demonstration visit. Pouching/discharge procedure revised/reviewed with patient/family/caregiver. Contact with outside resources; i.e. communication with Surgeon/ PCP, home health, ECF. Contact/referral to ostomy appliance supplier for new or additional products. Review when to call [facility] or schedule follow-up visit. Referral to Emergency Department   Documentation in [facility] completed. []   3       Is this the Patient's First Visit with [facility] @ [facility]? No    Is this Patient Established to this [facility] within the last 3 years?    Yes             Clinical Level of Care      Points  0-3  Level 1 []     Points  4-6  Level 2 [x]     Points  7-8  Level 3 []     Points  9-10  Level 4 []     Points  11-12  Level 5 []       Electronically signed by Irina Trinidad RN on 1/12/2023 at 1:33 PM

## 2023-01-12 NOTE — WOUND CARE
01/12/23 1150   Wound Peristomal Right #1 04/18/22   Date First Assessed/Time First Assessed: 04/18/22 1145   Present on Hospital Admission: Yes  Wound Approximate Age at First Assessment (Weeks): 4 weeks  Primary Wound Type: Traumatic  Location: Peristomal  Wound Location Orientation: Right  Wound Desc. ..    Wound Image    Wound Etiology Traumatic   Dressing Status Old drainage noted   Cleansed Cleansed with saline   Wound Length (cm) 0.8 cm   Wound Width (cm) 0.6 cm   Wound Depth (cm) 0.1 cm   Wound Surface Area (cm^2) 0.48 cm^2   Change in Wound Size % 97.89   Wound Volume (cm^3) 0.048 cm^3   Wound Healing % 99   Wound Assessment Pink/red   Drainage Amount Small   Drainage Description Serosanguinous   Wound Odor None   Vickie-Wound/Incision Assessment Fragile   Edges Flat/open edges   Wound Thickness Description Full thickness       Visit Vitals  BP (!) 191/95   Pulse 74   Temp 98.1 °F (36.7 °C)   Resp 18

## 2023-01-12 NOTE — WOUND CARE
OSTOMY Assessment    Stoma Tissue Assessment: ___Post-op _x__Follow-up       Type of Diversion: ___New__x_ Established ___Revision___Permanent____Temporary    _____  Ileostomy   _____  Colostomy: ____ Ascending, ____ Transverse, _____.  Sigmoid   _____  [employer]   __x___  Ileal Conduit   _____  Mucous Fistula     Appearance of Ostomy:   _x__ Alvera Set[employer] /Moist/Viable ___ Dark Red ___ Pink ___ Sloughing ___Necrotic____Pallor ____Red  ___Dry ____Moist    Stoma Size: ___32______ (mm) _x__Round ___ Yarely Bigger ___Irregular     Stoma Height:   ____Flush ____Retracted ____Budded ____Edematous __x__Prolapse     Stoma Location  ____ Left Side          ____Right Side     ___x__Umbilicus  _____Incision Line  ____ Above Belt       ____ Below Belt    Abdominal Contours  __x__ Firm ____ Flat ____Flabby ___Soft __x_Round ___ Hard ___ Other     Stoma Function: _x_Yes __No  Output:   __x__ Yellow ____Amber ____ Pink(Hematuria) ____ Tea Colored   ____ Clots ____Foul Odor ____ Mucous     Peristomal skin: See Wound Care Flowsheet for wound documentation  ___ Intact ___ Irritant Dermatitis ___ Allergic Contact Dermatitis ___Candidiasis ___Caput Medusae ___Folliculitis ___Mechanical Trauma ___Mucosal Transplantation    ___Pyoderma Gangrenosum ___Hyperplasia ___Radiation Trauma ___Allergies mpling  ___ Dimpling ____Blistered ____Fragile ____Macerated ___Peeling  ___Ulceration  ___ Fungal ___Peristomal Hernia ___Non-blanchable ___ Hypergranulation      Stoma Complications:   __Excessive Bleeding __Ischemia __ Abscess __Necrosis __Prolapse   _x_Hernia __ Retraction __Stenosis __Mucosal Separation __Melanosis Coli   __Laceration __Other     Application for Patient    Wafer and pouch used during assessment and education __[id number]_________    Pouch System Recommended:   __One-Piece __Two-Piece ___Custom     Flat:   ___Pre-cut   ___Cut-to fit     Convexity: ___Shallow ___Deep__x_ Flexible ___Creative Convexity   ___Pre-cut __x_Cut to [employer]   _x_ Adhesive Seals __Adhesive Remover Wipes __Barrier [employer] _x_Barrier Wipes   __Deodorizer __Liquid Adhesive __ Paste __ Powder _x_Strip   _x_ Support Belt __Tape      Education for Patient & Family  1. Booklet of information on specific ostomy given and some verbal discussion of patients ostomy and expectations. 2. Instruction/demostration of ostomy wafer & pouch change. 3. Discuss concerns/ answer questions. 4. Provide follow up education in clinic if needed.        PICTURE INSERT:

## 2023-01-12 NOTE — WOUND CARE
Discharge Instructions/Wound Orders  [facility]  [address], [address] [facility], [address] [facility]  Telephone: [phone]    NAME:  Valencia Root OF BIRTH:  1945  MEDICAL RECORD NUMBER:  [id number]  DATE:  1/12/2023  Ostomy Care Orders:  1) Remove old bag, clean stoma and peristomal skin with tap water and paper towels, dry thoroughly. 2) Crust wound area with stomahesive powder & No Sting skin prep. 3) Cover wound with plain alginate, then extra thin duoderm. 4) Shape Saul [id number] to size and place over stoma. 5) Cut [place] [id number] to size and place over stoma, remove paper backing from tape and secure tape. 6) Apply Coloplast barrier strips to edges, attach ostomy belt. 7) Change 2 x week and as needed for leaking. Dietary:  [x] Diet as tolerated: [] Calorie Diabetic Diet:Low carb and no Sugar [] No Added Salt:[x] Increase Protein: [] Other:Limit the amount of liquid you are drinking and avoid drinking in between meals   Activity:  [x] Activity as tolerated:  [] Patient has no activity restrictions     [] Strict Bedrest: [] Remain off Work:     [] May return to full duty work:                                   [] Return to work with restrictions:             Return Appointment:  [x] Return Appointment: With Kelin Jamison  in  2 Week(s)  [] Ordered tests:    Electronically signed Leslie Laureano RN on 1/12/2023 at 1:29 PM     Brittani Catherine 281: Should you experience any significant changes in your wound(s) or have questions about your wound care, please contact the [address] [place] [address] at [address] [place] [address] 8:00 am - 4:30. If you need help with your wound outside these hours and cannot wait until we are again available, contact your PCP or go to the hospital emergency room.      PLEASE NOTE: IF YOU ARE UNABLE TO OBTAIN WOUND SUPPLIES, CONTINUE TO USE THE SUPPLIES YOU HAVE AVAILABLE UNTIL YOU ARE ABLE TO REACH US. IT IS MOST IMPORTANT TO KEEP THE WOUND COVERED AT ALL TIMES.      CWON Signature:_______________________    Date: ___________ Time:  ____________

## 2023-01-16 ENCOUNTER — HOSPITAL ENCOUNTER (OUTPATIENT)
Dept: INFUSION THERAPY | Age: 78
Discharge: HOME | End: 2023-01-16
Payer: MEDICARE

## 2023-01-16 VITALS
TEMPERATURE: 98.6 F | OXYGEN SATURATION: 95 % | DIASTOLIC BLOOD PRESSURE: 92 MMHG | HEART RATE: 84 BPM | RESPIRATION RATE: 18 BRPM | SYSTOLIC BLOOD PRESSURE: 148 MMHG

## 2023-01-16 DIAGNOSIS — C67.9 MALIGNANT NEOPLASM OF URINARY BLADDER, UNSPECIFIED SITE (HCC): Primary | ICD-10-CM

## 2023-01-16 LAB
ALBUMIN SERPL-MCNC: 3.7 G/DL (ref 3.5–5)
ALBUMIN/GLOB SERPL: 1.2 (ref 1.1–2.2)
ALP SERPL-CCNC: 51 U/L (ref 45–117)
ALT SERPL-CCNC: 31 U/L (ref 12–78)
ANION GAP SERPL CALC-SCNC: 8 MMOL/L (ref 5–15)
AST SERPL-CCNC: 32 U/L (ref 15–37)
BASOPHILS # BLD: 0 K/UL (ref 0–0.1)
BASOPHILS NFR BLD: 1 % (ref 0–1)
BILIRUB SERPL-MCNC: 0.5 MG/DL (ref 0.2–1)
BUN SERPL-MCNC: 40 MG/DL (ref 6–20)
BUN/CREAT SERPL: 20 (ref 12–20)
CALCIUM SERPL-MCNC: 9 MG/DL (ref 8.5–10.1)
CHLORIDE SERPL-SCNC: 110 MMOL/L (ref 97–108)
CO2 SERPL-SCNC: 23 MMOL/L (ref 21–32)
CREAT SERPL-MCNC: 1.97 MG/DL (ref 0.7–1.3)
DIFFERENTIAL METHOD BLD: ABNORMAL
EOSINOPHIL # BLD: 0.1 K/UL (ref 0–0.4)
EOSINOPHIL NFR BLD: 3 % (ref 0–7)
ERYTHROCYTE [DISTWIDTH] IN BLOOD BY AUTOMATED COUNT: 12.8 % (ref 11.5–14.5)
GLOBULIN SER CALC-MCNC: 3.2 G/DL (ref 2–4)
GLUCOSE SERPL-MCNC: 138 MG/DL (ref 65–100)
HCT VFR BLD AUTO: 36.1 % (ref 36.6–50.3)
HGB BLD-MCNC: 11.9 G/DL (ref 12.1–17)
IMM GRANULOCYTES # BLD AUTO: 0 K/UL (ref 0–0.04)
IMM GRANULOCYTES NFR BLD AUTO: 1 % (ref 0–0.5)
LYMPHOCYTES # BLD: 0.7 K/UL (ref 0.8–3.5)
LYMPHOCYTES NFR BLD: 16 % (ref 12–49)
MCH RBC QN AUTO: 34.6 PG (ref 26–34)
MCHC RBC AUTO-ENTMCNC: 33 G/DL (ref 30–36.5)
MCV RBC AUTO: 104.9 FL (ref 80–99)
MONOCYTES # BLD: 0.5 K/UL (ref 0–1)
MONOCYTES NFR BLD: 10 % (ref 5–13)
NEUTS SEG # BLD: 3.3 K/UL (ref 1.8–8)
NEUTS SEG NFR BLD: 69 % (ref 32–75)
NRBC # BLD: 0 K/UL (ref 0–0.01)
NRBC BLD-RTO: 0 PER 100 WBC
PLATELET # BLD AUTO: 164 K/UL (ref 150–400)
PMV BLD AUTO: 8.4 FL (ref 8.9–12.9)
POTASSIUM SERPL-SCNC: 4.4 MMOL/L (ref 3.5–5.1)
PROT SERPL-MCNC: 6.9 G/DL (ref 6.4–8.2)
RBC # BLD AUTO: 3.44 M/UL (ref 4.1–5.7)
RBC MORPH BLD: ABNORMAL
SODIUM SERPL-SCNC: 141 MMOL/L (ref 136–145)
WBC # BLD AUTO: 4.6 K/UL (ref 4.1–11.1)

## 2023-01-16 PROCEDURE — 80053 COMPREHEN METABOLIC PANEL: CPT

## 2023-01-16 PROCEDURE — 36415 COLL VENOUS BLD VENIPUNCTURE: CPT

## 2023-01-16 PROCEDURE — 85025 COMPLETE CBC W/AUTO DIFF WBC: CPT

## 2023-01-16 NOTE — PROGRESS NOTES
[facility] Progress Note    Date: January 16, 2023        0940: Pt arrived ambulatory to [facility] for pre-treatment labs in stable condition. Labs drawn from [facility] [place] and sent for processing. Patient Vitals for the past 12 hrs:   Temp Pulse Resp BP SpO2   01/16/23 0942 98.6 °F (37 °C) 84 18 (!) 148/92 95 %                Mr. Tahir Cunningham tolerated the procedure, and had no complaints. 2x2 and coban placed. 36: Mr. Tahir Cunningham was discharged from [place] [address] in stable condition. Patient is aware of next scheduled [facility] appointment.          Future Appointments   Date Time Provider Gregorio Ibarra   1/17/2023 10:00 AM A1 TD MED [address] [facility]   1/26/2023 11:30 AM [facility]  OSTOMY NURSE [facility]   1/30/2023  9:30 AM H3 TD LAB [facility]   1/31/2023  9:00 AM E3 TD MED TX [facility]   1/31/2023  9:15 AM Wilbur Bai  [address] [facility] AMB   2/13/2023  9:30 AM H3 TD LAB [facility]   2/14/2023  8:30 AM F4 TD MED TX [facility]   2/28/2023 10:30 AM B3 TD MED [address] [facility]         Kristi Tam RN  January 16, 2023

## 2023-01-17 ENCOUNTER — HOSPITAL ENCOUNTER (OUTPATIENT)
Dept: INFUSION THERAPY | Age: 78
Discharge: HOME | End: 2023-01-17
Payer: MEDICARE

## 2023-01-17 VITALS
TEMPERATURE: 98.1 F | HEART RATE: 73 BPM | RESPIRATION RATE: 18 BRPM | DIASTOLIC BLOOD PRESSURE: 85 MMHG | SYSTOLIC BLOOD PRESSURE: 151 MMHG

## 2023-01-17 DIAGNOSIS — C67.9 MALIGNANT NEOPLASM OF URINARY BLADDER, UNSPECIFIED SITE (HCC): Primary | ICD-10-CM

## 2023-01-17 PROCEDURE — 96413 CHEMO IV INFUSION 1 HR: CPT

## 2023-01-17 PROCEDURE — 77030012965 HC NDL HUBR BBMI -A

## 2023-01-17 PROCEDURE — 74011250636 HC RX REV CODE- 250/636: Performed by: REGISTERED NURSE

## 2023-01-17 PROCEDURE — 74011000250 HC RX REV CODE- 250: Performed by: REGISTERED NURSE

## 2023-01-17 PROCEDURE — 74011000258 HC RX REV CODE- 258: Performed by: REGISTERED NURSE

## 2023-01-17 RX ORDER — SODIUM CHLORIDE 0.9 % (FLUSH) 0.9 %
10 SYRINGE (ML) INJECTION AS NEEDED
Status: DISPENSED | OUTPATIENT
Start: 2023-01-17 | End: 2023-01-17

## 2023-01-17 RX ORDER — SODIUM CHLORIDE 9 MG/ML
25 INJECTION, SOLUTION INTRAVENOUS CONTINUOUS
Status: DISPENSED | OUTPATIENT
Start: 2023-01-17 | End: 2023-01-17

## 2023-01-17 RX ORDER — HEPARIN 100 UNIT/ML
300-500 SYRINGE INTRAVENOUS AS NEEDED
Status: ACTIVE | OUTPATIENT
Start: 2023-01-17 | End: 2023-01-17

## 2023-01-17 RX ORDER — SODIUM CHLORIDE 9 MG/ML
10 INJECTION INTRAVENOUS AS NEEDED
Status: ACTIVE | OUTPATIENT
Start: 2023-01-17 | End: 2023-01-17

## 2023-01-17 RX ADMIN — SODIUM CHLORIDE, PRESERVATIVE FREE 10 ML: 5 INJECTION INTRAVENOUS at 10:12

## 2023-01-17 RX ADMIN — HEPARIN 500 UNITS: 100 SYRINGE at 11:29

## 2023-01-17 RX ADMIN — SODIUM CHLORIDE 240 MG: 9 INJECTION, SOLUTION INTRAVENOUS at 10:54

## 2023-01-17 RX ADMIN — SODIUM CHLORIDE, PRESERVATIVE FREE 10 ML: 5 INJECTION INTRAVENOUS at 11:29

## 2023-01-17 RX ADMIN — SODIUM CHLORIDE, PRESERVATIVE FREE 10 ML: 5 INJECTION INTRAVENOUS at 10:13

## 2023-01-17 RX ADMIN — SODIUM CHLORIDE 25 ML/HR: 9 INJECTION, SOLUTION INTRAVENOUS at 10:20

## 2023-01-17 NOTE — PROGRESS NOTES
[facility] Chemotherapy Progress Note    Date: 2023    Name: John Helm    MRN: [id number]         : 1945      0800 Pt admit to [facility] for [facility] in stable condition. Assessment completed. No new concerns voiced. Port with positive blood return. Labs reviewed from yesterday and are within treatment parameters. Hgb is 11.9 and not within parameters for Retacrit injection. Retacrit HELD. Pre-medications  were administered as ordered and chemotherapy was initiated. Medications Administered       0.9% sodium chloride infusion       Admin Date  2023 Action  New Bag Dose  25 mL/hr Rate  25 mL/hr Route  IntraVENous Administered By  Peter Bello RN              heparin (porcine) pf 300-500 Units       Admin Date  2023 Action  Given Dose  500 Units Route  InterCATHeter Administered By  Peter Bello RN              nivolumab (OPDIVO) 240 mg in 0.9% sodium chloride 100 mL, overfill volume 10 mL IVPB       Admin Date  2023 Action  New Bag Dose  240 mg Rate  268 mL/hr Route  IntraVENous Administered By  Peter Bello RN              sodium chloride (NS) flush 10 mL       Admin Date  2023 Action  Given Dose  10 mL Route  IntraVENous Administered By  Peter Bello RN               Admin Date  2023 Action  Given Dose  10 mL Route  IntraVENous Administered By  Peter Bello RN               Admin Date  2023 Action  Given Dose  10 mL Route  IntraVENous Administered By  Peter Bello RN                      Two nurses verified prior to administering:Drug name, Drug doseInfusion volume or drug volume when prepared in a syringe, Rate of administration, Route of administration, Expiration dates and/or times, Appearance and physical integrity of the drugs, Rate set on infusion pump, when used, Sequencing of drug administration.     Patient Vitals for the past 12 hrs:   Temp Pulse Resp BP   23 1126 -- 73 18 (!) 151/85   23 1003 98.1 °F (36.7 °C) 82 18 130/64         1130 Pt tolerated treatment well. Port maintained positive blood return throughout treatment. Flushed, heparinized and de-accessed per protocol. D/c home ambulatory in no distress. Next appointment is 1/30/23.       Teddy Downing RN  January 17, 2023

## 2023-01-20 ENCOUNTER — APPOINTMENT (OUTPATIENT)
Dept: CT IMAGING | Age: 78
End: 2023-01-20
Attending: REGISTERED NURSE
Payer: MEDICARE

## 2023-01-20 ENCOUNTER — HOSPITAL ENCOUNTER (OUTPATIENT)
Dept: CT IMAGING | Age: 78
End: 2023-01-20
Attending: REGISTERED NURSE
Payer: MEDICARE

## 2023-01-20 ENCOUNTER — HOSPITAL ENCOUNTER (OUTPATIENT)
Dept: CT IMAGING | Age: 78
Discharge: HOME | End: 2023-01-20
Attending: REGISTERED NURSE
Payer: MEDICARE

## 2023-01-20 DIAGNOSIS — C67.9 MALIGNANT NEOPLASM OF URINARY BLADDER, UNSPECIFIED SITE (HCC): ICD-10-CM

## 2023-01-20 PROCEDURE — 71250 CT THORAX DX C-: CPT

## 2023-01-20 PROCEDURE — 74176 CT ABD & PELVIS W/O CONTRAST: CPT

## 2023-01-26 ENCOUNTER — HOSPITAL ENCOUNTER (OUTPATIENT)
Dept: WOUND CARE | Age: 78
Discharge: HOME | End: 2023-01-26
Payer: MEDICARE

## 2023-01-26 VITALS
RESPIRATION RATE: 18 BRPM | SYSTOLIC BLOOD PRESSURE: 154 MMHG | HEART RATE: 86 BPM | DIASTOLIC BLOOD PRESSURE: 79 MMHG | TEMPERATURE: 98.7 F

## 2023-01-26 PROCEDURE — 99212 OFFICE O/P EST SF 10 MIN: CPT

## 2023-01-26 NOTE — WOUND CARE
01/26/23 1149   Wound Peristomal Right #1 04/18/22   Date First Assessed/Time First Assessed: 04/18/22 1145   Present on Hospital Admission: Yes  Wound Approximate Age at First Assessment (Weeks): 4 weeks  Primary Wound Type: Traumatic  Location: Peristomal  Wound Location Orientation: Right  Wound Desc. ..    Wound Image    Wound Etiology Traumatic   Dressing Status Intact   Cleansed Cleansed with saline   Dressing/Treatment Alginate with Ag;Hydrocolloid   Wound Length (cm) 0.1 cm   Wound Width (cm) 0.1 cm   Wound Depth (cm) 0.1 cm   Wound Surface Area (cm^2) 0.01 cm^2   Change in Wound Size % 99.96   Wound Volume (cm^3) 0.001 cm^3   Wound Healing % 100   Drainage Amount None   Wound Odor None

## 2023-01-26 NOTE — WOUND CARE
OSTOMY Assessment    Stoma Tissue Assessment: ___Post-op __x_Follow-up       Type of Diversion: ___New__x_ Established ___Revision___Permanent____Temporary    _____  Ileostomy   _____  Colostomy: ____ Ascending, ____ Transverse, _____.  Sigmoid   __x___  Osvaldo Cifuentes   _____  Ileal Conduit   _____  Mucous Fistula     Appearance of Ostomy:   __x_ Chelle Pollo /Moist/Viable ___ Dark Red ___ Pink ___ Sloughing ___Necrotic____Pallor ____Red  ___Dry ____Moist    Stoma Size: ____30____ (mm) __x_Round ___ Ladean Juaquin ___Irregular     Stoma Height:   ____Flush ____Retracted ____Budded ____Edematous __x__Prolapse     Stoma Location  ____ Left Side          _x___Right Side     _____Umbilicus  _____Incision Line  ____ Above Belt       ____ Below Belt    Abdominal Contours  __x__ Firm ____ Flat ____Flabby ___Soft _x__Round ___ Hard ___ Other     Stoma Function: _x_Yes __No  Output:   __x__ Yellow ____Amber ____ Pink(Hematuria) ____ Tea Colored   ____ Clots ____Foul Odor ____ Mucous     Peristomal skin: See Wound FLowsheet for wound documentation  ___ Intact ___ Irritant Dermatitis ___ Allergic Contact Dermatitis ___Candidiasis ___Caput Medusae ___Folliculitis ___Mechanical Trauma ___Mucosal Transplantation    ___Pyoderma Gangrenosum ___Hyperplasia ___Radiation Trauma ___Allergies mpling  ___ Dimpling ____Blistered ____Fragile ____Macerated ___Peeling  ___Ulceration  ___ Fungal ___Peristomal Hernia ___Non-blanchable ___ Hypergranulation      Stoma Complications:   __Excessive Bleeding __Ischemia __ Abscess __Necrosis __Prolapse   x__Hernia __ Retraction __Stenosis __Mucosal Separation __Melanosis Coli   __Laceration __Other     Application for Patient    Wafer and pouch used during assessment and education ____[place] [id number]________    Pouch System Recommended:   __One-Piece __Two-Piece ___Custom     Flat:   ___Pre-cut   ___Cut-to fit     Convexity: ___Shallow ___Deep__x_ Flexible ___Creative Convexity   ___Pre-cut __x_Cut to [employer]   _x_ Adhesive Seals __Adhesive Remover Wipes __Barrier [employer] x__Barrier Wipes   __Deodorizer __Liquid Adhesive __ Paste _x_ Powder _x_Strip   _x_ Support Belt __Tape      Education for Patient & Family  1. Booklet of information on specific ostomy given and some verbal discussion of patients ostomy and expectations. 2. Instruction/demostration of ostomy wafer & pouch change. 3. Discuss concerns/ answer questions. 4. Provide follow up education in clinic if needed.        PICTURE INSERT:

## 2023-01-30 ENCOUNTER — HOSPITAL ENCOUNTER (OUTPATIENT)
Dept: INFUSION THERAPY | Age: 78
Discharge: HOME | End: 2023-01-30
Payer: MEDICARE

## 2023-01-30 VITALS
TEMPERATURE: 98.9 F | RESPIRATION RATE: 18 BRPM | SYSTOLIC BLOOD PRESSURE: 138 MMHG | DIASTOLIC BLOOD PRESSURE: 69 MMHG | HEART RATE: 83 BPM

## 2023-01-30 DIAGNOSIS — C67.9 MALIGNANT NEOPLASM OF URINARY BLADDER, UNSPECIFIED SITE (HCC): Primary | ICD-10-CM

## 2023-01-30 LAB
ALBUMIN SERPL-MCNC: 3.9 G/DL (ref 3.5–5)
ALBUMIN/GLOB SERPL: 1.1 (ref 1.1–2.2)
ALP SERPL-CCNC: 80 U/L (ref 45–117)
ALT SERPL-CCNC: 36 U/L (ref 12–78)
ANION GAP SERPL CALC-SCNC: 2 MMOL/L (ref 5–15)
AST SERPL-CCNC: 18 U/L (ref 15–37)
BASOPHILS # BLD: 0.1 K/UL (ref 0–0.1)
BASOPHILS NFR BLD: 1 % (ref 0–1)
BILIRUB SERPL-MCNC: 0.6 MG/DL (ref 0.2–1)
BUN SERPL-MCNC: 38 MG/DL (ref 6–20)
BUN/CREAT SERPL: 19 (ref 12–20)
CALCIUM SERPL-MCNC: 9.5 MG/DL (ref 8.5–10.1)
CHLORIDE SERPL-SCNC: 112 MMOL/L (ref 97–108)
CO2 SERPL-SCNC: 26 MMOL/L (ref 21–32)
CREAT SERPL-MCNC: 1.97 MG/DL (ref 0.7–1.3)
DIFFERENTIAL METHOD BLD: ABNORMAL
EOSINOPHIL # BLD: 0.2 K/UL (ref 0–0.4)
EOSINOPHIL NFR BLD: 3 % (ref 0–7)
ERYTHROCYTE [DISTWIDTH] IN BLOOD BY AUTOMATED COUNT: 12.7 % (ref 11.5–14.5)
GLOBULIN SER CALC-MCNC: 3.5 G/DL (ref 2–4)
GLUCOSE SERPL-MCNC: 99 MG/DL (ref 65–100)
HCT VFR BLD AUTO: 39.6 % (ref 36.6–50.3)
HGB BLD-MCNC: 12.8 G/DL (ref 12.1–17)
IMM GRANULOCYTES # BLD AUTO: 0 K/UL (ref 0–0.04)
IMM GRANULOCYTES NFR BLD AUTO: 1 % (ref 0–0.5)
LYMPHOCYTES # BLD: 1.1 K/UL (ref 0.8–3.5)
LYMPHOCYTES NFR BLD: 18 % (ref 12–49)
MCH RBC QN AUTO: 34.1 PG (ref 26–34)
MCHC RBC AUTO-ENTMCNC: 32.3 G/DL (ref 30–36.5)
MCV RBC AUTO: 105.6 FL (ref 80–99)
MONOCYTES # BLD: 0.6 K/UL (ref 0–1)
MONOCYTES NFR BLD: 10 % (ref 5–13)
NEUTS SEG # BLD: 4.1 K/UL (ref 1.8–8)
NEUTS SEG NFR BLD: 67 % (ref 32–75)
NRBC # BLD: 0 K/UL (ref 0–0.01)
NRBC BLD-RTO: 0 PER 100 WBC
PLATELET # BLD AUTO: 171 K/UL (ref 150–400)
PMV BLD AUTO: 9.1 FL (ref 8.9–12.9)
POTASSIUM SERPL-SCNC: 3.8 MMOL/L (ref 3.5–5.1)
PROT SERPL-MCNC: 7.4 G/DL (ref 6.4–8.2)
RBC # BLD AUTO: 3.75 M/UL (ref 4.1–5.7)
SODIUM SERPL-SCNC: 140 MMOL/L (ref 136–145)
WBC # BLD AUTO: 6 K/UL (ref 4.1–11.1)

## 2023-01-30 PROCEDURE — 36415 COLL VENOUS BLD VENIPUNCTURE: CPT

## 2023-01-30 PROCEDURE — 85025 COMPLETE CBC W/AUTO DIFF WBC: CPT

## 2023-01-30 PROCEDURE — 80053 COMPREHEN METABOLIC PANEL: CPT

## 2023-01-30 NOTE — PROGRESS NOTES
[facility] Progress Note    Date: 2023    Name: Katelyn Maldonado    MRN: [id number]         : 1945      0930:  Pt arrived ambulatory and in no distress, for lab visit. Labs drawn via R AC, patient tolerated well. Visit Vitals  /69   Pulse 83   Temp 98.9 °F (37.2 °C)   Resp 18     Departed [facility] ambulatory and in no distress. Patient aware that he is coming back tomorrow for Opdivo infusion.     Future Appointments   Date Time Provider Gregorio Ibarra   2023  9:00 AM E3 TD MED [facility]   2023  9:15 AM Abby Garcia  [address] BS AMB   2023  9:30 AM H3 TD LAB [facility]   2023  8:30 AM F4 TD MED [place] [facility]   2023 10:30 AM B3 TD MED [facility]   3/2/2023 11:00 AM YUMIKO  OSTOMY NURSE [facility]       Jerica Louis RN  2023

## 2023-01-30 NOTE — PROGRESS NOTES
[facility] [place] [facility] [place] [address] [address], [address]  W: [phone]  F: [phone]    Reason for Visit:   William Gracia is a 69 y.o. male who is seen in follow-up of Muscle invasive bladder cancer- Mixed histology    Treatment History:   3/1/2021: CT IVP: Multiple abdominal, iliac, mediastinal nodes unchanged from 2019 consistent with h/o sarcoidosis, Thickened R lateral bladder wall. 6/23/2021: Cystoscopy and TURBT- Papillary changes were noted within the prostatic urethra ,  papillary tumors seen emanating from the surface of the left hemitrigone, There were also diffuse changes in the floor of the bladder- Low grade urothelial carcinoma of the prostatic urethra, R lateral bladder wall with HG Muscle invasive urothelial carcinoma and squamous differentiation+ CIS, Left brenda trigone HG non invasive urothelial carcinoma  8/5/21- 10/21/2021: Cisplatin + Gemzar x 4   9/14/2021: CT was stable. 12/6/2021: Radical urethro-cystoprostatectomy   2/3/22: Adjuvant nivolumab  4/22/2022: CT CHATO   8/2022 CT CAP CHATO   1/2023: CT CHATO    History of Present Illness:   Patient is a 69 y.o. male with PMH as below including sarcoidosis who is seen for evaluation of bladder cancer. He has a history of nonmuscle invasive bladder cancer in 2015, underwent 2 induction courses of BCG, had a non muscle invasive recurrence in 2019 and had re induction with BCG. Cystoscopy 6/2020 at [facility] did not show any recurrence. In March 2021 he had a positive FISH and was seen by Dr. John Carpio. Had a CT IVP 3/2021 which showed some Bladder thickening. 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 Grade 4 neutropenia after cycle 1. Completed 4 cycles and seen after surgery done 12/6/2021. He comes today for follow up on Nivolumab.  Rash is about the same. Taking prednisone 10mg daily. No new cough, SOB, fevers/chills. No bleeding. Excited to complete his treatment. Past Medical History:   Diagnosis Date    Arrhythmia     LBBB    Arthritis     Asthma     MILD    Cancer ([address]     scc top of head and nose    Cancer ([facility] [address]     BLADDER    COVID-19 vaccine series completed     [facility] VACCINE 21 AND 21    Depression with anxiety     Hypertension     Other unknown and unspecified cause of morbidity or mortality     history of pulmonary sarcoid     Posterior cervical adenopathy, benign 2018    Pulmonary sarcoidosis ([facility] [address]     Unspecified sleep apnea     cpap      Past Surgical History:   Procedure Laterality Date    HX CATARACT REMOVAL Bilateral     HX HERNIA REPAIR Right AS A CHILD    INGUINAL    HX HERNIA REPAIR Left     INGUIBAL    HX KNEE REPLACEMENT Left     HX ORTHOPAEDIC Right     BONE SPURS REMOVED FROM FOOT    HX OTHER SURGICAL      scc removed top of head and nose    HX OTHER SURGICAL  2005    benign lump removed from thyroid    HX OTHER SURGICAL Right 2020    SKIN CANCER RELMOVED FROM LEG    HX UROLOGICAL  2020    CYSTO    IR INSERT TUNL CVC W PORT OVER 5 YEARS  2021    MO REVJ TOT KNEE ARTHRP FEM&ENTIRE TIBIAL COMPONE Left     MO UNLISTED PROCEDURE CARDIAC SURGERY  2021    CARDIAC CATH      Social History     Tobacco Use    Smoking status: Former     Packs/day: 0.50     Years: 2.00     Pack years: 1.00     Types: Cigarettes     Quit date: 1965     Years since quittin.4     Passive exposure: Never    Smokeless tobacco: Never   Substance Use Topics    Alcohol use:  Yes     Alcohol/week: 2.0 standard drinks     Types: 2 Glasses of wine per week     Comment: DAILY      Family History   Problem Relation Age of Onset    Cancer Mother         breast with mets    Dementia Mother     Heart Disease Father     Cancer Sister         breast    Lung Disease Brother     No Known Problems Brother     Anesth Problems Neg Hx      Current Outpatient Medications   Medication Sig    predniSONE (DELTASONE) 10 mg tablet Take 10 mg by mouth daily (with breakfast). Take 10 mg by mouth daily for 1 month. Then take 10 mg every other day for two weeks, then take twice a week for 2 weeks, then once a week for two weeks, then stop.    lidocaine-prilocaine (EMLA) topical cream Apply  to affected area as needed for Pain. Apply a thick layer over port a cath 30-60 minutes prior to port access    multivitamin (ONE A DAY) tablet Take 1 Tablet by mouth daily. metoprolol tartrate (LOPRESSOR) 25 mg tablet Take 0.5 Tablets by mouth every twelve (12) hours. zolpidem (AMBIEN) 5 mg tablet Take 5 mg by mouth nightly as needed for Sleep. buPROPion SR (WELLBUTRIN SR) 100 mg SR tablet Take 100 mg by mouth daily. acetaminophen (TYLENOL) 500 mg tablet Take 1,000 mg by mouth every six (6) hours as needed for Pain.    simvastatin (ZOCOR) 20 mg tablet Take 20 mg by mouth nightly. escitalopram oxalate (Lexapro) 10 mg tablet Take 10 mg by mouth daily. No current facility-administered medications for this visit.      Facility-Administered Medications Ordered in Other Visits   Medication Dose Route Frequency    0.9% sodium chloride infusion  25 mL/hr IntraVENous CONTINUOUS    nivolumab (OPDIVO) 240 mg in 0.9% sodium chloride 100 mL, overfill volume 10 mL IVPB  240 mg IntraVENous ONCE    heparin (porcine) pf 300-500 Units  300-500 Units InterCATHeter PRN    sodium chloride (NS) flush 10 mL  10 mL IntraVENous PRN    sodium chloride 0.9 % bolus infusion 500 mL  500 mL IntraVENous PRN    diphenhydrAMINE (BENADRYL) injection 25 mg  25 mg IntraVENous PRN    famotidine (PF) (PEPCID) 20 mg in 0.9% sodium chloride 10 mL injection  20 mg IntraVENous PRN    acetaminophen (TYLENOL) tablet 650 mg  650 mg Oral PRN    meperidine (DEMEROL) injection 25 mg  25 mg IntraVENous PRN    ondansetron (ZOFRAN) injection 8 mg  8 mg IntraVENous PRN    sodium chloride 0.9 % bolus infusion 500 mL  500 mL IntraVENous PRN    EPINEPHrine HCl (PF) (ADRENALIN) 1 mg/mL (1 mL) injection 0.3 mg  0.3 mg SubCUTAneous PRN    hydrocortisone Sod Succ (PF) (SOLU-CORTEF) injection 100 mg  100 mg IntraVENous PRN    diphenhydrAMINE (BENADRYL) injection 50 mg  50 mg IntraVENous PRN    albuterol (PROVENTIL VENTOLIN) nebulizer solution 2.5 mg  2.5 mg Inhalation PRN      Allergies   Allergen Reactions    Pcn [Penicillins] Hives     Patient screened for any delayed non-IgE-mediated reaction to PCN. Patient notes the following:    NO SEVERE NON-IGE MEDIATED REACTIONS        Review of Systems: A complete review of systems was obtained, negative except as described above. Physical Exam:     Visit Vitals  /80 (BP 1 Location: Right arm, BP Patient Position: Sitting)   Pulse 84   Temp 98.8 °F (37.1 °C)   Resp 18   Wt 244 lb (110.7 kg)   SpO2 96%   BMI 34.03 kg/m²       ECOG PS: 1  General: No distress  Eyes: PERRL, anicteric sclerae  HENT: Atraumatic   Neck: Supple  Skin: resolving macular erythematous rash noted on b/l arms & chest   GI: Has a urostomy bag  Psych: Alert, oriented, appropriate affect, normal judgment/insight    Results:     Lab Results   Component Value Date/Time    WBC 6.0 01/30/2023 09:39 AM    HGB 12.8 01/30/2023 09:39 AM    HCT 39.6 01/30/2023 09:39 AM    PLATELET 018 44/63/2801 09:39 AM    .6 (H) 01/30/2023 09:39 AM    ABS.  NEUTROPHILS 4.1 01/30/2023 09:39 AM     Lab Results   Component Value Date/Time    Sodium 140 01/30/2023 09:39 AM    Potassium 3.8 01/30/2023 09:39 AM    Chloride 112 (H) 01/30/2023 09:39 AM    CO2 26 01/30/2023 09:39 AM    Glucose 99 01/30/2023 09:39 AM    BUN 38 (H) 01/30/2023 09:39 AM    Creatinine 1.97 (H) 01/30/2023 09:39 AM    GFR est AA 41 (L) 09/26/2022 10:45 AM    GFR est non-AA 34 (L) 09/26/2022 10:45 AM    Calcium 9.5 01/30/2023 09:39 AM    Glucose (POC) 143 (H) 12/11/2021 01:02 AM     Lab Results   Component Value Date/Time Bilirubin, total 0.6 01/30/2023 09:39 AM    ALT (SGPT) 36 01/30/2023 09:39 AM    Alk. phosphatase 80 01/30/2023 09:39 AM    Protein, total 7.4 01/30/2023 09:39 AM    Albumin 3.9 01/30/2023 09:39 AM    Globulin 3.5 01/30/2023 09:39 AM       External   Records reviewed and summarized above. Pathology report(s) reviewed    12/21/2021     SPECIMEN       Procedure: Radical urethro-cystoprostatectomy       TUMOR       Tumor Site:                Shen Pare, Right lateral wall, Left   Ureteral                                  orifice, Right bladder neck        Histologic Type:     Papillary urothelial carcinoma, invasive and   non-invasive, and carcinoma in situ       Histologic Grade:                High-grade       Tumor Size: Cannot be determined:      Multiple tumors       Tumor Extension:           Tumor invades adjacent structures -   Prostate                                  (transmural invasion from the bladder   tumor)       Lymphovascular Invasion:      Present       Tumor Configuration:           Papillary, Flat       MARGINS       Margins:                     Uninvolved by invasive carcinoma and   carcinoma in situ /                                  noninvasive urothelial carcinoma    LYMPH NODES       Number of Lymph Nodes Involved:      2           Size of Largest Metastatic Deposit:      1 cm               Site:                     Left Pelvic Lymph Nodes           Extranodal Extension:           Present       Number of Lymph Nodes Examined:      5     PATHOLOGIC STAGE CLASSIFICATION (pTNM, AJCC 8th Edition)       TNM Descriptors:                m (multiple primary tumors)       Primary Tumor (pT):                pT4a       Regional Lymph Nodes (pN):           pN2     Carcinoma in situ URETHRA: RESECTION       Tumor Site:                     Urethra       SPECIMEN       Procedure:                      Total urethrectomy    TUMOR       Tumor Site:                     Prostatic urethra       Histologic Type:                       Papillary urothelial carcinoma,   invasive       Histologic Grade:                     High-grade       Tumor Extension:                Tumor invades adjacent structures -   Prostate       Lymphovascular Invasion:           Present       MARGINS       Margins:                          Uninvolved by invasive carcinoma and   carcinoma in situ /                                       noninvasive urothelial carcinoma       LYMPH NODES (These represent the same lymph nodes reported in the BLADDER   Resection Template)       Number of Lymph Nodes Involved:      2           Size of Largest Metastatic Deposit:      1 cm               Site:                     Left Pelvic Lymph node       Number of Lymph Nodes Examined:      5      PATHOLOGIC STAGE CLASSIFICATION (pTNM, AJCC 8th Edition)       TNM Descriptors:                m (multiple primary tumors within the   prostatic urethra)       Primary Tumor (pT):                pT2       Regional Lymph Nodes (pN):           pN2                     4.  Right pelvic lymph nodes, lymph node dissection:        One benign lymph node with noncaseating granulomatous inflammation,   compatible with patients history of sarcoidosis   One benign lymph node, no histopathologic changes   No tumor seen     5. Left pelvic lymph nodes, lymph node dissection:        Metastatic urothelial carcinoma in two of three lymph nodes, 1 cm in   greatest dimension with extracapsular extension   Noncaseating granulomatous inflammation, compatible with patient's history   of sarcoidosis     Radiology report(s) reviewed above. CT CAP 7/19/21:   IMPRESSION  1. While the bladder is decompressed by Juarez is thick-walled and irregular  2. No evidence of metastatic disease to the abdomen or pelvis  3. Extensive bilateral hilar and mediastinal adenopathy with patchy perihilar  airspace disease with associated nodular peribronchial cuffing. Findings can be  seen with sarcoidosis. Differentiating adenopathy from sarcoidosis for  metastatic disease is difficult       9/2021 CT   IMPRESSION  1. Mild bladder wall thickening posteriorly and along the right lateral bladder  wall, nonspecific. No evidence of metastatic disease within the chest, abdomen,  or pelvis. 2. Extensive mediastinal and bilateral hilar lymphadenopathy with lymph node  calcifications, most compatible with known sarcoidosis. No significant change. 3. Bilateral perihilar airspace opacities have improved. No new pulmonary  pathology identified. 4. Severe diverticulosis of the colon. No definite superimposed acute  Diverticulitis. 12/11/2021     IMPRESSION     1. Interval radical cystoprostatectomy with ileal conduit urinary diversion. 2. No postoperative complication identified. 3. Dense bilateral nephrograms in this patient who is status post contrast  injection the previous day. This suggests ATN. 4/2022 CAP CT      IMPRESSION     Status post radical cystoprostatectomy with ileal conduit urinary diversion. Mild left-sided hydronephrosis status post removal of bilateral nephroureteral  stents. Chronic pulmonary parenchymal scarring and nodularity, not significantly  changed. No definitive evidence of new/recurrent metastatic disease in the chest, abdomen  or pelvis. 8/2022 CT CAP  IMPRESSION  1. Stable examination with regards to chronic pulmonary parenchymal opacities  and chronic partially calcified mediastinal and hilar lymph nodes, on related to  malignancy. 2.  Stable single prominent left para-aortic retroperitoneal lymph node. No new  mesenteric or retroperitoneal lymphadenopathy. 3.  Stable chronic left hydroureteronephrosis. 4.  Increase in size of the small bowel containing parastomal hernia in the  right lower quadrant without evidence of incarceration. 1/2023 CT CAP  IMPRESSION  1. Stable examination.   2.  Stable partially calcified mediastinal and hilar lymph nodes, partially  calcified pleural plaque, and chronic pulmonary opacities. 3.  Stable para-aortic lymph node. 4.  Stable left hydroureteronephrosis. Assessment:   1) Muscle invasive bladder cancer- With squamous differentiation     [id number]  S/p 4 cycles of NAC with Cisplatin+ gemzar complicated by grade 4 neutropenia  S/P Radical urethro-cystoprostatectomy 12/6/2021- pT4N2 (invaded prostate), HG papillar, +LVI  Path stage-  Stage IIIB  Disease at high risk for local and distant recurrence  Margins negative but nodes had EPE    CT scans 1/2023 reviewed and CHATO. Today is last cycle of nivolumab    2) Urethral TCC  S/P Total urethrectomy 12/6  HG Papillary qA3H3G1  Margins negative  EPE  Present  See above    Has been evaluated by Rad Onc to discuss whether RT is indictated at a later date    1) Sarcoidosis  Follows with pulmonary  Stable     4) Obesity    5) Anemia  Likely secondary to CKD   Status post injectafer x 1 on 3/11/2022. He was also started on Retacrit 10,000 units on 3/3/2022   Improved     6) immune mediated rash  Was grade 2, now grade 1   Improved with steroids  Continue Prednisone 10mg daily, will begin steroid taper, see below    7) Encounter for high risk medication like immunotherapy  Rash has improved to grade 1  Continue prednisone 10mg daily     Plan:     Proceed with Nivolumab, last infusion today  10mg prednisone taper: continue daily for 1 month, then every other day for 2 weeks, then twice a week for 2 weeks, and then once a week for 2 weeks,Then stop. CBC, CMP, TSH per protocol  F/U with Dr. Karishma Mccarthy 5/9/22 visit- consider RT after completion of Nivolumab- call to schedule an appointment  Follow with wound clinic   See urology as scheduled   Pepcid daily  Continue Retacrit 10,000 units monthly, Goal hemoglobin is 10 g or less.   Hold if hemoglobin more than 10 g/dL  Pet scan ordered for next ov   Port flush every 4 weeks with retacrit as indicated       RTC in 3 months  Port flush every 4 weeks with retacrit I appreciate the opportunity to participate in Mr. Ragini Shelton care. I personally saw and evaluated the patient and performed the key components of medical decision making. The history, physical exam, and documentation were performed by Yovani Smith NP. I reviewed and verified the above documentation and modified it as needed   Daniel Edwards is here for the last cycle of adjuvant Nivolumab  He has some bruising, no other rashes  Labs stable.   We discussed close surveillance, PET in 3 months  I do suggest that he see Rad Onc to review RT given EPE of the urethral tumor  Continue Retacrit if Hb < 10     Signed By: Oz Camara MD

## 2023-01-31 ENCOUNTER — OFFICE VISIT (OUTPATIENT)
Dept: ONCOLOGY | Age: 78
End: 2023-01-31
Payer: MEDICARE

## 2023-01-31 ENCOUNTER — HOSPITAL ENCOUNTER (OUTPATIENT)
Dept: INFUSION THERAPY | Age: 78
Discharge: HOME | End: 2023-01-31
Payer: MEDICARE

## 2023-01-31 VITALS
BODY MASS INDEX: 34.03 KG/M2 | DIASTOLIC BLOOD PRESSURE: 80 MMHG | SYSTOLIC BLOOD PRESSURE: 131 MMHG | TEMPERATURE: 98.8 F | RESPIRATION RATE: 18 BRPM | OXYGEN SATURATION: 96 % | HEART RATE: 84 BPM | WEIGHT: 244 LBS

## 2023-01-31 VITALS
SYSTOLIC BLOOD PRESSURE: 117 MMHG | HEART RATE: 75 BPM | RESPIRATION RATE: 18 BRPM | TEMPERATURE: 98 F | DIASTOLIC BLOOD PRESSURE: 68 MMHG

## 2023-01-31 DIAGNOSIS — Z95.828 PORT-A-CATH IN PLACE: ICD-10-CM

## 2023-01-31 DIAGNOSIS — C67.3 MALIGNANT NEOPLASM OF ANTERIOR WALL OF BLADDER (HCC): ICD-10-CM

## 2023-01-31 DIAGNOSIS — C67.9 MALIGNANT NEOPLASM OF URINARY BLADDER, UNSPECIFIED SITE (HCC): Primary | ICD-10-CM

## 2023-01-31 DIAGNOSIS — R21 RASH: ICD-10-CM

## 2023-01-31 DIAGNOSIS — D64.9 ANEMIA, UNSPECIFIED TYPE: ICD-10-CM

## 2023-01-31 DIAGNOSIS — Z51.12 ENCOUNTER FOR ANTINEOPLASTIC IMMUNOTHERAPY: ICD-10-CM

## 2023-01-31 PROCEDURE — 74011250636 HC RX REV CODE- 250/636: Performed by: REGISTERED NURSE

## 2023-01-31 PROCEDURE — 3078F DIAST BP <80 MM HG: CPT | Performed by: INTERNAL MEDICINE

## 2023-01-31 PROCEDURE — 74011000250 HC RX REV CODE- 250: Performed by: REGISTERED NURSE

## 2023-01-31 PROCEDURE — G8417 CALC BMI ABV UP PARAM F/U: HCPCS | Performed by: INTERNAL MEDICINE

## 2023-01-31 PROCEDURE — 74011000258 HC RX REV CODE- 258: Performed by: REGISTERED NURSE

## 2023-01-31 PROCEDURE — 1101F PT FALLS ASSESS-DOCD LE1/YR: CPT | Performed by: INTERNAL MEDICINE

## 2023-01-31 PROCEDURE — G8427 DOCREV CUR MEDS BY ELIG CLIN: HCPCS | Performed by: INTERNAL MEDICINE

## 2023-01-31 PROCEDURE — G8432 DEP SCR NOT DOC, RNG: HCPCS | Performed by: INTERNAL MEDICINE

## 2023-01-31 PROCEDURE — 3074F SYST BP LT 130 MM HG: CPT | Performed by: INTERNAL MEDICINE

## 2023-01-31 PROCEDURE — G8536 NO DOC ELDER MAL SCRN: HCPCS | Performed by: INTERNAL MEDICINE

## 2023-01-31 PROCEDURE — 99215 OFFICE O/P EST HI 40 MIN: CPT | Performed by: INTERNAL MEDICINE

## 2023-01-31 PROCEDURE — 1123F ACP DISCUSS/DSCN MKR DOCD: CPT | Performed by: INTERNAL MEDICINE

## 2023-01-31 PROCEDURE — 77030012965 HC NDL HUBR BBMI -A

## 2023-01-31 PROCEDURE — G0463 HOSPITAL OUTPT CLINIC VISIT: HCPCS | Performed by: INTERNAL MEDICINE

## 2023-01-31 PROCEDURE — 96413 CHEMO IV INFUSION 1 HR: CPT

## 2023-01-31 RX ORDER — EPINEPHRINE 1 MG/ML
0.3 INJECTION, SOLUTION, CONCENTRATE INTRAVENOUS AS NEEDED
Status: ACTIVE | OUTPATIENT
Start: 2023-01-31 | End: 2023-01-31

## 2023-01-31 RX ORDER — SODIUM CHLORIDE 9 MG/ML
5-250 INJECTION, SOLUTION INTRAVENOUS AS NEEDED
OUTPATIENT
Start: 2023-04-25

## 2023-01-31 RX ORDER — HEPARIN 100 UNIT/ML
300-500 SYRINGE INTRAVENOUS AS NEEDED
Status: ACTIVE | OUTPATIENT
Start: 2023-01-31 | End: 2023-01-31

## 2023-01-31 RX ORDER — ALBUTEROL SULFATE 0.83 MG/ML
2.5 SOLUTION RESPIRATORY (INHALATION) AS NEEDED
Status: ACTIVE | OUTPATIENT
Start: 2023-01-31 | End: 2023-01-31

## 2023-01-31 RX ORDER — HEPARIN 100 UNIT/ML
500 SYRINGE INTRAVENOUS AS NEEDED
Status: CANCELLED
Start: 2023-04-25

## 2023-01-31 RX ORDER — SODIUM CHLORIDE 9 MG/ML
5-250 INJECTION, SOLUTION INTRAVENOUS AS NEEDED
Status: CANCELLED | OUTPATIENT
Start: 2023-04-27

## 2023-01-31 RX ORDER — SODIUM CHLORIDE 0.9 % (FLUSH) 0.9 %
5-40 SYRINGE (ML) INJECTION AS NEEDED
OUTPATIENT
Start: 2023-03-28

## 2023-01-31 RX ORDER — SODIUM CHLORIDE 9 MG/ML
5-40 INJECTION INTRAVENOUS AS NEEDED
Status: CANCELLED | OUTPATIENT
Start: 2023-04-27

## 2023-01-31 RX ORDER — SODIUM CHLORIDE 9 MG/ML
5-40 INJECTION INTRAVENOUS AS NEEDED
OUTPATIENT
Start: 2023-03-28

## 2023-01-31 RX ORDER — PREDNISONE 10 MG/1
10 TABLET ORAL
Qty: 42 TABLET | Refills: 0 | Status: SHIPPED | OUTPATIENT
Start: 2023-01-31

## 2023-01-31 RX ORDER — SODIUM CHLORIDE 9 MG/ML
25 INJECTION, SOLUTION INTRAVENOUS CONTINUOUS
Status: DISPENSED | OUTPATIENT
Start: 2023-01-31 | End: 2023-01-31

## 2023-01-31 RX ORDER — SODIUM CHLORIDE 0.9 % (FLUSH) 0.9 %
5-40 SYRINGE (ML) INJECTION AS NEEDED
Status: CANCELLED | OUTPATIENT
Start: 2023-04-25

## 2023-01-31 RX ORDER — SODIUM CHLORIDE 9 MG/ML
5-40 INJECTION INTRAVENOUS AS NEEDED
OUTPATIENT
Start: 2023-04-25

## 2023-01-31 RX ORDER — HYDROCORTISONE SODIUM SUCCINATE 100 MG/2ML
100 INJECTION, POWDER, FOR SOLUTION INTRAMUSCULAR; INTRAVENOUS AS NEEDED
Status: ACTIVE | OUTPATIENT
Start: 2023-01-31 | End: 2023-01-31

## 2023-01-31 RX ORDER — HEPARIN 100 UNIT/ML
500 SYRINGE INTRAVENOUS AS NEEDED
Start: 2023-03-28

## 2023-01-31 RX ORDER — SODIUM CHLORIDE 0.9 % (FLUSH) 0.9 %
5-40 SYRINGE (ML) INJECTION AS NEEDED
Status: CANCELLED | OUTPATIENT
Start: 2023-03-28

## 2023-01-31 RX ORDER — DIPHENHYDRAMINE HYDROCHLORIDE 50 MG/ML
50 INJECTION, SOLUTION INTRAMUSCULAR; INTRAVENOUS AS NEEDED
Status: ACTIVE | OUTPATIENT
Start: 2023-01-31 | End: 2023-01-31

## 2023-01-31 RX ORDER — SODIUM CHLORIDE 0.9 % (FLUSH) 0.9 %
5-40 SYRINGE (ML) INJECTION AS NEEDED
Status: CANCELLED | OUTPATIENT
Start: 2023-04-27

## 2023-01-31 RX ORDER — SODIUM CHLORIDE 9 MG/ML
5-40 INJECTION INTRAVENOUS AS NEEDED
OUTPATIENT
Start: 2023-02-28

## 2023-01-31 RX ORDER — HEPARIN 100 UNIT/ML
500 SYRINGE INTRAVENOUS AS NEEDED
Status: CANCELLED
Start: 2023-03-28

## 2023-01-31 RX ORDER — HEPARIN 100 UNIT/ML
500 SYRINGE INTRAVENOUS AS NEEDED
Start: 2023-02-28

## 2023-01-31 RX ORDER — SODIUM CHLORIDE 9 MG/ML
5-40 INJECTION INTRAVENOUS AS NEEDED
Status: CANCELLED | OUTPATIENT
Start: 2023-04-25

## 2023-01-31 RX ORDER — SODIUM CHLORIDE 9 MG/ML
5-250 INJECTION, SOLUTION INTRAVENOUS AS NEEDED
OUTPATIENT
Start: 2023-02-28

## 2023-01-31 RX ORDER — HEPARIN 100 UNIT/ML
500 SYRINGE INTRAVENOUS AS NEEDED
Start: 2023-04-25

## 2023-01-31 RX ORDER — SODIUM CHLORIDE 9 MG/ML
5-250 INJECTION, SOLUTION INTRAVENOUS AS NEEDED
Status: CANCELLED | OUTPATIENT
Start: 2023-03-28

## 2023-01-31 RX ORDER — SODIUM CHLORIDE 0.9 % (FLUSH) 0.9 %
5-40 SYRINGE (ML) INJECTION AS NEEDED
OUTPATIENT
Start: 2023-04-25

## 2023-01-31 RX ORDER — SODIUM CHLORIDE 9 MG/ML
5-250 INJECTION, SOLUTION INTRAVENOUS AS NEEDED
Status: CANCELLED | OUTPATIENT
Start: 2023-04-25

## 2023-01-31 RX ORDER — ACETAMINOPHEN 325 MG/1
650 TABLET ORAL AS NEEDED
Status: ACTIVE | OUTPATIENT
Start: 2023-01-31 | End: 2023-01-31

## 2023-01-31 RX ORDER — SODIUM CHLORIDE 0.9 % (FLUSH) 0.9 %
5-40 SYRINGE (ML) INJECTION AS NEEDED
OUTPATIENT
Start: 2023-02-28

## 2023-01-31 RX ORDER — HEPARIN 100 UNIT/ML
500 SYRINGE INTRAVENOUS AS NEEDED
Status: CANCELLED
Start: 2023-04-27

## 2023-01-31 RX ORDER — SODIUM CHLORIDE 9 MG/ML
5-40 INJECTION INTRAVENOUS AS NEEDED
Status: CANCELLED | OUTPATIENT
Start: 2023-03-28

## 2023-01-31 RX ORDER — SODIUM CHLORIDE 0.9 % (FLUSH) 0.9 %
10 SYRINGE (ML) INJECTION AS NEEDED
Status: DISPENSED | OUTPATIENT
Start: 2023-01-31 | End: 2023-01-31

## 2023-01-31 RX ORDER — SODIUM CHLORIDE 9 MG/ML
5-250 INJECTION, SOLUTION INTRAVENOUS AS NEEDED
OUTPATIENT
Start: 2023-03-28

## 2023-01-31 RX ORDER — ONDANSETRON 2 MG/ML
8 INJECTION INTRAMUSCULAR; INTRAVENOUS AS NEEDED
Status: ACTIVE | OUTPATIENT
Start: 2023-01-31 | End: 2023-01-31

## 2023-01-31 RX ORDER — DIPHENHYDRAMINE HYDROCHLORIDE 50 MG/ML
25 INJECTION, SOLUTION INTRAMUSCULAR; INTRAVENOUS AS NEEDED
Status: ACTIVE | OUTPATIENT
Start: 2023-01-31 | End: 2023-01-31

## 2023-01-31 RX ADMIN — SODIUM CHLORIDE 240 MG: 9 INJECTION, SOLUTION INTRAVENOUS at 11:07

## 2023-01-31 RX ADMIN — HEPARIN 500 UNITS: 100 SYRINGE at 11:43

## 2023-01-31 RX ADMIN — SODIUM CHLORIDE 25 ML/HR: 9 INJECTION, SOLUTION INTRAVENOUS at 11:00

## 2023-01-31 RX ADMIN — SODIUM CHLORIDE, PRESERVATIVE FREE 10 ML: 5 INJECTION INTRAVENOUS at 11:43

## 2023-01-31 NOTE — Clinical Note
Patient is completing his treatment today and will need labs and port flushes every 8 weeks. Please see beacon. Thanks!

## 2023-01-31 NOTE — PROGRESS NOTES
William Gracia is a 69 y.o. male    Chief Complaint   Patient presents with    Follow-up      Muscle invasive bladder cancer- Mixed histology       1. Have you been to the ER, urgent care clinic since your last visit? Hospitalized since your last visit? No    2. Have you seen or consulted any other health care providers outside of the [address] [place] [address] since your last visit? Include any pap smears or colon screening.  No

## 2023-01-31 NOTE — Clinical Note
Please disregard the 8 week port flush appointments. We will need port flush/labs with monthly Retacrit. Thanks!

## 2023-01-31 NOTE — PROGRESS NOTES
[facility] Progress Note    Date: 2023    Name: Corine Arias    MRN: [id number]         : 1945    Mr. Nae Montes De Oca Arrived ambulatory and in no distress for cycle 18 day 1 of Opdivo regimen. Follow Up: Proceed with treatment    Assessment was completed and documented in flowsheets. No acute concerns at this time. Port accessed without difficulty, labs drawn and processed. Chemotherapy Flowsheet 2023   Cycle C18   Date 2023   Drug / Regimen Opdivo   Pre Hydration -   Post Hydration -   Pre Meds -   Notes given         Mr. Rubina Motley vitals were reviewed. Patient Vitals for the past 12 hrs:   Temp Pulse Resp BP   23 1138 -- 75 -- 117/68   23 1023 98 °F (36.7 °C) 70 18 (!) 150/89       Lines:   Venous Access Device port 21 Upper chest (subclavicular area, right (Active)   Central Line Being Utilized Yes 23 1028   Criteria for Appropriate Use Irritant/vesicant medication 23 1028   Site Assessment Clean, dry, & intact 23 1028   Date of Last Dressing Change 23 1028   Dressing Status Clean, dry, & intact 23 1028   Dressing Type Transparent 23 1028   Action Taken Blood drawn 11/10/22 1100   Date Accessed (Medial Site) 23 1028   Access Time (Medial Site) 1145 22 1145   Access Needle Size (Site #1) 20 G 23 1028   Access Needle Length (Medial Site) 0.75 inches 23 1028   Positive Blood Return (Medial Site) Yes 23 1144   Action Taken (Medial Site) Flushed; De-accessed 23 1144   Access Needle Size (Lateral Site) 20 G 22 1336   Access Needle Length (Lateral Site) 0.75 inches 22 1336   Positive Blood Return (Lateral Site) Yes 22 1336   Action Taken (Lateral Site) Alcohol 22 1336   Alcohol Cap Used Yes 23 1028        Lab results were obtained and reviewed. Labs within parameter for treatment.      Pre-medications  were administered as ordered and chemotherapy was initiated. Medications Administered       0.9% sodium chloride infusion       Admin Date  01/31/2023 Action  New Bag Dose  25 mL/hr Rate  25 mL/hr Route  IntraVENous Administered By  Josie Howell RN              heparin (porcine) pf 300-500 Units       Admin Date  01/31/2023 Action  Given Dose  500 Units Route  InterCATHeter Administered By  Josie Howell RN              nivolumab (OPDIVO) 240 mg in 0.9% sodium chloride 100 mL, overfill volume 10 mL IVPB       Admin Date  01/31/2023 Action  New Bag Dose  240 mg Rate  268 mL/hr Route  IntraVENous Administered By  Josie Howell RN              sodium chloride (NS) flush 10 mL       Admin Date  01/31/2023 Action  Given Dose  10 mL Route  IntraVENous Administered By  Josie Howell RN                    Two nurses verified prior to administering: Drug name, Drug dose, Infusion volume or drug volume when prepared in a syringe, Rate of administration, Route of administration, Expiration dates and/or times, Appearance and physical integrity of the drugs, Rate set on infusion pump, when used, and Sequencing of drug administration. Medication education and side effect management reinforced with patient. They verbalized understanding. Mr. Bartolo Rowell tolerated treatment well, port flushed and de accessed, patient was discharged from [place] [address] in stable condition. Patient is aware of upcoming appointments.       Future Appointments   Date Time Provider Gregorio Ibarra   2/13/2023  9:30 AM H3 TD LAB [facility]   2/14/2023  8:30 AM F4 TD MED TX [facility]   2/28/2023 10:30 AM B3 TD MED TX [facility]   3/2/2023 11:00 AM [facility] OSTOMY NURSE [facility] GAYE Abel RN  January 31, 2023

## 2023-01-31 NOTE — Clinical Note
Please call patient and let him know Dr. Buckley Sacks would like him to continue with the monthly retacrit. So we will do monthly, lab/port flush and retacrit. Belen Keller to call with schedule. Thanks!

## 2023-02-01 ENCOUNTER — TELEPHONE (OUTPATIENT)
Dept: ONCOLOGY | Age: 78
End: 2023-02-01

## 2023-02-01 NOTE — TELEPHONE ENCOUNTER
1008:    Called patient to provide the following updates:  Dr. Ruben Uribe would like him to continue with the monthly retacrit. We will do monthly, lab/port flush and retacrit.  Office will call to coordinate     No answer   Left detailed VM and office callback number

## 2023-02-06 ENCOUNTER — HOSPITAL ENCOUNTER (OUTPATIENT)
Dept: RADIATION THERAPY | Age: 78
Discharge: HOME | End: 2023-02-06

## 2023-02-13 ENCOUNTER — APPOINTMENT (OUTPATIENT)
Dept: INFUSION THERAPY | Age: 78
End: 2023-02-13

## 2023-02-14 ENCOUNTER — APPOINTMENT (OUTPATIENT)
Dept: INFUSION THERAPY | Age: 78
End: 2023-02-14

## 2023-02-28 ENCOUNTER — HOSPITAL ENCOUNTER (OUTPATIENT)
Dept: INFUSION THERAPY | Age: 78
Discharge: HOME | End: 2023-02-28
Payer: MEDICARE

## 2023-02-28 ENCOUNTER — APPOINTMENT (OUTPATIENT)
Dept: INFUSION THERAPY | Age: 78
End: 2023-02-28

## 2023-02-28 VITALS
HEART RATE: 92 BPM | RESPIRATION RATE: 18 BRPM | TEMPERATURE: 97.8 F | DIASTOLIC BLOOD PRESSURE: 68 MMHG | SYSTOLIC BLOOD PRESSURE: 99 MMHG

## 2023-02-28 DIAGNOSIS — D64.9 ANEMIA, UNSPECIFIED TYPE: Primary | ICD-10-CM

## 2023-02-28 LAB
ALBUMIN SERPL-MCNC: 3.6 G/DL (ref 3.5–5)
ALBUMIN/GLOB SERPL: 0.9 (ref 1.1–2.2)
ALP SERPL-CCNC: 52 U/L (ref 45–117)
ALT SERPL-CCNC: 40 U/L (ref 12–78)
ANION GAP SERPL CALC-SCNC: 8 MMOL/L (ref 5–15)
AST SERPL-CCNC: 20 U/L (ref 15–37)
BASOPHILS # BLD: 0.1 K/UL (ref 0–0.1)
BASOPHILS NFR BLD: 1 % (ref 0–1)
BILIRUB SERPL-MCNC: 0.3 MG/DL (ref 0.2–1)
BUN SERPL-MCNC: 38 MG/DL (ref 6–20)
BUN/CREAT SERPL: 18 (ref 12–20)
CALCIUM SERPL-MCNC: 9.5 MG/DL (ref 8.5–10.1)
CHLORIDE SERPL-SCNC: 114 MMOL/L (ref 97–108)
CO2 SERPL-SCNC: 20 MMOL/L (ref 21–32)
CREAT SERPL-MCNC: 2.07 MG/DL (ref 0.7–1.3)
DIFFERENTIAL METHOD BLD: ABNORMAL
EOSINOPHIL # BLD: 0.1 K/UL (ref 0–0.4)
EOSINOPHIL NFR BLD: 1 % (ref 0–7)
ERYTHROCYTE [DISTWIDTH] IN BLOOD BY AUTOMATED COUNT: 13.2 % (ref 11.5–14.5)
GLOBULIN SER CALC-MCNC: 3.8 G/DL (ref 2–4)
GLUCOSE SERPL-MCNC: 120 MG/DL (ref 65–100)
HCT VFR BLD AUTO: 39.3 % (ref 36.6–50.3)
HGB BLD-MCNC: 12.4 G/DL (ref 12.1–17)
IMM GRANULOCYTES # BLD AUTO: 0 K/UL (ref 0–0.04)
IMM GRANULOCYTES NFR BLD AUTO: 0 % (ref 0–0.5)
LYMPHOCYTES # BLD: 0.5 K/UL (ref 0.8–3.5)
LYMPHOCYTES NFR BLD: 9 % (ref 12–49)
MCH RBC QN AUTO: 34 PG (ref 26–34)
MCHC RBC AUTO-ENTMCNC: 31.6 G/DL (ref 30–36.5)
MCV RBC AUTO: 107.7 FL (ref 80–99)
MONOCYTES # BLD: 0.3 K/UL (ref 0–1)
MONOCYTES NFR BLD: 6 % (ref 5–13)
NEUTS SEG # BLD: 4.8 K/UL (ref 1.8–8)
NEUTS SEG NFR BLD: 83 % (ref 32–75)
NRBC # BLD: 0 K/UL (ref 0–0.01)
NRBC BLD-RTO: 0 PER 100 WBC
PLATELET # BLD AUTO: 230 K/UL (ref 150–400)
PMV BLD AUTO: 9 FL (ref 8.9–12.9)
POTASSIUM SERPL-SCNC: 4.3 MMOL/L (ref 3.5–5.1)
PROT SERPL-MCNC: 7.4 G/DL (ref 6.4–8.2)
RBC # BLD AUTO: 3.65 M/UL (ref 4.1–5.7)
RBC MORPH BLD: ABNORMAL
RBC MORPH BLD: ABNORMAL
SODIUM SERPL-SCNC: 142 MMOL/L (ref 136–145)
WBC # BLD AUTO: 5.8 K/UL (ref 4.1–11.1)

## 2023-02-28 PROCEDURE — 80053 COMPREHEN METABOLIC PANEL: CPT

## 2023-02-28 PROCEDURE — 85025 COMPLETE CBC W/AUTO DIFF WBC: CPT

## 2023-02-28 PROCEDURE — 74011000250 HC RX REV CODE- 250: Performed by: NURSE PRACTITIONER

## 2023-02-28 PROCEDURE — 36591 DRAW BLOOD OFF VENOUS DEVICE: CPT

## 2023-02-28 PROCEDURE — 74011250636 HC RX REV CODE- 250/636: Performed by: NURSE PRACTITIONER

## 2023-02-28 PROCEDURE — 77030012965 HC NDL HUBR BBMI -A

## 2023-02-28 RX ORDER — HEPARIN 100 UNIT/ML
500 SYRINGE INTRAVENOUS AS NEEDED
Status: DISCONTINUED | OUTPATIENT
Start: 2023-02-28 | End: 2023-03-01 | Stop reason: HOSPADM

## 2023-02-28 RX ORDER — SODIUM CHLORIDE 9 MG/ML
5-40 INJECTION INTRAVENOUS AS NEEDED
Status: DISCONTINUED | OUTPATIENT
Start: 2023-02-28 | End: 2023-03-01 | Stop reason: HOSPADM

## 2023-02-28 RX ORDER — SODIUM CHLORIDE 9 MG/ML
5-250 INJECTION, SOLUTION INTRAVENOUS AS NEEDED
Status: DISCONTINUED | OUTPATIENT
Start: 2023-02-28 | End: 2023-03-01 | Stop reason: HOSPADM

## 2023-02-28 RX ORDER — SODIUM CHLORIDE 0.9 % (FLUSH) 0.9 %
5-40 SYRINGE (ML) INJECTION AS NEEDED
Status: DISCONTINUED | OUTPATIENT
Start: 2023-02-28 | End: 2023-03-01 | Stop reason: HOSPADM

## 2023-02-28 RX ADMIN — SODIUM CHLORIDE, PRESERVATIVE FREE 10 ML: 5 INJECTION INTRAVENOUS at 15:25

## 2023-02-28 RX ADMIN — HEPARIN 500 UNITS: 100 SYRINGE at 15:26

## 2023-02-28 NOTE — PROGRESS NOTES
[facility] Short Note                       Date: 2023    Name: Yoel Oliver    MRN: [id number]         : 1945      Patient presents to [facility] for port flush/lab draw ambulatory in stable condition. Assessment completed and documented in flowsheets. No acute concerns at this time. Patient politely declined waiting for Hgb result/possible retacrit injection. Please review pending lab results in [facility]. Mr. Luis F Jensen vitals were reviewed prior to treatment. Patient Vitals for the past 12 hrs:   Temp Pulse Resp BP   23 1520 97.8 °F (36.6 °C) 92 18 99/68     Medications Administered       0.9% sodium chloride injection 5-40 mL       Admin Date  2023 Action  Given Dose  10 mL Route  IntraVENous Administered By  Monet Ruth              heparin (porcine) pf 500 Units       Admin Date  2023 Action  Given Dose  500 Units Route  InterCATHeter Administered By  Catrina Ackerman accessed with positive blood return. Port flushed, heparinized and de-accessed per protocol. Mr. Darrel Ruby tolerated the treatment well. Mr. Darrel Ruby was discharged from [place] [address] in stable condition and is aware of future appointments.     Future Appointments   Date Time Provider Gregorio Ibarra   3/2/2023 11:00 AM [facility] OSTOMY NURSE [facility]   3/28/2023  8:30 AM H1 TD [facility]   2023  8:30 AM G1 TD [facility]   2023  8:30 AM G1 TD [facility]   2023  9:30 AM Darryl Oliveros  [address] BS AMB   2023  8:30 AM G1 TD Davidson RN  2023  3:35 PM Patient

## 2023-03-02 ENCOUNTER — HOSPITAL ENCOUNTER (OUTPATIENT)
Dept: WOUND CARE | Age: 78
Discharge: HOME | End: 2023-03-02
Payer: MEDICARE

## 2023-03-02 VITALS
DIASTOLIC BLOOD PRESSURE: 84 MMHG | SYSTOLIC BLOOD PRESSURE: 141 MMHG | HEART RATE: 83 BPM | TEMPERATURE: 97.5 F | RESPIRATION RATE: 18 BRPM

## 2023-03-02 PROCEDURE — 99212 OFFICE O/P EST SF 10 MIN: CPT

## 2023-03-02 RX ORDER — LISINOPRIL 5 MG/1
5 TABLET ORAL 2 TIMES DAILY
COMMUNITY

## 2023-03-02 NOTE — WOUND CARE
Discharge Instructions/Wound Orders  [facility]  [address], 900 [facility], 200 [address] [facility]  Telephone: [phone]    NAME:  Scott Collins OF BIRTH:  1945  MEDICAL RECORD NUMBER:  [id number]  DATE:  3/2/2023  Ostomy Care Orders:  1) Remove old bag and clean stoma and peristomal skin with tap water & paper towels, then dry thoroughly. 2) Shape Saul barrier ring to size and place over stoma site. 3) Cut [place] [id number] to size 32mm, remove plastic backing and place over stoma site & barrier. 4) Remove paper and smooth out tape border, then apply Coloplast [id number] barrier arcs. 5) Attach ostomy belt. 6) Change bag 2 x week and as needed for leaking. Dietary:  [x] Diet as tolerated: [] Calorie Diabetic Diet:Low carb and no Sugar [] No Added Salt:[] Increase Protein: [] Other:Limit the amount of liquid you are drinking and avoid drinking in between meals   Activity:  [x] Activity as tolerated:  [] Patient has no activity restrictions     [] Strict Bedrest: [] Remain off Work:     [] May return to full duty work:                                   [] Return to work with restrictions:             Return Appointment:  [] Return Appointment: No further follow-up needed at this time. [] Ordered tests:    Electronically signed Colletta Sager, RN on 3/2/2023 at 1:14 PM     [address] [place] [address] Information: Should you experience any significant changes in your wound(s) or have questions about your wound care, please contact the [address] [place] [address] at [address] [place] [address] 8:00 am - 4:30. If you need help with your wound outside these hours and cannot wait until we are again available, contact your PCP or go to the hospital emergency room. PLEASE NOTE: IF YOU ARE UNABLE TO OBTAIN WOUND SUPPLIES, CONTINUE TO USE THE SUPPLIES YOU HAVE AVAILABLE UNTIL YOU ARE ABLE TO REACH US.  IT IS MOST IMPORTANT TO KEEP THE WOUND COVERED AT ALL Chris Venkata Signature:_______________________    Date: ___________ Time:  ____________

## 2023-03-02 NOTE — WOUND CARE
CHIEF COMPLAINT: Patient has been followed by me for the past several months for management of a large peristomal wound. OSTOMY Assessment    Stoma Tissue Assessment: ___Post-op _x__Follow-up       Type of Diversion: ___New__x_ Established ___Revision___Permanent____Temporary    _____  Ileostomy   _____  Colostomy: ____ Ascending, ____ Transverse, _____. Sigmoid   _____  Elfredia Floro   __x___  Ileal Conduit   _____  Mucous Fistula     Appearance of Ostomy:   __x_ Chela Bushy /Moist/Viable ___ Dark Red ___ Pink ___ Sloughing ___Necrotic____Pallor ____Red  ___Dry ____Moist    Stoma Size: ____32_____ (mm) _x__Round ___ Oval ___Irregular     Stoma Height:   ____Flush ____Retracted ____Budded ____Edematous _x___Prolapse     Stoma Location  ____ Left Side          __x__Right Side     _____Umbilicus  _____Incision Line  __x__ Above Belt       ____ Below Belt    Abdominal Contours  __x__ Firm ____ Flat ____Flabby ___Soft _x__Round ___ Hard ___ Other     Stoma Function: _x_Yes __No  Output:   __x__ Yellow ____Amber ____ Pink(Hematuria) ____ Tea Colored   ____ Clots ____Foul Odor ____ Mucous     Peristomal skin:   __x_ Intact ___ Irritant Dermatitis ___ Allergic Contact Dermatitis ___Candidiasis ___Caput Medusae ___Folliculitis ___Mechanical Trauma ___Mucosal Transplantation    ___Pyoderma Gangrenosum ___Hyperplasia ___Radiation Trauma ___Allergies mpling  ___ Dimpling ____Blistered ____Fragile ____Macerated ___Peeling  ___Ulceration  ___ Fungal ___Peristomal Hernia ___Non-blanchable ___ Hypergranulation      Stoma Complications: Wounds resolved.    __Excessive Bleeding __Ischemia __ Abscess __Necrosis __Prolapse   __Hernia __ Retraction __Stenosis __Mucosal Separation __Melanosis Coli   __Laceration __Other     Application for Patient    Wafer and pouch used during assessment and education ___[place] [id number]_________    Pouch System Recommended:   _x_One-Piece __Two-Piece ___Custom     Flat:   ___Pre-cut   ___Cut-to fit Convexity: ___Shallow ___Deep__x_ Flexible ___Creative Convexity   ___Pre-cut __x_Cut to [employer]     Accessory Products   _x_ Adhesive Seals __Adhesive Remover Wipes __Barrier Wafer __Barrier Wipes   __Deodorizer __Liquid Adhesive __ Paste __ Powder _x_Strip   __ Support Belt __Tape      Education for Patient & Family  1. Booklet of information on specific ostomy given and some verbal discussion of patients ostomy and expectations. 2. Instruction/demostration of ostomy wafer & pouch change. 3. Discuss concerns/ answer questions. 4. Provide follow up education in clinic if needed.        PICTURE INSERT:

## 2023-03-02 NOTE — WOUND CARE
03/02/23 1129   Wound Peristomal Right #1 04/18/22   Date First Assessed/Time First Assessed: 04/18/22 1145   Present on Hospital Admission: Yes  Wound Approximate Age at First Assessment (Weeks): 4 weeks  Primary Wound Type: Traumatic  Location: Peristomal  Wound Location Orientation: Right  Wound Desc. ..    Wound Image    Wound Etiology Traumatic   Dressing Status Intact   Wound Length (cm) 0 cm   Wound Width (cm) 0 cm   Wound Depth (cm) 0 cm   Wound Surface Area (cm^2) 0 cm^2   Change in Wound Size % 100   Wound Volume (cm^3) 0 cm^3   Wound Healing % 100   Drainage Amount None   Wound Odor None

## 2023-03-02 NOTE — WOUND CARE
Clinical Level of Care Assessment    Outpatient Ostomy Care      NAME:  Heath Pemberton OF BIRTH:  1945  MEDICAL RECORD NUMBER:  [id number]   DATE:  3/2/2023      Patient Trent Camel Assessment- Document in Flowsheet I&O   Points   Review of chart []   0   Assess Complete Ostomy tab in Navigator for assessment of; stoma status, peristomal skin, presence of hernia/stool consistency/diet/related medications   Simple adjustments to pouch size/pouch system, new stoma pattern, accessory addition/deletion. []   1   Assess Complete Ostomy tab in Navigator for assessment of; stoma status, peristomal skin, presence of hernia/stool consistency/diet/related medications   Moderate adjustments to pouch size/pouch system, new stoma pattern, accessory addition/deletion. Observe patient/caregiver with hands-on care. 1-2 adjustments to pouch size/system/skin care/accessory addition or deletion. [x]   2   Assess Complete Ostomy tab in Navigator for assessment of; stoma status, peristomal skin, presence of hernia/stool consistency/diet/related medications   Complex adjustments to pouch size/pouch system, new stoma pattern, accessory addition/deletion. 3 or more complex adjustments to pouch size/system/skin care/accessory addition or deletion. Observe patient/caregiver with hands-on care. Assess patient/patient abdomen for optimal pre-marked stoma site. Assess patient abdomen for type of hernia belt/accessory needed. []   3         Ambulation Status Documented in CN Clinical Note  Status Definition Points   Independent Independently able to ambulate. Fully able (without any assistance) to get on/off exam table/chair. [x]   0   Minimal Physical Assistance Requires physical assistance of one person to ambulate and/or position patient to be examined. Includes necessary physical assistance to position lower extremities on/off stool.    []   1   Moderate Physical Assistance Requires at least one staff member to physically assist patient in ambulating into treatment room, and/or on off chair/bed. Requires assistance to bathroom. []   2   Full Assistance Requires assistance of at least two staff members to transfer patient into treatment room and/or on/off bed/chair. \"Total Transfer\". Unable to use bathroom requires bedside commode and/or bedpan []   3       Teaching Effort Documented in [facility] Clinical Note  Effort Definition Points   No Teaching  []   0   General Initial/Simple lesson:  Assess readiness to learn, assess patient learning style to determine educational flow/special needs for learning. Teaching related to 1-3 topics  Documentation in [facility] completed. [x]   1   Intermediate Assess readiness to learn, assess patient learning style to determine educational flow/special needs for learning. Teaching related to 3-4 topics. Hernia belt application and care considerations  Documentation in [facility] completed. []   2   Complex Assess readiness to learn, assess patient learning style to determine educational flow/special needs for learning. Teaching of greater than 5 additional topics   Pre-operative ostomy education with review of written resources for patient/family/caregiver as needed. Demonstration/return demonstration of ostomy irrigation  Documentation in [facility] completed. []   3     Patient Assessment and Planning in [facility] Clinical Note   Planning Definition Points   Simple Simple pouch change procedure completed and reviewed with patient/family/caregiver   Documentation in [facility] completed. [x]   1   Intermediate Moderate level of follow-up needs:   Pouch change/discharge procedure revised and reviewed with patient/caregiver. Communications with outside resources; i.e. Telephone calls to Surgeon/ PCP, family/caregiver, home health, ECF. Documentation in [facility] completed.      []   2   Complex Complex level of instructions/changes:   Family/Caregiver learning/demonstration/return demonstration visit. Pouching/discharge procedure revised/reviewed with patient/family/caregiver. Contact with outside resources; i.e. communication with Surgeon/ PCP, home health, ECF. Contact/referral to ostomy appliance supplier for new or additional products. Review when to call [facility] or schedule follow-up visit. Referral to Emergency Department   Documentation in [facility] completed. []   3       Is this the Patient's First Visit with [facility] @ [facility]? No    Is this Patient Established to this [facility] within the last 3 years?    Yes             Clinical Level of Care      Points  0-3  Level 1 []     Points  4-6  Level 2 [x]     Points  7-8  Level 3 []     Points  9-10  Level 4 []     Points  11-12  Level 5 []       Electronically signed by Vernia Dubin, RN on 3/2/2023 at 1:17 PM

## 2023-04-03 ENCOUNTER — HOSPITAL ENCOUNTER (OUTPATIENT)
Dept: RADIATION THERAPY | Age: 78
End: 2023-04-03

## 2023-04-04 ENCOUNTER — HOSPITAL ENCOUNTER (OUTPATIENT)
Dept: RADIATION THERAPY | Age: 78
End: 2023-04-04

## 2023-04-05 ENCOUNTER — HOSPITAL ENCOUNTER (OUTPATIENT)
Dept: RADIATION THERAPY | Age: 78
End: 2023-04-05

## 2023-04-13 ENCOUNTER — HOSPITAL ENCOUNTER (OUTPATIENT)
Dept: RADIATION THERAPY | Age: 78
Discharge: HOME | End: 2023-04-13

## 2023-04-14 ENCOUNTER — HOSPITAL ENCOUNTER (OUTPATIENT)
Dept: RADIATION THERAPY | Age: 78
Discharge: HOME | End: 2023-04-14

## 2023-04-17 ENCOUNTER — HOSPITAL ENCOUNTER (OUTPATIENT)
Dept: RADIATION THERAPY | Age: 78
Discharge: HOME | End: 2023-04-17

## 2023-04-18 ENCOUNTER — HOSPITAL ENCOUNTER (OUTPATIENT)
Dept: RADIATION THERAPY | Age: 78
Discharge: HOME | End: 2023-04-18

## 2023-04-19 ENCOUNTER — HOSPITAL ENCOUNTER (OUTPATIENT)
Dept: RADIATION THERAPY | Age: 78
Discharge: HOME | End: 2023-04-19

## 2023-04-20 ENCOUNTER — HOSPITAL ENCOUNTER (OUTPATIENT)
Dept: RADIATION THERAPY | Age: 78
Discharge: HOME | End: 2023-04-20

## 2023-04-21 ENCOUNTER — HOSPITAL ENCOUNTER (OUTPATIENT)
Dept: RADIATION THERAPY | Age: 78
Discharge: HOME | End: 2023-04-21

## 2023-04-24 ENCOUNTER — HOSPITAL ENCOUNTER (OUTPATIENT)
Dept: RADIATION THERAPY | Age: 78
Discharge: HOME | End: 2023-04-24

## 2023-04-25 ENCOUNTER — HOSPITAL ENCOUNTER (OUTPATIENT)
Dept: RADIATION THERAPY | Age: 78
Discharge: HOME | End: 2023-04-25

## 2023-04-26 ENCOUNTER — HOSPITAL ENCOUNTER (OUTPATIENT)
Dept: RADIATION THERAPY | Age: 78
Discharge: HOME | End: 2023-04-26

## 2023-04-27 ENCOUNTER — HOSPITAL ENCOUNTER (OUTPATIENT)
Dept: RADIATION THERAPY | Age: 78
Discharge: HOME | End: 2023-04-27

## 2023-04-27 DIAGNOSIS — C67.9 MALIGNANT NEOPLASM OF URINARY BLADDER, UNSPECIFIED SITE (HCC): Primary | ICD-10-CM

## 2023-04-27 RX ORDER — ONDANSETRON HYDROCHLORIDE 8 MG/1
8 TABLET, FILM COATED ORAL
Qty: 30 TABLET | Refills: 2 | Status: SHIPPED | OUTPATIENT
Start: 2023-04-27

## 2023-04-27 RX ORDER — OXYCODONE AND ACETAMINOPHEN 5; 325 MG/1; MG/1
1 TABLET ORAL
Qty: 20 TABLET | Refills: 0 | Status: SHIPPED | OUTPATIENT
Start: 2023-04-27 | End: 2023-05-27

## 2023-04-28 ENCOUNTER — HOSPITAL ENCOUNTER (OUTPATIENT)
Dept: RADIATION THERAPY | Age: 78
Discharge: HOME | End: 2023-04-28

## 2023-05-01 ENCOUNTER — HOSPITAL ENCOUNTER (OUTPATIENT)
Dept: RADIATION THERAPY | Age: 78
Discharge: HOME | End: 2023-05-01

## 2023-05-02 ENCOUNTER — HOSPITAL ENCOUNTER (OUTPATIENT)
Dept: RADIATION THERAPY | Age: 78
Discharge: HOME | End: 2023-05-02

## 2023-05-03 ENCOUNTER — HOSPITAL ENCOUNTER (OUTPATIENT)
Dept: RADIATION THERAPY | Age: 78
Discharge: HOME | End: 2023-05-03

## 2023-05-04 ENCOUNTER — HOSPITAL ENCOUNTER (OUTPATIENT)
Dept: RADIATION THERAPY | Age: 78
Discharge: HOME | End: 2023-05-04

## 2023-05-05 ENCOUNTER — HOSPITAL ENCOUNTER (OUTPATIENT)
Dept: RADIATION THERAPY | Age: 78
Discharge: HOME | End: 2023-05-05

## 2023-05-08 ENCOUNTER — HOSPITAL ENCOUNTER (OUTPATIENT)
Facility: HOSPITAL | Age: 78
Discharge: HOME | End: 2023-05-11
Attending: RADIOLOGY

## 2023-05-09 ENCOUNTER — HOSPITAL ENCOUNTER (OUTPATIENT)
Facility: HOSPITAL | Age: 78
Discharge: HOME | End: 2023-05-12
Attending: RADIOLOGY

## 2023-05-09 DIAGNOSIS — C67.9 MALIGNANT NEOPLASM OF URINARY BLADDER, UNSPECIFIED SITE (HCC): ICD-10-CM

## 2023-05-09 DIAGNOSIS — D64.9 ANEMIA, UNSPECIFIED TYPE: ICD-10-CM

## 2023-05-09 DIAGNOSIS — N18.32 CHRONIC KIDNEY DISEASE, STAGE 3B (HCC): Primary | ICD-10-CM

## 2023-05-09 RX ORDER — ALBUTEROL SULFATE 90 UG/1
4 AEROSOL, METERED RESPIRATORY (INHALATION) PRN
OUTPATIENT
Start: 2023-05-22

## 2023-05-09 RX ORDER — HEPARIN SODIUM (PORCINE) LOCK FLUSH IV SOLN 100 UNIT/ML 100 UNIT/ML
500 SOLUTION INTRAVENOUS PRN
OUTPATIENT
Start: 2023-05-22

## 2023-05-09 RX ORDER — SODIUM CHLORIDE 9 MG/ML
25 INJECTION, SOLUTION INTRAVENOUS PRN
OUTPATIENT
Start: 2023-05-22

## 2023-05-09 RX ORDER — ACETAMINOPHEN 325 MG/1
650 TABLET ORAL
OUTPATIENT
Start: 2023-05-22

## 2023-05-09 RX ORDER — DIPHENHYDRAMINE HYDROCHLORIDE 50 MG/ML
50 INJECTION INTRAMUSCULAR; INTRAVENOUS
OUTPATIENT
Start: 2023-05-22

## 2023-05-09 RX ORDER — ONDANSETRON 2 MG/ML
8 INJECTION INTRAMUSCULAR; INTRAVENOUS
OUTPATIENT
Start: 2023-05-22

## 2023-05-09 RX ORDER — SODIUM CHLORIDE 9 MG/ML
INJECTION, SOLUTION INTRAVENOUS CONTINUOUS
OUTPATIENT
Start: 2023-05-22

## 2023-05-09 RX ORDER — EPINEPHRINE 1 MG/ML
0.3 INJECTION, SOLUTION, CONCENTRATE INTRAVENOUS PRN
OUTPATIENT
Start: 2023-05-22

## 2023-05-09 RX ORDER — SODIUM CHLORIDE 0.9 % (FLUSH) 0.9 %
5-40 SYRINGE (ML) INJECTION PRN
OUTPATIENT
Start: 2023-05-22

## 2023-05-10 ENCOUNTER — HOSPITAL ENCOUNTER (OUTPATIENT)
Facility: HOSPITAL | Age: 78
Discharge: HOME | End: 2023-05-13
Attending: RADIOLOGY

## 2023-05-23 ENCOUNTER — APPOINTMENT (OUTPATIENT)
Dept: INFUSION THERAPY | Age: 78
End: 2023-05-23

## 2023-05-28 DIAGNOSIS — C67.9 MALIGNANT NEOPLASM OF URINARY BLADDER, UNSPECIFIED SITE (HCC): Primary | ICD-10-CM

## 2023-05-28 DIAGNOSIS — C67.3 MALIGNANT NEOPLASM OF ANTERIOR WALL OF BLADDER (HCC): Primary | ICD-10-CM

## 2023-05-30 ENCOUNTER — TELEPHONE (OUTPATIENT)
Age: 78
End: 2023-05-30

## 2023-05-30 NOTE — TELEPHONE ENCOUNTER
"    2023         RE: Makeda Sanches  [address] [facility] [address]        Dear Colleague,    Thank you for referring your patient, Makeda Sanches, to the [facility] [place]. Please see a copy of my visit note below.        New Medical Weight Management Consult    PATIENT:  Makeda Sanches  MRN:         [id number]  :         2001  PATTIE:         2023    Dear Dr. Martinez,    I had the pleasure of seeing your patient, Makeda Sanches. Full intake/assessment was done to determine barriers to weight loss success and develop a treatment plan. Makeda Sanches is a 21 year old female interested in treatment of medical problems associated with excess weight. She has a height of 5' 7\", a weight of 266 lbs 0 oz, and the calculated Body mass index is 41.66 kg/m .    ASSESSMENT/PLAN:  Insulin resistance/hirsutism by history and physical  Declines labs d/t needle phobia.   Start metformin and add B-12 as metformin can make B-12 go low  Wegovy (appears to be a covered benefit)  Dietitian  Discussed insulin resistance and importance of protecting sleep, stress management, daily activity, and eating protein with each meal to keep lean muscle mass, lose body fat thus optimizing metabolism and improving insulin sensitivity    She has the following co-morbidities:        2023     6:52 PM   --   I have the following health issues associated with obesity High Blood Pressure   I have the following symptoms associated with obesity Back Pain    Fatigue            View : No data to display.                    2023     6:52 PM   Referring Provider   Please name the provider who referred you to Medical Weight Management  If you do not know, please answer \"I Don't Know\" abdoulaye lee           2023     6:52 PM   Weight History   How concerned are you about your weight? Very Concerned   I became overweight In College   The following factors " 1330:    Returned call to [facility] listed on PHI   Reporting the following:  Fall on 1/4/23 night after drinking   Transport came and got him back up; patient refused trip to ER  Per [facility], patient drinks about 1 bottle of wine a night, also drinks scotch and whiskey during the day     Concerned about well-being and mental capacity of the patient    Requesting a referral to home health or some form of help regarding long term care     Informed would send message to team have contributed to my weight gain Change in Schedule    Eating Wrong Types of Food    Eating Too Much    Lack of Exercise    Genetic (Runs in the Family)    Stress   I have tried the following methods to lose weight Watching Portions or Calories    Exercise   My lowest weight since age 19 was 200   My highest weight since age 19 was 260   The most weight I have ever lost was (lbs) 30   I have the following family history of obesity/being overweight My mother is overweight    My father is overweight   How has your weight changed over the last year? Gained   How many pounds? 30           5/21/2023     6:52 PM   Diet Recall Review with Patient   If you do eat lunch, what types of food do you typically eat? usually some type of fast food or a sandwich i make at home   If you do eat supper, what types of food do you typically eat? if i am working i will order food from the restraunt i work at if i am home my parents usually make dinner and they make all kinds of well balanced meals   How many glasses of juice do you drink in a typical day? 0   How many of glasses of milk do you drink in a typical day? 1   If you do drink milk, what type? Skim   How many 8oz glasses of sugar containing drinks such as Parag-Aid/sweet tea do you drink in a day? 0   How many cans/bottles of sugar pop/soda/tea/sports drinks do you drink in a day? 1   How many cans/bottles of diet pop/soda/tea or sports drink do you drink in a day? 0   How often do you have a drink of alcohol? Monthly or Less   If you do drink, how many drinks might you have in a day? 1 or 2           5/21/2023     6:52 PM   Eating Habits   Generally, my meals include foods like these bread, pasta, rice, potatoes, corn, crackers, sweet dessert, pop, or juice A Few Times a Week   Generally, my meals include foods like these fried meats, brats, burgers, french fries, pizza, cheese, chips, or ice cream Almost Everyday   Eat fast food (like McDonalds, Burger Lukasz, Taco Bell) A Few  Times a Week   Eat at a buffet or sit-down restaurant Once a Week   Eat most of my meals in front of the TV or computer Almost Everyday   Often skip meals, eat at random times, have no regular eating times Almost Everyday   Rarely sit down for a meal but snack or graze throughout Less Than Weekly   Eat extra snacks between meals Less Than Weekly   Eat most of my food at the end of the day A Few Times a Week   Eat in the middle of the night or wake up at night to eat Less Than Weekly   Eat extra snacks to prevent or correct low blood sugar Never   Eat to prevent acid reflux or stomach pain Less Than Weekly   Worry about not having enough food to eat Never   I eat when I am depressed Never   I eat when I am stressed Less Than Weekly   I eat when I am bored Almost Everyday   I eat when I am anxious Never   I eat when I am happy or as a reward A Few Times a Week   I feel hungry all the time even if I just have eaten Once a Week   Feeling full is important to me Almost Everyday   I finish all the food on my plate even if I am already full Almost Everyday   I can't resist eating delicious food or walk past the good food/smell Almost Everyday   I eat/snack without noticing that I am eating Less Than Weekly   I eat when I am preparing the meal A Few Times a Week   I eat more than usual when I see others eating Never   I have trouble not eating sweets, ice cream, cookies, or chips if they are around the house Almost Everyday   I think about food all day Never   What foods, if any, do you crave? Chips/Crackers   Please list any other foods you crave? fried food           5/21/2023     6:52 PM   Amount of Food   I feel out of control when eating Monthly   I eat a large amount of food, like a loaf of bread, a box of cookies, a pint/quart of ice cream, all at once Monthly   I eat a large amount of food even when I am not hungry Monthly   I eat rapidly Weekly   I eat alone because I feel embarrassed and do not want others to see  how much I have eaten Never   I eat until I am uncomfortably full Almost Everyday   I feel bad, disgusted, or guilty after I overeat Almost Everyday           5/21/2023     6:52 PM   Activity/Exercise History   How much of a typical 12 hour day do you spend sitting? Half the Day   How much of a typical 12 hour day do you spend lying down? Less Than Half the Day   How much of a typical day do you spend walking/standing? Half the Day   How many hours (not including work) do you spend on the TV/Video Games/Computer/Tablet/Phone? 4-5 Hours   How many times a week are you active for the purpose of exercise? Once a Week   What keeps you from being more active? Too tired    Unsure What To Do    Worried People Will Look At Me   How many total minutes do you spend doing some activity for the purpose of exercising when you exercise? 15-30 Minutes       PAST MEDICAL HISTORY:  Past Medical History:   Diagnosis Date     Eczema      Elevated blood pressure reading without diagnosis of hypertension      Fatigue      Hirsutism      Obesity      Seasonal allergic rhinitis            5/21/2023     6:52 PM   Work/Social History Reviewed With Patient   My employment status is Part-Time    Student   My job is  and in college   How much of your job is spent on the computer or phone? Less Than 50%   How many hours do you spend commuting to work daily? 1 there and back   What is your marital status? Single   Who do you live with? my parents and siblings   Who does the food shopping? my mom and dad           5/21/2023     6:52 PM   Mental Health History Reviewed With Patient   Have you ever been physically or sexually abused? No   How often in the past 2 weeks have you felt little interest or pleasure in doing things? Not at all   Over the past 2 weeks how often have you felt down, depressed, or hopeless? Not at all           5/21/2023     6:52 PM   Sleep History Reviewed With Patient   How many hours do you sleep at night? 8  "      MEDICATIONS:   Current Outpatient Medications   Medication Sig Dispense Refill     amphetamine-dextroamphetamine (ADDERALL XR) 30 MG 24 hr capsule Take 1 capsule (30 mg) by mouth daily 90 capsule 0     metFORMIN (GLUCOPHAGE XR) 500 MG 24 hr tablet Take 1 tablet (500 mg) by mouth 2 times daily (with meals) 180 tablet 3     Semaglutide-Weight Management (WEGOVY) 0.25 MG/0.5ML pen Inject 0.25 mg Subcutaneous once a week for 28 days 2 mL 0     [START ON 6/30/2023] Semaglutide-Weight Management (WEGOVY) 0.5 MG/0.5ML pen Inject 0.5 mg Subcutaneous once a week for 28 days 2 mL 0     [START ON 7/30/2023] Semaglutide-Weight Management (WEGOVY) 1 MG/0.5ML pen Inject 1 mg Subcutaneous once a week for 28 days 2 mL 0     vitamin B-12 (CYANOCOBALAMIN) 2500 MCG sublingual tablet Take 1 tablet (2,500 mcg) by mouth daily 90 tablet 3     amphetamine-dextroamphetamine (ADDERALL XR) 10 MG 24 hr capsule Take 1 capsule (10 mg) by mouth daily (Patient not taking: Reported on 5/30/2023) 90 capsule 0       ALLERGIES:   No Known Allergies    PHYSICAL EXAM:  /86 (BP Location: Right arm, Patient Position: Sitting)   Ht 1.702 m (5' 7\")   Wt 120.7 kg (266 lb)   BMI 41.66 kg/m    Pleasant and in no distress  A little emotional  Neck 17\"  Mallampati 3+  Heart regular without murmur  Lungs clear  Abdomen 52\"  Extremities: arms shaved. Good distal pulses,   Alert and oriented    FOLLOW-UP:   As above    TIME: 60 min spent on evaluation, management, counseling, education, & motivational interviewing     Sincerely,    Frida Mathew MD          Again, thank you for allowing me to participate in the care of your patient.        Sincerely,        Frida Mathew MD    "

## 2023-05-30 NOTE — TELEPHONE ENCOUNTER
Per [facility], adjust out 4 weeks due to No PET scan completed.   Patient has tentatively confirmed reschedule from 5/31/23 to 6/28/23 @ 945am.

## 2023-05-31 ENCOUNTER — TELEPHONE (OUTPATIENT)
Age: 78
End: 2023-05-31

## 2023-06-02 ENCOUNTER — HOSPITAL ENCOUNTER (OUTPATIENT)
Facility: HOSPITAL | Age: 78
End: 2023-06-02
Payer: MEDICARE

## 2023-06-02 VITALS — BODY MASS INDEX: 33.47 KG/M2 | WEIGHT: 240 LBS

## 2023-06-02 DIAGNOSIS — C67.3 MALIGNANT NEOPLASM OF ANTERIOR WALL OF BLADDER (HCC): ICD-10-CM

## 2023-06-02 LAB
GLUCOSE BLD STRIP.AUTO-MCNC: 105 MG/DL (ref 65–117)
SERVICE CMNT-IMP: NORMAL

## 2023-06-02 PROCEDURE — 78815 PET IMAGE W/CT SKULL-THIGH: CPT

## 2023-06-02 PROCEDURE — A9552 F18 FDG: HCPCS | Performed by: NURSE PRACTITIONER

## 2023-06-02 PROCEDURE — 3430000000 HC RX DIAGNOSTIC RADIOPHARMACEUTICAL: Performed by: NURSE PRACTITIONER

## 2023-06-02 PROCEDURE — 82962 GLUCOSE BLOOD TEST: CPT

## 2023-06-02 RX ORDER — FLUDEOXYGLUCOSE F-18 500 MCI/ML
10 INJECTION INTRAVENOUS
Status: COMPLETED | OUTPATIENT
Start: 2023-06-02 | End: 2023-06-02

## 2023-06-02 RX ADMIN — FLUDEOXYGLUCOSE F-18 10 MILLICURIE: 500 INJECTION INTRAVENOUS at 07:09

## 2023-06-08 ENCOUNTER — HOSPITAL ENCOUNTER (OUTPATIENT)
Facility: HOSPITAL | Age: 78
Setting detail: INFUSION SERIES
End: 2023-06-08
Payer: MEDICARE

## 2023-06-08 ENCOUNTER — CLINICAL DOCUMENTATION (OUTPATIENT)
Age: 78
End: 2023-06-08

## 2023-06-08 ENCOUNTER — OFFICE VISIT (OUTPATIENT)
Age: 78
End: 2023-06-08
Payer: MEDICARE

## 2023-06-08 VITALS
BODY MASS INDEX: 32.36 KG/M2 | SYSTOLIC BLOOD PRESSURE: 87 MMHG | WEIGHT: 232 LBS | HEART RATE: 92 BPM | DIASTOLIC BLOOD PRESSURE: 58 MMHG | RESPIRATION RATE: 18 BRPM | TEMPERATURE: 98.2 F | OXYGEN SATURATION: 94 %

## 2023-06-08 VITALS
DIASTOLIC BLOOD PRESSURE: 81 MMHG | TEMPERATURE: 97.8 F | HEART RATE: 92 BPM | RESPIRATION RATE: 16 BRPM | SYSTOLIC BLOOD PRESSURE: 144 MMHG

## 2023-06-08 DIAGNOSIS — C67.9 MALIGNANT NEOPLASM OF URINARY BLADDER, UNSPECIFIED SITE (HCC): Primary | ICD-10-CM

## 2023-06-08 LAB
ALBUMIN SERPL-MCNC: 3.6 G/DL (ref 3.5–5)
ALBUMIN/GLOB SERPL: 0.9 (ref 1.1–2.2)
ALP SERPL-CCNC: 81 U/L (ref 45–117)
ALT SERPL-CCNC: 32 U/L (ref 12–78)
ANION GAP SERPL CALC-SCNC: 6 MMOL/L (ref 5–15)
AST SERPL-CCNC: 19 U/L (ref 15–37)
BASOPHILS # BLD: 0.1 K/UL (ref 0–0.1)
BASOPHILS NFR BLD: 1 % (ref 0–1)
BILIRUB SERPL-MCNC: 0.3 MG/DL (ref 0.2–1)
BUN SERPL-MCNC: 46 MG/DL (ref 6–20)
BUN/CREAT SERPL: 23 (ref 12–20)
CALCIUM SERPL-MCNC: 10.1 MG/DL (ref 8.5–10.1)
CHLORIDE SERPL-SCNC: 109 MMOL/L (ref 97–108)
CO2 SERPL-SCNC: 22 MMOL/L (ref 21–32)
CREAT SERPL-MCNC: 1.97 MG/DL (ref 0.7–1.3)
DIFFERENTIAL METHOD BLD: ABNORMAL
EOSINOPHIL # BLD: 0.8 K/UL (ref 0–0.4)
EOSINOPHIL NFR BLD: 15 % (ref 0–7)
ERYTHROCYTE [DISTWIDTH] IN BLOOD BY AUTOMATED COUNT: 12.8 % (ref 11.5–14.5)
GLOBULIN SER CALC-MCNC: 4 G/DL (ref 2–4)
GLUCOSE SERPL-MCNC: 112 MG/DL (ref 65–100)
HCT VFR BLD AUTO: 33.9 % (ref 36.6–50.3)
HGB BLD-MCNC: 10.9 G/DL (ref 12.1–17)
IMM GRANULOCYTES # BLD AUTO: 0.1 K/UL (ref 0–0.04)
IMM GRANULOCYTES NFR BLD AUTO: 1 % (ref 0–0.5)
LYMPHOCYTES # BLD: 0.5 K/UL (ref 0.8–3.5)
LYMPHOCYTES NFR BLD: 10 % (ref 12–49)
MCH RBC QN AUTO: 33.4 PG (ref 26–34)
MCHC RBC AUTO-ENTMCNC: 32.2 G/DL (ref 30–36.5)
MCV RBC AUTO: 104 FL (ref 80–99)
MONOCYTES # BLD: 0.6 K/UL (ref 0–1)
MONOCYTES NFR BLD: 11 % (ref 5–13)
NEUTS SEG # BLD: 3.3 K/UL (ref 1.8–8)
NEUTS SEG NFR BLD: 62 % (ref 32–75)
NRBC # BLD: 0 K/UL (ref 0–0.01)
NRBC BLD-RTO: 0 PER 100 WBC
PLATELET # BLD AUTO: 224 K/UL (ref 150–400)
PMV BLD AUTO: 8.9 FL (ref 8.9–12.9)
POTASSIUM SERPL-SCNC: 4.2 MMOL/L (ref 3.5–5.1)
PROT SERPL-MCNC: 7.6 G/DL (ref 6.4–8.2)
RBC # BLD AUTO: 3.26 M/UL (ref 4.1–5.7)
RBC MORPH BLD: ABNORMAL
SODIUM SERPL-SCNC: 137 MMOL/L (ref 136–145)
WBC # BLD AUTO: 5.4 K/UL (ref 4.1–11.1)

## 2023-06-08 PROCEDURE — 4004F PT TOBACCO SCREEN RCVD TLK: CPT | Performed by: INTERNAL MEDICINE

## 2023-06-08 PROCEDURE — 6360000002 HC RX W HCPCS: Performed by: NURSE PRACTITIONER

## 2023-06-08 PROCEDURE — 3074F SYST BP LT 130 MM HG: CPT | Performed by: INTERNAL MEDICINE

## 2023-06-08 PROCEDURE — 83550 IRON BINDING TEST: CPT

## 2023-06-08 PROCEDURE — 80053 COMPREHEN METABOLIC PANEL: CPT

## 2023-06-08 PROCEDURE — G8428 CUR MEDS NOT DOCUMENT: HCPCS | Performed by: INTERNAL MEDICINE

## 2023-06-08 PROCEDURE — G8417 CALC BMI ABV UP PARAM F/U: HCPCS | Performed by: INTERNAL MEDICINE

## 2023-06-08 PROCEDURE — 36415 COLL VENOUS BLD VENIPUNCTURE: CPT

## 2023-06-08 PROCEDURE — 83540 ASSAY OF IRON: CPT

## 2023-06-08 PROCEDURE — 85025 COMPLETE CBC W/AUTO DIFF WBC: CPT

## 2023-06-08 PROCEDURE — 99215 OFFICE O/P EST HI 40 MIN: CPT | Performed by: INTERNAL MEDICINE

## 2023-06-08 PROCEDURE — 36591 DRAW BLOOD OFF VENOUS DEVICE: CPT

## 2023-06-08 PROCEDURE — 82728 ASSAY OF FERRITIN: CPT

## 2023-06-08 PROCEDURE — 1123F ACP DISCUSS/DSCN MKR DOCD: CPT | Performed by: INTERNAL MEDICINE

## 2023-06-08 PROCEDURE — 3078F DIAST BP <80 MM HG: CPT | Performed by: INTERNAL MEDICINE

## 2023-06-08 PROCEDURE — 2580000003 HC RX 258: Performed by: NURSE PRACTITIONER

## 2023-06-08 RX ORDER — HEPARIN 100 UNIT/ML
500 SYRINGE INTRAVENOUS PRN
Status: DISCONTINUED | OUTPATIENT
Start: 2023-06-08 | End: 2023-06-09 | Stop reason: HOSPADM

## 2023-06-08 RX ORDER — HEPARIN SODIUM (PORCINE) LOCK FLUSH IV SOLN 100 UNIT/ML 100 UNIT/ML
500 SOLUTION INTRAVENOUS PRN
Status: CANCELLED | OUTPATIENT
Start: 2023-06-08

## 2023-06-08 RX ORDER — SODIUM CHLORIDE 9 MG/ML
25 INJECTION, SOLUTION INTRAVENOUS PRN
Status: DISCONTINUED | OUTPATIENT
Start: 2023-06-08 | End: 2023-06-09 | Stop reason: HOSPADM

## 2023-06-08 RX ORDER — SODIUM CHLORIDE 0.9 % (FLUSH) 0.9 %
5-40 SYRINGE (ML) INJECTION PRN
Status: CANCELLED | OUTPATIENT
Start: 2023-06-08

## 2023-06-08 RX ORDER — SODIUM CHLORIDE 9 MG/ML
25 INJECTION, SOLUTION INTRAVENOUS PRN
Status: CANCELLED | OUTPATIENT
Start: 2023-07-30

## 2023-06-08 RX ORDER — SODIUM CHLORIDE 0.9 % (FLUSH) 0.9 %
5-40 SYRINGE (ML) INJECTION PRN
Status: DISCONTINUED | OUTPATIENT
Start: 2023-06-08 | End: 2023-06-09 | Stop reason: HOSPADM

## 2023-06-08 RX ORDER — HEPARIN 100 UNIT/ML
500 SYRINGE INTRAVENOUS PRN
Status: CANCELLED | OUTPATIENT
Start: 2023-07-30

## 2023-06-08 RX ORDER — SODIUM CHLORIDE 9 MG/ML
25 INJECTION, SOLUTION INTRAVENOUS PRN
Status: CANCELLED | OUTPATIENT
Start: 2023-06-08

## 2023-06-08 RX ORDER — SODIUM CHLORIDE 0.9 % (FLUSH) 0.9 %
5-40 SYRINGE (ML) INJECTION PRN
Status: CANCELLED | OUTPATIENT
Start: 2023-07-30

## 2023-06-08 RX ADMIN — HEPARIN 500 UNITS: 100 SYRINGE at 13:03

## 2023-06-08 RX ADMIN — SODIUM CHLORIDE, PRESERVATIVE FREE 10 ML: 5 INJECTION INTRAVENOUS at 13:02

## 2023-06-08 RX ADMIN — SODIUM CHLORIDE, PRESERVATIVE FREE 10 ML: 5 INJECTION INTRAVENOUS at 13:00

## 2023-06-08 ASSESSMENT — PAIN DESCRIPTION - LOCATION: LOCATION: PELVIS

## 2023-06-08 ASSESSMENT — PAIN DESCRIPTION - ORIENTATION: ORIENTATION: ANTERIOR

## 2023-06-08 ASSESSMENT — PAIN SCALES - GENERAL: PAINLEVEL_OUTOF10: 3

## 2023-06-08 NOTE — PROGRESS NOTES
Neutrophils Absolute 3.3 1.8 - 8.0 K/UL    Lymphocytes Absolute 0.5 (L) 0.8 - 3.5 K/UL    Monocytes Absolute 0.6 0.0 - 1.0 K/UL    Eosinophils Absolute 0.8 (H) 0.0 - 0.4 K/UL    Basophils Absolute 0.1 0.0 - 0.1 K/UL    Absolute Immature Granulocyte 0.1 (H) 0.00 - 0.04 K/UL    Differential Type SMEAR SCANNED      RBC Comment NORMOCYTIC, NORMOCHROMIC         Medications given:   Medications Administered         heparin flush (PF) 100 UNIT/ML injection 500 Units Admin Date  06/08/2023 Action  Given Dose  500 Units Route  IntraCATHeter Administered By  Eryn Knight RN        sodium chloride flush 0.9 % injection 5-40 mL Admin Date  06/08/2023 Action  Given Dose  10 mL Route  IntraVENous Administered By  Eryn Knight RN        sodium chloride flush 0.9 % injection 5-40 mL Admin Date  06/08/2023 Action  Given Dose  10 mL Route  IntraVENous Administered By  Eryn Knight RN            Port accessed with positive blood return. Port flushed, heparinized and de-accessed per protocol. Mr. Pete Wright tolerated the procedure, and had no complaints. Mr. Pete Wright was discharged from [place] [address] in stable condition.      Future Appointments   Date Time Provider Shima Locke   8/3/2023  1:00 PM [facility]   9/6/2023  1:45 PM Steph Gregg MD [facility]   9/28/2023  1:00 [facility]   11/24/2023  1:00 PM G2 ANGELA HOFFMAN [facility]   1/18/2024  1:00 PM [address] Mesha Michaels RN  June 8, 2023  4:39 PM

## 2023-06-08 NOTE — PROGRESS NOTES
Loyd Fredy is a 69 y.o. male    Chief Complaint   Patient presents with    Follow-up     Muscle invasive bladder cancer- Mixed histology       1. Have you been to the ER, urgent care clinic since your last visit? Hospitalized since your last visit? No    2. Have you seen or consulted any other health care providers outside of the [address] [place] [address] since your last visit? Include any pap smears or colon screening.  Yes, Urology Dr. Hira Barajas, Dr. Isabel Subramanian PCP, Dr. Breezy Cali

## 2023-06-08 NOTE — PROGRESS NOTES
[facility] [place] [address] [address], [address]   W: [phone]  F: [phone]          Reason for Visit:     Barbie Sutherland is a 69 y.o.  male who is seen in follow-up of Muscle invasive bladder cancer- Mixed histology          Treatment History:      3/1/2021: CT IVP: Multiple abdominal, iliac, mediastinal nodes unchanged from 2019 consistent with h/o sarcoidosis, Thickened R lateral  bladder wall. 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 12/6/2021: Radical urethro-cystoprostatectomy     2/3/22-1/2023: Adjuvant nivolumab    1/2023: CT BAUDILIO  5/2023: RT with Dr. Rene Brody  5/2023: With suspicious pelvic nodes           History of Present Illness:     Patient is a 69 y.o. male with PMH as below including sarcoidosis who  is seen for evaluation of bladder cancer. He has a history of nonmuscle invasive bladder cancer in 2015, underwent 2 induction courses of BCG, had a non muscle invasive recurrence in 2019 and had re induction with BCG. Cystoscopy 6/2020 at [facility] did not show any recurrence. In March 2021 he had  a positive FISH and was seen by Dr. Nas Boudreaux. Had a CT IVP 3/2021 which showed some Bladder thickening. He underwent a Cystoscopy with TURBT and was found to have extensive non muscle invasive disease including that of prostatic urethra, CIS and a focus  of Muscle invasive disease in the R bladder wall. Grade 4 neutropenia after cycle 1.  Completed 4 cycles and had surgery

## 2023-06-09 ENCOUNTER — TELEPHONE (OUTPATIENT)
Age: 78
End: 2023-06-09

## 2023-06-10 LAB
FERRITIN SERPL-MCNC: 574 NG/ML (ref 26–388)
IRON SATN MFR SERPL: 40 % (ref 20–50)
IRON SERPL-MCNC: 114 UG/DL (ref 35–150)
TIBC SERPL-MCNC: 286 UG/DL (ref 250–450)

## 2023-06-20 ENCOUNTER — APPOINTMENT (OUTPATIENT)
Dept: INFUSION THERAPY | Age: 78
End: 2023-06-20

## 2023-07-14 ENCOUNTER — HOSPITAL ENCOUNTER (INPATIENT)
Facility: HOSPITAL | Age: 78
LOS: 5 days | Discharge: HOME | DRG: 871 | End: 2023-07-19
Attending: STUDENT IN AN ORGANIZED HEALTH CARE EDUCATION/TRAINING PROGRAM | Admitting: STUDENT IN AN ORGANIZED HEALTH CARE EDUCATION/TRAINING PROGRAM
Payer: MEDICARE

## 2023-07-14 ENCOUNTER — APPOINTMENT (OUTPATIENT)
Facility: HOSPITAL | Age: 78
DRG: 871 | End: 2023-07-14
Payer: MEDICARE

## 2023-07-14 DIAGNOSIS — R77.8 ELEVATED TROPONIN: ICD-10-CM

## 2023-07-14 DIAGNOSIS — N17.9 AKI (ACUTE KIDNEY INJURY) (HCC): ICD-10-CM

## 2023-07-14 DIAGNOSIS — I48.0 PAROXYSMAL A-FIB (HCC): ICD-10-CM

## 2023-07-14 DIAGNOSIS — A41.9 SEPTICEMIA (HCC): ICD-10-CM

## 2023-07-14 DIAGNOSIS — J18.9 PNEUMONIA OF BOTH LOWER LOBES DUE TO INFECTIOUS ORGANISM: Primary | ICD-10-CM

## 2023-07-14 LAB
ALBUMIN SERPL-MCNC: 2.7 G/DL (ref 3.5–5)
ALBUMIN/GLOB SERPL: 0.6 (ref 1.1–2.2)
ALP SERPL-CCNC: 65 U/L (ref 45–117)
ALT SERPL-CCNC: 29 U/L (ref 12–78)
ANION GAP SERPL CALC-SCNC: 9 MMOL/L (ref 5–15)
APPEARANCE UR: ABNORMAL
AST SERPL-CCNC: 35 U/L (ref 15–37)
BACTERIA URNS QL MICRO: ABNORMAL /HPF
BASOPHILS # BLD: 0 K/UL (ref 0–0.1)
BASOPHILS NFR BLD: 0 % (ref 0–1)
BILIRUB SERPL-MCNC: 0.4 MG/DL (ref 0.2–1)
BILIRUB UR QL: NEGATIVE
BUN SERPL-MCNC: 48 MG/DL (ref 6–20)
BUN/CREAT SERPL: 17 (ref 12–20)
CALCIUM SERPL-MCNC: 10.2 MG/DL (ref 8.5–10.1)
CHLORIDE SERPL-SCNC: 103 MMOL/L (ref 97–108)
CO2 SERPL-SCNC: 22 MMOL/L (ref 21–32)
COLOR UR: ABNORMAL
COMMENT:: NORMAL
CREAT SERPL-MCNC: 2.83 MG/DL (ref 0.7–1.3)
DIFFERENTIAL METHOD BLD: ABNORMAL
EKG ATRIAL RATE: 95 BPM
EKG DIAGNOSIS: NORMAL
EKG P AXIS: 59 DEGREES
EKG P-R INTERVAL: 198 MS
EKG Q-T INTERVAL: 380 MS
EKG QRS DURATION: 132 MS
EKG QTC CALCULATION (BAZETT): 477 MS
EKG R AXIS: 2 DEGREES
EKG T AXIS: 124 DEGREES
EKG VENTRICULAR RATE: 95 BPM
EOSINOPHIL # BLD: 0 K/UL (ref 0–0.4)
EOSINOPHIL NFR BLD: 0 % (ref 0–7)
EPITH CASTS URNS QL MICRO: ABNORMAL /LPF
ERYTHROCYTE [DISTWIDTH] IN BLOOD BY AUTOMATED COUNT: 14.1 % (ref 11.5–14.5)
FLUAV AG NPH QL IA: NEGATIVE
FLUBV AG NOSE QL IA: NEGATIVE
GLOBULIN SER CALC-MCNC: 4.7 G/DL (ref 2–4)
GLUCOSE SERPL-MCNC: 115 MG/DL (ref 65–100)
GLUCOSE UR STRIP.AUTO-MCNC: NEGATIVE MG/DL
HCT VFR BLD AUTO: 32.8 % (ref 36.6–50.3)
HGB BLD-MCNC: 10.5 G/DL (ref 12.1–17)
HGB UR QL STRIP: ABNORMAL
IMM GRANULOCYTES # BLD AUTO: 0 K/UL
IMM GRANULOCYTES NFR BLD AUTO: 0 %
KETONES UR QL STRIP.AUTO: ABNORMAL MG/DL
LACTATE SERPL-SCNC: 1.5 MMOL/L (ref 0.4–2)
LEUKOCYTE ESTERASE UR QL STRIP.AUTO: ABNORMAL
LYMPHOCYTES # BLD: 0.3 K/UL (ref 0.8–3.5)
LYMPHOCYTES NFR BLD: 2 % (ref 12–49)
MCH RBC QN AUTO: 33.5 PG (ref 26–34)
MCHC RBC AUTO-ENTMCNC: 32 G/DL (ref 30–36.5)
MCV RBC AUTO: 104.8 FL (ref 80–99)
METAMYELOCYTES NFR BLD MANUAL: 1 %
MONOCYTES # BLD: 0.4 K/UL (ref 0–1)
MONOCYTES NFR BLD: 3 % (ref 5–13)
NEUTS BAND NFR BLD MANUAL: 6 % (ref 0–6)
NEUTS SEG # BLD: 13.9 K/UL (ref 1.8–8)
NEUTS SEG NFR BLD: 88 % (ref 32–75)
NITRITE UR QL STRIP.AUTO: NEGATIVE
NRBC # BLD: 0 K/UL (ref 0–0.01)
NRBC BLD-RTO: 0 PER 100 WBC
PH UR STRIP: 7.5 (ref 5–8)
PLATELET # BLD AUTO: 203 K/UL (ref 150–400)
PMV BLD AUTO: 9.2 FL (ref 8.9–12.9)
POTASSIUM SERPL-SCNC: 3.9 MMOL/L (ref 3.5–5.1)
PROT SERPL-MCNC: 7.4 G/DL (ref 6.4–8.2)
PROT UR STRIP-MCNC: 100 MG/DL
RBC # BLD AUTO: 3.13 M/UL (ref 4.1–5.7)
RBC #/AREA URNS HPF: ABNORMAL /HPF (ref 0–5)
RBC MORPH BLD: ABNORMAL
SARS-COV-2 RDRP RESP QL NAA+PROBE: NOT DETECTED
SODIUM SERPL-SCNC: 134 MMOL/L (ref 136–145)
SOURCE: NORMAL
SP GR UR REFRACTOMETRY: 1.02 (ref 1–1.03)
SPECIMEN HOLD: NORMAL
URINE CULTURE IF INDICATED: ABNORMAL
UROBILINOGEN UR QL STRIP.AUTO: 0.2 EU/DL (ref 0.2–1)
WBC # BLD AUTO: 14.8 K/UL (ref 4.1–11.1)
WBC URNS QL MICRO: ABNORMAL /HPF (ref 0–4)

## 2023-07-14 PROCEDURE — 87086 URINE CULTURE/COLONY COUNT: CPT

## 2023-07-14 PROCEDURE — 83605 ASSAY OF LACTIC ACID: CPT

## 2023-07-14 PROCEDURE — 6360000002 HC RX W HCPCS: Performed by: STUDENT IN AN ORGANIZED HEALTH CARE EDUCATION/TRAINING PROGRAM

## 2023-07-14 PROCEDURE — 87150 DNA/RNA AMPLIFIED PROBE: CPT

## 2023-07-14 PROCEDURE — 2060000000 HC ICU INTERMEDIATE R&B

## 2023-07-14 PROCEDURE — 87186 SC STD MICRODIL/AGAR DIL: CPT

## 2023-07-14 PROCEDURE — 87635 SARS-COV-2 COVID-19 AMP PRB: CPT

## 2023-07-14 PROCEDURE — 80053 COMPREHEN METABOLIC PANEL: CPT

## 2023-07-14 PROCEDURE — 87040 BLOOD CULTURE FOR BACTERIA: CPT

## 2023-07-14 PROCEDURE — 96375 TX/PRO/DX INJ NEW DRUG ADDON: CPT

## 2023-07-14 PROCEDURE — 93005 ELECTROCARDIOGRAM TRACING: CPT | Performed by: STUDENT IN AN ORGANIZED HEALTH CARE EDUCATION/TRAINING PROGRAM

## 2023-07-14 PROCEDURE — 2580000003 HC RX 258: Performed by: STUDENT IN AN ORGANIZED HEALTH CARE EDUCATION/TRAINING PROGRAM

## 2023-07-14 PROCEDURE — 96365 THER/PROPH/DIAG IV INF INIT: CPT

## 2023-07-14 PROCEDURE — 99285 EMERGENCY DEPT VISIT HI MDM: CPT

## 2023-07-14 PROCEDURE — 81001 URINALYSIS AUTO W/SCOPE: CPT

## 2023-07-14 PROCEDURE — 36415 COLL VENOUS BLD VENIPUNCTURE: CPT

## 2023-07-14 PROCEDURE — 85025 COMPLETE CBC W/AUTO DIFF WBC: CPT

## 2023-07-14 PROCEDURE — 71045 X-RAY EXAM CHEST 1 VIEW: CPT

## 2023-07-14 PROCEDURE — 87077 CULTURE AEROBIC IDENTIFY: CPT

## 2023-07-14 PROCEDURE — 87804 INFLUENZA ASSAY W/OPTIC: CPT

## 2023-07-14 RX ORDER — ATORVASTATIN CALCIUM 10 MG/1
20 TABLET, FILM COATED ORAL DAILY
Status: DISCONTINUED | OUTPATIENT
Start: 2023-07-15 | End: 2023-07-19 | Stop reason: HOSPADM

## 2023-07-14 RX ORDER — ESCITALOPRAM OXALATE 10 MG/1
10 TABLET ORAL DAILY
Status: DISCONTINUED | OUTPATIENT
Start: 2023-07-15 | End: 2023-07-19 | Stop reason: HOSPADM

## 2023-07-14 RX ORDER — SODIUM CHLORIDE 9 MG/ML
INJECTION, SOLUTION INTRAVENOUS PRN
Status: DISCONTINUED | OUTPATIENT
Start: 2023-07-14 | End: 2023-07-19 | Stop reason: HOSPADM

## 2023-07-14 RX ORDER — SODIUM CHLORIDE 9 MG/ML
INJECTION, SOLUTION INTRAVENOUS CONTINUOUS
Status: DISCONTINUED | OUTPATIENT
Start: 2023-07-14 | End: 2023-07-15

## 2023-07-14 RX ORDER — SODIUM CHLORIDE 0.9 % (FLUSH) 0.9 %
5-40 SYRINGE (ML) INJECTION PRN
Status: DISCONTINUED | OUTPATIENT
Start: 2023-07-14 | End: 2023-07-19 | Stop reason: HOSPADM

## 2023-07-14 RX ORDER — HEPARIN SODIUM 5000 [USP'U]/ML
5000 INJECTION, SOLUTION INTRAVENOUS; SUBCUTANEOUS EVERY 8 HOURS SCHEDULED
Status: DISCONTINUED | OUTPATIENT
Start: 2023-07-14 | End: 2023-07-19 | Stop reason: HOSPADM

## 2023-07-14 RX ORDER — ACETAMINOPHEN 325 MG/1
650 TABLET ORAL EVERY 6 HOURS PRN
Status: DISCONTINUED | OUTPATIENT
Start: 2023-07-14 | End: 2023-07-19 | Stop reason: HOSPADM

## 2023-07-14 RX ORDER — POLYETHYLENE GLYCOL 3350 17 G/17G
17 POWDER, FOR SOLUTION ORAL DAILY PRN
Status: DISCONTINUED | OUTPATIENT
Start: 2023-07-14 | End: 2023-07-19 | Stop reason: HOSPADM

## 2023-07-14 RX ORDER — ACETAMINOPHEN 650 MG/1
650 SUPPOSITORY RECTAL EVERY 6 HOURS PRN
Status: DISCONTINUED | OUTPATIENT
Start: 2023-07-14 | End: 2023-07-19 | Stop reason: HOSPADM

## 2023-07-14 RX ORDER — ONDANSETRON 4 MG/1
4 TABLET, ORALLY DISINTEGRATING ORAL EVERY 8 HOURS PRN
Status: DISCONTINUED | OUTPATIENT
Start: 2023-07-14 | End: 2023-07-19 | Stop reason: HOSPADM

## 2023-07-14 RX ORDER — SODIUM CHLORIDE 0.9 % (FLUSH) 0.9 %
5-40 SYRINGE (ML) INJECTION EVERY 12 HOURS SCHEDULED
Status: DISCONTINUED | OUTPATIENT
Start: 2023-07-14 | End: 2023-07-19 | Stop reason: HOSPADM

## 2023-07-14 RX ORDER — ZOLPIDEM TARTRATE 5 MG/1
5 TABLET ORAL NIGHTLY PRN
Status: DISCONTINUED | OUTPATIENT
Start: 2023-07-14 | End: 2023-07-19 | Stop reason: HOSPADM

## 2023-07-14 RX ORDER — ONDANSETRON 2 MG/ML
4 INJECTION INTRAMUSCULAR; INTRAVENOUS EVERY 6 HOURS PRN
Status: DISCONTINUED | OUTPATIENT
Start: 2023-07-14 | End: 2023-07-19 | Stop reason: HOSPADM

## 2023-07-14 RX ORDER — BUPROPION HYDROCHLORIDE 100 MG/1
100 TABLET, EXTENDED RELEASE ORAL DAILY
Status: DISCONTINUED | OUTPATIENT
Start: 2023-07-15 | End: 2023-07-19 | Stop reason: HOSPADM

## 2023-07-14 RX ORDER — 0.9 % SODIUM CHLORIDE 0.9 %
1000 INTRAVENOUS SOLUTION INTRAVENOUS ONCE
Status: COMPLETED | OUTPATIENT
Start: 2023-07-14 | End: 2023-07-14

## 2023-07-14 RX ORDER — PREDNISONE 10 MG/1
10 TABLET ORAL
Status: DISCONTINUED | OUTPATIENT
Start: 2023-07-15 | End: 2023-07-19 | Stop reason: HOSPADM

## 2023-07-14 RX ADMIN — SODIUM CHLORIDE: 9 INJECTION, SOLUTION INTRAVENOUS at 22:31

## 2023-07-14 RX ADMIN — AZITHROMYCIN MONOHYDRATE 500 MG: 500 INJECTION, POWDER, LYOPHILIZED, FOR SOLUTION INTRAVENOUS at 20:32

## 2023-07-14 RX ADMIN — HEPARIN SODIUM 5000 UNITS: 5000 INJECTION INTRAVENOUS; SUBCUTANEOUS at 22:32

## 2023-07-14 RX ADMIN — SODIUM CHLORIDE, PRESERVATIVE FREE 10 ML: 5 INJECTION INTRAVENOUS at 22:34

## 2023-07-14 RX ADMIN — WATER 2000 MG: 1 INJECTION INTRAMUSCULAR; INTRAVENOUS; SUBCUTANEOUS at 23:54

## 2023-07-14 RX ADMIN — WATER 1000 MG: 1 INJECTION INTRAMUSCULAR; INTRAVENOUS; SUBCUTANEOUS at 20:32

## 2023-07-14 RX ADMIN — SODIUM CHLORIDE 1000 ML: 9 INJECTION, SOLUTION INTRAVENOUS at 22:31

## 2023-07-14 ASSESSMENT — ENCOUNTER SYMPTOMS
SHORTNESS OF BREATH: 0
ABDOMINAL PAIN: 0
DIARRHEA: 1
EYE DISCHARGE: 0
COUGH: 0
VOMITING: 0
RHINORRHEA: 0
EYE REDNESS: 0
NAUSEA: 0

## 2023-07-14 ASSESSMENT — PAIN - FUNCTIONAL ASSESSMENT: PAIN_FUNCTIONAL_ASSESSMENT: NONE - DENIES PAIN

## 2023-07-15 ENCOUNTER — APPOINTMENT (OUTPATIENT)
Facility: HOSPITAL | Age: 78
DRG: 871 | End: 2023-07-15
Payer: MEDICARE

## 2023-07-15 LAB
ACCESSION NUMBER, LLC1M: ABNORMAL
ACINETOBACTER CALCOAC BAUMANNII COMPLEX BY PCR: NOT DETECTED
ALBUMIN SERPL-MCNC: 2.4 G/DL (ref 3.5–5)
ALBUMIN SERPL-MCNC: 2.4 G/DL (ref 3.5–5)
ALBUMIN/GLOB SERPL: 0.5 (ref 1.1–2.2)
ALBUMIN/GLOB SERPL: 0.7 (ref 1.1–2.2)
ALP SERPL-CCNC: 58 U/L (ref 45–117)
ALP SERPL-CCNC: 62 U/L (ref 45–117)
ALT SERPL-CCNC: 24 U/L (ref 12–78)
ALT SERPL-CCNC: 25 U/L (ref 12–78)
ANION GAP SERPL CALC-SCNC: 8 MMOL/L (ref 5–15)
ANION GAP SERPL CALC-SCNC: 9 MMOL/L (ref 5–15)
AST SERPL-CCNC: 20 U/L (ref 15–37)
AST SERPL-CCNC: 22 U/L (ref 15–37)
B FRAGILIS DNA BLD POS QL NAA+NON-PROBE: NOT DETECTED
B PERT DNA SPEC QL NAA+PROBE: NOT DETECTED
BASOPHILS # BLD: 0 K/UL (ref 0–0.1)
BASOPHILS NFR BLD: 0 % (ref 0–1)
BILIRUB SERPL-MCNC: 0.2 MG/DL (ref 0.2–1)
BILIRUB SERPL-MCNC: 0.3 MG/DL (ref 0.2–1)
BIOFIRE TEST COMMENT: ABNORMAL
BLACTX-M ISLT/SPM QL: NOT DETECTED
BLAIMP ISLT/SPM QL: NOT DETECTED
BLAKPC BLD POS QL NAA+NON-PROBE: NOT DETECTED
BLAOXA-48-LIKE ISLT/SPM QL: NOT DETECTED
BLAVIM ISLT/SPM QL: NOT DETECTED
BORDETELLA PARAPERTUSSIS BY PCR: NOT DETECTED
BUN SERPL-MCNC: 46 MG/DL (ref 6–20)
BUN SERPL-MCNC: 56 MG/DL (ref 6–20)
BUN/CREAT SERPL: 19 (ref 12–20)
BUN/CREAT SERPL: 26 (ref 12–20)
C ALBICANS DNA BLD POS QL NAA+NON-PROBE: NOT DETECTED
C AURIS DNA BLD POS QL NAA+NON-PROBE: NOT DETECTED
C GATTII+NEOFOR DNA BLD POS QL NAA+N-PRB: NOT DETECTED
C GLABRATA DNA BLD POS QL NAA+NON-PROBE: NOT DETECTED
C KRUSEI DNA BLD POS QL NAA+NON-PROBE: NOT DETECTED
C PARAP DNA BLD POS QL NAA+NON-PROBE: NOT DETECTED
C PNEUM DNA SPEC QL NAA+PROBE: NOT DETECTED
C TROPICLS DNA BLD POS QL NAA+NON-PROBE: NOT DETECTED
CALCIUM SERPL-MCNC: 8.7 MG/DL (ref 8.5–10.1)
CALCIUM SERPL-MCNC: 9.2 MG/DL (ref 8.5–10.1)
CHLORIDE SERPL-SCNC: 107 MMOL/L (ref 97–108)
CHLORIDE SERPL-SCNC: 110 MMOL/L (ref 97–108)
CO2 SERPL-SCNC: 19 MMOL/L (ref 21–32)
CO2 SERPL-SCNC: 20 MMOL/L (ref 21–32)
COLISTIN RES MCR-1 ISLT/SPM QL: NOT DETECTED
CREAT SERPL-MCNC: 2.16 MG/DL (ref 0.7–1.3)
CREAT SERPL-MCNC: 2.44 MG/DL (ref 0.7–1.3)
DIFFERENTIAL METHOD BLD: ABNORMAL
E CLOAC COMP DNA BLD POS NAA+NON-PROBE: NOT DETECTED
E COLI DNA BLD POS QL NAA+NON-PROBE: NOT DETECTED
E FAECALIS DNA BLD POS QL NAA+NON-PROBE: NOT DETECTED
E FAECIUM DNA BLD POS QL NAA+NON-PROBE: NOT DETECTED
ENTEROBACTERALES DNA BLD POS NAA+N-PRB: DETECTED
EOSINOPHIL # BLD: 0 K/UL (ref 0–0.4)
EOSINOPHIL NFR BLD: 0 % (ref 0–7)
ERYTHROCYTE [DISTWIDTH] IN BLOOD BY AUTOMATED COUNT: 13.8 % (ref 11.5–14.5)
FLUAV SUBTYP SPEC NAA+PROBE: NOT DETECTED
FLUBV RNA SPEC QL NAA+PROBE: NOT DETECTED
GLOBULIN SER CALC-MCNC: 3.6 G/DL (ref 2–4)
GLOBULIN SER CALC-MCNC: 4.5 G/DL (ref 2–4)
GLUCOSE SERPL-MCNC: 151 MG/DL (ref 65–100)
GLUCOSE SERPL-MCNC: 173 MG/DL (ref 65–100)
GP B STREP DNA BLD POS QL NAA+NON-PROBE: NOT DETECTED
HADV DNA SPEC QL NAA+PROBE: NOT DETECTED
HAEM INFLU DNA BLD POS QL NAA+NON-PROBE: NOT DETECTED
HCOV 229E RNA SPEC QL NAA+PROBE: NOT DETECTED
HCOV HKU1 RNA SPEC QL NAA+PROBE: NOT DETECTED
HCOV NL63 RNA SPEC QL NAA+PROBE: NOT DETECTED
HCOV OC43 RNA SPEC QL NAA+PROBE: NOT DETECTED
HCT VFR BLD AUTO: 28.9 % (ref 36.6–50.3)
HGB BLD-MCNC: 9.1 G/DL (ref 12.1–17)
HMPV RNA SPEC QL NAA+PROBE: NOT DETECTED
HPIV1 RNA SPEC QL NAA+PROBE: NOT DETECTED
HPIV2 RNA SPEC QL NAA+PROBE: NOT DETECTED
HPIV3 RNA SPEC QL NAA+PROBE: NOT DETECTED
HPIV4 RNA SPEC QL NAA+PROBE: NOT DETECTED
IMM GRANULOCYTES # BLD AUTO: 0 K/UL
IMM GRANULOCYTES NFR BLD AUTO: 0 %
K OXYTOCA DNA BLD POS QL NAA+NON-PROBE: NOT DETECTED
KLEBSIELLA SP DNA BLD POS QL NAA+NON-PRB: DETECTED
KLEBSIELLA SP DNA BLD POS QL NAA+NON-PRB: NOT DETECTED
L MONOCYTOG DNA BLD POS QL NAA+NON-PROBE: NOT DETECTED
LACTATE SERPL-SCNC: 1 MMOL/L (ref 0.4–2)
LYMPHOCYTES # BLD: 0.1 K/UL (ref 0.8–3.5)
LYMPHOCYTES NFR BLD: 1 % (ref 12–49)
M PNEUMO DNA SPEC QL NAA+PROBE: NOT DETECTED
MAGNESIUM SERPL-MCNC: 2.3 MG/DL (ref 1.6–2.4)
MCH RBC QN AUTO: 32.9 PG (ref 26–34)
MCHC RBC AUTO-ENTMCNC: 31.5 G/DL (ref 30–36.5)
MCV RBC AUTO: 104.3 FL (ref 80–99)
MONOCYTES # BLD: 0.3 K/UL (ref 0–1)
MONOCYTES NFR BLD: 3 % (ref 5–13)
N MEN DNA BLD POS QL NAA+NON-PROBE: NOT DETECTED
NEUTS SEG # BLD: 11.2 K/UL (ref 1.8–8)
NEUTS SEG NFR BLD: 96 % (ref 32–75)
NRBC # BLD: 0 K/UL (ref 0–0.01)
NRBC BLD-RTO: 0 PER 100 WBC
P AERUGINOSA DNA BLD POS NAA+NON-PROBE: NOT DETECTED
PHOSPHATE SERPL-MCNC: 2.3 MG/DL (ref 2.6–4.7)
PLATELET # BLD AUTO: 184 K/UL (ref 150–400)
PMV BLD AUTO: 9.2 FL (ref 8.9–12.9)
POTASSIUM SERPL-SCNC: 3.5 MMOL/L (ref 3.5–5.1)
POTASSIUM SERPL-SCNC: 3.6 MMOL/L (ref 3.5–5.1)
PROT SERPL-MCNC: 6 G/DL (ref 6.4–8.2)
PROT SERPL-MCNC: 6.9 G/DL (ref 6.4–8.2)
PROTEUS SP DNA BLD POS QL NAA+NON-PROBE: NOT DETECTED
RBC # BLD AUTO: 2.77 M/UL (ref 4.1–5.7)
RBC MORPH BLD: ABNORMAL
RESISTANT GENE NDM BY PCR: NOT DETECTED
RESISTANT GENE TARGETS: ABNORMAL
RSV RNA SPEC QL NAA+PROBE: NOT DETECTED
RV+EV RNA SPEC QL NAA+PROBE: NOT DETECTED
S AUREUS DNA BLD POS QL NAA+NON-PROBE: NOT DETECTED
S AUREUS+CONS DNA BLD POS NAA+NON-PROBE: NOT DETECTED
S EPIDERMIDIS DNA BLD POS QL NAA+NON-PRB: NOT DETECTED
S LUGDUNENSIS DNA BLD POS QL NAA+NON-PRB: NOT DETECTED
S MALTOPHILIA DNA BLD POS QL NAA+NON-PRB: NOT DETECTED
S MARCESCENS DNA BLD POS NAA+NON-PROBE: NOT DETECTED
S PNEUM DNA BLD POS QL NAA+NON-PROBE: NOT DETECTED
S PYO DNA BLD POS QL NAA+NON-PROBE: NOT DETECTED
SALMONELLA DNA BLD POS QL NAA+NON-PROBE: NOT DETECTED
SARS-COV-2 RNA RESP QL NAA+PROBE: NOT DETECTED
SODIUM SERPL-SCNC: 135 MMOL/L (ref 136–145)
SODIUM SERPL-SCNC: 138 MMOL/L (ref 136–145)
STREPTOCOCCUS DNA BLD POS NAA+NON-PROBE: NOT DETECTED
TROPONIN I SERPL HS-MCNC: 15 NG/L (ref 0–57)
WBC # BLD AUTO: 11.6 K/UL (ref 4.1–11.1)

## 2023-07-15 PROCEDURE — 84484 ASSAY OF TROPONIN QUANT: CPT

## 2023-07-15 PROCEDURE — 97116 GAIT TRAINING THERAPY: CPT | Performed by: PHYSICAL THERAPIST

## 2023-07-15 PROCEDURE — 6360000002 HC RX W HCPCS: Performed by: STUDENT IN AN ORGANIZED HEALTH CARE EDUCATION/TRAINING PROGRAM

## 2023-07-15 PROCEDURE — 80053 COMPREHEN METABOLIC PANEL: CPT

## 2023-07-15 PROCEDURE — 6370000000 HC RX 637 (ALT 250 FOR IP): Performed by: STUDENT IN AN ORGANIZED HEALTH CARE EDUCATION/TRAINING PROGRAM

## 2023-07-15 PROCEDURE — 71045 X-RAY EXAM CHEST 1 VIEW: CPT

## 2023-07-15 PROCEDURE — 84100 ASSAY OF PHOSPHORUS: CPT

## 2023-07-15 PROCEDURE — 0202U NFCT DS 22 TRGT SARS-COV-2: CPT

## 2023-07-15 PROCEDURE — 36415 COLL VENOUS BLD VENIPUNCTURE: CPT

## 2023-07-15 PROCEDURE — 97161 PT EVAL LOW COMPLEX 20 MIN: CPT | Performed by: PHYSICAL THERAPIST

## 2023-07-15 PROCEDURE — 83735 ASSAY OF MAGNESIUM: CPT

## 2023-07-15 PROCEDURE — 87040 BLOOD CULTURE FOR BACTERIA: CPT

## 2023-07-15 PROCEDURE — 71250 CT THORAX DX C-: CPT

## 2023-07-15 PROCEDURE — 2580000003 HC RX 258: Performed by: STUDENT IN AN ORGANIZED HEALTH CARE EDUCATION/TRAINING PROGRAM

## 2023-07-15 PROCEDURE — 97530 THERAPEUTIC ACTIVITIES: CPT | Performed by: PHYSICAL THERAPIST

## 2023-07-15 PROCEDURE — 2060000000 HC ICU INTERMEDIATE R&B

## 2023-07-15 PROCEDURE — 97165 OT EVAL LOW COMPLEX 30 MIN: CPT

## 2023-07-15 PROCEDURE — 83605 ASSAY OF LACTIC ACID: CPT

## 2023-07-15 PROCEDURE — 94640 AIRWAY INHALATION TREATMENT: CPT

## 2023-07-15 PROCEDURE — 97535 SELF CARE MNGMENT TRAINING: CPT

## 2023-07-15 PROCEDURE — 2500000003 HC RX 250 WO HCPCS: Performed by: STUDENT IN AN ORGANIZED HEALTH CARE EDUCATION/TRAINING PROGRAM

## 2023-07-15 PROCEDURE — 85025 COMPLETE CBC W/AUTO DIFF WBC: CPT

## 2023-07-15 PROCEDURE — 2580000003 HC RX 258

## 2023-07-15 RX ORDER — IPRATROPIUM BROMIDE AND ALBUTEROL SULFATE 2.5; .5 MG/3ML; MG/3ML
1 SOLUTION RESPIRATORY (INHALATION) EVERY 4 HOURS
Status: DISCONTINUED | OUTPATIENT
Start: 2023-07-15 | End: 2023-07-17

## 2023-07-15 RX ORDER — 0.9 % SODIUM CHLORIDE 0.9 %
500 INTRAVENOUS SOLUTION INTRAVENOUS ONCE
Status: COMPLETED | OUTPATIENT
Start: 2023-07-15 | End: 2023-07-16

## 2023-07-15 RX ADMIN — DOXYCYCLINE 100 MG: 100 INJECTION, POWDER, LYOPHILIZED, FOR SOLUTION INTRAVENOUS at 09:36

## 2023-07-15 RX ADMIN — IPRATROPIUM BROMIDE AND ALBUTEROL SULFATE 1 DOSE: 2.5; .5 SOLUTION RESPIRATORY (INHALATION) at 16:38

## 2023-07-15 RX ADMIN — BUPROPION HYDROCHLORIDE 100 MG: 100 TABLET, EXTENDED RELEASE ORAL at 09:40

## 2023-07-15 RX ADMIN — DOXYCYCLINE 100 MG: 100 INJECTION, POWDER, LYOPHILIZED, FOR SOLUTION INTRAVENOUS at 21:08

## 2023-07-15 RX ADMIN — PREDNISONE 10 MG: 10 TABLET ORAL at 09:40

## 2023-07-15 RX ADMIN — SODIUM CHLORIDE 500 ML: 9 INJECTION, SOLUTION INTRAVENOUS at 23:21

## 2023-07-15 RX ADMIN — HEPARIN SODIUM 5000 UNITS: 5000 INJECTION INTRAVENOUS; SUBCUTANEOUS at 21:08

## 2023-07-15 RX ADMIN — IPRATROPIUM BROMIDE AND ALBUTEROL SULFATE 1 DOSE: 2.5; .5 SOLUTION RESPIRATORY (INHALATION) at 01:05

## 2023-07-15 RX ADMIN — HEPARIN SODIUM 5000 UNITS: 5000 INJECTION INTRAVENOUS; SUBCUTANEOUS at 14:43

## 2023-07-15 RX ADMIN — SODIUM CHLORIDE, PRESERVATIVE FREE 10 ML: 5 INJECTION INTRAVENOUS at 21:08

## 2023-07-15 RX ADMIN — ATORVASTATIN CALCIUM 20 MG: 10 TABLET, FILM COATED ORAL at 09:40

## 2023-07-15 RX ADMIN — HEPARIN SODIUM 5000 UNITS: 5000 INJECTION INTRAVENOUS; SUBCUTANEOUS at 05:18

## 2023-07-15 RX ADMIN — ZOLPIDEM TARTRATE 5 MG: 5 TABLET ORAL at 21:23

## 2023-07-15 RX ADMIN — ESCITALOPRAM OXALATE 10 MG: 10 TABLET ORAL at 09:40

## 2023-07-15 RX ADMIN — CEFEPIME 2000 MG: 2 INJECTION, POWDER, FOR SOLUTION INTRAVENOUS at 14:44

## 2023-07-15 RX ADMIN — SODIUM CHLORIDE, PRESERVATIVE FREE 10 ML: 5 INJECTION INTRAVENOUS at 08:27

## 2023-07-15 RX ADMIN — METHYLPREDNISOLONE SODIUM SUCCINATE 125 MG: 125 INJECTION, POWDER, FOR SOLUTION INTRAMUSCULAR; INTRAVENOUS at 01:05

## 2023-07-15 RX ADMIN — ZOLPIDEM TARTRATE 5 MG: 5 TABLET ORAL at 05:18

## 2023-07-16 LAB
ANION GAP SERPL CALC-SCNC: 11 MMOL/L (ref 5–15)
BASOPHILS # BLD: 0 K/UL (ref 0–0.1)
BASOPHILS # BLD: 0 K/UL (ref 0–0.1)
BASOPHILS NFR BLD: 0 % (ref 0–1)
BASOPHILS NFR BLD: 0 % (ref 0–1)
BUN SERPL-MCNC: 56 MG/DL (ref 6–20)
BUN/CREAT SERPL: 28 (ref 12–20)
CALCIUM SERPL-MCNC: 8.7 MG/DL (ref 8.5–10.1)
CHLORIDE SERPL-SCNC: 112 MMOL/L (ref 97–108)
CO2 SERPL-SCNC: 17 MMOL/L (ref 21–32)
CREAT SERPL-MCNC: 1.99 MG/DL (ref 0.7–1.3)
DIFFERENTIAL METHOD BLD: ABNORMAL
DIFFERENTIAL METHOD BLD: ABNORMAL
EOSINOPHIL # BLD: 0 K/UL (ref 0–0.4)
EOSINOPHIL # BLD: 0 K/UL (ref 0–0.4)
EOSINOPHIL NFR BLD: 0 % (ref 0–7)
EOSINOPHIL NFR BLD: 0 % (ref 0–7)
ERYTHROCYTE [DISTWIDTH] IN BLOOD BY AUTOMATED COUNT: 14 % (ref 11.5–14.5)
ERYTHROCYTE [DISTWIDTH] IN BLOOD BY AUTOMATED COUNT: 14.1 % (ref 11.5–14.5)
GLUCOSE SERPL-MCNC: 122 MG/DL (ref 65–100)
HCT VFR BLD AUTO: 27.1 % (ref 36.6–50.3)
HCT VFR BLD AUTO: 27.6 % (ref 36.6–50.3)
HGB BLD-MCNC: 8.7 G/DL (ref 12.1–17)
HGB BLD-MCNC: 8.7 G/DL (ref 12.1–17)
IMM GRANULOCYTES # BLD AUTO: 0 K/UL
IMM GRANULOCYTES # BLD AUTO: 0.1 K/UL (ref 0–0.04)
IMM GRANULOCYTES NFR BLD AUTO: 0 %
IMM GRANULOCYTES NFR BLD AUTO: 1 % (ref 0–0.5)
LYMPHOCYTES # BLD: 0.1 K/UL (ref 0.8–3.5)
LYMPHOCYTES # BLD: 0.2 K/UL (ref 0.8–3.5)
LYMPHOCYTES NFR BLD: 1 % (ref 12–49)
LYMPHOCYTES NFR BLD: 2 % (ref 12–49)
MCH RBC QN AUTO: 32.8 PG (ref 26–34)
MCH RBC QN AUTO: 33 PG (ref 26–34)
MCHC RBC AUTO-ENTMCNC: 31.5 G/DL (ref 30–36.5)
MCHC RBC AUTO-ENTMCNC: 32.1 G/DL (ref 30–36.5)
MCV RBC AUTO: 102.3 FL (ref 80–99)
MCV RBC AUTO: 104.5 FL (ref 80–99)
MONOCYTES # BLD: 0.2 K/UL (ref 0–1)
MONOCYTES # BLD: 0.4 K/UL (ref 0–1)
MONOCYTES NFR BLD: 2 % (ref 5–13)
MONOCYTES NFR BLD: 5 % (ref 5–13)
NEUTS SEG # BLD: 7.7 K/UL (ref 1.8–8)
NEUTS SEG # BLD: 8.1 K/UL (ref 1.8–8)
NEUTS SEG NFR BLD: 92 % (ref 32–75)
NEUTS SEG NFR BLD: 97 % (ref 32–75)
NRBC # BLD: 0 K/UL (ref 0–0.01)
NRBC # BLD: 0 K/UL (ref 0–0.01)
NRBC BLD-RTO: 0 PER 100 WBC
NRBC BLD-RTO: 0 PER 100 WBC
PLATELET # BLD AUTO: 167 K/UL (ref 150–400)
PLATELET # BLD AUTO: 171 K/UL (ref 150–400)
PMV BLD AUTO: 9.4 FL (ref 8.9–12.9)
PMV BLD AUTO: 9.4 FL (ref 8.9–12.9)
POTASSIUM SERPL-SCNC: 3.9 MMOL/L (ref 3.5–5.1)
RBC # BLD AUTO: 2.64 M/UL (ref 4.1–5.7)
RBC # BLD AUTO: 2.65 M/UL (ref 4.1–5.7)
RBC MORPH BLD: ABNORMAL
RBC MORPH BLD: ABNORMAL
SODIUM SERPL-SCNC: 140 MMOL/L (ref 136–145)
TROPONIN I SERPL HS-MCNC: 15 NG/L (ref 0–57)
WBC # BLD AUTO: 8.4 K/UL (ref 4.1–11.1)
WBC # BLD AUTO: 8.4 K/UL (ref 4.1–11.1)

## 2023-07-16 PROCEDURE — 2580000003 HC RX 258: Performed by: STUDENT IN AN ORGANIZED HEALTH CARE EDUCATION/TRAINING PROGRAM

## 2023-07-16 PROCEDURE — 6370000000 HC RX 637 (ALT 250 FOR IP)

## 2023-07-16 PROCEDURE — 85025 COMPLETE CBC W/AUTO DIFF WBC: CPT

## 2023-07-16 PROCEDURE — 6360000002 HC RX W HCPCS: Performed by: STUDENT IN AN ORGANIZED HEALTH CARE EDUCATION/TRAINING PROGRAM

## 2023-07-16 PROCEDURE — 2500000003 HC RX 250 WO HCPCS: Performed by: STUDENT IN AN ORGANIZED HEALTH CARE EDUCATION/TRAINING PROGRAM

## 2023-07-16 PROCEDURE — 2060000000 HC ICU INTERMEDIATE R&B

## 2023-07-16 PROCEDURE — 80048 BASIC METABOLIC PNL TOTAL CA: CPT

## 2023-07-16 PROCEDURE — 84484 ASSAY OF TROPONIN QUANT: CPT

## 2023-07-16 PROCEDURE — 6370000000 HC RX 637 (ALT 250 FOR IP): Performed by: STUDENT IN AN ORGANIZED HEALTH CARE EDUCATION/TRAINING PROGRAM

## 2023-07-16 PROCEDURE — 36415 COLL VENOUS BLD VENIPUNCTURE: CPT

## 2023-07-16 PROCEDURE — 94640 AIRWAY INHALATION TREATMENT: CPT

## 2023-07-16 RX ADMIN — HEPARIN SODIUM 5000 UNITS: 5000 INJECTION INTRAVENOUS; SUBCUTANEOUS at 07:24

## 2023-07-16 RX ADMIN — IPRATROPIUM BROMIDE AND ALBUTEROL SULFATE 1 DOSE: 2.5; .5 SOLUTION RESPIRATORY (INHALATION) at 19:54

## 2023-07-16 RX ADMIN — CEFEPIME 2000 MG: 2 INJECTION, POWDER, FOR SOLUTION INTRAVENOUS at 01:22

## 2023-07-16 RX ADMIN — DOXYCYCLINE 100 MG: 100 INJECTION, POWDER, LYOPHILIZED, FOR SOLUTION INTRAVENOUS at 09:51

## 2023-07-16 RX ADMIN — SODIUM CHLORIDE, PRESERVATIVE FREE 10 ML: 5 INJECTION INTRAVENOUS at 21:41

## 2023-07-16 RX ADMIN — HEPARIN SODIUM 5000 UNITS: 5000 INJECTION INTRAVENOUS; SUBCUTANEOUS at 14:44

## 2023-07-16 RX ADMIN — POTASSIUM & SODIUM PHOSPHATES POWDER PACK 280-160-250 MG 250 MG: 280-160-250 PACK at 01:22

## 2023-07-16 RX ADMIN — DOXYCYCLINE 100 MG: 100 INJECTION, POWDER, LYOPHILIZED, FOR SOLUTION INTRAVENOUS at 21:40

## 2023-07-16 RX ADMIN — SODIUM CHLORIDE, PRESERVATIVE FREE 10 ML: 5 INJECTION INTRAVENOUS at 09:52

## 2023-07-16 RX ADMIN — BUPROPION HYDROCHLORIDE 100 MG: 100 TABLET, EXTENDED RELEASE ORAL at 09:51

## 2023-07-16 RX ADMIN — PREDNISONE 10 MG: 10 TABLET ORAL at 07:25

## 2023-07-16 RX ADMIN — HEPARIN SODIUM 5000 UNITS: 5000 INJECTION INTRAVENOUS; SUBCUTANEOUS at 21:41

## 2023-07-16 RX ADMIN — ESCITALOPRAM OXALATE 10 MG: 10 TABLET ORAL at 09:51

## 2023-07-16 RX ADMIN — CEFEPIME 2000 MG: 2 INJECTION, POWDER, FOR SOLUTION INTRAVENOUS at 14:43

## 2023-07-16 RX ADMIN — ZOLPIDEM TARTRATE 5 MG: 5 TABLET ORAL at 21:41

## 2023-07-16 RX ADMIN — ATORVASTATIN CALCIUM 20 MG: 10 TABLET, FILM COATED ORAL at 09:51

## 2023-07-16 ASSESSMENT — ENCOUNTER SYMPTOMS
BACK PAIN: 0
SHORTNESS OF BREATH: 1
NAUSEA: 0
TROUBLE SWALLOWING: 0
VOMITING: 0
COUGH: 1
RHINORRHEA: 0
ABDOMINAL PAIN: 0

## 2023-07-17 ENCOUNTER — APPOINTMENT (OUTPATIENT)
Facility: HOSPITAL | Age: 78
DRG: 871 | End: 2023-07-17
Attending: INTERNAL MEDICINE
Payer: MEDICARE

## 2023-07-17 LAB
ANION GAP SERPL CALC-SCNC: 9 MMOL/L (ref 5–15)
BACTERIA SPEC CULT: ABNORMAL
BASOPHILS # BLD: 0 K/UL (ref 0–0.1)
BASOPHILS NFR BLD: 0 % (ref 0–1)
BUN SERPL-MCNC: 56 MG/DL (ref 6–20)
BUN/CREAT SERPL: 29 (ref 12–20)
CALCIUM SERPL-MCNC: 8.5 MG/DL (ref 8.5–10.1)
CHLORIDE SERPL-SCNC: 115 MMOL/L (ref 97–108)
CO2 SERPL-SCNC: 18 MMOL/L (ref 21–32)
CREAT SERPL-MCNC: 1.9 MG/DL (ref 0.7–1.3)
DIFFERENTIAL METHOD BLD: ABNORMAL
EOSINOPHIL # BLD: 0 K/UL (ref 0–0.4)
EOSINOPHIL NFR BLD: 0 % (ref 0–7)
ERYTHROCYTE [DISTWIDTH] IN BLOOD BY AUTOMATED COUNT: 14.1 % (ref 11.5–14.5)
GLUCOSE SERPL-MCNC: 111 MG/DL (ref 65–100)
HCT VFR BLD AUTO: 28.5 % (ref 36.6–50.3)
HGB BLD-MCNC: 8.8 G/DL (ref 12.1–17)
IMM GRANULOCYTES # BLD AUTO: 0.1 K/UL (ref 0–0.04)
IMM GRANULOCYTES NFR BLD AUTO: 1 % (ref 0–0.5)
LYMPHOCYTES # BLD: 0.2 K/UL (ref 0.8–3.5)
LYMPHOCYTES NFR BLD: 3 % (ref 12–49)
MCH RBC QN AUTO: 32.2 PG (ref 26–34)
MCHC RBC AUTO-ENTMCNC: 30.9 G/DL (ref 30–36.5)
MCV RBC AUTO: 104.4 FL (ref 80–99)
MONOCYTES # BLD: 0.6 K/UL (ref 0–1)
MONOCYTES NFR BLD: 8 % (ref 5–13)
NEUTS SEG # BLD: 6.5 K/UL (ref 1.8–8)
NEUTS SEG NFR BLD: 88 % (ref 32–75)
NRBC # BLD: 0 K/UL (ref 0–0.01)
NRBC BLD-RTO: 0 PER 100 WBC
PLATELET # BLD AUTO: 191 K/UL (ref 150–400)
PMV BLD AUTO: 9.3 FL (ref 8.9–12.9)
POTASSIUM SERPL-SCNC: 3.5 MMOL/L (ref 3.5–5.1)
RBC # BLD AUTO: 2.73 M/UL (ref 4.1–5.7)
RBC MORPH BLD: ABNORMAL
SERVICE CMNT-IMP: ABNORMAL
SERVICE CMNT-IMP: ABNORMAL
SODIUM SERPL-SCNC: 142 MMOL/L (ref 136–145)
WBC # BLD AUTO: 7.4 K/UL (ref 4.1–11.1)

## 2023-07-17 PROCEDURE — 6360000002 HC RX W HCPCS: Performed by: STUDENT IN AN ORGANIZED HEALTH CARE EDUCATION/TRAINING PROGRAM

## 2023-07-17 PROCEDURE — 6370000000 HC RX 637 (ALT 250 FOR IP): Performed by: STUDENT IN AN ORGANIZED HEALTH CARE EDUCATION/TRAINING PROGRAM

## 2023-07-17 PROCEDURE — 36415 COLL VENOUS BLD VENIPUNCTURE: CPT

## 2023-07-17 PROCEDURE — 97116 GAIT TRAINING THERAPY: CPT

## 2023-07-17 PROCEDURE — 85025 COMPLETE CBC W/AUTO DIFF WBC: CPT

## 2023-07-17 PROCEDURE — 80048 BASIC METABOLIC PNL TOTAL CA: CPT

## 2023-07-17 PROCEDURE — 2060000000 HC ICU INTERMEDIATE R&B

## 2023-07-17 PROCEDURE — 2580000003 HC RX 258: Performed by: STUDENT IN AN ORGANIZED HEALTH CARE EDUCATION/TRAINING PROGRAM

## 2023-07-17 PROCEDURE — 2500000003 HC RX 250 WO HCPCS: Performed by: STUDENT IN AN ORGANIZED HEALTH CARE EDUCATION/TRAINING PROGRAM

## 2023-07-17 PROCEDURE — 93306 TTE W/DOPPLER COMPLETE: CPT

## 2023-07-17 PROCEDURE — 94640 AIRWAY INHALATION TREATMENT: CPT

## 2023-07-17 RX ORDER — IPRATROPIUM BROMIDE AND ALBUTEROL SULFATE 2.5; .5 MG/3ML; MG/3ML
1 SOLUTION RESPIRATORY (INHALATION) 2 TIMES DAILY
Status: DISCONTINUED | OUTPATIENT
Start: 2023-07-17 | End: 2023-07-19 | Stop reason: HOSPADM

## 2023-07-17 RX ORDER — IPRATROPIUM BROMIDE AND ALBUTEROL SULFATE 2.5; .5 MG/3ML; MG/3ML
1 SOLUTION RESPIRATORY (INHALATION) 2 TIMES DAILY
Status: DISCONTINUED | OUTPATIENT
Start: 2023-07-17 | End: 2023-07-17

## 2023-07-17 RX ADMIN — ACETAMINOPHEN 650 MG: 325 TABLET ORAL at 12:42

## 2023-07-17 RX ADMIN — SODIUM CHLORIDE, PRESERVATIVE FREE 10 ML: 5 INJECTION INTRAVENOUS at 09:02

## 2023-07-17 RX ADMIN — BUPROPION HYDROCHLORIDE 100 MG: 100 TABLET, EXTENDED RELEASE ORAL at 09:01

## 2023-07-17 RX ADMIN — ZOLPIDEM TARTRATE 5 MG: 5 TABLET ORAL at 20:04

## 2023-07-17 RX ADMIN — ACETAMINOPHEN 650 MG: 325 TABLET ORAL at 20:04

## 2023-07-17 RX ADMIN — CEFEPIME 2000 MG: 2 INJECTION, POWDER, FOR SOLUTION INTRAVENOUS at 00:01

## 2023-07-17 RX ADMIN — HEPARIN SODIUM 5000 UNITS: 5000 INJECTION INTRAVENOUS; SUBCUTANEOUS at 06:48

## 2023-07-17 RX ADMIN — ESCITALOPRAM OXALATE 10 MG: 10 TABLET ORAL at 09:01

## 2023-07-17 RX ADMIN — ACETAMINOPHEN 650 MG: 325 TABLET ORAL at 06:48

## 2023-07-17 RX ADMIN — IPRATROPIUM BROMIDE AND ALBUTEROL SULFATE 1 DOSE: 2.5; .5 SOLUTION RESPIRATORY (INHALATION) at 20:06

## 2023-07-17 RX ADMIN — HEPARIN SODIUM 5000 UNITS: 5000 INJECTION INTRAVENOUS; SUBCUTANEOUS at 13:30

## 2023-07-17 RX ADMIN — ATORVASTATIN CALCIUM 20 MG: 10 TABLET, FILM COATED ORAL at 09:01

## 2023-07-17 RX ADMIN — ACETAMINOPHEN 650 MG: 325 TABLET ORAL at 00:05

## 2023-07-17 RX ADMIN — DOXYCYCLINE 100 MG: 100 INJECTION, POWDER, LYOPHILIZED, FOR SOLUTION INTRAVENOUS at 09:01

## 2023-07-17 RX ADMIN — PREDNISONE 10 MG: 10 TABLET ORAL at 06:55

## 2023-07-17 RX ADMIN — CEFEPIME 2000 MG: 2 INJECTION, POWDER, FOR SOLUTION INTRAVENOUS at 12:37

## 2023-07-17 RX ADMIN — IPRATROPIUM BROMIDE AND ALBUTEROL SULFATE 1 DOSE: 2.5; .5 SOLUTION RESPIRATORY (INHALATION) at 08:43

## 2023-07-17 RX ADMIN — DOXYCYCLINE 100 MG: 100 INJECTION, POWDER, LYOPHILIZED, FOR SOLUTION INTRAVENOUS at 20:04

## 2023-07-17 RX ADMIN — SODIUM CHLORIDE, PRESERVATIVE FREE 10 ML: 5 INJECTION INTRAVENOUS at 20:04

## 2023-07-17 ASSESSMENT — PAIN DESCRIPTION - FREQUENCY: FREQUENCY: INTERMITTENT

## 2023-07-17 ASSESSMENT — PAIN DESCRIPTION - PAIN TYPE: TYPE: CHRONIC PAIN

## 2023-07-17 ASSESSMENT — PAIN DESCRIPTION - ONSET: ONSET: ON-GOING

## 2023-07-17 ASSESSMENT — PAIN - FUNCTIONAL ASSESSMENT: PAIN_FUNCTIONAL_ASSESSMENT: PREVENTS OR INTERFERES SOME ACTIVE ACTIVITIES AND ADLS

## 2023-07-17 ASSESSMENT — PAIN SCALES - GENERAL: PAINLEVEL_OUTOF10: 5

## 2023-07-17 ASSESSMENT — PAIN DESCRIPTION - DESCRIPTORS: DESCRIPTORS: ACHING

## 2023-07-17 NOTE — CARE COORDINATION
Transition of Care Plan:    RUR: 17% Moderate  Prior Level of Functioning: Home with wife-independent at baseline  Disposition: [facility]  If SNF or IPR: Date FOC offered:   Date FOC received:   Accepting facility:   Date authorization started with reference number:   Date authorization received and expires: Follow up appointments: TBD  DME needed: Owns Cane and walker  Transportation at discharge: Wife  IM/IMM Medicare/ letter given: 7/15/23  Is patient a  and connected with VA? NA   If yes, was [employer] transfer form completed and VA notified? Caregiver Contact: Shannan Cai, [phone], wife  Discharge Caregiver contacted prior to discharge? NA  Care Conference needed? NA  Barriers to discharge: NA    CM received call from wife Shannan Cai, [phone] stating that she does not feel comfortable with patient returning home at [place]. She would like to request as short SNF stay (been to [facility] before). CM explained MD would have to approve referral. Linus Abraham MD and will follow up. CM updated in IDRs DC anticipated for end of week. Cultures and echo pending. CM to monitor. 4:22 MD did not approve SNF rehab referral. Patient will DC home with home health.      [address], [address]

## 2023-07-18 LAB
ANION GAP SERPL CALC-SCNC: 12 MMOL/L (ref 5–15)
BACTERIA SPEC CULT: ABNORMAL
BACTERIA SPEC CULT: ABNORMAL
BASOPHILS # BLD: 0 K/UL (ref 0–0.1)
BASOPHILS NFR BLD: 0 % (ref 0–1)
BUN SERPL-MCNC: 48 MG/DL (ref 6–20)
BUN/CREAT SERPL: 23 (ref 12–20)
CALCIUM SERPL-MCNC: 8.6 MG/DL (ref 8.5–10.1)
CC UR VC: ABNORMAL
CHLORIDE SERPL-SCNC: 114 MMOL/L (ref 97–108)
CO2 SERPL-SCNC: 15 MMOL/L (ref 21–32)
CREAT SERPL-MCNC: 2.06 MG/DL (ref 0.7–1.3)
DIFFERENTIAL METHOD BLD: ABNORMAL
ECHO AO ASC DIAM: 4.3 CM
ECHO AO ASCENDING AORTA INDEX: 1.92 CM/M2
ECHO AV AREA PEAK VELOCITY: 2.5 CM2
ECHO AV AREA VTI: 2.6 CM2
ECHO AV AREA/BSA PEAK VELOCITY: 1.1 CM2/M2
ECHO AV AREA/BSA VTI: 1.2 CM2/M2
ECHO AV MEAN GRADIENT: 5 MMHG
ECHO AV MEAN VELOCITY: 1 M/S
ECHO AV PEAK GRADIENT: 9 MMHG
ECHO AV PEAK VELOCITY: 1.5 M/S
ECHO AV VELOCITY RATIO: 0.8
ECHO AV VTI: 28.8 CM
ECHO BSA: 2.29 M2
ECHO LA DIAMETER INDEX: 2.32 CM/M2
ECHO LA DIAMETER: 5.2 CM
ECHO LA VOL 4C: 59 ML (ref 18–58)
ECHO LA VOL 4C: 62 ML (ref 18–58)
ECHO LV EDV A2C: 118 ML
ECHO LV EDV A4C: 133 ML
ECHO LV EDV BP: 133 ML (ref 67–155)
ECHO LV EDV INDEX A4C: 59 ML/M2
ECHO LV EDV INDEX BP: 59 ML/M2
ECHO LV EDV NDEX A2C: 53 ML/M2
ECHO LV EJECTION FRACTION A2C: 41 %
ECHO LV EJECTION FRACTION A4C: 51 %
ECHO LV EJECTION FRACTION BIPLANE: 51 % (ref 55–100)
ECHO LV ESV A2C: 70 ML
ECHO LV ESV A4C: 65 ML
ECHO LV ESV BP: 65 ML (ref 22–58)
ECHO LV ESV INDEX A2C: 31 ML/M2
ECHO LV ESV INDEX A4C: 29 ML/M2
ECHO LV ESV INDEX BP: 29 ML/M2
ECHO LV FRACTIONAL SHORTENING: 31 % (ref 28–44)
ECHO LV INTERNAL DIMENSION DIASTOLE INDEX: 2.19 CM/M2
ECHO LV INTERNAL DIMENSION DIASTOLIC: 4.9 CM (ref 4.2–5.9)
ECHO LV INTERNAL DIMENSION SYSTOLIC INDEX: 1.52 CM/M2
ECHO LV INTERNAL DIMENSION SYSTOLIC: 3.4 CM
ECHO LV IVSD: 1 CM (ref 0.6–1)
ECHO LV MASS 2D: 153 G (ref 88–224)
ECHO LV MASS INDEX 2D: 68.3 G/M2 (ref 49–115)
ECHO LV POSTERIOR WALL DIASTOLIC: 0.8 CM (ref 0.6–1)
ECHO LV RELATIVE WALL THICKNESS RATIO: 0.33
ECHO LVOT AREA: 3.1 CM2
ECHO LVOT AV VTI INDEX: 0.82
ECHO LVOT DIAM: 2 CM
ECHO LVOT MEAN GRADIENT: 3 MMHG
ECHO LVOT PEAK GRADIENT: 5 MMHG
ECHO LVOT PEAK VELOCITY: 1.2 M/S
ECHO LVOT STROKE VOLUME INDEX: 33.1 ML/M2
ECHO LVOT SV: 74.1 ML
ECHO LVOT VTI: 23.6 CM
ECHO MV A VELOCITY: 1.08 M/S
ECHO MV AREA PHT: 3.2 CM2
ECHO MV E DECELERATION TIME (DT): 236.5 MS
ECHO MV E VELOCITY: 0.98 M/S
ECHO MV E/A RATIO: 0.91
ECHO MV PRESSURE HALF TIME (PHT): 68.6 MS
ECHO PV MAX VELOCITY: 1 M/S
ECHO PV PEAK GRADIENT: 4 MMHG
ECHO PVEIN A DURATION: 114.2 MS
ECHO PVEIN A VELOCITY: 0.2 M/S
ECHO PVEIN PEAK D VELOCITY: 0.2 M/S
ECHO PVEIN PEAK S VELOCITY: 0.3 M/S
ECHO PVEIN S/D RATIO: 1.5
ECHO RV TAPSE: 2.3 CM (ref 1.7–?)
ECHO TV REGURGITANT MAX VELOCITY: 2.29 M/S
ECHO TV REGURGITANT PEAK GRADIENT: 21 MMHG
EOSINOPHIL # BLD: 0.1 K/UL (ref 0–0.4)
EOSINOPHIL NFR BLD: 1 % (ref 0–7)
ERYTHROCYTE [DISTWIDTH] IN BLOOD BY AUTOMATED COUNT: 14.1 % (ref 11.5–14.5)
GLUCOSE SERPL-MCNC: 110 MG/DL (ref 65–100)
HCT VFR BLD AUTO: 31.3 % (ref 36.6–50.3)
HGB BLD-MCNC: 9.8 G/DL (ref 12.1–17)
IMM GRANULOCYTES # BLD AUTO: 0.1 K/UL (ref 0–0.04)
IMM GRANULOCYTES NFR BLD AUTO: 2 % (ref 0–0.5)
LYMPHOCYTES # BLD: 0.3 K/UL (ref 0.8–3.5)
LYMPHOCYTES NFR BLD: 5 % (ref 12–49)
MCH RBC QN AUTO: 32.6 PG (ref 26–34)
MCHC RBC AUTO-ENTMCNC: 31.3 G/DL (ref 30–36.5)
MCV RBC AUTO: 104 FL (ref 80–99)
MONOCYTES # BLD: 0.6 K/UL (ref 0–1)
MONOCYTES NFR BLD: 11 % (ref 5–13)
NEUTS SEG # BLD: 4.5 K/UL (ref 1.8–8)
NEUTS SEG NFR BLD: 81 % (ref 32–75)
NRBC # BLD: 0 K/UL (ref 0–0.01)
NRBC BLD-RTO: 0 PER 100 WBC
PLATELET # BLD AUTO: 187 K/UL (ref 150–400)
PMV BLD AUTO: 9 FL (ref 8.9–12.9)
POTASSIUM SERPL-SCNC: 3.5 MMOL/L (ref 3.5–5.1)
RBC # BLD AUTO: 3.01 M/UL (ref 4.1–5.7)
RBC MORPH BLD: ABNORMAL
SERVICE CMNT-IMP: ABNORMAL
SODIUM SERPL-SCNC: 141 MMOL/L (ref 136–145)
WBC # BLD AUTO: 5.6 K/UL (ref 4.1–11.1)

## 2023-07-18 PROCEDURE — 2500000003 HC RX 250 WO HCPCS: Performed by: STUDENT IN AN ORGANIZED HEALTH CARE EDUCATION/TRAINING PROGRAM

## 2023-07-18 PROCEDURE — 6370000000 HC RX 637 (ALT 250 FOR IP): Performed by: STUDENT IN AN ORGANIZED HEALTH CARE EDUCATION/TRAINING PROGRAM

## 2023-07-18 PROCEDURE — 2580000003 HC RX 258: Performed by: STUDENT IN AN ORGANIZED HEALTH CARE EDUCATION/TRAINING PROGRAM

## 2023-07-18 PROCEDURE — 94640 AIRWAY INHALATION TREATMENT: CPT

## 2023-07-18 PROCEDURE — 97116 GAIT TRAINING THERAPY: CPT

## 2023-07-18 PROCEDURE — 36415 COLL VENOUS BLD VENIPUNCTURE: CPT

## 2023-07-18 PROCEDURE — 85025 COMPLETE CBC W/AUTO DIFF WBC: CPT

## 2023-07-18 PROCEDURE — 6360000002 HC RX W HCPCS: Performed by: STUDENT IN AN ORGANIZED HEALTH CARE EDUCATION/TRAINING PROGRAM

## 2023-07-18 PROCEDURE — 97535 SELF CARE MNGMENT TRAINING: CPT

## 2023-07-18 PROCEDURE — 2060000000 HC ICU INTERMEDIATE R&B

## 2023-07-18 PROCEDURE — 80048 BASIC METABOLIC PNL TOTAL CA: CPT

## 2023-07-18 RX ORDER — SODIUM BICARBONATE 650 MG/1
650 TABLET ORAL 3 TIMES DAILY
Status: DISCONTINUED | OUTPATIENT
Start: 2023-07-18 | End: 2023-07-19 | Stop reason: HOSPADM

## 2023-07-18 RX ADMIN — DOXYCYCLINE 100 MG: 100 INJECTION, POWDER, LYOPHILIZED, FOR SOLUTION INTRAVENOUS at 19:52

## 2023-07-18 RX ADMIN — SODIUM BICARBONATE 650 MG: 650 TABLET ORAL at 14:05

## 2023-07-18 RX ADMIN — ZOLPIDEM TARTRATE 5 MG: 5 TABLET ORAL at 19:52

## 2023-07-18 RX ADMIN — IPRATROPIUM BROMIDE AND ALBUTEROL SULFATE 1 DOSE: 2.5; .5 SOLUTION RESPIRATORY (INHALATION) at 19:52

## 2023-07-18 RX ADMIN — SODIUM BICARBONATE 650 MG: 650 TABLET ORAL at 09:11

## 2023-07-18 RX ADMIN — SODIUM BICARBONATE 650 MG: 650 TABLET ORAL at 19:52

## 2023-07-18 RX ADMIN — CEFEPIME 2000 MG: 2 INJECTION, POWDER, FOR SOLUTION INTRAVENOUS at 14:05

## 2023-07-18 RX ADMIN — BUPROPION HYDROCHLORIDE 100 MG: 100 TABLET, EXTENDED RELEASE ORAL at 09:11

## 2023-07-18 RX ADMIN — ACETAMINOPHEN 650 MG: 325 TABLET ORAL at 19:52

## 2023-07-18 RX ADMIN — DOXYCYCLINE 100 MG: 100 INJECTION, POWDER, LYOPHILIZED, FOR SOLUTION INTRAVENOUS at 09:11

## 2023-07-18 RX ADMIN — SODIUM CHLORIDE, PRESERVATIVE FREE 10 ML: 5 INJECTION INTRAVENOUS at 09:11

## 2023-07-18 RX ADMIN — PREDNISONE 10 MG: 10 TABLET ORAL at 07:01

## 2023-07-18 RX ADMIN — HEPARIN SODIUM 5000 UNITS: 5000 INJECTION INTRAVENOUS; SUBCUTANEOUS at 14:06

## 2023-07-18 RX ADMIN — SODIUM CHLORIDE, PRESERVATIVE FREE 10 ML: 5 INJECTION INTRAVENOUS at 19:53

## 2023-07-18 RX ADMIN — ESCITALOPRAM OXALATE 10 MG: 10 TABLET ORAL at 09:11

## 2023-07-18 RX ADMIN — HEPARIN SODIUM 5000 UNITS: 5000 INJECTION INTRAVENOUS; SUBCUTANEOUS at 07:01

## 2023-07-18 RX ADMIN — IPRATROPIUM BROMIDE AND ALBUTEROL SULFATE 1 DOSE: 2.5; .5 SOLUTION RESPIRATORY (INHALATION) at 07:57

## 2023-07-18 RX ADMIN — CEFEPIME 2000 MG: 2 INJECTION, POWDER, FOR SOLUTION INTRAVENOUS at 00:05

## 2023-07-18 RX ADMIN — ATORVASTATIN CALCIUM 20 MG: 10 TABLET, FILM COATED ORAL at 09:11

## 2023-07-18 ASSESSMENT — PAIN DESCRIPTION - DESCRIPTORS: DESCRIPTORS: ACHING

## 2023-07-18 ASSESSMENT — PAIN DESCRIPTION - PAIN TYPE: TYPE: CHRONIC PAIN

## 2023-07-18 ASSESSMENT — PAIN SCALES - GENERAL
PAINLEVEL_OUTOF10: 4
PAINLEVEL_OUTOF10: 7

## 2023-07-18 ASSESSMENT — PAIN DESCRIPTION - ORIENTATION: ORIENTATION: LOWER

## 2023-07-18 ASSESSMENT — PAIN DESCRIPTION - FREQUENCY: FREQUENCY: INTERMITTENT

## 2023-07-18 ASSESSMENT — PAIN - FUNCTIONAL ASSESSMENT: PAIN_FUNCTIONAL_ASSESSMENT: PREVENTS OR INTERFERES SOME ACTIVE ACTIVITIES AND ADLS

## 2023-07-18 ASSESSMENT — PAIN DESCRIPTION - ONSET: ONSET: ON-GOING

## 2023-07-18 ASSESSMENT — PAIN DESCRIPTION - LOCATION: LOCATION: BACK

## 2023-07-18 NOTE — CARE COORDINATION
Transition of Care Plan:     RUR: 17% Moderate  Prior Level of Functioning: Home with wife-independent at baseline  Disposition: [facility]-PT/OT/Home Health Aide-AVS updated  If SNF or IPR: Date FOC offered:   Date FOC received:   Accepting facility:   Date authorization started with reference number:   Date authorization received and expires: Follow up appointments: TBD  DME needed: Owns Cane and walker  Transportation at discharge: Wife  IM/IMM Medicare/ letter given: 7/15/23, 7/18/23  Is patient a  and connected with VA? NA              If yes, was [employer] transfer form completed and VA notified? Caregiver Contact: Noreenru Townsender, [phone], wife  Discharge Caregiver contacted prior to discharge? NA  Care Conference needed?  NA  Barriers to discharge: [address] [place] [address] Drive, [address], [address]

## 2023-07-19 VITALS
DIASTOLIC BLOOD PRESSURE: 68 MMHG | HEIGHT: 71 IN | OXYGEN SATURATION: 97 % | HEART RATE: 72 BPM | TEMPERATURE: 97.5 F | RESPIRATION RATE: 20 BRPM | WEIGHT: 229.94 LBS | SYSTOLIC BLOOD PRESSURE: 114 MMHG | BODY MASS INDEX: 32.19 KG/M2

## 2023-07-19 LAB
ANION GAP SERPL CALC-SCNC: 9 MMOL/L (ref 5–15)
BASOPHILS # BLD: 0 K/UL (ref 0–0.1)
BASOPHILS NFR BLD: 0 % (ref 0–1)
BUN SERPL-MCNC: 45 MG/DL (ref 6–20)
BUN/CREAT SERPL: 27 (ref 12–20)
CALCIUM SERPL-MCNC: 8.5 MG/DL (ref 8.5–10.1)
CHLORIDE SERPL-SCNC: 113 MMOL/L (ref 97–108)
CO2 SERPL-SCNC: 17 MMOL/L (ref 21–32)
CREAT SERPL-MCNC: 1.68 MG/DL (ref 0.7–1.3)
DIFFERENTIAL METHOD BLD: ABNORMAL
EOSINOPHIL # BLD: 0.1 K/UL (ref 0–0.4)
EOSINOPHIL NFR BLD: 1 % (ref 0–7)
ERYTHROCYTE [DISTWIDTH] IN BLOOD BY AUTOMATED COUNT: 14.2 % (ref 11.5–14.5)
GLUCOSE SERPL-MCNC: 95 MG/DL (ref 65–100)
HCT VFR BLD AUTO: 29.4 % (ref 36.6–50.3)
HGB BLD-MCNC: 9.4 G/DL (ref 12.1–17)
IMM GRANULOCYTES # BLD AUTO: 0 K/UL
IMM GRANULOCYTES NFR BLD AUTO: 0 %
LYMPHOCYTES # BLD: 0.2 K/UL (ref 0.8–3.5)
LYMPHOCYTES NFR BLD: 4 % (ref 12–49)
MCH RBC QN AUTO: 33.3 PG (ref 26–34)
MCHC RBC AUTO-ENTMCNC: 32 G/DL (ref 30–36.5)
MCV RBC AUTO: 104.3 FL (ref 80–99)
MONOCYTES # BLD: 0.6 K/UL (ref 0–1)
MONOCYTES NFR BLD: 10 % (ref 5–13)
MYELOCYTES NFR BLD MANUAL: 2 %
NEUTS SEG # BLD: 4.7 K/UL (ref 1.8–8)
NEUTS SEG NFR BLD: 83 % (ref 32–75)
NRBC # BLD: 0 K/UL (ref 0–0.01)
NRBC BLD-RTO: 0 PER 100 WBC
PLATELET # BLD AUTO: 186 K/UL (ref 150–400)
PMV BLD AUTO: 9 FL (ref 8.9–12.9)
POTASSIUM SERPL-SCNC: 3.4 MMOL/L (ref 3.5–5.1)
RBC # BLD AUTO: 2.82 M/UL (ref 4.1–5.7)
RBC MORPH BLD: ABNORMAL
SODIUM SERPL-SCNC: 139 MMOL/L (ref 136–145)
WBC # BLD AUTO: 5.7 K/UL (ref 4.1–11.1)

## 2023-07-19 PROCEDURE — 6370000000 HC RX 637 (ALT 250 FOR IP): Performed by: STUDENT IN AN ORGANIZED HEALTH CARE EDUCATION/TRAINING PROGRAM

## 2023-07-19 PROCEDURE — 6360000002 HC RX W HCPCS: Performed by: STUDENT IN AN ORGANIZED HEALTH CARE EDUCATION/TRAINING PROGRAM

## 2023-07-19 PROCEDURE — 2580000003 HC RX 258: Performed by: STUDENT IN AN ORGANIZED HEALTH CARE EDUCATION/TRAINING PROGRAM

## 2023-07-19 PROCEDURE — 85025 COMPLETE CBC W/AUTO DIFF WBC: CPT

## 2023-07-19 PROCEDURE — 2500000003 HC RX 250 WO HCPCS: Performed by: STUDENT IN AN ORGANIZED HEALTH CARE EDUCATION/TRAINING PROGRAM

## 2023-07-19 PROCEDURE — 36415 COLL VENOUS BLD VENIPUNCTURE: CPT

## 2023-07-19 PROCEDURE — 80048 BASIC METABOLIC PNL TOTAL CA: CPT

## 2023-07-19 RX ORDER — POTASSIUM CHLORIDE 750 MG/1
40 TABLET, FILM COATED, EXTENDED RELEASE ORAL ONCE
Status: COMPLETED | OUTPATIENT
Start: 2023-07-19 | End: 2023-07-19

## 2023-07-19 RX ORDER — POTASSIUM CHLORIDE 750 MG/1
10 TABLET, FILM COATED, EXTENDED RELEASE ORAL ONCE
Status: DISCONTINUED | OUTPATIENT
Start: 2023-07-19 | End: 2023-07-19

## 2023-07-19 RX ORDER — LEVOFLOXACIN 500 MG/1
750 TABLET, FILM COATED ORAL EVERY OTHER DAY
Qty: 8 TABLET | Refills: 0 | Status: SHIPPED | OUTPATIENT
Start: 2023-07-19 | End: 2023-07-29

## 2023-07-19 RX ORDER — SODIUM BICARBONATE 650 MG/1
650 TABLET ORAL 3 TIMES DAILY
Qty: 21 TABLET | Refills: 0 | Status: SHIPPED | OUTPATIENT
Start: 2023-07-19 | End: 2023-07-26

## 2023-07-19 RX ADMIN — PREDNISONE 10 MG: 10 TABLET ORAL at 06:20

## 2023-07-19 RX ADMIN — HEPARIN SODIUM 5000 UNITS: 5000 INJECTION INTRAVENOUS; SUBCUTANEOUS at 13:23

## 2023-07-19 RX ADMIN — DOXYCYCLINE 100 MG: 100 INJECTION, POWDER, LYOPHILIZED, FOR SOLUTION INTRAVENOUS at 08:35

## 2023-07-19 RX ADMIN — BUPROPION HYDROCHLORIDE 100 MG: 100 TABLET, EXTENDED RELEASE ORAL at 08:41

## 2023-07-19 RX ADMIN — SODIUM BICARBONATE 650 MG: 650 TABLET ORAL at 13:23

## 2023-07-19 RX ADMIN — IPRATROPIUM BROMIDE AND ALBUTEROL SULFATE 1 DOSE: 2.5; .5 SOLUTION RESPIRATORY (INHALATION) at 08:04

## 2023-07-19 RX ADMIN — ESCITALOPRAM OXALATE 10 MG: 10 TABLET ORAL at 08:41

## 2023-07-19 RX ADMIN — SODIUM CHLORIDE, PRESERVATIVE FREE 10 ML: 5 INJECTION INTRAVENOUS at 08:35

## 2023-07-19 RX ADMIN — ATORVASTATIN CALCIUM 20 MG: 10 TABLET, FILM COATED ORAL at 08:41

## 2023-07-19 RX ADMIN — CEFEPIME 2000 MG: 2 INJECTION, POWDER, FOR SOLUTION INTRAVENOUS at 00:58

## 2023-07-19 RX ADMIN — SODIUM BICARBONATE 650 MG: 650 TABLET ORAL at 08:41

## 2023-07-19 RX ADMIN — POTASSIUM CHLORIDE 40 MEQ: 750 TABLET, FILM COATED, EXTENDED RELEASE ORAL at 10:10

## 2023-07-19 RX ADMIN — ACETAMINOPHEN 650 MG: 325 TABLET ORAL at 08:43

## 2023-07-19 ASSESSMENT — PAIN SCALES - GENERAL: PAINLEVEL_OUTOF10: 2

## 2023-07-19 ASSESSMENT — PAIN DESCRIPTION - ORIENTATION: ORIENTATION: ANTERIOR

## 2023-07-19 ASSESSMENT — PAIN DESCRIPTION - LOCATION: LOCATION: ARM

## 2023-07-19 ASSESSMENT — PAIN DESCRIPTION - DESCRIPTORS: DESCRIPTORS: ACHING

## 2023-07-19 NOTE — CONSULTS
Assessment:  SANDRA on CKD-3b: Serum Cr peaked on admission at 2.8mg/dl. Showing renal recovery back close to baseline of 1.6mg/dl. Suspected Pre-renal state vs ATN (hypotension). Followed by Dr. Homa Jiménez. Klebsiella bacteremia/urosepsis    Metabolic acidosis: NAGMA    HTN: stable    Hypokalemia    Hx of Sarcoid    Plan/Recommendations:  Stable for discharge  Will advise patient to follow up with Dr. Ginny Fletcher in 1-2wks  Agree with Oral KCL prior to discharge  Sodium Bicarbonate 650mg TID  Abx per hospitalist team  Avoid nephrotoxins  Avoid hypotension      Discussed with patient/wife    Thanks for the consultation. Renal service will follow patient with you. Please contact me with any questions or concerns. Initial Consult note         Patient name: Nicholas Cook  MR no: [id number]  Date of admission: 7/14/2023  Date of consultation: 7/19/23  Requested by: Dr. Rhett Sagastume  Reason for consult: SANDRA/CKD    Patient seen and examined. History obtained from patient and chart review. Relevant labs, data and notes reviewed. HPI: Nicholas Cook is a 69 y.o. male with PMH siginficant for CKD stage 3b, Sarcoid, HTN, AMI Bladder CA s/p resection and ileal conduit admitted back on 7/14/23 with hypotension. W/u c/w Klebsiella bacteremia/urosepsis. Serum Cr peaked on admission at 2.8mg/dl. Slowly improved to today's level of 1.6mg/dl. Patient saw my partner Dr. Homa Jiménez earlier this month for CKD management. Overall feeling better but some generalized weakness persists.      PMH:  Past Medical History:   Diagnosis Date    Arrhythmia     LBBB    Arthritis     Asthma     MILD    Cancer ([address]) 2015    BLADDER    Cancer ([address])     scc top of head and nose    COVID-19 vaccine series completed     [facility]    Depression with anxiety     Hypertension     Other unknown and unspecified cause of morbidity or mortality     history of pulmonary sarcoid     Posterior cervical

## 2023-07-19 NOTE — PLAN OF CARE
Problem: Discharge Planning  Goal: Discharge to home or other facility with appropriate resources  Outcome: Adequate for Discharge     Problem: Safety - Adult  Goal: Free from fall injury  Outcome: Adequate for Discharge     Problem: Pain  Goal: Verbalizes/displays adequate comfort level or baseline comfort level  Outcome: Adequate for Discharge
Problem: Discharge Planning  Goal: Discharge to home or other facility with appropriate resources  Outcome: Progressing     Problem: Safety - Adult  Goal: Free from fall injury  Outcome: Progressing  Flowsheets (Taken 7/17/2023 1121)  Free From Fall Injury: Based on caregiver fall risk screen, instruct family/caregiver to ask for assistance with transferring infant if caregiver noted to have fall risk factors
Problem: Discharge Planning  Goal: Discharge to home or other facility with appropriate resources  Outcome: Progressing     Problem: Safety - Adult  Goal: Free from fall injury  Outcome: Progressing  Flowsheets (Taken 7/18/2023 1138)  Free From Fall Injury: Based on caregiver fall risk screen, instruct family/caregiver to ask for assistance with transferring infant if caregiver noted to have fall risk factors     Problem: Pain  Goal: Verbalizes/displays adequate comfort level or baseline comfort level  Outcome: Progressing  Flowsheets (Taken 7/18/2023 0425 by Yolanda Minaya RN)  Verbalizes/displays adequate comfort level or baseline comfort level: Encourage patient to monitor pain and request assistance
Problem: Occupational Therapy - Adult  Goal: By Discharge: Performs self-care activities at highest level of function for planned discharge setting. See evaluation for individualized goals. Description: FUNCTIONAL STATUS PRIOR TO ADMISSION:  At baseline, he is IND-Mod I for ADLs and mobility with RW use, also ensures a cane and RW are in his car when driving/going out into the community. HOME SUPPORT: Patient lived with his wife but didn't require assistance. Occupational Therapy Goals:  Initiated 7/15/2023  1. Patient will perform at least 2 grooming tasks at the sink with Modified [place] within 7 day(s). 2.  Patient will perform bathing with Modified [place] within 7 day(s). 3.  Patient will perform lower body dressing with Modified [place] within 7 day(s). 4.  Patient will perform toilet transfers with Modified [place]  within 7 day(s). 5.  Patient will perform all aspects of toileting with Modified [place] within 7 day(s). 6.  Patient will participate in upper extremity therapeutic exercise/activities with [place] for 10 minutes with rest breaks PRN within 7 day(s). 7.  Patient will utilize energy conservation techniques during functional activities with verbal and visual cues within 7 day(s). Outcome: Completed      OCCUPATIONAL THERAPY TREATMENT/DISCHARGE  Patient: Cyril Goodpasture (89 y.o. male)  Date: 7/18/2023  Primary Diagnosis: Septicemia ([address]) [A41.9]  SANDRA (acute kidney injury) ([address]) [N17.9]  Sepsis ([address]) [A41.9]  Pneumonia of both lower lobes due to infectious organism [J18.9]       Precautions: Fall Risk                  Chart, occupational therapy assessment, plan of care, and goals were reviewed. ASSESSMENT  Patient has progressed to OT goals at supervision to modified independent level. Patient was received today up ad sridhar, performing toileting and standing grooming without assistance or difficulty.   He remains limited by impaired endurance,
Problem: Physical Therapy - Adult  Goal: By Discharge: Performs mobility at highest level of function for planned discharge setting. See evaluation for individualized goals. Description: FUNCTIONAL STATUS PRIOR TO ADMISSION: Patient was independent and active without use of DME.    HOME SUPPORT PRIOR TO ADMISSION:     Physical Therapy Goals  Initiated 7/15/2023  1. Patient will move from supine to sit and sit to supine , scoot up and down, and roll side to side in bed with independence within 7 day(s). 2.  Patient will transfer from bed to chair and chair to bed with independence using the least restrictive device within 7 day(s). 3.  Patient will perform sit to stand with independence within 7 day(s). 4.  Patient will ambulate with independence for 100 feet with the least restrictive device within 7 day(s). 5.  Patient will ascend/descend 4 stairs with 1 handrail(s) with supervision/set-up within 7 day(s). Outcome: Progressing     PHYSICAL THERAPY TREATMENT    Patient: Filemon Ogden (40 y.o. male)  Date: 7/17/2023  Diagnosis: Septicemia ([address]) [A41.9]  SANDRA (acute kidney injury) ([address]) [N17.9]  Sepsis ([address]) [A41.9]  Pneumonia of both lower lobes due to infectious organism [J18.9] Sepsis [facility])      Precautions: Fall Risk       ASSESSMENT:  Patient continues to benefit from skilled PT services and is progressing towards goals. Received pt sitting in bedside recliner w/ chair brakes unlocked. Educated pt in his fall risk with this, engaged brakes prior to mobility training. Pt endorses walking in the moreira w/ staff supervision over the weekend and to/ from the bathroom w/ SBA of staff only to disconnect the IV. He voiced no issues other than endurance and the need to continue using the walker. Today pt completed sit<->stand transfers w/ SBA to engage recliner brakes and ambulated 200 ft x2 w/ the RW and SBA for IV pole management. Gait with the walker is steady.   Pt was mildly SOB after walking 200
Given To: Patient; Family  Education Provided: Fall Prevention Strategies  Education Method: Verbal  Barriers to Learning: None  Education Outcome: Verbalized understanding      Mendy Alberts PT  Minutes: 73

## 2023-07-19 NOTE — DISCHARGE SUMMARY
Reviewed discharge orders with patient.       Medication List        START taking these medications      levoFLOXacin 500 MG tablet  Commonly known as: LEVAQUIN  Take 1.5 tablets by mouth every other day for 10 days     sodium bicarbonate 650 MG tablet  Take 1 tablet by mouth 3 times daily for 7 days            CONTINUE taking these medications      acetaminophen 500 MG tablet  Commonly known as: TYLENOL     buPROPion 100 MG extended release tablet  Commonly known as: WELLBUTRIN SR     escitalopram 10 MG tablet  Commonly known as: LEXAPRO     lidocaine-prilocaine 2.5-2.5 % cream  Commonly known as: EMLA     metoprolol tartrate 25 MG tablet  Commonly known as: LOPRESSOR     predniSONE 10 MG tablet  Commonly known as: DELTASONE     simvastatin 20 MG tablet  Commonly known as: ZOCOR     zolpidem 5 MG tablet  Commonly known as: AMBIEN            STOP taking these medications      lisinopril 5 MG tablet  Commonly known as: PRINIVIL;ZESTRIL               Where to Get Your Medications        These medications were sent to [facility] [id number] [address] [place] [address]      Phone: [phone]   levoFLOXacin 500 MG tablet  sodium bicarbonate 650 MG tablet

## 2023-07-19 NOTE — DISCHARGE SUMMARY
Discharge Summary       PATIENT ID: Gwen Patrick  MRN: [id number]   YOB: 1945    DATE OF ADMISSION: 7/14/2023  4:55 PM    DATE OF DISCHARGE: 07/19/23    PRIMARY CARE PROVIDER: Glenn Benavides MD     ATTENDING PHYSICIAN: Manuel Resendez MD   DISCHARGING PROVIDER: Manuel Resendez MD    To contact this individual call [phone] and ask the  to page. If unavailable ask to be transferred the Adult Hospitalist Department. CONSULTATIONS: IP CONSULT TO CARDIOLOGY  IP CONSULT HOME HEALTH  IP CONSULT TO NEPHROLOGY    PROCEDURES/SURGERIES: * No surgery found *     ADMITTING DIAGNOSES & HOSPITAL COURSE:   HPI: Tiana Spear is a 69 y.o. male with pmh of sarcoid, carmenza, asthma, htm, mdd w/ samantha, bladder cancer s/p resection w/ ileal conduit who presented to ed with complaints of low bp. Reports 3 days of subjective fevers, chills, malaise, night sweats. Later developed intermittent dyspnea initially at exertion but now occurring at rest not history of self or for which she was recently weaned off corticosteroids. He has had no recent travel or sick contacts. Started checking his BP and reports low BPs in 80s over 50s while at home and confirmed by EMS. He denies any abdominal pain, chest pain, nausea, vomiting. Remarkable vitals on ER Presentation: bp 80s/50s  Labs Remarkable for: cr 2.83, wbc 14.8, ua: Les, bld  ER Images: CXR: New bibasilar airspace disease, concerning for pneumonia  ER Rx: Rocephin and azithromycin\"        DISCHARGE DIAGNOSES / PLAN:      Severe Sepsis 2/2 Complicated Cystitis  Possible CAP  Klebsiella bacteremia secondary to above  E. coli and klebsiella UTI  CKD stage IIIb, NAGMA   Bladder cancer s/p resection w/ ileal conduit  -Status post sepsis replacement fluids  -s/p cefepime, Doxy completed 4 days ip, discharged with p.o.  Levaquin renally dosed to complete 14 days total 7/29 last dose   - CT chest showing findings consistent with sarcoid rather than pneumonia

## 2023-07-20 LAB
BACTERIA SPEC CULT: NORMAL
BACTERIA SPEC CULT: NORMAL
SERVICE CMNT-IMP: NORMAL
SERVICE CMNT-IMP: NORMAL

## 2023-07-21 NOTE — PROGRESS NOTES
Bedside shift change report given to [address] RN  (oncoming nurse) by Tiana Romero RN  (offgoing nurse). Report included the following information Nurse Handoff Report, Intake/Output, MAR, and Recent Results.
Bedside shift change report given to [facility] RN/Jorge Luis RN  (oncoming nurse) by Sascha Muse RN (offgoing nurse). Report included the following information Nurse Handoff Report, Intake/Output, MAR, and Recent Results.
Hospital follow-up PCP transitional care appointment has been scheduled with Dr. Eladio Leslie for Friday July 21, 2023 at 11:00 AM.? Pending patient discharge.   Christofer Casarez RN- Care Management Assistant
Hospitalist Progress Note  Justyn Gifford MD  Answering service: [phone] from in house phone        Date of Service:  2023  NAME:  Alvira Gowers  :  1945  MRN:  [id number]      Admission Summary:   HPI: Alvira Gowers is a 69 y.o. male with pmh of sarcoid, carmenza, asthma, htm, mdd w/ samantha, bladder cancer s/p resection w/ ileal conduit who presented to ed with complaints of low bp. Reports 3 days of subjective fevers, chills, malaise, night sweats. Later developed intermittent dyspnea initially at exertion but now occurring at rest not history of self or for which she was recently weaned off corticosteroids. He has had no recent travel or sick contacts. Started checking his BP and reports low BPs in 80s over 50s while at home and confirmed by EMS. He denies any abdominal pain, chest pain, nausea, vomiting. Remarkable vitals on ER Presentation: bp 80s/50s  Labs Remarkable for: cr 2.83, wbc 14.8, ua: Les, bld  ER Images: CXR: New bibasilar airspace disease, concerning for pneumonia  ER Rx: Rocephin and azithromycin\"       Interval history / Subjective:   Patient seen examined at bedside earlier. No acute complaints feels well     Assessment & Plan:      Severe Sepsis 2/2 Complicated Cystitis  Possible CAP  Klebsiella bacteremia secondary to above  E. coli and klebsiella UTI  CKD stage IIIb, NAGMA   Bladder cancer s/p resection w/ ileal conduit  -Status post sepsis replacement fluids  -Continue empiric cefepime, Doxy  - CT chest showing findings consistent with sarcoid rather than pneumonia doubt But will empirically treat for infection given presentation of sepsis  -Blood cultures  positive for Klebsiella, repeat ordered 7/15 ngtd, urine culture shows E. coli and kebsiella all sensitive to Levaquin will discharge with p.o.  Levaquin to complete 2 weeks total  -Start p.o. bicarb,
Physician Progress Note      PATIENT:               Adolfo Garcia  CSN #:                  [id number]  :                       1945  ADMIT DATE:       2023 4:55 PM  [facility] DATE:        2023 3:12 PM  RESPONDING  PROVIDER #:        Joann Arrington MD          QUERY TEXT:    Dear attending,    Noted documentation of possible CAP. However per documentation on 7/15 - CT   chest showing findings consistent with sarcoid rather than pneumonia but will   empirically treat for infection given presentation of sepsis    If possible, please document in progress notes and discharge summary:    The medical record reflects the following:    Risk Factors: 61-year-old male, bladder cancer    Clinical Indicators:  Per H&P  Severe Sepsis 2/2 CAP and Complicated Cystitis, Bladder cancer   s/p resection w/ ileal conduit, community acquired pneumonia, CXR  New   bibasilar airspace disease, concerning for pneumonia, Underlying lung   hyperinflation, Stable bilateral perihilar scarring CT chest 7/15 Large   irregular scattered bilateral perihilar consolidations appear not   significantly changed compared to 2023 PET/CT. Although pneumonia or   neoplasm cannot entirely be excluded, findings again favor inflammatory   process,  7/15-Per PN-Severe Sepsis 2/2 Complicated Cystitis  Possible CAP  Klebsiella bacteremia secondary to above  Bladder cancer s/p resection w/ ileal conduit  Status post sepsis replacement fluids  Continue empiric cefepime, Doxy  CT chest showing findings consistent with sarcoid rather than pneumonia doubt   But will empirically treat for infection given presentation of sepsis  Blood cultures  positive for Klebsiella, repeat ordered will need 2 weeks   antibiotic treatment pending sensitivities    Treatment: IV cefepime, IV Doxycycline 100 mg q12 hours, IV fluids, CT chest   CXR, lab workup.       Thank you,    Maico Valdovinos RN, BSN, CRCR,  [facility]  Clinical Documentation
Physician Progress Note      PATIENT:               Harmony Sorensen  CSN #:                  [id number]  :                       1945  ADMIT DATE:       2023 4:55 PM  [facility] DATE:  RESPONDING  PROVIDER #:        Justyn Gifford MD          QUERY TEXT:    Dear attending,      Patient admitted with sepsis. Noted documentation of severe sepsis in H&P   . The patients sbp was not less than 90 although he did have hypotension. In order to support the diagnosis of severe sepsis, please include additional   clinical indicators in your documentation. Or please document if the   diagnosis of severe sepsis has been ruled out after further study. The medical record reflects the following:  Risk Factors: 67-year-old male, bladder cancer, hypertension. Clinical Indicators: -neutrophils 88, WBC 14.8  Per H&P  Severe Sepsis 2/2 CAP and Complicated Cystitis, Bladder cancer   s/p resection w/ ileal conduit, community acquired pneumonia, CXR  New   bibasilar airspace disease, concerning for pneumonia, Underlying lung   hyperinflation, Stable bilateral perihilar scarring CT chest 7/15 Large   irregular scattered bilateral perihilar consolidations appear not   significantly changed compared to 2023 PET/CT. Although pneumonia or   neoplasm cannot entirely be excluded, findings again favor inflammatory   process,  7/15-Per PN-Severe Sepsis 2/2 Complicated Cystitis  Possible CAP  Klebsiella bacteremia secondary to above  Bladder cancer s/p resection w/ ileal conduit  Status post sepsis replacement fluids  Continue empiric cefepime, Doxy  CT chest showing findings consistent with sarcoid rather than pneumonia doubt   But will empirically treat for infection given presentation of sepsis  Blood cultures  positive for Klebsiella, repeat ordered will need 2 weeks   antibiotic treatment pending sensitivities,  Treatment: IV antibiotics, IV fluids, CT chest CXR, lab workup.   Options provided:  --
IVPB (mini-bag)  100 mg IntraVENous Q12H    sodium chloride flush 0.9 % injection 5-40 mL  5-40 mL IntraVENous 2 times per day    sodium chloride flush 0.9 % injection 5-40 mL  5-40 mL IntraVENous PRN    0.9 % sodium chloride infusion   IntraVENous PRN    ondansetron (ZOFRAN-ODT) disintegrating tablet 4 mg  4 mg Oral Q8H PRN    Or    ondansetron (ZOFRAN) injection 4 mg  4 mg IntraVENous Q6H PRN    polyethylene glycol (GLYCOLAX) packet 17 g  17 g Oral Daily PRN    acetaminophen (TYLENOL) tablet 650 mg  650 mg Oral Q6H PRN    Or    acetaminophen (TYLENOL) suppository 650 mg  650 mg Rectal Q6H PRN    heparin (porcine) injection 5,000 Units  5,000 Units SubCUTAneous 3 times per day     ______________________________________________________________________  EXPECTED LENGTH OF STAY: Unable to retrieve estimated LOS  ACTUAL LENGTH OF STAY:          3                 Carly Marques MD

## 2023-08-01 ENCOUNTER — TELEPHONE (OUTPATIENT)
Age: 78
End: 2023-08-01

## 2023-08-03 ENCOUNTER — HOSPITAL ENCOUNTER (OUTPATIENT)
Facility: HOSPITAL | Age: 78
Setting detail: INFUSION SERIES
End: 2023-08-03

## 2023-08-04 ENCOUNTER — HOSPITAL ENCOUNTER (OUTPATIENT)
Facility: HOSPITAL | Age: 78
Setting detail: INFUSION SERIES
End: 2023-08-04
Payer: MEDICARE

## 2023-08-04 VITALS
DIASTOLIC BLOOD PRESSURE: 71 MMHG | SYSTOLIC BLOOD PRESSURE: 107 MMHG | RESPIRATION RATE: 18 BRPM | HEART RATE: 84 BPM | TEMPERATURE: 97.7 F

## 2023-08-04 DIAGNOSIS — C67.9 MALIGNANT NEOPLASM OF URINARY BLADDER, UNSPECIFIED SITE (HCC): Primary | ICD-10-CM

## 2023-08-04 LAB
ALBUMIN SERPL-MCNC: 3.1 G/DL (ref 3.5–5)
ALBUMIN/GLOB SERPL: 0.8 (ref 1.1–2.2)
ALP SERPL-CCNC: 67 U/L (ref 45–117)
ALT SERPL-CCNC: 23 U/L (ref 12–78)
ANION GAP SERPL CALC-SCNC: 2 MMOL/L (ref 5–15)
AST SERPL-CCNC: 18 U/L (ref 15–37)
BASOPHILS # BLD: 0 K/UL (ref 0–0.1)
BASOPHILS NFR BLD: 1 % (ref 0–1)
BILIRUB SERPL-MCNC: 0.3 MG/DL (ref 0.2–1)
BUN SERPL-MCNC: 26 MG/DL (ref 6–20)
BUN/CREAT SERPL: 14 (ref 12–20)
CALCIUM SERPL-MCNC: 10 MG/DL (ref 8.5–10.1)
CHLORIDE SERPL-SCNC: 106 MMOL/L (ref 97–108)
CO2 SERPL-SCNC: 28 MMOL/L (ref 21–32)
CREAT SERPL-MCNC: 1.84 MG/DL (ref 0.7–1.3)
DIFFERENTIAL METHOD BLD: ABNORMAL
EOSINOPHIL # BLD: 0.2 K/UL (ref 0–0.4)
EOSINOPHIL NFR BLD: 6 % (ref 0–7)
ERYTHROCYTE [DISTWIDTH] IN BLOOD BY AUTOMATED COUNT: 13.8 % (ref 11.5–14.5)
GLOBULIN SER CALC-MCNC: 4 G/DL (ref 2–4)
GLUCOSE SERPL-MCNC: 107 MG/DL (ref 65–100)
HCT VFR BLD AUTO: 30.9 % (ref 36.6–50.3)
HGB BLD-MCNC: 9.8 G/DL (ref 12.1–17)
IMM GRANULOCYTES # BLD AUTO: 0 K/UL (ref 0–0.04)
IMM GRANULOCYTES NFR BLD AUTO: 1 % (ref 0–0.5)
LYMPHOCYTES # BLD: 0.4 K/UL (ref 0.8–3.5)
LYMPHOCYTES NFR BLD: 10 % (ref 12–49)
MCH RBC QN AUTO: 32.9 PG (ref 26–34)
MCHC RBC AUTO-ENTMCNC: 31.7 G/DL (ref 30–36.5)
MCV RBC AUTO: 103.7 FL (ref 80–99)
MONOCYTES # BLD: 0.6 K/UL (ref 0–1)
MONOCYTES NFR BLD: 14 % (ref 5–13)
NEUTS SEG # BLD: 2.9 K/UL (ref 1.8–8)
NEUTS SEG NFR BLD: 68 % (ref 32–75)
NRBC # BLD: 0 K/UL (ref 0–0.01)
NRBC BLD-RTO: 0 PER 100 WBC
PLATELET # BLD AUTO: 173 K/UL (ref 150–400)
PMV BLD AUTO: 8.7 FL (ref 8.9–12.9)
POTASSIUM SERPL-SCNC: 4 MMOL/L (ref 3.5–5.1)
PROT SERPL-MCNC: 7.1 G/DL (ref 6.4–8.2)
RBC # BLD AUTO: 2.98 M/UL (ref 4.1–5.7)
RBC MORPH BLD: ABNORMAL
SODIUM SERPL-SCNC: 136 MMOL/L (ref 136–145)
WBC # BLD AUTO: 4.1 K/UL (ref 4.1–11.1)

## 2023-08-04 PROCEDURE — 36591 DRAW BLOOD OFF VENOUS DEVICE: CPT

## 2023-08-04 PROCEDURE — 85025 COMPLETE CBC W/AUTO DIFF WBC: CPT

## 2023-08-04 PROCEDURE — 80053 COMPREHEN METABOLIC PANEL: CPT

## 2023-08-04 PROCEDURE — 96523 IRRIG DRUG DELIVERY DEVICE: CPT

## 2023-08-04 PROCEDURE — 6360000002 HC RX W HCPCS: Performed by: NURSE PRACTITIONER

## 2023-08-04 PROCEDURE — 36415 COLL VENOUS BLD VENIPUNCTURE: CPT

## 2023-08-04 RX ORDER — SODIUM CHLORIDE 0.9 % (FLUSH) 0.9 %
5-40 SYRINGE (ML) INJECTION PRN
Status: CANCELLED | OUTPATIENT
Start: 2023-09-24

## 2023-08-04 RX ORDER — HEPARIN 100 UNIT/ML
500 SYRINGE INTRAVENOUS PRN
Status: CANCELLED | OUTPATIENT
Start: 2023-09-24

## 2023-08-04 RX ORDER — SODIUM CHLORIDE 9 MG/ML
25 INJECTION, SOLUTION INTRAVENOUS PRN
Status: CANCELLED | OUTPATIENT
Start: 2023-09-24

## 2023-08-04 RX ORDER — SODIUM CHLORIDE 9 MG/ML
25 INJECTION, SOLUTION INTRAVENOUS PRN
Status: DISCONTINUED | OUTPATIENT
Start: 2023-08-04 | End: 2023-08-05 | Stop reason: HOSPADM

## 2023-08-04 RX ORDER — HEPARIN 100 UNIT/ML
500 SYRINGE INTRAVENOUS PRN
Status: DISCONTINUED | OUTPATIENT
Start: 2023-08-04 | End: 2023-08-05 | Stop reason: HOSPADM

## 2023-08-04 RX ORDER — SODIUM CHLORIDE 0.9 % (FLUSH) 0.9 %
5-40 SYRINGE (ML) INJECTION PRN
Status: DISCONTINUED | OUTPATIENT
Start: 2023-08-04 | End: 2023-08-05 | Stop reason: HOSPADM

## 2023-08-04 RX ADMIN — HEPARIN 500 UNITS: 100 SYRINGE at 15:18

## 2023-08-08 ENCOUNTER — HOSPITAL ENCOUNTER (OUTPATIENT)
Facility: HOSPITAL | Age: 78
Discharge: HOME | End: 2023-08-08
Attending: RADIOLOGY | Admitting: RADIOLOGY
Payer: MEDICARE

## 2023-08-08 VITALS
DIASTOLIC BLOOD PRESSURE: 78 MMHG | RESPIRATION RATE: 20 BRPM | HEIGHT: 71 IN | TEMPERATURE: 98 F | BODY MASS INDEX: 32.2 KG/M2 | WEIGHT: 230 LBS | OXYGEN SATURATION: 98 % | SYSTOLIC BLOOD PRESSURE: 124 MMHG | HEART RATE: 82 BPM

## 2023-08-08 DIAGNOSIS — C67.9 MALIGNANT NEOPLASM OF URINARY BLADDER, UNSPECIFIED SITE (HCC): ICD-10-CM

## 2023-08-08 PROCEDURE — 2500000003 HC RX 250 WO HCPCS: Performed by: RADIOLOGY

## 2023-08-08 PROCEDURE — 6360000002 HC RX W HCPCS: Performed by: RADIOLOGY

## 2023-08-08 PROCEDURE — 2580000003 HC RX 258: Performed by: RADIOLOGY

## 2023-08-08 PROCEDURE — 76942 ECHO GUIDE FOR BIOPSY: CPT

## 2023-08-08 PROCEDURE — C2625 STENT, NON-COR, TEM W/DEL SY: HCPCS | Performed by: RADIOLOGY

## 2023-08-08 RX ORDER — LIDOCAINE HYDROCHLORIDE 10 MG/ML
INJECTION, SOLUTION EPIDURAL; INFILTRATION; INTRACAUDAL; PERINEURAL PRN
Status: COMPLETED | OUTPATIENT
Start: 2023-08-08 | End: 2023-08-08

## 2023-08-08 RX ORDER — FENTANYL CITRATE 50 UG/ML
INJECTION, SOLUTION INTRAMUSCULAR; INTRAVENOUS PRN
Status: COMPLETED | OUTPATIENT
Start: 2023-08-08 | End: 2023-08-08

## 2023-08-08 RX ORDER — MIDAZOLAM HYDROCHLORIDE 2 MG/2ML
INJECTION, SOLUTION INTRAMUSCULAR; INTRAVENOUS PRN
Status: COMPLETED | OUTPATIENT
Start: 2023-08-08 | End: 2023-08-08

## 2023-08-08 RX ADMIN — FENTANYL CITRATE 50 MCG: 50 INJECTION, SOLUTION INTRAMUSCULAR; INTRAVENOUS at 10:53

## 2023-08-08 RX ADMIN — FENTANYL CITRATE 50 MCG: 50 INJECTION, SOLUTION INTRAMUSCULAR; INTRAVENOUS at 10:33

## 2023-08-08 RX ADMIN — LIDOCAINE HYDROCHLORIDE 5 ML: 10 INJECTION, SOLUTION EPIDURAL; INFILTRATION; INTRACAUDAL; PERINEURAL at 10:35

## 2023-08-08 RX ADMIN — MIDAZOLAM HYDROCHLORIDE 1 MG: 1 INJECTION, SOLUTION INTRAMUSCULAR; INTRAVENOUS at 10:35

## 2023-08-08 RX ADMIN — FENTANYL CITRATE 50 MCG: 50 INJECTION, SOLUTION INTRAMUSCULAR; INTRAVENOUS at 10:35

## 2023-08-08 RX ADMIN — MIDAZOLAM HYDROCHLORIDE 1 MG: 1 INJECTION, SOLUTION INTRAMUSCULAR; INTRAVENOUS at 10:53

## 2023-08-08 RX ADMIN — MIDAZOLAM HYDROCHLORIDE 1 MG: 1 INJECTION, SOLUTION INTRAMUSCULAR; INTRAVENOUS at 10:37

## 2023-08-08 RX ADMIN — MIDAZOLAM HYDROCHLORIDE 1 MG: 1 INJECTION, SOLUTION INTRAMUSCULAR; INTRAVENOUS at 10:56

## 2023-08-08 RX ADMIN — FENTANYL CITRATE 50 MCG: 50 INJECTION, SOLUTION INTRAMUSCULAR; INTRAVENOUS at 10:42

## 2023-08-08 RX ADMIN — WATER 2000 MG: 1 INJECTION INTRAMUSCULAR; INTRAVENOUS; SUBCUTANEOUS at 10:00

## 2023-08-08 RX ADMIN — MIDAZOLAM HYDROCHLORIDE 1 MG: 1 INJECTION, SOLUTION INTRAMUSCULAR; INTRAVENOUS at 10:33

## 2023-08-08 ASSESSMENT — PAIN SCALES - GENERAL
PAINLEVEL_OUTOF10: 0

## 2023-08-08 ASSESSMENT — PULMONARY FUNCTION TESTS
PIF_VALUE: 0

## 2023-08-08 NOTE — DISCHARGE INSTRUCTIONS
You have received sedation medications today. YOU SHOULD NOT DRIVE FOR 24 HOURS, DO NOT OPERATE HEAVY MACHINERY, DO NOT MAKE ANY LEGAL DECISIONS OR SIGN LEGAL DOCUMENTS FOR 24 HOURS. DO NOT DRINK ALCOHOL, TAKE ANY MEDICATIONS UNLESS PRESCRIBED BY YOUR DOCTOR. IF YOU ARE A CAREGIVER, SOMEONE SHOULD TAKE THAT ROLE FOR 24 HOURS. Side effects of sedation medications and other medications used today have been reviewed  Those side effects can include but are not limited to: dizziness, drowsiness, poor balance, fatigue, sleepiness. Take precautions at home to prevent falls, such as assistance with walking or stairs if allowed and /or when needed or position changes. Allergic or adverse reactions could include nausea, itching, hives, dizziness, or anything else out of the ordinary. Should you experience any of these significant changes, please call [phone] between the hours of 7:30 am and 3:30 pm or [phone] after hours. After hours, ask the  to page the X-ray Technologist, and describe the problem to the technologist. If you are experiencing chest pain, shortness of breath, altered mental state, unusual bleeding or any other emergent symptom you should call 911 immediately.

## 2023-08-08 NOTE — H&P
HISTORY AND PHYSICAL             Date: 8/8/2023        Patient Name: Luis Gabriel     YOB: 1945      Age:  69 y.o. Chief Complaint   No chief complaint on file. History Obtained From   patient    History of Present Illness   Left urinary obstruction     Past Medical History     Past Medical History:   Diagnosis Date    Arrhythmia     LBBB    Arthritis     Asthma     MILD    Cancer ([address]) 2015    BLADDER    Cancer ([address])     scc top of head and nose    COVID-19 vaccine series completed     [facility] VACCINE 1-28-21 AND 2-25-21    Depression with anxiety     Hypertension     Other unknown and unspecified cause of morbidity or mortality     history of pulmonary sarcoid     Posterior cervical adenopathy 11/19/2018    Pulmonary sarcoidosis ([address])     Unspecified sleep apnea     cpap        Past Surgical History     Past Surgical History:   Procedure Laterality Date    CATARACT REMOVAL Bilateral     HERNIA REPAIR Left 1990    [address] Right AS A CHILD    INGUINAL    IR PORT PLACEMENT EQUAL OR GREATER THAN 5 YEARS  7/16/2021    IR PORT PLACEMENT EQUAL OR GREATER THAN 5 YEARS 7/16/2021 [facility] RAD ANGIO IR    IR PORT PLACEMENT EQUAL OR GREATER THAN 5 YEARS  7/16/2021    IR URETERAL PLACEMENT STENT&NEPHRO CATH NEW ACCESS  8/8/2023    IR URETERAL PLACEMENT STENT&NEPHRO CATH NEW ACCESS 8/8/2023 [facility] RAD ANGIO IR    ORTHOPEDIC SURGERY Right     BONE SPURS REMOVED FROM FOOT    OTHER SURGICAL HISTORY Right 2020    SKIN CANCER RELMOVED FROM LEG    OTHER SURGICAL HISTORY  2005    benign lump removed from thyroid    OTHER SURGICAL HISTORY      scc removed top of head and nose    SC UNLISTED PROCEDURE CARDIAC SURGERY  01/22/2021    CARDIAC CATH    REVISE KNEE JOINT REPLACE,ALL PARTS Left 2019    TOTAL KNEE ARTHROPLASTY Left 2008    UROLOGICAL SURGERY  06/2020    CYSTO        Medications Prior to Admission     Prior to Admission medications    Medication Sig Start Date End Date Taking?  Authorizing

## 2023-08-08 NOTE — PROGRESS NOTES
Patient given copy of discharge instructions and verbalized understanding. Patient wheeled to exit via wheelchair. Patient in NAD. No bleeding noted at puncture site. Patient and spouse report understanding of d/c instructions and NUS care.

## 2023-08-09 ENCOUNTER — TRANSCRIBE ORDERS (OUTPATIENT)
Facility: HOSPITAL | Age: 78
End: 2023-08-09

## 2023-08-09 DIAGNOSIS — C67.2 CANCER OF LATERAL WALL OF URINARY BLADDER (HCC): ICD-10-CM

## 2023-08-09 DIAGNOSIS — N13.30 HYDRONEPHROSIS, UNSPECIFIED HYDRONEPHROSIS TYPE: Primary | ICD-10-CM

## 2023-09-05 ENCOUNTER — HOSPITAL ENCOUNTER (OUTPATIENT)
Facility: HOSPITAL | Age: 78
Discharge: HOME | End: 2023-09-05
Attending: STUDENT IN AN ORGANIZED HEALTH CARE EDUCATION/TRAINING PROGRAM | Admitting: STUDENT IN AN ORGANIZED HEALTH CARE EDUCATION/TRAINING PROGRAM
Payer: MEDICARE

## 2023-09-05 VITALS
OXYGEN SATURATION: 95 % | SYSTOLIC BLOOD PRESSURE: 133 MMHG | HEIGHT: 71 IN | BODY MASS INDEX: 32.2 KG/M2 | HEART RATE: 77 BPM | WEIGHT: 230 LBS | TEMPERATURE: 98.1 F | DIASTOLIC BLOOD PRESSURE: 72 MMHG | RESPIRATION RATE: 14 BRPM

## 2023-09-05 DIAGNOSIS — N13.2 HYDRONEPHROSIS WITH URINARY OBSTRUCTION DUE TO RENAL CALCULUS: ICD-10-CM

## 2023-09-05 PROCEDURE — 50435 EXCHANGE NEPHROSTOMY CATH: CPT

## 2023-09-05 PROCEDURE — 6360000002 HC RX W HCPCS: Performed by: STUDENT IN AN ORGANIZED HEALTH CARE EDUCATION/TRAINING PROGRAM

## 2023-09-05 RX ORDER — FENTANYL CITRATE 50 UG/ML
INJECTION, SOLUTION INTRAMUSCULAR; INTRAVENOUS PRN
Status: COMPLETED | OUTPATIENT
Start: 2023-09-05 | End: 2023-09-05

## 2023-09-05 RX ADMIN — FENTANYL CITRATE 50 MCG: 50 INJECTION, SOLUTION INTRAMUSCULAR; INTRAVENOUS at 14:25

## 2023-09-05 NOTE — PROGRESS NOTES
Pt arrives ambulatory to angio department accompanied by wife for left percutaneous nephrostomy exchange procedure. All assessments completed and consent was reviewed. Education given was regarding procedure, possible moderate sedation, post-procedure care and  management/follow-up. Opportunity for questions was provided and all questions and concerns were addressed.

## 2023-09-05 NOTE — PROGRESS NOTES
Pt discharge instructions were given to pt by PA and RN. Opportunities for questions and concerns were provided. Pt verbalized understanding of medication use and follow-up. IV dc'd, pt wheeled off unit in no signs of distress, steady gait noted.

## 2023-09-06 ENCOUNTER — HOSPITAL ENCOUNTER (OUTPATIENT)
Facility: HOSPITAL | Age: 78
Discharge: HOME | End: 2023-09-09

## 2023-09-06 DIAGNOSIS — C67.8 MALIGNANT NEOPLASM OF OVERLAPPING SITES OF BLADDER (HCC): Primary | ICD-10-CM

## 2023-09-08 ENCOUNTER — HOSPITAL ENCOUNTER (OUTPATIENT)
Facility: HOSPITAL | Age: 78
End: 2023-09-08
Attending: UROLOGY
Payer: MEDICARE

## 2023-09-08 DIAGNOSIS — C67.2 CANCER OF LATERAL WALL OF URINARY BLADDER (HCC): ICD-10-CM

## 2023-09-08 DIAGNOSIS — N13.30 HYDRONEPHROSIS, UNSPECIFIED HYDRONEPHROSIS TYPE: ICD-10-CM

## 2023-09-08 LAB — CREAT BLD-MCNC: 1.9 MG/DL (ref 0.6–1.3)

## 2023-09-08 PROCEDURE — 6360000004 HC RX CONTRAST MEDICATION: Performed by: RADIOLOGY

## 2023-09-08 PROCEDURE — 78707 K FLOW/FUNCT IMAGE W/O DRUG: CPT

## 2023-09-08 PROCEDURE — 74177 CT ABD & PELVIS W/CONTRAST: CPT

## 2023-09-08 PROCEDURE — 82565 ASSAY OF CREATININE: CPT

## 2023-09-08 PROCEDURE — 3430000000 HC RX DIAGNOSTIC RADIOPHARMACEUTICAL: Performed by: UROLOGY

## 2023-09-08 PROCEDURE — A9562 TC99M MERTIATIDE: HCPCS | Performed by: UROLOGY

## 2023-09-08 RX ADMIN — TECHNESCAN TC 99M MERTIATIDE 10.7 MILLICURIE: 1 INJECTION, POWDER, LYOPHILIZED, FOR SOLUTION INTRAVENOUS at 09:22

## 2023-09-08 RX ADMIN — IOPAMIDOL 100 ML: 755 INJECTION, SOLUTION INTRAVENOUS at 11:20

## 2023-09-19 ENCOUNTER — CLINICAL DOCUMENTATION (OUTPATIENT)
Age: 78
End: 2023-09-19

## 2023-09-19 NOTE — PROGRESS NOTES
Reviewed scans in TB  The RP is stable and not FDG avid  The pelvic nodes were FDG avid and stable  Follow

## 2023-09-22 ENCOUNTER — HOSPITAL ENCOUNTER (OUTPATIENT)
Facility: HOSPITAL | Age: 78
Setting detail: INFUSION SERIES
End: 2023-09-22
Payer: MEDICARE

## 2023-09-22 ENCOUNTER — OFFICE VISIT (OUTPATIENT)
Age: 78
End: 2023-09-22
Payer: MEDICARE

## 2023-09-22 VITALS
WEIGHT: 228 LBS | DIASTOLIC BLOOD PRESSURE: 80 MMHG | HEIGHT: 71 IN | BODY MASS INDEX: 31.92 KG/M2 | OXYGEN SATURATION: 98 % | TEMPERATURE: 98 F | RESPIRATION RATE: 17 BRPM | HEART RATE: 88 BPM | SYSTOLIC BLOOD PRESSURE: 130 MMHG

## 2023-09-22 DIAGNOSIS — C67.9 MALIGNANT NEOPLASM OF URINARY BLADDER, UNSPECIFIED SITE (HCC): Primary | ICD-10-CM

## 2023-09-22 LAB
ALBUMIN SERPL-MCNC: 3.2 G/DL (ref 3.5–5)
ALBUMIN/GLOB SERPL: 0.8 (ref 1.1–2.2)
ALP SERPL-CCNC: 67 U/L (ref 45–117)
ALT SERPL-CCNC: 18 U/L (ref 12–78)
ANION GAP SERPL CALC-SCNC: 4 MMOL/L (ref 5–15)
AST SERPL-CCNC: 10 U/L (ref 15–37)
BASOPHILS # BLD: 0 K/UL (ref 0–0.1)
BASOPHILS NFR BLD: 1 % (ref 0–1)
BILIRUB SERPL-MCNC: 0.4 MG/DL (ref 0.2–1)
BUN SERPL-MCNC: 31 MG/DL (ref 6–20)
BUN/CREAT SERPL: 18 (ref 12–20)
CALCIUM SERPL-MCNC: 9.8 MG/DL (ref 8.5–10.1)
CHLORIDE SERPL-SCNC: 108 MMOL/L (ref 97–108)
CO2 SERPL-SCNC: 27 MMOL/L (ref 21–32)
CREAT SERPL-MCNC: 1.74 MG/DL (ref 0.7–1.3)
DIFFERENTIAL METHOD BLD: ABNORMAL
EOSINOPHIL # BLD: 0.2 K/UL (ref 0–0.4)
EOSINOPHIL NFR BLD: 5 % (ref 0–7)
ERYTHROCYTE [DISTWIDTH] IN BLOOD BY AUTOMATED COUNT: 14 % (ref 11.5–14.5)
GLOBULIN SER CALC-MCNC: 4.2 G/DL (ref 2–4)
GLUCOSE SERPL-MCNC: 90 MG/DL (ref 65–100)
HCT VFR BLD AUTO: 32.8 % (ref 36.6–50.3)
HGB BLD-MCNC: 10.5 G/DL (ref 12.1–17)
IMM GRANULOCYTES # BLD AUTO: 0 K/UL (ref 0–0.04)
IMM GRANULOCYTES NFR BLD AUTO: 0 % (ref 0–0.5)
LYMPHOCYTES # BLD: 0.5 K/UL (ref 0.8–3.5)
LYMPHOCYTES NFR BLD: 11 % (ref 12–49)
MCH RBC QN AUTO: 31.9 PG (ref 26–34)
MCHC RBC AUTO-ENTMCNC: 32 G/DL (ref 30–36.5)
MCV RBC AUTO: 99.7 FL (ref 80–99)
MONOCYTES # BLD: 0.6 K/UL (ref 0–1)
MONOCYTES NFR BLD: 13 % (ref 5–13)
NEUTS SEG # BLD: 3.2 K/UL (ref 1.8–8)
NEUTS SEG NFR BLD: 70 % (ref 32–75)
NRBC # BLD: 0 K/UL (ref 0–0.01)
NRBC BLD-RTO: 0 PER 100 WBC
PLATELET # BLD AUTO: 235 K/UL (ref 150–400)
PMV BLD AUTO: 8.8 FL (ref 8.9–12.9)
POTASSIUM SERPL-SCNC: 3.9 MMOL/L (ref 3.5–5.1)
PROT SERPL-MCNC: 7.4 G/DL (ref 6.4–8.2)
RBC # BLD AUTO: 3.29 M/UL (ref 4.1–5.7)
RBC MORPH BLD: ABNORMAL
SODIUM SERPL-SCNC: 139 MMOL/L (ref 136–145)
WBC # BLD AUTO: 4.5 K/UL (ref 4.1–11.1)

## 2023-09-22 PROCEDURE — 1036F TOBACCO NON-USER: CPT | Performed by: INTERNAL MEDICINE

## 2023-09-22 PROCEDURE — 36591 DRAW BLOOD OFF VENOUS DEVICE: CPT

## 2023-09-22 PROCEDURE — 99215 OFFICE O/P EST HI 40 MIN: CPT | Performed by: INTERNAL MEDICINE

## 2023-09-22 PROCEDURE — G8417 CALC BMI ABV UP PARAM F/U: HCPCS | Performed by: INTERNAL MEDICINE

## 2023-09-22 PROCEDURE — 85025 COMPLETE CBC W/AUTO DIFF WBC: CPT

## 2023-09-22 PROCEDURE — 3079F DIAST BP 80-89 MM HG: CPT | Performed by: INTERNAL MEDICINE

## 2023-09-22 PROCEDURE — 3075F SYST BP GE 130 - 139MM HG: CPT | Performed by: INTERNAL MEDICINE

## 2023-09-22 PROCEDURE — G8428 CUR MEDS NOT DOCUMENT: HCPCS | Performed by: INTERNAL MEDICINE

## 2023-09-22 PROCEDURE — 1123F ACP DISCUSS/DSCN MKR DOCD: CPT | Performed by: INTERNAL MEDICINE

## 2023-09-22 PROCEDURE — 80053 COMPREHEN METABOLIC PANEL: CPT

## 2023-09-22 PROCEDURE — 36415 COLL VENOUS BLD VENIPUNCTURE: CPT

## 2023-09-22 RX ORDER — SODIUM CHLORIDE 0.9 % (FLUSH) 0.9 %
5-40 SYRINGE (ML) INJECTION PRN
Status: DISCONTINUED | OUTPATIENT
Start: 2023-09-22 | End: 2023-09-23 | Stop reason: HOSPADM

## 2023-09-22 RX ORDER — HEPARIN 100 UNIT/ML
500 SYRINGE INTRAVENOUS PRN
OUTPATIENT
Start: 2023-09-24

## 2023-09-22 RX ORDER — SODIUM CHLORIDE 0.9 % (FLUSH) 0.9 %
5-40 SYRINGE (ML) INJECTION PRN
OUTPATIENT
Start: 2023-09-24

## 2023-09-22 RX ORDER — HEPARIN 100 UNIT/ML
500 SYRINGE INTRAVENOUS PRN
Status: DISCONTINUED | OUTPATIENT
Start: 2023-09-22 | End: 2023-09-23 | Stop reason: HOSPADM

## 2023-09-22 RX ORDER — SODIUM CHLORIDE 9 MG/ML
25 INJECTION, SOLUTION INTRAVENOUS PRN
Status: DISCONTINUED | OUTPATIENT
Start: 2023-09-22 | End: 2023-09-23 | Stop reason: HOSPADM

## 2023-09-22 RX ORDER — SODIUM CHLORIDE 9 MG/ML
25 INJECTION, SOLUTION INTRAVENOUS PRN
OUTPATIENT
Start: 2023-09-24

## 2023-09-22 NOTE — PROGRESS NOTES
Doris Calvert is a 69 y.o. malec  Chief Complaint   Patient presents with    Follow-up    Bladder Cancer     1. Have you been to the ER, urgent care clinic since your last visit? Hospitalized since your last visit? No    2. Have you seen or consulted any other health care providers outside of the [address] [place] [address] Avenue since your last visit? Include any pap smears or colon screening.  No
Sharon Jimenes 's care.      Fantasma Arriaga MD, [phone]4 [facility]

## 2023-09-22 NOTE — PRE-PROCEDURE INSTRUCTIONS
[facility] Port Flush Note  Date: September 22, 2023      Pt arrived ambulatory to [facility] for [place] [facility] Work in stable condition. Port accessed with positive blood return. Labs drawn and sent for processing. Port flushed and de- accessed per protocol. D/Cd from [facility] in no distress to clinic apt with Dr. Solitario Galeano. Patient is aware of next scheduled [facility] appointment.         Future Appointments   Date Time Provider [address]   10/17/2023 11:00 AM MO PET DOSE 1 [facility]   10/17/2023 12:00 PM MO PET 1 [facility]   11/24/2023  1:00 PM G2 ANGELA PERDOMOB [address]   1/18/2024  1:00 PM BENJAMÍN TERRY [address], RN  September 22, 2023

## 2023-09-25 ENCOUNTER — TELEPHONE (OUTPATIENT)
Age: 78
End: 2023-09-25

## 2023-09-25 DIAGNOSIS — T78.40XA ALLERGIC REACTION TO DRUG, INITIAL ENCOUNTER: ICD-10-CM

## 2023-09-25 DIAGNOSIS — C67.9 MALIGNANT NEOPLASM OF URINARY BLADDER, UNSPECIFIED SITE (HCC): Primary | ICD-10-CM

## 2023-09-25 RX ORDER — PREDNISONE 10 MG/1
TABLET ORAL
Qty: 8 TABLET | Refills: 1 | Status: SHIPPED | OUTPATIENT
Start: 2023-09-25

## 2023-09-25 NOTE — TELEPHONE ENCOUNTER
1400  Called pt HIPAA verified x2  Gave pt updates on how/when to take Prednisone prior to his PET scan. I have also sent the instructions via Wear and they will be on the prescription that was sent in as well. Pt voiced understanding and has no concerns at this time.

## 2023-09-25 NOTE — TELEPHONE ENCOUNTER
Prednisone 25 mg PO 13 hours, 7 hours and 1 hour prior to PET. Benadryl 25mg PO 1 hours prior to PET.

## 2023-10-17 ENCOUNTER — HOSPITAL ENCOUNTER (OUTPATIENT)
Facility: HOSPITAL | Age: 78
Discharge: HOME | End: 2023-10-20
Attending: RADIOLOGY
Payer: MEDICARE

## 2023-10-17 DIAGNOSIS — C67.8 MALIGNANT NEOPLASM OF OVERLAPPING SITES OF BLADDER (HCC): ICD-10-CM

## 2023-10-17 LAB
GLUCOSE BLD STRIP.AUTO-MCNC: 125 MG/DL (ref 65–117)
SERVICE CMNT-IMP: ABNORMAL

## 2023-10-17 PROCEDURE — 78815 PET IMAGE W/CT SKULL-THIGH: CPT

## 2023-10-17 PROCEDURE — 3430000000 HC RX DIAGNOSTIC RADIOPHARMACEUTICAL: Performed by: RADIOLOGY

## 2023-10-17 PROCEDURE — 82962 GLUCOSE BLOOD TEST: CPT

## 2023-10-17 PROCEDURE — A9552 F18 FDG: HCPCS | Performed by: RADIOLOGY

## 2023-10-17 RX ORDER — FLUDEOXYGLUCOSE F-18 500 MCI/ML
10 INJECTION INTRAVENOUS ONCE
Status: COMPLETED | OUTPATIENT
Start: 2023-10-17 | End: 2023-10-17

## 2023-10-17 RX ADMIN — FLUDEOXYGLUCOSE F-18 10 MILLICURIE: 500 INJECTION INTRAVENOUS at 11:27

## 2023-10-24 ENCOUNTER — CLINICAL DOCUMENTATION (OUTPATIENT)
Age: 78
End: 2023-10-24

## 2023-10-24 DIAGNOSIS — C67.9 MALIGNANT NEOPLASM OF URINARY BLADDER, UNSPECIFIED SITE (HCC): Primary | ICD-10-CM

## 2023-10-24 NOTE — PROGRESS NOTES
Reviewed PET CT  Has fluctuating lung spots ( 1 is stable another has increased but small overall)  Similarly some lymph nodes are smaller while 1 is larger    At this point recommend continued close follow up  Best to get a CT in 2 months as it would be easier to compare sizes on that    Sara to call  Vane Link to ensure CT CAP without contrast ordered

## 2023-10-27 ENCOUNTER — HOSPITAL ENCOUNTER (OUTPATIENT)
Facility: HOSPITAL | Age: 78
End: 2023-10-27
Payer: MEDICARE

## 2023-10-27 VITALS
SYSTOLIC BLOOD PRESSURE: 111 MMHG | WEIGHT: 227 LBS | HEIGHT: 71 IN | BODY MASS INDEX: 31.78 KG/M2 | DIASTOLIC BLOOD PRESSURE: 69 MMHG | TEMPERATURE: 97.4 F

## 2023-10-27 LAB
ABO + RH BLD: NORMAL
ALBUMIN SERPL-MCNC: 3.4 G/DL (ref 3.5–5)
ALBUMIN/GLOB SERPL: 0.9 (ref 1.1–2.2)
ALP SERPL-CCNC: 67 U/L (ref 45–117)
ALT SERPL-CCNC: 20 U/L (ref 12–78)
AMORPH CRY URNS QL MICRO: ABNORMAL
ANION GAP SERPL CALC-SCNC: 7 MMOL/L (ref 5–15)
APPEARANCE UR: ABNORMAL
AST SERPL-CCNC: 15 U/L (ref 15–37)
BACTERIA URNS QL MICRO: ABNORMAL /HPF
BILIRUB SERPL-MCNC: 0.4 MG/DL (ref 0.2–1)
BILIRUB UR QL: NEGATIVE
BLOOD GROUP ANTIBODIES SERPL: NORMAL
BUN SERPL-MCNC: 30 MG/DL (ref 6–20)
BUN/CREAT SERPL: 17 (ref 12–20)
CALCIUM SERPL-MCNC: 10 MG/DL (ref 8.5–10.1)
CHLORIDE SERPL-SCNC: 106 MMOL/L (ref 97–108)
CO2 SERPL-SCNC: 24 MMOL/L (ref 21–32)
COLOR UR: ABNORMAL
CREAT SERPL-MCNC: 1.77 MG/DL (ref 0.7–1.3)
EPITH CASTS URNS QL MICRO: ABNORMAL /LPF
ERYTHROCYTE [DISTWIDTH] IN BLOOD BY AUTOMATED COUNT: 14 % (ref 11.5–14.5)
GLOBULIN SER CALC-MCNC: 3.6 G/DL (ref 2–4)
GLUCOSE SERPL-MCNC: 106 MG/DL (ref 65–100)
GLUCOSE UR STRIP.AUTO-MCNC: NEGATIVE MG/DL
HCT VFR BLD AUTO: 34.5 % (ref 36.6–50.3)
HGB BLD-MCNC: 11 G/DL (ref 12.1–17)
HGB UR QL STRIP: ABNORMAL
HISTORY CHECK: NORMAL
KETONES UR QL STRIP.AUTO: NEGATIVE MG/DL
LEUKOCYTE ESTERASE UR QL STRIP.AUTO: ABNORMAL
MCH RBC QN AUTO: 31.3 PG (ref 26–34)
MCHC RBC AUTO-ENTMCNC: 31.9 G/DL (ref 30–36.5)
MCV RBC AUTO: 98.3 FL (ref 80–99)
MUCOUS THREADS URNS QL MICRO: ABNORMAL /LPF
NITRITE UR QL STRIP.AUTO: POSITIVE
NRBC # BLD: 0 K/UL (ref 0–0.01)
NRBC BLD-RTO: 0 PER 100 WBC
PH UR STRIP: 8.5 (ref 5–8)
PLATELET # BLD AUTO: 229 K/UL (ref 150–400)
PMV BLD AUTO: 8.9 FL (ref 8.9–12.9)
POTASSIUM SERPL-SCNC: 4.4 MMOL/L (ref 3.5–5.1)
PROT SERPL-MCNC: 7 G/DL (ref 6.4–8.2)
PROT UR STRIP-MCNC: 30 MG/DL
RBC # BLD AUTO: 3.51 M/UL (ref 4.1–5.7)
RBC #/AREA URNS HPF: ABNORMAL /HPF (ref 0–5)
SODIUM SERPL-SCNC: 137 MMOL/L (ref 136–145)
SP GR UR REFRACTOMETRY: 1.01 (ref 1–1.03)
SPECIMEN EXP DATE BLD: NORMAL
TRI-PHOS CRY URNS QL MICRO: ABNORMAL
URINE CULTURE IF INDICATED: ABNORMAL
UROBILINOGEN UR QL STRIP.AUTO: 0.2 EU/DL (ref 0.2–1)
WBC # BLD AUTO: 5.3 K/UL (ref 4.1–11.1)
WBC URNS QL MICRO: ABNORMAL /HPF (ref 0–4)

## 2023-10-27 PROCEDURE — 86900 BLOOD TYPING SEROLOGIC ABO: CPT

## 2023-10-27 PROCEDURE — 80053 COMPREHEN METABOLIC PANEL: CPT

## 2023-10-27 PROCEDURE — 36415 COLL VENOUS BLD VENIPUNCTURE: CPT

## 2023-10-27 PROCEDURE — 85027 COMPLETE CBC AUTOMATED: CPT

## 2023-10-27 PROCEDURE — 81001 URINALYSIS AUTO W/SCOPE: CPT

## 2023-10-27 PROCEDURE — 86850 RBC ANTIBODY SCREEN: CPT

## 2023-10-27 PROCEDURE — 86901 BLOOD TYPING SEROLOGIC RH(D): CPT

## 2023-10-27 PROCEDURE — 87086 URINE CULTURE/COLONY COUNT: CPT

## 2023-10-27 RX ORDER — SODIUM CHLORIDE 9 MG/ML
INJECTION, SOLUTION INTRAVENOUS PRN
Status: DISCONTINUED | OUTPATIENT
Start: 2023-10-27 | End: 2023-10-27

## 2023-10-27 RX ORDER — SODIUM CHLORIDE 9 MG/ML
INJECTION, SOLUTION INTRAVENOUS PRN
Status: DISCONTINUED | OUTPATIENT
Start: 2023-10-27 | End: 2023-10-31 | Stop reason: HOSPADM

## 2023-10-27 NOTE — PERIOP NOTE
[id number] [facility] INSTRUCTIONS  [facility]    Surgery Date:   11/13/23     Your surgeon's office or [facility] staff will call you between 4 PM- 8 PM the day before surgery with your arrival time. If your surgery is on a Monday, you will receive a call the preceding Friday. If your surgeon's office has given you, your arrival time, then go by that time. Please report to [facility] Patient Access/Admitting on the 1st floor. Bring your insurance card, photo identification, and any copayment ( if applicable). If you are going home the same day of your surgery, you must have a responsible adult to drive you home. You need to have a responsible adult to stay with you the first 24 hours after surgery and you should not drive a car for 24 hours following your surgery. If you are being admitted to the hospital, please leave personal belongings/luggage in your car until you have an assigned hospital room number. Please refer to [facility] book for Pre-operative Nutrition Plan. Do NOT drink alcohol or smoke 24 hours before surgery. STOP smoking for 14 days prior as it helps with breathing and healing after surgery. Please wear comfortable clothes. Wear your glasses instead of contacts. We ask that all money, jewelry and valuables be left at home. Wear no make up, particularly mascara, the day of surgery. All body piercings, rings, and jewelry need to be removed and left at home. Do not wear any nail polish except or clear. Please wear your hair loose or down, no pony-tails, buns, or any metal hair accessories. You may wear deodorant. Do not shave any body area within 24 hours of your surgery. Please follow all instructions to avoid any potential surgical cancellation. Should your physical condition change, (i.e. fever, cold, flu, etc.) please notify your surgeon as soon as possible. It is important to be on time.  If a situation occurs where you may be delayed, please call:  [phone] / [phone]

## 2023-10-29 LAB
BACTERIA SPEC CULT: NORMAL
CC UR VC: NORMAL
SERVICE CMNT-IMP: NORMAL

## 2023-10-30 NOTE — PERIOP NOTE
CALLED DR CARTAGENA'S OFFICE TO REPORT CREATININE 1.77 AND URINE CULTURE + FOR GREATER THAN 2 ORGANISMS, CONTAMINATED SPECIMEN, REQUESTED REPEAT.  STATES SHE SENT MESSAGE TO NURSE AND NURSE WILL CALL BACK. NURSE CALLED BACK. REPORTED CREATININE AND URINE CULTURE RESULTS. RESULTS FAXED TO SURGEON'S OFFICE AGAIN PER NURSE REQUEST. SHE WILL CALL BACK AFTER SHE SPEAKS WITH SURGEON.

## 2023-10-31 NOTE — PERIOP NOTE
CALLED DR Ailyn Dove OFFICE AND SPOKE TO FIORDALIZA, IN REGARDS TO ABNORMAL URINE CULTURE RESULT STATED SHE WILL NOTIFY NURSE AND NURSE WILL CONTACT PAT NURSE.

## 2023-11-02 NOTE — PERIOP NOTE
CALLED SPOKE WITH DR. Fidel Peterson OFFICE, STATS IN THEIR NOTES AWARE OF URINE CULTURE RESULTS, STATES URINE WAS TAKEN FROM OSTOMY BAG SO WILL NOT REPEAT, DR. Feng Clifford HAS SENT IN A PRESCRIPTION FOR PT TO START TAKING BACTERIUM 3 DAYS PRIOR TO DOS, ROSI Presotn NURSE, STATES IN HER NOTES SHE CALLED BACK ON 11/1/2023 LVM PT WILL BE TREATED PRE-OP

## 2023-11-13 ENCOUNTER — ANESTHESIA (OUTPATIENT)
Facility: HOSPITAL | Age: 78
DRG: 355 | End: 2023-11-13
Payer: MEDICARE

## 2023-11-13 ENCOUNTER — ANESTHESIA EVENT (OUTPATIENT)
Facility: HOSPITAL | Age: 78
DRG: 355 | End: 2023-11-13
Payer: MEDICARE

## 2023-11-13 ENCOUNTER — HOSPITAL ENCOUNTER (INPATIENT)
Facility: HOSPITAL | Age: 78
LOS: 3 days | Discharge: NURSING, REHAB OR LONG-TERM CARE | DRG: 355 | End: 2023-11-16
Attending: UROLOGY | Admitting: UROLOGY
Payer: MEDICARE

## 2023-11-13 DIAGNOSIS — Z98.890 STATUS POST SURGERY: Primary | ICD-10-CM

## 2023-11-13 DIAGNOSIS — F51.02 ADJUSTMENT INSOMNIA: ICD-10-CM

## 2023-11-13 PROBLEM — N13.30 HYDRONEPHROSIS: Status: ACTIVE | Noted: 2023-11-13

## 2023-11-13 LAB
ABO + RH BLD: NORMAL
ANION GAP SERPL CALC-SCNC: 5 MMOL/L (ref 5–15)
BLOOD GROUP ANTIBODIES SERPL: NORMAL
BUN SERPL-MCNC: 34 MG/DL (ref 6–20)
BUN/CREAT SERPL: 18 (ref 12–20)
CALCIUM SERPL-MCNC: 8.7 MG/DL (ref 8.5–10.1)
CHLORIDE SERPL-SCNC: 108 MMOL/L (ref 97–108)
CO2 SERPL-SCNC: 24 MMOL/L (ref 21–32)
CREAT SERPL-MCNC: 1.93 MG/DL (ref 0.7–1.3)
GLUCOSE SERPL-MCNC: 158 MG/DL (ref 65–100)
HCT VFR BLD AUTO: 30.4 % (ref 36.6–50.3)
HGB BLD-MCNC: 9.7 G/DL (ref 12.1–17)
POTASSIUM SERPL-SCNC: 4.1 MMOL/L (ref 3.5–5.1)
SODIUM SERPL-SCNC: 137 MMOL/L (ref 136–145)
SPECIMEN EXP DATE BLD: NORMAL

## 2023-11-13 PROCEDURE — 3600000004 HC SURGERY LEVEL 4 BASE: Performed by: UROLOGY

## 2023-11-13 PROCEDURE — 88307 TISSUE EXAM BY PATHOLOGIST: CPT

## 2023-11-13 PROCEDURE — 88302 TISSUE EXAM BY PATHOLOGIST: CPT

## 2023-11-13 PROCEDURE — 86900 BLOOD TYPING SEROLOGIC ABO: CPT

## 2023-11-13 PROCEDURE — 0WQF0ZZ REPAIR ABDOMINAL WALL, OPEN APPROACH: ICD-10-PCS | Performed by: UROLOGY

## 2023-11-13 PROCEDURE — 0TT10ZZ RESECTION OF LEFT KIDNEY, OPEN APPROACH: ICD-10-PCS | Performed by: UROLOGY

## 2023-11-13 PROCEDURE — 6360000002 HC RX W HCPCS: Performed by: UROLOGY

## 2023-11-13 PROCEDURE — 2580000003 HC RX 258: Performed by: UROLOGY

## 2023-11-13 PROCEDURE — 6360000002 HC RX W HCPCS: Performed by: NURSE ANESTHETIST, CERTIFIED REGISTERED

## 2023-11-13 PROCEDURE — 2720000010 HC SURG SUPPLY STERILE: Performed by: UROLOGY

## 2023-11-13 PROCEDURE — 3700000001 HC ADD 15 MINUTES (ANESTHESIA): Performed by: UROLOGY

## 2023-11-13 PROCEDURE — 6370000000 HC RX 637 (ALT 250 FOR IP): Performed by: UROLOGY

## 2023-11-13 PROCEDURE — 86901 BLOOD TYPING SEROLOGIC RH(D): CPT

## 2023-11-13 PROCEDURE — C9290 INJ, BUPIVACAINE LIPOSOME: HCPCS | Performed by: UROLOGY

## 2023-11-13 PROCEDURE — 86850 RBC ANTIBODY SCREEN: CPT

## 2023-11-13 PROCEDURE — 2500000003 HC RX 250 WO HCPCS: Performed by: NURSE ANESTHETIST, CERTIFIED REGISTERED

## 2023-11-13 PROCEDURE — 80048 BASIC METABOLIC PNL TOTAL CA: CPT

## 2023-11-13 PROCEDURE — 36415 COLL VENOUS BLD VENIPUNCTURE: CPT

## 2023-11-13 PROCEDURE — 85018 HEMOGLOBIN: CPT

## 2023-11-13 PROCEDURE — 7100000001 HC PACU RECOVERY - ADDTL 15 MIN: Performed by: UROLOGY

## 2023-11-13 PROCEDURE — 2580000003 HC RX 258: Performed by: NURSE ANESTHETIST, CERTIFIED REGISTERED

## 2023-11-13 PROCEDURE — 85014 HEMATOCRIT: CPT

## 2023-11-13 PROCEDURE — 2700000000 HC OXYGEN THERAPY PER DAY

## 2023-11-13 PROCEDURE — 2709999900 HC NON-CHARGEABLE SUPPLY: Performed by: UROLOGY

## 2023-11-13 PROCEDURE — 3600000014 HC SURGERY LEVEL 4 ADDTL 15MIN: Performed by: UROLOGY

## 2023-11-13 PROCEDURE — 7100000000 HC PACU RECOVERY - FIRST 15 MIN: Performed by: UROLOGY

## 2023-11-13 PROCEDURE — P9045 ALBUMIN (HUMAN), 5%, 250 ML: HCPCS | Performed by: NURSE ANESTHETIST, CERTIFIED REGISTERED

## 2023-11-13 PROCEDURE — 1100000000 HC RM PRIVATE

## 2023-11-13 PROCEDURE — 3700000000 HC ANESTHESIA ATTENDED CARE: Performed by: UROLOGY

## 2023-11-13 DEVICE — HEMOLOK L 6 CLIPS/CART
Type: IMPLANTABLE DEVICE | Site: ABDOMEN | Status: FUNCTIONAL
Brand: WECK

## 2023-11-13 DEVICE — HORIZON TI MED 6/CART
Type: IMPLANTABLE DEVICE | Site: ABDOMEN | Status: FUNCTIONAL
Brand: WECK

## 2023-11-13 DEVICE — THE ECHELON, ECHELON ENDOPATH™ AND ECHELON FLEX™ FAMILIES OF ENDOSCOPIC LINEAR CUTTERS AND RELOADS ARE STERILE, SINGLE PATIENT USE INSTRUMENTS THAT SIMULTANEOUSLY CUT AND STAPLE TISSUE. THERE ARE SIX STAGGERED ROWS OF STAPLES, THREE ON EITHER SIDE OF THE CUT LINE. THE 45 MM INSTRUMENTS HAVE A STAPLE LINE THATIS APPROXIMATELY 45 MM LONG AND A CUT LINE THAT IS APPROXIMATELY 42 MM LONG. THE SHAFT CAN ROTATE FREELY IN BOTH DIRECTIONS AND AN ARTICULATION MECHANISM ON ARTICULATING INSTRUMENTS ENABLES BENDING THE DISTAL PORTIONOF THE SHAFT TO FACILITATE LATERAL ACCESS OF THE OPERATIVE SITE.THE INSTRUMENTS ARE SHIPPED WITHOUT A RELOAD AND MUST BE LOADED PRIOR TO USE. A STAPLE RETAINING CAP ON THE RELOAD PROTECTS THE STAPLE LEG POINTS DURING SHIPPING AND TRANSPORTATION. THE INSTRUMENTS’ LOCK-OUT FEATURE IS DESIGNED TO PREVENT A USED RELOAD FROM BEING REFIRED.
Type: IMPLANTABLE DEVICE | Site: ABDOMEN | Status: FUNCTIONAL
Brand: ECHELON ENDOPATH

## 2023-11-13 RX ORDER — SODIUM CHLORIDE, SODIUM LACTATE, POTASSIUM CHLORIDE, CALCIUM CHLORIDE 600; 310; 30; 20 MG/100ML; MG/100ML; MG/100ML; MG/100ML
INJECTION, SOLUTION INTRAVENOUS CONTINUOUS PRN
Status: DISCONTINUED | OUTPATIENT
Start: 2023-11-13 | End: 2023-11-13 | Stop reason: SDUPTHER

## 2023-11-13 RX ORDER — EPHEDRINE SULFATE/0.9% NACL/PF 50 MG/5 ML
SYRINGE (ML) INTRAVENOUS PRN
Status: DISCONTINUED | OUTPATIENT
Start: 2023-11-13 | End: 2023-11-13 | Stop reason: SDUPTHER

## 2023-11-13 RX ORDER — LIDOCAINE HYDROCHLORIDE ANHYDROUS AND DEXTROSE MONOHYDRATE 5; 400 G/100ML; MG/100ML
INJECTION, SOLUTION INTRAVENOUS CONTINUOUS PRN
Status: DISCONTINUED | OUTPATIENT
Start: 2023-11-13 | End: 2023-11-13 | Stop reason: SDUPTHER

## 2023-11-13 RX ORDER — LIDOCAINE HYDROCHLORIDE ANHYDROUS AND DEXTROSE MONOHYDRATE 5; 400 G/100ML; MG/100ML
1 INJECTION, SOLUTION INTRAVENOUS CONTINUOUS
Status: DISPENSED | OUTPATIENT
Start: 2023-11-13 | End: 2023-11-14

## 2023-11-13 RX ORDER — DEXAMETHASONE SODIUM PHOSPHATE 4 MG/ML
INJECTION, SOLUTION INTRA-ARTICULAR; INTRALESIONAL; INTRAMUSCULAR; INTRAVENOUS; SOFT TISSUE PRN
Status: DISCONTINUED | OUTPATIENT
Start: 2023-11-13 | End: 2023-11-13 | Stop reason: SDUPTHER

## 2023-11-13 RX ORDER — FENTANYL CITRATE 50 UG/ML
INJECTION, SOLUTION INTRAMUSCULAR; INTRAVENOUS PRN
Status: DISCONTINUED | OUTPATIENT
Start: 2023-11-13 | End: 2023-11-13 | Stop reason: SDUPTHER

## 2023-11-13 RX ORDER — BUPIVACAINE HYDROCHLORIDE 5 MG/ML
INJECTION, SOLUTION EPIDURAL; INTRACAUDAL PRN
Status: DISCONTINUED | OUTPATIENT
Start: 2023-11-13 | End: 2023-11-13 | Stop reason: HOSPADM

## 2023-11-13 RX ORDER — SODIUM CHLORIDE 0.9 % (FLUSH) 0.9 %
5-40 SYRINGE (ML) INJECTION EVERY 12 HOURS SCHEDULED
Status: DISCONTINUED | OUTPATIENT
Start: 2023-11-13 | End: 2023-11-13 | Stop reason: HOSPADM

## 2023-11-13 RX ORDER — MEPERIDINE HYDROCHLORIDE 25 MG/ML
12.5 INJECTION INTRAMUSCULAR; INTRAVENOUS; SUBCUTANEOUS EVERY 5 MIN PRN
Status: DISCONTINUED | OUTPATIENT
Start: 2023-11-13 | End: 2023-11-13 | Stop reason: HOSPADM

## 2023-11-13 RX ORDER — PHENYLEPHRINE HCL IN 0.9% NACL 0.4MG/10ML
SYRINGE (ML) INTRAVENOUS PRN
Status: DISCONTINUED | OUTPATIENT
Start: 2023-11-13 | End: 2023-11-13 | Stop reason: SDUPTHER

## 2023-11-13 RX ORDER — SODIUM CHLORIDE, SODIUM LACTATE, POTASSIUM CHLORIDE, CALCIUM CHLORIDE 600; 310; 30; 20 MG/100ML; MG/100ML; MG/100ML; MG/100ML
INJECTION, SOLUTION INTRAVENOUS CONTINUOUS
Status: DISCONTINUED | OUTPATIENT
Start: 2023-11-13 | End: 2023-11-14

## 2023-11-13 RX ORDER — TRAMADOL HYDROCHLORIDE 50 MG/1
50 TABLET ORAL EVERY 6 HOURS PRN
Status: DISCONTINUED | OUTPATIENT
Start: 2023-11-13 | End: 2023-11-16 | Stop reason: HOSPADM

## 2023-11-13 RX ORDER — ONDANSETRON 2 MG/ML
4 INJECTION INTRAMUSCULAR; INTRAVENOUS ONCE
Status: DISCONTINUED | OUTPATIENT
Start: 2023-11-13 | End: 2023-11-13 | Stop reason: HOSPADM

## 2023-11-13 RX ORDER — SUCCINYLCHOLINE CHLORIDE 20 MG/ML
INJECTION INTRAMUSCULAR; INTRAVENOUS PRN
Status: DISCONTINUED | OUTPATIENT
Start: 2023-11-13 | End: 2023-11-13 | Stop reason: SDUPTHER

## 2023-11-13 RX ORDER — SODIUM CHLORIDE 9 MG/ML
INJECTION, SOLUTION INTRAVENOUS PRN
Status: DISCONTINUED | OUTPATIENT
Start: 2023-11-13 | End: 2023-11-16 | Stop reason: HOSPADM

## 2023-11-13 RX ORDER — HYDROMORPHONE HYDROCHLORIDE 1 MG/ML
0.25 INJECTION, SOLUTION INTRAMUSCULAR; INTRAVENOUS; SUBCUTANEOUS EVERY 5 MIN PRN
Status: DISCONTINUED | OUTPATIENT
Start: 2023-11-13 | End: 2023-11-13 | Stop reason: HOSPADM

## 2023-11-13 RX ORDER — KETAMINE HCL IN NACL, ISO-OSM 100MG/10ML
SYRINGE (ML) INJECTION PRN
Status: DISCONTINUED | OUTPATIENT
Start: 2023-11-13 | End: 2023-11-13 | Stop reason: SDUPTHER

## 2023-11-13 RX ORDER — FENTANYL CITRATE 50 UG/ML
100 INJECTION, SOLUTION INTRAMUSCULAR; INTRAVENOUS
Status: DISCONTINUED | OUTPATIENT
Start: 2023-11-13 | End: 2023-11-13 | Stop reason: HOSPADM

## 2023-11-13 RX ORDER — SODIUM CHLORIDE, SODIUM LACTATE, POTASSIUM CHLORIDE, CALCIUM CHLORIDE 600; 310; 30; 20 MG/100ML; MG/100ML; MG/100ML; MG/100ML
INJECTION, SOLUTION INTRAVENOUS CONTINUOUS
Status: DISCONTINUED | OUTPATIENT
Start: 2023-11-13 | End: 2023-11-16 | Stop reason: HOSPADM

## 2023-11-13 RX ORDER — ONDANSETRON 4 MG/1
4 TABLET, ORALLY DISINTEGRATING ORAL EVERY 8 HOURS PRN
Status: DISCONTINUED | OUTPATIENT
Start: 2023-11-13 | End: 2023-11-16 | Stop reason: HOSPADM

## 2023-11-13 RX ORDER — ONDANSETRON 2 MG/ML
4 INJECTION INTRAMUSCULAR; INTRAVENOUS
Status: DISCONTINUED | OUTPATIENT
Start: 2023-11-13 | End: 2023-11-13 | Stop reason: HOSPADM

## 2023-11-13 RX ORDER — MIDAZOLAM HYDROCHLORIDE 1 MG/ML
INJECTION INTRAMUSCULAR; INTRAVENOUS PRN
Status: DISCONTINUED | OUTPATIENT
Start: 2023-11-13 | End: 2023-11-13 | Stop reason: SDUPTHER

## 2023-11-13 RX ORDER — SODIUM CHLORIDE 9 MG/ML
INJECTION, SOLUTION INTRAVENOUS PRN
Status: DISCONTINUED | OUTPATIENT
Start: 2023-11-13 | End: 2023-11-13 | Stop reason: HOSPADM

## 2023-11-13 RX ORDER — ZOLPIDEM TARTRATE 5 MG/1
5 TABLET ORAL NIGHTLY PRN
Status: DISCONTINUED | OUTPATIENT
Start: 2023-11-13 | End: 2023-11-16 | Stop reason: HOSPADM

## 2023-11-13 RX ORDER — MIDAZOLAM HYDROCHLORIDE 2 MG/2ML
2 INJECTION, SOLUTION INTRAMUSCULAR; INTRAVENOUS
Status: DISCONTINUED | OUTPATIENT
Start: 2023-11-13 | End: 2023-11-13 | Stop reason: HOSPADM

## 2023-11-13 RX ORDER — ALBUMIN, HUMAN INJ 5% 5 %
SOLUTION INTRAVENOUS PRN
Status: DISCONTINUED | OUTPATIENT
Start: 2023-11-13 | End: 2023-11-13 | Stop reason: SDUPTHER

## 2023-11-13 RX ORDER — DIPHENHYDRAMINE HYDROCHLORIDE 50 MG/ML
12.5 INJECTION INTRAMUSCULAR; INTRAVENOUS
Status: DISCONTINUED | OUTPATIENT
Start: 2023-11-13 | End: 2023-11-13 | Stop reason: HOSPADM

## 2023-11-13 RX ORDER — ONDANSETRON 2 MG/ML
4 INJECTION INTRAMUSCULAR; INTRAVENOUS EVERY 6 HOURS PRN
Status: DISCONTINUED | OUTPATIENT
Start: 2023-11-13 | End: 2023-11-16 | Stop reason: HOSPADM

## 2023-11-13 RX ORDER — ROCURONIUM BROMIDE 10 MG/ML
INJECTION, SOLUTION INTRAVENOUS PRN
Status: DISCONTINUED | OUTPATIENT
Start: 2023-11-13 | End: 2023-11-13 | Stop reason: SDUPTHER

## 2023-11-13 RX ORDER — LIDOCAINE HYDROCHLORIDE 20 MG/ML
INJECTION, SOLUTION EPIDURAL; INFILTRATION; INTRACAUDAL; PERINEURAL PRN
Status: DISCONTINUED | OUTPATIENT
Start: 2023-11-13 | End: 2023-11-13 | Stop reason: SDUPTHER

## 2023-11-13 RX ORDER — SODIUM CHLORIDE, SODIUM LACTATE, POTASSIUM CHLORIDE, CALCIUM CHLORIDE 600; 310; 30; 20 MG/100ML; MG/100ML; MG/100ML; MG/100ML
INJECTION, SOLUTION INTRAVENOUS CONTINUOUS
Status: DISCONTINUED | OUTPATIENT
Start: 2023-11-13 | End: 2023-11-13 | Stop reason: HOSPADM

## 2023-11-13 RX ORDER — ESCITALOPRAM OXALATE 10 MG/1
10 TABLET ORAL DAILY
Status: DISCONTINUED | OUTPATIENT
Start: 2023-11-14 | End: 2023-11-16 | Stop reason: HOSPADM

## 2023-11-13 RX ORDER — FENTANYL CITRATE 50 UG/ML
50 INJECTION, SOLUTION INTRAMUSCULAR; INTRAVENOUS EVERY 5 MIN PRN
Status: DISCONTINUED | OUTPATIENT
Start: 2023-11-13 | End: 2023-11-13 | Stop reason: HOSPADM

## 2023-11-13 RX ORDER — BUPROPION HYDROCHLORIDE 100 MG/1
100 TABLET, EXTENDED RELEASE ORAL DAILY
Status: DISCONTINUED | OUTPATIENT
Start: 2023-11-14 | End: 2023-11-16 | Stop reason: HOSPADM

## 2023-11-13 RX ORDER — HYDRALAZINE HYDROCHLORIDE 20 MG/ML
10 INJECTION INTRAMUSCULAR; INTRAVENOUS
Status: DISCONTINUED | OUTPATIENT
Start: 2023-11-13 | End: 2023-11-13 | Stop reason: HOSPADM

## 2023-11-13 RX ORDER — DEXMEDETOMIDINE HYDROCHLORIDE 100 UG/ML
INJECTION, SOLUTION INTRAVENOUS PRN
Status: DISCONTINUED | OUTPATIENT
Start: 2023-11-13 | End: 2023-11-13 | Stop reason: SDUPTHER

## 2023-11-13 RX ORDER — MAGNESIUM SULFATE HEPTAHYDRATE 40 MG/ML
INJECTION, SOLUTION INTRAVENOUS PRN
Status: DISCONTINUED | OUTPATIENT
Start: 2023-11-13 | End: 2023-11-13 | Stop reason: SDUPTHER

## 2023-11-13 RX ORDER — SODIUM CHLORIDE 0.9 % (FLUSH) 0.9 %
5-40 SYRINGE (ML) INJECTION PRN
Status: DISCONTINUED | OUTPATIENT
Start: 2023-11-13 | End: 2023-11-16 | Stop reason: HOSPADM

## 2023-11-13 RX ORDER — SODIUM CHLORIDE 9 MG/ML
INJECTION, SOLUTION INTRAVENOUS CONTINUOUS
Status: DISCONTINUED | OUTPATIENT
Start: 2023-11-13 | End: 2023-11-13 | Stop reason: HOSPADM

## 2023-11-13 RX ORDER — SODIUM CHLORIDE 0.9 % (FLUSH) 0.9 %
5-40 SYRINGE (ML) INJECTION EVERY 12 HOURS SCHEDULED
Status: DISCONTINUED | OUTPATIENT
Start: 2023-11-13 | End: 2023-11-16 | Stop reason: HOSPADM

## 2023-11-13 RX ORDER — SODIUM CHLORIDE 0.9 % (FLUSH) 0.9 %
5-40 SYRINGE (ML) INJECTION PRN
Status: DISCONTINUED | OUTPATIENT
Start: 2023-11-13 | End: 2023-11-13 | Stop reason: HOSPADM

## 2023-11-13 RX ORDER — PROCHLORPERAZINE EDISYLATE 5 MG/ML
5 INJECTION INTRAMUSCULAR; INTRAVENOUS
Status: DISCONTINUED | OUTPATIENT
Start: 2023-11-13 | End: 2023-11-13 | Stop reason: HOSPADM

## 2023-11-13 RX ORDER — ONDANSETRON 2 MG/ML
INJECTION INTRAMUSCULAR; INTRAVENOUS PRN
Status: DISCONTINUED | OUTPATIENT
Start: 2023-11-13 | End: 2023-11-13 | Stop reason: SDUPTHER

## 2023-11-13 RX ORDER — ACETAMINOPHEN 500 MG
1000 TABLET ORAL EVERY 6 HOURS
Status: DISCONTINUED | OUTPATIENT
Start: 2023-11-13 | End: 2023-11-16 | Stop reason: HOSPADM

## 2023-11-13 RX ADMIN — DEXAMETHASONE SODIUM PHOSPHATE 8 MG: 4 INJECTION INTRA-ARTICULAR; INTRALESIONAL; INTRAMUSCULAR; INTRAVENOUS; SOFT TISSUE at 08:37

## 2023-11-13 RX ADMIN — SODIUM CHLORIDE, SODIUM LACTATE, POTASSIUM CHLORIDE, CALCIUM CHLORIDE: 600; 310; 30; 20 INJECTION, SOLUTION INTRAVENOUS at 08:20

## 2023-11-13 RX ADMIN — SUGAMMADEX 200 MG: 100 INJECTION, SOLUTION INTRAVENOUS at 12:03

## 2023-11-13 RX ADMIN — Medication 10 MG: at 08:35

## 2023-11-13 RX ADMIN — ROCURONIUM BROMIDE 50 MG: 10 INJECTION, SOLUTION INTRAVENOUS at 09:26

## 2023-11-13 RX ADMIN — LIDOCAINE HYDROCHLORIDE 1 MG/KG/HR: 4 INJECTION, SOLUTION INTRAVENOUS at 12:52

## 2023-11-13 RX ADMIN — LIDOCAINE HYDROCHLORIDE 1.5 MG/KG/HR: 4 INJECTION, SOLUTION INTRAVENOUS at 08:32

## 2023-11-13 RX ADMIN — MIDAZOLAM 1 MG: 1 INJECTION INTRAMUSCULAR; INTRAVENOUS at 08:20

## 2023-11-13 RX ADMIN — LIDOCAINE HYDROCHLORIDE 100 MG: 20 INJECTION, SOLUTION EPIDURAL; INFILTRATION; INTRACAUDAL; PERINEURAL at 08:28

## 2023-11-13 RX ADMIN — ROCURONIUM BROMIDE 40 MG: 10 INJECTION, SOLUTION INTRAVENOUS at 08:39

## 2023-11-13 RX ADMIN — DEXMEDETOMIDINE 8 MCG: 100 INJECTION, SOLUTION INTRAVENOUS at 11:36

## 2023-11-13 RX ADMIN — ALBUMIN (HUMAN) 250 ML: 12.5 INJECTION, SOLUTION INTRAVENOUS at 08:48

## 2023-11-13 RX ADMIN — DEXMEDETOMIDINE 8 MCG: 100 INJECTION, SOLUTION INTRAVENOUS at 11:39

## 2023-11-13 RX ADMIN — SUCCINYLCHOLINE CHLORIDE 140 MG: 20 INJECTION, SOLUTION INTRAMUSCULAR; INTRAVENOUS at 08:28

## 2023-11-13 RX ADMIN — Medication 10 MG: at 10:29

## 2023-11-13 RX ADMIN — Medication 80 MCG: at 08:33

## 2023-11-13 RX ADMIN — Medication 50 MG: at 08:39

## 2023-11-13 RX ADMIN — ONDANSETRON 4 MG: 2 INJECTION INTRAMUSCULAR; INTRAVENOUS at 18:16

## 2023-11-13 RX ADMIN — SODIUM CHLORIDE, POTASSIUM CHLORIDE, SODIUM LACTATE AND CALCIUM CHLORIDE: 600; 310; 30; 20 INJECTION, SOLUTION INTRAVENOUS at 08:20

## 2023-11-13 RX ADMIN — PROPOFOL 50 MG: 10 INJECTION, EMULSION INTRAVENOUS at 11:35

## 2023-11-13 RX ADMIN — ALBUMIN (HUMAN) 250 ML: 12.5 INJECTION, SOLUTION INTRAVENOUS at 09:57

## 2023-11-13 RX ADMIN — Medication 80 MCG: at 10:41

## 2023-11-13 RX ADMIN — ACETAMINOPHEN 1000 MG: 500 TABLET ORAL at 20:36

## 2023-11-13 RX ADMIN — ROCURONIUM BROMIDE 20 MG: 10 INJECTION, SOLUTION INTRAVENOUS at 10:32

## 2023-11-13 RX ADMIN — HYDROMORPHONE HYDROCHLORIDE 0.5 MG: 1 INJECTION, SOLUTION INTRAMUSCULAR; INTRAVENOUS; SUBCUTANEOUS at 12:05

## 2023-11-13 RX ADMIN — ONDANSETRON HYDROCHLORIDE 4 MG: 2 INJECTION, SOLUTION INTRAMUSCULAR; INTRAVENOUS at 11:28

## 2023-11-13 RX ADMIN — ZOLPIDEM TARTRATE 5 MG: 5 TABLET ORAL at 20:36

## 2023-11-13 RX ADMIN — SODIUM CHLORIDE, POTASSIUM CHLORIDE, SODIUM LACTATE AND CALCIUM CHLORIDE: 600; 310; 30; 20 INJECTION, SOLUTION INTRAVENOUS at 13:00

## 2023-11-13 RX ADMIN — ROCURONIUM BROMIDE 10 MG: 10 INJECTION, SOLUTION INTRAVENOUS at 08:28

## 2023-11-13 RX ADMIN — HYDROMORPHONE HYDROCHLORIDE 0.5 MG: 1 INJECTION, SOLUTION INTRAMUSCULAR; INTRAVENOUS; SUBCUTANEOUS at 11:38

## 2023-11-13 RX ADMIN — Medication 10 MG: at 11:45

## 2023-11-13 RX ADMIN — ROCURONIUM BROMIDE 20 MG: 10 INJECTION, SOLUTION INTRAVENOUS at 11:37

## 2023-11-13 RX ADMIN — Medication 80 MCG: at 10:09

## 2023-11-13 RX ADMIN — Medication 80 MCG: at 11:22

## 2023-11-13 RX ADMIN — FENTANYL CITRATE 50 MCG: 50 INJECTION, SOLUTION INTRAMUSCULAR; INTRAVENOUS at 09:00

## 2023-11-13 RX ADMIN — FENTANYL CITRATE 50 MCG: 50 INJECTION, SOLUTION INTRAMUSCULAR; INTRAVENOUS at 08:28

## 2023-11-13 RX ADMIN — DEXMEDETOMIDINE 4 MCG: 100 INJECTION, SOLUTION INTRAVENOUS at 11:29

## 2023-11-13 RX ADMIN — MAGNESIUM SULFATE HEPTAHYDRATE 2000 MG: 40 INJECTION, SOLUTION INTRAVENOUS at 08:49

## 2023-11-13 RX ADMIN — PROPOFOL 150 MG: 10 INJECTION, EMULSION INTRAVENOUS at 08:28

## 2023-11-13 RX ADMIN — SODIUM CHLORIDE, PRESERVATIVE FREE 10 ML: 5 INJECTION INTRAVENOUS at 20:36

## 2023-11-13 RX ADMIN — ACETAMINOPHEN 1000 MG: 500 TABLET ORAL at 15:43

## 2023-11-13 RX ADMIN — PHENYLEPHRINE HYDROCHLORIDE 40 MCG/MIN: 10 INJECTION INTRAVENOUS at 08:42

## 2023-11-13 RX ADMIN — CEFTRIAXONE SODIUM 2000 MG: 1 INJECTION, POWDER, FOR SOLUTION INTRAMUSCULAR; INTRAVENOUS at 08:59

## 2023-11-13 ASSESSMENT — PAIN SCALES - GENERAL
PAINLEVEL_OUTOF10: 5
PAINLEVEL_OUTOF10: 5

## 2023-11-13 ASSESSMENT — PAIN DESCRIPTION - LOCATION
LOCATION: ABDOMEN
LOCATION: ABDOMEN

## 2023-11-13 ASSESSMENT — PAIN - FUNCTIONAL ASSESSMENT: PAIN_FUNCTIONAL_ASSESSMENT: NONE - DENIES PAIN

## 2023-11-13 NOTE — FLOWSHEET NOTE
11/13/23 0910   Family Communication    Relationship to Patient Spouse    Phone Number [phone] Other Update Called   Delivery Origin Nurse   Message Disposition Family present - message delivered   Update Given Yes   Family Communication   Family Update Message Procedure started

## 2023-11-13 NOTE — BRIEF OP NOTE
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 PARASTOMAL HERNIA REPAIR (E R A S)    Surgeon(s):  Jamal Mcdermott MD    Assistant:  Surgical Assistant: Ortiz Hernandez    Anesthesia: General    Estimated Blood Loss (mL): 078     Complications: None    Specimens:   ID Type Source Tests Collected by Time Destination   1 : Left Kidney Tissue Kidney SURGICAL PATHOLOGY Jamal Mcdermott MD 11/13/2023 0604    2 : 2. Hernia Sac Tissue Abdomen SURGICAL PATHOLOGY Jamal Mcdermott MD 11/13/2023 1057        Implants:  Implant Name Type Inv. Item Serial No.  Lot No. LRB No. Used Action   Quan christinaolok Large   N/A  [id number] Left 1 Implanted   stapler reload   N/A [employer] ENDO-SURGERY_CR [id number] Left 1 Implanted   CLIP LIG M SATNAM TI HRT SHP WIRE HORZ 6 CLIPS PER PK - [id number]  CLIP LIG M SATNAM TI HRT SHP WIRE HORZ 6 CLIPS PER PK  [employer] [id number] Left 1 Implanted         Drains:   Closed/Suction Drain Left;Lateral LLQ;Abdomen Bulb (Active)   Site Description Clean, dry & intact 11/13/23 1207   Drainage Appearance Bloody 11/13/23 1207   Drain Status To bulb suction 11/13/23 1207       Closed/Suction Drain Lateral;Right RLQ;Abdomen Bulb (Active)   Site Description Clean, dry & intact 11/13/23 1208   Drainage Appearance Bloody 11/13/23 1208   Drain Status To bulb suction 11/13/23 1208       Nephrostomy Tube 2 Left Flank 10.2 fr (Active)       Findings: large hernia sac.   Shrunken l kidney      Electronically signed by Chidi Caballero MD on 11/13/2023 at 12:09 PM

## 2023-11-13 NOTE — FLOWSHEET NOTE
11/13/23 1208   Handoff   Communication Given Transfer Handoff   Handoff Given To PACU   Handoff Received From Manuel Ora RN   Handoff Communication Face to Face   End of Shift Check Performed N/A

## 2023-11-13 NOTE — PROGRESS NOTES
1002 Laparoscopic Nephrectomy performed. Incisional areas covered with sterile drapes. Pt repositioned in supine position. Ileo conduit prepped by Dr. Titus Cantor and 16 fr Fuentes introduced into ileo conduit and balloon inflated with 10 mls sterile water. Abdomen re-prepped again and pt drapped for second part of the procedure. Second procedure started at 10.30 hrs. Mervat Briseno RN

## 2023-11-13 NOTE — FLOWSHEET NOTE
11/13/23 1152   Incision 11/13/23 Abdomen Lateral;Lower;Right; Anterior   Date First Assessed: 11/13/23   Location: Abdomen  Incision Location Orientation: Lateral;Lower;Right; Anterior  Incision Description (Comments): Peristomal incision   Dressing Status Clean   Incision Cleansed Cleansed with saline; Other (Comment)  (ostomy bag to ileo conduit)   Closure Sutures   Margins Approximated

## 2023-11-13 NOTE — OP NOTE
[facility]  OPERATIVE REPORT    Name:  Alvaro Wilde  MR#:  [id number]  :  1945  ACCOUNT #:  [de-identified]  DATE OF SERVICE:  2023    PREOPERATIVE DIAGNOSIS:  Nonfunctioning left kidney, parastomal hernia. POSTOPERATIVE DIAGNOSIS:  Nonfunctioning left kidney, parastomal hernia. PROCEDURE PERFORMED:  Left nephrectomy, laparoscopic with hand assist; repair of parastomal hernia. SURGEON:  Katherine Das MD    ASSISTANT:  Ru Polo. ANESTHESIA:  General.    COMPLICATIONS:  None. SPECIMENS REMOVED:  Left kidney, hernia sac. IMPLANTS:  None. ESTIMATED BLOOD LOSS:  200 mL. PROCEDURE:  After anesthesia, the patient was initially placed in the left flank position. All pressure points were carefully padded. A midline incision was made above the umbilicus, and two 12 mm ports were placed in the left mid abdomen, and left colon was reflected medially. The spleen was carefully protected. The left kidney was encased in scarred perinephric adipose tissue, and using the LigaSure device, the kidney was stripped from its attachments carefully. The ureter was isolated and the stent was palpable within the left ureter. The kidney was mobilized and the nephrostomy tube was palpated entering the left kidney through the left flank. The upper pole was mobilized and the pedicle was isolated. The ureter was opened and the distal end of the nephroureteral stent was brought out into the operative field and then the nephrostomy tube was divided at the level of the flank exit, and then the pedicle was divided with an Endo-DENY stapling device. The kidney was brought out through the hand port and passed off the field. Hemostasis was obtained with electrocautery. A drain was brought out through the more lateral port and placed in the bed of the nephrectomy to drain the distal ureter. The distal ureter was left open.     At this point, the patient was repositioned,

## 2023-11-13 NOTE — FLOWSHEET NOTE
11/13/23 1153   Incision 11/13/23 Abdomen Medial;Right   Date First Assessed: 11/13/23   Present on Original Admission: No  Location: Abdomen  Incision Location Orientation: Medial;Right  Incision Description (Comments): Additional two trocar incisions anterior abdomen , one which has the drain in situ. In... Dressing Status Clean;Dry; Intact   Incision Cleansed Cleansed with saline   Dressing/Treatment Gauze dressing/dressing sponge;Tape/Soft cloth adhesive tape;Tegaderm/transparent film dressing   Closure Staples

## 2023-11-14 LAB
ANION GAP SERPL CALC-SCNC: 7 MMOL/L (ref 5–15)
BUN SERPL-MCNC: 30 MG/DL (ref 6–20)
BUN/CREAT SERPL: 16 (ref 12–20)
CALCIUM SERPL-MCNC: 9.2 MG/DL (ref 8.5–10.1)
CHLORIDE SERPL-SCNC: 108 MMOL/L (ref 97–108)
CO2 SERPL-SCNC: 23 MMOL/L (ref 21–32)
CREAT SERPL-MCNC: 1.82 MG/DL (ref 0.7–1.3)
GLUCOSE SERPL-MCNC: 132 MG/DL (ref 65–100)
HCT VFR BLD AUTO: 27.9 % (ref 36.6–50.3)
HGB BLD-MCNC: 9 G/DL (ref 12.1–17)
POTASSIUM SERPL-SCNC: 4.3 MMOL/L (ref 3.5–5.1)
SODIUM SERPL-SCNC: 138 MMOL/L (ref 136–145)

## 2023-11-14 PROCEDURE — 97165 OT EVAL LOW COMPLEX 30 MIN: CPT

## 2023-11-14 PROCEDURE — 85018 HEMOGLOBIN: CPT

## 2023-11-14 PROCEDURE — 36415 COLL VENOUS BLD VENIPUNCTURE: CPT

## 2023-11-14 PROCEDURE — 6370000000 HC RX 637 (ALT 250 FOR IP): Performed by: UROLOGY

## 2023-11-14 PROCEDURE — 80048 BASIC METABOLIC PNL TOTAL CA: CPT

## 2023-11-14 PROCEDURE — 97530 THERAPEUTIC ACTIVITIES: CPT

## 2023-11-14 PROCEDURE — 1100000000 HC RM PRIVATE

## 2023-11-14 PROCEDURE — 6360000002 HC RX W HCPCS: Performed by: UROLOGY

## 2023-11-14 PROCEDURE — 2700000000 HC OXYGEN THERAPY PER DAY

## 2023-11-14 PROCEDURE — 85014 HEMATOCRIT: CPT

## 2023-11-14 PROCEDURE — 97161 PT EVAL LOW COMPLEX 20 MIN: CPT

## 2023-11-14 PROCEDURE — 2580000003 HC RX 258: Performed by: UROLOGY

## 2023-11-14 PROCEDURE — 94760 N-INVAS EAR/PLS OXIMETRY 1: CPT

## 2023-11-14 RX ORDER — LORAZEPAM 0.5 MG/1
1 TABLET ORAL EVERY 6 HOURS
Status: DISCONTINUED | OUTPATIENT
Start: 2023-11-14 | End: 2023-11-16 | Stop reason: HOSPADM

## 2023-11-14 RX ORDER — IBUPROFEN 600 MG/1
600 TABLET ORAL EVERY 8 HOURS PRN
Status: DISCONTINUED | OUTPATIENT
Start: 2023-11-14 | End: 2023-11-16 | Stop reason: HOSPADM

## 2023-11-14 RX ADMIN — SODIUM CHLORIDE, PRESERVATIVE FREE 10 ML: 5 INJECTION INTRAVENOUS at 21:13

## 2023-11-14 RX ADMIN — BUPROPION HYDROCHLORIDE 100 MG: 100 TABLET, FILM COATED, EXTENDED RELEASE ORAL at 08:41

## 2023-11-14 RX ADMIN — ACETAMINOPHEN 1000 MG: 500 TABLET ORAL at 21:12

## 2023-11-14 RX ADMIN — ACETAMINOPHEN 1000 MG: 500 TABLET ORAL at 08:41

## 2023-11-14 RX ADMIN — LORAZEPAM 1 MG: 0.5 TABLET ORAL at 21:12

## 2023-11-14 RX ADMIN — TRAMADOL HYDROCHLORIDE 50 MG: 50 TABLET ORAL at 01:47

## 2023-11-14 RX ADMIN — ACETAMINOPHEN 1000 MG: 500 TABLET ORAL at 15:13

## 2023-11-14 RX ADMIN — SODIUM CHLORIDE, POTASSIUM CHLORIDE, SODIUM LACTATE AND CALCIUM CHLORIDE: 600; 310; 30; 20 INJECTION, SOLUTION INTRAVENOUS at 01:32

## 2023-11-14 RX ADMIN — LORAZEPAM 1 MG: 0.5 TABLET ORAL at 15:58

## 2023-11-14 RX ADMIN — ONDANSETRON 4 MG: 2 INJECTION INTRAMUSCULAR; INTRAVENOUS at 08:54

## 2023-11-14 RX ADMIN — SODIUM CHLORIDE, POTASSIUM CHLORIDE, SODIUM LACTATE AND CALCIUM CHLORIDE: 600; 310; 30; 20 INJECTION, SOLUTION INTRAVENOUS at 15:23

## 2023-11-14 RX ADMIN — TRAMADOL HYDROCHLORIDE 50 MG: 50 TABLET ORAL at 11:46

## 2023-11-14 RX ADMIN — ESCITALOPRAM OXALATE 10 MG: 10 TABLET ORAL at 08:41

## 2023-11-14 RX ADMIN — SODIUM CHLORIDE, PRESERVATIVE FREE 2000 MG: 5 INJECTION INTRAVENOUS at 08:41

## 2023-11-14 RX ADMIN — ZOLPIDEM TARTRATE 5 MG: 5 TABLET ORAL at 21:12

## 2023-11-14 ASSESSMENT — PAIN SCALES - GENERAL
PAINLEVEL_OUTOF10: 8
PAINLEVEL_OUTOF10: 7
PAINLEVEL_OUTOF10: 3

## 2023-11-14 ASSESSMENT — PAIN DESCRIPTION - LOCATION
LOCATION: ABDOMEN
LOCATION: ABDOMEN

## 2023-11-14 ASSESSMENT — PAIN DESCRIPTION - DESCRIPTORS: DESCRIPTORS: ACHING

## 2023-11-14 NOTE — PLAN OF CARE
Provided: Role of Therapy  Education Method: Verbal  Barriers to Learning: None  Education Outcome: Verbalized understanding;Continued education needed    Thank you for this referral.  Brianda Hope OT  Minutes: 35    Occupational Therapy Evaluation Charge Determination   History Examination Decision-Making   LOW Complexity : Brief history review  LOW Complexity: 1-3 Performance deficits relating to physical, cognitive, or psychosocial skills that result in activity limitations and/or participation restrictions LOW Complexity: No comorbidities that affect functional and  no verbal  or physical assist needed to complete eval tasks   Based on the above components, the patient evaluation is determined to be of the following complexity level: Low

## 2023-11-14 NOTE — PROGRESS NOTES
Spiritual Care Partner Volunteer visited patient at [facility] in [address] on 11/14/2023   Documented by:  Viky Dais MDIV, [facility]

## 2023-11-14 NOTE — CARE COORDINATION
Care Management Initial Assessment        RUR: 11% Low   Readmission? No  1st IM letter given? Yes - 11/14/23  1st  letter given: NA    10:25AM - CM met with patient at bedside to introduce self and explain role. Patient lives with his wife in a condo with 1 step to enter and then elevator access to his condo. Patient was independent with ADL's and IADL's. Patient ambulates independently within his condo; however, uses a cane in the community. Patient also owns a C-pap machine and RW. Patient has history of HH with [facility] and history of SNF at [facility]. Per patient and wife, requesting short SNF stay prior to returning home. CM sent attending message with patient/wife SNF request; awaiting response. CM received response from attending who is in agreement with SNF. Referral sent to [facility] via mmCHANNEL. CM spoke with Jes Pickens, [facility] Coordinator (p: [phone]) to discuss. Awaiting determination. CM provided update to patient at bedside. 1:30PM - CM received call from Jes Pickens stating patient has been accepted. Patient will require 3 midnight stay for Medicare purposes. Earliest patient is able to admit is Thursday, 11/16. Attending made aware. CM provided update to patient at bedside. CM will continue to follow as needed.      11/14/23 1020   Service Assessment   Patient Orientation Alert and Oriented;Person;Place;Situation;Self   Cognition Alert   History Provided By Patient   Primary Caregiver Self   Accompanied By/Relationship Spouse   Support Systems Spouse/Significant Other   Patient's Healthcare Decision Maker is: Legal Next of Kin   PCP Verified by CM Yes   Last Visit to PCP Within last 3 months  (Dr. Lazara Weeks)   Prior Functional Level Independent in ADLs/IADLs   Current Functional Level Independent in ADLs/IADLs   Can patient return to prior living arrangement Yes   Ability to make needs known: Good   Family able to assist with home care needs: Yes   Would you like for me to discuss the discharge plan with any other family members/significant others, and if so, who? Yes  (Upon patient request)   Financial Resources [employer] None   Social/Functional History   Lives With Spouse   Type of Home Condo   ADL Assistance Independent   [facility]   Ambulation Assistance Independent   Transfer Assistance Independent   Discharge Planning   Type of Residence House   Living Arrangements Spouse/Significant Other   Current Services Prior To Admission Durable Medical Equipment   Current DME Prior to [address]  (Patient requesting short SNF placement)   DME Ordered? No   Potential Assistance Purchasing Medications No   Type of Home Care Services None   Services At/After Discharge   Services At/After Discharge [address] [facility] (SNF)   Condition of Participation: Discharge Planning   The Plan for Transition of Care is related to the following treatment goals: SNF   The Patient and/or Patient Representative was provided with a Choice of Provider? Patient;Patient Representative   Name of the Patient Representative who was provided with the Choice of Provider and agrees with the Discharge Plan? Wife, Sherice Ware, [phone]   The Patient and/Or Patient Representative agree with the Discharge Plan? Yes   Freedom of Choice list was provided with basic dialogue that supports the patient's individualized plan of care/goals, treatment preferences, and shares the quality data associated with the providers?   Yes     DIVINE Andres   [phone]

## 2023-11-14 NOTE — ANESTHESIA POSTPROCEDURE EVALUATION
Department of Anesthesiology  Postprocedure Note    Patient: Mariah Nguyen  MRN: [id number]  YOB: 1945  Date of evaluation: 11/13/2023      Procedure Summary     Date: 11/13/23 Room / Location: [facility] MAIN OR 20 / [facility] MAIN OR    Anesthesia Start: 0820 Anesthesia Stop: 4302    Procedure: LAPAROSCOPIC, HAND ASSISTED LEFT NEPHRECTOMY.   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])    Providers: Nadia Pineda MD Responsible Provider: Benita Santo MD    Anesthesia Type: General ASA Status: 3          Anesthesia Type: General    Rhett Phase I: Rhett Score: 8    Rhett Phase II:        Anesthesia Post Evaluation    Patient location during evaluation: PACU  Patient participation: complete - patient participated  Level of consciousness: awake  Airway patency: patent  Nausea & Vomiting: no nausea  Complications: no  Cardiovascular status: blood pressure returned to baseline and hemodynamically stable  Respiratory status: acceptable  Hydration status: stable  Multimodal analgesia pain management approach

## 2023-11-14 NOTE — PROGRESS NOTES
Bedside shift change report given to Tristen Bolaños RN (oncoming nurse) by [facility] ([facility]going nurse). Report included the following information Nurse Handoff Report, Index, ED Encounter Summary, ED SBAR, Adult Overview, Surgery Report, Intake/Output, MAR, Recent Results, Med Rec Status, Alarm Parameters, Quality Measures, and Neuro Assessment.

## 2023-11-15 LAB
ANION GAP SERPL CALC-SCNC: 4 MMOL/L (ref 5–15)
BUN SERPL-MCNC: 21 MG/DL (ref 6–20)
BUN/CREAT SERPL: 13 (ref 12–20)
CALCIUM SERPL-MCNC: 9 MG/DL (ref 8.5–10.1)
CHLORIDE SERPL-SCNC: 107 MMOL/L (ref 97–108)
CO2 SERPL-SCNC: 26 MMOL/L (ref 21–32)
CREAT SERPL-MCNC: 1.58 MG/DL (ref 0.7–1.3)
GLUCOSE SERPL-MCNC: 94 MG/DL (ref 65–100)
HCT VFR BLD AUTO: 29.5 % (ref 36.6–50.3)
HGB BLD-MCNC: 9.3 G/DL (ref 12.1–17)
POTASSIUM SERPL-SCNC: 4.1 MMOL/L (ref 3.5–5.1)
SODIUM SERPL-SCNC: 137 MMOL/L (ref 136–145)

## 2023-11-15 PROCEDURE — 94760 N-INVAS EAR/PLS OXIMETRY 1: CPT

## 2023-11-15 PROCEDURE — 97530 THERAPEUTIC ACTIVITIES: CPT

## 2023-11-15 PROCEDURE — 97535 SELF CARE MNGMENT TRAINING: CPT

## 2023-11-15 PROCEDURE — 6370000000 HC RX 637 (ALT 250 FOR IP): Performed by: UROLOGY

## 2023-11-15 PROCEDURE — 36415 COLL VENOUS BLD VENIPUNCTURE: CPT

## 2023-11-15 PROCEDURE — 1100000000 HC RM PRIVATE

## 2023-11-15 PROCEDURE — A4216 STERILE WATER/SALINE, 10 ML: HCPCS | Performed by: UROLOGY

## 2023-11-15 PROCEDURE — 85018 HEMOGLOBIN: CPT

## 2023-11-15 PROCEDURE — 2580000003 HC RX 258: Performed by: UROLOGY

## 2023-11-15 PROCEDURE — 85014 HEMATOCRIT: CPT

## 2023-11-15 PROCEDURE — 6360000002 HC RX W HCPCS: Performed by: UROLOGY

## 2023-11-15 PROCEDURE — 80048 BASIC METABOLIC PNL TOTAL CA: CPT

## 2023-11-15 RX ADMIN — ACETAMINOPHEN 1000 MG: 500 TABLET ORAL at 17:23

## 2023-11-15 RX ADMIN — LORAZEPAM 1 MG: 0.5 TABLET ORAL at 17:23

## 2023-11-15 RX ADMIN — TRAMADOL HYDROCHLORIDE 50 MG: 50 TABLET ORAL at 09:34

## 2023-11-15 RX ADMIN — BUPROPION HYDROCHLORIDE 100 MG: 100 TABLET, FILM COATED, EXTENDED RELEASE ORAL at 07:49

## 2023-11-15 RX ADMIN — SODIUM CHLORIDE, PRESERVATIVE FREE 2000 MG: 5 INJECTION INTRAVENOUS at 07:03

## 2023-11-15 RX ADMIN — ACETAMINOPHEN 1000 MG: 500 TABLET ORAL at 03:43

## 2023-11-15 RX ADMIN — ACETAMINOPHEN 1000 MG: 500 TABLET ORAL at 09:34

## 2023-11-15 RX ADMIN — LORAZEPAM 1 MG: 0.5 TABLET ORAL at 21:33

## 2023-11-15 RX ADMIN — LORAZEPAM 1 MG: 0.5 TABLET ORAL at 03:44

## 2023-11-15 RX ADMIN — SODIUM CHLORIDE, POTASSIUM CHLORIDE, SODIUM LACTATE AND CALCIUM CHLORIDE: 600; 310; 30; 20 INJECTION, SOLUTION INTRAVENOUS at 03:43

## 2023-11-15 RX ADMIN — SODIUM CHLORIDE, POTASSIUM CHLORIDE, SODIUM LACTATE AND CALCIUM CHLORIDE: 600; 310; 30; 20 INJECTION, SOLUTION INTRAVENOUS at 17:22

## 2023-11-15 RX ADMIN — ACETAMINOPHEN 1000 MG: 500 TABLET ORAL at 21:33

## 2023-11-15 RX ADMIN — LORAZEPAM 1 MG: 0.5 TABLET ORAL at 09:34

## 2023-11-15 RX ADMIN — ESCITALOPRAM OXALATE 10 MG: 10 TABLET ORAL at 07:49

## 2023-11-15 NOTE — PLAN OF CARE
Problem: Occupational Therapy - Adult  Goal: By Discharge: Performs self-care activities at highest level of function for planned discharge setting. See evaluation for individualized goals. Description: FUNCTIONAL STATUS PRIOR TO ADMISSION: Patient was independent with ADLs, used SPC and RW PRN but typically reports not using AD. ADL Assistance: Independent, Homemaking Assistance: Independent, Ambulation Assistance: Independent, Transfer Assistance: Independent, Active : Yes     HOME SUPPORT: Patient lived with spouse and dog. Occupational Therapy Goals:  Initiated 11/14/2023  1. Patient will perform grooming in standing with Minimal Assist within 7 day(s). 2.  Patient will perform seated bathing with Minimal Assist within 7 day(s). 3.  Patient will perform lower body dressing with Moderate Assist within 7 day(s). 4.  Patient will perform toilet transfers with Minimal Assist  within 7 day(s). 5.  Patient will perform all aspects of toileting with Minimal Assist within 7 day(s). 6.  Patient will participate in upper extremity therapeutic exercise/activities with Supervision for 5 minutes within 7 day(s). 7.  Patient will utilize energy conservation techniques during functional activities with verbal cues within 7 day(s). Outcome: Progressing   OCCUPATIONAL THERAPY TREATMENT  Patient: Disha Underwood (1 y.o. 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 PARASTOMAL HERNIA REPAIR (E R A S) REMOVAL OF NEPHROSTOMY TUBE (Left) 2 Days Post-Op   Precautions: Fall Risk                Chart, occupational therapy assessment, plan of care, and goals were reviewed. ASSESSMENT  Patient continues to benefit from skilled OT services and is progressing towards goals.

## 2023-11-15 NOTE — PROGRESS NOTES
VSS AF ABD SOFT. PASSING FLATUS. STOMA PINK. JPS SLOWING DOWN. TRANSFER TO [facility] TOMORROW IF CONTINUES TO IMPROVE. [facility] ALL GOOD.

## 2023-11-15 NOTE — PROGRESS NOTES
Bedside shift change report given to Ashish Herrmann RN (oncoming nurse) by [facility] [employer], RN (offgoing nurse). Report included the following information Nurse Handoff Report, Index, ED Encounter Summary, ED SBAR, Adult Overview, Surgery Report, Intake/Output, MAR, Recent Results, Med Rec Status, Alarm Parameters, Quality Measures, and Neuro Assessment.

## 2023-11-15 NOTE — CARE COORDINATION
Patient informed of need for urine sample. States unable to void at present time. Specimen cup given to patient.    Transition of Care Plan:    RUR: 11% Low   Prior Level of Functioning: Independent   Disposition: SNF; [facility] ([place]: [phone])   If SNF or IPR: Date FOC offered: 11/14/23  Date [address] received: 11/14/23  Accepting facility: [facility]   Follow up appointments: PCP, specialist  DME needed: SNF to provide   Transportation at discharge: Hospital to Home stretcher transport scheduled for Thursday, 11/16 @ 11:15AM   IM/IMM Medicare/ letter given: 1st IM: 11/14/23  Is patient a  and connected with VA? NA  Caregiver Contact: Wife, Adeline Car, [phone]  Discharge Caregiver contacted prior to discharge? Care Conference needed? No   Barriers to discharge: Medical    CM reviewed chart. Per review, plan for patient to admit to [facility] tomorrow, Thursday, 11/16. CM spoke with Noel [facility] Coordinator (p: [phone]) to provide update. Per Tara Priest, patient can admit to room # 4912. Nursing to call report to [phone]. Discharge packet started and ambulance form placed on patient's hard chart. Hospital to Home stretcher transport scheduled for tomorrow, Thursday, 11/16 @ 11:15AM. Billed to Medicare. CM updated nurse. Patient provided update at bedside and remains in agreement. Attending aware and in agreement with DC tomorrow. CM will continue to follow as needed. Transition of Care Plan to SNF/Rehab    Communication to Patient/Family:  Met with patient and family and they are agreeable to the transition plan. The Plan for Transition of Care is related to the following treatment goals: SNF; [facility]     The Patient and/or patient representative was provided with a choice of provider and agrees  with the discharge plan.       Yes [x] No []    A Freedom of choice list was provided with basic dialogue that supports the patient's individualized plan of care/goals and shares the quality data associated with the nursing facility. [] Individuals who are inpatients of an out of state hospital, or in state or out of state veterans/ hospital and seek direct admission to a [facility]  [] Individuals who are pateints or residents of a state owned/operated facility that is licensed by Department of [employer] ([employer]) and seek direct admission to [address] [place] [address]  [] A screening not required for enrollment in [address] [place] [address] as set out in [address] [place] [address] 30-  [] [facility]) staff shall perform screenings of the [facility] clients. Advanced Care Plan:  []Surrogate Decision Maker of Care  []POA  []Communicated Code Status and copy sent.     Other:           Rhett Leslie, DIVINE   [phone]

## 2023-11-15 NOTE — PLAN OF CARE
Problem: Pain  Goal: Verbalizes/displays adequate comfort level or baseline comfort level  Outcome: Progressing     Problem: Discharge Planning  Goal: Discharge to home or other facility with appropriate resources  Outcome: Progressing     Problem: Safety - Adult  Goal: Free from fall injury  Outcome: Progressing     Problem: Occupational Therapy - Adult  Goal: By Discharge: Performs self-care activities at highest level of function for planned discharge setting. See evaluation for individualized goals. Description: FUNCTIONAL STATUS PRIOR TO ADMISSION: Patient was independent with ADLs, used SPC and RW PRN but typically reports not using AD. ADL Assistance: Independent, Homemaking Assistance: Independent, Ambulation Assistance: Independent, Transfer Assistance: Independent, Active : Yes     HOME SUPPORT: Patient lived with spouse and dog. Occupational Therapy Goals:  Initiated 11/14/2023  1. Patient will perform grooming in standing with Minimal Assist within 7 day(s). 2.  Patient will perform seated bathing with Minimal Assist within 7 day(s). 3.  Patient will perform lower body dressing with Moderate Assist within 7 day(s). 4.  Patient will perform toilet transfers with Minimal Assist  within 7 day(s). 5.  Patient will perform all aspects of toileting with Minimal Assist within 7 day(s). 6.  Patient will participate in upper extremity therapeutic exercise/activities with Supervision for 5 minutes within 7 day(s). 7.  Patient will utilize energy conservation techniques during functional activities with verbal cues within 7 day(s). 11/14/2023 1528 by Deepa Verdin OT  Outcome: Progressing     Problem: Physical Therapy - Adult  Goal: By Discharge: Performs mobility at highest level of function for planned discharge setting. See evaluation for individualized goals. Description: FUNCTIONAL STATUS PRIOR TO ADMISSION: Patient was independent and active without use of DME.  Occasionally uses a

## 2023-11-15 NOTE — PLAN OF CARE
Problem: Physical Therapy - Adult  Goal: By Discharge: Performs mobility at highest level of function for planned discharge setting. See evaluation for individualized goals. Description: FUNCTIONAL STATUS PRIOR TO ADMISSION: Patient was independent and active without use of DME. Occasionally uses a SPC. HOME SUPPORT PRIOR TO ADMISSION: The patient lived with his spouse but did not require assistance. Physical Therapy Goals  Initiated 11/14/2023  1. Patient will move from supine to sit and sit to supine and scoot up and down in bed with moderate assistance within 7 day(s). 2.  Patient will perform sit to stand with contact guard assist within 7 day(s). 3.  Patient will transfer from bed to chair and chair to bed with contact guard assist using the least restrictive device within 7 day(s). 4.  Patient will ambulate with minimal assistance for 50 feet with the least restrictive device within 7 day(s). Outcome: Progressing  PHYSICAL THERAPY TREATMENT    Patient: Liliana Middleton (89 y.o. 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 PARASTOMAL HERNIA REPAIR (E R A S) REMOVAL OF NEPHROSTOMY TUBE (Left) 2 Days Post-Op  Precautions: Fall Risk                    ASSESSMENT:  Patient continues to benefit from skilled PT services and is slowly progressing towards goals. Pt presents with pain (8/10 pre-session, 3/10 post session), impaired balance, decreased activity tolerance, and overall decline in functional mobility. Pt motivated and agreeable to work with therapy. Re-educated on log roll technique and pt transferred supine>sit with modA x2. Transferred sit<>stand and took a few steps bed>chair with Preeti x2 and use of RW.   Pt reported dizziness with positional

## 2023-11-16 VITALS
HEIGHT: 71 IN | HEART RATE: 56 BPM | DIASTOLIC BLOOD PRESSURE: 79 MMHG | OXYGEN SATURATION: 90 % | SYSTOLIC BLOOD PRESSURE: 139 MMHG | WEIGHT: 234.13 LBS | TEMPERATURE: 97.9 F | RESPIRATION RATE: 18 BRPM | BODY MASS INDEX: 32.78 KG/M2

## 2023-11-16 LAB
ANION GAP SERPL CALC-SCNC: 7 MMOL/L (ref 5–15)
BUN SERPL-MCNC: 20 MG/DL (ref 6–20)
BUN/CREAT SERPL: 13 (ref 12–20)
CALCIUM SERPL-MCNC: 9.3 MG/DL (ref 8.5–10.1)
CHLORIDE SERPL-SCNC: 105 MMOL/L (ref 97–108)
CO2 SERPL-SCNC: 24 MMOL/L (ref 21–32)
CREAT SERPL-MCNC: 1.59 MG/DL (ref 0.7–1.3)
GLUCOSE SERPL-MCNC: 89 MG/DL (ref 65–100)
HCT VFR BLD AUTO: 31.5 % (ref 36.6–50.3)
HGB BLD-MCNC: 10 G/DL (ref 12.1–17)
POTASSIUM SERPL-SCNC: 3.8 MMOL/L (ref 3.5–5.1)
SODIUM SERPL-SCNC: 136 MMOL/L (ref 136–145)

## 2023-11-16 PROCEDURE — 94760 N-INVAS EAR/PLS OXIMETRY 1: CPT

## 2023-11-16 PROCEDURE — 6370000000 HC RX 637 (ALT 250 FOR IP): Performed by: UROLOGY

## 2023-11-16 PROCEDURE — A4216 STERILE WATER/SALINE, 10 ML: HCPCS | Performed by: UROLOGY

## 2023-11-16 PROCEDURE — 80048 BASIC METABOLIC PNL TOTAL CA: CPT

## 2023-11-16 PROCEDURE — 85018 HEMOGLOBIN: CPT

## 2023-11-16 PROCEDURE — 36415 COLL VENOUS BLD VENIPUNCTURE: CPT

## 2023-11-16 PROCEDURE — 2580000003 HC RX 258: Performed by: UROLOGY

## 2023-11-16 PROCEDURE — 6360000002 HC RX W HCPCS: Performed by: UROLOGY

## 2023-11-16 PROCEDURE — 85014 HEMATOCRIT: CPT

## 2023-11-16 RX ORDER — TRAMADOL HYDROCHLORIDE 50 MG/1
50 TABLET ORAL EVERY 6 HOURS PRN
Qty: 20 TABLET | Refills: 0 | Status: SHIPPED | OUTPATIENT
Start: 2023-11-16 | End: 2023-11-21

## 2023-11-16 RX ORDER — LORAZEPAM 1 MG/1
1 TABLET ORAL EVERY 6 HOURS
Qty: 120 TABLET | Refills: 0 | Status: SHIPPED | OUTPATIENT
Start: 2023-11-16 | End: 2023-12-16

## 2023-11-16 RX ORDER — LORAZEPAM 1 MG/1
1 TABLET ORAL EVERY 6 HOURS
Qty: 120 TABLET | Refills: 0 | Status: SHIPPED | OUTPATIENT
Start: 2023-11-16 | End: 2023-11-16 | Stop reason: SDUPTHER

## 2023-11-16 RX ORDER — ZOLPIDEM TARTRATE 5 MG/1
5 TABLET ORAL NIGHTLY PRN
Qty: 30 TABLET | Refills: 0 | Status: SHIPPED | OUTPATIENT
Start: 2023-11-16 | End: 2023-12-16

## 2023-11-16 RX ADMIN — SODIUM CHLORIDE, PRESERVATIVE FREE 10 ML: 5 INJECTION INTRAVENOUS at 10:01

## 2023-11-16 RX ADMIN — ACETAMINOPHEN 1000 MG: 500 TABLET ORAL at 04:16

## 2023-11-16 RX ADMIN — BUPROPION HYDROCHLORIDE 100 MG: 100 TABLET, FILM COATED, EXTENDED RELEASE ORAL at 10:01

## 2023-11-16 RX ADMIN — ESCITALOPRAM OXALATE 10 MG: 10 TABLET ORAL at 10:01

## 2023-11-16 RX ADMIN — LORAZEPAM 1 MG: 0.5 TABLET ORAL at 10:01

## 2023-11-16 RX ADMIN — LORAZEPAM 1 MG: 0.5 TABLET ORAL at 04:16

## 2023-11-16 RX ADMIN — ACETAMINOPHEN 1000 MG: 500 TABLET ORAL at 10:01

## 2023-11-16 RX ADMIN — SODIUM CHLORIDE, PRESERVATIVE FREE 2000 MG: 5 INJECTION INTRAVENOUS at 08:16

## 2023-11-16 NOTE — DISCHARGE SUMMARY
Urology Discharge Summary    Patient: Lindsey Andersen MRN: [id number]  SSN: [id number]    YOB: 1945  Age: 69 y.o. 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 PARASTOMAL HERNIA REPAIR (E R A S) REMOVAL OF NEPHROSTOMY TUBE    COMPLICATIONS: None identified. CONSULTS: None       Medication List        START taking these medications      LORazepam 1 MG tablet  Commonly known as: ATIVAN  Take 1 tablet by mouth every 6 hours for 30 days. Max Daily Amount: 4 mg     traMADol 50 MG tablet  Commonly known as: ULTRAM  Take 1 tablet by mouth every 6 hours as needed for Pain for up to 5 days. Max Daily Amount: 200 mg            CONTINUE taking these medications      acetaminophen 500 MG tablet  Commonly known as: TYLENOL     buPROPion 100 MG extended release tablet  Commonly known as: WELLBUTRIN SR     escitalopram 10 MG tablet  Commonly known as: LEXAPRO     zolpidem 5 MG tablet  Commonly known as: AMBIEN            ASK your doctor about these medications      predniSONE 10 MG tablet  Commonly known as: DELTASONE  Take 2.5 tabs by mouth 13 hours, 7 hours and 1 hours prior to PET scan. Take Benadryl 25mg with 1 hour prior dose.      simvastatin 20 MG tablet  Commonly known as: ZOCOR               Where to Get Your Medications        These medications were sent to [id number], [address] [address]      Phone: [phone]   LORazepam 1 MG tablet       Information about where to get these medications is not yet available    Ask your nurse or

## 2023-11-16 NOTE — PROGRESS NOTES
Patient alert and oriented x4 at time of discharge. IV's removed. Discharge instructions and medications reviewed with patient. Patient discharged to  [facility] via hospital to home. Packet with discharge instructions and hard scripts transported by hospital to home. Patient discharged at 1130.

## 2023-11-16 NOTE — PROGRESS NOTES
Report called to Virginia Harrison LPN on Ledon Johni transferring to [facility]. Report included SBAR.

## 2023-11-16 NOTE — DISCHARGE INSTRUCTIONS
Patient Discharge Instructions    Mark Hilario / [id number] : 1945    Admitted 2023 Discharged: 2023     Take Home Medications       It is important that you take the medication exactly as they are prescribed. Keep your medication in the bottles provided by the pharmacist and keep a list of the medication names, dosages, and times to be taken in your wallet. Do not take other medications without consulting your doctor. What to do at Home      Recommended activity: No Lifting for 6 weeks. Follow-up with [facility]. Call for an appointment (if not already scheduled)            [phone]. Information obtained by :  I understand that if any problems occur once I am at home I am to contact my physician. I understand and acknowledge receipt of the instructions indicated above.                                                                                                                                            Physician's or R.N.'s Signature                                                                  Date/Time                                                                                                                                              Patient or Representative Signature                                                          Date/Time

## 2023-11-16 NOTE — PROGRESS NOTES
RT Note:  Home CPAP Assessment     11/16/23 0340   NIV Type   NIV Started/Stopped On   Mode CPAP   Mask Type Full face mask   Assessment   Pulse 82   Respirations 16   SpO2 95 %   Level of Consciousness 1   Comfort Level Good   Using Accessory Muscles No   Mask Compliance Good   Skin Assessment Clean, dry, & intact   Skin Protection for O2 Device No   Settings/Measurements   CPAP/EPAP   (unable to assess)   Patient's Home Machine Yes (type/vendor)  [facility], no humidifier)

## 2023-11-24 ENCOUNTER — HOSPITAL ENCOUNTER (OUTPATIENT)
Facility: HOSPITAL | Age: 78
Setting detail: INFUSION SERIES
End: 2023-11-24

## 2023-12-29 ENCOUNTER — TELEPHONE (OUTPATIENT)
Facility: HOSPITAL | Age: 78
End: 2023-12-29

## 2023-12-29 ENCOUNTER — HOSPITAL ENCOUNTER (OUTPATIENT)
Facility: HOSPITAL | Age: 78
End: 2023-12-29
Payer: MEDICARE

## 2023-12-29 DIAGNOSIS — C67.9 MALIGNANT NEOPLASM OF URINARY BLADDER, UNSPECIFIED SITE (HCC): ICD-10-CM

## 2023-12-29 PROCEDURE — 74176 CT ABD & PELVIS W/O CONTRAST: CPT

## 2023-12-29 PROCEDURE — 71250 CT THORAX DX C-: CPT

## 2024-01-01 NOTE — PROGRESS NOTES
[facility] Chemo Progress Note    Date: 2021    Name: Yenifer Mills    MRN: [id number]         : 1945    0910 Mr. Jamshid Fry Arrived to [facility] for Gemzar ambulatory in stable condition. Assessment was completed, no acute issues at this time, no new complaints voiced. Port accessed with positive blood return. Chemotherapy Flowsheet 2021   Cycle C1D8   Date 2021   Drug / Regimen Gemzar   Pre Hydration -   Post Hydration given   Pre Meds given   Notes given         Patient Denies SOB, fever, cough, general not feeling well. Patient denies recent exposure to someone who has tested positive for COVID-19. Patient denies having contact with anyone who has a pending COVID test.      Mr. Susy Trejo vitals were reviewed. Patient Vitals for the past 12 hrs:   Pulse Resp BP   21 1120 69 18 112/67   21 0912 80 18 93/65         Lab results were obtained and reviewed. No results found for this or any previous visit (from the past 12 hour(s)). Pre-medications  were administered as ordered and chemotherapy was initiated.     Medications Administered     0.9% sodium chloride infusion     Admin Date  2021 Action  New Bag Dose  25 mL/hr Rate  25 mL/hr Route  IntraVENous Administered By  Blas Brower RN          gemcitabine (GEMZAR) 2,350 mg in 0.9% sodium chloride 250 mL, overfill volume 25 mL chemo infusion     Admin Date  2021 Action  New Bag Dose  2,350 mg Rate  673.6 mL/hr Route  IntraVENous Administered By  Blas Brower RN          ondansetron [facility]) injection 8 mg     Admin Date  2021 Action  Given Dose  8 mg Route  IntraVENous Administered By  Blas Brower RN          potassium chloride SR (KLOR-CON 10) tablet 40 mEq     Admin Date  2021 Action  Given Dose  40 mEq Route  Oral Administered By  Blas Brower RN          sodium chloride (NS) flush 10 mL     Admin Date  2021 Action  Given Dose  10 mL Route  IntraVENous Administered By  Peter Bearden RN sodium chloride 0.9 % bolus infusion 500 mL     Admin Date  08/12/2021 Action  New Bag Dose  500 mL Rate  500 mL/hr Route  IntraVENous Administered By  Иван Gaytan RN                  1771 Patient tolerated treatment well. Port maintained positive blood return throughout treatment. Port flushed, heparinized and de accessed per protocol.  Patient was discharged from [facility] in stable condition     Future Appointments   Date Time Provider Gregorio Ibarra   8/26/2021 11:30 AM E4 [address]   8/26/2021 11:45 AM Dillon Montelongo  [address] BS AMB   9/2/2021  1:00 PM F4 TD MED [address] [facility]   9/16/2021 11:00 AM C1 TD AMY Pulido RN  August 12, 2021 Statement Selected

## 2024-01-10 ENCOUNTER — HOSPITAL ENCOUNTER (OUTPATIENT)
Facility: HOSPITAL | Age: 79
Setting detail: INFUSION SERIES
Discharge: HOME | End: 2024-01-10
Payer: MEDICARE

## 2024-01-10 ENCOUNTER — OFFICE VISIT (OUTPATIENT)
Age: 79
End: 2024-01-10
Payer: MEDICARE

## 2024-01-10 VITALS
HEART RATE: 83 BPM | OXYGEN SATURATION: 95 % | WEIGHT: 221 LBS | SYSTOLIC BLOOD PRESSURE: 99 MMHG | DIASTOLIC BLOOD PRESSURE: 66 MMHG | TEMPERATURE: 98.3 F | BODY MASS INDEX: 30.82 KG/M2 | RESPIRATION RATE: 18 BRPM

## 2024-01-10 DIAGNOSIS — C67.9 MALIGNANT NEOPLASM OF URINARY BLADDER, UNSPECIFIED SITE (HCC): Primary | ICD-10-CM

## 2024-01-10 DIAGNOSIS — C67.3 MALIGNANT NEOPLASM OF ANTERIOR WALL OF BLADDER (HCC): ICD-10-CM

## 2024-01-10 DIAGNOSIS — N18.32 CHRONIC KIDNEY DISEASE, STAGE 3B (HCC): ICD-10-CM

## 2024-01-10 DIAGNOSIS — E66.09 CLASS 1 OBESITY DUE TO EXCESS CALORIES WITH SERIOUS COMORBIDITY AND BODY MASS INDEX (BMI) OF 33.0 TO 33.9 IN ADULT: ICD-10-CM

## 2024-01-10 LAB
ALBUMIN SERPL-MCNC: 3.9 G/DL (ref 3.5–5)
ALBUMIN/GLOB SERPL: 1.1 (ref 1.1–2.2)
ALP SERPL-CCNC: 70 U/L (ref 45–117)
ALT SERPL-CCNC: 16 U/L (ref 12–78)
ANION GAP SERPL CALC-SCNC: 6 MMOL/L (ref 5–15)
AST SERPL-CCNC: 11 U/L (ref 15–37)
BASOPHILS # BLD: 0 K/UL (ref 0–0.1)
BASOPHILS NFR BLD: 1 % (ref 0–1)
BILIRUB SERPL-MCNC: 0.7 MG/DL (ref 0.2–1)
BUN SERPL-MCNC: 28 MG/DL (ref 6–20)
BUN/CREAT SERPL: 15 (ref 12–20)
CALCIUM SERPL-MCNC: 9.4 MG/DL (ref 8.5–10.1)
CHLORIDE SERPL-SCNC: 106 MMOL/L (ref 97–108)
CO2 SERPL-SCNC: 25 MMOL/L (ref 21–32)
CREAT SERPL-MCNC: 1.88 MG/DL (ref 0.7–1.3)
DIFFERENTIAL METHOD BLD: ABNORMAL
EOSINOPHIL # BLD: 0.2 K/UL (ref 0–0.4)
EOSINOPHIL NFR BLD: 5 % (ref 0–7)
ERYTHROCYTE [DISTWIDTH] IN BLOOD BY AUTOMATED COUNT: 15.8 % (ref 11.5–14.5)
GLOBULIN SER CALC-MCNC: 3.4 G/DL (ref 2–4)
GLUCOSE SERPL-MCNC: 111 MG/DL (ref 65–100)
HCT VFR BLD AUTO: 34.8 % (ref 36.6–50.3)
HGB BLD-MCNC: 11.3 G/DL (ref 12.1–17)
IMM GRANULOCYTES # BLD AUTO: 0 K/UL (ref 0–0.04)
IMM GRANULOCYTES NFR BLD AUTO: 1 % (ref 0–0.5)
LYMPHOCYTES # BLD: 0.5 K/UL (ref 0.8–3.5)
LYMPHOCYTES NFR BLD: 12 % (ref 12–49)
MCH RBC QN AUTO: 31.9 PG (ref 26–34)
MCHC RBC AUTO-ENTMCNC: 32.5 G/DL (ref 30–36.5)
MCV RBC AUTO: 98.3 FL (ref 80–99)
MONOCYTES # BLD: 0.6 K/UL (ref 0–1)
MONOCYTES NFR BLD: 14 % (ref 5–13)
NEUTS SEG # BLD: 3.1 K/UL (ref 1.8–8)
NEUTS SEG NFR BLD: 67 % (ref 32–75)
NRBC # BLD: 0 K/UL (ref 0–0.01)
NRBC BLD-RTO: 0 PER 100 WBC
PLATELET # BLD AUTO: 188 K/UL (ref 150–400)
PMV BLD AUTO: 8.9 FL (ref 8.9–12.9)
POTASSIUM SERPL-SCNC: 4 MMOL/L (ref 3.5–5.1)
PROT SERPL-MCNC: 7.3 G/DL (ref 6.4–8.2)
RBC # BLD AUTO: 3.54 M/UL (ref 4.1–5.7)
RBC MORPH BLD: ABNORMAL
SODIUM SERPL-SCNC: 137 MMOL/L (ref 136–145)
WBC # BLD AUTO: 4.4 K/UL (ref 4.1–11.1)

## 2024-01-10 PROCEDURE — 3074F SYST BP LT 130 MM HG: CPT | Performed by: INTERNAL MEDICINE

## 2024-01-10 PROCEDURE — 1123F ACP DISCUSS/DSCN MKR DOCD: CPT | Performed by: INTERNAL MEDICINE

## 2024-01-10 PROCEDURE — 85025 COMPLETE CBC W/AUTO DIFF WBC: CPT

## 2024-01-10 PROCEDURE — 1036F TOBACCO NON-USER: CPT | Performed by: INTERNAL MEDICINE

## 2024-01-10 PROCEDURE — 3078F DIAST BP <80 MM HG: CPT | Performed by: INTERNAL MEDICINE

## 2024-01-10 PROCEDURE — 80053 COMPREHEN METABOLIC PANEL: CPT

## 2024-01-10 PROCEDURE — G8428 CUR MEDS NOT DOCUMENT: HCPCS | Performed by: INTERNAL MEDICINE

## 2024-01-10 PROCEDURE — G8417 CALC BMI ABV UP PARAM F/U: HCPCS | Performed by: INTERNAL MEDICINE

## 2024-01-10 PROCEDURE — 99215 OFFICE O/P EST HI 40 MIN: CPT | Performed by: INTERNAL MEDICINE

## 2024-01-10 PROCEDURE — G8484 FLU IMMUNIZE NO ADMIN: HCPCS | Performed by: INTERNAL MEDICINE

## 2024-01-10 PROCEDURE — 36591 DRAW BLOOD OFF VENOUS DEVICE: CPT

## 2024-01-10 PROCEDURE — 36415 COLL VENOUS BLD VENIPUNCTURE: CPT

## 2024-01-10 RX ORDER — SODIUM CHLORIDE 0.9 % (FLUSH) 0.9 %
5-40 SYRINGE (ML) INJECTION PRN
OUTPATIENT
Start: 2024-01-10

## 2024-01-10 RX ORDER — SODIUM CHLORIDE 9 MG/ML
25 INJECTION, SOLUTION INTRAVENOUS PRN
OUTPATIENT
Start: 2024-01-10

## 2024-01-10 RX ORDER — HEPARIN 100 UNIT/ML
500 SYRINGE INTRAVENOUS PRN
OUTPATIENT
Start: 2024-01-10

## 2024-01-10 NOTE — PROGRESS NOTES
Robert Gastelum is a 78 y.o. male    Chief Complaint   Patient presents with    Follow-up     Muscle invasive bladder cancer- Mixed histology       1. Have you been to the ER, urgent care clinic since your last visit?  Hospitalized since your last visit? Yes, [facility]     2. Have you seen or consulted any other health care providers outside of the [facility] since your last visit?  Include any pap smears or colon screening. Yes, PCP      
surgery done 12/6/2021. Completed adjuvant Nivolumab 1/2023 , completed RT May 2023. He was admitted with sepsis in July 2023. 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 Comes with scans  He also has had a parastomal hernia repaired. No new SOB, No new Edema  Has had some trouble writing recently      Review of Systems: A complete review of systems was obtained, negative except as described above.          Physical Exam:        Visit Vitals        BP  131/80 (BP 1 Location: Right arm, BP Patient Position: Sitting)     Pulse  84     Temp  98.8 °F (37.1 °C)     Resp  18     Wt  244 lb (110.7 kg)     SpO2  96%        BMI           ECOG PS: 1   General: No distress   Eyes:  PERRL, anicteric sclerae   HENT: Atraumatic    Neck: Supple   Skin: resolving macular erythematous rash noted on b/l arms & chest    Psych: Alert, oriented, appropriate affect, normal judgment/insight          Results:       Lab Results   Component Value Date/Time    WBC 5.3 10/27/2023 04:03 PM    HGB 10.0 11/16/2023 04:27 AM    HCT 31.5 11/16/2023 04:27 AM     10/27/2023 04:03 PM    MCV 98.3 10/27/2023 04:03 PM     Lab Results   Component Value Date/Time     11/16/2023 04:27 AM    K 3.8 11/16/2023 04:27 AM     11/16/2023 04:27 AM    CO2 24 11/16/2023 04:27 AM    BUN 20 11/16/2023 04:27 AM    GFRAA 41 09/26/2022 10:45 AM     Lab Results   Component Value Date/Time    ALT 20 10/27/2023 04:03 PM    AST 15 10/27/2023 04:03 PM    GLOB 3.6 10/27/2023 04:03 PM         External    Records reviewed and summarized above.   Pathology report(s) reviewed      12/21/2021       SPECIMEN       Procedure: Radical urethro-cystoprostatectomy       TUMOR       Tumor Site:                Trigone, Dome, Right lateral wall, Left    Ureteral                                  orifice, Right bladder neck        Histologic Type:     Papillary urothelial carcinoma, invasive and   non-invasive, and carcinoma in situ       Histologic Grade:

## 2024-01-10 NOTE — PROGRESS NOTES
[facility] Short Note                       Date: January 10, 2024    Name: Robert Gastelum    MRN: [id number]         : 1945      1130 Pt admit to [facility] for Port Flush/Labs ambulatory in stable condition. Assessment completed. No new concerns voiced.        Lab results were obtained .      Port accessed with positive blood return. Port flushed,  and de-accessed per protocol.       [facility].   Pt aware of next appt    Future Appointments   Date Time Provider Department Center   1/10/2024 11:45 AM Zaida Davis MD [facility] BS [facility]   3/6/2024 11:30 AM G1 [facility]   2024 11:30 AM G1 [facility]   2024 11:30 AM G1 [facility]   2024 11:30 AM H1 [facility]       Catarina Lisa RN  January 10, 2024  11:38 AM

## 2024-01-12 ENCOUNTER — TELEPHONE (OUTPATIENT)
Facility: HOSPITAL | Age: 79
End: 2024-01-12

## 2024-01-15 ENCOUNTER — TELEPHONE (OUTPATIENT)
Age: 79
End: 2024-01-15

## 2024-01-15 NOTE — TELEPHONE ENCOUNTER
Geena with [facility] called and stated pt called to schedule appt for CT scan but order was not in chart. Call back number is [phone].

## 2024-01-16 ENCOUNTER — HOSPITAL ENCOUNTER (OUTPATIENT)
Facility: HOSPITAL | Age: 79
Discharge: HOME | End: 2024-01-19
Attending: INTERNAL MEDICINE
Payer: MEDICARE

## 2024-01-16 DIAGNOSIS — C67.9 MALIGNANT NEOPLASM OF URINARY BLADDER, UNSPECIFIED SITE (HCC): ICD-10-CM

## 2024-01-16 PROCEDURE — 70553 MRI BRAIN STEM W/O & W/DYE: CPT

## 2024-01-16 PROCEDURE — A9579 GAD-BASE MR CONTRAST NOS,1ML: HCPCS | Performed by: RADIOLOGY

## 2024-01-16 PROCEDURE — 6360000004 HC RX CONTRAST MEDICATION: Performed by: RADIOLOGY

## 2024-01-16 RX ADMIN — GADOTERIDOL 20 ML: 279.3 INJECTION, SOLUTION INTRAVENOUS at 10:02

## 2024-01-17 ENCOUNTER — TELEPHONE (OUTPATIENT)
Age: 79
End: 2024-01-17

## 2024-01-17 NOTE — TELEPHONE ENCOUNTER
1425  Called pt HIPAA verified x2  Made pt aware Dr. Davis reviewed his labs and his MRI of brain shows no cancer.  Pt voiced understanding and was thankful for my call.

## 2024-03-07 ENCOUNTER — TRANSCRIBE ORDERS (OUTPATIENT)
Facility: HOSPITAL | Age: 79
End: 2024-03-07

## 2024-03-07 DIAGNOSIS — R60.0 LOCALIZED EDEMA: Primary | ICD-10-CM

## 2024-03-07 DIAGNOSIS — M54.12 RADICULOPATHY, CERVICAL: ICD-10-CM

## 2024-03-13 ENCOUNTER — HOSPITAL ENCOUNTER (OUTPATIENT)
Facility: HOSPITAL | Age: 79
Discharge: HOME | End: 2024-03-15
Payer: MEDICARE

## 2024-03-13 DIAGNOSIS — R60.0 LOCALIZED EDEMA: ICD-10-CM

## 2024-03-13 PROCEDURE — 93971 EXTREMITY STUDY: CPT

## 2024-03-20 ENCOUNTER — HOSPITAL ENCOUNTER (OUTPATIENT)
Age: 79
Discharge: HOME | End: 2024-03-23
Payer: MEDICARE

## 2024-03-20 DIAGNOSIS — M54.12 RADICULOPATHY, CERVICAL: ICD-10-CM

## 2024-03-20 PROCEDURE — 72141 MRI NECK SPINE W/O DYE: CPT

## 2024-03-27 ENCOUNTER — TELEPHONE (OUTPATIENT)
Age: 79
End: 2024-03-27

## 2024-03-27 NOTE — TELEPHONE ENCOUNTER
1556  R/t call to pt HIPAA verified x2  Made pt aware Dr. Davis would like him to have a PET scan done to further evaluate.   We will see him in office in 4 weeks, sooner if needed.  Provided pt with number to scheduling for PET  Pt voiced understanding and has no further concerns at this time.

## 2024-03-27 NOTE — TELEPHONE ENCOUNTER
Voicemail left by patient in reference  to an Ultra sound of leg due to swelling.  The tech mention that it was abnormally in the left groin.  Reach out to his doctor and was advise to reach out to Dr. Davis for advise- concern that this is a return of cancer.    Please give him a call at [phone]

## 2024-04-03 ENCOUNTER — HOSPITAL ENCOUNTER (OUTPATIENT)
Facility: HOSPITAL | Age: 79
Setting detail: INFUSION SERIES
Discharge: HOME | End: 2024-04-03
Payer: MEDICARE

## 2024-04-03 VITALS
WEIGHT: 233 LBS | HEIGHT: 71 IN | RESPIRATION RATE: 18 BRPM | HEART RATE: 76 BPM | DIASTOLIC BLOOD PRESSURE: 84 MMHG | BODY MASS INDEX: 32.62 KG/M2 | TEMPERATURE: 98.5 F | OXYGEN SATURATION: 97 % | SYSTOLIC BLOOD PRESSURE: 137 MMHG

## 2024-04-03 DIAGNOSIS — C67.9 MALIGNANT NEOPLASM OF URINARY BLADDER, UNSPECIFIED SITE (HCC): Primary | ICD-10-CM

## 2024-04-03 LAB
ALBUMIN SERPL-MCNC: 3.6 G/DL (ref 3.5–5)
ALBUMIN/GLOB SERPL: 1 (ref 1.1–2.2)
ALP SERPL-CCNC: 70 U/L (ref 45–117)
ALT SERPL-CCNC: 18 U/L (ref 12–78)
ANION GAP SERPL CALC-SCNC: 3 MMOL/L (ref 5–15)
AST SERPL-CCNC: 12 U/L (ref 15–37)
BASOPHILS # BLD: 0 K/UL (ref 0–0.1)
BASOPHILS NFR BLD: 1 % (ref 0–1)
BILIRUB SERPL-MCNC: 0.5 MG/DL (ref 0.2–1)
BUN SERPL-MCNC: 31 MG/DL (ref 6–20)
BUN/CREAT SERPL: 16 (ref 12–20)
CALCIUM SERPL-MCNC: 10.1 MG/DL (ref 8.5–10.1)
CHLORIDE SERPL-SCNC: 107 MMOL/L (ref 97–108)
CO2 SERPL-SCNC: 27 MMOL/L (ref 21–32)
CREAT SERPL-MCNC: 1.88 MG/DL (ref 0.7–1.3)
DIFFERENTIAL METHOD BLD: ABNORMAL
EOSINOPHIL # BLD: 0 K/UL (ref 0–0.4)
EOSINOPHIL NFR BLD: 1 % (ref 0–7)
ERYTHROCYTE [DISTWIDTH] IN BLOOD BY AUTOMATED COUNT: 13.5 % (ref 11.5–14.5)
GLOBULIN SER CALC-MCNC: 3.6 G/DL (ref 2–4)
GLUCOSE SERPL-MCNC: 112 MG/DL (ref 65–100)
HCT VFR BLD AUTO: 34.4 % (ref 36.6–50.3)
HGB BLD-MCNC: 11.1 G/DL (ref 12.1–17)
IMM GRANULOCYTES # BLD AUTO: 0 K/UL (ref 0–0.04)
IMM GRANULOCYTES NFR BLD AUTO: 0 % (ref 0–0.5)
LYMPHOCYTES # BLD: 0.3 K/UL (ref 0.8–3.5)
LYMPHOCYTES NFR BLD: 8 % (ref 12–49)
MCH RBC QN AUTO: 31.8 PG (ref 26–34)
MCHC RBC AUTO-ENTMCNC: 32.3 G/DL (ref 30–36.5)
MCV RBC AUTO: 98.6 FL (ref 80–99)
MONOCYTES # BLD: 0.4 K/UL (ref 0–1)
MONOCYTES NFR BLD: 9 % (ref 5–13)
NEUTS SEG # BLD: 3.2 K/UL (ref 1.8–8)
NEUTS SEG NFR BLD: 81 % (ref 32–75)
NRBC # BLD: 0 K/UL (ref 0–0.01)
NRBC BLD-RTO: 0 PER 100 WBC
PLATELET # BLD AUTO: 175 K/UL (ref 150–400)
PMV BLD AUTO: 8.7 FL (ref 8.9–12.9)
POTASSIUM SERPL-SCNC: 4 MMOL/L (ref 3.5–5.1)
PROT SERPL-MCNC: 7.2 G/DL (ref 6.4–8.2)
RBC # BLD AUTO: 3.49 M/UL (ref 4.1–5.7)
RBC MORPH BLD: ABNORMAL
SODIUM SERPL-SCNC: 137 MMOL/L (ref 136–145)
WBC # BLD AUTO: 3.9 K/UL (ref 4.1–11.1)

## 2024-04-03 PROCEDURE — 36591 DRAW BLOOD OFF VENOUS DEVICE: CPT

## 2024-04-03 PROCEDURE — 36415 COLL VENOUS BLD VENIPUNCTURE: CPT

## 2024-04-03 PROCEDURE — 2580000003 HC RX 258

## 2024-04-03 PROCEDURE — 80053 COMPREHEN METABOLIC PANEL: CPT

## 2024-04-03 PROCEDURE — 85025 COMPLETE CBC W/AUTO DIFF WBC: CPT

## 2024-04-03 RX ORDER — SODIUM CHLORIDE 0.9 % (FLUSH) 0.9 %
5-40 SYRINGE (ML) INJECTION PRN
OUTPATIENT
Start: 2024-06-26

## 2024-04-03 RX ORDER — HEPARIN 100 UNIT/ML
500 SYRINGE INTRAVENOUS PRN
OUTPATIENT
Start: 2024-06-26

## 2024-04-03 RX ORDER — SODIUM CHLORIDE 9 MG/ML
25 INJECTION, SOLUTION INTRAVENOUS PRN
OUTPATIENT
Start: 2024-06-26

## 2024-04-03 RX ORDER — SODIUM CHLORIDE 0.9 % (FLUSH) 0.9 %
5-40 SYRINGE (ML) INJECTION PRN
Status: DISCONTINUED | OUTPATIENT
Start: 2024-04-03 | End: 2024-04-04 | Stop reason: HOSPADM

## 2024-04-03 RX ADMIN — SODIUM CHLORIDE, PRESERVATIVE FREE 30 ML: 5 INJECTION INTRAVENOUS at 11:55

## 2024-04-03 NOTE — PROGRESS NOTES
[facility] Visit Notes                 Date: April 3, 2024  Name: Robert Gastelum  MRN: [id number]       : 1945    Pt arrived ambulatory and in no acute distress to [facility] for Port flush/Labs  Denies fever, cough, and N/V- denies covid-like symptoms.     Chest port accessed with positive blood return.   Labs ordered/ drawn. See Epic Results Review for details.   Tolerated procedure well without issue.  Port flushed with NS prior to being de-accessed. Bandaid and Gauze Applied to site.     Patient aware of upcoming appointment. Patient declined post- monitoring time. Education provided.     Mr. Gastelum's vitals were reviewed prior to treatment.   Patient Vitals for the past 12 hrs:   Temp Pulse Resp BP SpO2   24 1145 98.5 °F (36.9 °C) 76 18 137/84 97 %     Medications Administered         sodium chloride flush 0.9 % injection 5-40 mL Admin Date  2024 Action  Given Dose  30 mL Route  IntraVENous Administered By  Brionna Kearney RN          Future Appointments   Date Time Provider Department Center   2024 12:00 PM MO PET DOSE 1 [facility]   2024  1:00 PM MO PET 1 [facility]   2024  9:45 AM Zaida Davis MD [facility] BS [facility]   2024 11:30 AM G1 [facility]   2024 10:30 AM Zaida Davis MD [facility] BS [facility]   2024 11:30 AM G1 [facility]   2024 11:30 AM H1 [facility]STR[facility]     Brionna Kearney RN  April 3, 2024

## 2024-04-17 ENCOUNTER — HOSPITAL ENCOUNTER (OUTPATIENT)
Facility: HOSPITAL | Age: 79
Discharge: HOME | End: 2024-04-20
Attending: INTERNAL MEDICINE
Payer: MEDICARE

## 2024-04-17 VITALS — BODY MASS INDEX: 32.78 KG/M2 | WEIGHT: 235 LBS

## 2024-04-17 DIAGNOSIS — C67.3 MALIGNANT NEOPLASM OF ANTERIOR WALL OF BLADDER (HCC): ICD-10-CM

## 2024-04-17 DIAGNOSIS — C67.9 MALIGNANT NEOPLASM OF URINARY BLADDER, UNSPECIFIED SITE (HCC): ICD-10-CM

## 2024-04-17 LAB
GLUCOSE BLD STRIP.AUTO-MCNC: 90 MG/DL (ref 65–117)
SERVICE CMNT-IMP: NORMAL

## 2024-04-17 PROCEDURE — A9609 HC RX DIAGNOSTIC RADIOPHARMACEUTICAL: HCPCS | Performed by: INTERNAL MEDICINE

## 2024-04-17 PROCEDURE — 78815 PET IMAGE W/CT SKULL-THIGH: CPT

## 2024-04-17 PROCEDURE — 82962 GLUCOSE BLOOD TEST: CPT

## 2024-04-17 PROCEDURE — 3430000000 HC RX DIAGNOSTIC RADIOPHARMACEUTICAL: Performed by: INTERNAL MEDICINE

## 2024-04-17 RX ORDER — FLUDEOXYGLUCOSE F-18 500 MCI/ML
10 INJECTION INTRAVENOUS
Status: COMPLETED | OUTPATIENT
Start: 2024-04-17 | End: 2024-04-17

## 2024-04-17 RX ADMIN — FLUDEOXYGLUCOSE F-18 10 MILLICURIE: 500 INJECTION INTRAVENOUS at 11:41

## 2024-04-23 ENCOUNTER — CLINICAL DOCUMENTATION (OUTPATIENT)
Age: 79
End: 2024-04-23

## 2024-04-23 NOTE — PROGRESS NOTES
Reviewed in TB  All findings likely from sarcoid  The L RP node was 10 mm in oct 2023, 16 mm in dec 2023 and 11 mm now  Observe

## 2024-04-25 ENCOUNTER — OFFICE VISIT (OUTPATIENT)
Age: 79
End: 2024-04-25
Payer: MEDICARE

## 2024-04-25 VITALS
WEIGHT: 231.8 LBS | OXYGEN SATURATION: 91 % | BODY MASS INDEX: 32.33 KG/M2 | TEMPERATURE: 98.1 F | RESPIRATION RATE: 20 BRPM | HEART RATE: 100 BPM | SYSTOLIC BLOOD PRESSURE: 115 MMHG | DIASTOLIC BLOOD PRESSURE: 78 MMHG

## 2024-04-25 DIAGNOSIS — C67.9 MALIGNANT NEOPLASM OF URINARY BLADDER, UNSPECIFIED SITE (HCC): Primary | ICD-10-CM

## 2024-04-25 PROCEDURE — 99215 OFFICE O/P EST HI 40 MIN: CPT | Performed by: INTERNAL MEDICINE

## 2024-04-25 NOTE — PROGRESS NOTES
[facility] [place] [facility]   [address], [address] [place] [address]   W: [phone]  F: [phone]          Reason for Visit:     Robert Gastelum is a 78 y.o.  male who is seen in follow-up of Muscle invasive bladder cancer- Mixed histology       Treatment History:      3/1/2021: CT IVP: Multiple abdominal, iliac, mediastinal nodes unchanged from 2019 consistent with h/o sarcoidosis, Thickened R lateral  bladder wall.     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     12/6/2021: Radical urethro-cystoprostatectomy     2/3/22-1/2023: Adjuvant nivolumab  5/2023: RT with Dr. Scruggs  5/2023: With suspicious pelvic nodes  12/2023: CT stable nodes            History of Present Illness:     Patient is a 78 y.o. male with PMH as below including sarcoidosis who  is seen for evaluation of bladder cancer.      He has a history of nonmuscle invasive bladder cancer in 2015, underwent 2 induction courses of BCG, had a non muscle invasive recurrence in 2019 and had re induction with BCG. Cystoscopy 6/2020 at [facility] did not show any recurrence. In March 2021 he had  a positive FISH and was seen by Dr. Naqvi. Had a CT IVP 3/2021 which showed some Bladder thickening. He underwent a Cystoscopy with TURBT and was found to have extensive non muscle invasive disease including that of prostatic urethra, CIS and a focus  of Muscle invasive disease in the R bladder wall. Grade 4 neutropenia after cycle 1. Completed 4 cycles and had

## 2024-04-25 NOTE — PROGRESS NOTES
Robert Gastelum is a 79 y.o. male  Chief Complaint   Patient presents with    Follow-up     Muscle invasive bladder cancer- Mixed histology     1. Have you been to the ER, urgent care clinic since your last visit?  Hospitalized since your last visit?No    2. Have you seen or consulted any other health care providers outside of the [facility] since your last visit?  Include any pap smears or colon screening. Yes Dr. [facility], [facility]

## 2024-05-01 ENCOUNTER — APPOINTMENT (OUTPATIENT)
Facility: HOSPITAL | Age: 79
End: 2024-05-01
Payer: MEDICARE

## 2024-05-22 ENCOUNTER — HOSPITAL ENCOUNTER (OUTPATIENT)
Age: 79
Discharge: HOME | End: 2024-05-25
Payer: MEDICARE

## 2024-05-22 DIAGNOSIS — R10.13 EPIGASTRIC PAIN: ICD-10-CM

## 2024-05-22 PROCEDURE — 6360000004 HC RX CONTRAST MEDICATION: Performed by: RADIOLOGY

## 2024-05-22 PROCEDURE — 74177 CT ABD & PELVIS W/CONTRAST: CPT

## 2024-05-22 RX ADMIN — IOPAMIDOL 100 ML: 755 INJECTION, SOLUTION INTRAVENOUS at 13:45

## 2024-06-17 ENCOUNTER — HOSPITAL ENCOUNTER (INPATIENT)
Facility: HOSPITAL | Age: 79
LOS: 5 days | Discharge: NURSING, REHAB OR LONG-TERM CARE | End: 2024-06-22
Attending: EMERGENCY MEDICINE | Admitting: INTERNAL MEDICINE
Payer: MEDICARE

## 2024-06-17 ENCOUNTER — APPOINTMENT (OUTPATIENT)
Facility: HOSPITAL | Age: 79
End: 2024-06-17
Payer: MEDICARE

## 2024-06-17 DIAGNOSIS — I82.412 ACUTE DEEP VEIN THROMBOSIS (DVT) OF FEMORAL VEIN OF LEFT LOWER EXTREMITY (HCC): Primary | ICD-10-CM

## 2024-06-17 LAB
ALBUMIN SERPL-MCNC: 3.6 G/DL (ref 3.5–5)
ALBUMIN/GLOB SERPL: 0.8 (ref 1.1–2.2)
ALP SERPL-CCNC: 73 U/L (ref 45–117)
ALT SERPL-CCNC: 18 U/L (ref 12–78)
ANION GAP SERPL CALC-SCNC: 4 MMOL/L (ref 5–15)
APTT PPP: 30.8 SEC (ref 22.1–31)
AST SERPL-CCNC: 12 U/L (ref 15–37)
BASOPHILS # BLD: 0 K/UL (ref 0–0.1)
BASOPHILS NFR BLD: 1 % (ref 0–1)
BILIRUB SERPL-MCNC: 0.5 MG/DL (ref 0.2–1)
BUN SERPL-MCNC: 28 MG/DL (ref 6–20)
BUN/CREAT SERPL: 15 (ref 12–20)
CALCIUM SERPL-MCNC: 9.9 MG/DL (ref 8.5–10.1)
CHLORIDE SERPL-SCNC: 106 MMOL/L (ref 97–108)
CO2 SERPL-SCNC: 26 MMOL/L (ref 21–32)
COMMENT:: NORMAL
COMMENT:: NORMAL
CREAT SERPL-MCNC: 1.81 MG/DL (ref 0.7–1.3)
DIFFERENTIAL METHOD BLD: ABNORMAL
EOSINOPHIL # BLD: 0.1 K/UL (ref 0–0.4)
EOSINOPHIL NFR BLD: 3 % (ref 0–7)
ERYTHROCYTE [DISTWIDTH] IN BLOOD BY AUTOMATED COUNT: 15 % (ref 11.5–14.5)
ERYTHROCYTE [DISTWIDTH] IN BLOOD BY AUTOMATED COUNT: 15.2 % (ref 11.5–14.5)
GLOBULIN SER CALC-MCNC: 4.3 G/DL (ref 2–4)
GLUCOSE SERPL-MCNC: 97 MG/DL (ref 65–100)
HCT VFR BLD AUTO: 33.8 % (ref 36.6–50.3)
HCT VFR BLD AUTO: 34.3 % (ref 36.6–50.3)
HGB BLD-MCNC: 11 G/DL (ref 12.1–17)
HGB BLD-MCNC: 11.2 G/DL (ref 12.1–17)
IMM GRANULOCYTES # BLD AUTO: 0 K/UL (ref 0–0.04)
IMM GRANULOCYTES NFR BLD AUTO: 1 % (ref 0–0.5)
INR PPP: 1.1 (ref 0.9–1.1)
LACTATE SERPL-SCNC: 0.7 MMOL/L (ref 0.4–2)
LYMPHOCYTES # BLD: 0.4 K/UL (ref 0.8–3.5)
LYMPHOCYTES NFR BLD: 8 % (ref 12–49)
MCH RBC QN AUTO: 33.4 PG (ref 26–34)
MCH RBC QN AUTO: 33.6 PG (ref 26–34)
MCHC RBC AUTO-ENTMCNC: 32.5 G/DL (ref 30–36.5)
MCHC RBC AUTO-ENTMCNC: 32.7 G/DL (ref 30–36.5)
MCV RBC AUTO: 102.7 FL (ref 80–99)
MCV RBC AUTO: 103 FL (ref 80–99)
MONOCYTES # BLD: 0.6 K/UL (ref 0–1)
MONOCYTES NFR BLD: 13 % (ref 5–13)
NEUTS SEG # BLD: 3.4 K/UL (ref 1.8–8)
NEUTS SEG NFR BLD: 74 % (ref 32–75)
NRBC # BLD: 0 K/UL (ref 0–0.01)
NRBC # BLD: 0 K/UL (ref 0–0.01)
NRBC BLD-RTO: 0 PER 100 WBC
NRBC BLD-RTO: 0 PER 100 WBC
PLATELET # BLD AUTO: 151 K/UL (ref 150–400)
PLATELET # BLD AUTO: 163 K/UL (ref 150–400)
PMV BLD AUTO: 9.4 FL (ref 8.9–12.9)
PMV BLD AUTO: 9.5 FL (ref 8.9–12.9)
POTASSIUM SERPL-SCNC: 3.6 MMOL/L (ref 3.5–5.1)
PROCALCITONIN SERPL-MCNC: 0.09 NG/ML
PROT SERPL-MCNC: 7.9 G/DL (ref 6.4–8.2)
PROTHROMBIN TIME: 11 SEC (ref 9–11.1)
RBC # BLD AUTO: 3.29 M/UL (ref 4.1–5.7)
RBC # BLD AUTO: 3.33 M/UL (ref 4.1–5.7)
RBC MORPH BLD: ABNORMAL
RBC MORPH BLD: ABNORMAL
SODIUM SERPL-SCNC: 136 MMOL/L (ref 136–145)
SPECIMEN HOLD: NORMAL
SPECIMEN HOLD: NORMAL
THERAPEUTIC RANGE: NORMAL SECS (ref 58–77)
UFH PPP CHRO-ACNC: <0.1 IU/ML
WBC # BLD AUTO: 4.2 K/UL (ref 4.1–11.1)
WBC # BLD AUTO: 4.5 K/UL (ref 4.1–11.1)

## 2024-06-17 PROCEDURE — 87040 BLOOD CULTURE FOR BACTERIA: CPT

## 2024-06-17 PROCEDURE — 36415 COLL VENOUS BLD VENIPUNCTURE: CPT

## 2024-06-17 PROCEDURE — 1100000000 HC RM PRIVATE

## 2024-06-17 PROCEDURE — 85730 THROMBOPLASTIN TIME PARTIAL: CPT

## 2024-06-17 PROCEDURE — 85610 PROTHROMBIN TIME: CPT

## 2024-06-17 PROCEDURE — 6360000002 HC RX W HCPCS

## 2024-06-17 PROCEDURE — 84145 PROCALCITONIN (PCT): CPT

## 2024-06-17 PROCEDURE — 80053 COMPREHEN METABOLIC PANEL: CPT

## 2024-06-17 PROCEDURE — 96374 THER/PROPH/DIAG INJ IV PUSH: CPT

## 2024-06-17 PROCEDURE — 83605 ASSAY OF LACTIC ACID: CPT

## 2024-06-17 PROCEDURE — 85025 COMPLETE CBC W/AUTO DIFF WBC: CPT

## 2024-06-17 PROCEDURE — 99285 EMERGENCY DEPT VISIT HI MDM: CPT

## 2024-06-17 PROCEDURE — 85027 COMPLETE CBC AUTOMATED: CPT

## 2024-06-17 PROCEDURE — 85520 HEPARIN ASSAY: CPT

## 2024-06-17 PROCEDURE — 93971 EXTREMITY STUDY: CPT

## 2024-06-17 RX ORDER — HEPARIN SODIUM 10000 [USP'U]/100ML
18-30 INJECTION, SOLUTION INTRAVENOUS CONTINUOUS
Status: DISCONTINUED | OUTPATIENT
Start: 2024-06-17 | End: 2024-06-19

## 2024-06-17 RX ORDER — HEPARIN SODIUM 1000 [USP'U]/ML
40 INJECTION, SOLUTION INTRAVENOUS; SUBCUTANEOUS PRN
Status: DISCONTINUED | OUTPATIENT
Start: 2024-06-17 | End: 2024-06-19

## 2024-06-17 RX ORDER — HEPARIN SODIUM 1000 [USP'U]/ML
80 INJECTION, SOLUTION INTRAVENOUS; SUBCUTANEOUS ONCE
Status: COMPLETED | OUTPATIENT
Start: 2024-06-17 | End: 2024-06-17

## 2024-06-17 RX ORDER — HEPARIN SODIUM 1000 [USP'U]/ML
80 INJECTION, SOLUTION INTRAVENOUS; SUBCUTANEOUS PRN
Status: DISCONTINUED | OUTPATIENT
Start: 2024-06-17 | End: 2024-06-19

## 2024-06-17 RX ORDER — HYDROMORPHONE HYDROCHLORIDE 1 MG/ML
1 INJECTION, SOLUTION INTRAMUSCULAR; INTRAVENOUS; SUBCUTANEOUS EVERY 4 HOURS PRN
Status: DISCONTINUED | OUTPATIENT
Start: 2024-06-17 | End: 2024-06-22

## 2024-06-17 RX ORDER — OXYCODONE HYDROCHLORIDE 5 MG/1
5 TABLET ORAL EVERY 4 HOURS PRN
Status: DISCONTINUED | OUTPATIENT
Start: 2024-06-17 | End: 2024-06-22 | Stop reason: HOSPADM

## 2024-06-17 RX ADMIN — HEPARIN SODIUM 18 UNITS/KG/HR: 10000 INJECTION, SOLUTION INTRAVENOUS at 20:28

## 2024-06-17 RX ADMIN — HEPARIN SODIUM 9200 UNITS: 1000 INJECTION INTRAVENOUS; SUBCUTANEOUS at 20:26

## 2024-06-17 ASSESSMENT — PAIN DESCRIPTION - DESCRIPTORS: DESCRIPTORS: ACHING

## 2024-06-17 ASSESSMENT — PAIN - FUNCTIONAL ASSESSMENT
PAIN_FUNCTIONAL_ASSESSMENT: PREVENTS OR INTERFERES SOME ACTIVE ACTIVITIES AND ADLS
PAIN_FUNCTIONAL_ASSESSMENT: 0-10

## 2024-06-17 ASSESSMENT — PAIN DESCRIPTION - ORIENTATION: ORIENTATION: LEFT

## 2024-06-17 ASSESSMENT — PAIN DESCRIPTION - LOCATION: LOCATION: LEG

## 2024-06-17 ASSESSMENT — PAIN SCALES - GENERAL: PAINLEVEL_OUTOF10: 5

## 2024-06-17 NOTE — ED TRIAGE NOTES
Patient arrives to ED reports cellulitis to left leg for 1 month. Patient sent in by PCP after outpatient antibiotics.

## 2024-06-17 NOTE — ED PROVIDER NOTES
[facility] EMERGENCY DEP  EMERGENCY DEPARTMENT ENCOUNTER      Date: 6/17/2024  Patient Name: Robert Gastelum  MRN: [id number]  Birthdate 1945  Date of evaluation: 6/17/2024  Provider: RAMOS Courtney NP   Note Started: 5:05 PM EDT 6/17/24    CHIEF COMPLAINT     Chief Complaint   Patient presents with    Leg Pain       HISTORY OF PRESENT ILLNESS  (Onset, Location, Duration, Character, Alleviating/Aggravating, Radiation, Timing, Severity)   Note limiting factors.   History Provided By: Patient     HPI: Robert Gastelum is a 79 y.o. male with a history of   Presents with left lower extremity redness and swelling. Onset a month ago. Primary care culture positive for MRSA treated with topical abx that improved. Been on prednisone and diuretics. Denies fever, chest pain, dyspnea, injury, N/V. Pain in left knee and hip. Took tylenol at 1300.   Endorses diarrhea for \"a couple weeks.\" Nonbloody.     Nursing Notes and triage vitals were reviewed.  PCP: Robert Leo MD      PAST MEDICAL HISTORY   Past Medical History:  Past Medical History:   Diagnosis Date    Arrhythmia     LBBB    Arthritis     Asthma     MILD    Cancer (HCC) 2015    BLADDER    Cancer (HCC)     scc top of head and nose    COVID-19 vaccine series completed     MODERNA VACCINE 1-28-21 AND 2-25-21    Depression with anxiety     Hypertension     Other unknown and unspecified cause of morbidity or mortality     history of pulmonary sarcoid     Posterior cervical adenopathy 11/19/2018    Pulmonary sarcoidosis (HCC)     Unspecified sleep apnea     cpap       Past Surgical History:  Past Surgical History:   Procedure Laterality Date    CATARACT REMOVAL Bilateral     HERNIA REPAIR Left 1990    INGUIBAL    HERNIA REPAIR Right AS A CHILD    INGUINAL    IR PORT PLACEMENT EQUAL OR GREATER THAN 5 YEARS  7/16/2021    IR PORT PLACEMENT EQUAL OR GREATER THAN 5 YEARS 7/16/2021 [facility] RAD ANGIO IR    IR PORT PLACEMENT EQUAL OR GREATER THAN 5 YEARS  7/16/2021    IR  appropriate.  Differentials include cellulitis, DVT, MRSA, PVD.  Less likely osteomyelitis, sepsis, septic joint.  Will get labs and duplex.  Patient denies need for pain medication at this time.  Consider antibiotics as needed.    ED COURSE  ED Course as of 06/17/24 1954 Mon Jun 17, 2024 1939 WBC: 4.5 [KW]   1939 Hemoglobin Quant(!): 11.2 [KW]   1939 Hematocrit(!): 34.3 [KW]   1939 BUN,BUNPL(!): 28 [KW]   1939 Creatinine(!): 1.81 [KW]   1939 Est, Glom Filt Rate(!): 38 [KW]   1939 Kidney labs consistent with past 5 months. [KW]   1939 Culture: NO GROWTH <24 HRS [KW]   1939 Lactic Acid, Plasma: 0.7 [KW]      ED Course User Index  [KW] Tasha Ma, APRN - NP     Perfect Serve Consult for Admission  7:54 PM    ED Room Number: R35/R35  Patient Name and age:  Robert Gastelum 79 y.o.  male  Working Diagnosis:   1. Acute deep vein thrombosis (DVT) of femoral vein of left lower extremity (HCC)        COVID-19 Suspicion: No  Sepsis present:  No  Reassessment needed: No  Code Status:  Full Code  Readmission: No  Isolation Requirements: no  Recommended Level of Care: med/surg  Department: [facility] Adult ED - [phone]  Consulting Provider: Tasha Ma ENP    79-year-old male presents with swelling, pain, and redness to the left lower extremity.  Previously positive for MRSA in that limb and treated with antibiotics.  Well-appearing with stable vital signs.  Pain extends into the left hip.  Workup with elevated kidney labs and extensive DVT of the left femoral vein.  Heparin initiated.      Sepsis Reassessment: Sepsis reassessment not applicable    CONSULTS:  None    Patient was given the following medications:  Medications   heparin (porcine) injection 9,200 Units (has no administration in time range)   heparin (porcine) injection 9,200 Units (has no administration in time range)   heparin (porcine) injection 4,600 Units (has no administration in time range)   heparin 25,000 units in dextrose 5% 250 mL (premix)

## 2024-06-18 ENCOUNTER — APPOINTMENT (OUTPATIENT)
Facility: HOSPITAL | Age: 79
End: 2024-06-18
Payer: MEDICARE

## 2024-06-18 LAB
ALBUMIN SERPL-MCNC: 3.2 G/DL (ref 3.5–5)
ALBUMIN/GLOB SERPL: 0.9 (ref 1.1–2.2)
ALP SERPL-CCNC: 63 U/L (ref 45–117)
ALT SERPL-CCNC: 15 U/L (ref 12–78)
ANION GAP SERPL CALC-SCNC: 8 MMOL/L (ref 5–15)
APPEARANCE UR: CLEAR
AST SERPL-CCNC: 11 U/L (ref 15–37)
BACTERIA URNS QL MICRO: ABNORMAL /HPF
BASOPHILS # BLD: 0 K/UL (ref 0–0.1)
BASOPHILS NFR BLD: 1 % (ref 0–1)
BILIRUB SERPL-MCNC: 0.6 MG/DL (ref 0.2–1)
BILIRUB UR QL: NEGATIVE
BUN SERPL-MCNC: 30 MG/DL (ref 6–20)
BUN/CREAT SERPL: 17 (ref 12–20)
CALCIUM SERPL-MCNC: 9.2 MG/DL (ref 8.5–10.1)
CAOX CRY URNS QL MICRO: ABNORMAL
CHLORIDE SERPL-SCNC: 108 MMOL/L (ref 97–108)
CO2 SERPL-SCNC: 22 MMOL/L (ref 21–32)
COLOR UR: ABNORMAL
CREAT SERPL-MCNC: 1.77 MG/DL (ref 0.7–1.3)
DIFFERENTIAL METHOD BLD: ABNORMAL
EKG ATRIAL RATE: 85 BPM
EKG DIAGNOSIS: NORMAL
EKG P AXIS: 40 DEGREES
EKG P-R INTERVAL: 200 MS
EKG Q-T INTERVAL: 446 MS
EKG QRS DURATION: 154 MS
EKG QTC CALCULATION (BAZETT): 530 MS
EKG R AXIS: -19 DEGREES
EKG T AXIS: 115 DEGREES
EKG VENTRICULAR RATE: 85 BPM
EOSINOPHIL # BLD: 0.2 K/UL (ref 0–0.4)
EOSINOPHIL NFR BLD: 4 % (ref 0–7)
EPITH CASTS URNS QL MICRO: ABNORMAL /LPF
ERYTHROCYTE [DISTWIDTH] IN BLOOD BY AUTOMATED COUNT: 15.2 % (ref 11.5–14.5)
GLOBULIN SER CALC-MCNC: 3.7 G/DL (ref 2–4)
GLUCOSE SERPL-MCNC: 107 MG/DL (ref 65–100)
GLUCOSE UR STRIP.AUTO-MCNC: NEGATIVE MG/DL
HCT VFR BLD AUTO: 31 % (ref 36.6–50.3)
HGB BLD-MCNC: 10.5 G/DL (ref 12.1–17)
HGB UR QL STRIP: ABNORMAL
IMM GRANULOCYTES # BLD AUTO: 0 K/UL (ref 0–0.04)
IMM GRANULOCYTES NFR BLD AUTO: 1 % (ref 0–0.5)
KETONES UR QL STRIP.AUTO: NEGATIVE MG/DL
LEUKOCYTE ESTERASE UR QL STRIP.AUTO: ABNORMAL
LYMPHOCYTES # BLD: 0.4 K/UL (ref 0.8–3.5)
LYMPHOCYTES NFR BLD: 8 % (ref 12–49)
MAGNESIUM SERPL-MCNC: 2.3 MG/DL (ref 1.6–2.4)
MCH RBC QN AUTO: 34 PG (ref 26–34)
MCHC RBC AUTO-ENTMCNC: 33.9 G/DL (ref 30–36.5)
MCV RBC AUTO: 100.3 FL (ref 80–99)
MONOCYTES # BLD: 0.7 K/UL (ref 0–1)
MONOCYTES NFR BLD: 16 % (ref 5–13)
NEUTS SEG # BLD: 3 K/UL (ref 1.8–8)
NEUTS SEG NFR BLD: 71 % (ref 32–75)
NITRITE UR QL STRIP.AUTO: POSITIVE
NRBC # BLD: 0 K/UL (ref 0–0.01)
NRBC BLD-RTO: 0 PER 100 WBC
NT PRO BNP: 1057 PG/ML
PH UR STRIP: 6.5 (ref 5–8)
PHOSPHATE SERPL-MCNC: 3 MG/DL (ref 2.6–4.7)
PLATELET # BLD AUTO: 141 K/UL (ref 150–400)
PMV BLD AUTO: 9.2 FL (ref 8.9–12.9)
POTASSIUM SERPL-SCNC: 3.7 MMOL/L (ref 3.5–5.1)
PROT SERPL-MCNC: 6.9 G/DL (ref 6.4–8.2)
PROT UR STRIP-MCNC: ABNORMAL MG/DL
RBC # BLD AUTO: 3.09 M/UL (ref 4.1–5.7)
RBC #/AREA URNS HPF: ABNORMAL /HPF (ref 0–5)
SODIUM SERPL-SCNC: 138 MMOL/L (ref 136–145)
SP GR UR REFRACTOMETRY: 1.02 (ref 1–1.03)
TSH SERPL DL<=0.05 MIU/L-ACNC: 1.8 UIU/ML (ref 0.36–3.74)
UFH PPP CHRO-ACNC: 0.48 IU/ML
UFH PPP CHRO-ACNC: 0.52 IU/ML
UFH PPP CHRO-ACNC: 0.63 IU/ML
UFH PPP CHRO-ACNC: 0.86 IU/ML
URATE CRY URNS QL MICRO: ABNORMAL
URINE CULTURE IF INDICATED: ABNORMAL
UROBILINOGEN UR QL STRIP.AUTO: 0.2 EU/DL (ref 0.2–1)
WBC # BLD AUTO: 4.3 K/UL (ref 4.1–11.1)
WBC URNS QL MICRO: ABNORMAL /HPF (ref 0–4)

## 2024-06-18 PROCEDURE — 6370000000 HC RX 637 (ALT 250 FOR IP): Performed by: NURSE PRACTITIONER

## 2024-06-18 PROCEDURE — 6360000002 HC RX W HCPCS: Performed by: INTERNAL MEDICINE

## 2024-06-18 PROCEDURE — 85520 HEPARIN ASSAY: CPT

## 2024-06-18 PROCEDURE — 81001 URINALYSIS AUTO W/SCOPE: CPT

## 2024-06-18 PROCEDURE — 2580000003 HC RX 258: Performed by: INTERNAL MEDICINE

## 2024-06-18 PROCEDURE — 83880 ASSAY OF NATRIURETIC PEPTIDE: CPT

## 2024-06-18 PROCEDURE — 84100 ASSAY OF PHOSPHORUS: CPT

## 2024-06-18 PROCEDURE — 80053 COMPREHEN METABOLIC PANEL: CPT

## 2024-06-18 PROCEDURE — 83735 ASSAY OF MAGNESIUM: CPT

## 2024-06-18 PROCEDURE — 71045 X-RAY EXAM CHEST 1 VIEW: CPT

## 2024-06-18 PROCEDURE — 93005 ELECTROCARDIOGRAM TRACING: CPT | Performed by: INTERNAL MEDICINE

## 2024-06-18 PROCEDURE — 85025 COMPLETE CBC W/AUTO DIFF WBC: CPT

## 2024-06-18 PROCEDURE — 6370000000 HC RX 637 (ALT 250 FOR IP): Performed by: INTERNAL MEDICINE

## 2024-06-18 PROCEDURE — 1100000000 HC RM PRIVATE

## 2024-06-18 PROCEDURE — 84443 ASSAY THYROID STIM HORMONE: CPT

## 2024-06-18 PROCEDURE — 36415 COLL VENOUS BLD VENIPUNCTURE: CPT

## 2024-06-18 PROCEDURE — 6360000002 HC RX W HCPCS

## 2024-06-18 RX ORDER — SODIUM CHLORIDE 0.9 % (FLUSH) 0.9 %
5-40 SYRINGE (ML) INJECTION PRN
Status: DISCONTINUED | OUTPATIENT
Start: 2024-06-18 | End: 2024-06-22 | Stop reason: HOSPADM

## 2024-06-18 RX ORDER — PHENOBARBITAL 32.4 MG/1
16.2 TABLET ORAL 2 TIMES DAILY
Status: COMPLETED | OUTPATIENT
Start: 2024-06-20 | End: 2024-06-21

## 2024-06-18 RX ORDER — LORAZEPAM 2 MG/ML
4 INJECTION INTRAMUSCULAR
Status: DISCONTINUED | OUTPATIENT
Start: 2024-06-18 | End: 2024-06-22 | Stop reason: HOSPADM

## 2024-06-18 RX ORDER — POTASSIUM CHLORIDE 7.45 MG/ML
10 INJECTION INTRAVENOUS PRN
Status: DISCONTINUED | OUTPATIENT
Start: 2024-06-18 | End: 2024-06-20

## 2024-06-18 RX ORDER — LORAZEPAM 2 MG/ML
3 INJECTION INTRAMUSCULAR
Status: DISCONTINUED | OUTPATIENT
Start: 2024-06-18 | End: 2024-06-22 | Stop reason: HOSPADM

## 2024-06-18 RX ORDER — PHENOBARBITAL 32.4 MG/1
32.4 TABLET ORAL 2 TIMES DAILY
Status: COMPLETED | OUTPATIENT
Start: 2024-06-19 | End: 2024-06-20

## 2024-06-18 RX ORDER — BUPROPION HYDROCHLORIDE 100 MG/1
100 TABLET, EXTENDED RELEASE ORAL 2 TIMES DAILY
Status: DISCONTINUED | OUTPATIENT
Start: 2024-06-18 | End: 2024-06-22 | Stop reason: HOSPADM

## 2024-06-18 RX ORDER — PHENOBARBITAL 32.4 MG/1
32.4 TABLET ORAL EVERY 6 HOURS PRN
Status: ACTIVE | OUTPATIENT
Start: 2024-06-18 | End: 2024-06-20

## 2024-06-18 RX ORDER — LORAZEPAM 1 MG/1
4 TABLET ORAL
Status: DISCONTINUED | OUTPATIENT
Start: 2024-06-18 | End: 2024-06-22 | Stop reason: HOSPADM

## 2024-06-18 RX ORDER — PHENOBARBITAL 32.4 MG/1
32.4 TABLET ORAL 4 TIMES DAILY
Status: COMPLETED | OUTPATIENT
Start: 2024-06-18 | End: 2024-06-19

## 2024-06-18 RX ORDER — ONDANSETRON 4 MG/1
4 TABLET, ORALLY DISINTEGRATING ORAL EVERY 8 HOURS PRN
Status: DISCONTINUED | OUTPATIENT
Start: 2024-06-18 | End: 2024-06-22 | Stop reason: HOSPADM

## 2024-06-18 RX ORDER — PHENOBARBITAL 32.4 MG/1
16.2 TABLET ORAL EVERY 6 HOURS PRN
Status: ACTIVE | OUTPATIENT
Start: 2024-06-20 | End: 2024-06-21

## 2024-06-18 RX ORDER — ZOLPIDEM TARTRATE 10 MG/1
10 TABLET ORAL NIGHTLY
COMMUNITY

## 2024-06-18 RX ORDER — ESCITALOPRAM OXALATE 10 MG/1
20 TABLET ORAL NIGHTLY
Status: DISCONTINUED | OUTPATIENT
Start: 2024-06-18 | End: 2024-06-22 | Stop reason: HOSPADM

## 2024-06-18 RX ORDER — LORAZEPAM 1 MG/1
3 TABLET ORAL
Status: DISCONTINUED | OUTPATIENT
Start: 2024-06-18 | End: 2024-06-22 | Stop reason: HOSPADM

## 2024-06-18 RX ORDER — LORAZEPAM 1 MG/1
2 TABLET ORAL
Status: DISCONTINUED | OUTPATIENT
Start: 2024-06-18 | End: 2024-06-22 | Stop reason: HOSPADM

## 2024-06-18 RX ORDER — POTASSIUM CHLORIDE 750 MG/1
40 TABLET, FILM COATED, EXTENDED RELEASE ORAL PRN
Status: DISCONTINUED | OUTPATIENT
Start: 2024-06-18 | End: 2024-06-20

## 2024-06-18 RX ORDER — SODIUM CHLORIDE 9 MG/ML
INJECTION, SOLUTION INTRAVENOUS PRN
Status: DISCONTINUED | OUTPATIENT
Start: 2024-06-18 | End: 2024-06-22 | Stop reason: HOSPADM

## 2024-06-18 RX ORDER — ACETAMINOPHEN 650 MG/1
650 SUPPOSITORY RECTAL EVERY 6 HOURS PRN
Status: DISCONTINUED | OUTPATIENT
Start: 2024-06-18 | End: 2024-06-22 | Stop reason: HOSPADM

## 2024-06-18 RX ORDER — ATORVASTATIN CALCIUM 10 MG/1
20 TABLET, FILM COATED ORAL NIGHTLY
Status: DISCONTINUED | OUTPATIENT
Start: 2024-06-18 | End: 2024-06-22 | Stop reason: HOSPADM

## 2024-06-18 RX ORDER — LORAZEPAM 2 MG/ML
1 INJECTION INTRAMUSCULAR
Status: DISCONTINUED | OUTPATIENT
Start: 2024-06-18 | End: 2024-06-22 | Stop reason: HOSPADM

## 2024-06-18 RX ORDER — ACETAMINOPHEN 325 MG/1
650 TABLET ORAL EVERY 6 HOURS PRN
Status: DISCONTINUED | OUTPATIENT
Start: 2024-06-18 | End: 2024-06-22 | Stop reason: HOSPADM

## 2024-06-18 RX ORDER — ONDANSETRON 2 MG/ML
4 INJECTION INTRAMUSCULAR; INTRAVENOUS EVERY 6 HOURS PRN
Status: DISCONTINUED | OUTPATIENT
Start: 2024-06-18 | End: 2024-06-22 | Stop reason: HOSPADM

## 2024-06-18 RX ORDER — SODIUM CHLORIDE 0.9 % (FLUSH) 0.9 %
5-40 SYRINGE (ML) INJECTION EVERY 12 HOURS SCHEDULED
Status: DISCONTINUED | OUTPATIENT
Start: 2024-06-18 | End: 2024-06-22 | Stop reason: HOSPADM

## 2024-06-18 RX ORDER — ZOLPIDEM TARTRATE 5 MG/1
5-10 TABLET ORAL NIGHTLY PRN
Status: DISCONTINUED | OUTPATIENT
Start: 2024-06-18 | End: 2024-06-22 | Stop reason: HOSPADM

## 2024-06-18 RX ORDER — MAGNESIUM SULFATE IN WATER 40 MG/ML
2000 INJECTION, SOLUTION INTRAVENOUS PRN
Status: DISCONTINUED | OUTPATIENT
Start: 2024-06-18 | End: 2024-06-20

## 2024-06-18 RX ORDER — LORAZEPAM 1 MG/1
1 TABLET ORAL
Status: DISCONTINUED | OUTPATIENT
Start: 2024-06-18 | End: 2024-06-22 | Stop reason: HOSPADM

## 2024-06-18 RX ORDER — POLYETHYLENE GLYCOL 3350 17 G/17G
17 POWDER, FOR SOLUTION ORAL DAILY PRN
Status: DISCONTINUED | OUTPATIENT
Start: 2024-06-18 | End: 2024-06-22 | Stop reason: HOSPADM

## 2024-06-18 RX ORDER — LORAZEPAM 2 MG/ML
2 INJECTION INTRAMUSCULAR
Status: DISCONTINUED | OUTPATIENT
Start: 2024-06-18 | End: 2024-06-22 | Stop reason: HOSPADM

## 2024-06-18 RX ORDER — ATORVASTATIN CALCIUM 10 MG/1
10 TABLET, FILM COATED ORAL ONCE
Status: COMPLETED | OUTPATIENT
Start: 2024-06-18 | End: 2024-06-18

## 2024-06-18 RX ADMIN — ATORVASTATIN CALCIUM 10 MG: 10 TABLET, FILM COATED ORAL at 01:09

## 2024-06-18 RX ADMIN — HEPARIN SODIUM 16 UNITS/KG/HR: 10000 INJECTION, SOLUTION INTRAVENOUS at 22:26

## 2024-06-18 RX ADMIN — HYDROMORPHONE HYDROCHLORIDE 1 MG: 1 INJECTION, SOLUTION INTRAMUSCULAR; INTRAVENOUS; SUBCUTANEOUS at 04:37

## 2024-06-18 RX ADMIN — PHENOBARBITAL 32.4 MG: 32.4 TABLET ORAL at 22:18

## 2024-06-18 RX ADMIN — ATORVASTATIN CALCIUM 20 MG: 10 TABLET, FILM COATED ORAL at 22:18

## 2024-06-18 RX ADMIN — HYDROMORPHONE HYDROCHLORIDE 1 MG: 1 INJECTION, SOLUTION INTRAMUSCULAR; INTRAVENOUS; SUBCUTANEOUS at 11:01

## 2024-06-18 RX ADMIN — HYDROMORPHONE HYDROCHLORIDE 1 MG: 1 INJECTION, SOLUTION INTRAMUSCULAR; INTRAVENOUS; SUBCUTANEOUS at 22:18

## 2024-06-18 RX ADMIN — BUPROPION HYDROCHLORIDE 100 MG: 100 TABLET, FILM COATED, EXTENDED RELEASE ORAL at 22:18

## 2024-06-18 RX ADMIN — WATER 2000 MG: 1 INJECTION INTRAMUSCULAR; INTRAVENOUS; SUBCUTANEOUS at 07:24

## 2024-06-18 RX ADMIN — HEPARIN SODIUM 16 UNITS/KG/HR: 10000 INJECTION, SOLUTION INTRAVENOUS at 07:51

## 2024-06-18 RX ADMIN — PHENOBARBITAL 32.4 MG: 32.4 TABLET ORAL at 18:02

## 2024-06-18 RX ADMIN — ZOLPIDEM TARTRATE 10 MG: 5 TABLET ORAL at 23:57

## 2024-06-18 RX ADMIN — BUPROPION HYDROCHLORIDE 100 MG: 100 TABLET, FILM COATED, EXTENDED RELEASE ORAL at 16:11

## 2024-06-18 RX ADMIN — SODIUM CHLORIDE, PRESERVATIVE FREE 10 ML: 5 INJECTION INTRAVENOUS at 22:19

## 2024-06-18 RX ADMIN — Medication 2500 MG: at 05:40

## 2024-06-18 RX ADMIN — ESCITALOPRAM OXALATE 20 MG: 10 TABLET ORAL at 22:19

## 2024-06-18 ASSESSMENT — PAIN SCALES - GENERAL
PAINLEVEL_OUTOF10: 4
PAINLEVEL_OUTOF10: 7
PAINLEVEL_OUTOF10: 7

## 2024-06-18 ASSESSMENT — PAIN DESCRIPTION - LOCATION
LOCATION: LEG
LOCATION: LEG

## 2024-06-18 ASSESSMENT — PAIN DESCRIPTION - ORIENTATION: ORIENTATION: LEFT

## 2024-06-18 NOTE — PROGRESS NOTES
Spiritual Care Partner Volunteer visited patient at [facility] in [facility] MED ONCOLOGY on 6/18/2024   Documented by:  Murphy Gastelum MDIV, BCC

## 2024-06-18 NOTE — H&P
History and Physical    Date of Service:  6/18/2024  Primary Care Provider: Robert Leo MD  Source of information: The patient and review of EMR    Chief Complaint: Leg Pain      History of Presenting Illness:   Robert Gastelum is a 79 y.o. male with past medical history significant for dyslipidemia, anxiety/depression, CKD, hypertension, AMI on CPAP, urinary bladder cancer s/p resection with resultant urostomy presented at the emergency room with left leg swelling and redness.  Patient symptoms started about a month ago, was seen by his PCP and wound culture obtained was positive for MRSA, patient was treated with topical antibiotics with improvement.  Patient was also treated with diuretic and prednisone for the swelling.  Patient has had left knee replacement in the past as well.  Ultrasound of the left leg according to the patient was done and this was negative for DVT.  Ultrasound of the lower extremity done on arrival at the emergency room today showed extensive DVT of the left leg up to the groin.  Patient was started on heparin infusion and was referred to the hospitalist service for admission     REVIEW OF SYSTEMS:  REVIEW OF SYSTEMS:  HEAD, EYES, EARS, NOSE AND THROAT:  No headache.  No dizziness.  No blurring of vision.  No photophobia.  RESPIRATORY SYSTEM:  No shortness of breath.  No cough.  No hemoptysis.  CARDIOVASCULAR SYSTEM:  no  chest pain.  No orthopnea.  No palpitations.  GASTROINTESTINAL SYSTEM:  No nausea or vomiting.  No diarrhea.  No constipation.  GENITOURINARY SYSTEM:  No dysuria, no urgency, and no frequency.     All other systems are reviewed and they are negative.        Past Medical History:   Diagnosis Date    Arrhythmia     LBBB    Arthritis     Asthma     MILD    Cancer (HCC) 2015    BLADDER    Cancer (HCC)     scc top of head and nose    COVID-19 vaccine series completed     MODERNA VACCINE 1-28-21 AND 2-25-21    Depression with anxiety     Hypertension     Other  (ZOCOR) 20 MG tablet Take 1 tablet by mouth nightly    Automatic Reconciliation, Ar     Allergies   Allergen Reactions    Penicillins Hives     Patient screened for any delayed non-IgE-mediated reaction to PCN.        Patient notes the following:    NO SEVERE NON-IGE MEDIATED REACTIONS      Family History   Problem Relation Age of Onset    Dementia Mother     Heart Disease Father     Cancer Sister         breast    Lung Disease Brother     No Known Problems Brother     Anesth Problems Neg Hx     Cancer Mother         breast with mets      Social History:  reports that he quit smoking about 58 years ago. His smoking use included cigarettes. He has never used smokeless tobacco. He reports current alcohol use of about 2.0 standard drinks of alcohol per week. He reports that he does not use drugs.   Social Determinants of Health     Tobacco Use: Medium Risk (11/13/2023)    Patient History     Smoking Tobacco Use: Former     Smokeless Tobacco Use: Never     Passive Exposure: Not on file   Alcohol Use: Not on file   Financial Resource Strain: Not on file   Food Insecurity: Not on file   Transportation Needs: Not on file   Physical Activity: Not on file   Stress: Not on file   Social Connections: Moderately Integrated (6/18/2024)    Social Connections ([facility] HRSN)     If for any reason you need help with day-to-day activities such as bathing, preparing meals, shopping, managing finances, etc., do you get the help you need?: Not on file   Intimate Partner Violence: Not on file   Depression: Not at risk (1/3/2023)    PHQ-2     PHQ-2 Score: 0   Housing Stability: Not on file   Interpersonal Safety: Not At Risk (6/18/2024)    Interpersonal Safety Domain Source: IP Abuse Screening     Physical abuse: Denies     Verbal abuse: Denies     Emotional abuse: Denies     Financial abuse: Denies     Sexual abuse: Denies   Utilities: Not on file        Medications were reconciled to the best of my ability given all available resources at  the time of admission. Route is PO if not otherwise noted.     Family and social history were personally reviewed, all pertinent and relevant details are outlined as above.    Objective:   BP 94/70   Pulse 87   Temp 97.5 °F (36.4 °C) (Axillary)   Resp 17   Ht 1.803 m (5' 11\")   Wt 114.7 kg (252 lb 13.9 oz)   SpO2 95%   BMI 35.27 kg/m²         PHYSICAL EXAM:   General appearance: Patient appears ill, in moderate distress.  Head: Normal cephalic, atraumatic.  Eyes: Normal eye movement, no redness no drainage.  Ears: Normal external ear and no drainage.  Nose: No deformity and no drainage.  Mouth and throat: No visible oral lesion.  Neck: Supple, no JVD no thyromegaly.  Chest: Clear breath sounds, no wheezing no crackles.  Heart: Normal S1 and S2, regular, no clinically appreciable murmur.  Abdomen: Soft, nontender normal bowel sounds.  Urostomy noted  CNS: Alert oriented x3, no gross or focal neurological deficit.  Extremities: Edema 2+, pulses 2+ bilaterally.  Musculoskeletal system: No joint deformity and swelling.  Skin: Redness and swelling entire left lower extremity, tender and warmth to touch  Psychiatry: Normal mood and affect.  Lymphatic system: No cervical lymphadenopathy.  Data Review:   I have independently reviewed and interpreted patient's lab and all other diagnostic data    Abnormal Labs Reviewed   CBC WITH AUTO DIFFERENTIAL - Abnormal; Notable for the following components:       Result Value    RBC 3.33 (*)     Hemoglobin 11.2 (*)     Hematocrit 34.3 (*)     .0 (*)     RDW 15.2 (*)     Lymphocytes % 8 (*)     Immature Granulocytes % 1 (*)     Lymphocytes Absolute 0.4 (*)     All other components within normal limits   COMPREHENSIVE METABOLIC PANEL - Abnormal; Notable for the following components:    Anion Gap 4 (*)     BUN 28 (*)     Creatinine 1.81 (*)     Est, Glom Filt Rate 38 (*)     AST 12 (*)     Globulin 4.3 (*)     Albumin/Globulin Ratio 0.8 (*)     All other components within

## 2024-06-18 NOTE — ED NOTES
ED TO INPATIENT SBAR HANDOFF    Patient Name: Robert Gastelum   :  1945  79 y.o.   MRN:  [id number]  ED Room #:  R35/R35  Family/Caregiver Present no       Situation  Code Status: Full Code     Allergies: Penicillins  Weight: Patient Vitals for the past 96 hrs (Last 3 readings):   Weight   24 1706 114.7 kg (252 lb 13.9 oz)     Arrived from: home  Chief Complaint:   Chief Complaint   Patient presents with    Leg Pain     Hospital Problem/Diagnosis:  Principal Problem:    Acute deep vein thrombosis (DVT) of femoral vein of left lower extremity (HCC)  Resolved Problems:    * No resolved hospital problems. *    Imaging:   Vascular duplex lower extremity venous left           Abnormal labs:   Abnormal Labs Reviewed   CBC WITH AUTO DIFFERENTIAL - Abnormal; Notable for the following components:       Result Value    RBC 3.33 (*)     Hemoglobin 11.2 (*)     Hematocrit 34.3 (*)     .0 (*)     RDW 15.2 (*)     Lymphocytes % 8 (*)     Immature Granulocytes % 1 (*)     Lymphocytes Absolute 0.4 (*)     All other components within normal limits   COMPREHENSIVE METABOLIC PANEL - Abnormal; Notable for the following components:    Anion Gap 4 (*)     BUN 28 (*)     Creatinine 1.81 (*)     Est, Glom Filt Rate 38 (*)     AST 12 (*)     Globulin 4.3 (*)     Albumin/Globulin Ratio 0.8 (*)     All other components within normal limits   CBC - Abnormal; Notable for the following components:    RBC 3.29 (*)     Hemoglobin 11.0 (*)     Hematocrit 33.8 (*)     .7 (*)     RDW 15.0 (*)     All other components within normal limits     Abnormal Assessment Findings: DVT in left leg    SAFETY    Mobility: limited transfer mobility   ED Fall Risk: Presents to emergency department  because of falls (Syncope, seizure, or loss of consciousness): No, Age > 70: Yes, Altered Mental Status, Intoxication with alcohol or substance confusion (Disorientation, impaired judgment, poor safety awaremess, or inability to follow  oriented, alert, coherent, logical, thought processes intact, and able to concentrate and follow conversation  Orientation Level:    NIH Score:    C-SSRS: Risk of Suicide: No Risk  Bedside swallow:    Apison Coma Scale (GCS): Sterling Coma Scale  Eye Opening: Spontaneous  Best Verbal Response: Oriented  Best Motor Response: Obeys commands  Apison Coma Scale Score: 15  Active LDA's:   Peripheral IV 06/17/24 Left Antecubital (Active)   Site Assessment Clean, dry & intact 06/17/24 1820   Line Status Blood return noted 06/17/24 1820   Line Care Chlorhexidine wipes 06/17/24 1820   Phlebitis Assessment No symptoms 06/17/24 1820   Infiltration Assessment 0 06/17/24 1820   Dressing Status New dressing applied 06/17/24 1820   Dressing Type Transparent 06/17/24 1820     PO Status: Regular  Pertinent or High Risk Medications/Drips: yes   If Yes, please provide details: heparin drip  Titratable drips: yes   Pending Blood Product Administration: no     Sepsis    Sepsis Risk Score   Predictive Model Details          17 (Low)  Factor Value    Calculated 6/17/2024 23:04 12% O2 Delivery Method ROOM AIR    BS EARLY DETECTION OF SEPSIS VERSION 2 Model 7% Count of Active LDAs 4     6% Total Active Inpatient Meds 5     -6% WBC Count 4.2 K/uL     5% Age 79 years old     5% Has Imaging Procedure 1     -5% AST 12 U/L     4% Absolute Lymphocytes 0.4 K/UL     4% RBC Count 3.29 M/uL     3% Albumin 3.6 g/dL      Recent Labs     06/17/24 1815 06/17/24 2038   WBC 4.5 4.2     Blood Cultures Drawn: Yes  1800 and 1811 pm  Repeat LA: Time Due none  Antibiotic Given: No  Fluid Resuscitation: Total needed , Status    VS x 2 post-fluid resuscitation:     Recommendation    Pending orders morning labs  Consults ordered: None    Consulted provider:      Plan for next 24 hours: monitor DVT  Additional Comments:      If any further questions, please call Sending RN at [phone]  Electronically signed by: Electronically signed by Srinivasan Marin RN on

## 2024-06-18 NOTE — PROGRESS NOTES
Pharmacist Note - Vancomycin Dosing    Consult provided for this 79 y.o. male for indication of cellulitis.  Antibiotic regimen(s): vancomycin + cefepime   Patient on vancomycin PTA? NO     Recent Labs     24  0530   WBC 4.5 4.2 4.3   CREATININE 1.81*  --  1.77*   BUN 28*  --  30*     Frequency of BMP: daily through    Height: 180.3 cm  Weight: 114.7 kg  Est CrCl: 44 ml/min  Temp (24hrs), Av.6 °F (36.4 °C), Min:97.5 °F (36.4 °C), Max:97.7 °F (36.5 °C)    Cultures:   blood x 2 - in process     MRSA Swab ordered (if applicable)? N/A    The plan below is expected to result in a target range of AUC/CELIA 400-500    Therapy will be initiated with a loading dose of 2500 mg IV x 1 to be followed by a maintenance dose of 1000 mg IV every 24 hours. . The first maintenance dose will be scheduled for 21:00 to maintain and AUC/CELIA greater than 400. Pharmacy to follow patient daily and order levels / make dose adjustments as appropriate.    *Vancomycin has been dosed used Bayesian kinetics software to target an AUC/CELIA of 400-600, which provides adequate exposure for an assumed infection due to MRSA with an CELIA of 1 or less while reducing the risk of nephrotoxicity as seen with traditional trough based dosing goals.

## 2024-06-18 NOTE — CARE COORDINATION
Care Management Initial Assessment       RUR: 17%  Readmission? No  1st IM letter given? Yes - 6/18/24  1st  letter given: No n/a    Transition of Care: hopefully home with followup with specialists/pcp    Transport Plan: likely in car    Main contact is spouse- Libby Grayson- [phone]    Discharge pending:  -patient continues on heparin drip  -pending medical progress and care recommendations    1430: this CM met with pleasant patient at bedside; he is alert and oriented x 4; patient lives at stated address with his spouse; its a condo with elevator access; patient is normally independent in his adls; uses a cane to ambulate outside; also has a cpap and RW; has history of SNF stay at [facility] and has used [facility] in the past; pcp and insurance confirme; uses the [facility] at [facility]       06/18/24 1432   Service Assessment   Patient Orientation Alert and Oriented   Cognition Alert   History Provided By Patient   Primary Caregiver Self   Support Systems Spouse/Significant Other   Patient's Healthcare Decision Maker is: Legal Next of Kin   PCP Verified by CM Yes   Last Visit to PCP Within last 6 months   Prior Functional Level Independent in ADLs/IADLs   Current Functional Level Assistance with the following:;Mobility   Can patient return to prior living arrangement Yes   Ability to make needs known: Good   Family able to assist with home care needs: Yes   Financial Resources Medicare   Social/Functional History   Lives With Spouse   Type of Home Condo   Home Access Elevator   Receives Help From Family   ADL Assistance Independent   Ambulation Assistance Independent   Transfer Assistance Independent   Discharge Planning   Type of Residence House   Living Arrangements Spouse/Significant Other   Current Services Prior To Admission C-pap   Potential Assistance Purchasing Medications No   Patient expects to be discharged to: House     CM following   Jessica Odell RN

## 2024-06-19 LAB
ANION GAP SERPL CALC-SCNC: 6 MMOL/L (ref 5–15)
BUN SERPL-MCNC: 27 MG/DL (ref 6–20)
BUN/CREAT SERPL: 15 (ref 12–20)
CALCIUM SERPL-MCNC: 8.8 MG/DL (ref 8.5–10.1)
CHLORIDE SERPL-SCNC: 110 MMOL/L (ref 97–108)
CO2 SERPL-SCNC: 21 MMOL/L (ref 21–32)
CREAT SERPL-MCNC: 1.81 MG/DL (ref 0.7–1.3)
ERYTHROCYTE [DISTWIDTH] IN BLOOD BY AUTOMATED COUNT: 15.3 % (ref 11.5–14.5)
GLUCOSE BLD STRIP.AUTO-MCNC: 88 MG/DL (ref 65–117)
GLUCOSE SERPL-MCNC: 100 MG/DL (ref 65–100)
HCT VFR BLD AUTO: 28.2 % (ref 36.6–50.3)
HGB BLD-MCNC: 9.6 G/DL (ref 12.1–17)
MCH RBC QN AUTO: 34 PG (ref 26–34)
MCHC RBC AUTO-ENTMCNC: 34 G/DL (ref 30–36.5)
MCV RBC AUTO: 100 FL (ref 80–99)
NRBC # BLD: 0 K/UL (ref 0–0.01)
NRBC BLD-RTO: 0 PER 100 WBC
PLATELET # BLD AUTO: 127 K/UL (ref 150–400)
PMV BLD AUTO: 9 FL (ref 8.9–12.9)
POTASSIUM SERPL-SCNC: 4 MMOL/L (ref 3.5–5.1)
RBC # BLD AUTO: 2.82 M/UL (ref 4.1–5.7)
SERVICE CMNT-IMP: NORMAL
SODIUM SERPL-SCNC: 137 MMOL/L (ref 136–145)
UFH PPP CHRO-ACNC: 0.4 IU/ML
WBC # BLD AUTO: 3.9 K/UL (ref 4.1–11.1)

## 2024-06-19 PROCEDURE — 82962 GLUCOSE BLOOD TEST: CPT

## 2024-06-19 PROCEDURE — 1100000000 HC RM PRIVATE

## 2024-06-19 PROCEDURE — 85520 HEPARIN ASSAY: CPT

## 2024-06-19 PROCEDURE — 2580000003 HC RX 258: Performed by: INTERNAL MEDICINE

## 2024-06-19 PROCEDURE — 6360000002 HC RX W HCPCS: Performed by: INTERNAL MEDICINE

## 2024-06-19 PROCEDURE — 36415 COLL VENOUS BLD VENIPUNCTURE: CPT

## 2024-06-19 PROCEDURE — 6370000000 HC RX 637 (ALT 250 FOR IP): Performed by: NURSE PRACTITIONER

## 2024-06-19 PROCEDURE — 85027 COMPLETE CBC AUTOMATED: CPT

## 2024-06-19 PROCEDURE — 80048 BASIC METABOLIC PNL TOTAL CA: CPT

## 2024-06-19 PROCEDURE — 6370000000 HC RX 637 (ALT 250 FOR IP): Performed by: INTERNAL MEDICINE

## 2024-06-19 RX ORDER — OXYCODONE HYDROCHLORIDE 5 MG/1
5 TABLET ORAL EVERY 4 HOURS PRN
Qty: 12 TABLET | Refills: 0 | Status: SHIPPED | OUTPATIENT
Start: 2024-06-19 | End: 2024-06-22

## 2024-06-19 RX ADMIN — HYDROMORPHONE HYDROCHLORIDE 1 MG: 1 INJECTION, SOLUTION INTRAMUSCULAR; INTRAVENOUS; SUBCUTANEOUS at 10:28

## 2024-06-19 RX ADMIN — PHENOBARBITAL 32.4 MG: 32.4 TABLET ORAL at 21:13

## 2024-06-19 RX ADMIN — ATORVASTATIN CALCIUM 20 MG: 10 TABLET, FILM COATED ORAL at 21:12

## 2024-06-19 RX ADMIN — ESCITALOPRAM OXALATE 20 MG: 10 TABLET ORAL at 21:12

## 2024-06-19 RX ADMIN — ZOLPIDEM TARTRATE 10 MG: 5 TABLET ORAL at 21:13

## 2024-06-19 RX ADMIN — APIXABAN 10 MG: 5 TABLET, FILM COATED ORAL at 21:13

## 2024-06-19 RX ADMIN — PHENOBARBITAL 32.4 MG: 32.4 TABLET ORAL at 10:25

## 2024-06-19 RX ADMIN — BUPROPION HYDROCHLORIDE 100 MG: 100 TABLET, FILM COATED, EXTENDED RELEASE ORAL at 21:13

## 2024-06-19 RX ADMIN — BUPROPION HYDROCHLORIDE 100 MG: 100 TABLET, FILM COATED, EXTENDED RELEASE ORAL at 10:26

## 2024-06-19 RX ADMIN — SODIUM CHLORIDE, PRESERVATIVE FREE 10 ML: 5 INJECTION INTRAVENOUS at 21:13

## 2024-06-19 RX ADMIN — APIXABAN 10 MG: 5 TABLET, FILM COATED ORAL at 15:24

## 2024-06-19 RX ADMIN — SODIUM CHLORIDE, PRESERVATIVE FREE 10 ML: 5 INJECTION INTRAVENOUS at 10:26

## 2024-06-19 RX ADMIN — PHENOBARBITAL 32.4 MG: 32.4 TABLET ORAL at 15:24

## 2024-06-19 RX ADMIN — POLYETHYLENE GLYCOL 3350 17 G: 17 POWDER, FOR SOLUTION ORAL at 15:23

## 2024-06-19 RX ADMIN — HYDROMORPHONE HYDROCHLORIDE 1 MG: 1 INJECTION, SOLUTION INTRAMUSCULAR; INTRAVENOUS; SUBCUTANEOUS at 02:07

## 2024-06-19 RX ADMIN — HYDROMORPHONE HYDROCHLORIDE 1 MG: 1 INJECTION, SOLUTION INTRAMUSCULAR; INTRAVENOUS; SUBCUTANEOUS at 22:05

## 2024-06-19 ASSESSMENT — PAIN DESCRIPTION - DESCRIPTORS
DESCRIPTORS: ACHING
DESCRIPTORS: PRESSURE
DESCRIPTORS: PRESSURE

## 2024-06-19 ASSESSMENT — PAIN SCALES - GENERAL
PAINLEVEL_OUTOF10: 6
PAINLEVEL_OUTOF10: 5
PAINLEVEL_OUTOF10: 6
PAINLEVEL_OUTOF10: 7

## 2024-06-19 ASSESSMENT — PAIN SCALES - WONG BAKER: WONGBAKER_NUMERICALRESPONSE: NO HURT

## 2024-06-19 ASSESSMENT — PAIN DESCRIPTION - ORIENTATION
ORIENTATION: LEFT

## 2024-06-19 ASSESSMENT — PAIN DESCRIPTION - LOCATION
LOCATION: LEG
LOCATION: KNEE;HIP
LOCATION: LEG

## 2024-06-19 ASSESSMENT — PAIN - FUNCTIONAL ASSESSMENT: PAIN_FUNCTIONAL_ASSESSMENT: PREVENTS OR INTERFERES SOME ACTIVE ACTIVITIES AND ADLS

## 2024-06-19 NOTE — PROGRESS NOTES
Hospitalist Progress Note  RAMOS Flores NP  Answering service: [phone] OR [phone] from in house phone        Date of Service:  2024  NAME:  Robert Gastelum  :  1945  MRN:  [id number]      Admission Summary:     As per H&P: \" Robert Gastelum is a 79 y.o. male with past medical history significant for dyslipidemia, anxiety/depression, CKD, hypertension, cervical spine stenosis, AMI on CPAP, urinary bladder cancer s/p resection with resultant urostomy presented at the emergency room with left leg swelling and redness.  Patient symptoms started about a month ago, was seen by his PCP and wound culture obtained was positive for MRSA, patient was treated with topical antibiotics with minimal improvement.  Patient was also treated with diuretic and prednisone for the swelling.  Patient has had left knee replacement in the past as well.  Ultrasound of the left leg according to the patient was done initially around one month ago and this was negative for DVT.    Ultrasound of the lower extremity done on arrival at the emergency room today showed extensive DVT of the left leg up to the groin.  Patient was started on heparin infusion and was referred to the hospitalist service for admission.\"     Interval history / Subjective:     Patient seen and examined. He does have compression stockings on today. Encouraged him to elevate his leg while in the chair. Long discussion with patient and his wife regarding causation, prognosis, and treatment for current condition.     She requested that I call in Eliquis today so that she can pick it up and be ready for tomorrow discharge. She also requested  for nursing for possible wrapping of his left lower extremity for severe edema. I discussed this with CM and they will arrange.     Patient will need to follow up with Dr. Davis regarding this enlarged groin lymph node  BMI 35.27 kg/m²        Intake/Output Summary (Last 24 hours) at 6/19/2024 1713  Last data filed at 6/19/2024 1508  Gross per 24 hour   Intake --   Output 600 ml   Net -600 ml        Physical Examination:     I had a face to face encounter with this patient and independently examined them on 6/19/2024 as outlined below:          PHYSICAL EXAM:   General appearance: A&Ox4 , pleasant, no distress   Head: Normal cephalic, atraumatic.  Eyes: Normal eye movement, no redness no drainage.  Ears: Normal external ear and no drainage.  Nose: No deformity and no drainage.  Mouth and throat: No visible oral lesion.  Neck: Supple, no JVD no thyromegaly.  Chest: Clear breath sounds, no wheezing no crackles.  Heart: Normal S1 and S2, regular, no clinically appreciable murmur.  Abdomen: Soft, nontender normal bowel sounds.  Urostomy noted  CNS: Alert oriented x3, no gross or focal neurological deficit.  Extremities: Edema 4+ LLE , pulses 2+ bilaterally.  Musculoskeletal system: No joint deformity and swelling.  Skin: Redness and swelling entire left lower extremity, tender and warmth to touch  Psychiatry: Normal mood and affect.  Lymphatic system: No cervical lymphadenopathy.  Data Review:   I have independently reviewed and interpreted patient's lab and all other diagnostic data       Data Review:    Review and/or order of clinical lab test  Review and/or order of tests in the radiology section of CPT  Review and/or order of tests in the medicine section of CPT    I have independently reviewed and interpreted patient's lab and all other diagnostic data    Notes reviewed from all clinical/nonclinical/nursing services involved in patient's clinical care. Care coordination discussions were held with appropriate clinical/nonclinical/ nursing providers based on care coordination needs.     Labs:     Recent Labs     06/18/24  0530 06/19/24  0534   WBC 4.3 3.9*   HGB 10.5* 9.6*   HCT 31.0* 28.2*   * 127*       Recent Labs

## 2024-06-19 NOTE — PROGRESS NOTES
Patient has bilateral compression stockings. L leg swollen and painful. Pt requesting stockings removed.  ACE bandages wrapped from feet to thigh.     Pt called out.  IV had been pulled out and patient was covered in blood.  Arm elevated and wrapped until bleeding controlled.  Patient complained of dizziness.  BP WDL.    No

## 2024-06-19 NOTE — PROGRESS NOTES
Hospitalist Progress Note  RAMOS Flores NP  Answering service: [phone] OR [phone] from in house phone        Date of Service:  2024  NAME:  Robert Gastelum  :  1945  MRN:  [id number]      Admission Summary:     As per H&P: \" Robert Gastelum is a 79 y.o. male with past medical history significant for dyslipidemia, anxiety/depression, CKD, hypertension, cervical spine stenosis, AMI on CPAP, urinary bladder cancer s/p resection with resultant urostomy presented at the emergency room with left leg swelling and redness.  Patient symptoms started about a month ago, was seen by his PCP and wound culture obtained was positive for MRSA, patient was treated with topical antibiotics with minimal improvement.  Patient was also treated with diuretic and prednisone for the swelling.  Patient has had left knee replacement in the past as well.  Ultrasound of the left leg according to the patient was done initially around one month ago and this was negative for DVT.    Ultrasound of the lower extremity done on arrival at the emergency room today showed extensive DVT of the left leg up to the groin.  Patient was started on heparin infusion and was referred to the hospitalist service for admission.\"     Interval history / Subjective:     Patient seen and examined. He states that the pain has improved, however the swelling really has not. At the time of my exam he is eating lunch and his leg is in the dependent position. His foot and toes are purplish in color and swelling is severe. Pulses are found via doppler and bounding.    He was seen by vascular surgery this AM who does not feel intervention is warranted at this time due to some improvement on the heparin gtt.     His wife does disclose that the patient drinks a significant amount of Liquor daily and typically withdrawals around th 24 hour nat. She reports  Followed by    [START ON 6/20/2024] PHENobarbital tablet 16.2 mg  16.2 mg Oral Q6H PRN    LORazepam (ATIVAN) tablet 1 mg  1 mg Oral Q1H PRN    Or    LORazepam (ATIVAN) injection 1 mg  1 mg IntraVENous Q1H PRN    Or    LORazepam (ATIVAN) tablet 2 mg  2 mg Oral Q1H PRN    Or    LORazepam (ATIVAN) injection 2 mg  2 mg IntraVENous Q1H PRN    Or    LORazepam (ATIVAN) tablet 3 mg  3 mg Oral Q1H PRN    Or    LORazepam (ATIVAN) injection 3 mg  3 mg IntraVENous Q1H PRN    Or    LORazepam (ATIVAN) tablet 4 mg  4 mg Oral Q1H PRN    Or    LORazepam (ATIVAN) injection 4 mg  4 mg IntraVENous Q1H PRN    heparin (porcine) injection 9,200 Units  80 Units/kg IntraVENous PRN    heparin (porcine) injection 4,600 Units  40 Units/kg IntraVENous PRN    heparin 25,000 units in dextrose 5% 250 mL (premix) infusion  18-30 Units/kg/hr IntraVENous Continuous    HYDROmorphone HCl PF (DILAUDID) injection 1 mg  1 mg IntraVENous Q4H PRN    oxyCODONE (ROXICODONE) immediate release tablet 5 mg  5 mg Oral Q4H PRN     ______________________________________________________________________  EXPECTED LENGTH OF STAY: Unable to retrieve estimated LOS  ACTUAL LENGTH OF STAY:          1                 RAMOS Flores - NP

## 2024-06-19 NOTE — PROGRESS NOTES
Spiritual Care Assessment/Progress Note  [facility]    Name: Robert Gastelum MRN: [id number]    Age: 79 y.o.     Sex: male   Language: English     Date: 6/19/2024            Total Time Calculated: 47 min              Spiritual Assessment begun in [facility] MED ONCOLOGY  Service Provided For: Patient  Referral/Consult From: Family  Encounter Overview/Reason: Spiritual/Emotional Needs, Initial Encounter    Spiritual beliefs:      [x] Involved in a kerry tradition/spiritual practice: Christianity     [x] Supported by a kerry community:      [] Claims no spiritual orientation:      [] Seeking spiritual identity:           [] Adheres to an individual form of spirituality:      [] Not able to assess:                Identified resources for coping and support system:   Support System: Spouse, Children, Family members, Holiness/kerry community, Friends/neighbors       [x] Prayer                  [] Devotional reading               [] Music                  [] Guided Imagery     [] Pet visits                                        [] Other: (COMMENT)     Specific area/focus of visit   Encounter:    Crisis:    Spiritual/Emotional needs: Type: Spiritual Support  Ritual, Rites and Sacraments:    Grief, Loss, and Adjustments:    Ethics/Mediation:    Behavioral Health:    Palliative Care:    Advance Care Planning:           Narrative:   Patient's wife requested a  visit. Patient appeared to be in pain but talked freely about his spiritual community, his wife, children, and other support systems. Patient expressed a deep kerry and has attended [facility] for 20 years. He spoke of the reason for this admission and that he has had frequent admissions due to other significant diagnosis. He stated that he due to return in 2 weeks for another surgery. He requested prayer and  provided prayer and support.   Advised patient that  and spiritual care is available to him upon his  request.  Gerda Joselain Intern  Spiritual Health Services  Paging service: [phone] (MAGY)

## 2024-06-20 ENCOUNTER — APPOINTMENT (OUTPATIENT)
Facility: HOSPITAL | Age: 79
End: 2024-06-20
Payer: MEDICARE

## 2024-06-20 LAB
ANION GAP SERPL CALC-SCNC: 5 MMOL/L (ref 5–15)
BUN SERPL-MCNC: 28 MG/DL (ref 6–20)
BUN/CREAT SERPL: 16 (ref 12–20)
CALCIUM SERPL-MCNC: 9 MG/DL (ref 8.5–10.1)
CHLORIDE SERPL-SCNC: 107 MMOL/L (ref 97–108)
CO2 SERPL-SCNC: 23 MMOL/L (ref 21–32)
CREAT SERPL-MCNC: 1.78 MG/DL (ref 0.7–1.3)
ECHO BSA: 2.4 M2
GLUCOSE SERPL-MCNC: 115 MG/DL (ref 65–100)
POTASSIUM SERPL-SCNC: 4.1 MMOL/L (ref 3.5–5.1)
SODIUM SERPL-SCNC: 135 MMOL/L (ref 136–145)

## 2024-06-20 PROCEDURE — 6370000000 HC RX 637 (ALT 250 FOR IP): Performed by: NURSE PRACTITIONER

## 2024-06-20 PROCEDURE — 80048 BASIC METABOLIC PNL TOTAL CA: CPT

## 2024-06-20 PROCEDURE — 1100000000 HC RM PRIVATE

## 2024-06-20 PROCEDURE — 94640 AIRWAY INHALATION TREATMENT: CPT

## 2024-06-20 PROCEDURE — 71045 X-RAY EXAM CHEST 1 VIEW: CPT

## 2024-06-20 PROCEDURE — 36415 COLL VENOUS BLD VENIPUNCTURE: CPT

## 2024-06-20 PROCEDURE — 6360000002 HC RX W HCPCS: Performed by: INTERNAL MEDICINE

## 2024-06-20 PROCEDURE — 2580000003 HC RX 258: Performed by: INTERNAL MEDICINE

## 2024-06-20 PROCEDURE — 6370000000 HC RX 637 (ALT 250 FOR IP): Performed by: INTERNAL MEDICINE

## 2024-06-20 RX ORDER — IPRATROPIUM BROMIDE AND ALBUTEROL SULFATE 2.5; .5 MG/3ML; MG/3ML
1 SOLUTION RESPIRATORY (INHALATION) EVERY 4 HOURS PRN
Status: DISCONTINUED | OUTPATIENT
Start: 2024-06-20 | End: 2024-06-22 | Stop reason: HOSPADM

## 2024-06-20 RX ADMIN — PHENOBARBITAL 32.4 MG: 32.4 TABLET ORAL at 09:24

## 2024-06-20 RX ADMIN — SODIUM CHLORIDE, PRESERVATIVE FREE 10 ML: 5 INJECTION INTRAVENOUS at 21:07

## 2024-06-20 RX ADMIN — BUPROPION HYDROCHLORIDE 100 MG: 100 TABLET, FILM COATED, EXTENDED RELEASE ORAL at 21:06

## 2024-06-20 RX ADMIN — OXYCODONE HYDROCHLORIDE 5 MG: 5 TABLET ORAL at 02:26

## 2024-06-20 RX ADMIN — APIXABAN 10 MG: 5 TABLET, FILM COATED ORAL at 21:06

## 2024-06-20 RX ADMIN — ATORVASTATIN CALCIUM 20 MG: 10 TABLET, FILM COATED ORAL at 21:06

## 2024-06-20 RX ADMIN — PHENOBARBITAL 16.2 MG: 32.4 TABLET ORAL at 21:06

## 2024-06-20 RX ADMIN — OXYCODONE HYDROCHLORIDE 5 MG: 5 TABLET ORAL at 17:28

## 2024-06-20 RX ADMIN — APIXABAN 10 MG: 5 TABLET, FILM COATED ORAL at 09:24

## 2024-06-20 RX ADMIN — HYDROMORPHONE HYDROCHLORIDE 1 MG: 1 INJECTION, SOLUTION INTRAMUSCULAR; INTRAVENOUS; SUBCUTANEOUS at 06:48

## 2024-06-20 RX ADMIN — HYDROMORPHONE HYDROCHLORIDE 1 MG: 1 INJECTION, SOLUTION INTRAMUSCULAR; INTRAVENOUS; SUBCUTANEOUS at 12:25

## 2024-06-20 RX ADMIN — OXYCODONE HYDROCHLORIDE 5 MG: 5 TABLET ORAL at 21:09

## 2024-06-20 RX ADMIN — IPRATROPIUM BROMIDE AND ALBUTEROL SULFATE 1 DOSE: .5; 3 SOLUTION RESPIRATORY (INHALATION) at 16:47

## 2024-06-20 RX ADMIN — SODIUM CHLORIDE, PRESERVATIVE FREE 10 ML: 5 INJECTION INTRAVENOUS at 09:26

## 2024-06-20 RX ADMIN — ESCITALOPRAM OXALATE 20 MG: 10 TABLET ORAL at 21:07

## 2024-06-20 RX ADMIN — BUPROPION HYDROCHLORIDE 100 MG: 100 TABLET, FILM COATED, EXTENDED RELEASE ORAL at 09:24

## 2024-06-20 ASSESSMENT — PAIN DESCRIPTION - LOCATION
LOCATION: LEG
LOCATION: ANKLE;LEG;HIP

## 2024-06-20 ASSESSMENT — PAIN DESCRIPTION - ORIENTATION
ORIENTATION: LEFT

## 2024-06-20 ASSESSMENT — PAIN SCALES - GENERAL
PAINLEVEL_OUTOF10: 5
PAINLEVEL_OUTOF10: 3
PAINLEVEL_OUTOF10: 5
PAINLEVEL_OUTOF10: 9
PAINLEVEL_OUTOF10: 9
PAINLEVEL_OUTOF10: 5

## 2024-06-20 ASSESSMENT — PAIN DESCRIPTION - DESCRIPTORS
DESCRIPTORS: ACHING;DISCOMFORT
DESCRIPTORS: ACHING
DESCRIPTORS: PRESSURE

## 2024-06-20 NOTE — DISCHARGE INSTRUCTIONS
Discharge Instructions       PATIENT ID: Robert Gastelum  MRN: [id number]   YOB: 1945    DATE OF ADMISSION: 6/17/2024   DATE OF DISCHARGE: 6/22/2024    PRIMARY CARE PROVIDER: Robert Leo     ATTENDING PHYSICIAN: Jaun Red MD   DISCHARGING PROVIDER: RAMOS Shook NP    To contact this individual call [phone] and ask the  to page.   If unavailable ask to be transferred the Adult Hospitalist Department.    DISCHARGE DIAGNOSES: Acute Left leg Deep Vein Thrombosis    CONSULTATIONS: Vascular surgery    PROCEDURES/SURGERIES: * No surgery found *    PENDING TEST RESULTS:   At the time of discharge the following test results are still pending: none    Follow-up Information       Follow up With Specialties Details Why Contact Info    Cheng Smallwood MD Vascular Surgery, General Surgery Schedule an appointment as soon as possible for a visit in 2 week(s) Follow up for left lower extremity extensive  [facility] [id number] [phone]      Zaida Davis MD Hematology and Oncology, Hematology, Oncology Follow up in 2 week(s) To follow up on enlarged lymph note obstructing venous flow in left leg [address] [address] [facility] [address]  [facility] [id number] [phone]      [facility], Home Health Services, Rehabilitation   [address] [facility] [id number] [phone]    Kip Hurst MD Neurosurgery Follow up Please follow up in 1 week to discuss recent hospitalization, blood thinners, and upcoming surgery. [address] [address] [facility] [address]  [facility] [id number] [phone]      Beto Stiles,  Pulmonary Disease Follow up Follow up as previously directed. [address]  [facility] [id number] [phone]              ADDITIONAL CARE RECOMMENDATIONS:   Schedule follow up appointments as listed above.  You are being discharged with a prescription for oxycodone to take if needed for  pain. Do not drive or drink alcohol while taking this. Decrease the frequency of this medication as your pain improves.  You should continue to wear compression stockings and keep your legs elevated as much as possible.  You are being discharged with a prescription for Eliquis (blood thinner). Take this as directed. You will need to speak with Dr. Smallwood and Dr. Davis to determine how long you will be on this medication. You should avoid taking NSAIDs (ibuprofen, naproxen, aspirin) unless otherwise directed as these medications may increase your risk of bleeding. If you notice any unusual bleeding or bleeding that you cannot control, you need to be evaluated in the emergency department. You should also be seen in the emergency department if you develop chest pain or shortness of breath.    DIET: regular    ACTIVITY: activity as tolerated    WOUND CARE: none    EQUIPMENT needed: none      DISCHARGE MEDICATIONS:   See Medication Reconciliation Form    It is important that you take the medication exactly as they are prescribed.   Keep your medication in the bottles provided by the pharmacist and keep a list of the medication names, dosages, and times to be taken in your wallet.   Do not take other medications without consulting your doctor.       NOTIFY YOUR PHYSICIAN FOR ANY OF THE FOLLOWING:   Fever over 101 degrees for 24 hours.   Chest pain, shortness of breath, fever, chills, nausea, vomiting, diarrhea, change in mentation, falling, weakness, bleeding. Severe pain or pain not relieved by medications.  Or, any other signs or symptoms that you may have questions about.      DISPOSITION:   Home With:   OT  PT  HH  RN      X SNF/Inpatient Rehab/LTAC    Independent/assisted living    Hospice    Other:     CDMP Checked:   Yes x     PROBLEM LIST Updated:  Yes x       Signed:   RAMOS Shook NP  6/22/2024  9:56 AM

## 2024-06-20 NOTE — PROGRESS NOTES
Hospitalist Progress Note  RAMOS Reinoso NP  Answering service: [phone] OR [phone] from in house phone        Date of Service:  2024  NAME:  Robert Gastelum  :  1945  MRN:  [id number]      Admission Summary:     As per H&P: \" Robert Gastelum is a 79 y.o. male with past medical history significant for dyslipidemia, anxiety/depression, CKD, hypertension, cervical spine stenosis, AMI on CPAP, urinary bladder cancer s/p resection with resultant urostomy presented at the emergency room with left leg swelling and redness.  Patient symptoms started about a month ago, was seen by his PCP and wound culture obtained was positive for MRSA, patient was treated with topical antibiotics with minimal improvement.  Patient was also treated with diuretic and prednisone for the swelling.  Patient has had left knee replacement in the past as well.  Ultrasound of the left leg according to the patient was done initially around one month ago and this was negative for DVT.    Ultrasound of the lower extremity done on arrival at the emergency room today showed extensive DVT of the left leg up to the groin.  Patient was started on heparin infusion and was referred to the hospitalist service for admission.\"     Interval history / Subjective:   Reports he was up in bathroom and was experiencing pain and dizziness  Pt is having difficulty with mobility and now on Eliquis for DVT. Pt would like to receive PT at [facility], discussed with CM.     ADDENDUM: 155 Notified by nursing of wheezing. Duo nebs and cxr ordered     Assessment & Plan:     Provoked Acute Left common femoral, profunda femoral, proximal femoral and mid femoral DVT  - in the setting of lymph node compression with mass effect on the vein   - per vascular surgery, patient needs compression, I discussed with nursing and wound care , they will apply ACE

## 2024-06-20 NOTE — CARE COORDINATION
Transition of Care:  UPDATE: recs are now for SNF- pending referral sent to [facility] (only place patient wants) (no insurance authorization is needed)      Previous plan today was home with home health/SN and followup with specialists/pcp;  [facility] program has accepted; info on the avs     Transport Plan: now likely needs stretcher (not set up yet)     RUR: 19%     Main contact is spouse- Libby Grayson- [phone]     Discharge pending:  -pending SNF referral to [facility]  -pending medical progress and care recommendations        1245: this Cm met with patient at bedside to discuss home health; he has used [facility] in the past; will use them again; [facility]    1315: [facility] has accepted    1330: this CM has now been informed that patient wants SNF and only [facility]; referrral sent via [facility] to  for SNF          Prior Level of Functioning: lives with spouse in a home  Disposition: home with home health  If SNF or IPR: Date FOC offered: 6/20  Date FOC received:6/20  Accepting facility: pending   Date authorization started with reference number: n/a  Date authorization received and expires: n/a  Follow up appointments: specialists/pcp  DME needed: none ordered  Transportation at discharge: likely stretcher   IM/IMM Medicare/ letter given: 1st on 6/18  Is patient a  and connected with VA? no   If yes, was [place] transfer form completed and VA notified? N/a  Caregiver Contact: spouse  Discharge Caregiver contacted prior to discharge? Not yet  Care Conference needed? no  Barriers to discharge:  medical progress, SNF pending    CM following   Jessica Odell RN     NOTE: per previous CM note: pleasant patient at bedside; he is alert and oriented x 4; patient lives at stated address with his spouse; its a condo with elevator access; patient is normally independent in his  adls; uses a cane to ambulate outside; also has a cpap and RW; has history of SNF stay at [facility] and has used [facility] in the past; pcp and insurance confirme; uses the [facility] pharmacy at [facility]

## 2024-06-21 LAB
ANION GAP SERPL CALC-SCNC: 4 MMOL/L (ref 5–15)
BUN SERPL-MCNC: 33 MG/DL (ref 6–20)
BUN/CREAT SERPL: 17 (ref 12–20)
CALCIUM SERPL-MCNC: 9.1 MG/DL (ref 8.5–10.1)
CHLORIDE SERPL-SCNC: 107 MMOL/L (ref 97–108)
CO2 SERPL-SCNC: 24 MMOL/L (ref 21–32)
CREAT SERPL-MCNC: 1.89 MG/DL (ref 0.7–1.3)
GLUCOSE SERPL-MCNC: 117 MG/DL (ref 65–100)
POTASSIUM SERPL-SCNC: 4.3 MMOL/L (ref 3.5–5.1)
SODIUM SERPL-SCNC: 135 MMOL/L (ref 136–145)

## 2024-06-21 PROCEDURE — 97161 PT EVAL LOW COMPLEX 20 MIN: CPT

## 2024-06-21 PROCEDURE — 1100000000 HC RM PRIVATE

## 2024-06-21 PROCEDURE — 97165 OT EVAL LOW COMPLEX 30 MIN: CPT

## 2024-06-21 PROCEDURE — 2580000003 HC RX 258: Performed by: INTERNAL MEDICINE

## 2024-06-21 PROCEDURE — 6370000000 HC RX 637 (ALT 250 FOR IP): Performed by: NURSE PRACTITIONER

## 2024-06-21 PROCEDURE — 36415 COLL VENOUS BLD VENIPUNCTURE: CPT

## 2024-06-21 PROCEDURE — 80048 BASIC METABOLIC PNL TOTAL CA: CPT

## 2024-06-21 PROCEDURE — 97530 THERAPEUTIC ACTIVITIES: CPT

## 2024-06-21 PROCEDURE — 6370000000 HC RX 637 (ALT 250 FOR IP): Performed by: INTERNAL MEDICINE

## 2024-06-21 PROCEDURE — 94760 N-INVAS EAR/PLS OXIMETRY 1: CPT

## 2024-06-21 RX ADMIN — APIXABAN 10 MG: 5 TABLET, FILM COATED ORAL at 09:15

## 2024-06-21 RX ADMIN — ATORVASTATIN CALCIUM 20 MG: 10 TABLET, FILM COATED ORAL at 21:53

## 2024-06-21 RX ADMIN — PHENOBARBITAL 16.2 MG: 32.4 TABLET ORAL at 09:15

## 2024-06-21 RX ADMIN — SODIUM CHLORIDE, PRESERVATIVE FREE 10 ML: 5 INJECTION INTRAVENOUS at 22:38

## 2024-06-21 RX ADMIN — OXYCODONE HYDROCHLORIDE 5 MG: 5 TABLET ORAL at 11:09

## 2024-06-21 RX ADMIN — ZOLPIDEM TARTRATE 10 MG: 5 TABLET ORAL at 21:57

## 2024-06-21 RX ADMIN — APIXABAN 10 MG: 5 TABLET, FILM COATED ORAL at 21:53

## 2024-06-21 RX ADMIN — BUPROPION HYDROCHLORIDE 100 MG: 100 TABLET, FILM COATED, EXTENDED RELEASE ORAL at 21:53

## 2024-06-21 RX ADMIN — SODIUM CHLORIDE, PRESERVATIVE FREE 10 ML: 5 INJECTION INTRAVENOUS at 09:16

## 2024-06-21 RX ADMIN — BUPROPION HYDROCHLORIDE 100 MG: 100 TABLET, FILM COATED, EXTENDED RELEASE ORAL at 09:15

## 2024-06-21 RX ADMIN — ESCITALOPRAM OXALATE 20 MG: 10 TABLET ORAL at 21:53

## 2024-06-21 RX ADMIN — OXYCODONE HYDROCHLORIDE 5 MG: 5 TABLET ORAL at 01:25

## 2024-06-21 ASSESSMENT — PAIN DESCRIPTION - ORIENTATION
ORIENTATION: LEFT

## 2024-06-21 ASSESSMENT — PAIN DESCRIPTION - LOCATION
LOCATION: LEG

## 2024-06-21 ASSESSMENT — PAIN DESCRIPTION - DESCRIPTORS: DESCRIPTORS: DISCOMFORT

## 2024-06-21 ASSESSMENT — PAIN SCALES - GENERAL
PAINLEVEL_OUTOF10: 5
PAINLEVEL_OUTOF10: 5
PAINLEVEL_OUTOF10: 3

## 2024-06-21 NOTE — CARE COORDINATION
Transition of Care:  UPDATE: recs are now for SNF- [facility] has accepted; they will have a bed ready on Saturday 6/22;   Room #9132 (only place patient wants) (no insurance authorization is needed)      Contact at [facility] is Alexi- [phone]     Previous plan was home with home health/SN and followup with specialists/pcp;  [facility] had accepted     Transport Plan: [facility] ([phone])-stretcher- scheduled for Saturday June 22, 2024 at 3:30pm (patient in agreement)      RUR: 16%     Main contact is spouse- Libby Grayson- [phone]     Discharge pending:  -pending bed at  SNF- will be ready on 6/22          1000: this cM spoke to Alexi at  SNF; they are reviewing and will accept; they will have a bed available on Saturday 6/22; this CM updated attending; she verbalized understanding    1155: Alexi at  called again-patient can go to room #9132 report # [phone]; at 1530 on Saturday 6/22    1200: this Cm met with patient at bedside to update him on bed availability at [facility] on 6/22 and transport with [facility]- stretcher (safety) at 3:30pm- (patient in agreement) ; he verbalized understanding     Prior Level of Functioning: lives with spouse in a home  Disposition:SNF  If SNF or IPR: Date FOC offered: 6/20  Date FOC received:6/20  Accepting facility: [facility]  Date authorization started with reference number: n/a  Date authorization received and expires: n/a  Follow up appointments: specialists/pcp  DME needed: none ordered  Transportation at discharge: stretcher - [facility]- scheduled for Saturday 6/222 at 3:30pm  IM/IMM Medicare/ letter given: 1st on 6/18; 2nd on 6/21/24  Is patient a [place] and connected with VA? no              If yes, was [place] transfer form completed and VA notified? N/a  Caregiver Contact: spouse  Discharge Caregiver contacted prior to  discharge? Not yet  Care Conference needed? no  Barriers to discharge:   bed availability at [facility]     CM following   Jessica Odell RN     NOTE: per previous CM note: pleasant patient at bedside; he is alert and oriented x 4; patient lives at stated address with his spouse; its a condo with elevator access; patient is normally independent in his adls; uses a cane to ambulate outside; also has a cpap and RW; has history of SNF stay at [facility] and has used [facility] in the past; pcp and insurance confirme; uses the "[facility] pharmacy at [facility]        Revision History

## 2024-06-21 NOTE — PLAN OF CARE
Problem: Pain  Goal: Verbalizes/displays adequate comfort level or baseline comfort level  Outcome: Progressing     Problem: Safety - Adult  Goal: Free from fall injury  Outcome: Progressing     Problem: Physical Therapy - Adult  Goal: By Discharge: Performs mobility at highest level of function for planned discharge setting.  See evaluation for individualized goals.  Description: FUNCTIONAL STATUS PRIOR TO ADMISSION: patient used cane in community; rollator occasionally at home    HOME SUPPORT PRIOR TO ADMISSION: The patient lived with wife but did not require assistance.    Physical Therapy Goals  Initiated 6/21/2024  1.  Patient will move from supine to sit and sit to supine in bed with supervision/set-up within 7 day(s).    2.  Patient will perform sit to stand with supervision/set-up within 7 day(s).  3.  Patient will transfer from bed to chair and chair to bed with supervision/set-up using the least restrictive device within 7 day(s).  4.  Patient will ambulate with supervision/set-up for 120 feet with the least restrictive device within 7 day(s).     6/21/2024 0955 by Manuel Berger, PT  Outcome: Progressing  6/21/2024 0947 by Manuel Berger, PT  Outcome: Progressing     Problem: Occupational Therapy - Adult  Goal: By Discharge: Performs self-care activities at highest level of function for planned discharge setting.  See evaluation for individualized goals.  Description: FUNCTIONAL STATUS PRIOR TO ADMISSION:  Patient was ambulatory without use of DME. Pt endorses occasional use of rollator within his home and SPC for community distances.     HOME SUPPORT: Patient lived with his wife and was independent with all ADL tasks.    Occupational Therapy Goals:  Initiated 6/21/2024  1.  Patient will perform grooming routine with Supervision within 7 day(s).  2.  Patient will perform upper and lower body bathing, using AE PRN with Supervision within 7 day(s).  3.  Patient will perform upper and lower  body dressing, using AE PRN, with Supervision within 7 day(s).  4.  Patient will perform toilet transfers with Supervision within 7 day(s).  5.  Patient will perform all aspects of toileting with Supervision within 7 day(s).  6.  Patient will participate in upper extremity therapeutic exercise/activities with Supervision for 5 minutes within 7 day(s).    7.  Patient will utilize energy conservation techniques during functional activities with verbal cues within 7 day(s).    6/21/2024 0949 by Luzmaria Miller, ABDOUL  Outcome: Progressing

## 2024-06-21 NOTE — PROGRESS NOTES
will need to f/u with vascular surgery outpatient to determine length of AC, will be likely determined by resolution of mass effect from enlarged lymph node  - discontinue Vanc and Cefepime, rubor/erythema of left lower leg is not infection, it is related to venous insuffiencey   -  for nursing for wrapping of left leg/compression at home   - Patient will need to follow up with Dr. Davis (already follows with her) regarding this enlarged groin lymph node that vascular surgery feels is compressing the vessel and may have caused this DVT    Dyslipidemia:  - continue lipitor    CKD , Stage III  Solitary kidney  - s/p left nephrectomy   - avoid nephrotoxic agents as able  - monitor BMP daily     Essential HTN  - not requiring any medications at this time  - continue to monitor vitals per unit routine     AMI - CPAP at bedtime.     Urinary bladder cancer s/p resection with resultant urostomy POA:  - continue urostomy care  - follow up with Dr. Davis outpatient     Alcohol Abuse  - start CIWA and phenobarb taper    Pulmonary Sarcoidosis    Code status: Full   Prophylaxis: Eliquis   Care Plan discussed with: patient, RN, Dr. Blanco, CM  Anticipated Disposition:      Principal Problem:    Acute deep vein thrombosis (DVT) of femoral vein of left lower extremity (HCC)  Resolved Problems:    * No resolved hospital problems. *            Review of Systems:   Pertinent items are noted in HPI.         Vital Signs:    Last 24hrs VS reviewed since prior progress note. Most recent are:  /77   Pulse 75   Temp 97.9 °F (36.6 °C)   Resp 18   Ht 1.803 m (5' 11\")   Wt 114.7 kg (252 lb 13.9 oz)   SpO2 93%   BMI 35.27 kg/m²        Intake/Output Summary (Last 24 hours) at 6/21/2024 1001  Last data filed at 6/20/2024 1737  Gross per 24 hour   Intake --   Output 425 ml   Net -425 ml          Physical Examination:     I had a face to face encounter with this patient and independently examined them on 6/21/2024 as outlined  below:          PHYSICAL EXAM:   General appearance: A&Ox4 , pleasant, no distress   Head: Normal cephalic, atraumatic.  Eyes: Normal eye movement, no redness no drainage.  Ears: Normal external ear and no drainage.  Nose: No deformity and no drainage.  Mouth and throat: No visible oral lesion.  Neck: Supple, no JVD no thyromegaly.  Chest: mild exp wheezing posteriorly, RA 97%  Heart: Normal S1 and S2, regular, no clinically appreciable murmur.  Abdomen: Soft, nontender normal bowel sounds.  Urostomy noted, urine clear, yellow  CNS: Alert oriented x3, no gross or focal neurological deficit.  Extremities: Edema 4+ LLE , pulses 2+ bilaterally.  Musculoskeletal system: No joint deformity and swelling.  Skin: Redness and swelling entire left lower extremity, tender and warmth to touch  Psychiatry: Normal mood and affect.  Lymphatic system: No cervical lymphadenopathy.    Data Review:   I have independently reviewed and interpreted patient's lab and all other diagnostic data       Data Review:    Review and/or order of clinical lab test  Review and/or order of tests in the radiology section of CPT  Review and/or order of tests in the medicine section of CPT    I have independently reviewed and interpreted patient's lab and all other diagnostic data    Notes reviewed from all clinical/nonclinical/nursing services involved in patient's clinical care. Care coordination discussions were held with appropriate clinical/nonclinical/ nursing providers based on care coordination needs.     Labs:     Recent Labs     06/19/24  0534   WBC 3.9*   HGB 9.6*   HCT 28.2*   *       Recent Labs     06/19/24  0534 06/20/24  0644 06/21/24  0102    135* 135*   K 4.0 4.1 4.3   * 107 107   CO2 21 23 24   BUN 27* 28* 33*       No results for input(s): \"ALT\", \"TP\", \"GLOB\", \"GGT\" in the last 72 hours.    Invalid input(s): \"SGOT\", \"GPT\", \"AP\", \"TBIL\", \"TBILI\", \"ALB\", \"AML\", \"AMYP\", \"LPSE\", \"HLPSE\"    No results for input(s):  LORazepam (ATIVAN) injection 2 mg  2 mg IntraVENous Q1H PRN    Or    LORazepam (ATIVAN) tablet 3 mg  3 mg Oral Q1H PRN    Or    LORazepam (ATIVAN) injection 3 mg  3 mg IntraVENous Q1H PRN    Or    LORazepam (ATIVAN) tablet 4 mg  4 mg Oral Q1H PRN    Or    LORazepam (ATIVAN) injection 4 mg  4 mg IntraVENous Q1H PRN    HYDROmorphone HCl PF (DILAUDID) injection 1 mg  1 mg IntraVENous Q4H PRN    oxyCODONE (ROXICODONE) immediate release tablet 5 mg  5 mg Oral Q4H PRN     ______________________________________________________________________  EXPECTED LENGTH OF STAY: Unable to retrieve estimated LOS  ACTUAL LENGTH OF STAY:          4                 RAMOS Reinoso NP

## 2024-06-21 NOTE — PLAN OF CARE
Problem: Occupational Therapy - Adult  Goal: By Discharge: Performs self-care activities at highest level of function for planned discharge setting.  See evaluation for individualized goals.  Description: FUNCTIONAL STATUS PRIOR TO ADMISSION:  Patient was ambulatory without use of DME. Pt endorses occasional use of rollator within his home and SPC for community distances.     HOME SUPPORT: Patient lived with his wife and was independent with all ADL tasks.    Occupational Therapy Goals:  Initiated 6/21/2024  1.  Patient will perform grooming routine with Supervision within 7 day(s).  2.  Patient will perform upper and lower body bathing, using AE PRN with Supervision within 7 day(s).  3.  Patient will perform upper and lower body dressing, using AE PRN, with Supervision within 7 day(s).  4.  Patient will perform toilet transfers with Supervision within 7 day(s).  5.  Patient will perform all aspects of toileting with Supervision within 7 day(s).  6.  Patient will participate in upper extremity therapeutic exercise/activities with Supervision for 5 minutes within 7 day(s).    7.  Patient will utilize energy conservation techniques during functional activities with verbal cues within 7 day(s).    Outcome: Progressing   OCCUPATIONAL THERAPY EVALUATION    Patient: Robert Gastelum (79 y.o. male)  Date: 6/21/2024  Primary Diagnosis: Acute deep vein thrombosis (DVT) of femoral vein of left lower extremity (HCC) [I82.412]         Precautions: Fall Risk                  ASSESSMENT :  The patient's performance of ADL/IADL tasks is limited at this time by impaired balance, activity tolerance, BLE edema/LB access, coordination, BUE sensation/weakness (2/2 reported spinal stenosis), and pain s/p admission for LLE pain. Medical work-up on admission revealing significant LLE DVT, pt now on oral AC.     Pt received supine and agreeable to participation in therapy session. He demonstrated good insight into current deficits and  of Therapy;Plan of Care;Precautions;ADL Adaptive Strategies;Fall Prevention Strategies;Transfer Training;Equipment  Education Method: Demonstration  Barriers to Learning: None  Education Outcome: Verbalized understanding;Demonstrated understanding    Thank you for this referral.  CAROLANN Ledesma, OTR/L  Minutes: 21    Occupational Therapy Evaluation Charge Determination   History Examination Decision-Making   LOW Complexity : Brief history review  LOW Complexity: 1-3 Performance deficits relating to physical, cognitive, or psychosocial skills that result in activity limitations and/or participation restrictions MEDIUM Complexity: Patient may present with comorbidities that affect occupational performance. Minimal to moderate modifications of tasks or assist (eg. physical or verbal) with assist is necessary to enable pt to complete eval   Based on the above components, the patient evaluation is determined to be of the following complexity level: Low

## 2024-06-21 NOTE — PLAN OF CARE
FUNCTIONAL STATUS PRIOR TO ADMISSION: patient used cane in community; rollator occasionally at home    HOME SUPPORT PRIOR TO ADMISSION: The patient lived with wife but did not require assistance.    Physical Therapy Goals  Initiated 6/21/2024  1.  Patient will move from supine to sit and sit to supine in bed with supervision/set-up within 7 day(s).    2.  Patient will perform sit to stand with supervision/set-up within 7 day(s).  3.  Patient will transfer from bed to chair and chair to bed with supervision/set-up using the least restrictive device within 7 day(s).  4.  Patient will ambulate with supervision/set-up for 120 feet with the least restrictive device within 7 day(s).   Problem: Physical Therapy - Adult  Goal: By Discharge: Performs mobility at highest level of function for planned discharge setting.  See evaluation for individualized goals.  Outcome: Progressing           PHYSICAL THERAPY EVALUATION    Patient: Robert Gastelum (79 y.o. male)  Date: 6/21/2024  Primary Diagnosis: Acute deep vein thrombosis (DVT) of femoral vein of left lower extremity (HCC) [I82.412]       Precautions: Restrictions/Precautions: Fall Risk                      ASSESSMENT :   DEFICITS/IMPAIRMENTS:   The patient is limited by decreased functional mobility, ROM, strength, activity tolerance, endurance, balance, increased pain levels who would benefit from PT to address these impairments. Patient is very pleasant and motivated to participate - limited by increased pain and weakness in left lower ext. Patient also with bilateral upper ext weakness from cervical issues per patient. Patient able to ambulate using rolling walker for support.       Functional Outcome Measure:  The patient scored 16/24 on the [facility] outcome measure which is indicative of ?171,2,3 had higher odds of discharging home with home health or need of SNF/IPR..           PLAN :  Recommendations and Planned Interventions:   bed mobility training, transfer  training, gait training, therapeutic exercises, patient and family training/education, and therapeutic activities    Frequency/Duration: Patient will be followed by physical therapy to address goals, PT Plan of Care: 5 times/week to address goals.    Recommend with staff: therapy recommendations for staff: Recommend mobility with staff assist x1 using rolling walker.      Recommendation for discharge: (in order for the patient to meet his/her long term goals): Therapy up to 5 days/week in Skilled nursing facility    Other factors to consider for discharge: patient's current support system is unable to meet their requirements for physical assistance    IF patient discharges home will need the following DME: continuing to assess with progress                SUBJECTIVE:   Patient stated “I really dont want to fall.”    OBJECTIVE DATA SUMMARY:       Past Medical History:   Diagnosis Date    Arrhythmia     LBBB    Arthritis     Asthma     MILD    Cancer (HCC) 2015    BLADDER    Cancer (HCC)     scc top of head and nose    COVID-19 vaccine series completed     MODERNA VACCINE 1-28-21 AND 2-25-21    Depression with anxiety     Hypertension     Other unknown and unspecified cause of morbidity or mortality     history of pulmonary sarcoid     Posterior cervical adenopathy 11/19/2018    Pulmonary sarcoidosis (HCC)     Unspecified sleep apnea     cpap     Past Surgical History:   Procedure Laterality Date    CATARACT REMOVAL Bilateral     HERNIA REPAIR Left 1990    INGUIBAL    HERNIA REPAIR Right AS A CHILD    INGUINAL    IR PORT PLACEMENT EQUAL OR GREATER THAN 5 YEARS  7/16/2021    IR PORT PLACEMENT EQUAL OR GREATER THAN 5 YEARS 7/16/2021 [facility] RAD ANGIO IR    IR PORT PLACEMENT EQUAL OR GREATER THAN 5 YEARS  7/16/2021    IR URETERAL PLACEMENT STENT&NEPHRO CATH NEW ACCESS  8/8/2023    IR URETERAL PLACEMENT STENT&NEPHRO CATH NEW ACCESS 8/8/2023 [facility] RAD ANGIO IR    KIDNEY REMOVAL Left 11/13/2023    LAPAROSCOPIC, HAND ASSISTED LEFT

## 2024-06-22 VITALS
SYSTOLIC BLOOD PRESSURE: 98 MMHG | WEIGHT: 252.87 LBS | HEART RATE: 78 BPM | RESPIRATION RATE: 16 BRPM | BODY MASS INDEX: 35.4 KG/M2 | HEIGHT: 71 IN | OXYGEN SATURATION: 95 % | TEMPERATURE: 98.1 F | DIASTOLIC BLOOD PRESSURE: 71 MMHG

## 2024-06-22 LAB
ANION GAP SERPL CALC-SCNC: 5 MMOL/L (ref 5–15)
BACTERIA SPEC CULT: NORMAL
BACTERIA SPEC CULT: NORMAL
BUN SERPL-MCNC: 34 MG/DL (ref 6–20)
BUN/CREAT SERPL: 20 (ref 12–20)
CALCIUM SERPL-MCNC: 8.8 MG/DL (ref 8.5–10.1)
CHLORIDE SERPL-SCNC: 109 MMOL/L (ref 97–108)
CO2 SERPL-SCNC: 23 MMOL/L (ref 21–32)
COMMENT:: NORMAL
CREAT SERPL-MCNC: 1.74 MG/DL (ref 0.7–1.3)
ERYTHROCYTE [DISTWIDTH] IN BLOOD BY AUTOMATED COUNT: 14.6 % (ref 11.5–14.5)
GLUCOSE SERPL-MCNC: 105 MG/DL (ref 65–100)
HCT VFR BLD AUTO: 28.4 % (ref 36.6–50.3)
HGB BLD-MCNC: 9.3 G/DL (ref 12.1–17)
MCH RBC QN AUTO: 33.2 PG (ref 26–34)
MCHC RBC AUTO-ENTMCNC: 32.7 G/DL (ref 30–36.5)
MCV RBC AUTO: 101.4 FL (ref 80–99)
NRBC # BLD: 0 K/UL (ref 0–0.01)
NRBC BLD-RTO: 0 PER 100 WBC
PLATELET # BLD AUTO: 172 K/UL (ref 150–400)
PMV BLD AUTO: 9.1 FL (ref 8.9–12.9)
POTASSIUM SERPL-SCNC: 4 MMOL/L (ref 3.5–5.1)
RBC # BLD AUTO: 2.8 M/UL (ref 4.1–5.7)
SERVICE CMNT-IMP: NORMAL
SERVICE CMNT-IMP: NORMAL
SODIUM SERPL-SCNC: 137 MMOL/L (ref 136–145)
SPECIMEN HOLD: NORMAL
WBC # BLD AUTO: 4.1 K/UL (ref 4.1–11.1)

## 2024-06-22 PROCEDURE — 6370000000 HC RX 637 (ALT 250 FOR IP): Performed by: NURSE PRACTITIONER

## 2024-06-22 PROCEDURE — 6370000000 HC RX 637 (ALT 250 FOR IP): Performed by: INTERNAL MEDICINE

## 2024-06-22 PROCEDURE — 6360000002 HC RX W HCPCS: Performed by: INTERNAL MEDICINE

## 2024-06-22 PROCEDURE — 36415 COLL VENOUS BLD VENIPUNCTURE: CPT

## 2024-06-22 PROCEDURE — 80048 BASIC METABOLIC PNL TOTAL CA: CPT

## 2024-06-22 PROCEDURE — 85027 COMPLETE CBC AUTOMATED: CPT

## 2024-06-22 PROCEDURE — 2580000003 HC RX 258: Performed by: INTERNAL MEDICINE

## 2024-06-22 RX ORDER — OXYCODONE HYDROCHLORIDE 5 MG/1
5 TABLET ORAL EVERY 6 HOURS PRN
Qty: 12 TABLET | Refills: 0 | Status: SHIPPED | OUTPATIENT
Start: 2024-06-22 | End: 2024-06-25

## 2024-06-22 RX ORDER — OXYCODONE HYDROCHLORIDE 5 MG/1
5 TABLET ORAL EVERY 6 HOURS PRN
Qty: 12 TABLET | Refills: 0 | Status: SHIPPED | OUTPATIENT
Start: 2024-06-22 | End: 2024-06-22

## 2024-06-22 RX ORDER — NALOXONE HYDROCHLORIDE 4 MG/.1ML
1 SPRAY NASAL PRN
Qty: 1 EACH | Refills: 0 | Status: SHIPPED
Start: 2024-06-22

## 2024-06-22 RX ORDER — IPRATROPIUM BROMIDE AND ALBUTEROL SULFATE 2.5; .5 MG/3ML; MG/3ML
3 SOLUTION RESPIRATORY (INHALATION) EVERY 4 HOURS PRN
Qty: 3 ML | Refills: 0 | Status: SHIPPED
Start: 2024-06-22

## 2024-06-22 RX ORDER — OXYCODONE HYDROCHLORIDE 10 MG/1
10 TABLET ORAL EVERY 6 HOURS PRN
Qty: 12 TABLET | Refills: 0 | Status: SHIPPED | OUTPATIENT
Start: 2024-06-22 | End: 2024-06-25

## 2024-06-22 RX ADMIN — BUPROPION HYDROCHLORIDE 100 MG: 100 TABLET, FILM COATED, EXTENDED RELEASE ORAL at 10:11

## 2024-06-22 RX ADMIN — APIXABAN 10 MG: 5 TABLET, FILM COATED ORAL at 10:10

## 2024-06-22 RX ADMIN — SODIUM CHLORIDE, PRESERVATIVE FREE 10 ML: 5 INJECTION INTRAVENOUS at 10:11

## 2024-06-22 RX ADMIN — HYDROMORPHONE HYDROCHLORIDE 1 MG: 1 INJECTION, SOLUTION INTRAMUSCULAR; INTRAVENOUS; SUBCUTANEOUS at 01:55

## 2024-06-22 ASSESSMENT — PAIN SCALES - GENERAL
PAINLEVEL_OUTOF10: 7
PAINLEVEL_OUTOF10: 1

## 2024-06-22 ASSESSMENT — PAIN DESCRIPTION - FREQUENCY: FREQUENCY: INTERMITTENT

## 2024-06-22 ASSESSMENT — PAIN DESCRIPTION - LOCATION
LOCATION: LEG
LOCATION: LEG

## 2024-06-22 ASSESSMENT — PAIN - FUNCTIONAL ASSESSMENT: PAIN_FUNCTIONAL_ASSESSMENT: ACTIVITIES ARE NOT PREVENTED

## 2024-06-22 ASSESSMENT — PAIN DESCRIPTION - PAIN TYPE: TYPE: CHRONIC PAIN

## 2024-06-22 ASSESSMENT — PAIN DESCRIPTION - ORIENTATION
ORIENTATION: LEFT
ORIENTATION: LEFT

## 2024-06-22 ASSESSMENT — PAIN DESCRIPTION - DESCRIPTORS
DESCRIPTORS: ACHING
DESCRIPTORS: CRAMPING

## 2024-06-22 NOTE — DISCHARGE SUMMARY
Discharge Summary       PATIENT ID: Robert Gastelum  MRN: [id number]   YOB: 1945    DATE OF ADMISSION: 6/17/2024  5:28 PM    DATE OF DISCHARGE: 6/22/2024   PRIMARY CARE PROVIDER: Robert Leo MD     ATTENDING PHYSICIAN: Dr. Red  DISCHARGING PROVIDER: RAMOS Shook NP    To contact this individual call [phone] and ask the  to page.  If unavailable ask to be transferred the Adult Hospitalist Department.    CONSULTATIONS: IP CONSULT TO VASCULAR SURGERY  IP CONSULT TO PHARMACY  IP CONSULT TO CASE MANAGEMENT    PROCEDURES/SURGERIES: * No surgery found *     ADMITTING DIAGNOSES & HOSPITAL COURSE:   As per H&P: \" Robert Gastelum is a 79 y.o. male with past medical history significant for dyslipidemia, anxiety/depression, CKD, hypertension, cervical spine stenosis, AMI on CPAP, urinary bladder cancer s/p resection with resultant urostomy presented at the emergency room with left leg swelling and redness.  Patient symptoms started about a month ago, was seen by his PCP and wound culture obtained was positive for MRSA, patient was treated with topical antibiotics with minimal improvement.  Patient was also treated with diuretic and prednisone for the swelling.  Patient has had left knee replacement in the past as well.  Ultrasound of the left leg according to the patient was done initially around one month ago and this was negative for DVT.     Ultrasound of the lower extremity done on arrival at the emergency room today showed extensive DVT of the left leg up to the groin.  Patient was started on heparin infusion and was referred to the hospitalist service for admission.\"    On 6/22, patient was medically stable for discharge. Discharge plan discussed at length with patient and his wife.    DISCHARGE DIAGNOSES / PLAN:      Provoked Acute left common femoral, profunda femoral, proximal femoral and mid femoral DVT  - in the setting of lymph node compression with mass effect on the  tablet  oxyCODONE HCl 10 MG immediate release tablet  UNABLE TO FIND           NOTIFY YOUR PHYSICIAN FOR ANY OF THE FOLLOWING:   Fever over 101 degrees for 24 hours.   Chest pain, shortness of breath, fever, chills, nausea, vomiting, diarrhea, change in mentation, falling, weakness, bleeding. Severe pain or pain not relieved by medications.  Or, any other signs or symptoms that you may have questions about.    DISPOSITION:    Home With:   OT  PT  HH  RN      X Long term SNF/Inpatient Rehab    Independent/assisted living    Hospice    Other:       PATIENT CONDITION AT DISCHARGE:     Functional status    Poor    X Deconditioned     Independent      Cognition    X Lucid     Forgetful     Dementia      Catheters/lines (plus indication)    Fuentes     PICC     PEG    X None      Code status    X Full code     DNR      PHYSICAL EXAMINATION AT DISCHARGE:  Patient seen and examined, sitting in chair. Having some left leg pain. Had a BM this morning.    General : alert x 3, awake, no acute distress   HEENT: PEERL, EOMI, moist mucus membrane  Neck: supple, no JVD, no meningeal signs  Chest: Clear to auscultation bilaterally   CVS: S1 S2 heard, Capillary refill less than 2 seconds  Abd: soft/non tender, non distended, BS physiological, urostomy in place - stoma pink and moist, urine clear yellow   Ext: no clubbing, no cyanosis, brisk 2+ DP pulses; left leg with redness, warmth, 2+ edema  Neuro/Psych: pleasant mood and affect, CN 2-12 grossly intact, sensory grossly within normal limit  Skin: warm, dry     CHRONIC MEDICAL DIAGNOSES:  Principal Problem:    Acute deep vein thrombosis (DVT) of femoral vein of left lower extremity (HCC)  Resolved Problems:    * No resolved hospital problems. *        Greater than 31 minutes were spent with the patient on counseling and coordination of care    Signed:   RAMOS Shook NP  6/22/2024  9:59 AM

## 2024-06-22 NOTE — PROGRESS NOTES
Discharge order received and transportation arranged for 1530. Report called to [facility] at 1336. PIV removed, telemetry discontinued, urostomy emptied and pt changed into personal attire. AVS reviewed with patient and opportunity for questions/concerns to be addressed was provided. H2H on unit dropping of another pt and offered to transport pt early - pt in agreement. Pt discharged with wife and H2H via stretcher with all personal belongings.

## 2024-06-22 NOTE — PLAN OF CARE
Problem: Pain  Goal: Verbalizes/displays adequate comfort level or baseline comfort level  6/22/2024 1254 by Jana Sorensen, RN  Outcome: Progressing  Flowsheets (Taken 6/22/2024 1000)  Verbalizes/displays adequate comfort level or baseline comfort level:   Assess pain using appropriate pain scale   Encourage patient to monitor pain and request assistance   Administer analgesics based on type and severity of pain and evaluate response  6/22/2024 0301 by Karli Ramirez, RN  Outcome: Progressing     Problem: Safety - Adult  Goal: Free from fall injury  6/22/2024 1254 by Jana Sorensen, RN  Outcome: Progressing  6/22/2024 0301 by Karli Ramirez, RN  Outcome: Progressing

## 2024-06-26 ENCOUNTER — TELEPHONE (OUTPATIENT)
Age: 79
End: 2024-06-26

## 2024-06-26 ENCOUNTER — APPOINTMENT (OUTPATIENT)
Facility: HOSPITAL | Age: 79
End: 2024-06-26
Payer: MEDICARE

## 2024-06-26 NOTE — TELEPHONE ENCOUNTER
Patient's spouse (Libby, \"Paris\") walked into the lobby. Stated patient is currently in rehab and has been since 06/22. Stated urgently needing to speak to a member of the team as soon as possible. Libby stated needing to provide updates to the team about patient's current condition and to get guidance as to what the next steps should be.     #[phone]

## 2024-06-26 NOTE — TELEPHONE ENCOUNTER
2:19pm: pt verified x2, stated he has a enlarged lymph node in his left leg. According to pt spouse the enlarged lymph node caused a blood clot that hospitalized him. He is now in rehab learning how to walk again.       Dr. Leo pts pcp thinks the enlarged lymph node might be due to cancer. The pcp believes it needs a bx possibly.     Pt spouse requested a call from Dr. Davis when possible. Informed her that Ryan is very busy with clinic but I will notify her of request. Would not inform me of what she wanted to speak about.    No further questions.

## 2024-07-10 NOTE — PROGRESS NOTES
[facility] [place] [facility]   [address], [address] [place] [id number]   W: [phone]  F: [phone]          Reason for Visit:     Robert Gastelum is a 78 y.o.  male who is seen in follow-up of Muscle invasive bladder cancer- Mixed histology       Treatment History:      3/1/2021: CT IVP: Multiple abdominal, iliac, mediastinal nodes unchanged from 2019 consistent with h/o sarcoidosis, Thickened R lateral  bladder wall.     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     12/6/2021: Radical urethro-cystoprostatectomy     2/3/22-1/2023: Adjuvant nivolumab  5/2023: RT with Dr. Scruggs  5/2023: With suspicious pelvic nodes  12/2023: CT stable nodes            History of Present Illness:     Patient is a 78 y.o. male with PMH as below including sarcoidosis who  is seen for evaluation of bladder cancer.      He has a history of nonmuscle invasive bladder cancer in 2015, underwent 2 induction courses of BCG, had a non muscle invasive recurrence in 2019 and had re induction with BCG. Cystoscopy 6/2020 at [facility] did not show any recurrence. In March 2021 he had  a positive FISH and was seen by Dr. Naqvi. Had a CT IVP 3/2021 which showed some Bladder thickening. He underwent a Cystoscopy with TURBT and was found to have extensive non muscle invasive disease including that of prostatic urethra, CIS and a focus  of Muscle invasive disease in the R bladder wall. Grade 4 neutropenia after cycle 1. Completed 4 cycles and had

## 2024-07-11 ENCOUNTER — HOSPITAL ENCOUNTER (OUTPATIENT)
Facility: HOSPITAL | Age: 79
Setting detail: INFUSION SERIES
Discharge: HOME | End: 2024-07-11
Payer: MEDICARE

## 2024-07-11 ENCOUNTER — OFFICE VISIT (OUTPATIENT)
Age: 79
End: 2024-07-11
Payer: MEDICARE

## 2024-07-11 VITALS
SYSTOLIC BLOOD PRESSURE: 106 MMHG | DIASTOLIC BLOOD PRESSURE: 70 MMHG | OXYGEN SATURATION: 97 % | RESPIRATION RATE: 18 BRPM | BODY MASS INDEX: 35.43 KG/M2 | HEART RATE: 84 BPM | WEIGHT: 254 LBS | TEMPERATURE: 97.6 F

## 2024-07-11 VITALS
TEMPERATURE: 97.6 F | HEART RATE: 84 BPM | SYSTOLIC BLOOD PRESSURE: 106 MMHG | DIASTOLIC BLOOD PRESSURE: 70 MMHG | RESPIRATION RATE: 18 BRPM

## 2024-07-11 DIAGNOSIS — R19.09 INGUINAL NODULE: Primary | ICD-10-CM

## 2024-07-11 DIAGNOSIS — C67.3 MALIGNANT NEOPLASM OF ANTERIOR WALL OF BLADDER (HCC): Primary | ICD-10-CM

## 2024-07-11 DIAGNOSIS — C67.9 MALIGNANT NEOPLASM OF URINARY BLADDER, UNSPECIFIED SITE (HCC): Primary | ICD-10-CM

## 2024-07-11 DIAGNOSIS — I82.412 ACUTE DEEP VEIN THROMBOSIS (DVT) OF FEMORAL VEIN OF LEFT LOWER EXTREMITY (HCC): ICD-10-CM

## 2024-07-11 LAB
ALBUMIN SERPL-MCNC: 3.2 G/DL (ref 3.5–5)
ALBUMIN/GLOB SERPL: 0.9 (ref 1.1–2.2)
ALP SERPL-CCNC: 72 U/L (ref 45–117)
ALT SERPL-CCNC: 16 U/L (ref 12–78)
ANION GAP SERPL CALC-SCNC: 8 MMOL/L (ref 5–15)
AST SERPL-CCNC: 12 U/L (ref 15–37)
BASOPHILS # BLD: 0.1 K/UL (ref 0–0.1)
BASOPHILS NFR BLD: 2 % (ref 0–1)
BILIRUB SERPL-MCNC: 0.3 MG/DL (ref 0.2–1)
BUN SERPL-MCNC: 28 MG/DL (ref 6–20)
BUN/CREAT SERPL: 14 (ref 12–20)
CALCIUM SERPL-MCNC: 9.4 MG/DL (ref 8.5–10.1)
CHLORIDE SERPL-SCNC: 107 MMOL/L (ref 97–108)
CO2 SERPL-SCNC: 24 MMOL/L (ref 21–32)
CREAT SERPL-MCNC: 1.98 MG/DL (ref 0.7–1.3)
DIFFERENTIAL METHOD BLD: ABNORMAL
EOSINOPHIL # BLD: 0.3 K/UL (ref 0–0.4)
EOSINOPHIL NFR BLD: 6 % (ref 0–7)
ERYTHROCYTE [DISTWIDTH] IN BLOOD BY AUTOMATED COUNT: 14.6 % (ref 11.5–14.5)
GLOBULIN SER CALC-MCNC: 3.6 G/DL (ref 2–4)
GLUCOSE SERPL-MCNC: 108 MG/DL (ref 65–100)
HCT VFR BLD AUTO: 31.1 % (ref 36.6–50.3)
HGB BLD-MCNC: 9.9 G/DL (ref 12.1–17)
IMM GRANULOCYTES # BLD AUTO: 0 K/UL
IMM GRANULOCYTES NFR BLD AUTO: 0 %
LYMPHOCYTES # BLD: 0.4 K/UL (ref 0.8–3.5)
LYMPHOCYTES NFR BLD: 10 % (ref 12–49)
MCH RBC QN AUTO: 32.2 PG (ref 26–34)
MCHC RBC AUTO-ENTMCNC: 31.8 G/DL (ref 30–36.5)
MCV RBC AUTO: 101.3 FL (ref 80–99)
MONOCYTES # BLD: 0.7 K/UL (ref 0–1)
MONOCYTES NFR BLD: 16 % (ref 5–13)
NEUTS SEG # BLD: 2.9 K/UL (ref 1.8–8)
NEUTS SEG NFR BLD: 66 % (ref 32–75)
NRBC # BLD: 0 K/UL (ref 0–0.01)
NRBC BLD-RTO: 0 PER 100 WBC
PLATELET # BLD AUTO: 188 K/UL (ref 150–400)
PMV BLD AUTO: 9 FL (ref 8.9–12.9)
POTASSIUM SERPL-SCNC: 4.2 MMOL/L (ref 3.5–5.1)
PROT SERPL-MCNC: 6.8 G/DL (ref 6.4–8.2)
RBC # BLD AUTO: 3.07 M/UL (ref 4.1–5.7)
RBC MORPH BLD: ABNORMAL
SODIUM SERPL-SCNC: 139 MMOL/L (ref 136–145)
WBC # BLD AUTO: 4.4 K/UL (ref 4.1–11.1)
WBC MORPH BLD: ABNORMAL

## 2024-07-11 PROCEDURE — 2580000003 HC RX 258

## 2024-07-11 PROCEDURE — 3074F SYST BP LT 130 MM HG: CPT | Performed by: INTERNAL MEDICINE

## 2024-07-11 PROCEDURE — G8428 CUR MEDS NOT DOCUMENT: HCPCS | Performed by: INTERNAL MEDICINE

## 2024-07-11 PROCEDURE — 1123F ACP DISCUSS/DSCN MKR DOCD: CPT | Performed by: INTERNAL MEDICINE

## 2024-07-11 PROCEDURE — 99215 OFFICE O/P EST HI 40 MIN: CPT | Performed by: INTERNAL MEDICINE

## 2024-07-11 PROCEDURE — 85025 COMPLETE CBC W/AUTO DIFF WBC: CPT

## 2024-07-11 PROCEDURE — 3078F DIAST BP <80 MM HG: CPT | Performed by: INTERNAL MEDICINE

## 2024-07-11 PROCEDURE — 36591 DRAW BLOOD OFF VENOUS DEVICE: CPT

## 2024-07-11 PROCEDURE — 1036F TOBACCO NON-USER: CPT | Performed by: INTERNAL MEDICINE

## 2024-07-11 PROCEDURE — 36415 COLL VENOUS BLD VENIPUNCTURE: CPT

## 2024-07-11 PROCEDURE — G8417 CALC BMI ABV UP PARAM F/U: HCPCS | Performed by: INTERNAL MEDICINE

## 2024-07-11 PROCEDURE — 80053 COMPREHEN METABOLIC PANEL: CPT

## 2024-07-11 PROCEDURE — 1111F DSCHRG MED/CURRENT MED MERGE: CPT | Performed by: INTERNAL MEDICINE

## 2024-07-11 RX ORDER — OXYCODONE AND ACETAMINOPHEN 7.5; 325 MG/1; MG/1
TABLET ORAL
COMMUNITY

## 2024-07-11 RX ORDER — SODIUM CHLORIDE 0.9 % (FLUSH) 0.9 %
5-40 SYRINGE (ML) INJECTION PRN
OUTPATIENT
Start: 2024-09-15

## 2024-07-11 RX ORDER — HEPARIN 100 UNIT/ML
500 SYRINGE INTRAVENOUS PRN
OUTPATIENT
Start: 2024-09-15

## 2024-07-11 RX ORDER — SODIUM CHLORIDE 0.9 % (FLUSH) 0.9 %
5-40 SYRINGE (ML) INJECTION PRN
Status: DISCONTINUED | OUTPATIENT
Start: 2024-07-11 | End: 2024-07-12 | Stop reason: HOSPADM

## 2024-07-11 RX ORDER — SODIUM CHLORIDE 9 MG/ML
25 INJECTION, SOLUTION INTRAVENOUS PRN
OUTPATIENT
Start: 2024-09-15

## 2024-07-11 RX ADMIN — SODIUM CHLORIDE, PRESERVATIVE FREE 30 ML: 5 INJECTION INTRAVENOUS at 10:55

## 2024-07-11 ASSESSMENT — PAIN SCALES - GENERAL: PAINLEVEL_OUTOF10: 0

## 2024-07-11 NOTE — PROGRESS NOTES
[facility] Short Note                       Date: 2024    Name: Robert Gastelum    MRN: [id number]         : 1945      Pt admit to [facility] for Port Flush/Labs ambulatory in stable condition. No new concerns voiced today.       Lab results were obtained .  Medications Administered         sodium chloride flush 0.9 % injection 5-40 mL Admin Date  2024 Action  Given Dose  30 mL Route  IntraVENous Administered By  Apple Min RN            Port accessed with positive blood return. Port flushed,  and de-accessed per protocol.       [facility]astel[facility].   Pt aware of next appt    Future Appointments   Date Time Provider Department Center   2024  9:00 AM Zaida Davis MD [facility] BS AMB   2024 11:30 AM [facility]   2024 11:30 AM [facility] PORT CHAIR 1 [facility]       APPLE MIN RN  2024  4:01 PM

## 2024-07-11 NOTE — PROGRESS NOTES
Robert Gastelum is a 79 y.o. male    Chief Complaint   Patient presents with    Follow-up     Muscle invasive bladder cancer- Mixed histology            1. Have you been to the ER, urgent care clinic since your last visit?  Hospitalized since your last visit? Yes, [place]     2. Have you seen or consulted any other health care providers outside of the [facility] since your last visit?  Include any pap smears or [facility]    Pt stated he is also on a dieretic PRN depending on his weight daily. Name/dosage of medication unknown

## 2024-07-12 ENCOUNTER — TELEPHONE (OUTPATIENT)
Age: 79
End: 2024-07-12

## 2024-07-12 NOTE — TELEPHONE ENCOUNTER
9:04am: pt verified x2, informed pt spouse that pt ultrasound 7/17 @ 1pm. Arrive at 12:30pm. Pt is to report to out patient registration in [facility]. No further questions.

## 2024-07-17 ENCOUNTER — HOSPITAL ENCOUNTER (OUTPATIENT)
Facility: HOSPITAL | Age: 79
Discharge: HOME | End: 2024-07-20
Payer: MEDICARE

## 2024-07-17 DIAGNOSIS — R19.09 INGUINAL NODULE: ICD-10-CM

## 2024-07-17 PROCEDURE — 76882 US LMTD JT/FCL EVL NVASC XTR: CPT

## 2024-07-26 ENCOUNTER — TELEPHONE (OUTPATIENT)
Age: 79
End: 2024-07-26

## 2024-07-26 DIAGNOSIS — R59.0 LAD (LYMPHADENOPATHY), INGUINAL: Primary | ICD-10-CM

## 2024-07-26 DIAGNOSIS — C67.3 MALIGNANT NEOPLASM OF ANTERIOR WALL OF BLADDER (HCC): ICD-10-CM

## 2024-07-26 NOTE — TELEPHONE ENCOUNTER
1527  Called pt to discuss US results, no answer. VM left requesting a call back to discuss further.

## 2024-07-29 ENCOUNTER — TELEPHONE (OUTPATIENT)
Age: 79
End: 2024-07-29

## 2024-07-29 NOTE — TELEPHONE ENCOUNTER
Patient LVM. Stated returning a call from Yolie from Friday. Would like a call back.    #[phone]

## 2024-07-29 NOTE — TELEPHONE ENCOUNTER
1130  R/t call to pt HIPAA verified x2  Made pt aware Dr. Davis reviewed his US and it shows an enlarged inguinal lymph node.   Dr. Davis would like to obtain US guided biopsy on this. We have been in contact with the biopsy  and they will be in contact with him with the appointment information.  Pt voiced understanding and was thankful for my call.

## 2024-08-02 ENCOUNTER — TRANSCRIBE ORDERS (OUTPATIENT)
Facility: HOSPITAL | Age: 79
End: 2024-08-02

## 2024-08-02 DIAGNOSIS — R60.0 EDEMA OF RIGHT LOWER LEG: Primary | ICD-10-CM

## 2024-08-07 ENCOUNTER — HOSPITAL ENCOUNTER (OUTPATIENT)
Age: 79
Discharge: HOME | End: 2024-08-10
Payer: MEDICARE

## 2024-08-07 DIAGNOSIS — R60.0 EDEMA OF RIGHT LOWER LEG: ICD-10-CM

## 2024-08-07 PROCEDURE — 93971 EXTREMITY STUDY: CPT

## 2024-08-12 ENCOUNTER — HOSPITAL ENCOUNTER (OUTPATIENT)
Facility: HOSPITAL | Age: 79
Discharge: HOME | End: 2024-08-15
Payer: MEDICARE

## 2024-08-12 DIAGNOSIS — R59.0 LAD (LYMPHADENOPATHY), INGUINAL: ICD-10-CM

## 2024-08-12 DIAGNOSIS — C67.3 MALIGNANT NEOPLASM OF ANTERIOR WALL OF BLADDER (HCC): ICD-10-CM

## 2024-08-12 PROCEDURE — 88341 IMHCHEM/IMCYTCHM EA ADD ANTB: CPT

## 2024-08-12 PROCEDURE — 2709999900 US BIOPSY LYMPH NODE

## 2024-08-12 PROCEDURE — 88333 PATH CONSLTJ SURG CYTO XM 1: CPT

## 2024-08-12 PROCEDURE — 76942 ECHO GUIDE FOR BIOPSY: CPT

## 2024-08-12 PROCEDURE — 88342 IMHCHEM/IMCYTCHM 1ST ANTB: CPT

## 2024-08-12 PROCEDURE — 88312 SPECIAL STAINS GROUP 1: CPT

## 2024-08-12 PROCEDURE — 88305 TISSUE EXAM BY PATHOLOGIST: CPT

## 2024-08-16 ENCOUNTER — TELEPHONE (OUTPATIENT)
Age: 79
End: 2024-08-16

## 2024-08-16 NOTE — TELEPHONE ENCOUNTER
TC with patient. Stated completing a biopsy on Monday, 08/12. Pt stated he was told it would take 2-3 days before results would be available to be read. Stated wondering if the results are available. If so, can someone from the clinical team reach out and go over the results with patient.    #[phone]

## 2024-08-16 NOTE — TELEPHONE ENCOUNTER
1138  R/t call to pt HIPAA verified x2  Made pt aware results are still pending and should be back in time to discuss with him at his upcoming appt on 8/22.  Pt voiced understanding and was appreciative of my call.

## 2024-08-20 ENCOUNTER — TRANSCRIBE ORDERS (OUTPATIENT)
Facility: HOSPITAL | Age: 79
End: 2024-08-20

## 2024-08-20 DIAGNOSIS — I89.0 LYMPHEDEMA: Primary | ICD-10-CM

## 2024-08-21 ENCOUNTER — APPOINTMENT (OUTPATIENT)
Facility: HOSPITAL | Age: 79
End: 2024-08-21
Payer: MEDICARE

## 2024-08-22 ENCOUNTER — OFFICE VISIT (OUTPATIENT)
Age: 79
End: 2024-08-22
Payer: MEDICARE

## 2024-08-22 VITALS
WEIGHT: 244 LBS | HEIGHT: 71 IN | BODY MASS INDEX: 34.16 KG/M2 | HEART RATE: 83 BPM | SYSTOLIC BLOOD PRESSURE: 108 MMHG | DIASTOLIC BLOOD PRESSURE: 75 MMHG | RESPIRATION RATE: 20 BRPM | OXYGEN SATURATION: 95 % | TEMPERATURE: 98.3 F

## 2024-08-22 DIAGNOSIS — C67.3 MALIGNANT NEOPLASM OF ANTERIOR WALL OF BLADDER (HCC): Primary | ICD-10-CM

## 2024-08-22 PROCEDURE — G8417 CALC BMI ABV UP PARAM F/U: HCPCS | Performed by: INTERNAL MEDICINE

## 2024-08-22 PROCEDURE — 1123F ACP DISCUSS/DSCN MKR DOCD: CPT | Performed by: INTERNAL MEDICINE

## 2024-08-22 PROCEDURE — 3078F DIAST BP <80 MM HG: CPT | Performed by: INTERNAL MEDICINE

## 2024-08-22 PROCEDURE — 99215 OFFICE O/P EST HI 40 MIN: CPT | Performed by: INTERNAL MEDICINE

## 2024-08-22 PROCEDURE — G8427 DOCREV CUR MEDS BY ELIG CLIN: HCPCS | Performed by: INTERNAL MEDICINE

## 2024-08-22 PROCEDURE — 3074F SYST BP LT 130 MM HG: CPT | Performed by: INTERNAL MEDICINE

## 2024-08-22 PROCEDURE — 1036F TOBACCO NON-USER: CPT | Performed by: INTERNAL MEDICINE

## 2024-08-22 RX ORDER — CEPHALEXIN 500 MG/1
CAPSULE ORAL
COMMUNITY
Start: 2024-06-16

## 2024-08-22 NOTE — PROGRESS NOTES
Identified pt with two pt identifiers(name and ). Reviewed record in preparation for visit and have obtained necessary documentation. All patient medications has been reviewed.  No chief complaint on file.      Vitals:    24 1014   BP: 108/75   Pulse: 83   Resp: 20   Temp: 98.3 °F (36.8 °C)   SpO2: 95%                   Coordination of Care Questionnaire:   1) Have you been to an emergency room, urgent care, or hospitalized since your last visit?   Yes       2. Have seen or consulted any other health care provider since your last visit? No        Patient is accompanied by spouse I have received verbal consent from Robert Gastelum to discuss any/all medical information while they are present in the room.  
12/6   HG Papillary [id number]   Margins negative   EPE  Present   See above     S/p rt 5/2023      3) Sarcoidosis   Follows with pulmonary          4) Left renal atrophy  S/p Nephrectomy       5) Anemia   Likely secondary to CKD and stable     6) left lower extremity DVT-acute 6/2024  On Eliquis  Continue indefinintely    6) Encounter for high risk disease for recurrence       Plan:       Tentative plan RTC 3 months with PET and port flush               I appreciate the opportunity to participate in Mr. Robert DYLAN Gastelum 's care.     Zaida Davis MD, MS  [facility]

## 2024-08-29 ENCOUNTER — HOSPITAL ENCOUNTER (OUTPATIENT)
Facility: HOSPITAL | Age: 79
Setting detail: RECURRING SERIES
End: 2024-08-29
Payer: MEDICARE

## 2024-08-29 PROCEDURE — 97535 SELF CARE MNGMENT TRAINING: CPT

## 2024-08-29 PROCEDURE — 97166 OT EVAL MOD COMPLEX 45 MIN: CPT

## 2024-08-29 NOTE — THERAPY EVALUATION
[facility]  a part of [facility]   [address], [address]  [place] [address]  Phone: [phone]    Fax: [phone]                                                                                PT/OT LYMPHEDEMA - EVALUATION/PLAN OF CARE NOTE (updated 3/23)      Date: 2024          Patient Name:  Robert Gastelum :  1945   Medical   Diagnosis:  Lymphedema [I89.0] Treatment Diagnosis:  I89.0     Lymphedema, not elsewhere classified    Referral Source:  Robert Leo MD Provider #:  [id number]                Insurance: Payor: MEDICARE / Plan: MEDICARE PART A AND B / Product Type: *No Product type* /      Patient  verified yes     Visit #   Current  / Total 1 12   Time   In / Out 11:55 AM 1:00 PM   Total Treatment Time 65   Total Timed Codes 25   1:1 Treatment Time 25      [facility] BC Totals Reminder:  bill using total billable   min of TIMED therapeutic procedures and modalities.   8-22 min = 1 unit; 23-37 min = 2 units; 38-52 min = 3 units;  53-67 min = 4 units; 68-82 min = 5 units         SUBJECTIVE  Pain Level (0-10 scale): 4/10 in B knees; mostly when standing  [x]constant []intermittent []improving []worsening []no change since onset    Any medication changes, allergies to medications, adverse drug reactions, diagnosis change, or new procedure performed?: [x] No    [] Yes (see summary sheet for update)  Medications: Verified on Patient Summary List    Subjective functional status/changes:     \"Mobility is harder. I have trouble getting on socks and pants.\"  Start of Care: 2024  Onset Date: May 2024  Current symptoms/Complaints: pain, swelling, itchy, tight, swelling increases throughout the day, decerased balance, uses SBQC, heaviness  Mechanism of Injury: Bladder Cancer and Lymph node dissection  PLOF: overall min A; ostomy bag - wife assists with this and LB dressing; R hand is useless due to spinal stenosis; R hand dominant, hands are the bigger  appropriate garments to decongest limb. Pt will receive instruction in proper skin care to recognize signs and symptoms of and to prevent infection, therapeutic exercise, and self MLD for independent home program and restorative lymphatic performance.    This care is medically necessary due to the infection risk with lymphedema and to improve safety and independence with functional activities. CDT is necessary to resolve swelling to allow pt to return to wearing normal clothes and footwear and prevent worsening of symptoms, such as venous stasis ulcerations, infections, and/r hospitalizations. Patient will be independent with home program strategies to allow improved ADL performance and mobility and to allow pt to return to greatest functional independence.       Evaluation Complexity:  History:  HIGH Complexity :3+ comorbidities / personal factors will impact the outcome/ POC ; Examination:  MEDIUM Complexity : 3 Standardized tests and measures addressin body structure, function, activity limitation and / or participation in recreation  ;Presentation:  MEDIUM Complexity : Evolving with changing characteristics  ;Clinical Decision Making:  MEDIUM Complexity : FOTO score of 26-74 Overall Complexity Rating: MEDIUM  Problem List: pain affecting function, decrease ROM, decrease strength, edema affection function, impaired gait/balance, decrease ADL/functional abilities, decrease activity tolerance, decrease flexibility/joint mobility, and decrease transfer abilities    Treatment Plan may include any combination of the followin Therapeutic Exercise, 87720 Manual Therapy, 35082 Self Care/Home Management, 70043 Vasopneumatic Device  (Vasopnuematic compression justification:  Per bilateral girth measures taken and listed above the edema is considered significant and having an impact on the patient's strength, balance, gait, transfers, self care, and ADL's), 88584 Orthotic Management and Training, and 86748 Orthotic

## 2024-09-03 ENCOUNTER — HOSPITAL ENCOUNTER (OUTPATIENT)
Facility: HOSPITAL | Age: 79
Setting detail: RECURRING SERIES
Discharge: HOME | End: 2024-09-06
Payer: MEDICARE

## 2024-09-03 PROCEDURE — 97140 MANUAL THERAPY 1/> REGIONS: CPT

## 2024-09-03 PROCEDURE — 97110 THERAPEUTIC EXERCISES: CPT

## 2024-09-03 NOTE — PROGRESS NOTES
PHYSICAL THERAPY/OCCUPATIONAL THERAPY - MEDICARE DAILY TREATMENT NOTE (updated 3/23)      Date: 9/3/2024          Patient Name:  Robert Gastelum :  1945   Medical   Diagnosis:  Lymphedema [I89.0] Treatment Diagnosis:  I89.0     Lymphedema, not elsewhere classified    Referral Source:  Robert Leo MD Insurance:   Payor: MEDICARE / Plan: MEDICARE PART A AND B / Product Type: *No Product type* /                     Patient  verified yes     Visit #   Current  / Total 2 12   Time   In / Out 9:40 AM 11:06 AM   Total Treatment Time 86   Total Timed Codes 86   1:1 Treatment Time 86       BC Totals Reminder:  bill using total billable   min of TIMED therapeutic procedures and modalities.   8-22 min = 1 unit; 23-37 min = 2 units; 38-52 min = 3 units;  53-67 min = 4 units; 68-82 min = 5 units         SUBJECTIVE    Pain Level (0-10 scale): 2/10    Any medication changes, allergies to medications, adverse drug reactions, diagnosis change, or new procedure performed?: [x] No    [] Yes (see summary sheet for update)  Medications: Verified on Patient Summary List    Subjective functional status/changes:     \"I feel like I don't have any strength in my legs anymore. My stamina and my balance are both poor.\"    OBJECTIVE  Pt has been performing the following conservative measures:  Elevation from 2024 to present.  Therapeutic exercises from 2024 to present.  Self MLD for at least 30 minutes a day from 2024 to present.  Wearing compression garments from  - pt has not yet been measured for garments; this is being postponed until after his spinal surgery that is scheduled for 2024.    Therapeutic Procedures:  Tx Min Billable or 1:1 Min (if diff from Tx Min) Procedure, Rationale, Specifics   25  [id number] Therapeutic Exercise (timed):  increase ROM, strength, coordination, balance, and proprioception to improve patient's ability to progress to PLOF and address remaining functional goals. (see flow

## 2024-09-04 ENCOUNTER — HOSPITAL ENCOUNTER (OUTPATIENT)
Facility: HOSPITAL | Age: 79
Discharge: HOME | End: 2024-09-07
Payer: MEDICARE

## 2024-09-04 VITALS
HEART RATE: 79 BPM | DIASTOLIC BLOOD PRESSURE: 74 MMHG | SYSTOLIC BLOOD PRESSURE: 127 MMHG | TEMPERATURE: 97.8 F | BODY MASS INDEX: 32.04 KG/M2 | HEIGHT: 72 IN | WEIGHT: 236.55 LBS

## 2024-09-04 LAB
ANION GAP SERPL CALC-SCNC: 5 MMOL/L (ref 5–15)
BASOPHILS # BLD: 0 K/UL (ref 0–0.1)
BASOPHILS NFR BLD: 1 % (ref 0–1)
BUN SERPL-MCNC: 38 MG/DL (ref 6–20)
BUN/CREAT SERPL: 18 (ref 12–20)
CALCIUM SERPL-MCNC: 10.4 MG/DL (ref 8.5–10.1)
CHLORIDE SERPL-SCNC: 107 MMOL/L (ref 97–108)
CO2 SERPL-SCNC: 28 MMOL/L (ref 21–32)
CREAT SERPL-MCNC: 2.12 MG/DL (ref 0.7–1.3)
DIFFERENTIAL METHOD BLD: ABNORMAL
EOSINOPHIL # BLD: 0.2 K/UL (ref 0–0.4)
EOSINOPHIL NFR BLD: 4 % (ref 0–7)
ERYTHROCYTE [DISTWIDTH] IN BLOOD BY AUTOMATED COUNT: 14.2 % (ref 11.5–14.5)
GLUCOSE SERPL-MCNC: 93 MG/DL (ref 65–100)
HCT VFR BLD AUTO: 32.7 % (ref 36.6–50.3)
HGB BLD-MCNC: 10.6 G/DL (ref 12.1–17)
IMM GRANULOCYTES # BLD AUTO: 0 K/UL (ref 0–0.04)
IMM GRANULOCYTES NFR BLD AUTO: 0 % (ref 0–0.5)
LYMPHOCYTES # BLD: 0.5 K/UL (ref 0.8–3.5)
LYMPHOCYTES NFR BLD: 11 % (ref 12–49)
MCH RBC QN AUTO: 32 PG (ref 26–34)
MCHC RBC AUTO-ENTMCNC: 32.4 G/DL (ref 30–36.5)
MCV RBC AUTO: 98.8 FL (ref 80–99)
MONOCYTES # BLD: 0.6 K/UL (ref 0–1)
MONOCYTES NFR BLD: 13 % (ref 5–13)
NEUTS SEG # BLD: 3.1 K/UL (ref 1.8–8)
NEUTS SEG NFR BLD: 71 % (ref 32–75)
NRBC # BLD: 0 K/UL (ref 0–0.01)
NRBC BLD-RTO: 0 PER 100 WBC
PLATELET # BLD AUTO: 197 K/UL (ref 150–400)
PMV BLD AUTO: 9.4 FL (ref 8.9–12.9)
POTASSIUM SERPL-SCNC: 4.1 MMOL/L (ref 3.5–5.1)
RBC # BLD AUTO: 3.31 M/UL (ref 4.1–5.7)
RBC MORPH BLD: ABNORMAL
SODIUM SERPL-SCNC: 140 MMOL/L (ref 136–145)
WBC # BLD AUTO: 4.4 K/UL (ref 4.1–11.1)

## 2024-09-04 PROCEDURE — 80048 BASIC METABOLIC PNL TOTAL CA: CPT

## 2024-09-04 PROCEDURE — 85025 COMPLETE CBC W/AUTO DIFF WBC: CPT

## 2024-09-04 PROCEDURE — 36415 COLL VENOUS BLD VENIPUNCTURE: CPT

## 2024-09-04 RX ORDER — FUROSEMIDE 20 MG
20 TABLET ORAL AS NEEDED
COMMUNITY

## 2024-09-04 RX ORDER — M-VIT,TX,IRON,MINS/CALC/FOLIC 27MG-0.4MG
1 TABLET ORAL DAILY
COMMUNITY

## 2024-09-04 NOTE — PERIOP NOTE
[address] [address]   MAIN OR                                  [phone]    MAIN PRE OP             [phone]                                                                                AMBULATORY PRE OP          [phone]  PRE-ADMISSION TESTING    [phone]     Surgery Date:  09/16/2024       Is surgery arrival time given by surgeon? NO    If “NO”, [facility] staff will call you between 4 and 7pm the day before your surgery with your arrival time. (If your surgery is on a Monday, we will call you the Friday before.)    Call [phone] after 7pm Monday-Friday if you did not receive this call.    INSTRUCTIONS BEFORE YOUR SURGERY   When You  Arrive Arrive at [facility] Patient Access on 1st floor the day of your surgery.  Have your insurance card, photo ID, living will/advanced directive/POA (if applicable),  and any copayment (if needed)   Food   and   Drink NO food or drink after midnight the night before surgery    This means NO water, gum, mints, coffee, juice, etc.  No alcohol (beer, wine, liquor) or marijuana (smoking) 24 hours prior to surgery, or Marijuana edibles for 3 days prior to surgery.  Stop smoking cigarettes 14 days before surgery (helps w/healing and breathing).   Medications to   TAKE   Morning of Surgery MEDICATIONS TO TAKE THE MORNING OF SURGERY WITH A SIP OF WATER: BUPROPION   Medications to STOP  before surgery Non-Steroidal anti-inflammatory Drugs (NSAID's): for example, Diclofenac (Voltaren), Ibuprofen (Advil, Motrin), Naproxen (Aleve) 3 days    STOP all herbal supplements and vitamins(unless prescribed by your doctor), and fish oil for 7 days    (Pain medications not listed above, including Tylenol may be taken up until 4 hours prior to arrival time)   Blood  Thinners If you take Eliquis, Aspirin, Plavix, Coumadin, or any blood-thinning or anti-blood clot medicine, talk to the doctor who prescribed the

## 2024-09-05 LAB
BACTERIA SPEC CULT: ABNORMAL
BACTERIA SPEC CULT: ABNORMAL
SERVICE CMNT-IMP: ABNORMAL

## 2024-09-05 NOTE — PERIOP NOTE
PAT Nurse Practitioner   Pre-Operative Chart Review/Assessment:-ORTHOPEDIC/NEUROSURGICAL SPINE                Patient Name:  Robert Gastelum                                                           Age:   79 y.o.    :  1945     Today's Date:  2024     Date of PAT:   24      Date of Surgery:    24      Procedure(s):  C4-C7 ACDF     Surgeon:   Dr. Hurst                       PLAN:       1)  PCP: Robert Leo MD        2)  Cardiac Clearance: EKG and METs reviewed. No further cardiac evaluation requested. EKG from 24 showed normal sinus rhythm and LBBB. No change from previous tracings.         3)  Diabetic Treatment Consult: A1c not ordered. No hx of DM. BG-93 on PAT labs.       4)  Sleep Apnea evaluation:  AIM score of 7. Pt reports loud snoring that can be heard through a closed door, daytime fatigue, witnessed pauses in breathing, and a diagnosis of HTN. Referral sent to PCP for F/U and recommendations.        5)  Treatment for MRSA/Staph Aureus: +MRSA. Tx w/ mupirocin.       6)   Discharge Plan: Home w/ spouse       7)  Skin: Denies open wounds, cuts, sores, rashes or other areas of concern in PAT assessment.       8)  Additional Concerns: HTN, hx of LLE DVT(2024; LE duplex from  neg for DVT), Pulm Sarcoidosis(followed by Dr. Stiles;PAR), hx of bladder CA s/p L nephrectomy, cystectomy, prostatectomy and urethrectomy(pt has ileal conduit), PAF(pt on Eliquis), CKD 3(Cr-2.12 on PAT labs), dep/anx     Additional Orders for Day of Surgery:  none      Records Scanned in Epic:   Cardiology Note, EKG, and Pulmonary Note              Vital Signs:         Vitals:    24 1414   BP: 127/74   Pulse: 79   Temp: 97.8 °F (36.6 °C)           Body mass index is 32.08 kg/m².       ____________________________________________  PAST MEDICAL HISTORY  Past Medical History:   Diagnosis Date    Arthritis     Asthma     MILD    Bladder cancer (HCC) 2015    Cancer (HCC)     scc top of head and nose

## 2024-09-06 RX ORDER — MUPIROCIN 20 MG/G
OINTMENT TOPICAL 2 TIMES DAILY
Qty: 1 G | Refills: 0 | Status: SHIPPED | OUTPATIENT
Start: 2024-09-06 | End: 2024-09-11

## 2024-09-06 NOTE — PERIOP NOTE
PC to pt, full name and  verified, regarding positive nasal cx (MRSA) and need to start Mupirocin ointment BID x 5 days to B nostrils starting today and bathe with CHG soap for 5 days prior to surgery. Pt verbalized understanding of instructions and will start today as recommended. Allergies and pharmacy of choice reviewed. Rx escribed to pt's pharmacy of choice.  PTA medlist updated. Surgeon and PCP notified of positive culture and treatment.     PRESCRIPTION:    MUPIROCIN 2% OINTMENT  QUANTITY:  #22 GRAMS  REFILLS: NONE    Apply 0.25 g (small pea-sized amount) to both nostrils twice a day for five days.    Vancomycin 1.5 g IV x 1 ordered for pre-op DOS.    RAMOS PELLETIER - NP

## 2024-09-09 ENCOUNTER — HOSPITAL ENCOUNTER (OUTPATIENT)
Facility: HOSPITAL | Age: 79
Setting detail: RECURRING SERIES
Discharge: HOME | End: 2024-09-12
Payer: MEDICARE

## 2024-09-09 PROCEDURE — 97140 MANUAL THERAPY 1/> REGIONS: CPT

## 2024-09-10 ENCOUNTER — APPOINTMENT (OUTPATIENT)
Facility: HOSPITAL | Age: 79
End: 2024-09-10
Payer: MEDICARE

## 2024-09-10 ENCOUNTER — HOSPITAL ENCOUNTER (INPATIENT)
Facility: HOSPITAL | Age: 79
LOS: 4 days | Discharge: HOME | End: 2024-09-14
Attending: STUDENT IN AN ORGANIZED HEALTH CARE EDUCATION/TRAINING PROGRAM | Admitting: FAMILY MEDICINE
Payer: MEDICARE

## 2024-09-10 DIAGNOSIS — K56.609 SBO (SMALL BOWEL OBSTRUCTION) (HCC): ICD-10-CM

## 2024-09-10 DIAGNOSIS — R11.2 NAUSEA AND VOMITING, UNSPECIFIED VOMITING TYPE: ICD-10-CM

## 2024-09-10 DIAGNOSIS — K43.5 PARASTOMAL HERNIA OF ILEAL CONDUIT: ICD-10-CM

## 2024-09-10 DIAGNOSIS — R91.8 LUNG NODULES: ICD-10-CM

## 2024-09-10 DIAGNOSIS — R10.84 GENERALIZED ABDOMINAL PAIN: Primary | ICD-10-CM

## 2024-09-10 PROBLEM — K43.3 PARASTOMAL HERNIA WITH OBSTRUCTION AND WITHOUT GANGRENE: Status: ACTIVE | Noted: 2024-09-10

## 2024-09-10 LAB
ABO + RH BLD: NORMAL
ALBUMIN SERPL-MCNC: 3.8 G/DL (ref 3.5–5)
ALBUMIN/GLOB SERPL: 1 (ref 1.1–2.2)
ALP SERPL-CCNC: 60 U/L (ref 45–117)
ALT SERPL-CCNC: 20 U/L (ref 12–78)
ANION GAP SERPL CALC-SCNC: 8 MMOL/L (ref 2–12)
APTT PPP: 28.1 SEC (ref 22.1–31)
APTT PPP: 28.1 SEC (ref 22.1–31)
AST SERPL-CCNC: 14 U/L (ref 15–37)
BASOPHILS # BLD: 0.1 K/UL (ref 0–0.1)
BASOPHILS NFR BLD: 1 % (ref 0–1)
BILIRUB SERPL-MCNC: 0.4 MG/DL (ref 0.2–1)
BLOOD GROUP ANTIBODIES SERPL: NORMAL
BUN SERPL-MCNC: 38 MG/DL (ref 6–20)
BUN/CREAT SERPL: 19 (ref 12–20)
CALCIUM SERPL-MCNC: 9.7 MG/DL (ref 8.5–10.1)
CHLORIDE SERPL-SCNC: 108 MMOL/L (ref 97–108)
CO2 SERPL-SCNC: 22 MMOL/L (ref 21–32)
COMMENT:: NORMAL
CREAT SERPL-MCNC: 1.96 MG/DL (ref 0.7–1.3)
DIFFERENTIAL METHOD BLD: ABNORMAL
EOSINOPHIL # BLD: 0.1 K/UL (ref 0–0.4)
EOSINOPHIL NFR BLD: 2 % (ref 0–7)
ERYTHROCYTE [DISTWIDTH] IN BLOOD BY AUTOMATED COUNT: 14.2 % (ref 11.5–14.5)
ERYTHROCYTE [DISTWIDTH] IN BLOOD BY AUTOMATED COUNT: 14.3 % (ref 11.5–14.5)
GLOBULIN SER CALC-MCNC: 4 G/DL (ref 2–4)
GLUCOSE SERPL-MCNC: 124 MG/DL (ref 65–100)
HCT VFR BLD AUTO: 29 % (ref 36.6–50.3)
HCT VFR BLD AUTO: 31.3 % (ref 36.6–50.3)
HGB BLD-MCNC: 9.2 G/DL (ref 12.1–17)
HGB BLD-MCNC: 9.9 G/DL (ref 12.1–17)
IMM GRANULOCYTES # BLD AUTO: 0 K/UL (ref 0–0.04)
IMM GRANULOCYTES NFR BLD AUTO: 0 % (ref 0–0.5)
INR PPP: 1.1 (ref 0.9–1.1)
LIPASE SERPL-CCNC: 28 U/L (ref 13–75)
LYMPHOCYTES # BLD: 0.3 K/UL (ref 0.8–3.5)
LYMPHOCYTES NFR BLD: 6 % (ref 12–49)
MAGNESIUM SERPL-MCNC: 2.1 MG/DL (ref 1.6–2.4)
MCH RBC QN AUTO: 31 PG (ref 26–34)
MCH RBC QN AUTO: 31.1 PG (ref 26–34)
MCHC RBC AUTO-ENTMCNC: 31.6 G/DL (ref 30–36.5)
MCHC RBC AUTO-ENTMCNC: 31.7 G/DL (ref 30–36.5)
MCV RBC AUTO: 98 FL (ref 80–99)
MCV RBC AUTO: 98.1 FL (ref 80–99)
MONOCYTES # BLD: 0.5 K/UL (ref 0–1)
MONOCYTES NFR BLD: 9 % (ref 5–13)
NEUTS SEG # BLD: 4.4 K/UL (ref 1.8–8)
NEUTS SEG NFR BLD: 82 % (ref 32–75)
NRBC # BLD: 0 K/UL (ref 0–0.01)
NRBC # BLD: 0 K/UL (ref 0–0.01)
NRBC BLD-RTO: 0 PER 100 WBC
NRBC BLD-RTO: 0 PER 100 WBC
PLATELET # BLD AUTO: 137 K/UL (ref 150–400)
PLATELET # BLD AUTO: 188 K/UL (ref 150–400)
PMV BLD AUTO: 8.8 FL (ref 8.9–12.9)
PMV BLD AUTO: 9.1 FL (ref 8.9–12.9)
POTASSIUM SERPL-SCNC: 3.6 MMOL/L (ref 3.5–5.1)
PROT SERPL-MCNC: 7.8 G/DL (ref 6.4–8.2)
PROTHROMBIN TIME: 11.5 SEC (ref 9–11.1)
RBC # BLD AUTO: 2.96 M/UL (ref 4.1–5.7)
RBC # BLD AUTO: 3.19 M/UL (ref 4.1–5.7)
RBC MORPH BLD: ABNORMAL
SODIUM SERPL-SCNC: 138 MMOL/L (ref 136–145)
SPECIMEN EXP DATE BLD: NORMAL
SPECIMEN HOLD: NORMAL
THERAPEUTIC RANGE: NORMAL SECS (ref 58–77)
THERAPEUTIC RANGE: NORMAL SECS (ref 58–77)
WBC # BLD AUTO: 4.7 K/UL (ref 4.1–11.1)
WBC # BLD AUTO: 5.4 K/UL (ref 4.1–11.1)

## 2024-09-10 PROCEDURE — 1200000000 HC SEMI PRIVATE

## 2024-09-10 PROCEDURE — 6360000002 HC RX W HCPCS: Performed by: HOSPITALIST

## 2024-09-10 PROCEDURE — 6370000000 HC RX 637 (ALT 250 FOR IP): Performed by: PHYSICIAN ASSISTANT

## 2024-09-10 PROCEDURE — 96361 HYDRATE IV INFUSION ADD-ON: CPT

## 2024-09-10 PROCEDURE — 2580000003 HC RX 258: Performed by: PHYSICIAN ASSISTANT

## 2024-09-10 PROCEDURE — 6360000002 HC RX W HCPCS: Performed by: EMERGENCY MEDICINE

## 2024-09-10 PROCEDURE — 93005 ELECTROCARDIOGRAM TRACING: CPT | Performed by: STUDENT IN AN ORGANIZED HEALTH CARE EDUCATION/TRAINING PROGRAM

## 2024-09-10 PROCEDURE — 36415 COLL VENOUS BLD VENIPUNCTURE: CPT

## 2024-09-10 PROCEDURE — 86850 RBC ANTIBODY SCREEN: CPT

## 2024-09-10 PROCEDURE — 96374 THER/PROPH/DIAG INJ IV PUSH: CPT

## 2024-09-10 PROCEDURE — 80053 COMPREHEN METABOLIC PANEL: CPT

## 2024-09-10 PROCEDURE — 96376 TX/PRO/DX INJ SAME DRUG ADON: CPT

## 2024-09-10 PROCEDURE — 6360000002 HC RX W HCPCS: Performed by: STUDENT IN AN ORGANIZED HEALTH CARE EDUCATION/TRAINING PROGRAM

## 2024-09-10 PROCEDURE — 85730 THROMBOPLASTIN TIME PARTIAL: CPT

## 2024-09-10 PROCEDURE — 96375 TX/PRO/DX INJ NEW DRUG ADDON: CPT

## 2024-09-10 PROCEDURE — 83690 ASSAY OF LIPASE: CPT

## 2024-09-10 PROCEDURE — 85610 PROTHROMBIN TIME: CPT

## 2024-09-10 PROCEDURE — 85027 COMPLETE CBC AUTOMATED: CPT

## 2024-09-10 PROCEDURE — 6360000002 HC RX W HCPCS: Performed by: PHYSICIAN ASSISTANT

## 2024-09-10 PROCEDURE — 99285 EMERGENCY DEPT VISIT HI MDM: CPT

## 2024-09-10 PROCEDURE — 74018 RADEX ABDOMEN 1 VIEW: CPT

## 2024-09-10 PROCEDURE — 83735 ASSAY OF MAGNESIUM: CPT

## 2024-09-10 PROCEDURE — 86901 BLOOD TYPING SEROLOGIC RH(D): CPT

## 2024-09-10 PROCEDURE — 2580000003 HC RX 258: Performed by: STUDENT IN AN ORGANIZED HEALTH CARE EDUCATION/TRAINING PROGRAM

## 2024-09-10 PROCEDURE — 86900 BLOOD TYPING SEROLOGIC ABO: CPT

## 2024-09-10 PROCEDURE — 85025 COMPLETE CBC W/AUTO DIFF WBC: CPT

## 2024-09-10 PROCEDURE — 74176 CT ABD & PELVIS W/O CONTRAST: CPT

## 2024-09-10 PROCEDURE — 99221 1ST HOSP IP/OBS SF/LOW 40: CPT | Performed by: PHYSICIAN ASSISTANT

## 2024-09-10 PROCEDURE — 71045 X-RAY EXAM CHEST 1 VIEW: CPT

## 2024-09-10 RX ORDER — BUMETANIDE 1 MG/1
2 TABLET ORAL DAILY
COMMUNITY

## 2024-09-10 RX ORDER — ZOLPIDEM TARTRATE 5 MG/1
10 TABLET ORAL NIGHTLY
Status: DISCONTINUED | OUTPATIENT
Start: 2024-09-10 | End: 2024-09-14 | Stop reason: HOSPADM

## 2024-09-10 RX ORDER — POTASSIUM CHLORIDE 750 MG/1
40 TABLET, EXTENDED RELEASE ORAL PRN
Status: DISCONTINUED | OUTPATIENT
Start: 2024-09-10 | End: 2024-09-14 | Stop reason: HOSPADM

## 2024-09-10 RX ORDER — HEPARIN SODIUM 1000 [USP'U]/ML
80 INJECTION, SOLUTION INTRAVENOUS; SUBCUTANEOUS PRN
Status: DISCONTINUED | OUTPATIENT
Start: 2024-09-10 | End: 2024-09-14

## 2024-09-10 RX ORDER — ACETAMINOPHEN 650 MG/1
650 SUPPOSITORY RECTAL EVERY 6 HOURS PRN
Status: DISCONTINUED | OUTPATIENT
Start: 2024-09-10 | End: 2024-09-14 | Stop reason: HOSPADM

## 2024-09-10 RX ORDER — MAGNESIUM SULFATE IN WATER 40 MG/ML
2000 INJECTION, SOLUTION INTRAVENOUS PRN
Status: DISCONTINUED | OUTPATIENT
Start: 2024-09-10 | End: 2024-09-14 | Stop reason: HOSPADM

## 2024-09-10 RX ORDER — ONDANSETRON 2 MG/ML
4 INJECTION INTRAMUSCULAR; INTRAVENOUS ONCE
Status: COMPLETED | OUTPATIENT
Start: 2024-09-10 | End: 2024-09-10

## 2024-09-10 RX ORDER — SODIUM CHLORIDE 0.9 % (FLUSH) 0.9 %
5-40 SYRINGE (ML) INJECTION EVERY 12 HOURS SCHEDULED
Status: DISCONTINUED | OUTPATIENT
Start: 2024-09-10 | End: 2024-09-14 | Stop reason: HOSPADM

## 2024-09-10 RX ORDER — POLYETHYLENE GLYCOL 3350 17 G/17G
17 POWDER, FOR SOLUTION ORAL DAILY PRN
Status: DISCONTINUED | OUTPATIENT
Start: 2024-09-10 | End: 2024-09-14 | Stop reason: HOSPADM

## 2024-09-10 RX ORDER — ONDANSETRON 2 MG/ML
4 INJECTION INTRAMUSCULAR; INTRAVENOUS EVERY 6 HOURS PRN
Status: DISCONTINUED | OUTPATIENT
Start: 2024-09-10 | End: 2024-09-14 | Stop reason: HOSPADM

## 2024-09-10 RX ORDER — KETOROLAC TROMETHAMINE 30 MG/ML
30 INJECTION, SOLUTION INTRAMUSCULAR; INTRAVENOUS ONCE
Status: COMPLETED | OUTPATIENT
Start: 2024-09-10 | End: 2024-09-10

## 2024-09-10 RX ORDER — IOPAMIDOL 755 MG/ML
100 INJECTION, SOLUTION INTRAVASCULAR
Status: DISCONTINUED | OUTPATIENT
Start: 2024-09-10 | End: 2024-09-14 | Stop reason: HOSPADM

## 2024-09-10 RX ORDER — HEPARIN SODIUM (PORCINE) LOCK FLUSH IV SOLN 100 UNIT/ML 100 UNIT/ML
300 SOLUTION INTRAVENOUS PRN
Status: DISCONTINUED | OUTPATIENT
Start: 2024-09-10 | End: 2024-09-12

## 2024-09-10 RX ORDER — 0.9 % SODIUM CHLORIDE 0.9 %
1000 INTRAVENOUS SOLUTION INTRAVENOUS ONCE
Status: COMPLETED | OUTPATIENT
Start: 2024-09-10 | End: 2024-09-10

## 2024-09-10 RX ORDER — BUPROPION HYDROCHLORIDE 100 MG/1
TABLET, EXTENDED RELEASE ORAL
COMMUNITY

## 2024-09-10 RX ORDER — PRAMIPEXOLE DIHYDROCHLORIDE 0.25 MG/1
0.25 TABLET ORAL DAILY
COMMUNITY

## 2024-09-10 RX ORDER — ACETAMINOPHEN 325 MG/1
650 TABLET ORAL EVERY 6 HOURS PRN
Status: DISCONTINUED | OUTPATIENT
Start: 2024-09-10 | End: 2024-09-14 | Stop reason: HOSPADM

## 2024-09-10 RX ORDER — MORPHINE SULFATE 2 MG/ML
2 INJECTION, SOLUTION INTRAMUSCULAR; INTRAVENOUS
Status: COMPLETED | OUTPATIENT
Start: 2024-09-10 | End: 2024-09-10

## 2024-09-10 RX ORDER — ONDANSETRON 4 MG/1
4 TABLET, ORALLY DISINTEGRATING ORAL EVERY 8 HOURS PRN
Status: DISCONTINUED | OUTPATIENT
Start: 2024-09-10 | End: 2024-09-14 | Stop reason: HOSPADM

## 2024-09-10 RX ORDER — BUSPIRONE HYDROCHLORIDE 10 MG/1
5 TABLET ORAL DAILY PRN
Status: DISCONTINUED | OUTPATIENT
Start: 2024-09-10 | End: 2024-09-14 | Stop reason: HOSPADM

## 2024-09-10 RX ORDER — HEPARIN SODIUM 1000 [USP'U]/ML
40 INJECTION, SOLUTION INTRAVENOUS; SUBCUTANEOUS PRN
Status: DISCONTINUED | OUTPATIENT
Start: 2024-09-10 | End: 2024-09-14

## 2024-09-10 RX ORDER — ATORVASTATIN CALCIUM 10 MG/1
20 TABLET, FILM COATED ORAL DAILY
Status: DISCONTINUED | OUTPATIENT
Start: 2024-09-10 | End: 2024-09-14 | Stop reason: HOSPADM

## 2024-09-10 RX ORDER — MORPHINE SULFATE 4 MG/ML
4 INJECTION, SOLUTION INTRAMUSCULAR; INTRAVENOUS
Status: COMPLETED | OUTPATIENT
Start: 2024-09-10 | End: 2024-09-10

## 2024-09-10 RX ORDER — HEPARIN SODIUM,PORCINE/PF 1 UNIT/ML
1 SYRINGE (ML) INTRAVENOUS PRN
Status: DISCONTINUED | OUTPATIENT
Start: 2024-09-10 | End: 2024-09-10 | Stop reason: DRUGHIGH

## 2024-09-10 RX ORDER — SENNOSIDES 8.6 MG
CAPSULE ORAL
COMMUNITY

## 2024-09-10 RX ORDER — ESCITALOPRAM OXALATE 10 MG/1
10 TABLET ORAL
Status: DISCONTINUED | OUTPATIENT
Start: 2024-09-10 | End: 2024-09-14 | Stop reason: HOSPADM

## 2024-09-10 RX ORDER — POTASSIUM CHLORIDE 7.45 MG/ML
10 INJECTION INTRAVENOUS PRN
Status: DISCONTINUED | OUTPATIENT
Start: 2024-09-10 | End: 2024-09-14 | Stop reason: HOSPADM

## 2024-09-10 RX ORDER — BUPROPION HYDROCHLORIDE 100 MG/1
100 TABLET, EXTENDED RELEASE ORAL 2 TIMES DAILY
Status: DISCONTINUED | OUTPATIENT
Start: 2024-09-10 | End: 2024-09-14 | Stop reason: HOSPADM

## 2024-09-10 RX ORDER — SODIUM BICARBONATE 650 MG/1
TABLET ORAL
COMMUNITY

## 2024-09-10 RX ORDER — LIDOCAINE HYDROCHLORIDE 20 MG/ML
JELLY TOPICAL PRN
Status: DISCONTINUED | OUTPATIENT
Start: 2024-09-10 | End: 2024-09-14 | Stop reason: HOSPADM

## 2024-09-10 RX ORDER — SODIUM CHLORIDE 0.9 % (FLUSH) 0.9 %
5-40 SYRINGE (ML) INJECTION PRN
Status: DISCONTINUED | OUTPATIENT
Start: 2024-09-10 | End: 2024-09-14 | Stop reason: HOSPADM

## 2024-09-10 RX ORDER — HEPARIN SODIUM 10000 [USP'U]/100ML
5-30 INJECTION, SOLUTION INTRAVENOUS CONTINUOUS
Status: DISCONTINUED | OUTPATIENT
Start: 2024-09-10 | End: 2024-09-14

## 2024-09-10 RX ORDER — HEPARIN SODIUM 1000 [USP'U]/ML
80 INJECTION, SOLUTION INTRAVENOUS; SUBCUTANEOUS ONCE
Status: COMPLETED | OUTPATIENT
Start: 2024-09-10 | End: 2024-09-10

## 2024-09-10 RX ORDER — ENOXAPARIN SODIUM 150 MG/ML
1 INJECTION SUBCUTANEOUS 2 TIMES DAILY
Status: DISCONTINUED | OUTPATIENT
Start: 2024-09-10 | End: 2024-09-10

## 2024-09-10 RX ORDER — MORPHINE SULFATE 2 MG/ML
2 INJECTION, SOLUTION INTRAMUSCULAR; INTRAVENOUS ONCE
Status: COMPLETED | OUTPATIENT
Start: 2024-09-10 | End: 2024-09-11

## 2024-09-10 RX ORDER — SIMVASTATIN 5 MG
TABLET ORAL
COMMUNITY

## 2024-09-10 RX ORDER — SODIUM CHLORIDE, SODIUM LACTATE, POTASSIUM CHLORIDE, CALCIUM CHLORIDE 600; 310; 30; 20 MG/100ML; MG/100ML; MG/100ML; MG/100ML
INJECTION, SOLUTION INTRAVENOUS CONTINUOUS
Status: DISCONTINUED | OUTPATIENT
Start: 2024-09-10 | End: 2024-09-13

## 2024-09-10 RX ORDER — SODIUM CHLORIDE 9 MG/ML
INJECTION, SOLUTION INTRAVENOUS PRN
Status: DISCONTINUED | OUTPATIENT
Start: 2024-09-10 | End: 2024-09-14 | Stop reason: HOSPADM

## 2024-09-10 RX ORDER — BUSPIRONE HYDROCHLORIDE 5 MG/1
5 TABLET ORAL DAILY PRN
COMMUNITY

## 2024-09-10 RX ADMIN — LIDOCAINE HYDROCHLORIDE: 20 JELLY TOPICAL at 16:44

## 2024-09-10 RX ADMIN — ZOLPIDEM TARTRATE 10 MG: 5 TABLET ORAL at 23:27

## 2024-09-10 RX ADMIN — BUPROPION HYDROCHLORIDE 100 MG: 100 TABLET, FILM COATED, EXTENDED RELEASE ORAL at 20:56

## 2024-09-10 RX ADMIN — SODIUM CHLORIDE 1000 ML: 9 INJECTION, SOLUTION INTRAVENOUS at 06:58

## 2024-09-10 RX ADMIN — ONDANSETRON 4 MG: 2 INJECTION INTRAMUSCULAR; INTRAVENOUS at 07:00

## 2024-09-10 RX ADMIN — MORPHINE SULFATE 4 MG: 4 INJECTION INTRAVENOUS at 06:59

## 2024-09-10 RX ADMIN — HEPARIN SODIUM 8600 UNITS: 1000 INJECTION INTRAVENOUS; SUBCUTANEOUS at 16:39

## 2024-09-10 RX ADMIN — KETOROLAC TROMETHAMINE 30 MG: 30 INJECTION, SOLUTION INTRAMUSCULAR at 20:56

## 2024-09-10 RX ADMIN — SODIUM CHLORIDE, PRESERVATIVE FREE 10 ML: 5 INJECTION INTRAVENOUS at 20:57

## 2024-09-10 RX ADMIN — SODIUM CHLORIDE, POTASSIUM CHLORIDE, SODIUM LACTATE AND CALCIUM CHLORIDE: 600; 310; 30; 20 INJECTION, SOLUTION INTRAVENOUS at 17:14

## 2024-09-10 RX ADMIN — HEPARIN SODIUM 18 UNITS/KG/HR: 10000 INJECTION, SOLUTION INTRAVENOUS at 16:39

## 2024-09-10 RX ADMIN — MORPHINE SULFATE 2 MG: 2 INJECTION, SOLUTION INTRAMUSCULAR; INTRAVENOUS at 08:00

## 2024-09-10 RX ADMIN — ESCITALOPRAM OXALATE 10 MG: 10 TABLET ORAL at 20:56

## 2024-09-10 ASSESSMENT — PAIN DESCRIPTION - FREQUENCY
FREQUENCY: CONTINUOUS
FREQUENCY: CONTINUOUS

## 2024-09-10 ASSESSMENT — ENCOUNTER SYMPTOMS
NAUSEA: 1
CONSTIPATION: 0
ABDOMINAL PAIN: 1
SHORTNESS OF BREATH: 1
DIARRHEA: 0
VOMITING: 1

## 2024-09-10 ASSESSMENT — PAIN DESCRIPTION - LOCATION
LOCATION: ABDOMEN
LOCATION: CHEST
LOCATION: ABDOMEN

## 2024-09-10 ASSESSMENT — PAIN DESCRIPTION - PAIN TYPE
TYPE: ACUTE PAIN
TYPE: ACUTE PAIN

## 2024-09-10 ASSESSMENT — PAIN SCALES - GENERAL
PAINLEVEL_OUTOF10: 5
PAINLEVEL_OUTOF10: 6
PAINLEVEL_OUTOF10: 10
PAINLEVEL_OUTOF10: 10
PAINLEVEL_OUTOF10: 8

## 2024-09-10 ASSESSMENT — PAIN DESCRIPTION - ORIENTATION
ORIENTATION: RIGHT
ORIENTATION: MID
ORIENTATION: RIGHT

## 2024-09-10 ASSESSMENT — PAIN DESCRIPTION - ONSET
ONSET: ON-GOING
ONSET: ON-GOING

## 2024-09-10 ASSESSMENT — PAIN DESCRIPTION - DESCRIPTORS
DESCRIPTORS: ACHING
DESCRIPTORS: SHARP;SHOOTING
DESCRIPTORS: SHOOTING;SHARP
DESCRIPTORS: SHARP

## 2024-09-10 ASSESSMENT — PAIN - FUNCTIONAL ASSESSMENT
PAIN_FUNCTIONAL_ASSESSMENT: 0-10
PAIN_FUNCTIONAL_ASSESSMENT: ACTIVITIES ARE NOT PREVENTED

## 2024-09-10 ASSESSMENT — PAIN DESCRIPTION - DIRECTION
RADIATING_TOWARDS: ABD
RADIATING_TOWARDS: ABD

## 2024-09-11 ENCOUNTER — APPOINTMENT (OUTPATIENT)
Facility: HOSPITAL | Age: 79
End: 2024-09-11
Payer: MEDICARE

## 2024-09-11 LAB
ANION GAP SERPL CALC-SCNC: 5 MMOL/L (ref 2–12)
APTT PPP: 54.4 SEC (ref 22.1–31)
APTT PPP: 68.3 SEC (ref 22.1–31)
APTT PPP: 81.1 SEC (ref 22.1–31)
BASOPHILS # BLD: 0 K/UL (ref 0–0.1)
BASOPHILS NFR BLD: 1 % (ref 0–1)
BUN SERPL-MCNC: 31 MG/DL (ref 6–20)
BUN/CREAT SERPL: 16 (ref 12–20)
CALCIUM SERPL-MCNC: 9.4 MG/DL (ref 8.5–10.1)
CHLORIDE SERPL-SCNC: 111 MMOL/L (ref 97–108)
CO2 SERPL-SCNC: 25 MMOL/L (ref 21–32)
CREAT SERPL-MCNC: 1.92 MG/DL (ref 0.7–1.3)
DIFFERENTIAL METHOD BLD: ABNORMAL
EOSINOPHIL # BLD: 0.1 K/UL (ref 0–0.4)
EOSINOPHIL NFR BLD: 2 % (ref 0–7)
ERYTHROCYTE [DISTWIDTH] IN BLOOD BY AUTOMATED COUNT: 14.3 % (ref 11.5–14.5)
GLUCOSE SERPL-MCNC: 95 MG/DL (ref 65–100)
HCT VFR BLD AUTO: 29.4 % (ref 36.6–50.3)
HGB BLD-MCNC: 9.3 G/DL (ref 12.1–17)
IMM GRANULOCYTES # BLD AUTO: 0 K/UL
IMM GRANULOCYTES NFR BLD AUTO: 0 %
LYMPHOCYTES # BLD: 0.6 K/UL (ref 0.8–3.5)
LYMPHOCYTES NFR BLD: 14 % (ref 12–49)
MCH RBC QN AUTO: 30.9 PG (ref 26–34)
MCHC RBC AUTO-ENTMCNC: 31.6 G/DL (ref 30–36.5)
MCV RBC AUTO: 97.7 FL (ref 80–99)
MONOCYTES # BLD: 0.3 K/UL (ref 0–1)
MONOCYTES NFR BLD: 8 % (ref 5–13)
NEUTS SEG # BLD: 3.3 K/UL (ref 1.8–8)
NEUTS SEG NFR BLD: 75 % (ref 32–75)
NRBC # BLD: 0 K/UL (ref 0–0.01)
NRBC BLD-RTO: 0 PER 100 WBC
PLATELET # BLD AUTO: 152 K/UL (ref 150–400)
PMV BLD AUTO: 9.2 FL (ref 8.9–12.9)
POTASSIUM SERPL-SCNC: 3.9 MMOL/L (ref 3.5–5.1)
RBC # BLD AUTO: 3.01 M/UL (ref 4.1–5.7)
RBC MORPH BLD: ABNORMAL
SODIUM SERPL-SCNC: 141 MMOL/L (ref 136–145)
THERAPEUTIC RANGE: ABNORMAL SECS (ref 58–77)
WBC # BLD AUTO: 4.3 K/UL (ref 4.1–11.1)

## 2024-09-11 PROCEDURE — 6370000000 HC RX 637 (ALT 250 FOR IP): Performed by: PHYSICIAN ASSISTANT

## 2024-09-11 PROCEDURE — 2580000003 HC RX 258: Performed by: PHYSICIAN ASSISTANT

## 2024-09-11 PROCEDURE — 6360000002 HC RX W HCPCS: Performed by: EMERGENCY MEDICINE

## 2024-09-11 PROCEDURE — 36415 COLL VENOUS BLD VENIPUNCTURE: CPT

## 2024-09-11 PROCEDURE — 85025 COMPLETE CBC W/AUTO DIFF WBC: CPT

## 2024-09-11 PROCEDURE — 6370000000 HC RX 637 (ALT 250 FOR IP): Performed by: HOSPITALIST

## 2024-09-11 PROCEDURE — APPSS15 APP SPLIT SHARED TIME 0-15 MINUTES

## 2024-09-11 PROCEDURE — 36591 DRAW BLOOD OFF VENOUS DEVICE: CPT

## 2024-09-11 PROCEDURE — 6360000002 HC RX W HCPCS: Performed by: PHYSICIAN ASSISTANT

## 2024-09-11 PROCEDURE — 80048 BASIC METABOLIC PNL TOTAL CA: CPT

## 2024-09-11 PROCEDURE — 74018 RADEX ABDOMEN 1 VIEW: CPT

## 2024-09-11 PROCEDURE — 85730 THROMBOPLASTIN TIME PARTIAL: CPT

## 2024-09-11 PROCEDURE — 1200000000 HC SEMI PRIVATE

## 2024-09-11 RX ADMIN — BUPROPION HYDROCHLORIDE 100 MG: 100 TABLET, FILM COATED, EXTENDED RELEASE ORAL at 21:37

## 2024-09-11 RX ADMIN — ACETAMINOPHEN 650 MG: 325 TABLET ORAL at 09:56

## 2024-09-11 RX ADMIN — MORPHINE SULFATE 2 MG: 2 INJECTION, SOLUTION INTRAMUSCULAR; INTRAVENOUS at 02:14

## 2024-09-11 RX ADMIN — SODIUM CHLORIDE, POTASSIUM CHLORIDE, SODIUM LACTATE AND CALCIUM CHLORIDE: 600; 310; 30; 20 INJECTION, SOLUTION INTRAVENOUS at 09:57

## 2024-09-11 RX ADMIN — BUPROPION HYDROCHLORIDE 100 MG: 100 TABLET, FILM COATED, EXTENDED RELEASE ORAL at 09:56

## 2024-09-11 RX ADMIN — SODIUM CHLORIDE, POTASSIUM CHLORIDE, SODIUM LACTATE AND CALCIUM CHLORIDE: 600; 310; 30; 20 INJECTION, SOLUTION INTRAVENOUS at 01:40

## 2024-09-11 RX ADMIN — PHENOL 1 SPRAY: 1.4 SPRAY ORAL at 20:10

## 2024-09-11 RX ADMIN — ACETAMINOPHEN 650 MG: 325 TABLET ORAL at 15:57

## 2024-09-11 RX ADMIN — HEPARIN SODIUM 19 UNITS/KG/HR: 10000 INJECTION, SOLUTION INTRAVENOUS at 23:42

## 2024-09-11 RX ADMIN — ZOLPIDEM TARTRATE 10 MG: 5 TABLET ORAL at 21:37

## 2024-09-11 RX ADMIN — HEPARIN SODIUM 4300 UNITS: 1000 INJECTION INTRAVENOUS; SUBCUTANEOUS at 16:12

## 2024-09-11 RX ADMIN — PHENOL 1 SPRAY: 1.4 SPRAY ORAL at 22:56

## 2024-09-11 RX ADMIN — HEPARIN SODIUM 19 UNITS/KG/HR: 10000 INJECTION, SOLUTION INTRAVENOUS at 16:17

## 2024-09-11 RX ADMIN — HEPARIN SODIUM 17 UNITS/KG/HR: 10000 INJECTION, SOLUTION INTRAVENOUS at 06:19

## 2024-09-11 RX ADMIN — ESCITALOPRAM OXALATE 10 MG: 10 TABLET ORAL at 21:37

## 2024-09-11 ASSESSMENT — PAIN DESCRIPTION - ORIENTATION: ORIENTATION: ANTERIOR

## 2024-09-11 ASSESSMENT — PAIN DESCRIPTION - LOCATION
LOCATION: HEAD
LOCATION: THROAT

## 2024-09-11 ASSESSMENT — PAIN DESCRIPTION - DESCRIPTORS
DESCRIPTORS: SORE
DESCRIPTORS: DULL;SHOOTING

## 2024-09-11 ASSESSMENT — PAIN SCALES - GENERAL: PAINLEVEL_OUTOF10: 7

## 2024-09-12 ENCOUNTER — APPOINTMENT (OUTPATIENT)
Facility: HOSPITAL | Age: 79
End: 2024-09-12
Payer: MEDICARE

## 2024-09-12 PROBLEM — R10.84 GENERALIZED ABDOMINAL PAIN: Status: ACTIVE | Noted: 2024-09-12

## 2024-09-12 LAB
ANION GAP SERPL CALC-SCNC: 4 MMOL/L (ref 2–12)
ANION GAP SERPL CALC-SCNC: NORMAL MMOL/L (ref 2–12)
APTT PPP: 101.8 SEC (ref 22.1–31)
APTT PPP: 58.1 SEC (ref 22.1–31)
APTT PPP: 76.2 SEC (ref 22.1–31)
BASOPHILS # BLD: 0 K/UL (ref 0–0.1)
BASOPHILS # BLD: NORMAL K/UL (ref 0–0.1)
BASOPHILS NFR BLD: 0 % (ref 0–1)
BASOPHILS NFR BLD: NORMAL % (ref 0–1)
BUN SERPL-MCNC: 26 MG/DL (ref 6–20)
BUN SERPL-MCNC: NORMAL MG/DL (ref 6–20)
BUN/CREAT SERPL: 14 (ref 12–20)
BUN/CREAT SERPL: NORMAL (ref 12–20)
CALCIUM SERPL-MCNC: 9.8 MG/DL (ref 8.5–10.1)
CALCIUM SERPL-MCNC: NORMAL MG/DL (ref 8.5–10.1)
CHLORIDE SERPL-SCNC: 107 MMOL/L (ref 97–108)
CHLORIDE SERPL-SCNC: NORMAL MMOL/L (ref 97–108)
CO2 SERPL-SCNC: 28 MMOL/L (ref 21–32)
CO2 SERPL-SCNC: NORMAL MMOL/L (ref 21–32)
CREAT SERPL-MCNC: 1.91 MG/DL (ref 0.7–1.3)
CREAT SERPL-MCNC: NORMAL MG/DL (ref 0.7–1.3)
DIFFERENTIAL METHOD BLD: ABNORMAL
DIFFERENTIAL METHOD BLD: NORMAL
EOSINOPHIL # BLD: 0 K/UL (ref 0–0.4)
EOSINOPHIL # BLD: NORMAL K/UL (ref 0–0.4)
EOSINOPHIL NFR BLD: 1 % (ref 0–7)
EOSINOPHIL NFR BLD: NORMAL % (ref 0–7)
ERYTHROCYTE [DISTWIDTH] IN BLOOD BY AUTOMATED COUNT: 14 % (ref 11.5–14.5)
ERYTHROCYTE [DISTWIDTH] IN BLOOD BY AUTOMATED COUNT: NORMAL % (ref 11.5–14.5)
GLUCOSE SERPL-MCNC: 105 MG/DL (ref 65–100)
GLUCOSE SERPL-MCNC: NORMAL MG/DL (ref 65–100)
HCT VFR BLD AUTO: 32.1 % (ref 36.6–50.3)
HCT VFR BLD AUTO: NORMAL % (ref 36.6–50.3)
HGB BLD-MCNC: 10.2 G/DL (ref 12.1–17)
HGB BLD-MCNC: NORMAL G/DL (ref 12.1–17)
IMM GRANULOCYTES # BLD AUTO: 0 K/UL (ref 0–0.04)
IMM GRANULOCYTES # BLD AUTO: NORMAL K/UL (ref 0–0.04)
IMM GRANULOCYTES NFR BLD AUTO: 0 % (ref 0–0.5)
IMM GRANULOCYTES NFR BLD AUTO: NORMAL % (ref 0–0.5)
LYMPHOCYTES # BLD: 0.4 K/UL (ref 0.8–3.5)
LYMPHOCYTES # BLD: NORMAL K/UL (ref 0.8–3.5)
LYMPHOCYTES NFR BLD: 6 % (ref 12–49)
LYMPHOCYTES NFR BLD: NORMAL % (ref 12–49)
MCH RBC QN AUTO: 31.4 PG (ref 26–34)
MCH RBC QN AUTO: NORMAL PG (ref 26–34)
MCHC RBC AUTO-ENTMCNC: 31.8 G/DL (ref 30–36.5)
MCHC RBC AUTO-ENTMCNC: NORMAL G/DL (ref 30–36.5)
MCV RBC AUTO: 98.8 FL (ref 80–99)
MCV RBC AUTO: NORMAL FL (ref 80–99)
MONOCYTES # BLD: 0.7 K/UL (ref 0–1)
MONOCYTES # BLD: NORMAL K/UL (ref 0–1)
MONOCYTES NFR BLD: 11 % (ref 5–13)
MONOCYTES NFR BLD: NORMAL % (ref 5–13)
NEUTS SEG # BLD: 5.4 K/UL (ref 1.8–8)
NEUTS SEG # BLD: NORMAL K/UL (ref 1.8–8)
NEUTS SEG NFR BLD: 82 % (ref 32–75)
NEUTS SEG NFR BLD: NORMAL % (ref 32–75)
NRBC # BLD: 0 K/UL (ref 0–0.01)
NRBC # BLD: NORMAL K/UL (ref 0–0.01)
NRBC BLD-RTO: 0 PER 100 WBC
NRBC BLD-RTO: NORMAL PER 100 WBC
PLATELET # BLD AUTO: 153 K/UL (ref 150–400)
PLATELET # BLD AUTO: NORMAL K/UL (ref 150–400)
PMV BLD AUTO: 9.1 FL (ref 8.9–12.9)
PMV BLD AUTO: NORMAL FL (ref 8.9–12.9)
POTASSIUM SERPL-SCNC: 4 MMOL/L (ref 3.5–5.1)
POTASSIUM SERPL-SCNC: NORMAL MMOL/L (ref 3.5–5.1)
RBC # BLD AUTO: 3.25 M/UL (ref 4.1–5.7)
RBC # BLD AUTO: NORMAL M/UL (ref 4.1–5.7)
SODIUM SERPL-SCNC: 139 MMOL/L (ref 136–145)
SODIUM SERPL-SCNC: NORMAL MMOL/L (ref 136–145)
THERAPEUTIC RANGE: ABNORMAL SECS (ref 58–77)
WBC # BLD AUTO: 6.6 K/UL (ref 4.1–11.1)
WBC # BLD AUTO: NORMAL K/UL (ref 4.1–11.1)

## 2024-09-12 PROCEDURE — 71045 X-RAY EXAM CHEST 1 VIEW: CPT

## 2024-09-12 PROCEDURE — 36591 DRAW BLOOD OFF VENOUS DEVICE: CPT

## 2024-09-12 PROCEDURE — 6360000002 HC RX W HCPCS: Performed by: PHYSICIAN ASSISTANT

## 2024-09-12 PROCEDURE — 6370000000 HC RX 637 (ALT 250 FOR IP): Performed by: NURSE PRACTITIONER

## 2024-09-12 PROCEDURE — 85730 THROMBOPLASTIN TIME PARTIAL: CPT

## 2024-09-12 PROCEDURE — 99231 SBSQ HOSP IP/OBS SF/LOW 25: CPT | Performed by: SURGERY

## 2024-09-12 PROCEDURE — 94760 N-INVAS EAR/PLS OXIMETRY 1: CPT

## 2024-09-12 PROCEDURE — APPSS15 APP SPLIT SHARED TIME 0-15 MINUTES

## 2024-09-12 PROCEDURE — 80048 BASIC METABOLIC PNL TOTAL CA: CPT

## 2024-09-12 PROCEDURE — 2700000000 HC OXYGEN THERAPY PER DAY

## 2024-09-12 PROCEDURE — 74018 RADEX ABDOMEN 1 VIEW: CPT

## 2024-09-12 PROCEDURE — 2580000003 HC RX 258: Performed by: PHYSICIAN ASSISTANT

## 2024-09-12 PROCEDURE — 85025 COMPLETE CBC W/AUTO DIFF WBC: CPT

## 2024-09-12 PROCEDURE — 1200000000 HC SEMI PRIVATE

## 2024-09-12 PROCEDURE — 6370000000 HC RX 637 (ALT 250 FOR IP): Performed by: PHYSICIAN ASSISTANT

## 2024-09-12 PROCEDURE — 94640 AIRWAY INHALATION TREATMENT: CPT

## 2024-09-12 PROCEDURE — 36415 COLL VENOUS BLD VENIPUNCTURE: CPT

## 2024-09-12 PROCEDURE — 6360000002 HC RX W HCPCS: Performed by: STUDENT IN AN ORGANIZED HEALTH CARE EDUCATION/TRAINING PROGRAM

## 2024-09-12 RX ORDER — HEPARIN SODIUM (PORCINE) LOCK FLUSH IV SOLN 100 UNIT/ML 100 UNIT/ML
500 SOLUTION INTRAVENOUS PRN
Status: DISCONTINUED | OUTPATIENT
Start: 2024-09-12 | End: 2024-09-14 | Stop reason: HOSPADM

## 2024-09-12 RX ORDER — IPRATROPIUM BROMIDE AND ALBUTEROL SULFATE 2.5; .5 MG/3ML; MG/3ML
1 SOLUTION RESPIRATORY (INHALATION) EVERY 4 HOURS PRN
Status: DISCONTINUED | OUTPATIENT
Start: 2024-09-12 | End: 2024-09-14 | Stop reason: HOSPADM

## 2024-09-12 RX ADMIN — SODIUM CHLORIDE, PRESERVATIVE FREE 10 ML: 5 INJECTION INTRAVENOUS at 21:28

## 2024-09-12 RX ADMIN — ACETAMINOPHEN 650 MG: 325 TABLET ORAL at 15:13

## 2024-09-12 RX ADMIN — IPRATROPIUM BROMIDE AND ALBUTEROL SULFATE 1 DOSE: .5; 3 SOLUTION RESPIRATORY (INHALATION) at 13:18

## 2024-09-12 RX ADMIN — HEPARIN SODIUM 16 UNITS/KG/HR: 10000 INJECTION, SOLUTION INTRAVENOUS at 15:13

## 2024-09-12 RX ADMIN — ACETAMINOPHEN 650 MG: 325 TABLET ORAL at 21:27

## 2024-09-12 RX ADMIN — ACETAMINOPHEN 650 MG: 325 TABLET ORAL at 02:24

## 2024-09-12 RX ADMIN — ATORVASTATIN CALCIUM 20 MG: 10 TABLET, FILM COATED ORAL at 17:54

## 2024-09-12 RX ADMIN — Medication 500 UNITS: at 12:47

## 2024-09-12 RX ADMIN — PHENOL 1 SPRAY: 1.4 SPRAY ORAL at 09:05

## 2024-09-12 RX ADMIN — ESCITALOPRAM OXALATE 10 MG: 10 TABLET ORAL at 21:27

## 2024-09-12 RX ADMIN — BUPROPION HYDROCHLORIDE 100 MG: 100 TABLET, FILM COATED, EXTENDED RELEASE ORAL at 21:27

## 2024-09-12 RX ADMIN — ZOLPIDEM TARTRATE 10 MG: 5 TABLET ORAL at 21:27

## 2024-09-12 RX ADMIN — SODIUM CHLORIDE, PRESERVATIVE FREE 10 ML: 5 INJECTION INTRAVENOUS at 12:47

## 2024-09-12 RX ADMIN — SODIUM CHLORIDE, PRESERVATIVE FREE 10 ML: 5 INJECTION INTRAVENOUS at 08:51

## 2024-09-12 RX ADMIN — BUPROPION HYDROCHLORIDE 100 MG: 100 TABLET, FILM COATED, EXTENDED RELEASE ORAL at 08:50

## 2024-09-12 RX ADMIN — IPRATROPIUM BROMIDE AND ALBUTEROL SULFATE 1 DOSE: .5; 3 SOLUTION RESPIRATORY (INHALATION) at 02:49

## 2024-09-12 RX ADMIN — HEPARIN SODIUM 16 UNITS/KG/HR: 10000 INJECTION, SOLUTION INTRAVENOUS at 12:40

## 2024-09-12 ASSESSMENT — PAIN DESCRIPTION - DESCRIPTORS
DESCRIPTORS: ACHING
DESCRIPTORS: ACHING

## 2024-09-12 ASSESSMENT — PAIN SCALES - GENERAL
PAINLEVEL_OUTOF10: 5
PAINLEVEL_OUTOF10: 5
PAINLEVEL_OUTOF10: 6

## 2024-09-12 ASSESSMENT — PAIN DESCRIPTION - LOCATION
LOCATION: HEAD

## 2024-09-12 ASSESSMENT — PAIN DESCRIPTION - ORIENTATION: ORIENTATION: RIGHT

## 2024-09-13 LAB
ANION GAP SERPL CALC-SCNC: 8 MMOL/L (ref 2–12)
APTT PPP: 60.6 SEC (ref 22.1–31)
BASOPHILS # BLD: 0 K/UL (ref 0–0.1)
BASOPHILS NFR BLD: 0 % (ref 0–1)
BUN SERPL-MCNC: 23 MG/DL (ref 6–20)
BUN/CREAT SERPL: 13 (ref 12–20)
CALCIUM SERPL-MCNC: 9 MG/DL (ref 8.5–10.1)
CHLORIDE SERPL-SCNC: 107 MMOL/L (ref 97–108)
CO2 SERPL-SCNC: 25 MMOL/L (ref 21–32)
CREAT SERPL-MCNC: 1.78 MG/DL (ref 0.7–1.3)
DIFFERENTIAL METHOD BLD: ABNORMAL
EKG ATRIAL RATE: 75 BPM
EKG DIAGNOSIS: NORMAL
EKG P AXIS: 56 DEGREES
EKG P-R INTERVAL: 222 MS
EKG Q-T INTERVAL: 432 MS
EKG QRS DURATION: 154 MS
EKG QTC CALCULATION (BAZETT): 482 MS
EKG R AXIS: 0 DEGREES
EKG T AXIS: 119 DEGREES
EKG VENTRICULAR RATE: 75 BPM
EOSINOPHIL # BLD: 0.1 K/UL (ref 0–0.4)
EOSINOPHIL NFR BLD: 2 % (ref 0–7)
ERYTHROCYTE [DISTWIDTH] IN BLOOD BY AUTOMATED COUNT: 14.1 % (ref 11.5–14.5)
GLUCOSE SERPL-MCNC: 105 MG/DL (ref 65–100)
HCT VFR BLD AUTO: 29.5 % (ref 36.6–50.3)
HGB BLD-MCNC: 9.4 G/DL (ref 12.1–17)
IMM GRANULOCYTES # BLD AUTO: 0 K/UL (ref 0–0.04)
IMM GRANULOCYTES NFR BLD AUTO: 0 % (ref 0–0.5)
LYMPHOCYTES # BLD: 0.4 K/UL (ref 0.8–3.5)
LYMPHOCYTES NFR BLD: 8 % (ref 12–49)
MAGNESIUM SERPL-MCNC: 2 MG/DL (ref 1.6–2.4)
MCH RBC QN AUTO: 31.3 PG (ref 26–34)
MCHC RBC AUTO-ENTMCNC: 31.9 G/DL (ref 30–36.5)
MCV RBC AUTO: 98.3 FL (ref 80–99)
MONOCYTES # BLD: 0.6 K/UL (ref 0–1)
MONOCYTES NFR BLD: 13 % (ref 5–13)
NEUTS SEG # BLD: 3.7 K/UL (ref 1.8–8)
NEUTS SEG NFR BLD: 77 % (ref 32–75)
NRBC # BLD: 0 K/UL (ref 0–0.01)
NRBC BLD-RTO: 0 PER 100 WBC
PLATELET # BLD AUTO: 141 K/UL (ref 150–400)
PMV BLD AUTO: 9.5 FL (ref 8.9–12.9)
POTASSIUM SERPL-SCNC: 3.5 MMOL/L (ref 3.5–5.1)
RBC # BLD AUTO: 3 M/UL (ref 4.1–5.7)
RBC MORPH BLD: ABNORMAL
RBC MORPH BLD: ABNORMAL
SODIUM SERPL-SCNC: 140 MMOL/L (ref 136–145)
THERAPEUTIC RANGE: ABNORMAL SECS (ref 58–77)
WBC # BLD AUTO: 4.8 K/UL (ref 4.1–11.1)

## 2024-09-13 PROCEDURE — 94760 N-INVAS EAR/PLS OXIMETRY 1: CPT

## 2024-09-13 PROCEDURE — 6360000002 HC RX W HCPCS: Performed by: PHYSICIAN ASSISTANT

## 2024-09-13 PROCEDURE — 1200000000 HC SEMI PRIVATE

## 2024-09-13 PROCEDURE — 6370000000 HC RX 637 (ALT 250 FOR IP): Performed by: NURSE PRACTITIONER

## 2024-09-13 PROCEDURE — 93010 ELECTROCARDIOGRAM REPORT: CPT | Performed by: SPECIALIST

## 2024-09-13 PROCEDURE — 99231 SBSQ HOSP IP/OBS SF/LOW 25: CPT | Performed by: SURGERY

## 2024-09-13 PROCEDURE — 6370000000 HC RX 637 (ALT 250 FOR IP): Performed by: PHYSICIAN ASSISTANT

## 2024-09-13 PROCEDURE — 6360000002 HC RX W HCPCS: Performed by: STUDENT IN AN ORGANIZED HEALTH CARE EDUCATION/TRAINING PROGRAM

## 2024-09-13 PROCEDURE — 2700000000 HC OXYGEN THERAPY PER DAY

## 2024-09-13 PROCEDURE — 85730 THROMBOPLASTIN TIME PARTIAL: CPT

## 2024-09-13 PROCEDURE — 94640 AIRWAY INHALATION TREATMENT: CPT

## 2024-09-13 PROCEDURE — 83735 ASSAY OF MAGNESIUM: CPT

## 2024-09-13 PROCEDURE — 80048 BASIC METABOLIC PNL TOTAL CA: CPT

## 2024-09-13 PROCEDURE — 85025 COMPLETE CBC W/AUTO DIFF WBC: CPT

## 2024-09-13 PROCEDURE — 36415 COLL VENOUS BLD VENIPUNCTURE: CPT

## 2024-09-13 PROCEDURE — 2580000003 HC RX 258: Performed by: PHYSICIAN ASSISTANT

## 2024-09-13 RX ORDER — FUROSEMIDE 10 MG/ML
20 INJECTION INTRAMUSCULAR; INTRAVENOUS ONCE
Status: COMPLETED | OUTPATIENT
Start: 2024-09-13 | End: 2024-09-13

## 2024-09-13 RX ADMIN — ESCITALOPRAM OXALATE 10 MG: 10 TABLET ORAL at 20:23

## 2024-09-13 RX ADMIN — SODIUM CHLORIDE, PRESERVATIVE FREE 10 ML: 5 INJECTION INTRAVENOUS at 08:15

## 2024-09-13 RX ADMIN — IPRATROPIUM BROMIDE AND ALBUTEROL SULFATE 1 DOSE: .5; 3 SOLUTION RESPIRATORY (INHALATION) at 04:06

## 2024-09-13 RX ADMIN — ACETAMINOPHEN 650 MG: 325 TABLET ORAL at 20:25

## 2024-09-13 RX ADMIN — IPRATROPIUM BROMIDE AND ALBUTEROL SULFATE 1 DOSE: .5; 3 SOLUTION RESPIRATORY (INHALATION) at 15:53

## 2024-09-13 RX ADMIN — HEPARIN SODIUM 16 UNITS/KG/HR: 10000 INJECTION, SOLUTION INTRAVENOUS at 22:35

## 2024-09-13 RX ADMIN — ACETAMINOPHEN 650 MG: 325 TABLET ORAL at 03:57

## 2024-09-13 RX ADMIN — ATORVASTATIN CALCIUM 20 MG: 10 TABLET, FILM COATED ORAL at 17:44

## 2024-09-13 RX ADMIN — SODIUM CHLORIDE, PRESERVATIVE FREE 10 ML: 5 INJECTION INTRAVENOUS at 20:23

## 2024-09-13 RX ADMIN — HEPARIN SODIUM 16 UNITS/KG/HR: 10000 INJECTION, SOLUTION INTRAVENOUS at 06:31

## 2024-09-13 RX ADMIN — ZOLPIDEM TARTRATE 10 MG: 5 TABLET ORAL at 20:23

## 2024-09-13 RX ADMIN — FUROSEMIDE 20 MG: 10 INJECTION, SOLUTION INTRAMUSCULAR; INTRAVENOUS at 15:08

## 2024-09-13 RX ADMIN — ACETAMINOPHEN 650 MG: 325 TABLET ORAL at 15:07

## 2024-09-13 RX ADMIN — BUPROPION HYDROCHLORIDE 100 MG: 100 TABLET, FILM COATED, EXTENDED RELEASE ORAL at 20:23

## 2024-09-13 RX ADMIN — BUPROPION HYDROCHLORIDE 100 MG: 100 TABLET, FILM COATED, EXTENDED RELEASE ORAL at 08:15

## 2024-09-13 ASSESSMENT — PAIN DESCRIPTION - DESCRIPTORS: DESCRIPTORS: ACHING

## 2024-09-13 ASSESSMENT — PAIN - FUNCTIONAL ASSESSMENT: PAIN_FUNCTIONAL_ASSESSMENT: ACTIVITIES ARE NOT PREVENTED

## 2024-09-13 ASSESSMENT — PAIN DESCRIPTION - LOCATION: LOCATION: HEAD

## 2024-09-13 ASSESSMENT — PAIN SCALES - GENERAL: PAINLEVEL_OUTOF10: 3

## 2024-09-14 VITALS
HEART RATE: 73 BPM | OXYGEN SATURATION: 98 % | BODY MASS INDEX: 35.1 KG/M2 | RESPIRATION RATE: 22 BRPM | DIASTOLIC BLOOD PRESSURE: 86 MMHG | WEIGHT: 237 LBS | HEIGHT: 69 IN | SYSTOLIC BLOOD PRESSURE: 130 MMHG | TEMPERATURE: 98.1 F

## 2024-09-14 PROBLEM — K56.609 SBO (SMALL BOWEL OBSTRUCTION) (HCC): Status: RESOLVED | Noted: 2024-09-10 | Resolved: 2024-09-14

## 2024-09-14 PROBLEM — K43.3 PARASTOMAL HERNIA WITH OBSTRUCTION AND WITHOUT GANGRENE: Status: RESOLVED | Noted: 2024-09-10 | Resolved: 2024-09-14

## 2024-09-14 PROBLEM — R10.84 GENERALIZED ABDOMINAL PAIN: Status: RESOLVED | Noted: 2024-09-12 | Resolved: 2024-09-14

## 2024-09-14 LAB
ANION GAP SERPL CALC-SCNC: 7 MMOL/L (ref 2–12)
BUN SERPL-MCNC: 23 MG/DL (ref 6–20)
BUN/CREAT SERPL: 12 (ref 12–20)
CALCIUM SERPL-MCNC: 9.3 MG/DL (ref 8.5–10.1)
CHLORIDE SERPL-SCNC: 106 MMOL/L (ref 97–108)
CO2 SERPL-SCNC: 26 MMOL/L (ref 21–32)
CREAT SERPL-MCNC: 1.9 MG/DL (ref 0.7–1.3)
ERYTHROCYTE [DISTWIDTH] IN BLOOD BY AUTOMATED COUNT: 14.2 % (ref 11.5–14.5)
GLUCOSE SERPL-MCNC: 102 MG/DL (ref 65–100)
HCT VFR BLD AUTO: 32.5 % (ref 36.6–50.3)
HGB BLD-MCNC: 10.4 G/DL (ref 12.1–17)
MCH RBC QN AUTO: 31 PG (ref 26–34)
MCHC RBC AUTO-ENTMCNC: 32 G/DL (ref 30–36.5)
MCV RBC AUTO: 96.7 FL (ref 80–99)
NRBC # BLD: 0 K/UL (ref 0–0.01)
NRBC BLD-RTO: 0 PER 100 WBC
PLATELET # BLD AUTO: 177 K/UL (ref 150–400)
PMV BLD AUTO: 9.5 FL (ref 8.9–12.9)
POTASSIUM SERPL-SCNC: 3.4 MMOL/L (ref 3.5–5.1)
RBC # BLD AUTO: 3.36 M/UL (ref 4.1–5.7)
SODIUM SERPL-SCNC: 139 MMOL/L (ref 136–145)
UFH PPP CHRO-ACNC: 0.27 IU/ML
UFH PPP CHRO-ACNC: 0.34 IU/ML
WBC # BLD AUTO: 4.7 K/UL (ref 4.1–11.1)

## 2024-09-14 PROCEDURE — 6370000000 HC RX 637 (ALT 250 FOR IP): Performed by: STUDENT IN AN ORGANIZED HEALTH CARE EDUCATION/TRAINING PROGRAM

## 2024-09-14 PROCEDURE — 85027 COMPLETE CBC AUTOMATED: CPT

## 2024-09-14 PROCEDURE — 36415 COLL VENOUS BLD VENIPUNCTURE: CPT

## 2024-09-14 PROCEDURE — 6370000000 HC RX 637 (ALT 250 FOR IP): Performed by: PHYSICIAN ASSISTANT

## 2024-09-14 PROCEDURE — 99232 SBSQ HOSP IP/OBS MODERATE 35: CPT | Performed by: SURGERY

## 2024-09-14 PROCEDURE — 85520 HEPARIN ASSAY: CPT

## 2024-09-14 PROCEDURE — 80048 BASIC METABOLIC PNL TOTAL CA: CPT

## 2024-09-14 RX ORDER — IPRATROPIUM BROMIDE AND ALBUTEROL SULFATE 2.5; .5 MG/3ML; MG/3ML
3 SOLUTION RESPIRATORY (INHALATION) EVERY 4 HOURS PRN
Qty: 360 ML | Refills: 0 | Status: SHIPPED | OUTPATIENT
Start: 2024-09-14

## 2024-09-14 RX ORDER — ALBUTEROL SULFATE 90 UG/1
2 INHALANT RESPIRATORY (INHALATION) 4 TIMES DAILY PRN
Qty: 18 G | Refills: 0 | Status: SHIPPED | OUTPATIENT
Start: 2024-09-14

## 2024-09-14 RX ADMIN — BUPROPION HYDROCHLORIDE 100 MG: 100 TABLET, FILM COATED, EXTENDED RELEASE ORAL at 08:25

## 2024-09-14 RX ADMIN — ACETAMINOPHEN 650 MG: 325 TABLET ORAL at 04:54

## 2024-09-14 RX ADMIN — APIXABAN 5 MG: 5 TABLET, FILM COATED ORAL at 14:15

## 2024-09-18 ENCOUNTER — OFFICE VISIT (OUTPATIENT)
Age: 79
End: 2024-09-18
Payer: MEDICARE

## 2024-09-18 ENCOUNTER — HOSPITAL ENCOUNTER (OUTPATIENT)
Facility: HOSPITAL | Age: 79
Setting detail: INFUSION SERIES
Discharge: HOME | End: 2024-09-18
Payer: MEDICARE

## 2024-09-18 VITALS
DIASTOLIC BLOOD PRESSURE: 76 MMHG | SYSTOLIC BLOOD PRESSURE: 113 MMHG | HEART RATE: 86 BPM | WEIGHT: 236.8 LBS | OXYGEN SATURATION: 92 % | RESPIRATION RATE: 14 BRPM | HEIGHT: 69 IN | BODY MASS INDEX: 35.07 KG/M2 | TEMPERATURE: 97.9 F

## 2024-09-18 DIAGNOSIS — K43.5 PARASTOMAL HERNIA WITHOUT OBSTRUCTION OR GANGRENE: Primary | ICD-10-CM

## 2024-09-18 DIAGNOSIS — C67.9 MALIGNANT NEOPLASM OF URINARY BLADDER, UNSPECIFIED SITE (HCC): Primary | ICD-10-CM

## 2024-09-18 LAB
ALBUMIN SERPL-MCNC: 3.4 G/DL (ref 3.5–5)
ALBUMIN/GLOB SERPL: 0.9 (ref 1.1–2.2)
ALP SERPL-CCNC: 76 U/L (ref 45–117)
ALT SERPL-CCNC: 29 U/L (ref 12–78)
ANION GAP SERPL CALC-SCNC: 5 MMOL/L (ref 2–12)
AST SERPL-CCNC: 21 U/L (ref 15–37)
BASOPHILS # BLD: 0 K/UL (ref 0–0.1)
BASOPHILS NFR BLD: 0 % (ref 0–1)
BILIRUB SERPL-MCNC: 0.4 MG/DL (ref 0.2–1)
BUN SERPL-MCNC: 29 MG/DL (ref 6–20)
BUN/CREAT SERPL: 14 (ref 12–20)
CALCIUM SERPL-MCNC: 9.7 MG/DL (ref 8.5–10.1)
CHLORIDE SERPL-SCNC: 108 MMOL/L (ref 97–108)
CO2 SERPL-SCNC: 26 MMOL/L (ref 21–32)
CREAT SERPL-MCNC: 2.03 MG/DL (ref 0.7–1.3)
DIFFERENTIAL METHOD BLD: ABNORMAL
EOSINOPHIL # BLD: 0.2 K/UL (ref 0–0.4)
EOSINOPHIL NFR BLD: 4 % (ref 0–7)
ERYTHROCYTE [DISTWIDTH] IN BLOOD BY AUTOMATED COUNT: 14.2 % (ref 11.5–14.5)
GLOBULIN SER CALC-MCNC: 3.8 G/DL (ref 2–4)
GLUCOSE SERPL-MCNC: 110 MG/DL (ref 65–100)
HCT VFR BLD AUTO: 32.8 % (ref 36.6–50.3)
HGB BLD-MCNC: 10.3 G/DL (ref 12.1–17)
IMM GRANULOCYTES # BLD AUTO: 0 K/UL (ref 0–0.04)
IMM GRANULOCYTES NFR BLD AUTO: 1 % (ref 0–0.5)
LYMPHOCYTES # BLD: 0.5 K/UL (ref 0.8–3.5)
LYMPHOCYTES NFR BLD: 10 % (ref 12–49)
MCH RBC QN AUTO: 30.6 PG (ref 26–34)
MCHC RBC AUTO-ENTMCNC: 31.4 G/DL (ref 30–36.5)
MCV RBC AUTO: 97.3 FL (ref 80–99)
MONOCYTES # BLD: 0.4 K/UL (ref 0–1)
MONOCYTES NFR BLD: 9 % (ref 5–13)
NEUTS SEG # BLD: 3.5 K/UL (ref 1.8–8)
NEUTS SEG NFR BLD: 76 % (ref 32–75)
NRBC # BLD: 0 K/UL (ref 0–0.01)
NRBC BLD-RTO: 0 PER 100 WBC
PLATELET # BLD AUTO: 203 K/UL (ref 150–400)
PMV BLD AUTO: 9.1 FL (ref 8.9–12.9)
POTASSIUM SERPL-SCNC: 3.6 MMOL/L (ref 3.5–5.1)
PROT SERPL-MCNC: 7.2 G/DL (ref 6.4–8.2)
RBC # BLD AUTO: 3.37 M/UL (ref 4.1–5.7)
RBC MORPH BLD: ABNORMAL
SODIUM SERPL-SCNC: 139 MMOL/L (ref 136–145)
WBC # BLD AUTO: 4.6 K/UL (ref 4.1–11.1)

## 2024-09-18 PROCEDURE — 3074F SYST BP LT 130 MM HG: CPT | Performed by: SURGERY

## 2024-09-18 PROCEDURE — G8427 DOCREV CUR MEDS BY ELIG CLIN: HCPCS | Performed by: SURGERY

## 2024-09-18 PROCEDURE — 36415 COLL VENOUS BLD VENIPUNCTURE: CPT

## 2024-09-18 PROCEDURE — 3078F DIAST BP <80 MM HG: CPT | Performed by: SURGERY

## 2024-09-18 PROCEDURE — G8417 CALC BMI ABV UP PARAM F/U: HCPCS | Performed by: SURGERY

## 2024-09-18 PROCEDURE — 85025 COMPLETE CBC W/AUTO DIFF WBC: CPT

## 2024-09-18 PROCEDURE — 36591 DRAW BLOOD OFF VENOUS DEVICE: CPT

## 2024-09-18 PROCEDURE — 99215 OFFICE O/P EST HI 40 MIN: CPT | Performed by: SURGERY

## 2024-09-18 PROCEDURE — 1036F TOBACCO NON-USER: CPT | Performed by: SURGERY

## 2024-09-18 PROCEDURE — 1111F DSCHRG MED/CURRENT MED MERGE: CPT | Performed by: SURGERY

## 2024-09-18 PROCEDURE — 80053 COMPREHEN METABOLIC PANEL: CPT

## 2024-09-18 PROCEDURE — 1123F ACP DISCUSS/DSCN MKR DOCD: CPT | Performed by: SURGERY

## 2024-09-18 RX ORDER — HEPARIN 100 UNIT/ML
500 SYRINGE INTRAVENOUS PRN
OUTPATIENT
Start: 2024-09-29

## 2024-09-18 RX ORDER — SODIUM CHLORIDE 0.9 % (FLUSH) 0.9 %
5-40 SYRINGE (ML) INJECTION PRN
OUTPATIENT
Start: 2024-09-29

## 2024-09-18 RX ORDER — SODIUM CHLORIDE 9 MG/ML
25 INJECTION, SOLUTION INTRAVENOUS PRN
Status: DISCONTINUED | OUTPATIENT
Start: 2024-09-18 | End: 2024-09-19 | Stop reason: HOSPADM

## 2024-09-18 RX ORDER — HEPARIN 100 UNIT/ML
500 SYRINGE INTRAVENOUS PRN
Status: DISCONTINUED | OUTPATIENT
Start: 2024-09-18 | End: 2024-09-19 | Stop reason: HOSPADM

## 2024-09-18 RX ORDER — SODIUM CHLORIDE 9 MG/ML
25 INJECTION, SOLUTION INTRAVENOUS PRN
OUTPATIENT
Start: 2024-09-29

## 2024-09-18 ASSESSMENT — PATIENT HEALTH QUESTIONNAIRE - PHQ9
SUM OF ALL RESPONSES TO PHQ QUESTIONS 1-9: 0
SUM OF ALL RESPONSES TO PHQ QUESTIONS 1-9: 0
SUM OF ALL RESPONSES TO PHQ9 QUESTIONS 1 & 2: 0
2. FEELING DOWN, DEPRESSED OR HOPELESS: NOT AT ALL
SUM OF ALL RESPONSES TO PHQ QUESTIONS 1-9: 0
SUM OF ALL RESPONSES TO PHQ QUESTIONS 1-9: 0

## 2024-09-23 NOTE — PERIOP NOTE
PATIENT HAD PAT ON 9/4/24. SURGERY SCHEDULED FOR 9/16/24 CANCELLED. PT WAS HOSPITALIZED. PER MATT ROBLERO NP, NEEDS PHONE INTERVIEW FOR HIS RESCHEDULED SURGERY ON 10/15/24 TO UPDATE HISTORY AND MEDICATION LIST.

## 2024-09-24 ENCOUNTER — HOSPITAL ENCOUNTER (OUTPATIENT)
Facility: HOSPITAL | Age: 79
Setting detail: RECURRING SERIES
Discharge: HOME | End: 2024-09-27
Payer: MEDICARE

## 2024-09-24 PROCEDURE — 97140 MANUAL THERAPY 1/> REGIONS: CPT

## 2024-09-24 PROCEDURE — 97535 SELF CARE MNGMENT TRAINING: CPT

## 2024-10-01 ENCOUNTER — HOSPITAL ENCOUNTER (OUTPATIENT)
Facility: HOSPITAL | Age: 79
Setting detail: RECURRING SERIES
Discharge: HOME | End: 2024-10-04
Payer: MEDICARE

## 2024-10-01 PROCEDURE — 97140 MANUAL THERAPY 1/> REGIONS: CPT

## 2024-10-01 NOTE — PROGRESS NOTES
PHYSICAL THERAPY/OCCUPATIONAL THERAPY - MEDICARE DAILY TREATMENT NOTE (updated 3/23)      Date: 10/1/2024          Patient Name:  Robert Gastelum :  1945   Medical   Diagnosis:  Lymphedema [I89.0] Treatment Diagnosis:  I89.0     Lymphedema, not elsewhere classified    Referral Source:  Robert Leo MD Insurance:   Payor: MEDICARE / Plan: MEDICARE PART A AND B / Product Type: *No Product type* /                     Patient  verified yes     Visit #   Current  / Total 5 12   Time   In / Out 11:40 AM 12:33 PM   Total Treatment Time 57   Total Timed Codes 57   1:1 Treatment Time 57      [facility] Totals Reminder:  bill using total billable   min of TIMED therapeutic procedures and modalities.   8-22 min = 1 unit; 23-37 min = 2 units; 38-52 min = 3 units;  53-67 min = 4 units; 68-82 min = 5 units         SUBJECTIVE    Pain Level (0-10 scale): 2/10    Any medication changes, allergies to medications, adverse drug reactions, diagnosis change, or new procedure performed?: [x] No    [] Yes (see summary sheet for update)  Medications: Verified on Patient Summary List    Subjective functional status/changes:     \"I need to be out by 12:35. I have another appointment to get to.\"    OBJECTIVE  Pt has been performing the following conservative measures:  Elevation from 2024 to present.  Therapeutic exercises from 2024 to present.  Self MLD for at least 30 minutes a day from 2024 to present.  Wearing compression garments from  - pt has not been wearing his current compression and has not been measured for new garments yet due to pending surgery on 10/15/2024.    Pt had decreased time in therapy today, arriving 10 mins late and needing to leave 25 minutes early.    Therapeutic Procedures:  Tx Min Billable or 1:1 Min (if diff from Tx Min) Procedure, Rationale, Specifics   57  [id number] Manual Therapy (timed):  decrease pain, increase ROM, increase tissue extensibility, decrease edema, correct positional

## 2024-10-14 NOTE — PERIOP NOTE
PAT PHONE INTERVIEW COMPLETED WITH THE PT.  PRE OP INSTRUCTIONS AND MEDICATIONS WERE REVIEWED WITH THE PT WITH THE OPPORTUNITY FOR QUESTIONS.  PT VERBALIZED UNDERSTANDING.

## 2024-10-15 ENCOUNTER — HOSPITAL ENCOUNTER (INPATIENT)
Facility: HOSPITAL | Age: 79
LOS: 4 days | Discharge: NURSING, REHAB OR LONG-TERM CARE | End: 2024-10-19
Attending: NEUROLOGICAL SURGERY | Admitting: NEUROLOGICAL SURGERY
Payer: MEDICARE

## 2024-10-15 ENCOUNTER — ANESTHESIA (OUTPATIENT)
Facility: HOSPITAL | Age: 79
End: 2024-10-15
Payer: MEDICARE

## 2024-10-15 ENCOUNTER — APPOINTMENT (OUTPATIENT)
Facility: HOSPITAL | Age: 79
End: 2024-10-15
Attending: NEUROLOGICAL SURGERY
Payer: MEDICARE

## 2024-10-15 ENCOUNTER — ANESTHESIA EVENT (OUTPATIENT)
Facility: HOSPITAL | Age: 79
End: 2024-10-15
Payer: MEDICARE

## 2024-10-15 ENCOUNTER — APPOINTMENT (OUTPATIENT)
Facility: HOSPITAL | Age: 79
End: 2024-10-15
Payer: MEDICARE

## 2024-10-15 PROBLEM — M48.02 CERVICAL STENOSIS OF SPINAL CANAL: Status: ACTIVE | Noted: 2024-10-15

## 2024-10-15 PROCEDURE — 2720000010 HC SURG SUPPLY STERILE: Performed by: NEUROLOGICAL SURGERY

## 2024-10-15 PROCEDURE — 0RG20A0 FUSION OF 2 OR MORE CERVICAL VERTEBRAL JOINTS WITH INTERBODY FUSION DEVICE, ANTERIOR APPROACH, ANTERIOR COLUMN, OPEN APPROACH: ICD-10-PCS | Performed by: NEUROLOGICAL SURGERY

## 2024-10-15 PROCEDURE — 3600000004 HC SURGERY LEVEL 4 BASE: Performed by: NEUROLOGICAL SURGERY

## 2024-10-15 PROCEDURE — L0120 CERV FLEX N/ADJ FOAM PRE OTS: HCPCS | Performed by: NEUROLOGICAL SURGERY

## 2024-10-15 PROCEDURE — 6360000002 HC RX W HCPCS: Performed by: NEUROLOGICAL SURGERY

## 2024-10-15 PROCEDURE — 2500000003 HC RX 250 WO HCPCS: Performed by: NEUROLOGICAL SURGERY

## 2024-10-15 PROCEDURE — 6360000002 HC RX W HCPCS: Performed by: STUDENT IN AN ORGANIZED HEALTH CARE EDUCATION/TRAINING PROGRAM

## 2024-10-15 PROCEDURE — 2580000003 HC RX 258: Performed by: NEUROLOGICAL SURGERY

## 2024-10-15 PROCEDURE — 6370000000 HC RX 637 (ALT 250 FOR IP): Performed by: NEUROLOGICAL SURGERY

## 2024-10-15 PROCEDURE — C1713 ANCHOR/SCREW BN/BN,TIS/BN: HCPCS | Performed by: NEUROLOGICAL SURGERY

## 2024-10-15 PROCEDURE — 7100000001 HC PACU RECOVERY - ADDTL 15 MIN: Performed by: NEUROLOGICAL SURGERY

## 2024-10-15 PROCEDURE — 3700000000 HC ANESTHESIA ATTENDED CARE: Performed by: NEUROLOGICAL SURGERY

## 2024-10-15 PROCEDURE — 0RB30ZZ EXCISION OF CERVICAL VERTEBRAL DISC, OPEN APPROACH: ICD-10-PCS | Performed by: NEUROLOGICAL SURGERY

## 2024-10-15 PROCEDURE — 2580000003 HC RX 258: Performed by: NURSE ANESTHETIST, CERTIFIED REGISTERED

## 2024-10-15 PROCEDURE — 3600000014 HC SURGERY LEVEL 4 ADDTL 15MIN: Performed by: NEUROLOGICAL SURGERY

## 2024-10-15 PROCEDURE — 2709999900 HC NON-CHARGEABLE SUPPLY: Performed by: NEUROLOGICAL SURGERY

## 2024-10-15 PROCEDURE — C1729 CATH, DRAINAGE: HCPCS | Performed by: NEUROLOGICAL SURGERY

## 2024-10-15 PROCEDURE — C1889 IMPLANT/INSERT DEVICE, NOC: HCPCS | Performed by: NEUROLOGICAL SURGERY

## 2024-10-15 PROCEDURE — 2500000003 HC RX 250 WO HCPCS: Performed by: NURSE ANESTHETIST, CERTIFIED REGISTERED

## 2024-10-15 PROCEDURE — 2500000003 HC RX 250 WO HCPCS

## 2024-10-15 PROCEDURE — 6370000000 HC RX 637 (ALT 250 FOR IP): Performed by: STUDENT IN AN ORGANIZED HEALTH CARE EDUCATION/TRAINING PROGRAM

## 2024-10-15 PROCEDURE — 2580000003 HC RX 258: Performed by: PHYSICIAN ASSISTANT

## 2024-10-15 PROCEDURE — 1200000000 HC SEMI PRIVATE

## 2024-10-15 PROCEDURE — 99024 POSTOP FOLLOW-UP VISIT: CPT | Performed by: PHYSICIAN ASSISTANT

## 2024-10-15 PROCEDURE — 2500000003 HC RX 250 WO HCPCS: Performed by: STUDENT IN AN ORGANIZED HEALTH CARE EDUCATION/TRAINING PROGRAM

## 2024-10-15 PROCEDURE — 2580000003 HC RX 258: Performed by: STUDENT IN AN ORGANIZED HEALTH CARE EDUCATION/TRAINING PROGRAM

## 2024-10-15 PROCEDURE — 6360000002 HC RX W HCPCS: Performed by: NURSE ANESTHETIST, CERTIFIED REGISTERED

## 2024-10-15 PROCEDURE — 3700000001 HC ADD 15 MINUTES (ANESTHESIA): Performed by: NEUROLOGICAL SURGERY

## 2024-10-15 PROCEDURE — 6360000002 HC RX W HCPCS: Performed by: PHYSICIAN ASSISTANT

## 2024-10-15 PROCEDURE — 7100000000 HC PACU RECOVERY - FIRST 15 MIN: Performed by: NEUROLOGICAL SURGERY

## 2024-10-15 DEVICE — GRAFT BNE MED: Type: IMPLANTABLE DEVICE | Site: SPINE CERVICAL | Status: FUNCTIONAL

## 2024-10-15 DEVICE — GRAFT BNE SUB SM 1ML CRYOPRESERVED VIABLE CORT CANC BNE: Type: IMPLANTABLE DEVICE | Site: SPINE CERVICAL | Status: FUNCTIONAL

## 2024-10-15 DEVICE — IMPLANTABLE DEVICE
Type: IMPLANTABLE DEVICE | Site: SPINE CERVICAL | Status: FUNCTIONAL
Brand: ACIS® PROTI 360°™

## 2024-10-15 DEVICE — SCREW SPNL L16MM DIA4MM ANT CERV TI SELF DRL VAR ANG FULL: Type: IMPLANTABLE DEVICE | Site: SPINE CERVICAL | Status: FUNCTIONAL

## 2024-10-15 DEVICE — SCREW SPNL L16MM DIA4MM ANT CERV TI SELF DRL FULL THRD: Type: IMPLANTABLE DEVICE | Site: SPINE CERVICAL | Status: FUNCTIONAL

## 2024-10-15 DEVICE — IMPLANTABLE DEVICE: Type: IMPLANTABLE DEVICE | Site: SPINE CERVICAL | Status: FUNCTIONAL

## 2024-10-15 RX ORDER — CYCLOBENZAPRINE HCL 10 MG
10 TABLET ORAL EVERY 12 HOURS PRN
Status: DISCONTINUED | OUTPATIENT
Start: 2024-10-15 | End: 2024-10-19 | Stop reason: HOSPADM

## 2024-10-15 RX ORDER — ALBUTEROL SULFATE 0.83 MG/ML
2.5 SOLUTION RESPIRATORY (INHALATION) 4 TIMES DAILY PRN
Status: DISCONTINUED | OUTPATIENT
Start: 2024-10-15 | End: 2024-10-19 | Stop reason: HOSPADM

## 2024-10-15 RX ORDER — PRAMIPEXOLE DIHYDROCHLORIDE 0.25 MG/1
0.25 TABLET ORAL DAILY
Status: DISCONTINUED | OUTPATIENT
Start: 2024-10-15 | End: 2024-10-19 | Stop reason: HOSPADM

## 2024-10-15 RX ORDER — SODIUM CHLORIDE 0.9 % (FLUSH) 0.9 %
5-40 SYRINGE (ML) INJECTION PRN
Status: DISCONTINUED | OUTPATIENT
Start: 2024-10-15 | End: 2024-10-15 | Stop reason: HOSPADM

## 2024-10-15 RX ORDER — BUPROPION HYDROCHLORIDE 150 MG/1
150 TABLET, EXTENDED RELEASE ORAL DAILY
Status: DISCONTINUED | OUTPATIENT
Start: 2024-10-15 | End: 2024-10-19 | Stop reason: HOSPADM

## 2024-10-15 RX ORDER — SODIUM CHLORIDE, SODIUM LACTATE, POTASSIUM CHLORIDE, CALCIUM CHLORIDE 600; 310; 30; 20 MG/100ML; MG/100ML; MG/100ML; MG/100ML
INJECTION, SOLUTION INTRAVENOUS
Status: DISCONTINUED | OUTPATIENT
Start: 2024-10-15 | End: 2024-10-15 | Stop reason: SDUPTHER

## 2024-10-15 RX ORDER — EPHEDRINE SULFATE 50 MG/ML
INJECTION INTRAVENOUS
Status: DISCONTINUED | OUTPATIENT
Start: 2024-10-15 | End: 2024-10-15 | Stop reason: SDUPTHER

## 2024-10-15 RX ORDER — OXYCODONE HYDROCHLORIDE 5 MG/1
10 TABLET ORAL EVERY 4 HOURS PRN
Status: DISCONTINUED | OUTPATIENT
Start: 2024-10-15 | End: 2024-10-19 | Stop reason: HOSPADM

## 2024-10-15 RX ORDER — SODIUM CHLORIDE, SODIUM LACTATE, POTASSIUM CHLORIDE, CALCIUM CHLORIDE 600; 310; 30; 20 MG/100ML; MG/100ML; MG/100ML; MG/100ML
INJECTION, SOLUTION INTRAVENOUS CONTINUOUS
Status: DISCONTINUED | OUTPATIENT
Start: 2024-10-15 | End: 2024-10-15 | Stop reason: HOSPADM

## 2024-10-15 RX ORDER — IPRATROPIUM BROMIDE AND ALBUTEROL SULFATE 2.5; .5 MG/3ML; MG/3ML
1 SOLUTION RESPIRATORY (INHALATION) EVERY 4 HOURS PRN
Status: DISCONTINUED | OUTPATIENT
Start: 2024-10-15 | End: 2024-10-19 | Stop reason: HOSPADM

## 2024-10-15 RX ORDER — ACETAMINOPHEN 325 MG/1
650 TABLET ORAL EVERY 6 HOURS
Status: DISCONTINUED | OUTPATIENT
Start: 2024-10-15 | End: 2024-10-19 | Stop reason: HOSPADM

## 2024-10-15 RX ORDER — OXYCODONE HYDROCHLORIDE 5 MG/1
5 TABLET ORAL
Status: DISCONTINUED | OUTPATIENT
Start: 2024-10-15 | End: 2024-10-15 | Stop reason: HOSPADM

## 2024-10-15 RX ORDER — PHENYLEPHRINE HCL IN 0.9% NACL 0.4MG/10ML
SYRINGE (ML) INTRAVENOUS
Status: DISCONTINUED | OUTPATIENT
Start: 2024-10-15 | End: 2024-10-15 | Stop reason: SDUPTHER

## 2024-10-15 RX ORDER — DEXAMETHASONE SODIUM PHOSPHATE 4 MG/ML
4 INJECTION, SOLUTION INTRA-ARTICULAR; INTRALESIONAL; INTRAMUSCULAR; INTRAVENOUS; SOFT TISSUE EVERY 8 HOURS
Status: COMPLETED | OUTPATIENT
Start: 2024-10-15 | End: 2024-10-16

## 2024-10-15 RX ORDER — BUMETANIDE 1 MG/1
2 TABLET ORAL DAILY
Status: DISCONTINUED | OUTPATIENT
Start: 2024-10-15 | End: 2024-10-19 | Stop reason: HOSPADM

## 2024-10-15 RX ORDER — ZOLPIDEM TARTRATE 5 MG/1
10 TABLET ORAL NIGHTLY
Status: DISCONTINUED | OUTPATIENT
Start: 2024-10-15 | End: 2024-10-19 | Stop reason: HOSPADM

## 2024-10-15 RX ORDER — FENTANYL CITRATE 50 UG/ML
INJECTION, SOLUTION INTRAMUSCULAR; INTRAVENOUS
Status: DISCONTINUED | OUTPATIENT
Start: 2024-10-15 | End: 2024-10-15 | Stop reason: SDUPTHER

## 2024-10-15 RX ORDER — ACETAMINOPHEN 500 MG
1000 TABLET ORAL ONCE
Status: COMPLETED | OUTPATIENT
Start: 2024-10-15 | End: 2024-10-15

## 2024-10-15 RX ORDER — FENTANYL CITRATE 50 UG/ML
100 INJECTION, SOLUTION INTRAMUSCULAR; INTRAVENOUS
Status: DISCONTINUED | OUTPATIENT
Start: 2024-10-15 | End: 2024-10-15 | Stop reason: HOSPADM

## 2024-10-15 RX ORDER — SUCCINYLCHOLINE/SOD CL,ISO/PF 200MG/10ML
SYRINGE (ML) INTRAVENOUS
Status: DISCONTINUED | OUTPATIENT
Start: 2024-10-15 | End: 2024-10-15 | Stop reason: SDUPTHER

## 2024-10-15 RX ORDER — SODIUM CHLORIDE 9 MG/ML
INJECTION, SOLUTION INTRAVENOUS PRN
Status: DISCONTINUED | OUTPATIENT
Start: 2024-10-15 | End: 2024-10-19 | Stop reason: HOSPADM

## 2024-10-15 RX ORDER — NALOXONE HYDROCHLORIDE 0.4 MG/ML
INJECTION, SOLUTION INTRAMUSCULAR; INTRAVENOUS; SUBCUTANEOUS PRN
Status: DISCONTINUED | OUTPATIENT
Start: 2024-10-15 | End: 2024-10-15 | Stop reason: HOSPADM

## 2024-10-15 RX ORDER — ESCITALOPRAM OXALATE 10 MG/1
10 TABLET ORAL
Status: DISCONTINUED | OUTPATIENT
Start: 2024-10-15 | End: 2024-10-19 | Stop reason: HOSPADM

## 2024-10-15 RX ORDER — MAGNESIUM HYDROXIDE/ALUMINUM HYDROXICE/SIMETHICONE 120; 1200; 1200 MG/30ML; MG/30ML; MG/30ML
15 SUSPENSION ORAL EVERY 6 HOURS PRN
Status: DISCONTINUED | OUTPATIENT
Start: 2024-10-15 | End: 2024-10-19 | Stop reason: HOSPADM

## 2024-10-15 RX ORDER — SENNA AND DOCUSATE SODIUM 50; 8.6 MG/1; MG/1
1 TABLET, FILM COATED ORAL 2 TIMES DAILY
Status: DISCONTINUED | OUTPATIENT
Start: 2024-10-15 | End: 2024-10-19 | Stop reason: HOSPADM

## 2024-10-15 RX ORDER — LIDOCAINE HYDROCHLORIDE 20 MG/ML
INJECTION, SOLUTION EPIDURAL; INFILTRATION; INTRACAUDAL; PERINEURAL
Status: DISCONTINUED | OUTPATIENT
Start: 2024-10-15 | End: 2024-10-15 | Stop reason: SDUPTHER

## 2024-10-15 RX ORDER — SODIUM CHLORIDE 0.9 % (FLUSH) 0.9 %
5-40 SYRINGE (ML) INJECTION EVERY 12 HOURS SCHEDULED
Status: DISCONTINUED | OUTPATIENT
Start: 2024-10-15 | End: 2024-10-19 | Stop reason: HOSPADM

## 2024-10-15 RX ORDER — FENTANYL CITRATE 50 UG/ML
25 INJECTION, SOLUTION INTRAMUSCULAR; INTRAVENOUS EVERY 5 MIN PRN
Status: COMPLETED | OUTPATIENT
Start: 2024-10-15 | End: 2024-10-15

## 2024-10-15 RX ORDER — HYDRALAZINE HYDROCHLORIDE 20 MG/ML
10 INJECTION INTRAMUSCULAR; INTRAVENOUS ONCE
Status: DISCONTINUED | OUTPATIENT
Start: 2024-10-15 | End: 2024-10-15 | Stop reason: HOSPADM

## 2024-10-15 RX ORDER — DIPHENHYDRAMINE HCL 25 MG
25 CAPSULE ORAL EVERY 6 HOURS PRN
Status: DISCONTINUED | OUTPATIENT
Start: 2024-10-15 | End: 2024-10-19 | Stop reason: HOSPADM

## 2024-10-15 RX ORDER — ONDANSETRON 4 MG/1
4 TABLET, ORALLY DISINTEGRATING ORAL EVERY 8 HOURS PRN
Status: DISCONTINUED | OUTPATIENT
Start: 2024-10-15 | End: 2024-10-19 | Stop reason: HOSPADM

## 2024-10-15 RX ORDER — ONDANSETRON 2 MG/ML
4 INJECTION INTRAMUSCULAR; INTRAVENOUS
Status: DISCONTINUED | OUTPATIENT
Start: 2024-10-15 | End: 2024-10-15 | Stop reason: HOSPADM

## 2024-10-15 RX ORDER — LIDOCAINE HYDROCHLORIDE 10 MG/ML
1 INJECTION, SOLUTION EPIDURAL; INFILTRATION; INTRACAUDAL; PERINEURAL
Status: DISCONTINUED | OUTPATIENT
Start: 2024-10-15 | End: 2024-10-15 | Stop reason: HOSPADM

## 2024-10-15 RX ORDER — HYDROMORPHONE HYDROCHLORIDE 1 MG/ML
0.5 INJECTION, SOLUTION INTRAMUSCULAR; INTRAVENOUS; SUBCUTANEOUS EVERY 5 MIN PRN
Status: COMPLETED | OUTPATIENT
Start: 2024-10-15 | End: 2024-10-15

## 2024-10-15 RX ORDER — DIPHENHYDRAMINE HYDROCHLORIDE 50 MG/ML
25 INJECTION INTRAMUSCULAR; INTRAVENOUS EVERY 6 HOURS PRN
Status: DISCONTINUED | OUTPATIENT
Start: 2024-10-15 | End: 2024-10-19 | Stop reason: HOSPADM

## 2024-10-15 RX ORDER — SODIUM CHLORIDE AND POTASSIUM CHLORIDE 150; 900 MG/100ML; MG/100ML
INJECTION, SOLUTION INTRAVENOUS CONTINUOUS
Status: DISCONTINUED | OUTPATIENT
Start: 2024-10-15 | End: 2024-10-16

## 2024-10-15 RX ORDER — VANCOMYCIN 1.5 G/300ML
1500 INJECTION, SOLUTION INTRAVENOUS EVERY 12 HOURS
Status: COMPLETED | OUTPATIENT
Start: 2024-10-15 | End: 2024-10-16

## 2024-10-15 RX ORDER — ONDANSETRON 2 MG/ML
INJECTION INTRAMUSCULAR; INTRAVENOUS
Status: DISCONTINUED | OUTPATIENT
Start: 2024-10-15 | End: 2024-10-15 | Stop reason: SDUPTHER

## 2024-10-15 RX ORDER — ROCURONIUM BROMIDE 10 MG/ML
INJECTION, SOLUTION INTRAVENOUS
Status: DISCONTINUED | OUTPATIENT
Start: 2024-10-15 | End: 2024-10-15 | Stop reason: SDUPTHER

## 2024-10-15 RX ORDER — SODIUM CHLORIDE 0.9 % (FLUSH) 0.9 %
5-40 SYRINGE (ML) INJECTION EVERY 12 HOURS SCHEDULED
Status: DISCONTINUED | OUTPATIENT
Start: 2024-10-15 | End: 2024-10-15 | Stop reason: HOSPADM

## 2024-10-15 RX ORDER — DEXAMETHASONE SODIUM PHOSPHATE 4 MG/ML
INJECTION, SOLUTION INTRA-ARTICULAR; INTRALESIONAL; INTRAMUSCULAR; INTRAVENOUS; SOFT TISSUE
Status: DISCONTINUED | OUTPATIENT
Start: 2024-10-15 | End: 2024-10-15 | Stop reason: SDUPTHER

## 2024-10-15 RX ORDER — PROCHLORPERAZINE EDISYLATE 5 MG/ML
5 INJECTION INTRAMUSCULAR; INTRAVENOUS
Status: DISCONTINUED | OUTPATIENT
Start: 2024-10-15 | End: 2024-10-15 | Stop reason: HOSPADM

## 2024-10-15 RX ORDER — SODIUM CHLORIDE 9 MG/ML
INJECTION, SOLUTION INTRAVENOUS PRN
Status: DISCONTINUED | OUTPATIENT
Start: 2024-10-15 | End: 2024-10-15 | Stop reason: HOSPADM

## 2024-10-15 RX ORDER — OXYCODONE HYDROCHLORIDE 5 MG/1
5 TABLET ORAL EVERY 4 HOURS PRN
Status: DISCONTINUED | OUTPATIENT
Start: 2024-10-15 | End: 2024-10-19 | Stop reason: HOSPADM

## 2024-10-15 RX ORDER — ONDANSETRON 2 MG/ML
4 INJECTION INTRAMUSCULAR; INTRAVENOUS EVERY 6 HOURS PRN
Status: DISCONTINUED | OUTPATIENT
Start: 2024-10-15 | End: 2024-10-19 | Stop reason: HOSPADM

## 2024-10-15 RX ORDER — ATORVASTATIN CALCIUM 40 MG/1
40 TABLET, FILM COATED ORAL DAILY
Status: DISCONTINUED | OUTPATIENT
Start: 2024-10-15 | End: 2024-10-19 | Stop reason: HOSPADM

## 2024-10-15 RX ORDER — MIDAZOLAM HYDROCHLORIDE 2 MG/2ML
2 INJECTION, SOLUTION INTRAMUSCULAR; INTRAVENOUS PRN
Status: DISCONTINUED | OUTPATIENT
Start: 2024-10-15 | End: 2024-10-15 | Stop reason: HOSPADM

## 2024-10-15 RX ORDER — HYDROMORPHONE HYDROCHLORIDE 1 MG/ML
1 INJECTION, SOLUTION INTRAMUSCULAR; INTRAVENOUS; SUBCUTANEOUS
Status: DISCONTINUED | OUTPATIENT
Start: 2024-10-15 | End: 2024-10-19 | Stop reason: HOSPADM

## 2024-10-15 RX ORDER — SODIUM CHLORIDE 0.9 % (FLUSH) 0.9 %
5-40 SYRINGE (ML) INJECTION PRN
Status: DISCONTINUED | OUTPATIENT
Start: 2024-10-15 | End: 2024-10-19 | Stop reason: HOSPADM

## 2024-10-15 RX ADMIN — Medication 80 MCG: at 10:07

## 2024-10-15 RX ADMIN — LIDOCAINE HYDROCHLORIDE 60 MG: 20 INJECTION, SOLUTION EPIDURAL; INFILTRATION; INTRACAUDAL; PERINEURAL at 10:02

## 2024-10-15 RX ADMIN — HYDROMORPHONE HYDROCHLORIDE 1 MG: 1 INJECTION, SOLUTION INTRAMUSCULAR; INTRAVENOUS; SUBCUTANEOUS at 14:45

## 2024-10-15 RX ADMIN — Medication 120 MCG: at 10:11

## 2024-10-15 RX ADMIN — SENNOSIDES AND DOCUSATE SODIUM 1 TABLET: 50; 8.6 TABLET ORAL at 22:08

## 2024-10-15 RX ADMIN — EPHEDRINE SULFATE 10 MG: 50 INJECTION INTRAVENOUS at 10:13

## 2024-10-15 RX ADMIN — SODIUM CHLORIDE, PRESERVATIVE FREE 10 ML: 5 INJECTION INTRAVENOUS at 22:31

## 2024-10-15 RX ADMIN — OXYCODONE 5 MG: 5 TABLET ORAL at 16:32

## 2024-10-15 RX ADMIN — ONDANSETRON 4 MG: 2 INJECTION INTRAMUSCULAR; INTRAVENOUS at 10:21

## 2024-10-15 RX ADMIN — PHENYLEPHRINE HYDROCHLORIDE 50 MCG/MIN: 10 INJECTION INTRAVENOUS at 10:16

## 2024-10-15 RX ADMIN — FENTANYL CITRATE 50 MCG: 50 INJECTION, SOLUTION INTRAMUSCULAR; INTRAVENOUS at 10:02

## 2024-10-15 RX ADMIN — ROCURONIUM BROMIDE 20 MG: 10 INJECTION, SOLUTION INTRAVENOUS at 10:57

## 2024-10-15 RX ADMIN — ATORVASTATIN CALCIUM 40 MG: 40 TABLET, FILM COATED ORAL at 16:34

## 2024-10-15 RX ADMIN — SODIUM CHLORIDE 250 ML: 9 INJECTION, SOLUTION INTRAVENOUS at 22:32

## 2024-10-15 RX ADMIN — HYDROMORPHONE HYDROCHLORIDE 0.5 MG: 1 INJECTION, SOLUTION INTRAMUSCULAR; INTRAVENOUS; SUBCUTANEOUS at 13:50

## 2024-10-15 RX ADMIN — SODIUM CHLORIDE, POTASSIUM CHLORIDE, SODIUM LACTATE AND CALCIUM CHLORIDE: 600; 310; 30; 20 INJECTION, SOLUTION INTRAVENOUS at 09:34

## 2024-10-15 RX ADMIN — EPHEDRINE SULFATE 10 MG: 50 INJECTION INTRAVENOUS at 10:11

## 2024-10-15 RX ADMIN — ACETAMINOPHEN 1000 MG: 500 TABLET ORAL at 09:10

## 2024-10-15 RX ADMIN — HYDROMORPHONE HYDROCHLORIDE 0.5 MG: 1 INJECTION, SOLUTION INTRAMUSCULAR; INTRAVENOUS; SUBCUTANEOUS at 14:00

## 2024-10-15 RX ADMIN — DEXAMETHASONE SODIUM PHOSPHATE 4 MG: 4 INJECTION INTRA-ARTICULAR; INTRALESIONAL; INTRAMUSCULAR; INTRAVENOUS; SOFT TISSUE at 16:34

## 2024-10-15 RX ADMIN — FENTANYL CITRATE 50 MCG: 50 INJECTION, SOLUTION INTRAMUSCULAR; INTRAVENOUS at 11:36

## 2024-10-15 RX ADMIN — PROPOFOL 140 MG: 10 INJECTION, EMULSION INTRAVENOUS at 10:02

## 2024-10-15 RX ADMIN — ACETAMINOPHEN 650 MG: 325 TABLET ORAL at 16:34

## 2024-10-15 RX ADMIN — Medication 140 MG: at 10:02

## 2024-10-15 RX ADMIN — ACETAMINOPHEN 650 MG: 325 TABLET ORAL at 22:05

## 2024-10-15 RX ADMIN — ROCURONIUM BROMIDE 30 MG: 10 INJECTION, SOLUTION INTRAVENOUS at 10:10

## 2024-10-15 RX ADMIN — ROCURONIUM BROMIDE 10 MG: 10 INJECTION, SOLUTION INTRAVENOUS at 10:19

## 2024-10-15 RX ADMIN — FENTANYL CITRATE 25 MCG: 50 INJECTION INTRAMUSCULAR; INTRAVENOUS at 13:20

## 2024-10-15 RX ADMIN — ROCURONIUM BROMIDE 10 MG: 10 INJECTION, SOLUTION INTRAVENOUS at 11:36

## 2024-10-15 RX ADMIN — FENTANYL CITRATE 25 MCG: 50 INJECTION INTRAMUSCULAR; INTRAVENOUS at 13:30

## 2024-10-15 RX ADMIN — ZOLPIDEM TARTRATE 10 MG: 5 TABLET ORAL at 22:07

## 2024-10-15 RX ADMIN — Medication 120 MCG: at 10:14

## 2024-10-15 RX ADMIN — ESCITALOPRAM OXALATE 10 MG: 10 TABLET ORAL at 22:08

## 2024-10-15 RX ADMIN — VANCOMYCIN HYDROCHLORIDE 1000 MG: 1 INJECTION, POWDER, LYOPHILIZED, FOR SOLUTION INTRAVENOUS at 10:20

## 2024-10-15 RX ADMIN — BUPROPION HYDROCHLORIDE 150 MG: 150 TABLET, EXTENDED RELEASE ORAL at 16:32

## 2024-10-15 RX ADMIN — SUGAMMADEX 200 MG: 100 INJECTION, SOLUTION INTRAVENOUS at 12:43

## 2024-10-15 RX ADMIN — DEXAMETHASONE SODIUM PHOSPHATE 8 MG: 4 INJECTION, SOLUTION INTRAMUSCULAR; INTRAVENOUS at 10:21

## 2024-10-15 RX ADMIN — ROCURONIUM BROMIDE 10 MG: 10 INJECTION, SOLUTION INTRAVENOUS at 10:02

## 2024-10-15 RX ADMIN — Medication 80 MCG: at 10:09

## 2024-10-15 RX ADMIN — FENTANYL CITRATE 25 MCG: 50 INJECTION INTRAMUSCULAR; INTRAVENOUS at 13:25

## 2024-10-15 RX ADMIN — FENTANYL CITRATE 25 MCG: 50 INJECTION INTRAMUSCULAR; INTRAVENOUS at 13:15

## 2024-10-15 RX ADMIN — PRAMIPEXOLE DIHYDROCHLORIDE 0.25 MG: 0.25 TABLET ORAL at 16:32

## 2024-10-15 RX ADMIN — BUMETANIDE 2 MG: 1 TABLET ORAL at 16:34

## 2024-10-15 RX ADMIN — VANCOMYCIN 1500 MG: 1.5 INJECTION, SOLUTION INTRAVENOUS at 22:34

## 2024-10-15 RX ADMIN — WATER 2000 MG: 1 INJECTION INTRAMUSCULAR; INTRAVENOUS; SUBCUTANEOUS at 10:10

## 2024-10-15 RX ADMIN — WATER 2000 MG: 1 INJECTION INTRAMUSCULAR; INTRAVENOUS; SUBCUTANEOUS at 18:34

## 2024-10-15 RX ADMIN — ROCURONIUM BROMIDE 10 MG: 10 INJECTION, SOLUTION INTRAVENOUS at 12:11

## 2024-10-15 ASSESSMENT — PAIN SCALES - GENERAL
PAINLEVEL_OUTOF10: 6
PAINLEVEL_OUTOF10: 8
PAINLEVEL_OUTOF10: 7
PAINLEVEL_OUTOF10: 5

## 2024-10-15 ASSESSMENT — PAIN DESCRIPTION - PAIN TYPE: TYPE: SURGICAL PAIN

## 2024-10-15 ASSESSMENT — PAIN DESCRIPTION - LOCATION
LOCATION: NECK;SHOULDER
LOCATION: NECK;SHOULDER
LOCATION: NECK;INCISION
LOCATION: NECK

## 2024-10-15 ASSESSMENT — PAIN DESCRIPTION - DESCRIPTORS: DESCRIPTORS: THROBBING

## 2024-10-15 ASSESSMENT — PAIN DESCRIPTION - ORIENTATION
ORIENTATION: ANTERIOR
ORIENTATION: ANTERIOR

## 2024-10-15 ASSESSMENT — PAIN - FUNCTIONAL ASSESSMENT
PAIN_FUNCTIONAL_ASSESSMENT: 0-10
PAIN_FUNCTIONAL_ASSESSMENT: ACTIVITIES ARE NOT PREVENTED
PAIN_FUNCTIONAL_ASSESSMENT: PREVENTS OR INTERFERES SOME ACTIVE ACTIVITIES AND ADLS

## 2024-10-15 ASSESSMENT — PAIN DESCRIPTION - FREQUENCY: FREQUENCY: INTERMITTENT

## 2024-10-15 ASSESSMENT — PAIN DESCRIPTION - ONSET: ONSET: ON-GOING

## 2024-10-15 NOTE — PROGRESS NOTES
Spine Surgery Progress Note  Tasha Jimenez PA-C          Admit Date: 10/15/2024   LOS: 0 days        Daily Progress Note: 10/15/2024    POD:Day of Surgery    S/P: Procedure(s):  C4-C7 ANTERIOR CERVICAL DISCECTOMY AND FUSION    Subjective:     Mr. Gastelum is a pleasant 78yo gentleman with an extensive PMH who presents at this time for elective ACDF surgery. He tolerated the procedure well. Prior to surgery, pt right hand essentially non-functioning. RLE very weak as well. Pt uses a cane or walker to ambulate.  Post operatively, pt is resting comfortably in bed. Pain well-controlled. He denies chest pain, nausea, vomiting, headache, and dyspnea.     Objective:     Vital signs  Temp (24hrs), Av.7 °F (36.5 °C), Min:97 °F (36.1 °C), Max:98.6 °F (37 °C)   10/15 07 - 10/15 1900  In: 250   Out: 300 [Urine:300]  No intake/output data recorded.    /70   Pulse 75   Temp 97.6 °F (36.4 °C) (Oral)   Resp 18   Ht 1.829 m (6' 0.01\")   Wt 99.7 kg (219 lb 12.8 oz)   SpO2 97%   BMI 29.80 kg/m²            Voiding status: urostomy         Pain control       PT/OT  Gait              Physical Exam:  Gen: No acute distress.   Neuro: A&Ox3. Follows commands. Speech clear. Affect normal.  PERRL. EOMI. Face symmetric. Palate symmetric. Tongue midline.  R hand/wrist 1/5 strength. Able to briefly lift right arm off bed. LUE 4-/5 strength. BLE moving spontaneously, wiggles toes bilat.   Sensation diminished all extremities more so in RUE  Gait deferred.  Calves soft and supple; No pain with passive stretch  Skin: Incision C/D/I  Neck soft  HVAC intact and functioning    24 hour results:    No results found for this or any previous visit (from the past 24 hour(s)).       Assessment:     Principal Problem:    Cervical stenosis of spinal canal  Resolved Problems:    * No resolved hospital problems. *      Plan:     s/p C4-7 ACDF  -PT/OT - in collar  -Decadron 4mg q 8hr  -Post op vanc & ancef    -Pain control - scheduled

## 2024-10-15 NOTE — PERIOP NOTE
TRANSFER - OUT REPORT:    Verbal report given to Veronica DIEGO(name) on Robert Gastelum  being transferred to 540(unit) for routine post-op       Report consisted of patient’s Situation, Background, Assessment and   Recommendations(SBAR).     Time Pre op antibiotic given:1010  Anesthesia Stop time: 1308    Information from the following report(s) SBAR, Kardex, OR Summary, Procedure Summary, Intake/Output, MAR, and Cardiac Rhythm NSR  was reviewed with the receiving nurse.    Opportunity for questions and clarification was provided.     Is the patient on 02? Yes       L/Min 4       Other n/a    Is the patient on a monitor? No    Is the nurse transporting with the patient? No    At transfer, are there Patient Belongings with the patient?  If Yes, please note/list:  Bag of clothing   Glasses  Cane      Surgical Waiting Area notified of patient's transfer from PACU? Yes

## 2024-10-15 NOTE — FLOWSHEET NOTE
10/15/24 1047   Family Communication    Relationship to Patient Spouse   Family/Significant Other Update Called   Delivery Origin Nurse   Message Disposition Family present - message delivered   Update Given Yes   Family Communication   Family Update Message Procedure started;Patient stable

## 2024-10-15 NOTE — BRIEF OP NOTE
Brief Postoperative Note      Patient: Robert Gastelum  YOB: 1945  MRN: [id number]    Date of Procedure: 10/15/2024    Pre-Op Diagnosis Codes:      * Cervical spinal stenosis [M48.02]     * Other cord compression (HCC) [G95.29]    Post-Op Diagnosis: Same       Procedure(s):  C4-C7 ANTERIOR CERVICAL DISCECTOMY AND FUSION    Surgeon(s):  Kip Hurst MD    Assistant:  Surgical Assistant: Derick Tracy    Anesthesia: General    Estimated Blood Loss (mL): less than 50     Complications: None    Specimens:   * No specimens in log *    Implants:  Implant Name Type Inv. Item Serial No.  Lot No. LRB No. Used Action   GRAFT BNE MED - [id number]  GRAFT BNE MED [id number] [employer]OLOG[employer] NA N/A 1 Implanted   GRAFT BNE SUB SM 1ML CRYOPRESERVED VIABLE ERIKA CANC BNE - [id number]  GRAFT BNE SUB SM 1ML CRYOPRESERVED VIABLE ERIKA CANC BNE [id number] [facility] TISSUE [facility] NA N/A 1 Implanted   GRAFT BNE SUB SM 1ML CRYOPRESERVED VIABLE ERIKA CANC BNE - [id number]  GRAFT BNE SUB SM 1ML CRYOPRESERVED VIABLE ERIKA CANC BNE [id number] [facility] NA N/A 1 Implanted   SPACER SPNL LORDTC 360 DEG STD [id number] MM STRL ACIS PROTI - SNA  SPACER SPNL LORDTC 360 DEG STD [id number] MM STRL ACIS PROTI NA [employer] SPINE- [id number] N/A 1 Implanted   SPACER SPNL LORDTC 360 DEG STD [id number] MM STRL ACIS PROTI - SNA  SPACER SPNL LORDTC 360 DEG STD [id number] MM STRL ACIS PROTI NA [facility] [employer] SPINE- [id number] N/A 1 Implanted   SPACER SPNL LORDTC 360 DEG STD [id number] MM STRL ACIS PROTI - SNA  SPACER SPNL LORDTC 360 DEG STD [id number] MM STRL ACIS PROTI NA [facility] [employer] SPINE- [id number] N/A 1 Implanted   SCREW SPNL L16MM DIA4MM ANT CERV TI SELF DRL SOLIS ANG FULL - SNA  SCREW SPNL L16MM DIA4MM ANT CERV TI SELF DRL SOLIS ANG FULL NA [facility] [employer]PU[employer] SPINE-WD NA N/A 6 Implanted   SCREW SPNL L16MM DIA4MM ANT CERV TI SELF DRL FULL THRD - SNA  SCREW SPNL L16MM DIA4MM ANT CERV TI SELF DRL FULL THRD  NA [employer] SPINE-WD NA N/A 2 Implanted   PLATE SPNL L51MM ANT BILAT CERV TI 3 LEV VIDHYA HYBRID SKYLINE - SNA  PLATE SPNL L51MM ANT BILAT CERV TI 3 LEV VIDHYA HYBRID SKYLINE NA [employer] DE[employer]ES SPINE-WD NA N/A 1 Implanted         Drains:   Closed/Suction Drain Left;Anterior Neck Accordion (Active)       Urostomy (Active)       [REMOVED] NG/OG/NJ/NE Tube Nasogastric 14 fr Right nostril (Removed)       Findings:  Infection Present At Time Of Surgery (PATOS) (choose all levels that have infection present):  No infection present  Other Findings: stenosis    Electronically signed by Kip Hurst MD on 10/15/2024 at 12:56 PM

## 2024-10-15 NOTE — ANESTHESIA PRE PROCEDURE
Department of Anesthesiology  Preprocedure Note       Name:  Robert Gastelum   Age:  79 y.o.  :  1945                                          MRN:  [id number]         Date:  10/15/2024      Surgeon: Surgeon(s):  Kip Hurst MD    Procedure: Procedure(s):  C4-C7 ANTERIOR CERVICAL DISCECTOMY AND FUSION    Medications prior to admission:   Prior to Admission medications    Medication Sig Start Date End Date Taking? Authorizing Provider   buPROPion (WELLBUTRIN SR) 100 MG extended release tablet Take by mouth   Yes Sloan Camejo MD   simvastatin (ZOCOR) 5 MG tablet Take by mouth   Yes Sloan Camejo MD   acetaminophen (TYLENOL 8 HOUR) 650 MG extended release tablet Take by mouth   Yes ProviderSloan MD   Multiple Vitamins-Minerals (THERAPEUTIC MULTIVITAMIN-MINERALS) tablet Take 1 tablet by mouth daily   Yes Sloan Camejo MD   apixaban (ELIQUIS) 5 MG TABS tablet Take 1 tablet by mouth 2 times daily Start after completing 10 mg tablets. 24  Yes Janna Meyers APRN - NP   zolpidem (AMBIEN) 10 MG tablet Take 1 tablet by mouth nightly.   Yes Sloan Camejo MD   escitalopram (LEXAPRO) 10 MG tablet Take 1 tablet by mouth nightly   Yes Automatic Reconciliation, Ar   ipratropium 0.5 mg-albuterol 2.5 mg (DUONEB) 0.5-2.5 (3) MG/3ML SOLN nebulizer solution Inhale 3 mLs into the lungs every 4 hours as needed for Shortness of Breath or Wheezing  Patient not taking: Reported on 10/15/2024 9/14/24   Steff Isaacs MD   albuterol sulfate HFA (VENTOLIN HFA) 108 (90 Base) MCG/ACT inhaler Inhale 2 puffs into the lungs 4 times daily as needed for Wheezing  Patient not taking: Reported on 10/15/2024 9/14/24   Steff Isaacs MD   Pediatric Multivitamins-Fl (MULTIVITAMIN + FLUORIDE) 0.25 MG CHEW Every Day    Sloan Camejo MD   sodium bicarbonate 650 MG tablet Take 1 tablet 3 times a day by oral route.  Patient not taking: Reported on 10/15/2024    Sloan Camejo MD

## 2024-10-15 NOTE — FLOWSHEET NOTE
Patient ambulated at this time using the Log-Roll method to get out of bed. Able to use rolling walker and took a total of  25 steps. Gait observed slow and unsteady, Patient c/o of room spinning, denied N/V, denied any headache upon ambulation. Sitting in the chair at this time , call light placed within reach.

## 2024-10-15 NOTE — ANESTHESIA POSTPROCEDURE EVALUATION
Department of Anesthesiology  Postprocedure Note    Patient: Robert Gastelum  MRN: [id number]  YOB: 1945  Date of evaluation: 10/15/2024    Procedure Summary       Date: 10/15/24 Room / Location: [facility] MAIN OR  / [facility] MAIN OR    Anesthesia Start: 0952 Anesthesia Stop: 1308    Procedure: C4-C7 ANTERIOR CERVICAL DISCECTOMY AND FUSION (Neck) Diagnosis:       Cervical spinal stenosis      Other cord compression (HCC)      (Cervical spinal stenosis [M48.02])      (Other cord compression (HCC) [G95.29])    Providers: Kip Hurst MD Responsible Provider: Kaylin Trent DO    Anesthesia Type: General ASA Status: 2            Anesthesia Type: General    Rhett Phase I: Rhett Score: 8    Rhett Phase II:      Anesthesia Post Evaluation    Patient location during evaluation: PACU  Patient participation: complete - patient participated  Level of consciousness: awake and alert  Pain score: 0  Airway patency: patent  Nausea & Vomiting: no nausea and no vomiting  Cardiovascular status: blood pressure returned to baseline and hemodynamically stable  Respiratory status: acceptable and room air  Hydration status: euvolemic  Multimodal analgesia pain management approach  Pain management: adequate and satisfactory to patient    No notable events documented.

## 2024-10-15 NOTE — FLOWSHEET NOTE
10/15/24 1241   Incision 10/15/24 Neck Left;Anterior   Date First Assessed/Time First Assessed: 10/15/24 1241   Present on Original Admission: No  Location: Neck  Incision Location Orientation: Left;Anterior   Dressing Status Clean;Dry;Intact   Incision Cleansed Cleansed with saline   Dressing/Treatment Surgical glue;Other (comment)  (Island dressing)   Closure Sutures;Surgical glue   Margins Approximated

## 2024-10-16 LAB
ANION GAP SERPL CALC-SCNC: 5 MMOL/L (ref 2–12)
BASOPHILS # BLD: 0 K/UL (ref 0–0.1)
BASOPHILS NFR BLD: 0 % (ref 0–1)
BUN SERPL-MCNC: 39 MG/DL (ref 6–20)
BUN/CREAT SERPL: 17 (ref 12–20)
CALCIUM SERPL-MCNC: 9.7 MG/DL (ref 8.5–10.1)
CHLORIDE SERPL-SCNC: 106 MMOL/L (ref 97–108)
CO2 SERPL-SCNC: 25 MMOL/L (ref 21–32)
CREAT SERPL-MCNC: 2.24 MG/DL (ref 0.7–1.3)
DIFFERENTIAL METHOD BLD: ABNORMAL
EOSINOPHIL # BLD: 0 K/UL (ref 0–0.4)
EOSINOPHIL NFR BLD: 0 % (ref 0–7)
ERYTHROCYTE [DISTWIDTH] IN BLOOD BY AUTOMATED COUNT: 14.8 % (ref 11.5–14.5)
GLUCOSE SERPL-MCNC: 153 MG/DL (ref 65–100)
HCT VFR BLD AUTO: 33.1 % (ref 36.6–50.3)
HGB BLD-MCNC: 10.6 G/DL (ref 12.1–17)
IMM GRANULOCYTES # BLD AUTO: 0.1 K/UL (ref 0–0.04)
IMM GRANULOCYTES NFR BLD AUTO: 1 % (ref 0–0.5)
LYMPHOCYTES # BLD: 0.3 K/UL (ref 0.8–3.5)
LYMPHOCYTES NFR BLD: 4 % (ref 12–49)
MCH RBC QN AUTO: 30.6 PG (ref 26–34)
MCHC RBC AUTO-ENTMCNC: 32 G/DL (ref 30–36.5)
MCV RBC AUTO: 95.7 FL (ref 80–99)
MONOCYTES # BLD: 0.4 K/UL (ref 0–1)
MONOCYTES NFR BLD: 5 % (ref 5–13)
NEUTS SEG # BLD: 7 K/UL (ref 1.8–8)
NEUTS SEG NFR BLD: 90 % (ref 32–75)
NRBC # BLD: 0 K/UL (ref 0–0.01)
NRBC BLD-RTO: 0 PER 100 WBC
PLATELET # BLD AUTO: 180 K/UL (ref 150–400)
PMV BLD AUTO: 9.2 FL (ref 8.9–12.9)
POTASSIUM SERPL-SCNC: 4.2 MMOL/L (ref 3.5–5.1)
RBC # BLD AUTO: 3.46 M/UL (ref 4.1–5.7)
RBC MORPH BLD: ABNORMAL
SODIUM SERPL-SCNC: 136 MMOL/L (ref 136–145)
WBC # BLD AUTO: 7.8 K/UL (ref 4.1–11.1)

## 2024-10-16 PROCEDURE — 97530 THERAPEUTIC ACTIVITIES: CPT

## 2024-10-16 PROCEDURE — 1200000000 HC SEMI PRIVATE

## 2024-10-16 PROCEDURE — 36415 COLL VENOUS BLD VENIPUNCTURE: CPT

## 2024-10-16 PROCEDURE — 6360000002 HC RX W HCPCS: Performed by: NEUROLOGICAL SURGERY

## 2024-10-16 PROCEDURE — 97116 GAIT TRAINING THERAPY: CPT

## 2024-10-16 PROCEDURE — 97167 OT EVAL HIGH COMPLEX 60 MIN: CPT

## 2024-10-16 PROCEDURE — 97161 PT EVAL LOW COMPLEX 20 MIN: CPT

## 2024-10-16 PROCEDURE — 85025 COMPLETE CBC W/AUTO DIFF WBC: CPT

## 2024-10-16 PROCEDURE — 80048 BASIC METABOLIC PNL TOTAL CA: CPT

## 2024-10-16 PROCEDURE — 97535 SELF CARE MNGMENT TRAINING: CPT

## 2024-10-16 PROCEDURE — 6370000000 HC RX 637 (ALT 250 FOR IP): Performed by: NEUROLOGICAL SURGERY

## 2024-10-16 PROCEDURE — 99024 POSTOP FOLLOW-UP VISIT: CPT | Performed by: PHYSICIAN ASSISTANT

## 2024-10-16 PROCEDURE — 2580000003 HC RX 258: Performed by: NEUROLOGICAL SURGERY

## 2024-10-16 RX ADMIN — ACETAMINOPHEN 650 MG: 325 TABLET ORAL at 16:37

## 2024-10-16 RX ADMIN — OXYCODONE 5 MG: 5 TABLET ORAL at 00:27

## 2024-10-16 RX ADMIN — BUMETANIDE 2 MG: 1 TABLET ORAL at 10:02

## 2024-10-16 RX ADMIN — DEXAMETHASONE SODIUM PHOSPHATE 4 MG: 4 INJECTION INTRA-ARTICULAR; INTRALESIONAL; INTRAMUSCULAR; INTRAVENOUS; SOFT TISSUE at 06:22

## 2024-10-16 RX ADMIN — OXYCODONE 5 MG: 5 TABLET ORAL at 13:51

## 2024-10-16 RX ADMIN — SODIUM CHLORIDE, PRESERVATIVE FREE 10 ML: 5 INJECTION INTRAVENOUS at 00:17

## 2024-10-16 RX ADMIN — SODIUM CHLORIDE, PRESERVATIVE FREE 10 ML: 5 INJECTION INTRAVENOUS at 11:15

## 2024-10-16 RX ADMIN — ESCITALOPRAM OXALATE 10 MG: 10 TABLET ORAL at 21:21

## 2024-10-16 RX ADMIN — SENNOSIDES AND DOCUSATE SODIUM 1 TABLET: 50; 8.6 TABLET ORAL at 21:21

## 2024-10-16 RX ADMIN — ACETAMINOPHEN 650 MG: 325 TABLET ORAL at 10:02

## 2024-10-16 RX ADMIN — DEXAMETHASONE SODIUM PHOSPHATE 4 MG: 4 INJECTION INTRA-ARTICULAR; INTRALESIONAL; INTRAMUSCULAR; INTRAVENOUS; SOFT TISSUE at 00:13

## 2024-10-16 RX ADMIN — ATORVASTATIN CALCIUM 40 MG: 40 TABLET, FILM COATED ORAL at 10:03

## 2024-10-16 RX ADMIN — ACETAMINOPHEN 650 MG: 325 TABLET ORAL at 21:21

## 2024-10-16 RX ADMIN — OXYCODONE 10 MG: 5 TABLET ORAL at 10:05

## 2024-10-16 RX ADMIN — ZOLPIDEM TARTRATE 10 MG: 5 TABLET ORAL at 21:21

## 2024-10-16 RX ADMIN — OXYCODONE 5 MG: 5 TABLET ORAL at 21:21

## 2024-10-16 RX ADMIN — SENNOSIDES AND DOCUSATE SODIUM 1 TABLET: 50; 8.6 TABLET ORAL at 10:03

## 2024-10-16 RX ADMIN — BUPROPION HYDROCHLORIDE 150 MG: 150 TABLET, EXTENDED RELEASE ORAL at 10:03

## 2024-10-16 RX ADMIN — ACETAMINOPHEN 650 MG: 325 TABLET ORAL at 06:22

## 2024-10-16 ASSESSMENT — PAIN SCALES - GENERAL
PAINLEVEL_OUTOF10: 6
PAINLEVEL_OUTOF10: 7
PAINLEVEL_OUTOF10: 5

## 2024-10-16 ASSESSMENT — PAIN DESCRIPTION - LOCATION: LOCATION: BACK;NECK

## 2024-10-16 NOTE — OP NOTE
[address]  [address]                            OPERATIVE REPORT      PATIENT NAME: PRIYANK GARCIA              : 1945  MED REC NO: [id number]                       ROOM: 540  ACCOUNT NO: [id number]                       ADMIT DATE: 10/15/2024  PROVIDER: Kip Hurst MD    DATE OF SERVICE:  10/15/2024    PREOPERATIVE DIAGNOSES:  Cervical stenosis, C4 through 7 with myelopathy.    POSTOPERATIVE DIAGNOSES:  Cervical stenosis, C4 through 7 with myelopathy.    PROCEDURES PERFORMED:  C4-5, C5-6, C6-7 anterior cervical diskectomy with instrumented fusion C4-7 using operating microscope, DePuy PEEK allograft spacers and DePuy freestanding anterior skyline plate, use of operating microscope.    SURGEON:  Kip Hurst MD    ASSISTANT:  Derick Tracy.    ANESTHESIA:  General endotracheal anesthesia.    ESTIMATED BLOOD LOSS:  Less than 50 mL.    SPECIMENS REMOVED:  None.    INTRAOPERATIVE FINDINGS:  Severe stenosis from disk osteophyte complexes.     COMPLICATIONS:  None.    IMPLANTS:  As noted above.    INDICATIONS:  This is a 79-year-old gentleman with progressive myelopathy.  Workup revealed multilevel spondylosis with tight stenosis C5-6 and C6-7 and to a lesser extent C4-5.  After discussing treatment options and risks of surgery, informed consent was obtained.    DESCRIPTION OF PROCEDURE:  The patient was taken to the operating room, placed under general endotracheal anesthesia.  All necessary lines and monitors were placed, given appropriate dose of IV antibiotics.  SCDs were placed.  He was placed supine on the operating table.  All pressure points were padded, neck in gentle extension, arms tucked by the side.  All pressure points were padded.  The anterior cervical region was prepped and draped in a standard sterile fashion.  Incision was made from the midline to the left sternocleidomastoid with a skin knife, carried down with  Bovie electrocautery through the subcutaneous tissue.  The platysma was undermined rostrally and caudally.  A large superficial vein was ligated and divided.  The plane medial sternocleidomastoid was identified and via sharp dissection carried down to the prevertebral space, trachea and esophagus retracted medially, carotid sheath laterally.  Localizing x-ray was obtained with fluoroscopy.  The anterior vertebral bodies of C4, 5, 6 and 7 were cleaned off and the longus coli were undermined bilaterally.  Significant anterior osteophytes were rongeured.  Starting at C6-7, self-retaining retractor was placed.  Distraction was placed at the disc space.  Operating scope was brought into the field.  Under the microscope, a complete diskectomy was performed removing the entire disk and a thickened PLL down to the dura level.  There was significant disc osteophyte as well as significant foraminal compression on the right hand side.  I widely decompressed and skeletonized the bilateral foramina and the thecal sac.  The endplates were decorticated.  A 10 mm DePuy PEEK spacer packed with [employer] allograft was packed in the interspace, countersunk a millimeter.  I then repeated the process at C5-6, removing the entire disk down to the dura and opening up the PLL and widely decompressing the spinal cord and the neural foramina.  Similarly, a disk osteophyte was causing significant compression of the foramina as well as the central cord.  Another PEEK spacer was placed.  I repeated the process for C4-5.  The scope was then taken out of the field.  Retractors were adjusted.  I then used fluoroscopy and sized a 51 mm DePuy skyline plate.  I placed 16 mm fixed screws in C7 bilaterally and variable angle screws in C4, 5 and 6 bilaterally with good purchase.  Locking mechanisms were engaged.  X-ray showed good position of the grafts and the hardware.  The wound was irrigated.  Hemostasis was achieved.  A Hemovac drain was placed,

## 2024-10-16 NOTE — PLAN OF CARE
Problem: Occupational Therapy - Adult  Goal: By Discharge: Performs self-care activities at highest level of function for planned discharge setting.  See evaluation for individualized goals.  Description: Occupational Therapy Goals  Initiated:  10/16/2024   1.  Patient will perform grooming with supervision/set-up sitting in the chair within 7 day(s).  2.  Patient will perform bathing with mod A from chair within 7 day(s).  3.  Patient will perform upper body dressing and lower body dressing with mod A  within 7 day(s).  4.  Patient will perform toilet transfers with CG within 7 day(s).  5.  Patient will perform all aspects of toileting with CG within 7 day(s).  6.  Patient will be independent with cervical neck precautions precautions within 3 day(s).      FUNCTIONAL STATUS PRIOR TO ADMISSION:    Receives Help From: Family, ADL Assistance: Independent,  ,  ,  ,  ,  , Homemaking Assistance: Independent, Ambulation Assistance: Independent, Transfer Assistance: Independent, Active : Yes (hasn't driven for 2 weeks)     HOME SUPPORT: Pt lives with wife.      Outcome: Progressing     OCCUPATIONAL THERAPY EVALUATION    Patient: Robert Gastelum (79 y.o. male)  Date: 10/16/2024  Primary Diagnosis: Cervical spinal stenosis [M48.02]  Other cord compression (HCC) [G95.29]  Cervical stenosis of spinal canal [M48.02]  Procedure(s) (LRB):  C4-C7 ANTERIOR CERVICAL DISCECTOMY AND FUSION (N/A) 1 Day Post-Op     Precautions:                    ASSESSMENT :  The patient is limited by decreased functional mobility, independence in ADLs, high-level IADLs, ROM, strength, sensation, body mechanics, activity tolerance, endurance, balance, proprioception, increased pain levels.  Patient admitted for C4-7 ACDF due to right sided weakness and sensory changes.  Patient reporting numerous admission in the past, most recently in Sept.  Pt working with out-patient lymphedema due to h/o DVT in LLE and swelling.  He is typically independent  X2;Minimum assistance  Toilet Transfer: Assist X2;Minimum assistance          Functional Mobility: Minimal assistance          Balance:      Balance  Sitting: Impaired  Sitting - Static: Good (unsupported)  Sitting - Dynamic: Fair (occasional)  Standing: Impaired  Standing - Static: Good;Constant support  Standing - Dynamic: Fair;Constant support      ADL Assessment:          Feeding: Setup  Feeding Skilled Clinical Factors: using non-dominant hand for self feeding,  needs support for all containers    Grooming: Minimal assistance  Grooming Skilled Clinical Factors: using non-dominant hand for most activities    UE Bathing: Moderate assistance       Skin Care: Chlorhexidine wipes    LE Bathing: Maximum assistance       UE Dressing: Maximum assistance       LE Dressing: Maximum assistance       Toileting: Maximum assistance  Toileting Skilled Clinical Factors: assisted with posterior hygiene while in the bathroom         Functional Mobility: Minimal assistance  Functional Mobility Skilled Clinical Factors: min A x 2 persons due to right sided weakness.         ADL Intervention and task modifications:    Mobility to and from the bathroom completed with RW.  Able to transfer on/off commode with min A overall using RW.  He has difficulty sustaining  with his right hand at this time.  Modified walker with rolled wash clothe and tape to build up right handle in hopes to improve right hand .      [facility] AM-PACTM \"6 Clicks\"                                                       Daily Activity Inpatient Short Form  AM-PAC Daily Activity - Inpatient   How much help is needed for putting on and taking off regular lower body clothing?: A Lot  How much help is needed for bathing (which includes washing, rinsing, drying)?: A Lot  How much help is needed for toileting (which includes using toilet, bedpan, or urinal)?: A Little  How much help is needed for putting on and taking off regular upper body clothing?: A

## 2024-10-16 NOTE — PLAN OF CARE
Problem: Safety - Adult  Goal: Free from fall injury  10/16/2024 0514 by Vidhya Allen RN  Outcome: Progressing  10/16/2024 0513 by Vidhya Allen RN  Outcome: Progressing  10/15/2024 1926 by Veronica Madrigal RN  Outcome: Progressing  Flowsheets (Taken 10/15/2024 1515)  Free From Fall Injury:   Instruct family/caregiver on patient safety   Based on caregiver fall risk screen, instruct family/caregiver to ask for assistance with transferring infant if caregiver noted to have fall risk factors     Problem: Pain  Goal: Verbalizes/displays adequate comfort level or baseline comfort level  10/16/2024 0514 by Vidhya Allen RN  Outcome: Progressing  10/16/2024 0513 by Vidhya Allen RN  Outcome: Progressing  10/15/2024 1926 by Veronica Madrigal RN  Outcome: Progressing  Flowsheets (Taken 10/15/2024 1632)  Verbalizes/displays adequate comfort level or baseline comfort level:   Encourage patient to monitor pain and request assistance   Assess pain using appropriate pain scale   Notify Licensed Independent Practitioner if interventions unsuccessful or patient reports new pain   Implement non-pharmacological measures as appropriate and evaluate response   Administer analgesics based on type and severity of pain and evaluate response   Consider cultural and social influences on pain and pain management     Problem: Skin/Tissue Integrity - Adult  Goal: Incisions, wounds, or drain sites healing without S/S of infection  10/16/2024 0514 by Vidhya Allen RN  Outcome: Progressing  10/16/2024 0513 by Vidhya Allen RN  Outcome: Progressing  10/15/2024 1926 by Veronica Madrigal RN  Outcome: Progressing     Problem: Musculoskeletal - Adult  Goal: Return ADL status to a safe level of function  10/16/2024 0514 by Vidhya Allen RN  Outcome: Progressing  10/16/2024 0513 by Vidhya Allen RN  Outcome: Progressing  10/15/2024 1926 by Veronica Madrigal RN  Outcome: Progressing

## 2024-10-16 NOTE — DISCHARGE INSTRUCTIONS
dissolve, or inject your medicine. If you cannot  swallow your medicine whole, talk to your healthcare provider.  -Do not drink alcohol while taking this medicine  -Do not take anti-inflammatory medications or aspirin unless instructed by your   physician.

## 2024-10-16 NOTE — PROGRESS NOTES
Spine Surgery Progress Note  Tasha Jimenez PA-C          Admit Date: 10/15/2024   LOS: 1 day        Daily Progress Note: 10/16/2024    POD:1 Day Post-Op    S/P: Procedure(s):  C4-C7 ANTERIOR CERVICAL DISCECTOMY AND FUSION    Subjective:     Mr. Gastelum is a pleasant 80yo gentleman with an extensive PMH who presents at this time for elective ACDF surgery. He tolerated the procedure well. Prior to surgery, pt right hand essentially non-functioning. RLE very weak as well. Pt uses a cane or walker to ambulate.   On POD#1, patient is doing well overall. Pain is minimal. He endorses a sore throat but is able to swallow. He is unsure if arms and legs are any better than preop. He has not been out of bed yet today. Urostomy bag working. Passing gas but no BM yet.   He denies chest pain, nausea, vomiting, headache, and dyspnea. He is wheezing some which pt states \"happens every time I am in the hospital\". Typically breathing treatments help.    Objective:     Vital signs  Temp (24hrs), Av.6 °F (36.4 °C), Min:97 °F (36.1 °C), Max:98.1 °F (36.7 °C)   No intake/output data recorded.  10/14 1901 - 10/16 0700  In: 250   Out: 2975 [Urine:2925; Drains:50]    BP (!) 144/79   Pulse 79   Temp 97.9 °F (36.6 °C) (Oral)   Resp 16   Ht 1.829 m (6' 0.01\")   Wt 99.7 kg (219 lb 12.8 oz)   SpO2 98%   BMI 29.80 kg/m²            Voiding status: urostomy         Pain control       PT/OT  Gait              Physical Exam:  Gen: No acute distress.   Neuro: A&Ox3. Follows commands. Speech clear. Affect normal.  PERRL. EOMI. Face symmetric. Palate symmetric. Tongue midline.  R wrist 1/5 strength, right  3-/5. Able to briefly lift right arm off bed. LUE 4-/5 strength. BLE moving spontaneously, wiggles toes bilat. Able to lift both legs off bed without issue.  Sensation diminished all extremities more so in RUE  Gait deferred.  Calves soft and supple; No pain with passive stretch  Skin: Incision C/D/I  Neck soft  HVAC intact and

## 2024-10-16 NOTE — CARE COORDINATION
Transition of Care Plan:    RUR: 20%   Prior Level of Functioning: Independent   Preferred Pharmacy  [facility] [id number] [place], [place] - [facility] [address] - P [phone] - F [phone] [[id number]]   Disposition: IPR  If SNF or IPR: Date FOC offered: 10/16  Date FOC received: 10/16  Pending facility: [facility]   Pt preference  Date authorization started with reference number: N/A   Date authorization received and expires: N/A   Follow up appointments: PCP  DME needed: None   Transportation at discharge: BLS   IM/IMM Medicare/ letter given: Received   Emergency Contact: Libby Hancock (Spouse) [phone]   Discharge Caregiver contacted prior to discharge? N/A   Care Conference needed? N/A   Barriers to discharge: Medical, Placement, Drain,   S/P: C4-C7 ANTERIOR CERVICAL DISCECTOMY AND FUSION   Following: Neuro, PT/OT     10/16/24 1440   Service Assessment   Patient Orientation Alert and Oriented   Cognition Alert   History Provided By Patient   Primary Caregiver Self   Support Systems Spouse/Significant Other   Patient's Healthcare Decision Maker is: Legal Next of Kin   PCP Verified by CM Yes   Last Visit to PCP Within last 3 months   Prior Functional Level Independent in ADLs/IADLs   Can patient return to prior living arrangement Unknown at present   Ability to make needs known: Good   Family able to assist with home care needs: Yes   Financial Resources Medicare   Social/Functional History   Lives With Spouse   Type of Home Condo   Home Layout One level   Home Access Elevator   Bathroom Shower/Tub Walk-in shower   Bathroom Toilet Standard   Bathroom Equipment Grab bars in shower;Grab bars around toilet;Built-in shower seat   Bathroom Accessibility Accessible;Walker accessible   Home Equipment Walker - Rolling;Rollator   Receives Help From Family   ADL Assistance Independent   Active  Yes   Occupation Retired   Discharge Planning   Living Arrangements Spouse/Significant Other   Current

## 2024-10-16 NOTE — PLAN OF CARE
Problem: Physical Therapy - Adult  Goal: By Discharge: Performs mobility at highest level of function for planned discharge setting.  See evaluation for individualized goals.  Description: FUNCTIONAL STATUS PRIOR TO ADMISSION: Patient was modified independent using a rolling walker and rollator for functional mobility.    HOME SUPPORT PRIOR TO ADMISSION: The patient lived with wife in a handicap friendly apartment but did not require assistance.    Physical Therapy Goals  Initiated 10/16/2024  1.  Patient will move from supine to sit and sit to supine, scoot up and down, and roll side to side in bed with independence within 4 day(s).    2.  Patient will perform sit to stand with supervision/set-up within 4 day(s).  3.  Patient will transfer from bed to chair and chair to bed with supervision/set-up using the least restrictive device within 4 day(s).  4.  Patient will ambulate with minimal assistance for 150 feet with the least restrictive device within 4 day(s).   5.  Patient will ascend/descend 4 stairs with 1 handrail(s) with supervision/set-up within 4 day(s).  6. Patient will verbalize and demonstrate understanding of spinal precautions (No bending, lifting greater than 5 lbs, or twisting; log-roll technique; frequent repositioning as instructed) within 4 days.    Outcome: Progressing     PHYSICAL THERAPY EVALUATION    Patient: Robert Gastelum (79 y.o. male)  Date: 10/16/2024  Primary Diagnosis: Cervical spinal stenosis [M48.02]  Other cord compression (HCC) [G95.29]  Cervical stenosis of spinal canal [M48.02]  Procedure(s) (LRB):  C4-C7 ANTERIOR CERVICAL DISCECTOMY AND FUSION (N/A) 1 Day Post-Op   Precautions:                        ASSESSMENT :   DEFICITS/IMPAIRMENTS:   The patient is limited by decreased functional mobility, independence in ADLs, high-level IADLs, ROM, strength, sensation, body mechanics, activity tolerance, endurance, balance, increased pain levels     Based on the objective data described

## 2024-10-17 ENCOUNTER — APPOINTMENT (OUTPATIENT)
Facility: HOSPITAL | Age: 79
End: 2024-10-17
Attending: NEUROLOGICAL SURGERY
Payer: MEDICARE

## 2024-10-17 LAB
ANION GAP SERPL CALC-SCNC: 5 MMOL/L (ref 2–12)
BUN SERPL-MCNC: 41 MG/DL (ref 6–20)
BUN/CREAT SERPL: 19 (ref 12–20)
CALCIUM SERPL-MCNC: 10 MG/DL (ref 8.5–10.1)
CHLORIDE SERPL-SCNC: 107 MMOL/L (ref 97–108)
CO2 SERPL-SCNC: 27 MMOL/L (ref 21–32)
COMMENT:: NORMAL
CREAT SERPL-MCNC: 2.19 MG/DL (ref 0.7–1.3)
GLUCOSE SERPL-MCNC: 88 MG/DL (ref 65–100)
POTASSIUM SERPL-SCNC: 3.7 MMOL/L (ref 3.5–5.1)
SODIUM SERPL-SCNC: 139 MMOL/L (ref 136–145)
SPECIMEN HOLD: NORMAL

## 2024-10-17 PROCEDURE — 80048 BASIC METABOLIC PNL TOTAL CA: CPT

## 2024-10-17 PROCEDURE — 97110 THERAPEUTIC EXERCISES: CPT

## 2024-10-17 PROCEDURE — 97535 SELF CARE MNGMENT TRAINING: CPT

## 2024-10-17 PROCEDURE — 2580000003 HC RX 258: Performed by: NEUROLOGICAL SURGERY

## 2024-10-17 PROCEDURE — 97116 GAIT TRAINING THERAPY: CPT

## 2024-10-17 PROCEDURE — 6360000002 HC RX W HCPCS: Performed by: NEUROLOGICAL SURGERY

## 2024-10-17 PROCEDURE — 1200000000 HC SEMI PRIVATE

## 2024-10-17 PROCEDURE — 99024 POSTOP FOLLOW-UP VISIT: CPT | Performed by: PHYSICIAN ASSISTANT

## 2024-10-17 PROCEDURE — 6370000000 HC RX 637 (ALT 250 FOR IP): Performed by: PHYSICIAN ASSISTANT

## 2024-10-17 PROCEDURE — 6370000000 HC RX 637 (ALT 250 FOR IP): Performed by: NEUROLOGICAL SURGERY

## 2024-10-17 PROCEDURE — 70450 CT HEAD/BRAIN W/O DYE: CPT

## 2024-10-17 RX ORDER — POLYETHYLENE GLYCOL 3350 17 G/17G
17 POWDER, FOR SOLUTION ORAL DAILY
Status: DISCONTINUED | OUTPATIENT
Start: 2024-10-17 | End: 2024-10-19 | Stop reason: HOSPADM

## 2024-10-17 RX ADMIN — ACETAMINOPHEN 650 MG: 325 TABLET ORAL at 06:54

## 2024-10-17 RX ADMIN — OXYCODONE 5 MG: 5 TABLET ORAL at 21:54

## 2024-10-17 RX ADMIN — BUMETANIDE 2 MG: 1 TABLET ORAL at 08:37

## 2024-10-17 RX ADMIN — ALBUTEROL SULFATE 2.5 MG: 2.5 SOLUTION RESPIRATORY (INHALATION) at 07:02

## 2024-10-17 RX ADMIN — OXYCODONE 5 MG: 5 TABLET ORAL at 06:55

## 2024-10-17 RX ADMIN — ZOLPIDEM TARTRATE 10 MG: 5 TABLET ORAL at 21:54

## 2024-10-17 RX ADMIN — SENNOSIDES AND DOCUSATE SODIUM 1 TABLET: 50; 8.6 TABLET ORAL at 21:54

## 2024-10-17 RX ADMIN — ACETAMINOPHEN 650 MG: 325 TABLET ORAL at 17:40

## 2024-10-17 RX ADMIN — BUPROPION HYDROCHLORIDE 150 MG: 150 TABLET, EXTENDED RELEASE ORAL at 08:37

## 2024-10-17 RX ADMIN — CYCLOBENZAPRINE 10 MG: 10 TABLET, FILM COATED ORAL at 08:37

## 2024-10-17 RX ADMIN — POLYETHYLENE GLYCOL 3350 17 G: 17 POWDER, FOR SOLUTION ORAL at 17:40

## 2024-10-17 RX ADMIN — ESCITALOPRAM OXALATE 10 MG: 10 TABLET ORAL at 21:54

## 2024-10-17 RX ADMIN — OXYCODONE 5 MG: 5 TABLET ORAL at 11:29

## 2024-10-17 RX ADMIN — ACETAMINOPHEN 650 MG: 325 TABLET ORAL at 13:18

## 2024-10-17 RX ADMIN — OXYCODONE 5 MG: 5 TABLET ORAL at 17:52

## 2024-10-17 RX ADMIN — SODIUM CHLORIDE, PRESERVATIVE FREE 10 ML: 5 INJECTION INTRAVENOUS at 08:37

## 2024-10-17 RX ADMIN — ATORVASTATIN CALCIUM 40 MG: 40 TABLET, FILM COATED ORAL at 08:37

## 2024-10-17 RX ADMIN — SENNOSIDES AND DOCUSATE SODIUM 1 TABLET: 50; 8.6 TABLET ORAL at 08:37

## 2024-10-17 ASSESSMENT — PAIN SCALES - GENERAL
PAINLEVEL_OUTOF10: 4
PAINLEVEL_OUTOF10: 6
PAINLEVEL_OUTOF10: 4
PAINLEVEL_OUTOF10: 5
PAINLEVEL_OUTOF10: 6

## 2024-10-17 ASSESSMENT — PAIN DESCRIPTION - LOCATION
LOCATION: NECK
LOCATION: NECK

## 2024-10-17 NOTE — PLAN OF CARE
Problem: Safety - Adult  Goal: Free from fall injury  Outcome: Progressing     Problem: Pain  Goal: Verbalizes/displays adequate comfort level or baseline comfort level  Outcome: Progressing     Problem: Skin/Tissue Integrity - Adult  Goal: Incisions, wounds, or drain sites healing without S/S of infection  Outcome: Progressing     Problem: Musculoskeletal - Adult  Goal: Return ADL status to a safe level of function  Outcome: Progressing

## 2024-10-17 NOTE — PLAN OF CARE
Problem: Occupational Therapy - Adult  Goal: By Discharge: Performs self-care activities at highest level of function for planned discharge setting.  See evaluation for individualized goals.  Description: Occupational Therapy Goals  Initiated:  10/16/2024   1.  Patient will perform grooming with supervision/set-up sitting in the chair within 7 day(s).  2.  Patient will perform bathing with mod A from chair within 7 day(s).  3.  Patient will perform upper body dressing and lower body dressing with mod A  within 7 day(s).  4.  Patient will perform toilet transfers with CG within 7 day(s).  5.  Patient will perform all aspects of toileting with CG within 7 day(s).  6.  Patient will be independent with cervical neck precautions precautions within 3 day(s).      FUNCTIONAL STATUS PRIOR TO ADMISSION:    Receives Help From: Family, ADL Assistance: Independent,  ,  ,  ,  ,  , Homemaking Assistance: Independent, Ambulation Assistance: Independent, Transfer Assistance: Independent, Active : Yes (hasn't driven for 2 weeks)     HOME SUPPORT: Pt lives with wife.      Outcome: Progressing     OCCUPATIONAL THERAPY TREATMENT  Patient: Robert Gastelum (79 y.o. male)  Date: 10/17/2024  Primary Diagnosis: Cervical spinal stenosis [M48.02]  Other cord compression (HCC) [G95.29]  Cervical stenosis of spinal canal [M48.02]  Procedure(s) (LRB):  C4-C7 ANTERIOR CERVICAL DISCECTOMY AND FUSION (N/A) 2 Days Post-Op   Precautions:                  Chart, occupational therapy assessment, plan of care, and goals were reviewed.    ASSESSMENT  Patient continues to benefit from skilled OT services and is progressing towards goals but remains limited by impaired UE function (R>L) including decreased ROM/strength (distally>proximally), sensation, proprioception, fine motor control, and increased pain levels. Patient engaged in fine motor ADL tasks and UE therapeutic exercise with emphasis on  strength and dexterity. He tolerated exercise

## 2024-10-17 NOTE — PROGRESS NOTES
Spine Surgery Progress Note  Tasha Jimenez PA-C          Admit Date: 10/15/2024   LOS: 2 days        Daily Progress Note: 10/17/2024    POD:2 Days Post-Op    S/P: Procedure(s):  C4-C7 ANTERIOR CERVICAL DISCECTOMY AND FUSION    Subjective:     Mr. Gastelum is a pleasant 80yo gentleman with an extensive PMH who presents at this time for elective ACDF surgery. He tolerated the procedure well. Prior to surgery, pt right hand essentially non-functioning. RLE very weak as well. Pt uses a cane or walker to ambulate.   On POD#1, patient is doing well overall. Pain is minimal. He endorses a sore throat but is able to swallow. He is unsure if arms and legs are any better than preop. He has not been out of bed yet today. Urostomy bag working. Passing gas but no BM yet.   On POD#2, incisional bandage saturated with bloody drainage. Sore throat but able to eat scrambled eggs and toast for breakfast.  He denies chest pain, nausea, vomiting, headache, and dyspnea. Wheezing resolved.    Objective:     Vital signs  Temp (24hrs), Av.5 °F (36.4 °C), Min:97.3 °F (36.3 °C), Max:97.7 °F (36.5 °C)   No intake/output data recorded.  10/15 1901 - 10/17 0700  In: -   Out: 3365 [Urine:3315; Drains:50]    /78   Pulse 79   Temp 97.5 °F (36.4 °C) (Oral)   Resp 16   Ht 1.829 m (6' 0.01\")   Wt 99.7 kg (219 lb 12.8 oz)   SpO2 97%   BMI 29.80 kg/m²            Voiding status: urostomy         Pain control       PT/OT  Gait              Physical Exam:  Gen: No acute distress.   Neuro: A&Ox3. Follows commands. Speech clear. Affect normal.  PERRL. EOMI. Face symmetric. Palate symmetric. Tongue midline.  R wrist 1/5 strength, right  3-/5. Able to briefly lift right arm off bed. LUE 4-/5 strength. BLE moving spontaneously, wiggles toes bilat. Able to lift both legs off bed without issue.  Sensation diminished all extremities more so in RUE  Gait deferred.  Calves soft and supple; No pain with passive stretch  Skin: Incision

## 2024-10-17 NOTE — PROGRESS NOTES
Attempted therapy, patient being washed up. Will attempt again this afternoon    Erica Gomez, ALONZOT, PT

## 2024-10-17 NOTE — PLAN OF CARE
Problem: Physical Therapy - Adult  Goal: By Discharge: Performs mobility at highest level of function for planned discharge setting.  See evaluation for individualized goals.  Description: FUNCTIONAL STATUS PRIOR TO ADMISSION: Patient was modified independent using a rolling walker and rollator for functional mobility.    HOME SUPPORT PRIOR TO ADMISSION: The patient lived with wife in a handicap friendly apartment but did not require assistance.    Physical Therapy Goals  Initiated 10/16/2024  1.  Patient will move from supine to sit and sit to supine, scoot up and down, and roll side to side in bed with independence within 4 day(s).    2.  Patient will perform sit to stand with supervision/set-up within 4 day(s).  3.  Patient will transfer from bed to chair and chair to bed with supervision/set-up using the least restrictive device within 4 day(s).  4.  Patient will ambulate with minimal assistance for 150 feet with the least restrictive device within 4 day(s).   5.  Patient will ascend/descend 4 stairs with 1 handrail(s) with supervision/set-up within 4 day(s).  6. Patient will verbalize and demonstrate understanding of spinal precautions (No bending, lifting greater than 5 lbs, or twisting; log-roll technique; frequent repositioning as instructed) within 4 days.    Outcome: Progressing     PHYSICAL THERAPY TREATMENT    Patient: Robert Gastelum (79 y.o. male)  Date: 10/17/2024  Diagnosis: Cervical spinal stenosis [M48.02]  Other cord compression (HCC) [G95.29]  Cervical stenosis of spinal canal [M48.02] Cervical stenosis of spinal canal  Procedure(s) (LRB):  C4-C7 ANTERIOR CERVICAL DISCECTOMY AND FUSION (N/A) 2 Days Post-Op  Precautions:                        ASSESSMENT:  Patient continues to benefit from skilled PT services and is progressing towards goals. Pt tolerating movement through standing and participates in gait training for 65ft with sitting rest break and additional 65ft. Pt performed sitting in

## 2024-10-18 PROCEDURE — 6360000002 HC RX W HCPCS: Performed by: NEUROLOGICAL SURGERY

## 2024-10-18 PROCEDURE — 6370000000 HC RX 637 (ALT 250 FOR IP): Performed by: PHYSICIAN ASSISTANT

## 2024-10-18 PROCEDURE — 2580000003 HC RX 258: Performed by: NEUROLOGICAL SURGERY

## 2024-10-18 PROCEDURE — 6370000000 HC RX 637 (ALT 250 FOR IP): Performed by: NEUROLOGICAL SURGERY

## 2024-10-18 PROCEDURE — 1200000000 HC SEMI PRIVATE

## 2024-10-18 PROCEDURE — 6360000002 HC RX W HCPCS: Performed by: PHYSICIAN ASSISTANT

## 2024-10-18 PROCEDURE — 99024 POSTOP FOLLOW-UP VISIT: CPT | Performed by: PHYSICIAN ASSISTANT

## 2024-10-18 RX ORDER — FAMOTIDINE 20 MG/1
20 TABLET, FILM COATED ORAL 2 TIMES DAILY
Qty: 10 TABLET | Refills: 0 | Status: SHIPPED
Start: 2024-10-18

## 2024-10-18 RX ORDER — GUAIFENESIN 600 MG/1
600 TABLET, EXTENDED RELEASE ORAL 2 TIMES DAILY
Status: DISCONTINUED | OUTPATIENT
Start: 2024-10-18 | End: 2024-10-19 | Stop reason: HOSPADM

## 2024-10-18 RX ORDER — DEXAMETHASONE 4 MG/1
4 TABLET ORAL EVERY 6 HOURS
Qty: 6 TABLET | Refills: 0 | Status: SHIPPED
Start: 2024-10-18 | End: 2024-10-20

## 2024-10-18 RX ORDER — DEXAMETHASONE 4 MG/1
4 TABLET ORAL EVERY 6 HOURS SCHEDULED
Status: DISCONTINUED | OUTPATIENT
Start: 2024-10-18 | End: 2024-10-19 | Stop reason: HOSPADM

## 2024-10-18 RX ORDER — GUAIFENESIN 600 MG/1
600 TABLET, EXTENDED RELEASE ORAL 2 TIMES DAILY
Qty: 20 TABLET | Refills: 0 | Status: SHIPPED
Start: 2024-10-18

## 2024-10-18 RX ORDER — SENNA AND DOCUSATE SODIUM 50; 8.6 MG/1; MG/1
1 TABLET, FILM COATED ORAL 2 TIMES DAILY
Qty: 20 TABLET | Refills: 0 | Status: SHIPPED
Start: 2024-10-18

## 2024-10-18 RX ADMIN — OXYCODONE 5 MG: 5 TABLET ORAL at 09:34

## 2024-10-18 RX ADMIN — SODIUM CHLORIDE, PRESERVATIVE FREE 10 ML: 5 INJECTION INTRAVENOUS at 21:00

## 2024-10-18 RX ADMIN — SODIUM CHLORIDE, PRESERVATIVE FREE 10 ML: 5 INJECTION INTRAVENOUS at 09:32

## 2024-10-18 RX ADMIN — ACETAMINOPHEN 650 MG: 325 TABLET ORAL at 02:15

## 2024-10-18 RX ADMIN — DEXAMETHASONE 4 MG: 4 TABLET ORAL at 18:32

## 2024-10-18 RX ADMIN — BUMETANIDE 2 MG: 1 TABLET ORAL at 09:34

## 2024-10-18 RX ADMIN — BUPROPION HYDROCHLORIDE 150 MG: 150 TABLET, EXTENDED RELEASE ORAL at 09:33

## 2024-10-18 RX ADMIN — ZOLPIDEM TARTRATE 10 MG: 5 TABLET ORAL at 20:59

## 2024-10-18 RX ADMIN — ACETAMINOPHEN 650 MG: 325 TABLET ORAL at 14:19

## 2024-10-18 RX ADMIN — OXYCODONE 5 MG: 5 TABLET ORAL at 21:04

## 2024-10-18 RX ADMIN — GUAIFENESIN 600 MG: 600 TABLET, EXTENDED RELEASE ORAL at 12:35

## 2024-10-18 RX ADMIN — ACETAMINOPHEN 650 MG: 325 TABLET ORAL at 18:32

## 2024-10-18 RX ADMIN — POLYETHYLENE GLYCOL 3350 17 G: 17 POWDER, FOR SOLUTION ORAL at 09:32

## 2024-10-18 RX ADMIN — ATORVASTATIN CALCIUM 40 MG: 40 TABLET, FILM COATED ORAL at 09:34

## 2024-10-18 RX ADMIN — SENNOSIDES AND DOCUSATE SODIUM 1 TABLET: 50; 8.6 TABLET ORAL at 20:59

## 2024-10-18 RX ADMIN — ESCITALOPRAM OXALATE 10 MG: 10 TABLET ORAL at 20:59

## 2024-10-18 RX ADMIN — SENNOSIDES AND DOCUSATE SODIUM 1 TABLET: 50; 8.6 TABLET ORAL at 09:34

## 2024-10-18 RX ADMIN — ACETAMINOPHEN 650 MG: 325 TABLET ORAL at 07:17

## 2024-10-18 RX ADMIN — DEXAMETHASONE 4 MG: 4 TABLET ORAL at 12:35

## 2024-10-18 RX ADMIN — ALBUTEROL SULFATE 2.5 MG: 2.5 SOLUTION RESPIRATORY (INHALATION) at 15:32

## 2024-10-18 ASSESSMENT — PAIN DESCRIPTION - ORIENTATION
ORIENTATION: ANTERIOR
ORIENTATION: LOWER
ORIENTATION: LOWER

## 2024-10-18 ASSESSMENT — PAIN DESCRIPTION - DESCRIPTORS
DESCRIPTORS: SHARP
DESCRIPTORS: SHARP
DESCRIPTORS: ACHING;DISCOMFORT
DESCRIPTORS: SHARP

## 2024-10-18 ASSESSMENT — PAIN DESCRIPTION - LOCATION
LOCATION: BACK
LOCATION: NECK
LOCATION: NECK;SHOULDER
LOCATION: BACK

## 2024-10-18 ASSESSMENT — PAIN DESCRIPTION - FREQUENCY
FREQUENCY: INTERMITTENT
FREQUENCY: INTERMITTENT

## 2024-10-18 ASSESSMENT — PAIN DESCRIPTION - PAIN TYPE
TYPE: SURGICAL PAIN
TYPE: SURGICAL PAIN

## 2024-10-18 ASSESSMENT — PAIN DESCRIPTION - ONSET
ONSET: ON-GOING
ONSET: ON-GOING

## 2024-10-18 ASSESSMENT — PAIN SCALES - GENERAL
PAINLEVEL_OUTOF10: 5
PAINLEVEL_OUTOF10: 5
PAINLEVEL_OUTOF10: 4
PAINLEVEL_OUTOF10: 0
PAINLEVEL_OUTOF10: 6

## 2024-10-18 ASSESSMENT — PAIN - FUNCTIONAL ASSESSMENT
PAIN_FUNCTIONAL_ASSESSMENT: ACTIVITIES ARE NOT PREVENTED

## 2024-10-18 NOTE — DISCHARGE SUMMARY
Spine Discharge Summary    Patient ID:  Robert Gastelum  [id number]  male  79 y.o.  1945    Admit date: 10/15/2024    Discharge date: 10/18/2024    Admitting Physician: Kip Hurst MD     Consulting Physician(s):   Treatment Team:   Kip Hurst MD Alexander, Peter A, MD Hickman, Derwin Selassie, Tabitha, Jill Loyd, Erica Young, PT    Date of Surgery:   10/15/24     Preoperative Diagnosis:  Cervical spinal stenosis [M48.02]  Other cord compression (HCC) [G95.29]    Postoperative Diagnosis:  same    Procedure(s):  C4-C7 ANTERIOR CERVICAL DISCECTOMY AND FUSION     Anesthesia Type:   General     Surgeon: Kip Hurst MD                            HPI:  Pt is a 79 y.o. male who has a history of Cervical spinal stenosis [M48.02]  Other cord compression (HCC) [G95.29]  with progressive myelopathy and limitations of activities of daily living who presents at this time for a C4-C7 ANTERIOR CERVICAL DISCECTOMY AND FUSION  following the failure of conservative management.    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 : A BMI > 30 is classified as obesity and > 40 is classified as morbid obesity.     Medications upon admission :  and twisting  - Cervical Collar  - Wound Care Keep wound clean and dry.  See discharge instruction sheet.  - Hold Eliquis until 10/21*            -DISCHARGE MEDICATION LIST        Medication List        START taking these medications      Benzocaine-Menthol 6-10 MG Lozg lozenge  Commonly known as: CEPACOL  Take 1 lozenge by mouth every 2 hours as needed for Sore Throat     dexAMETHasone 4 MG tablet  Commonly known as: DECADRON  Take 1 tablet by mouth every 6 hours for 6 doses     famotidine 20 MG tablet  Commonly known as: PEPCID  Take 1 tablet by mouth 2 times daily     guaiFENesin 600 MG extended release tablet  Commonly known as: MUCINEX  Take 1 tablet by mouth 2 times daily     phenol 1.4 % Liqd mouth spray  Take 1 spray by mouth every 2 hours as needed for Sore Throat     sennosides-docusate sodium 8.6-50 MG tablet  Commonly known as: SENOKOT-S  Take 1 tablet by mouth 2 times daily            CONTINUE taking these medications      albuterol sulfate  (90 Base) MCG/ACT inhaler  Commonly known as: Ventolin HFA  Inhale 2 puffs into the lungs 4 times daily as needed for Wheezing     apixaban 5 MG Tabs tablet  Commonly known as: ELIQUIS  Take 1 tablet by mouth 2 times daily Start after completing 10 mg tablets.     bumetanide 1 MG tablet  Commonly known as: BUMEX     buPROPion 100 MG extended release tablet  Commonly known as: WELLBUTRIN SR     busPIRone 5 MG tablet  Commonly known as: BUSPAR     escitalopram 10 MG tablet  Commonly known as: LEXAPRO     ipratropium 0.5 mg-albuterol 2.5 mg 0.5-2.5 (3) MG/3ML Soln nebulizer solution  Commonly known as: DUONEB  Inhale 3 mLs into the lungs every 4 hours as needed for Shortness of Breath or Wheezing     MultiVitamin + Fluoride 0.25 MG Chew     pramipexole 0.25 MG tablet  Commonly known as: MIRAPEX     simvastatin 5 MG tablet  Commonly known as: ZOCOR     sodium bicarbonate 650 MG tablet     therapeutic multivitamin-minerals tablet     Tylenol 8 Hour 650 MG extended

## 2024-10-18 NOTE — PROGRESS NOTES
Spine Surgery Progress Note  Tasha Jimenez PA-C          Admit Date: 10/15/2024   LOS: 3 days        Daily Progress Note: 10/18/2024    POD:3 Days Post-Op    S/P: Procedure(s):  C4-C7 ANTERIOR CERVICAL DISCECTOMY AND FUSION    Subjective:     Mr. Gastelum is a pleasant 80yo gentleman with an extensive PMH who presents at this time for elective ACDF surgery. He tolerated the procedure well. Prior to surgery, pt right hand essentially non-functioning. RLE very weak as well. Pt uses a cane or walker to ambulate.   On POD#1, patient is doing well overall. Pain is minimal. He endorses a sore throat but is able to swallow. He is unsure if arms and legs are any better than preop. He has not been out of bed yet today. Urostomy bag working. Passing gas but no BM yet.   On POD#2, incisional bandage saturated with bloody drainage. Sore throat but able to eat scrambled eggs and toast for breakfast.  On POD#3, patient still having some trouble swallowing. He is able to get food and pills down but is gagging on some secretions, particularly with when lying flat. Able to cough up secretions.  He denies chest pain, nausea, vomiting, headache, and dyspnea. Wheezing resolved.    Objective:     Vital signs  Temp (24hrs), Av °F (36.7 °C), Min:97.7 °F (36.5 °C), Max:98.2 °F (36.8 °C)   No intake/output data recorded.  10/16 1901 - 10/18 0700  In: -   Out: 3390 [Urine:3390]    /75   Pulse 87   Temp 98.1 °F (36.7 °C) (Axillary)   Resp 16   Ht 1.829 m (6' 0.01\")   Wt 99.7 kg (219 lb 12.8 oz)   SpO2 93%   BMI 29.80 kg/m²            Voiding status: urostomy         Pain control       PT/OT  Gait              Physical Exam:  Gen: No acute distress.   Neuro: A&Ox3. Follows commands. Speech clear. Affect normal.  PERRL. EOMI. Face symmetric. Palate symmetric. Tongue midline.  R wrist 1/5 strength, right  3-/5. Able to briefly lift right arm off bed. LUE 4-/5 strength. BLE moving spontaneously, wiggles toes bilat. Able to

## 2024-10-18 NOTE — PLAN OF CARE
Problem: Safety - Adult  Goal: Free from fall injury  Outcome: Progressing  Flowsheets (Taken 10/18/2024 0805)  Free From Fall Injury:   Instruct family/caregiver on patient safety   Based on caregiver fall risk screen, instruct family/caregiver to ask for assistance with transferring infant if caregiver noted to have fall risk factors     Problem: Pain  Goal: Verbalizes/displays adequate comfort level or baseline comfort level  Outcome: Progressing  Flowsheets  Taken 10/18/2024 1419  Verbalizes/displays adequate comfort level or baseline comfort level:   Encourage patient to monitor pain and request assistance   Notify Licensed Independent Practitioner if interventions unsuccessful or patient reports new pain   Consider cultural and social influences on pain and pain management   Implement non-pharmacological measures as appropriate and evaluate response   Administer analgesics based on type and severity of pain and evaluate response   Assess pain using appropriate pain scale  Taken 10/18/2024 0932  Verbalizes/displays adequate comfort level or baseline comfort level:   Encourage patient to monitor pain and request assistance   Consider cultural and social influences on pain and pain management   Implement non-pharmacological measures as appropriate and evaluate response   Administer analgesics based on type and severity of pain and evaluate response   Assess pain using appropriate pain scale   Notify Licensed Independent Practitioner if interventions unsuccessful or patient reports new pain     Problem: Chronic Conditions and Co-morbidities  Goal: Patient's chronic conditions and co-morbidity symptoms are monitored and maintained or improved  Outcome: Progressing  Flowsheets (Taken 10/18/2024 0805)  Care Plan - Patient's Chronic Conditions and Co-Morbidity Symptoms are Monitored and Maintained or Improved:   Monitor and assess patient's chronic conditions and comorbid symptoms for stability, deterioration, or  improvement   Collaborate with multidisciplinary team to address chronic and comorbid conditions and prevent exacerbation or deterioration   Update acute care plan with appropriate goals if chronic or comorbid symptoms are exacerbated and prevent overall improvement and discharge     Problem: Skin/Tissue Integrity  Goal: Absence of new skin breakdown  Description: 1.  Monitor for areas of redness and/or skin breakdown  2.  Assess vascular access sites hourly  3.  Every 4-6 hours minimum:  Change oxygen saturation probe site  4.  Every 4-6 hours:  If on nasal continuous positive airway pressure, respiratory therapy assess nares and determine need for appliance change or resting period.  Outcome: Progressing     Problem: Neurosensory - Adult  Goal: Achieves maximal functionality and self care  Outcome: Progressing  Flowsheets (Taken 10/18/2024 0805)  Achieves maximal functionality and self care:   Encourage visually impaired, hearing impaired and aphasic patients to use assistive/communication devices   Encourage and assist patient to increase activity and self care with guidance from physical therapy/occupational therapy   Monitor swallowing and airway patency with patient fatigue and changes in neurological status     Problem: Skin/Tissue Integrity - Adult  Goal: Incisions, wounds, or drain sites healing without S/S of infection  Outcome: Progressing     Problem: Musculoskeletal - Adult  Goal: Return ADL status to a safe level of function  Outcome: Progressing  Flowsheets (Taken 10/18/2024 0805)  Return ADL Status to a Safe Level of Function:   Assist and instruct patient to increase activity and self care as tolerated   Obtain physical therapy/occupational therapy consults as needed   Assess activities of daily living deficits and provide assistive devices as needed   Administer medication as ordered     Problem: Metabolic/Fluid and Electrolytes - Adult  Goal: Glucose maintained within prescribed range  Outcome:

## 2024-10-18 NOTE — CARE COORDINATION
Barrier:  Per PA D/C Note 10/18-I was called by [facility] physician. They recommend an MRI of the brain and are unable to accept the patient until this is completed. If MRI negative then patient will transfer to [facility] tomorrow. Will update plan as needed pending results.       Discharge postponed       Transition of Care Plan:    RUR: 21%   Prior Level of Functioning: Independent   Disposition: IPR   If SNF or IPR: Date FOC offered:   Date FOC received:   Accepting facility: [facility]    Report#[phone]; #[id number]  Room Assignment will remain the same if the pt discharges on Saturday.   Date authorization started with reference number: N/A   Date authorization received and expires: N/A   Follow up appointments: PCP   DME needed: None   Transportation at discharge: S;[facility] 3:00pm requested for 10/19   IM/IMM Medicare/ letter given: Received   Caregiver Contact: Libby Hancock (Spouse)  [phone]  Discharge Caregiver contacted prior to discharge? N/A   Care Conference needed? N/A         Inpatient Rehab/ Hospital to Hospital Transition of Care Note /Discharge Note     Accepting Physician: Dr. Arthur     Discharging Physician: Dr. Hurst    Accepting Representative: Zara    Accepting Facility: [facility]    RN call to report: [phone]    Transport: H     time: 4pm    Ambulance packet at bedside chart.     The attending physician and the primary nurse were notified of the plan.

## 2024-10-19 ENCOUNTER — APPOINTMENT (OUTPATIENT)
Facility: HOSPITAL | Age: 79
End: 2024-10-19
Attending: NEUROLOGICAL SURGERY
Payer: MEDICARE

## 2024-10-19 VITALS
TEMPERATURE: 97.3 F | OXYGEN SATURATION: 97 % | HEIGHT: 72 IN | HEART RATE: 88 BPM | WEIGHT: 219.8 LBS | DIASTOLIC BLOOD PRESSURE: 73 MMHG | RESPIRATION RATE: 14 BRPM | BODY MASS INDEX: 29.77 KG/M2 | SYSTOLIC BLOOD PRESSURE: 124 MMHG

## 2024-10-19 PROCEDURE — A9579 GAD-BASE MR CONTRAST NOS,1ML: HCPCS | Performed by: PHYSICIAN ASSISTANT

## 2024-10-19 PROCEDURE — 6360000002 HC RX W HCPCS: Performed by: PHYSICIAN ASSISTANT

## 2024-10-19 PROCEDURE — 70553 MRI BRAIN STEM W/O & W/DYE: CPT

## 2024-10-19 PROCEDURE — 6370000000 HC RX 637 (ALT 250 FOR IP): Performed by: NEUROLOGICAL SURGERY

## 2024-10-19 PROCEDURE — 6360000004 HC RX CONTRAST MEDICATION: Performed by: PHYSICIAN ASSISTANT

## 2024-10-19 PROCEDURE — 97116 GAIT TRAINING THERAPY: CPT

## 2024-10-19 PROCEDURE — 2580000003 HC RX 258: Performed by: NEUROLOGICAL SURGERY

## 2024-10-19 PROCEDURE — 6370000000 HC RX 637 (ALT 250 FOR IP): Performed by: PHYSICIAN ASSISTANT

## 2024-10-19 PROCEDURE — 97110 THERAPEUTIC EXERCISES: CPT

## 2024-10-19 RX ADMIN — BUMETANIDE 2 MG: 1 TABLET ORAL at 09:29

## 2024-10-19 RX ADMIN — BUPROPION HYDROCHLORIDE 150 MG: 150 TABLET, EXTENDED RELEASE ORAL at 09:29

## 2024-10-19 RX ADMIN — ACETAMINOPHEN 650 MG: 325 TABLET ORAL at 13:10

## 2024-10-19 RX ADMIN — DEXAMETHASONE 4 MG: 4 TABLET ORAL at 00:09

## 2024-10-19 RX ADMIN — GADOTERIDOL 20 ML: 279.3 INJECTION, SOLUTION INTRAVENOUS at 08:18

## 2024-10-19 RX ADMIN — DEXAMETHASONE 4 MG: 4 TABLET ORAL at 06:12

## 2024-10-19 RX ADMIN — GUAIFENESIN 600 MG: 600 TABLET, EXTENDED RELEASE ORAL at 00:09

## 2024-10-19 RX ADMIN — GUAIFENESIN 600 MG: 600 TABLET, EXTENDED RELEASE ORAL at 13:10

## 2024-10-19 RX ADMIN — ATORVASTATIN CALCIUM 40 MG: 40 TABLET, FILM COATED ORAL at 09:29

## 2024-10-19 RX ADMIN — SODIUM CHLORIDE, PRESERVATIVE FREE 10 ML: 5 INJECTION INTRAVENOUS at 09:31

## 2024-10-19 RX ADMIN — DEXAMETHASONE 4 MG: 4 TABLET ORAL at 13:10

## 2024-10-19 RX ADMIN — ACETAMINOPHEN 650 MG: 325 TABLET ORAL at 00:09

## 2024-10-19 RX ADMIN — ACETAMINOPHEN 650 MG: 325 TABLET ORAL at 06:12

## 2024-10-19 ASSESSMENT — PAIN SCALES - GENERAL
PAINLEVEL_OUTOF10: 0
PAINLEVEL_OUTOF10: 2

## 2024-10-19 ASSESSMENT — PAIN DESCRIPTION - LOCATION: LOCATION: NECK

## 2024-10-19 NOTE — PLAN OF CARE
Problem: Physical Therapy - Adult  Goal: By Discharge: Performs mobility at highest level of function for planned discharge setting.  See evaluation for individualized goals.  Description: FUNCTIONAL STATUS PRIOR TO ADMISSION: Patient was modified independent using a rolling walker and rollator for functional mobility.    HOME SUPPORT PRIOR TO ADMISSION: The patient lived with wife in a handicap friendly apartment but did not require assistance.    Physical Therapy Goals  Initiated 10/16/2024  1.  Patient will move from supine to sit and sit to supine, scoot up and down, and roll side to side in bed with independence within 4 day(s).    2.  Patient will perform sit to stand with supervision/set-up within 4 day(s).  3.  Patient will transfer from bed to chair and chair to bed with supervision/set-up using the least restrictive device within 4 day(s).  4.  Patient will ambulate with minimal assistance for 150 feet with the least restrictive device within 4 day(s).   5.  Patient will ascend/descend 4 stairs with 1 handrail(s) with supervision/set-up within 4 day(s).  6. Patient will verbalize and demonstrate understanding of spinal precautions (No bending, lifting greater than 5 lbs, or twisting; log-roll technique; frequent repositioning as instructed) within 4 days.    10/19/2024 1151 by Miracle Michel PT  Outcome: Progressing     Problem: Safety - Adult  Goal: Free from fall injury  10/19/2024 1453 by Cece Anderson RN  Outcome: /HSPC Resolved Met  10/19/2024 1101 by Cece Anderson RN  Outcome: Progressing     Problem: Pain  Goal: Verbalizes/displays adequate comfort level or baseline comfort level  10/19/2024 1453 by Cece Anderson RN  Outcome: /HSPC Resolved Met  10/19/2024 1101 by Cece Anderson RN  Outcome: Progressing     Problem: Discharge Planning  Goal: Discharge to home or other facility with appropriate resources  10/19/2024 1453 by Cece Anderson RN  Outcome: /[facility]

## 2024-10-19 NOTE — PLAN OF CARE
Problem: Physical Therapy - Adult  Goal: By Discharge: Performs mobility at highest level of function for planned discharge setting.  See evaluation for individualized goals.  Description: FUNCTIONAL STATUS PRIOR TO ADMISSION: Patient was modified independent using a rolling walker and rollator for functional mobility.    HOME SUPPORT PRIOR TO ADMISSION: The patient lived with wife in a handicap friendly apartment but did not require assistance.    Physical Therapy Goals  Initiated 10/16/2024  1.  Patient will move from supine to sit and sit to supine, scoot up and down, and roll side to side in bed with independence within 4 day(s).    2.  Patient will perform sit to stand with supervision/set-up within 4 day(s).  3.  Patient will transfer from bed to chair and chair to bed with supervision/set-up using the least restrictive device within 4 day(s).  4.  Patient will ambulate with minimal assistance for 150 feet with the least restrictive device within 4 day(s).   5.  Patient will ascend/descend 4 stairs with 1 handrail(s) with supervision/set-up within 4 day(s).  6. Patient will verbalize and demonstrate understanding of spinal precautions (No bending, lifting greater than 5 lbs, or twisting; log-roll technique; frequent repositioning as instructed) within 4 days.    Outcome: Progressing    PHYSICAL THERAPY TREATMENT    Patient: Robert Gastelum (79 y.o. male)  Date: 10/19/2024  Diagnosis: Cervical spinal stenosis [M48.02]  Other cord compression (HCC) [G95.29]  Cervical stenosis of spinal canal [M48.02] Cervical stenosis of spinal canal  Procedure(s) (LRB):  C4-C7 ANTERIOR CERVICAL DISCECTOMY AND FUSION (N/A) 4 Days Post-Op  Precautions:           Spinal Precautions: No Bending, No Lifting, No Twisting            ASSESSMENT:  Patient continues to benefit from skilled PT services and is slowly progressing towards goals. Patient is cleared for therapy per nursing. Patient is up in recliner upon arrival. He is able

## 2024-10-19 NOTE — PLAN OF CARE
Problem: Safety - Adult  Goal: Free from fall injury  10/18/2024 2322 by Carolyn Raza RN  Outcome: Progressing  10/18/2024 1714 by Veronica Madrigal RN  Outcome: Progressing    Problem: Pain  Goal: Verbalizes/displays adequate comfort level or baseline comfort level  10/18/2024 2322 by Carolyn Raza RN  Outcome: Progressing  10/18/2024 1714 by Veronica Madrigal RN  Outcome: Progressing    Problem: Discharge Planning  Goal: Discharge to home or other facility with appropriate resources  10/18/2024 2322 by Carolyn Raza RN  Outcome: Progressing  10/18/2024 1714 by Veronica Madrigal RN  Outcome: Progressing

## 2024-10-19 NOTE — PLAN OF CARE
Problem: Safety - Adult  Goal: Free from fall injury  10/19/2024 1101 by Cece Anderson RN  Outcome: Progressing  10/18/2024 2322 by Carolyn Raza RN  Outcome: Progressing     Problem: Pain  Goal: Verbalizes/displays adequate comfort level or baseline comfort level  10/19/2024 1101 by Cece Anderson RN  Outcome: Progressing  10/18/2024 2322 by Carolyn Raza RN  Outcome: Progressing     Problem: Chronic Conditions and Co-morbidities  Goal: Patient's chronic conditions and co-morbidity symptoms are monitored and maintained or improved  Outcome: Progressing     Problem: Neurosensory - Adult  Goal: Achieves maximal functionality and self care  Outcome: Progressing     Problem: Skin/Tissue Integrity - Adult  Goal: Incisions, wounds, or drain sites healing without S/S of infection  Outcome: Progressing

## 2024-10-19 NOTE — CARE COORDINATION
Inpatient Rehab/ Hospital to Hospital Transition of Care Note /Discharge Note      Accepting Physician: Dr Boyd     Discharging Physician: Dr. Hurst     Accepting Representative: Megan     Accepting Facility: [facility]    Room# is 2012     RN call to report: [phone]     Transport: [facility]      time: 3pm     Ambulance packet at bedside chart.      The primary nurse was notified of the plan.

## 2024-10-21 ENCOUNTER — TELEPHONE (OUTPATIENT)
Facility: HOSPITAL | Age: 79
End: 2024-10-21

## 2024-10-21 NOTE — TELEPHONE ENCOUNTER
Discussed with Dr. Ferrari. Pt not seen by neurology this admission. Needs new patient appt for subacute stroke. Any provider. Any location. In-person. Next 4-6 weeks.     Thanks!

## 2024-10-22 ENCOUNTER — CLINICAL DOCUMENTATION (OUTPATIENT)
Facility: HOSPITAL | Age: 79
End: 2024-10-22

## 2024-10-22 NOTE — TELEPHONE ENCOUNTER
Called patient and got sent to voicemail, left a msg along with our number for a call back to [facility]

## 2024-10-22 NOTE — TELEPHONE ENCOUNTER
Thank you for the update, I have contact patients wife and was able to get apt scheduled.   Tried to give an earlier apt, but due to holiday traveling patient's wife states they're unable to take that.  Scheduled for next avail, will add patient to wait list and call back once we get any cancellations.   Will send reminder sheet/new patient paperwork via email and home address, both verified with patient's wife.   Thank you for all you do for our patients and have a good day!

## 2024-10-22 NOTE — TELEPHONE ENCOUNTER
Thanks! The patient is currently in rehab at [facility]. I recommend reaching out to the patient's wife Libby Grayson at [phone] to schedule appt. I spoke to her by telephone yesterday and advised her to expect a call from the clinic. I appreciate your help!

## 2024-10-22 NOTE — PROGRESS NOTES
Pt discharged to [facility] for inpatient rehab on 10/19/21 after undergoing an ACDF C4-7 on 10/15/24 with Dr. Kip fuchs. Pre-op, it was noted that his right hand was weak with difficulty gripping objects and difficulty lifting his wrist. During the hospital stay, the patient had an MRI brain with and without contrast on 10/19/24. The patient was stable for discharge and sent to [facility]. His MRI brain was read on 10/20/24 and revealed a small acute/subacute infarct in the left perirolandic frontal area. I called the attending physician at [facility] yesterday to notify them of the findings. Patient was started back on his Eliquis yesterday. I also recommended his statin be changed to atorvastatin 40 mg daily. I spoke with the patient's spouse Libby Grayson and updated her on radiographic findings and plan. I reached out to the patient's PCP, Dr. Leo and left a message requesting a return call to discuss the patient and request assistance with ordering an ECHO and carotid dopplers as an outpatient to complete his work-up. A referral to outpatient neurology for follow-up in 4-6 weeks has been made and the neurology clinic should contact the patient to schedule this appointment.

## 2024-10-29 ENCOUNTER — APPOINTMENT (OUTPATIENT)
Facility: HOSPITAL | Age: 79
End: 2024-10-29
Payer: MEDICARE

## 2024-11-06 ENCOUNTER — OFFICE VISIT (OUTPATIENT)
Age: 79
End: 2024-11-06
Payer: MEDICARE

## 2024-11-06 VITALS
BODY MASS INDEX: 30.31 KG/M2 | OXYGEN SATURATION: 99 % | HEIGHT: 72 IN | TEMPERATURE: 97.6 F | SYSTOLIC BLOOD PRESSURE: 94 MMHG | WEIGHT: 223.8 LBS | HEART RATE: 61 BPM | DIASTOLIC BLOOD PRESSURE: 67 MMHG | RESPIRATION RATE: 20 BRPM

## 2024-11-06 DIAGNOSIS — K43.5 PARASTOMAL HERNIA WITHOUT OBSTRUCTION OR GANGRENE: Primary | ICD-10-CM

## 2024-11-06 PROCEDURE — 99214 OFFICE O/P EST MOD 30 MIN: CPT | Performed by: SURGERY

## 2024-11-06 PROCEDURE — 1123F ACP DISCUSS/DSCN MKR DOCD: CPT | Performed by: SURGERY

## 2024-11-06 PROCEDURE — 1126F AMNT PAIN NOTED NONE PRSNT: CPT | Performed by: SURGERY

## 2024-11-06 PROCEDURE — 1160F RVW MEDS BY RX/DR IN RCRD: CPT | Performed by: SURGERY

## 2024-11-06 PROCEDURE — G8427 DOCREV CUR MEDS BY ELIG CLIN: HCPCS | Performed by: SURGERY

## 2024-11-06 PROCEDURE — G8484 FLU IMMUNIZE NO ADMIN: HCPCS | Performed by: SURGERY

## 2024-11-06 PROCEDURE — 1159F MED LIST DOCD IN RCRD: CPT | Performed by: SURGERY

## 2024-11-06 PROCEDURE — 1111F DSCHRG MED/CURRENT MED MERGE: CPT | Performed by: SURGERY

## 2024-11-06 PROCEDURE — 1036F TOBACCO NON-USER: CPT | Performed by: SURGERY

## 2024-11-06 PROCEDURE — 3074F SYST BP LT 130 MM HG: CPT | Performed by: SURGERY

## 2024-11-06 PROCEDURE — 3078F DIAST BP <80 MM HG: CPT | Performed by: SURGERY

## 2024-11-06 PROCEDURE — G8417 CALC BMI ABV UP PARAM F/U: HCPCS | Performed by: SURGERY

## 2024-11-06 ASSESSMENT — PATIENT HEALTH QUESTIONNAIRE - PHQ9
SUM OF ALL RESPONSES TO PHQ QUESTIONS 1-9: 0
2. FEELING DOWN, DEPRESSED OR HOPELESS: NOT AT ALL
1. LITTLE INTEREST OR PLEASURE IN DOING THINGS: NOT AT ALL
SUM OF ALL RESPONSES TO PHQ QUESTIONS 1-9: 0
SUM OF ALL RESPONSES TO PHQ9 QUESTIONS 1 & 2: 0

## 2024-11-06 NOTE — PROGRESS NOTES
Identified patient with two patient identifiers (name and ). Reviewed chart in preparation for visit and have obtained necessary documentation.    Robert Gastelum is a 79 y.o. male  Chief Complaint   Patient presents with    Post-Op Check    Hernia     BP 94/67 (Site: Right Upper Arm, Position: Sitting, Cuff Size: Large Adult)   Pulse 61   Temp 97.6 °F (36.4 °C) (Oral)   Resp 20   Ht 1.829 m (6')   Wt 101.5 kg (223 lb 12.8 oz)   SpO2 99%   BMI 30.35 kg/m²     1. Have you been to the ER, urgent care clinic since your last visit?  Hospitalized since your last visit?no    2. Have you seen or consulted any other health care providers outside of the [facility] since your last visit?  Include any pap smears or colon screening. no   
unchanged.  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  ADDITIONAL COMMENTS: N/A     IMPRESSION:     1. 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 There is a parastomal hernia right abdomen which contains  small bowel which is dilated and has fecal stasis sign with increasing haziness  in the mesenteric fat in the parastomal hernia suspicious for small bowel  obstruction.     There are several small nonobstructing calculi again noted in the right kidney.  There is slight increasing right hydroureteronephrosis.     Retroperitoneal lymph nodes are stable.     2. In the lower chest, mediastinal and hilar lymph nodes are similar to the  prior study. However there are multiple nodules several of which are cavitary  and overall nodule size is slightly increased could be related to sarcoid  although metastatic disease is not excluded. Results called to the ER.     Electronically signed by Abhay Merritt             Exam Ended: 09/10/24 08:45 EDT        I have reviewed and agree with all of the pertinent images    Assessment:     Robert Gastelum is a 79 y.o. male with parastomal hernia    Recommendations:     1.   He is back to see me today for reevaluation of the parastomal hernia after his neck surgery.  He thinks he would like to have repair but is little undecided on where he potentially would want to have it done.  He has had a call into [facility] to see about getting an appointment and evaluation.  He would potentially like to explore this option.  I would like to make sure he is fully healed from his neck surgery and would not want to do surgery if I were to do it at least for a few months.  He will come back and see me in January.  I believe it would be reasonable to do a robotic parastomal hernia repair with mesh using Sugarbaker type technique.  He will come back and see me in January and we will discuss further plans.  He was also thought to possibly

## 2024-11-12 ENCOUNTER — HOSPITAL ENCOUNTER (OUTPATIENT)
Facility: HOSPITAL | Age: 79
Discharge: HOME | End: 2024-11-15
Attending: INTERNAL MEDICINE
Payer: MEDICARE

## 2024-11-12 DIAGNOSIS — C67.3 MALIGNANT NEOPLASM OF ANTERIOR WALL OF BLADDER (HCC): ICD-10-CM

## 2024-11-12 LAB
GLUCOSE BLD STRIP.AUTO-MCNC: 94 MG/DL (ref 65–117)
SERVICE CMNT-IMP: NORMAL

## 2024-11-12 PROCEDURE — 3430000000 HC RX DIAGNOSTIC RADIOPHARMACEUTICAL: Performed by: INTERNAL MEDICINE

## 2024-11-12 PROCEDURE — 78815 PET IMAGE W/CT SKULL-THIGH: CPT

## 2024-11-12 PROCEDURE — A9609 HC RX DIAGNOSTIC RADIOPHARMACEUTICAL: HCPCS | Performed by: INTERNAL MEDICINE

## 2024-11-12 PROCEDURE — 82962 GLUCOSE BLOOD TEST: CPT

## 2024-11-12 RX ORDER — FLUDEOXYGLUCOSE F-18 500 MCI/ML
10 INJECTION INTRAVENOUS
Status: COMPLETED | OUTPATIENT
Start: 2024-11-12 | End: 2024-11-12

## 2024-11-12 RX ADMIN — FLUDEOXYGLUCOSE F-18 10 MILLICURIE: 500 INJECTION INTRAVENOUS at 10:25

## 2024-11-21 ENCOUNTER — TELEPHONE (OUTPATIENT)
Age: 79
End: 2024-11-21

## 2024-11-21 NOTE — TELEPHONE ENCOUNTER
LVM to inquire more about what type of special therapeutic treatment the pt is to receive so I can let the provider know exactly what they are providing clearance for. Awaiting callback.

## 2024-11-21 NOTE — TELEPHONE ENCOUNTER
Pt called requesting clearance from provider to be sent to the attention of Cee Chase OT at [facility] in [place] at fax# [phone]. Phone # for facility is [phone]. Pt states he needs oncology clearance to receive special therapeutic treatment. Call back number is [phone].

## 2024-12-11 ENCOUNTER — TELEPHONE (OUTPATIENT)
Age: 79
End: 2024-12-11

## 2024-12-11 ENCOUNTER — HOSPITAL ENCOUNTER (OUTPATIENT)
Facility: HOSPITAL | Age: 79
Setting detail: INFUSION SERIES
Discharge: HOME | End: 2024-12-11
Payer: MEDICARE

## 2024-12-11 ENCOUNTER — OFFICE VISIT (OUTPATIENT)
Age: 79
End: 2024-12-11
Payer: MEDICARE

## 2024-12-11 VITALS
DIASTOLIC BLOOD PRESSURE: 70 MMHG | SYSTOLIC BLOOD PRESSURE: 104 MMHG | TEMPERATURE: 97.8 F | RESPIRATION RATE: 18 BRPM | HEART RATE: 73 BPM

## 2024-12-11 VITALS
WEIGHT: 233 LBS | DIASTOLIC BLOOD PRESSURE: 70 MMHG | OXYGEN SATURATION: 98 % | TEMPERATURE: 97.8 F | SYSTOLIC BLOOD PRESSURE: 104 MMHG | HEIGHT: 72 IN | BODY MASS INDEX: 31.56 KG/M2 | HEART RATE: 73 BPM | RESPIRATION RATE: 18 BRPM

## 2024-12-11 DIAGNOSIS — C67.9 MALIGNANT NEOPLASM OF URINARY BLADDER, UNSPECIFIED SITE (HCC): Primary | ICD-10-CM

## 2024-12-11 LAB
ALBUMIN SERPL-MCNC: 3.5 G/DL (ref 3.5–5)
ALBUMIN/GLOB SERPL: 1.2 (ref 1.1–2.2)
ALP SERPL-CCNC: 69 U/L (ref 45–117)
ALT SERPL-CCNC: 16 U/L (ref 12–78)
ANION GAP SERPL CALC-SCNC: 0 MMOL/L (ref 2–12)
AST SERPL-CCNC: 7 U/L (ref 15–37)
BASOPHILS # BLD: 0 K/UL (ref 0–0.1)
BASOPHILS NFR BLD: 1 % (ref 0–1)
BILIRUB SERPL-MCNC: 0.5 MG/DL (ref 0.2–1)
BUN SERPL-MCNC: 39 MG/DL (ref 6–20)
BUN/CREAT SERPL: 22 (ref 12–20)
CALCIUM SERPL-MCNC: 9.6 MG/DL (ref 8.5–10.1)
CHLORIDE SERPL-SCNC: 112 MMOL/L (ref 97–108)
CO2 SERPL-SCNC: 24 MMOL/L (ref 21–32)
CREAT SERPL-MCNC: 1.78 MG/DL (ref 0.7–1.3)
DIFFERENTIAL METHOD BLD: ABNORMAL
EOSINOPHIL # BLD: 0.2 K/UL (ref 0–0.4)
EOSINOPHIL NFR BLD: 5 % (ref 0–7)
ERYTHROCYTE [DISTWIDTH] IN BLOOD BY AUTOMATED COUNT: 16 % (ref 11.5–14.5)
GLOBULIN SER CALC-MCNC: 3 G/DL (ref 2–4)
GLUCOSE SERPL-MCNC: 105 MG/DL (ref 65–100)
HCT VFR BLD AUTO: 32.4 % (ref 36.6–50.3)
HGB BLD-MCNC: 10.4 G/DL (ref 12.1–17)
IMM GRANULOCYTES # BLD AUTO: 0 K/UL (ref 0–0.04)
IMM GRANULOCYTES NFR BLD AUTO: 0 % (ref 0–0.5)
LYMPHOCYTES # BLD: 0.3 K/UL (ref 0.8–3.5)
LYMPHOCYTES NFR BLD: 8 % (ref 12–49)
MCH RBC QN AUTO: 31 PG (ref 26–34)
MCHC RBC AUTO-ENTMCNC: 32.1 G/DL (ref 30–36.5)
MCV RBC AUTO: 96.4 FL (ref 80–99)
MONOCYTES # BLD: 0.6 K/UL (ref 0–1)
MONOCYTES NFR BLD: 14 % (ref 5–13)
NEUTS SEG # BLD: 2.9 K/UL (ref 1.8–8)
NEUTS SEG NFR BLD: 72 % (ref 32–75)
NRBC # BLD: 0 K/UL (ref 0–0.01)
NRBC BLD-RTO: 0 PER 100 WBC
PLATELET # BLD AUTO: 173 K/UL (ref 150–400)
PMV BLD AUTO: 8.9 FL (ref 8.9–12.9)
POTASSIUM SERPL-SCNC: 4.2 MMOL/L (ref 3.5–5.1)
PROT SERPL-MCNC: 6.5 G/DL (ref 6.4–8.2)
RBC # BLD AUTO: 3.36 M/UL (ref 4.1–5.7)
RBC MORPH BLD: ABNORMAL
SODIUM SERPL-SCNC: 136 MMOL/L (ref 136–145)
WBC # BLD AUTO: 4 K/UL (ref 4.1–11.1)

## 2024-12-11 PROCEDURE — G8428 CUR MEDS NOT DOCUMENT: HCPCS | Performed by: INTERNAL MEDICINE

## 2024-12-11 PROCEDURE — 3074F SYST BP LT 130 MM HG: CPT | Performed by: INTERNAL MEDICINE

## 2024-12-11 PROCEDURE — 1123F ACP DISCUSS/DSCN MKR DOCD: CPT | Performed by: INTERNAL MEDICINE

## 2024-12-11 PROCEDURE — 2580000003 HC RX 258

## 2024-12-11 PROCEDURE — G8417 CALC BMI ABV UP PARAM F/U: HCPCS | Performed by: INTERNAL MEDICINE

## 2024-12-11 PROCEDURE — 1126F AMNT PAIN NOTED NONE PRSNT: CPT | Performed by: INTERNAL MEDICINE

## 2024-12-11 PROCEDURE — 36415 COLL VENOUS BLD VENIPUNCTURE: CPT

## 2024-12-11 PROCEDURE — 99215 OFFICE O/P EST HI 40 MIN: CPT | Performed by: INTERNAL MEDICINE

## 2024-12-11 PROCEDURE — 3078F DIAST BP <80 MM HG: CPT | Performed by: INTERNAL MEDICINE

## 2024-12-11 PROCEDURE — 80053 COMPREHEN METABOLIC PANEL: CPT

## 2024-12-11 PROCEDURE — G8484 FLU IMMUNIZE NO ADMIN: HCPCS | Performed by: INTERNAL MEDICINE

## 2024-12-11 PROCEDURE — 36591 DRAW BLOOD OFF VENOUS DEVICE: CPT

## 2024-12-11 PROCEDURE — 85025 COMPLETE CBC W/AUTO DIFF WBC: CPT

## 2024-12-11 PROCEDURE — 1036F TOBACCO NON-USER: CPT | Performed by: INTERNAL MEDICINE

## 2024-12-11 RX ORDER — SODIUM CHLORIDE 0.9 % (FLUSH) 0.9 %
5-40 SYRINGE (ML) INJECTION PRN
OUTPATIENT
Start: 2024-12-25

## 2024-12-11 RX ORDER — HEPARIN 100 UNIT/ML
500 SYRINGE INTRAVENOUS PRN
OUTPATIENT
Start: 2024-12-25

## 2024-12-11 RX ORDER — SODIUM CHLORIDE 9 MG/ML
25 INJECTION, SOLUTION INTRAVENOUS PRN
OUTPATIENT
Start: 2024-12-25

## 2024-12-11 RX ORDER — SODIUM CHLORIDE 0.9 % (FLUSH) 0.9 %
5-40 SYRINGE (ML) INJECTION PRN
Status: DISCONTINUED | OUTPATIENT
Start: 2024-12-11 | End: 2024-12-12 | Stop reason: HOSPADM

## 2024-12-11 RX ADMIN — SODIUM CHLORIDE, PRESERVATIVE FREE 30 ML: 5 INJECTION INTRAVENOUS at 11:40

## 2024-12-11 ASSESSMENT — PATIENT HEALTH QUESTIONNAIRE - PHQ9
SUM OF ALL RESPONSES TO PHQ QUESTIONS 1-9: 2
2. FEELING DOWN, DEPRESSED OR HOPELESS: SEVERAL DAYS
SUM OF ALL RESPONSES TO PHQ QUESTIONS 1-9: 2
1. LITTLE INTEREST OR PLEASURE IN DOING THINGS: SEVERAL DAYS
SUM OF ALL RESPONSES TO PHQ9 QUESTIONS 1 & 2: 2
SUM OF ALL RESPONSES TO PHQ QUESTIONS 1-9: 2
SUM OF ALL RESPONSES TO PHQ QUESTIONS 1-9: 2

## 2024-12-11 ASSESSMENT — PAIN SCALES - GENERAL: PAINLEVEL_OUTOF10: 0

## 2024-12-11 NOTE — PROGRESS NOTES
[facility] Short Note                       Date: 2024    Name: Robert Gastelum    MRN: [id number]         : 1945      Pt admit to [facility] for Port Flush/Labs ambulatory in stable condition. No new concerns voiced today.       Lab results were obtained .  Medications Administered         sodium chloride flush 0.9 % injection 5-40 mL Admin Date  2024 Action  Given Dose  30 mL Route  IntraVENous Documented By  Apple Min RN            Port accessed with positive blood return. Port flushed,  and de-accessed per protocol.       [facility].   Pt aware of next appt    Future Appointments   Date Time Provider Department Center   2024  1:00 PM [facility] - [facility]   2025 11:30 AM Sukhwinder Moya MD [facility] BS AMB   2025 11:30 AM Zaida Davis MD [facility] BS AMB       APPLE MIN RN  2024  12:36 PM

## 2024-12-11 NOTE — PROGRESS NOTES
Patient identified with two identification factors, Name and Date of Birth.    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 \"Have you been to the ER, urgent care clinic since your last visit?  Hospitalized since your last visit?\" No     2. \"Have you seen or consulted any other health care providers outside of the [facility] since your last visit?\" No     
urothelial carcinoma, invasive and   non-invasive, and carcinoma in situ       Histologic Grade:                 High-grade       Tumor Size: Cannot be determined:      Multiple tumors       Tumor Extension:           Tumor invades adjacent structures -   Prostate                                  (transmural  invasion from the bladder   tumor)       Lymphovascular Invasion:      Present       Tumor Configuration:           Papillary, Flat       MARGINS       Margins:                     Uninvolved by invasive  carcinoma and   carcinoma in situ /                                  noninvasive urothelial carcinoma    LYMPH NODES       Number of Lymph Nodes Involved:      2           Size of Largest Metastatic Deposit:       1 cm               Site:                     Left Pelvic Lymph Nodes           Extranodal Extension:           Present       Number of Lymph Nodes Examined:      5     PATHOLOGIC STAGE CLASSIFICATION  (pTNM, AJCC 8th Edition)       TNM Descriptors:                m (multiple primary tumors)       Primary Tumor (pT):                pT4a       Regional Lymph Nodes (pN):           pN2     Carcinoma in situ  URETHRA: RESECTION       Tumor Site:                     Urethra       SPECIMEN       Procedure:                     Total urethrectomy    TUMOR       Tumor Site:                     Prostatic  urethra       Histologic Type:                       Papillary urothelial carcinoma,   invasive       Histologic Grade:                     High-grade       Tumor Extension:                Tumor invades  adjacent structures -   Prostate       Lymphovascular Invasion:           Present       MARGINS       Margins:                          Uninvolved by invasive carcinoma and   carcinoma in situ /                                        noninvasive urothelial carcinoma       LYMPH NODES (These represent the same lymph nodes reported in the BLADDER   Resection Template)       Number of Lymph Nodes Involved:      2

## 2024-12-11 NOTE — TELEPHONE ENCOUNTER
Patient's spouse is requesting a call to see if she can r/s pt for the soonest next available with EMMANUELLE Luciano.    States pt will be available after 12/18 until 12/27, then will be out of town 12/28-01/07/25.    Please contact to schedule.

## 2024-12-18 ENCOUNTER — TRANSCRIBE ORDERS (OUTPATIENT)
Facility: HOSPITAL | Age: 79
End: 2024-12-18

## 2024-12-18 DIAGNOSIS — R60.0 LOCALIZED EDEMA: Primary | ICD-10-CM

## 2024-12-19 ENCOUNTER — HOSPITAL ENCOUNTER (OUTPATIENT)
Age: 79
Discharge: HOME | End: 2024-12-22
Payer: MEDICARE

## 2024-12-19 DIAGNOSIS — R60.0 LOCALIZED EDEMA: ICD-10-CM

## 2024-12-19 PROCEDURE — 93971 EXTREMITY STUDY: CPT

## 2025-01-08 ENCOUNTER — OFFICE VISIT (OUTPATIENT)
Age: 80
End: 2025-01-08

## 2025-01-08 VITALS
HEART RATE: 95 BPM | OXYGEN SATURATION: 96 % | DIASTOLIC BLOOD PRESSURE: 71 MMHG | WEIGHT: 236.6 LBS | HEIGHT: 72 IN | BODY MASS INDEX: 32.05 KG/M2 | TEMPERATURE: 97.1 F | SYSTOLIC BLOOD PRESSURE: 106 MMHG

## 2025-01-08 DIAGNOSIS — K43.5 PARASTOMAL HERNIA WITHOUT OBSTRUCTION OR GANGRENE: Primary | ICD-10-CM

## 2025-01-08 ASSESSMENT — PATIENT HEALTH QUESTIONNAIRE - PHQ9
SUM OF ALL RESPONSES TO PHQ9 QUESTIONS 1 & 2: 0
SUM OF ALL RESPONSES TO PHQ QUESTIONS 1-9: 0
2. FEELING DOWN, DEPRESSED OR HOPELESS: NOT AT ALL
SUM OF ALL RESPONSES TO PHQ QUESTIONS 1-9: 0

## 2025-01-08 NOTE — PROGRESS NOTES
Identified pt with two pt identifiers (name and ). Reviewed chart in preparation for visit and have obtained necessary documentation.    Robert Gastelum is a 79 y.o. male  Chief Complaint   Patient presents with    Establish Care     To discuss parastomal hernia surgery     /71 (Site: Left Upper Arm, Position: Sitting, Cuff Size: Large Adult)   Pulse 95   Temp 97.1 °F (36.2 °C) (Oral)   Ht 1.829 m (6')   Wt 107.3 kg (236 lb 9.6 oz)   SpO2 96%   BMI 32.09 kg/m²     1. Have you been to the ER, urgent care clinic since your last visit?  Hospitalized since your last visit?no    2. Have you seen or consulted any other health care providers outside of the [facility] since your last visit?  Include any pap smears or colon screening. no   
come and see me in the future if need be but I will turn his care over to [facility].    Sukhwinder Moya MD    Greater than half of the time: 30 minutes was used in counciling the patient about diagnosis and treatment plan    Mr. Gastelum has a reminder for a \"due or due soon\" health maintenance. I have asked that he contact his primary care provider for follow-up on this health maintenance.

## 2025-01-08 NOTE — PROGRESS NOTES
NEUROLOGY  NEW PATIENT EVALUATION/CONSULTATION         PATIENT ID:   Robert Gastelum  1945  79 y.o. male  [id number]     Reason for Consultation: Stroke       Previous records (physician notes, laboratory reports, and radiology reports) and imaging studies were reviewed and summarized. My recommendations will be communicated back to the patient's physician(s) via electronic medical record and/or by US mail.     Chief Complaint   Patient presents with    New Patient     CVA.  Patient reports balance issues       Referred by: Self      History of Present Illness:   Patient states in October 15, 2024 he had a cervical surgery done by Dr Hurst. Prior to discharge he had an MRI brain without contrast due to concerns for right arm and right leg weakness, which per notes, he had \"difficulty gripping objects and difficulty lifting his wrist \" prior to surgery. He denies acute neurologic symptoms at time of MRI. Per patient MRI was requested by [facility] prior to receiving pt for rehab. His MRI was read 10/20, the day after he was discharged to [facility], with \"small acute/subacute infarct in the left perirolandic frontal area\". He has weakness on the RUE, RLE, and diminished sensation on the RUE since this admission.  He was discharged to [facility]. He has PT, OT twice weekly. He does not participate in ST. Per notes, LACIE Baum NP, contacted [facility] and he had resumed his Eliquis and she recommended starting atorvastatin to 40mg qhs, a TTE, and carotid dopplers. The patient has a complicated medical history which was reviewed as below. He has upcoming plans for parastomal hernia repair at the [facility] in February. He has PT, OT twice weekly. He does not participate in ST. He has had DVT 6/2024 and is taking Eliquis 5mg BID. He is working with PT, OT for gait, RUE, RLE strengthening.     Past Medical History:   Diagnosis Date    Arthritis     Asthma     MILD    Bladder

## 2025-01-09 ENCOUNTER — OFFICE VISIT (OUTPATIENT)
Age: 80
End: 2025-01-09
Payer: MEDICARE

## 2025-01-09 VITALS
BODY MASS INDEX: 31.97 KG/M2 | DIASTOLIC BLOOD PRESSURE: 74 MMHG | HEART RATE: 78 BPM | SYSTOLIC BLOOD PRESSURE: 116 MMHG | HEIGHT: 72 IN | OXYGEN SATURATION: 98 % | RESPIRATION RATE: 18 BRPM | WEIGHT: 236 LBS

## 2025-01-09 DIAGNOSIS — R26.81 GAIT INSTABILITY: ICD-10-CM

## 2025-01-09 DIAGNOSIS — Z86.73 HISTORY OF STROKE: Primary | ICD-10-CM

## 2025-01-09 DIAGNOSIS — R53.1 RIGHT SIDED WEAKNESS: ICD-10-CM

## 2025-01-09 PROCEDURE — 99204 OFFICE O/P NEW MOD 45 MIN: CPT

## 2025-01-09 PROCEDURE — 1160F RVW MEDS BY RX/DR IN RCRD: CPT

## 2025-01-09 PROCEDURE — 1126F AMNT PAIN NOTED NONE PRSNT: CPT

## 2025-01-09 PROCEDURE — 1036F TOBACCO NON-USER: CPT

## 2025-01-09 PROCEDURE — 1159F MED LIST DOCD IN RCRD: CPT

## 2025-01-09 PROCEDURE — 1123F ACP DISCUSS/DSCN MKR DOCD: CPT

## 2025-01-09 PROCEDURE — G8427 DOCREV CUR MEDS BY ELIG CLIN: HCPCS

## 2025-01-09 PROCEDURE — 3078F DIAST BP <80 MM HG: CPT

## 2025-01-09 PROCEDURE — 3074F SYST BP LT 130 MM HG: CPT

## 2025-01-09 PROCEDURE — G8417 CALC BMI ABV UP PARAM F/U: HCPCS

## 2025-01-09 ASSESSMENT — PATIENT HEALTH QUESTIONNAIRE - PHQ9
SUM OF ALL RESPONSES TO PHQ QUESTIONS 1-9: 0
SUM OF ALL RESPONSES TO PHQ9 QUESTIONS 1 & 2: 0
SUM OF ALL RESPONSES TO PHQ QUESTIONS 1-9: 0
1. LITTLE INTEREST OR PLEASURE IN DOING THINGS: NOT AT ALL
SUM OF ALL RESPONSES TO PHQ QUESTIONS 1-9: 0
SUM OF ALL RESPONSES TO PHQ QUESTIONS 1-9: 0
2. FEELING DOWN, DEPRESSED OR HOPELESS: NOT AT ALL

## 2025-01-10 ENCOUNTER — CLINICAL DOCUMENTATION (OUTPATIENT)
Age: 80
End: 2025-01-10

## 2025-01-10 ENCOUNTER — TELEPHONE (OUTPATIENT)
Age: 80
End: 2025-01-10

## 2025-01-10 NOTE — TELEPHONE ENCOUNTER
[facility] [employer] called to state an ABN is being triggered when they try to schedule the EKG NP. Mustapha ordered.    A new order is new needed with a different diagnosis.

## 2025-01-10 NOTE — TELEPHONE ENCOUNTER
Requesting medical diagnosis notes faxed to   [facility], [facility]. Surgery in 3 weeks  Attention to Cristy    Fax # [phone]   Contact # [phone]

## 2025-01-15 LAB
CHOLEST SERPL-MCNC: 148 MG/DL (ref 100–199)
HBA1C MFR BLD: 5.4 % (ref 4.8–5.6)
HDLC SERPL-MCNC: 48 MG/DL
LDLC SERPL CALC-MCNC: 77 MG/DL (ref 0–99)
TRIGL SERPL-MCNC: 129 MG/DL (ref 0–149)
VLDLC SERPL CALC-MCNC: 23 MG/DL (ref 5–40)

## 2025-01-20 ENCOUNTER — TELEPHONE (OUTPATIENT)
Age: 80
End: 2025-01-20

## 2025-01-20 NOTE — TELEPHONE ENCOUNTER
Called patient and given him the provider's response. Stated I will go ahead and fax over the notes to Dr. Saini and he will call the office tomorrow.

## 2025-01-20 NOTE — TELEPHONE ENCOUNTER
Patient called to state scheduling has informed him his order for an ECHO requires a new code to be approved by insurance.    Patient is asking for a call back to let him know when this has been done.

## 2025-01-22 ENCOUNTER — TELEPHONE (OUTPATIENT)
Age: 80
End: 2025-01-22

## 2025-01-22 NOTE — TELEPHONE ENCOUNTER
1000  Called PAR to confirm if mutual pt has had a bx recently with them and if so, is the path report back.  This nurse was made aware that pt has not had a bx.    1006  Call placed to pt and a VM was left requesting a r/t call to see if pt had his bx completed. If not we will need to move his appt out until after it has been done so Dr. Davis can review the results with him.

## 2025-01-30 ENCOUNTER — TELEPHONE (OUTPATIENT)
Age: 80
End: 2025-01-30

## 2025-01-31 ENCOUNTER — TELEPHONE (OUTPATIENT)
Age: 80
End: 2025-01-31

## 2025-02-04 ENCOUNTER — OFFICE VISIT (OUTPATIENT)
Age: 80
End: 2025-02-04
Payer: MEDICARE

## 2025-02-04 VITALS
BODY MASS INDEX: 31.19 KG/M2 | SYSTOLIC BLOOD PRESSURE: 101 MMHG | OXYGEN SATURATION: 94 % | TEMPERATURE: 98.1 F | RESPIRATION RATE: 18 BRPM | WEIGHT: 230 LBS | HEART RATE: 90 BPM | DIASTOLIC BLOOD PRESSURE: 70 MMHG

## 2025-02-04 DIAGNOSIS — C67.9 MALIGNANT NEOPLASM OF URINARY BLADDER, UNSPECIFIED SITE (HCC): Primary | ICD-10-CM

## 2025-02-04 PROCEDURE — 3078F DIAST BP <80 MM HG: CPT | Performed by: INTERNAL MEDICINE

## 2025-02-04 PROCEDURE — G8428 CUR MEDS NOT DOCUMENT: HCPCS | Performed by: INTERNAL MEDICINE

## 2025-02-04 PROCEDURE — 99215 OFFICE O/P EST HI 40 MIN: CPT | Performed by: INTERNAL MEDICINE

## 2025-02-04 PROCEDURE — G8417 CALC BMI ABV UP PARAM F/U: HCPCS | Performed by: INTERNAL MEDICINE

## 2025-02-04 PROCEDURE — 1126F AMNT PAIN NOTED NONE PRSNT: CPT | Performed by: INTERNAL MEDICINE

## 2025-02-04 PROCEDURE — 3074F SYST BP LT 130 MM HG: CPT | Performed by: INTERNAL MEDICINE

## 2025-02-04 PROCEDURE — 1123F ACP DISCUSS/DSCN MKR DOCD: CPT | Performed by: INTERNAL MEDICINE

## 2025-02-04 PROCEDURE — 1036F TOBACCO NON-USER: CPT | Performed by: INTERNAL MEDICINE

## 2025-02-04 NOTE — PROGRESS NOTES
Robert Gastelum is a 80 y.o. male    Chief Complaint   Patient presents with    Follow-up     Muscle invasive bladder cancer- Mixed histology       1. Have you been to the ER, urgent care clinic since your last visit?  Hospitalized since your last visit?No    2. Have you seen or consulted any other health care providers outside of the [facility] since your last visit?  Include any pap smears or colon screening. PAR, Urologist, PCP      
High-grade       Tumor Size: Cannot be determined:      Multiple tumors       Tumor Extension:           Tumor invades adjacent structures -   Prostate                                  (transmural  invasion from the bladder   tumor)       Lymphovascular Invasion:      Present       Tumor Configuration:           Papillary, Flat       MARGINS       Margins:                     Uninvolved by invasive  carcinoma and   carcinoma in situ /                                  noninvasive urothelial carcinoma    LYMPH NODES       Number of Lymph Nodes Involved:      2           Size of Largest Metastatic Deposit:       1 cm               Site:                     Left Pelvic Lymph Nodes           Extranodal Extension:           Present       Number of Lymph Nodes Examined:      5     PATHOLOGIC STAGE CLASSIFICATION  (pTNM, AJCC 8th Edition)       TNM Descriptors:                m (multiple primary tumors)       Primary Tumor (pT):                pT4a       Regional Lymph Nodes (pN):           pN2     Carcinoma in situ  URETHRA: RESECTION       Tumor Site:                     Urethra       SPECIMEN       Procedure:                     Total urethrectomy    TUMOR       Tumor Site:                     Prostatic  urethra       Histologic Type:                       Papillary urothelial carcinoma,   invasive       Histologic Grade:                     High-grade       Tumor Extension:                Tumor invades  adjacent structures -   Prostate       Lymphovascular Invasion:           Present       MARGINS       Margins:                          Uninvolved by invasive carcinoma and   carcinoma in situ /                                        noninvasive urothelial carcinoma       LYMPH NODES (These represent the same lymph nodes reported in the BLADDER   Resection Template)       Number of Lymph Nodes Involved:      2            Size of Largest Metastatic Deposit:      1 cm               Site:                     Left Pelvic

## 2025-02-05 ENCOUNTER — TELEPHONE (OUTPATIENT)
Age: 80
End: 2025-02-05

## 2025-02-05 NOTE — TELEPHONE ENCOUNTER
Dr. Herbert received a call concerning pt for a pt report. Would like to speak to physician

## 2025-02-06 ENCOUNTER — CLINICAL DOCUMENTATION (OUTPATIENT)
Age: 80
End: 2025-02-06

## 2025-02-06 NOTE — PROGRESS NOTES
Talked to Dr. Herbert Pathology  GATA3 very positive and this is likely Urothelial    Dara could you request the addended report from this morning for my review?

## 2025-02-10 ENCOUNTER — TELEPHONE (OUTPATIENT)
Age: 80
End: 2025-02-10

## 2025-02-10 NOTE — PROGRESS NOTES
[facility]   [address], [address] [place] [id number]   W: [phone]  F: [phone]          Reason for Visit:     Robert Gastelum is a 80 y.o.  male who is seen in follow-up of Muscle invasive bladder cancer- Mixed histology-stage IV    Robert Gastelum is a 80 y.o. male who is seen by synchronous (real-time) audio-video technology on 2/11/2025 for follow up        Treatment History:      3/1/2021: CT IVP: Multiple abdominal, iliac, mediastinal nodes unchanged from 2019 consistent with h/o sarcoidosis, Thickened R lateral  bladder wall.     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     12/6/2021: Radical urethro-cystoprostatectomy     2/3/22-1/2023: Adjuvant nivolumab  5/2023: RT with Dr. Scruggs- Surveillance   2/2024: Stage IV Bladder cancer with squamous histology          History of Present Illness:     Patient is a 80 y.o.. male with PMH as below including sarcoidosis who  is seen for evaluation of bladder cancer.      He has a history of nonmuscle invasive bladder cancer in 2015, underwent 2 induction courses of BCG, had a non muscle invasive recurrence in 2019 and had re induction with BCG. Cystoscopy 6/2020 at [facility] did not show any recurrence. In March 2021 he had  a positive FISH and was seen by Dr. Naqvi. Had a CT IVP 3/2021 which showed some Bladder thickening. He underwent a Cystoscopy with TURBT and was found to have extensive non muscle invasive disease including that of

## 2025-02-11 ENCOUNTER — TELEPHONE (OUTPATIENT)
Age: 80
End: 2025-02-11

## 2025-02-11 ENCOUNTER — TELEMEDICINE (OUTPATIENT)
Age: 80
End: 2025-02-11
Payer: MEDICARE

## 2025-02-11 DIAGNOSIS — C67.9 MALIGNANT NEOPLASM OF URINARY BLADDER, UNSPECIFIED SITE (HCC): Primary | ICD-10-CM

## 2025-02-11 PROCEDURE — G8428 CUR MEDS NOT DOCUMENT: HCPCS | Performed by: INTERNAL MEDICINE

## 2025-02-11 PROCEDURE — 1123F ACP DISCUSS/DSCN MKR DOCD: CPT | Performed by: INTERNAL MEDICINE

## 2025-02-11 PROCEDURE — 99215 OFFICE O/P EST HI 40 MIN: CPT | Performed by: INTERNAL MEDICINE

## 2025-02-11 RX ORDER — ONDANSETRON 8 MG/1
8 TABLET, ORALLY DISINTEGRATING ORAL EVERY 8 HOURS PRN
Qty: 30 TABLET | Refills: 2 | Status: SHIPPED | OUTPATIENT
Start: 2025-02-11

## 2025-02-11 RX ORDER — PROCHLORPERAZINE MALEATE 10 MG
10 TABLET ORAL EVERY 6 HOURS PRN
Qty: 60 TABLET | Refills: 2 | Status: SHIPPED | OUTPATIENT
Start: 2025-02-11

## 2025-02-11 NOTE — TELEPHONE ENCOUNTER
Pharmacy Note- Oncology Treatment Education    Robert Gastelum is a  80 y.o.male  diagnosed with bladder cancer contacted today for oncology treatment counseling and consent. Mr. Gastelum is being treated with enfortumab vedotin, pembrolizumab.   Provided education on enfortumab vedotin, pembrolizumab and premedications - ondansetron.    Side effects of oncology treatment reviewed included s/s infection, anemia, appetite changes, thrombocytopenia, fatigue, hair loss/alopecia, bone pain, skin and nail changes, diarrhea/constipation and peripheral neuropathy.    Mr. Gastelum was provided information regarding the risks of immune-mediated adverse reactions secondary to pembrolizumab that may require interruption/delay of treatment and possible use of corticosteroids.  These reactions include, but are not limited to: colitis (diarrhea or severe abdominal pain); hepatitis (jaundice, severe nausea, or vomiting, easy bruising, and/or bleeding); hypophysitis (persistent or unusual headache, extreme weakness, dizziness/fainting, and/or vision changes); dermatitis (skin rash, mouth sores, skin blisters, and/or skin peeling); ocular toxicity (uveitis, iritis, and/or episcleritis); neuropathy (motor or sensory); hyper/hypothyroidism.    Patient given ways to manage these side effects and when to contact office.     [facility] Handout of medications provided to patient. Mr. Gastelum verbalized understanding of the information presented and all of the patient's questions were answered.    Samanta Lopez, PharmD, BCPS, BCOP    For Pharmacy Admin Tracking Only    Program: Medical Group  CPA in place:  No  Recommendation Provided To: Patient/Caregiver: 1 via Telephone    Intervention Accepted By: Patient/Caregiver: 1    Time Spent (min): 20

## 2025-02-12 ENCOUNTER — TELEPHONE (OUTPATIENT)
Age: 80
End: 2025-02-12

## 2025-02-12 NOTE — TELEPHONE ENCOUNTER
Called left vm advising of scheduling treatment in march per staff message. Provided c/b # if any times and dates aren't suitable.

## 2025-02-18 ENCOUNTER — HOSPITAL ENCOUNTER (EMERGENCY)
Facility: HOSPITAL | Age: 80
Discharge: HOME | End: 2025-02-18
Attending: EMERGENCY MEDICINE
Payer: MEDICARE

## 2025-02-18 ENCOUNTER — APPOINTMENT (OUTPATIENT)
Facility: HOSPITAL | Age: 80
End: 2025-02-18
Payer: MEDICARE

## 2025-02-18 VITALS
TEMPERATURE: 97.9 F | HEART RATE: 72 BPM | SYSTOLIC BLOOD PRESSURE: 142 MMHG | DIASTOLIC BLOOD PRESSURE: 115 MMHG | OXYGEN SATURATION: 97 % | RESPIRATION RATE: 14 BRPM

## 2025-02-18 DIAGNOSIS — R10.33 PERIUMBILICAL ABDOMINAL PAIN: Primary | ICD-10-CM

## 2025-02-18 LAB
ALBUMIN SERPL-MCNC: 3.9 G/DL (ref 3.5–5)
ALBUMIN/GLOB SERPL: 0.9 (ref 1.1–2.2)
ALP SERPL-CCNC: 67 U/L (ref 45–117)
ALT SERPL-CCNC: 19 U/L (ref 12–78)
ANION GAP SERPL CALC-SCNC: 8 MMOL/L (ref 2–12)
AST SERPL-CCNC: 12 U/L (ref 15–37)
BASOPHILS # BLD: 0.03 K/UL (ref 0–0.1)
BASOPHILS NFR BLD: 0.6 % (ref 0–1)
BILIRUB SERPL-MCNC: 0.5 MG/DL (ref 0.2–1)
BUN SERPL-MCNC: 38 MG/DL (ref 6–20)
BUN/CREAT SERPL: 19 (ref 12–20)
CALCIUM SERPL-MCNC: 10.4 MG/DL (ref 8.5–10.1)
CHLORIDE SERPL-SCNC: 104 MMOL/L (ref 97–108)
CO2 SERPL-SCNC: 23 MMOL/L (ref 21–32)
COMMENT:: NORMAL
CREAT SERPL-MCNC: 1.95 MG/DL (ref 0.7–1.3)
DIFFERENTIAL METHOD BLD: ABNORMAL
EOSINOPHIL # BLD: 0.12 K/UL (ref 0–0.4)
EOSINOPHIL NFR BLD: 2.3 % (ref 0–7)
ERYTHROCYTE [DISTWIDTH] IN BLOOD BY AUTOMATED COUNT: 13.8 % (ref 11.5–14.5)
GLOBULIN SER CALC-MCNC: 4.4 G/DL (ref 2–4)
GLUCOSE SERPL-MCNC: 93 MG/DL (ref 65–100)
HCT VFR BLD AUTO: 39.8 % (ref 36.6–50.3)
HGB BLD-MCNC: 12.9 G/DL (ref 12.1–17)
IMM GRANULOCYTES # BLD AUTO: 0.02 K/UL (ref 0–0.04)
IMM GRANULOCYTES NFR BLD AUTO: 0.4 % (ref 0–0.5)
LIPASE SERPL-CCNC: 61 U/L (ref 13–75)
LYMPHOCYTES # BLD: 0.41 K/UL (ref 0.8–3.5)
LYMPHOCYTES NFR BLD: 8 % (ref 12–49)
MCH RBC QN AUTO: 31.5 PG (ref 26–34)
MCHC RBC AUTO-ENTMCNC: 32.4 G/DL (ref 30–36.5)
MCV RBC AUTO: 97.3 FL (ref 80–99)
MONOCYTES # BLD: 0.5 K/UL (ref 0–1)
MONOCYTES NFR BLD: 9.8 % (ref 5–13)
NEUTS SEG # BLD: 4.02 K/UL (ref 1.8–8)
NEUTS SEG NFR BLD: 78.9 % (ref 32–75)
NRBC # BLD: 0 K/UL (ref 0–0.01)
NRBC BLD-RTO: 0 PER 100 WBC
PLATELET # BLD AUTO: 210 K/UL (ref 150–400)
PMV BLD AUTO: 9.2 FL (ref 8.9–12.9)
POTASSIUM SERPL-SCNC: 4.1 MMOL/L (ref 3.5–5.1)
PROT SERPL-MCNC: 8.3 G/DL (ref 6.4–8.2)
RBC # BLD AUTO: 4.09 M/UL (ref 4.1–5.7)
RBC MORPH BLD: ABNORMAL
RBC MORPH BLD: ABNORMAL
SODIUM SERPL-SCNC: 135 MMOL/L (ref 136–145)
SPECIMEN HOLD: NORMAL
WBC # BLD AUTO: 5.1 K/UL (ref 4.1–11.1)

## 2025-02-18 PROCEDURE — 6360000004 HC RX CONTRAST MEDICATION: Performed by: EMERGENCY MEDICINE

## 2025-02-18 PROCEDURE — 99285 EMERGENCY DEPT VISIT HI MDM: CPT

## 2025-02-18 PROCEDURE — 96376 TX/PRO/DX INJ SAME DRUG ADON: CPT

## 2025-02-18 PROCEDURE — 80053 COMPREHEN METABOLIC PANEL: CPT

## 2025-02-18 PROCEDURE — 74177 CT ABD & PELVIS W/CONTRAST: CPT

## 2025-02-18 PROCEDURE — 96374 THER/PROPH/DIAG INJ IV PUSH: CPT

## 2025-02-18 PROCEDURE — 6360000002 HC RX W HCPCS

## 2025-02-18 PROCEDURE — 83690 ASSAY OF LIPASE: CPT

## 2025-02-18 PROCEDURE — 96375 TX/PRO/DX INJ NEW DRUG ADDON: CPT

## 2025-02-18 PROCEDURE — 85025 COMPLETE CBC W/AUTO DIFF WBC: CPT

## 2025-02-18 RX ORDER — TRAMADOL HYDROCHLORIDE 50 MG/1
50 TABLET ORAL EVERY 4 HOURS PRN
Qty: 18 TABLET | Refills: 0 | Status: SHIPPED | OUTPATIENT
Start: 2025-02-18 | End: 2025-02-18 | Stop reason: ALTCHOICE

## 2025-02-18 RX ORDER — ONDANSETRON 2 MG/ML
4 INJECTION INTRAMUSCULAR; INTRAVENOUS
Status: COMPLETED | OUTPATIENT
Start: 2025-02-18 | End: 2025-02-18

## 2025-02-18 RX ORDER — IOPAMIDOL 755 MG/ML
100 INJECTION, SOLUTION INTRAVASCULAR
Status: COMPLETED | OUTPATIENT
Start: 2025-02-18 | End: 2025-02-18

## 2025-02-18 RX ORDER — MORPHINE SULFATE 4 MG/ML
4 INJECTION, SOLUTION INTRAMUSCULAR; INTRAVENOUS
Status: COMPLETED | OUTPATIENT
Start: 2025-02-18 | End: 2025-02-18

## 2025-02-18 RX ORDER — OXYCODONE HYDROCHLORIDE 5 MG/1
5 TABLET ORAL EVERY 6 HOURS PRN
Qty: 12 TABLET | Refills: 0 | Status: SHIPPED | OUTPATIENT
Start: 2025-02-18 | End: 2025-02-21

## 2025-02-18 RX ORDER — MORPHINE SULFATE 2 MG/ML
2 INJECTION, SOLUTION INTRAMUSCULAR; INTRAVENOUS
Status: COMPLETED | OUTPATIENT
Start: 2025-02-18 | End: 2025-02-18

## 2025-02-18 RX ADMIN — MORPHINE SULFATE 2 MG: 2 INJECTION, SOLUTION INTRAMUSCULAR; INTRAVENOUS at 10:22

## 2025-02-18 RX ADMIN — IOPAMIDOL 100 ML: 755 INJECTION, SOLUTION INTRAVENOUS at 12:21

## 2025-02-18 RX ADMIN — MORPHINE SULFATE 4 MG: 4 INJECTION INTRAVENOUS at 15:21

## 2025-02-18 RX ADMIN — ONDANSETRON 4 MG: 2 INJECTION INTRAMUSCULAR; INTRAVENOUS at 10:22

## 2025-02-18 ASSESSMENT — LIFESTYLE VARIABLES: HOW OFTEN DO YOU HAVE A DRINK CONTAINING ALCOHOL: NEVER

## 2025-02-18 NOTE — DISCHARGE INSTRUCTIONS
Thank you for allowing us to provide you with medical care today.  We realize that you have many choices for your emergency care needs.  We thank you for choosing [facility].  Please choose us in the future for any continued health care needs.     We hope we addressed all of your medical concerns. We strive to provide excellent quality care in the Emergency Department.  Anything less than excellent does not meet our expectations.     The exam and treatment you received in the Emergency Department were for an emergent problem and are not intended as complete care. It is important that you follow up with a doctor, nurse practitioner, or physician’s assistant for ongoing care. If your symptoms worsen or you do not improve as expected and you are unable to reach your usual health care provider, you should return to the Emergency Department. We are available 24 hours a day.     Take this sheet with you when you go to your follow-up visit.     If you have any problem arranging the follow-up visit, contact the Emergency Department immediately.     Make an appointment your family doctor for follow up of this visit. Return to the ER if you are unable to be seen in a timely manner.     Below is a summary of your results.    Labs  Recent Results (from the past 12 hour(s))   CBC with Auto Differential    Collection Time: 02/18/25  9:55 AM   Result Value Ref Range    WBC 5.1 4.1 - 11.1 K/uL    RBC 4.09 (L) 4.10 - 5.70 M/uL    Hemoglobin 12.9 12.1 - 17.0 g/dL    Hematocrit 39.8 36.6 - 50.3 %    MCV 97.3 80.0 - 99.0 FL    MCH 31.5 26.0 - 34.0 PG    MCHC 32.4 30.0 - 36.5 g/dL    RDW 13.8 11.5 - 14.5 %    Platelets 210 150 - 400 K/uL    MPV 9.2 8.9 - 12.9 FL    Nucleated RBCs 0.0 0  WBC    nRBC 0.00 0.00 - 0.01 K/uL    Neutrophils % 78.9 (H) 32.0 - 75.0 %    Lymphocytes % 8.0 (L) 12.0 - 49.0 %    Monocytes % 9.8 5.0 - 13.0 %    Eosinophils % 2.3 0.0 - 7.0 %    Basophils % 0.6 0.0 - 1.0 %    Immature

## 2025-02-18 NOTE — ED PROVIDER NOTES
[facility] EMERGENCY DEPARTMENT  EMERGENCY DEPARTMENT ENCOUNTER      Date: 2/18/2025  Patient Name: Robert Gastelum  MRN: [id number]  Birthdate 1945  Date of evaluation: 2/18/2025  Provider: RAMOS Courtney NP   Note Started: 9:53 AM EST 2/18/25    CHIEF COMPLAINT     Chief Complaint   Patient presents with    Abdominal Pain       HISTORY OF PRESENT ILLNESS  (Onset, Location, Duration, Character, Alleviating/Aggravating, Radiation, Timing, Severity)   Note limiting factors.   History Provided By: Patient and wife    HPI: Robert Gastelum is a 80 y.o. male with history of arthritis, asthma, bladder cancer, anxiety, depression, kidney disease, hyperlipidemia, colostomy, hypertension, MRSA presents with abdominal pain. Onset 0400 this morning. \"Feels bloated.\" Bowel obstruction last year. Non-surgical. Feels the same.   Denies fevers, vomiting, diarrhea, chest pain, difficulty breathing, edema.   Endorses nausea and dry heaving.   Last BM yesterday at 1600 and normal.     Nursing Notes and triage vitals were reviewed.  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

## 2025-03-06 ENCOUNTER — TELEPHONE (OUTPATIENT)
Age: 80
End: 2025-03-06

## 2025-03-06 NOTE — TELEPHONE ENCOUNTER
Pt called stating he had to have surgery postponed to March 26; wants provider to know treatment will have to be delayed. Call back number is [phone].

## 2025-04-08 NOTE — PROGRESS NOTES
HbA1C was 5.4%.     He saw his Cardiologist who did not think cardiac monitoring was needed per pt. Cardiologist did increase his simvastatin to 80 mg qhs. He just recently discharged from the [facility] for a repair of parastomal hernia, tolerated procedure well. He resumed taking Eliquis this morning. He had TTE done during admission which per pt, showed a \"small leak\" between two atriums, discussed a closure w/ Cardiologist while admitted, but the risks>benefits. He was discharged from the [facility] to [facility], to be discharged w/ Home Health PT, OT. Asking for referral for PT/OT today. He had a bronchoscopy in 1/2025 which identified recurrence of lung cancer, follows with Dr Davis, will start chemotherapy in near future. Pt has right hand in a splint, recommended by therapies d/t limited movement in the right hand. Pt has noticed improved right hand  strength, range of motion in the right arm w/ therapy. Pt states he saw Dr Hurst for follow up after anterior cervical fusion, per pt screws used in surgery are becoming loose. Pt denies pain in the neck. Pt will follow up with Dr Hurst in near future, will repeat imaging, and discuss if surgical intervention is needed. Wife is upset that pt was not seen by [facility] today.     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

## 2025-04-09 ENCOUNTER — CLINICAL DOCUMENTATION (OUTPATIENT)
Age: 80
End: 2025-04-09

## 2025-04-09 ENCOUNTER — OFFICE VISIT (OUTPATIENT)
Age: 80
End: 2025-04-09
Payer: MEDICARE

## 2025-04-09 VITALS
DIASTOLIC BLOOD PRESSURE: 80 MMHG | HEIGHT: 70 IN | OXYGEN SATURATION: 94 % | SYSTOLIC BLOOD PRESSURE: 112 MMHG | WEIGHT: 217 LBS | HEART RATE: 86 BPM | BODY MASS INDEX: 31.07 KG/M2

## 2025-04-09 DIAGNOSIS — R53.1 RIGHT SIDED WEAKNESS: ICD-10-CM

## 2025-04-09 DIAGNOSIS — Z98.890 HISTORY OF HERNIA REPAIR: ICD-10-CM

## 2025-04-09 DIAGNOSIS — Z86.73 HISTORY OF STROKE: Primary | ICD-10-CM

## 2025-04-09 DIAGNOSIS — R26.81 GAIT INSTABILITY: ICD-10-CM

## 2025-04-09 DIAGNOSIS — Z87.19 HISTORY OF HERNIA REPAIR: ICD-10-CM

## 2025-04-09 PROCEDURE — 1159F MED LIST DOCD IN RCRD: CPT

## 2025-04-09 PROCEDURE — 99215 OFFICE O/P EST HI 40 MIN: CPT

## 2025-04-09 PROCEDURE — G8417 CALC BMI ABV UP PARAM F/U: HCPCS

## 2025-04-09 PROCEDURE — 1160F RVW MEDS BY RX/DR IN RCRD: CPT

## 2025-04-09 PROCEDURE — 1036F TOBACCO NON-USER: CPT

## 2025-04-09 PROCEDURE — 1123F ACP DISCUSS/DSCN MKR DOCD: CPT

## 2025-04-09 PROCEDURE — 3074F SYST BP LT 130 MM HG: CPT

## 2025-04-09 PROCEDURE — G8427 DOCREV CUR MEDS BY ELIG CLIN: HCPCS

## 2025-04-09 PROCEDURE — 3079F DIAST BP 80-89 MM HG: CPT

## 2025-04-09 RX ORDER — ASPIRIN 325 MG
TABLET ORAL
COMMUNITY

## 2025-04-09 RX ORDER — ACETAMINOPHEN 500 MG
500 TABLET ORAL EVERY 8 HOURS
COMMUNITY

## 2025-04-09 RX ORDER — POLYETHYLENE GLYCOL 3350 17 G/17G
17 POWDER, FOR SOLUTION ORAL DAILY PRN
COMMUNITY
Start: 2025-04-03

## 2025-04-09 ASSESSMENT — PATIENT HEALTH QUESTIONNAIRE - PHQ9
1. LITTLE INTEREST OR PLEASURE IN DOING THINGS: NOT AT ALL
2. FEELING DOWN, DEPRESSED OR HOPELESS: NOT AT ALL
SUM OF ALL RESPONSES TO PHQ QUESTIONS 1-9: 0

## 2025-04-09 ASSESSMENT — VISUAL ACUITY: VA_NORMAL: 1

## 2025-04-16 DIAGNOSIS — C67.9 MALIGNANT NEOPLASM OF URINARY BLADDER, UNSPECIFIED SITE (HCC): Primary | ICD-10-CM

## 2025-04-16 RX ORDER — LORAZEPAM 2 MG/ML
0.5 INJECTION INTRAMUSCULAR
Status: CANCELLED | OUTPATIENT
Start: 2025-04-17

## 2025-04-16 RX ORDER — SODIUM CHLORIDE 9 MG/ML
5-250 INJECTION, SOLUTION INTRAVENOUS PRN
OUTPATIENT
Start: 2025-04-24

## 2025-04-16 RX ORDER — LORAZEPAM 2 MG/ML
0.5 INJECTION INTRAMUSCULAR
OUTPATIENT
Start: 2025-04-24

## 2025-04-16 RX ORDER — ALBUTEROL SULFATE 90 UG/1
4 INHALANT RESPIRATORY (INHALATION) PRN
Status: CANCELLED | OUTPATIENT
Start: 2025-04-17

## 2025-04-16 RX ORDER — HEPARIN 100 UNIT/ML
500 SYRINGE INTRAVENOUS PRN
OUTPATIENT
Start: 2025-04-24

## 2025-04-16 RX ORDER — ONDANSETRON 2 MG/ML
8 INJECTION INTRAMUSCULAR; INTRAVENOUS ONCE
Status: CANCELLED | OUTPATIENT
Start: 2025-04-17 | End: 2025-04-17

## 2025-04-16 RX ORDER — ONDANSETRON 2 MG/ML
8 INJECTION INTRAMUSCULAR; INTRAVENOUS
OUTPATIENT
Start: 2025-04-24

## 2025-04-16 RX ORDER — SODIUM CHLORIDE 9 MG/ML
INJECTION, SOLUTION INTRAVENOUS CONTINUOUS
OUTPATIENT
Start: 2025-04-24

## 2025-04-16 RX ORDER — ACETAMINOPHEN 325 MG/1
650 TABLET ORAL
Status: CANCELLED | OUTPATIENT
Start: 2025-04-17

## 2025-04-16 RX ORDER — MEPERIDINE HYDROCHLORIDE 25 MG/ML
12.5 INJECTION INTRAMUSCULAR; INTRAVENOUS; SUBCUTANEOUS PRN
OUTPATIENT
Start: 2025-04-24

## 2025-04-16 RX ORDER — PROCHLORPERAZINE EDISYLATE 5 MG/ML
5 INJECTION INTRAMUSCULAR; INTRAVENOUS
Status: CANCELLED | OUTPATIENT
Start: 2025-04-17

## 2025-04-16 RX ORDER — EPINEPHRINE 1 MG/ML
0.3 INJECTION, SOLUTION, CONCENTRATE INTRAVENOUS PRN
OUTPATIENT
Start: 2025-04-24

## 2025-04-16 RX ORDER — HYDROCORTISONE SODIUM SUCCINATE 100 MG/2ML
100 INJECTION INTRAMUSCULAR; INTRAVENOUS
Status: CANCELLED | OUTPATIENT
Start: 2025-04-17

## 2025-04-16 RX ORDER — SODIUM CHLORIDE 9 MG/ML
INJECTION, SOLUTION INTRAVENOUS CONTINUOUS
Status: CANCELLED | OUTPATIENT
Start: 2025-04-17

## 2025-04-16 RX ORDER — SODIUM CHLORIDE 9 MG/ML
5-250 INJECTION, SOLUTION INTRAVENOUS PRN
Status: CANCELLED | OUTPATIENT
Start: 2025-04-17

## 2025-04-16 RX ORDER — DIPHENHYDRAMINE HYDROCHLORIDE 50 MG/ML
50 INJECTION, SOLUTION INTRAMUSCULAR; INTRAVENOUS
Status: CANCELLED | OUTPATIENT
Start: 2025-04-17

## 2025-04-16 RX ORDER — PROCHLORPERAZINE EDISYLATE 5 MG/ML
5 INJECTION INTRAMUSCULAR; INTRAVENOUS
OUTPATIENT
Start: 2025-04-24

## 2025-04-16 RX ORDER — EPINEPHRINE 1 MG/ML
0.3 INJECTION, SOLUTION, CONCENTRATE INTRAVENOUS PRN
Status: CANCELLED | OUTPATIENT
Start: 2025-04-17

## 2025-04-16 RX ORDER — ACETAMINOPHEN 325 MG/1
650 TABLET ORAL
OUTPATIENT
Start: 2025-04-24

## 2025-04-16 RX ORDER — DIPHENHYDRAMINE HYDROCHLORIDE 50 MG/ML
50 INJECTION, SOLUTION INTRAMUSCULAR; INTRAVENOUS
OUTPATIENT
Start: 2025-04-24

## 2025-04-16 RX ORDER — SODIUM CHLORIDE 0.9 % (FLUSH) 0.9 %
5-40 SYRINGE (ML) INJECTION PRN
OUTPATIENT
Start: 2025-04-24

## 2025-04-16 RX ORDER — HYDROCORTISONE SODIUM SUCCINATE 100 MG/2ML
100 INJECTION INTRAMUSCULAR; INTRAVENOUS
OUTPATIENT
Start: 2025-04-24

## 2025-04-16 RX ORDER — HEPARIN 100 UNIT/ML
500 SYRINGE INTRAVENOUS PRN
Status: CANCELLED | OUTPATIENT
Start: 2025-04-17

## 2025-04-16 RX ORDER — ONDANSETRON 2 MG/ML
8 INJECTION INTRAMUSCULAR; INTRAVENOUS
Status: CANCELLED | OUTPATIENT
Start: 2025-04-17

## 2025-04-16 RX ORDER — SODIUM CHLORIDE 0.9 % (FLUSH) 0.9 %
5-40 SYRINGE (ML) INJECTION PRN
Status: CANCELLED | OUTPATIENT
Start: 2025-04-17

## 2025-04-16 RX ORDER — ALBUTEROL SULFATE 90 UG/1
4 INHALANT RESPIRATORY (INHALATION) PRN
OUTPATIENT
Start: 2025-04-24

## 2025-04-16 RX ORDER — MEPERIDINE HYDROCHLORIDE 25 MG/ML
12.5 INJECTION INTRAMUSCULAR; INTRAVENOUS; SUBCUTANEOUS PRN
Status: CANCELLED | OUTPATIENT
Start: 2025-04-17

## 2025-04-16 RX ORDER — ONDANSETRON 2 MG/ML
8 INJECTION INTRAMUSCULAR; INTRAVENOUS ONCE
OUTPATIENT
Start: 2025-04-24 | End: 2025-04-24

## 2025-04-17 ENCOUNTER — OFFICE VISIT (OUTPATIENT)
Age: 80
End: 2025-04-17
Payer: MEDICARE

## 2025-04-17 ENCOUNTER — HOSPITAL ENCOUNTER (OUTPATIENT)
Facility: HOSPITAL | Age: 80
Setting detail: INFUSION SERIES
Discharge: HOME | End: 2025-04-17
Payer: MEDICARE

## 2025-04-17 ENCOUNTER — CLINICAL DOCUMENTATION (OUTPATIENT)
Age: 80
End: 2025-04-17

## 2025-04-17 ENCOUNTER — HOSPITAL ENCOUNTER (OUTPATIENT)
Facility: HOSPITAL | Age: 80
Setting detail: INFUSION SERIES
End: 2025-04-17
Payer: MEDICARE

## 2025-04-17 VITALS
OXYGEN SATURATION: 93 % | BODY MASS INDEX: 31.14 KG/M2 | RESPIRATION RATE: 16 BRPM | HEART RATE: 105 BPM | TEMPERATURE: 98.1 F | WEIGHT: 217 LBS

## 2025-04-17 VITALS
TEMPERATURE: 97.3 F | WEIGHT: 217 LBS | HEIGHT: 70 IN | OXYGEN SATURATION: 96 % | HEART RATE: 113 BPM | RESPIRATION RATE: 18 BRPM | DIASTOLIC BLOOD PRESSURE: 66 MMHG | SYSTOLIC BLOOD PRESSURE: 94 MMHG | BODY MASS INDEX: 31.07 KG/M2

## 2025-04-17 VITALS
TEMPERATURE: 98.1 F | SYSTOLIC BLOOD PRESSURE: 116 MMHG | DIASTOLIC BLOOD PRESSURE: 71 MMHG | RESPIRATION RATE: 16 BRPM | OXYGEN SATURATION: 93 % | HEART RATE: 91 BPM

## 2025-04-17 DIAGNOSIS — C67.9 MALIGNANT NEOPLASM OF URINARY BLADDER, UNSPECIFIED SITE (HCC): Primary | ICD-10-CM

## 2025-04-17 LAB
ALBUMIN SERPL-MCNC: 3.4 G/DL (ref 3.5–5)
ALBUMIN/GLOB SERPL: 0.8 (ref 1.1–2.2)
ALP SERPL-CCNC: 75 U/L (ref 45–117)
ALT SERPL-CCNC: 16 U/L (ref 12–78)
ANION GAP SERPL CALC-SCNC: 3 MMOL/L (ref 2–12)
AST SERPL-CCNC: 13 U/L (ref 15–37)
BASOPHILS # BLD: 0.05 K/UL (ref 0–0.1)
BASOPHILS NFR BLD: 0.8 % (ref 0–1)
BILIRUB SERPL-MCNC: 0.7 MG/DL (ref 0.2–1)
BUN SERPL-MCNC: 29 MG/DL (ref 6–20)
BUN/CREAT SERPL: 14 (ref 12–20)
CALCIUM SERPL-MCNC: 9.7 MG/DL (ref 8.5–10.1)
CHLORIDE SERPL-SCNC: 108 MMOL/L (ref 97–108)
CO2 SERPL-SCNC: 24 MMOL/L (ref 21–32)
CORTIS SERPL-MCNC: 27.3 UG/DL
CREAT SERPL-MCNC: 2.11 MG/DL (ref 0.7–1.3)
DIFFERENTIAL METHOD BLD: ABNORMAL
EOSINOPHIL # BLD: 0.62 K/UL (ref 0–0.4)
EOSINOPHIL NFR BLD: 10.1 % (ref 0–7)
ERYTHROCYTE [DISTWIDTH] IN BLOOD BY AUTOMATED COUNT: 14 % (ref 11.5–14.5)
GLOBULIN SER CALC-MCNC: 4.5 G/DL (ref 2–4)
GLUCOSE SERPL-MCNC: 121 MG/DL (ref 65–100)
HBV SURFACE AB SER QL: NONREACTIVE
HBV SURFACE AB SER-ACNC: <3.1 MIU/ML
HBV SURFACE AG SER QL: <0.1 INDEX
HBV SURFACE AG SER QL: NEGATIVE
HCT VFR BLD AUTO: 36.4 % (ref 36.6–50.3)
HGB BLD-MCNC: 11.8 G/DL (ref 12.1–17)
IMM GRANULOCYTES # BLD AUTO: 0.02 K/UL (ref 0–0.04)
IMM GRANULOCYTES NFR BLD AUTO: 0.3 % (ref 0–0.5)
LYMPHOCYTES # BLD: 0.47 K/UL (ref 0.8–3.5)
LYMPHOCYTES NFR BLD: 7.7 % (ref 12–49)
MCH RBC QN AUTO: 31.2 PG (ref 26–34)
MCHC RBC AUTO-ENTMCNC: 32.4 G/DL (ref 30–36.5)
MCV RBC AUTO: 96.3 FL (ref 80–99)
MONOCYTES # BLD: 0.58 K/UL (ref 0–1)
MONOCYTES NFR BLD: 9.5 % (ref 5–13)
NEUTS SEG # BLD: 4.36 K/UL (ref 1.8–8)
NEUTS SEG NFR BLD: 71.6 % (ref 32–75)
NRBC # BLD: 0 K/UL (ref 0–0.01)
NRBC BLD-RTO: 0 PER 100 WBC
PHOSPHATE SERPL-MCNC: 3 MG/DL (ref 2.6–4.7)
PLATELET # BLD AUTO: 288 K/UL (ref 150–400)
PMV BLD AUTO: 8.9 FL (ref 8.9–12.9)
POTASSIUM SERPL-SCNC: 3.8 MMOL/L (ref 3.5–5.1)
PROT SERPL-MCNC: 7.9 G/DL (ref 6.4–8.2)
RBC # BLD AUTO: 3.78 M/UL (ref 4.1–5.7)
RBC MORPH BLD: ABNORMAL
SODIUM SERPL-SCNC: 135 MMOL/L (ref 136–145)
TSH SERPL DL<=0.05 MIU/L-ACNC: 1.53 UIU/ML (ref 0.36–3.74)
WBC # BLD AUTO: 6.1 K/UL (ref 4.1–11.1)

## 2025-04-17 PROCEDURE — 96413 CHEMO IV INFUSION 1 HR: CPT

## 2025-04-17 PROCEDURE — 1159F MED LIST DOCD IN RCRD: CPT

## 2025-04-17 PROCEDURE — 80053 COMPREHEN METABOLIC PANEL: CPT

## 2025-04-17 PROCEDURE — 84100 ASSAY OF PHOSPHORUS: CPT

## 2025-04-17 PROCEDURE — 1036F TOBACCO NON-USER: CPT

## 2025-04-17 PROCEDURE — 96375 TX/PRO/DX INJ NEW DRUG ADDON: CPT

## 2025-04-17 PROCEDURE — 87340 HEPATITIS B SURFACE AG IA: CPT

## 2025-04-17 PROCEDURE — 2580000003 HC RX 258: Performed by: INTERNAL MEDICINE

## 2025-04-17 PROCEDURE — 86704 HEP B CORE ANTIBODY TOTAL: CPT

## 2025-04-17 PROCEDURE — 1126F AMNT PAIN NOTED NONE PRSNT: CPT

## 2025-04-17 PROCEDURE — 82533 TOTAL CORTISOL: CPT

## 2025-04-17 PROCEDURE — 86706 HEP B SURFACE ANTIBODY: CPT

## 2025-04-17 PROCEDURE — 84443 ASSAY THYROID STIM HORMONE: CPT

## 2025-04-17 PROCEDURE — 85025 COMPLETE CBC W/AUTO DIFF WBC: CPT

## 2025-04-17 PROCEDURE — 6360000002 HC RX W HCPCS: Performed by: INTERNAL MEDICINE

## 2025-04-17 PROCEDURE — G8427 DOCREV CUR MEDS BY ELIG CLIN: HCPCS

## 2025-04-17 PROCEDURE — 96417 CHEMO IV INFUS EACH ADDL SEQ: CPT

## 2025-04-17 PROCEDURE — 99215 OFFICE O/P EST HI 40 MIN: CPT

## 2025-04-17 PROCEDURE — 1123F ACP DISCUSS/DSCN MKR DOCD: CPT

## 2025-04-17 PROCEDURE — G8417 CALC BMI ABV UP PARAM F/U: HCPCS

## 2025-04-17 PROCEDURE — 1160F RVW MEDS BY RX/DR IN RCRD: CPT

## 2025-04-17 RX ORDER — DIPHENHYDRAMINE HYDROCHLORIDE 50 MG/ML
50 INJECTION, SOLUTION INTRAMUSCULAR; INTRAVENOUS
Status: DISCONTINUED | OUTPATIENT
Start: 2025-04-17 | End: 2025-04-18 | Stop reason: HOSPADM

## 2025-04-17 RX ORDER — ALBUTEROL SULFATE 90 UG/1
4 INHALANT RESPIRATORY (INHALATION) PRN
Status: DISCONTINUED | OUTPATIENT
Start: 2025-04-17 | End: 2025-04-18 | Stop reason: HOSPADM

## 2025-04-17 RX ORDER — HYDROCORTISONE SODIUM SUCCINATE 100 MG/2ML
100 INJECTION INTRAMUSCULAR; INTRAVENOUS
Status: DISCONTINUED | OUTPATIENT
Start: 2025-04-17 | End: 2025-04-18 | Stop reason: HOSPADM

## 2025-04-17 RX ORDER — SODIUM CHLORIDE 0.9 % (FLUSH) 0.9 %
5-40 SYRINGE (ML) INJECTION PRN
Status: DISCONTINUED | OUTPATIENT
Start: 2025-04-17 | End: 2025-04-18 | Stop reason: HOSPADM

## 2025-04-17 RX ORDER — ONDANSETRON 2 MG/ML
8 INJECTION INTRAMUSCULAR; INTRAVENOUS ONCE
Status: COMPLETED | OUTPATIENT
Start: 2025-04-17 | End: 2025-04-17

## 2025-04-17 RX ORDER — SODIUM CHLORIDE 9 MG/ML
5-250 INJECTION, SOLUTION INTRAVENOUS PRN
Status: DISCONTINUED | OUTPATIENT
Start: 2025-04-17 | End: 2025-04-18 | Stop reason: HOSPADM

## 2025-04-17 RX ORDER — EPINEPHRINE 1 MG/ML
0.3 INJECTION, SOLUTION INTRAMUSCULAR; SUBCUTANEOUS PRN
Status: DISCONTINUED | OUTPATIENT
Start: 2025-04-17 | End: 2025-04-18 | Stop reason: HOSPADM

## 2025-04-17 RX ORDER — MEPERIDINE HYDROCHLORIDE 25 MG/ML
12.5 INJECTION INTRAMUSCULAR; INTRAVENOUS; SUBCUTANEOUS PRN
Status: DISCONTINUED | OUTPATIENT
Start: 2025-04-17 | End: 2025-04-18 | Stop reason: HOSPADM

## 2025-04-17 RX ORDER — ACETAMINOPHEN 325 MG/1
650 TABLET ORAL
Status: DISCONTINUED | OUTPATIENT
Start: 2025-04-17 | End: 2025-04-18 | Stop reason: HOSPADM

## 2025-04-17 RX ORDER — ONDANSETRON 2 MG/ML
8 INJECTION INTRAMUSCULAR; INTRAVENOUS
Status: DISCONTINUED | OUTPATIENT
Start: 2025-04-17 | End: 2025-04-18 | Stop reason: HOSPADM

## 2025-04-17 RX ORDER — HEPARIN 100 UNIT/ML
500 SYRINGE INTRAVENOUS PRN
Status: DISCONTINUED | OUTPATIENT
Start: 2025-04-17 | End: 2025-04-18 | Stop reason: HOSPADM

## 2025-04-17 RX ORDER — PROCHLORPERAZINE EDISYLATE 5 MG/ML
5 INJECTION INTRAMUSCULAR; INTRAVENOUS
Status: DISCONTINUED | OUTPATIENT
Start: 2025-04-17 | End: 2025-04-18 | Stop reason: HOSPADM

## 2025-04-17 RX ORDER — SODIUM CHLORIDE 9 MG/ML
INJECTION, SOLUTION INTRAVENOUS CONTINUOUS
Status: DISCONTINUED | OUTPATIENT
Start: 2025-04-17 | End: 2025-04-18 | Stop reason: HOSPADM

## 2025-04-17 RX ORDER — LORAZEPAM 2 MG/ML
0.5 INJECTION INTRAMUSCULAR
Status: DISCONTINUED | OUTPATIENT
Start: 2025-04-17 | End: 2025-04-18 | Stop reason: HOSPADM

## 2025-04-17 RX ADMIN — ONDANSETRON 8 MG: 2 INJECTION, SOLUTION INTRAMUSCULAR; INTRAVENOUS at 14:06

## 2025-04-17 RX ADMIN — SODIUM CHLORIDE 200 MG: 9 INJECTION, SOLUTION INTRAVENOUS at 14:54

## 2025-04-17 RX ADMIN — ENFORTUMAB VEDOTIN 125 MG: 30 INJECTION, POWDER, LYOPHILIZED, FOR SOLUTION INTRAVENOUS at 14:13

## 2025-04-17 ASSESSMENT — PAIN SCALES - GENERAL: PAINLEVEL_OUTOF10: 0

## 2025-04-17 NOTE — PROGRESS NOTES
Pharmacy Note- Oncology Treatment Education    Robert Gastelum is a  80 y.o.male  diagnosed with bladder cancer contacted today for Cycle 1, Day 1 oncology treatment counseling . Mr. Gastelum is being treated with enfortumab vedotin, pembrolizumab.   Provided education on enfortumab vedotin, pembrolizumab and premedications - ondansetron.    Provided handouts and reviewed home supportive care regimen.     Reviewed side effects of oncology treatment to include s/s infection, anemia, appetite changes, thrombocytopenia, fatigue, hair loss/alopecia, bone pain, skin and nail changes, diarrhea/constipation and peripheral neuropathy.    Mr. Gastelum was provided information regarding the risks of immune-mediated adverse reactions secondary to pembrolizumab that may require interruption/delay of treatment and possible use of corticosteroids.  These reactions include, but are not limited to: colitis (diarrhea or severe abdominal pain); hepatitis (jaundice, severe nausea, or vomiting, easy bruising, and/or bleeding); hypophysitis (persistent or unusual headache, extreme weakness, dizziness/fainting, and/or vision changes); dermatitis (skin rash, mouth sores, skin blisters, and/or skin peeling); ocular toxicity (uveitis, iritis, and/or episcleritis); neuropathy (motor or sensory); hyper/hypothyroidism.    Patient given ways to manage these side effects and when to contact office.      Mr. Gastelum verbalized understanding of the information presented and all of the patient's questions were answered.    Samanta Lopez, PharmD, BCPS, BCOP    For Pharmacy Admin Tracking Only    Program: Medical Group  CPA in place:  No  Recommendation Provided To: Patient/Caregiver: 1 via In person    Intervention Accepted By: Patient/Caregiver: 1    Time Spent (min): 15

## 2025-04-17 NOTE — PROGRESS NOTES
Robert Gastelum is a 80 y.o. 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 Have you been to the ER, urgent care clinic since your last visit?  Hospitalized since your last visit? Yes, [facility]     2. Have you seen or consulted any other health care providers outside of the [facility] since your last visit?  Include any pap smears or colon screening. Yes, [facility]      
appreciate the opportunity to participate in Mr. Robert Gastelum's care.    Signed By: RAMOS Culver NP

## 2025-04-18 LAB — HBV CORE AB SERPL QL IA: NEGATIVE

## 2025-04-23 ENCOUNTER — TELEPHONE (OUTPATIENT)
Age: 80
End: 2025-04-23

## 2025-04-23 ENCOUNTER — HOSPITAL ENCOUNTER (OUTPATIENT)
Facility: HOSPITAL | Age: 80
Setting detail: INFUSION SERIES
Discharge: HOME | End: 2025-04-23
Payer: MEDICARE

## 2025-04-23 DIAGNOSIS — C67.9 MALIGNANT NEOPLASM OF URINARY BLADDER, UNSPECIFIED SITE (HCC): ICD-10-CM

## 2025-04-23 LAB
ANION GAP SERPL CALC-SCNC: 9 MMOL/L (ref 2–12)
BASOPHILS # BLD: 0.05 K/UL (ref 0–0.1)
BASOPHILS NFR BLD: 0.9 % (ref 0–1)
BUN SERPL-MCNC: 34 MG/DL (ref 6–20)
BUN/CREAT SERPL: 16 (ref 12–20)
CALCIUM SERPL-MCNC: 10.1 MG/DL (ref 8.5–10.1)
CHLORIDE SERPL-SCNC: 108 MMOL/L (ref 97–108)
CO2 SERPL-SCNC: 20 MMOL/L (ref 21–32)
CREAT SERPL-MCNC: 2.16 MG/DL (ref 0.7–1.3)
DIFFERENTIAL METHOD BLD: ABNORMAL
EOSINOPHIL # BLD: 1.02 K/UL (ref 0–0.4)
EOSINOPHIL NFR BLD: 17.6 % (ref 0–7)
ERYTHROCYTE [DISTWIDTH] IN BLOOD BY AUTOMATED COUNT: 14.2 % (ref 11.5–14.5)
GLUCOSE SERPL-MCNC: 106 MG/DL (ref 65–100)
HCT VFR BLD AUTO: 37.8 % (ref 36.6–50.3)
HGB BLD-MCNC: 12.2 G/DL (ref 12.1–17)
IMM GRANULOCYTES # BLD AUTO: 0.02 K/UL (ref 0–0.04)
IMM GRANULOCYTES NFR BLD AUTO: 0.3 % (ref 0–0.5)
LYMPHOCYTES # BLD: 0.57 K/UL (ref 0.8–3.5)
LYMPHOCYTES NFR BLD: 9.9 % (ref 12–49)
MCH RBC QN AUTO: 31.4 PG (ref 26–34)
MCHC RBC AUTO-ENTMCNC: 32.3 G/DL (ref 30–36.5)
MCV RBC AUTO: 97.4 FL (ref 80–99)
MONOCYTES # BLD: 0.64 K/UL (ref 0–1)
MONOCYTES NFR BLD: 11 % (ref 5–13)
NEUTS SEG # BLD: 3.5 K/UL (ref 1.8–8)
NEUTS SEG NFR BLD: 60.3 % (ref 32–75)
NRBC # BLD: 0 K/UL (ref 0–0.01)
NRBC BLD-RTO: 0 PER 100 WBC
PHOSPHATE SERPL-MCNC: 3.5 MG/DL (ref 2.6–4.7)
PLATELET # BLD AUTO: 251 K/UL (ref 150–400)
PMV BLD AUTO: 9.1 FL (ref 8.9–12.9)
POTASSIUM SERPL-SCNC: 4 MMOL/L (ref 3.5–5.1)
RBC # BLD AUTO: 3.88 M/UL (ref 4.1–5.7)
RBC MORPH BLD: ABNORMAL
SODIUM SERPL-SCNC: 137 MMOL/L (ref 136–145)
WBC # BLD AUTO: 5.8 K/UL (ref 4.1–11.1)

## 2025-04-23 PROCEDURE — 84100 ASSAY OF PHOSPHORUS: CPT

## 2025-04-23 PROCEDURE — 85025 COMPLETE CBC W/AUTO DIFF WBC: CPT

## 2025-04-23 PROCEDURE — 80048 BASIC METABOLIC PNL TOTAL CA: CPT

## 2025-04-23 NOTE — TELEPHONE ENCOUNTER
HIPAA x 3  SW Mundo states bp 90/60 manual cuff, 84/61 automatic cuff, states pt stated he has a poor appetite, states aid was there this morning, not sure if pt is taking ambien/eliquis. I let her know I will call pt to get more information from him.      LVM for pt to let him know that I rec'd a call from his PT Mundo who had some concerns about his low bp and hydration status. Left office number for callback.       Will send [facility]    LVM for pt spouse also to let him know that I rec'd a call from his PT Mundo who had some concerns about his low bp and hydration status. Left office number for callback.

## 2025-04-23 NOTE — PROGRESS NOTES
[facility] Progress Note    Date: 2025    Name: Robert Gastelum    MRN: [id number]         : 1945      0930:  Pt arrived ambulatory and in no distress, for lab visit.  Labs drawn via R AC, patient tolerated well.      Departed [facility] ambulatory and in no distress.    Future Appointments   Date Time Provider Department Center   2025 10:00 AM AN[facility]ELA SO CHAIR 12 [facility]   2025 10:00 AM ANGELA LAB CHAIR 3 [facility]   2025 10:15 AM AN[facility]ELA SO CHAIR 18 [facility]   2025 10:45 AM Zaida Davis MD [facility]   2025 10:00 AM [facility]EL[facility] LAB CHAIR 3 [facility]   5/15/2025 10:00 AM ANGELA SO CHAIR 17 [facility]   2025 10:00 AM ANGELA LAB CHAIR 3 [facility]   2025 10:00 AM ANGELA SO CHAIR 16 [facility]   2025 10:00 AM ANGELA LAB CHAIR 3 [facility]   2025 10:00 AM ANGELA SO CHAIR 8 [facility]   2025 11:40 AM Luzmaria Jaimes DO [facility]       Shannon Dill RN  2025

## 2025-04-23 NOTE — TELEPHONE ENCOUNTER
Mundo, PT with [facility] called to report pt has had 2 falls since beginning treatment last week. Stated pt is very weak and deconditioned; did call surgeon because pt has bulge in abd near surgical incision. Would like to know if there are any guidelines or parameters as pt's bp has been trending low.   Call back number is [phone]

## 2025-04-24 ENCOUNTER — HOSPITAL ENCOUNTER (OUTPATIENT)
Facility: HOSPITAL | Age: 80
Setting detail: INFUSION SERIES
End: 2025-04-24

## 2025-04-24 ENCOUNTER — APPOINTMENT (OUTPATIENT)
Facility: HOSPITAL | Age: 80
End: 2025-04-24
Payer: MEDICARE

## 2025-04-24 ENCOUNTER — OFFICE VISIT (OUTPATIENT)
Age: 80
End: 2025-04-24
Payer: MEDICARE

## 2025-04-24 ENCOUNTER — HOSPITAL ENCOUNTER (OUTPATIENT)
Facility: HOSPITAL | Age: 80
Setting detail: INFUSION SERIES
Discharge: HOME | End: 2025-04-24
Payer: MEDICARE

## 2025-04-24 ENCOUNTER — TELEPHONE (OUTPATIENT)
Age: 80
End: 2025-04-24

## 2025-04-24 VITALS
SYSTOLIC BLOOD PRESSURE: 92 MMHG | WEIGHT: 215 LBS | DIASTOLIC BLOOD PRESSURE: 74 MMHG | BODY MASS INDEX: 30.1 KG/M2 | TEMPERATURE: 97.9 F | HEART RATE: 87 BPM | RESPIRATION RATE: 20 BRPM | OXYGEN SATURATION: 97 % | HEIGHT: 71 IN

## 2025-04-24 VITALS
BODY MASS INDEX: 30.1 KG/M2 | DIASTOLIC BLOOD PRESSURE: 74 MMHG | WEIGHT: 215 LBS | TEMPERATURE: 97.4 F | HEART RATE: 87 BPM | HEIGHT: 71 IN | SYSTOLIC BLOOD PRESSURE: 92 MMHG

## 2025-04-24 DIAGNOSIS — R19.7 DIARRHEA, UNSPECIFIED TYPE: ICD-10-CM

## 2025-04-24 DIAGNOSIS — C67.9 MALIGNANT NEOPLASM OF URINARY BLADDER, UNSPECIFIED SITE (HCC): Primary | ICD-10-CM

## 2025-04-24 PROCEDURE — G8427 DOCREV CUR MEDS BY ELIG CLIN: HCPCS

## 2025-04-24 PROCEDURE — 1123F ACP DISCUSS/DSCN MKR DOCD: CPT

## 2025-04-24 PROCEDURE — 99215 OFFICE O/P EST HI 40 MIN: CPT

## 2025-04-24 PROCEDURE — 96360 HYDRATION IV INFUSION INIT: CPT

## 2025-04-24 PROCEDURE — 3078F DIAST BP <80 MM HG: CPT

## 2025-04-24 PROCEDURE — 1036F TOBACCO NON-USER: CPT

## 2025-04-24 PROCEDURE — 1160F RVW MEDS BY RX/DR IN RCRD: CPT

## 2025-04-24 PROCEDURE — G8417 CALC BMI ABV UP PARAM F/U: HCPCS

## 2025-04-24 PROCEDURE — 1159F MED LIST DOCD IN RCRD: CPT

## 2025-04-24 PROCEDURE — 3074F SYST BP LT 130 MM HG: CPT

## 2025-04-24 PROCEDURE — 2580000003 HC RX 258

## 2025-04-24 RX ORDER — PANTOPRAZOLE SODIUM 40 MG/1
40 TABLET, DELAYED RELEASE ORAL
Qty: 30 TABLET | Refills: 1 | Status: SHIPPED | OUTPATIENT
Start: 2025-04-24

## 2025-04-24 RX ORDER — PREDNISONE 10 MG/1
100 TABLET ORAL DAILY
Qty: 50 TABLET | Refills: 0 | Status: SHIPPED | OUTPATIENT
Start: 2025-04-24 | End: 2025-04-29

## 2025-04-24 RX ORDER — PREDNISONE 50 MG/1
100 TABLET ORAL DAILY
Qty: 10 TABLET | Refills: 0 | Status: SHIPPED | OUTPATIENT
Start: 2025-04-24 | End: 2025-04-24

## 2025-04-24 RX ORDER — 0.9 % SODIUM CHLORIDE 0.9 %
1000 INTRAVENOUS SOLUTION INTRAVENOUS ONCE
Status: COMPLETED | OUTPATIENT
Start: 2025-04-24 | End: 2025-04-24

## 2025-04-24 RX ADMIN — SODIUM CHLORIDE 1000 ML: 0.9 INJECTION, SOLUTION INTRAVENOUS at 11:50

## 2025-04-24 ASSESSMENT — PATIENT HEALTH QUESTIONNAIRE - PHQ9
SUM OF ALL RESPONSES TO PHQ QUESTIONS 1-9: 0

## 2025-04-24 NOTE — TELEPHONE ENCOUNTER
1348  R/t call to pharmacy and advised that I confirmed with EMMANUELLE Boston and the dosage is 1mg/kg so 100mg is appropriate.  EMMANUELLE Boston is fine with 2 50mg tabs if that is what pt prefers.  Pharmacist voiced understanding,  inquired pts ICD code since they have not seen a dosage this high, ICD code provided.  No further questions or concerns at this time.

## 2025-04-24 NOTE — TELEPHONE ENCOUNTER
[facility] inquiring if 100mg of prednisone is correctly prescribed as they've never seen that high of a dosage. If it is correct can pt have 2 50mg tablets instead of taking 10 pills     # [phone]

## 2025-04-24 NOTE — PROGRESS NOTES
[facility] Chemo Progress Note  Date: 2025    Name: Robert Gastelum    MRN: [id number]         : 1945    Mr. Gastelum arrived ambulatory with cane and in no distress for cycle 2 day 1 of PADCEV.      Assessment was completed and documented in flowsheets. Patient reports increased dizziness and diarrhea since last treatment. Port accessed without difficulty, labs drawn and processed.    Follow Up: Hold treatment today per Dr. Davis and proceed with IVF only. Patient verbalized understanding.      Mr. Gastelum's vitals were reviewed.  Patient Vitals for the past 12 hrs:   Temp Pulse Resp BP SpO2   25 1025 -- 87 -- 92/74 --   25 1021 97.9 °F (36.6 °C) 86 20 (!) 81/62 97 %       Pre-medications  were administered as ordered and chemotherapy was initiated.  Medications Administered         sodium chloride 0.9 % bolus 1,000 mL Admin Date  2025 Action  New Bag Dose  1,000 mL Rate  983.6 mL/hr Route  IntraVENous Documented By  Donaldo Tubbs, JOHNATHON            Mr. Gastelum tolerated treatment well, port flushed and de-accessed per protocol. [facility] with friend in stable condition. Patient aware of next appointment.     Future Appointments   Date Time Provider Department Center   2025 10:00 AM ANGELA LAB CHAIR 3 [facility]   2025 10:15 AM ANGELA SO CHAIR 18 [facility]   2025 10:45 AM Zaida Davis MD [facility] AMB   2025 10:00 AM [facility] LAB CHAIR 3 [facility]   5/15/2025 10:00 AM ANGELA SO CHAIR 17 [facility]   2025 10:00 AM ANGELA LAB CHAIR 3 [facility]   2025 10:00 AM ANGELA SO CHAIR 16 [facility]   2025 10:00 AM ANGELA LAB CHAIR 3 [facility]   2025 10:00 AM ANGELA SO CHAIR 8 [facility]   2025 11:40 AM Luzmaria Jaimes DO [facility] AMB         DONALDO TUBBS RN  2025

## 2025-04-24 NOTE — PROGRESS NOTES
[facility] [place] at [facility]  [address], [address] [place] [address]  W: [phone]  F: [phone]    Reason for Visit:   Robert Gastelum is a 80 y.o. 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   12/6/2021: Radical urethro-cystoprostatectomy   2/3/22-1/2023: Adjuvant nivolumab  5/2023: RT with Dr. Scruggs- Surveillance   2/2024: Stage IV Bladder cancer with squamous histology  4/17/2025: Padcev plus Keytruda          History of Present Illness:     Patient is a 80 y.o.. male with PMH as below including sarcoidosis who  is seen for evaluation of bladder cancer.      He has a history of nonmuscle invasive bladder cancer in 2015, underwent 2 induction courses of BCG, had a non muscle invasive recurrence in 2019 and had re induction with BCG. Cystoscopy 6/2020 at [facility] did not show any recurrence. In March 2021 he had  a positive FISH and was seen by Dr. Naqvi. Had a CT IVP 3/2021 which showed some Bladder thickening. He underwent a Cystoscopy with TURBT and was found to have extensive non muscle invasive disease including that of prostatic urethra, CIS and a focus  of Muscle invasive disease in the R bladder wall. Grade 4 neutropenia after cycle 1.

## 2025-04-24 NOTE — PROGRESS NOTES
Identified pt with two pt identifiers(name and ). Reviewed record in preparation for visit and have obtained necessary documentation. All patient medications has been reviewed.  Chief Complaint   Patient presents with    Follow-up     Patient states he feels light headed diarrhea he has felt this way since his infusion last week on 2025           Wt Readings from Last 3 Encounters:   25 97.5 kg (215 lb)   25 97.5 kg (215 lb)   25 98.4 kg (217 lb)     Temp Readings from Last 3 Encounters:   25 97.4 °F (36.3 °C) (Temporal)   25 97.9 °F (36.6 °C) (Temporal)   25 98.1 °F (36.7 °C)     BP Readings from Last 3 Encounters:   25 92/74   25 92/74   25 116/71     Pulse Readings from Last 3 Encounters:   25 87   25 87   25 91       \"Have you been to the ER, urgent care clinic since your last visit?  Hospitalized since your last visit?\"   NO    \"Have you seen or consulted any other health care providers outside of [facility] since your last visit?\"   NO

## 2025-04-30 DIAGNOSIS — C67.9 MALIGNANT NEOPLASM OF URINARY BLADDER, UNSPECIFIED SITE (HCC): Primary | ICD-10-CM

## 2025-04-30 RX ORDER — SODIUM CHLORIDE 0.9 % (FLUSH) 0.9 %
5-40 SYRINGE (ML) INJECTION PRN
OUTPATIENT
Start: 2025-05-15

## 2025-04-30 RX ORDER — DIPHENHYDRAMINE HYDROCHLORIDE 50 MG/ML
50 INJECTION, SOLUTION INTRAMUSCULAR; INTRAVENOUS
OUTPATIENT
Start: 2025-05-15

## 2025-04-30 RX ORDER — PROCHLORPERAZINE EDISYLATE 5 MG/ML
5 INJECTION INTRAMUSCULAR; INTRAVENOUS
OUTPATIENT
Start: 2025-05-15

## 2025-04-30 RX ORDER — ONDANSETRON 2 MG/ML
8 INJECTION INTRAMUSCULAR; INTRAVENOUS ONCE
OUTPATIENT
Start: 2025-05-15 | End: 2025-05-15

## 2025-04-30 RX ORDER — ONDANSETRON 2 MG/ML
8 INJECTION INTRAMUSCULAR; INTRAVENOUS
OUTPATIENT
Start: 2025-05-15

## 2025-04-30 RX ORDER — EPINEPHRINE 1 MG/ML
0.3 INJECTION, SOLUTION, CONCENTRATE INTRAVENOUS PRN
OUTPATIENT
Start: 2025-05-08

## 2025-04-30 RX ORDER — PROCHLORPERAZINE EDISYLATE 5 MG/ML
5 INJECTION INTRAMUSCULAR; INTRAVENOUS
OUTPATIENT
Start: 2025-05-08

## 2025-04-30 RX ORDER — SODIUM CHLORIDE 9 MG/ML
INJECTION, SOLUTION INTRAVENOUS CONTINUOUS
OUTPATIENT
Start: 2025-05-08

## 2025-04-30 RX ORDER — SODIUM CHLORIDE 9 MG/ML
INJECTION, SOLUTION INTRAVENOUS CONTINUOUS
OUTPATIENT
Start: 2025-05-15

## 2025-04-30 RX ORDER — HEPARIN 100 UNIT/ML
500 SYRINGE INTRAVENOUS PRN
OUTPATIENT
Start: 2025-05-15

## 2025-04-30 RX ORDER — ONDANSETRON 2 MG/ML
8 INJECTION INTRAMUSCULAR; INTRAVENOUS ONCE
OUTPATIENT
Start: 2025-05-08 | End: 2025-05-08

## 2025-04-30 RX ORDER — ALBUTEROL SULFATE 90 UG/1
4 INHALANT RESPIRATORY (INHALATION) PRN
OUTPATIENT
Start: 2025-05-15

## 2025-04-30 RX ORDER — LORAZEPAM 2 MG/ML
0.5 INJECTION INTRAMUSCULAR
OUTPATIENT
Start: 2025-05-15

## 2025-04-30 RX ORDER — MEPERIDINE HYDROCHLORIDE 25 MG/ML
12.5 INJECTION INTRAMUSCULAR; INTRAVENOUS; SUBCUTANEOUS PRN
OUTPATIENT
Start: 2025-05-15

## 2025-04-30 RX ORDER — MEPERIDINE HYDROCHLORIDE 25 MG/ML
12.5 INJECTION INTRAMUSCULAR; INTRAVENOUS; SUBCUTANEOUS PRN
OUTPATIENT
Start: 2025-05-08

## 2025-04-30 RX ORDER — HEPARIN 100 UNIT/ML
500 SYRINGE INTRAVENOUS PRN
OUTPATIENT
Start: 2025-05-08

## 2025-04-30 RX ORDER — ONDANSETRON 2 MG/ML
8 INJECTION INTRAMUSCULAR; INTRAVENOUS
OUTPATIENT
Start: 2025-05-08

## 2025-04-30 RX ORDER — LORAZEPAM 2 MG/ML
0.5 INJECTION INTRAMUSCULAR
OUTPATIENT
Start: 2025-05-08

## 2025-04-30 RX ORDER — SODIUM CHLORIDE 0.9 % (FLUSH) 0.9 %
5-40 SYRINGE (ML) INJECTION PRN
OUTPATIENT
Start: 2025-05-08

## 2025-04-30 RX ORDER — SODIUM CHLORIDE 9 MG/ML
5-250 INJECTION, SOLUTION INTRAVENOUS PRN
OUTPATIENT
Start: 2025-05-08

## 2025-04-30 RX ORDER — DIPHENHYDRAMINE HYDROCHLORIDE 50 MG/ML
50 INJECTION, SOLUTION INTRAMUSCULAR; INTRAVENOUS
OUTPATIENT
Start: 2025-05-08

## 2025-04-30 RX ORDER — HYDROCORTISONE SODIUM SUCCINATE 100 MG/2ML
100 INJECTION INTRAMUSCULAR; INTRAVENOUS
OUTPATIENT
Start: 2025-05-08

## 2025-04-30 RX ORDER — HYDROCORTISONE SODIUM SUCCINATE 100 MG/2ML
100 INJECTION INTRAMUSCULAR; INTRAVENOUS
OUTPATIENT
Start: 2025-05-15

## 2025-04-30 RX ORDER — EPINEPHRINE 1 MG/ML
0.3 INJECTION, SOLUTION, CONCENTRATE INTRAVENOUS PRN
OUTPATIENT
Start: 2025-05-15

## 2025-04-30 RX ORDER — SODIUM CHLORIDE 9 MG/ML
5-250 INJECTION, SOLUTION INTRAVENOUS PRN
OUTPATIENT
Start: 2025-05-15

## 2025-04-30 RX ORDER — ACETAMINOPHEN 325 MG/1
650 TABLET ORAL
OUTPATIENT
Start: 2025-05-15

## 2025-04-30 RX ORDER — ALBUTEROL SULFATE 90 UG/1
4 INHALANT RESPIRATORY (INHALATION) PRN
OUTPATIENT
Start: 2025-05-08

## 2025-04-30 RX ORDER — ACETAMINOPHEN 325 MG/1
650 TABLET ORAL
OUTPATIENT
Start: 2025-05-08

## 2025-05-01 DIAGNOSIS — R19.7 DIARRHEA, UNSPECIFIED TYPE: Primary | ICD-10-CM

## 2025-05-01 RX ORDER — PREDNISONE 10 MG/1
TABLET ORAL
Qty: 70 TABLET | Refills: 0 | Status: SHIPPED | OUTPATIENT
Start: 2025-05-01 | End: 2025-05-31

## 2025-05-05 NOTE — PROGRESS NOTES
[facility] [place] at [facility]  [address], [address] [place] [address]  W: [phone]  F: [phone]    Reason for Visit:   Robert Gastelum is a 80 y.o. 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   12/6/2021: Radical urethro-cystoprostatectomy   2/3/22-1/2023: Adjuvant nivolumab  5/2023: RT with Dr. Scruggs- Surveillance   2/2024: Stage IV Bladder cancer with squamous histology  4/17/2025: Padcev plus Keytruda          History of Present Illness:     Patient is a 80 y.o.. male with PMH as below including sarcoidosis who  is seen for evaluation of bladder cancer.      He has a history of nonmuscle invasive bladder cancer in 2015, underwent 2 induction courses of BCG, had a non muscle invasive recurrence in 2019 and had re induction with BCG. Cystoscopy 6/2020 at [facility] did not show any recurrence. In March 2021 he had  a positive FISH and was seen by Dr. Naqvi. Had a CT IVP 3/2021 which showed some Bladder thickening. He underwent a Cystoscopy with TURBT and was found to have extensive non muscle invasive disease including that of prostatic urethra, CIS and a focus  of Muscle invasive disease in the R bladder wall. Grade 4 neutropenia after cycle 1.

## 2025-05-07 ENCOUNTER — HOSPITAL ENCOUNTER (OUTPATIENT)
Facility: HOSPITAL | Age: 80
Setting detail: INFUSION SERIES
Discharge: HOME | End: 2025-05-07
Payer: MEDICARE

## 2025-05-07 DIAGNOSIS — C67.9 MALIGNANT NEOPLASM OF URINARY BLADDER, UNSPECIFIED SITE (HCC): ICD-10-CM

## 2025-05-07 LAB
ALBUMIN SERPL-MCNC: 3.5 G/DL (ref 3.5–5)
ALBUMIN/GLOB SERPL: 1.1 (ref 1.1–2.2)
ALP SERPL-CCNC: 58 U/L (ref 45–117)
ALT SERPL-CCNC: 24 U/L (ref 12–78)
ANION GAP SERPL CALC-SCNC: 8 MMOL/L (ref 2–12)
AST SERPL-CCNC: 14 U/L (ref 15–37)
BASOPHILS # BLD: 0.04 K/UL (ref 0–0.1)
BASOPHILS NFR BLD: 0.4 % (ref 0–1)
BILIRUB SERPL-MCNC: 0.6 MG/DL (ref 0.2–1)
BUN SERPL-MCNC: 39 MG/DL (ref 6–20)
BUN/CREAT SERPL: 19 (ref 12–20)
CALCIUM SERPL-MCNC: 9.2 MG/DL (ref 8.5–10.1)
CHLORIDE SERPL-SCNC: 110 MMOL/L (ref 97–108)
CO2 SERPL-SCNC: 22 MMOL/L (ref 21–32)
CREAT SERPL-MCNC: 2.01 MG/DL (ref 0.7–1.3)
DIFFERENTIAL METHOD BLD: ABNORMAL
EOSINOPHIL # BLD: 0.28 K/UL (ref 0–0.4)
EOSINOPHIL NFR BLD: 3.1 % (ref 0–7)
ERYTHROCYTE [DISTWIDTH] IN BLOOD BY AUTOMATED COUNT: 16.5 % (ref 11.5–14.5)
GLOBULIN SER CALC-MCNC: 3.3 G/DL (ref 2–4)
GLUCOSE SERPL-MCNC: 112 MG/DL (ref 65–100)
HCT VFR BLD AUTO: 35 % (ref 36.6–50.3)
HGB BLD-MCNC: 11.6 G/DL (ref 12.1–17)
IMM GRANULOCYTES # BLD AUTO: 0.06 K/UL (ref 0–0.04)
IMM GRANULOCYTES NFR BLD AUTO: 0.7 % (ref 0–0.5)
LYMPHOCYTES # BLD: 0.54 K/UL (ref 0.8–3.5)
LYMPHOCYTES NFR BLD: 6 % (ref 12–49)
MCH RBC QN AUTO: 32.3 PG (ref 26–34)
MCHC RBC AUTO-ENTMCNC: 33.1 G/DL (ref 30–36.5)
MCV RBC AUTO: 97.5 FL (ref 80–99)
MONOCYTES # BLD: 0.59 K/UL (ref 0–1)
MONOCYTES NFR BLD: 6.5 % (ref 5–13)
NEUTS SEG # BLD: 7.49 K/UL (ref 1.8–8)
NEUTS SEG NFR BLD: 83.3 % (ref 32–75)
NRBC # BLD: 0 K/UL (ref 0–0.01)
NRBC BLD-RTO: 0 PER 100 WBC
PLATELET # BLD AUTO: 196 K/UL (ref 150–400)
PMV BLD AUTO: 9.3 FL (ref 8.9–12.9)
POTASSIUM SERPL-SCNC: 4 MMOL/L (ref 3.5–5.1)
PROT SERPL-MCNC: 6.8 G/DL (ref 6.4–8.2)
RBC # BLD AUTO: 3.59 M/UL (ref 4.1–5.7)
RBC MORPH BLD: ABNORMAL
RBC MORPH BLD: ABNORMAL
SODIUM SERPL-SCNC: 140 MMOL/L (ref 136–145)
WBC # BLD AUTO: 9 K/UL (ref 4.1–11.1)

## 2025-05-07 PROCEDURE — 85025 COMPLETE CBC W/AUTO DIFF WBC: CPT

## 2025-05-07 PROCEDURE — 80053 COMPREHEN METABOLIC PANEL: CPT

## 2025-05-07 NOTE — PROGRESS NOTES
Pt arrived to [facility] for labs in stable condition. Labs drawn peripherally from the right ac and sent for processing.     Please see pending results in EPIC.    Pt tolerated treatment well. D/Cd from [facility] in no distress.  Future Appointments   Date Time Provider Department Center   5/8/2025 10:30 AM ANGELA LAB CHAIR 2 [facility]   5/8/2025 10:45 AM Zaida Davis MD [facility] BS AMB   5/8/2025 11:30 AM ANGELA SO CHAIR 18 [facility]   5/14/2025 10:00 AM ANGELA LAB CHAIR 3 [facility]   5/15/2025 10:00 AM ANGELA SO CHAIR 17 [facility]   5/28/2025 10:00 AM ANGELA LAB CHAIR 3 [facility]   5/29/2025 10:00 AM ANGELA SO CHAIR 16 [facility]   6/4/2025 10:00 AM ANGELA LAB CHAIR 3 [facility]   6/5/2025 10:00 AM ANGELA SO CHAIR 8 [facility]   8/5/2025 11:40 AM Luzmaria Jaimes DO [facility] AMB

## 2025-05-08 ENCOUNTER — APPOINTMENT (OUTPATIENT)
Facility: HOSPITAL | Age: 80
End: 2025-05-08
Payer: MEDICARE

## 2025-05-08 ENCOUNTER — HOSPITAL ENCOUNTER (OUTPATIENT)
Facility: HOSPITAL | Age: 80
Setting detail: INFUSION SERIES
Discharge: HOME | End: 2025-05-08
Payer: MEDICARE

## 2025-05-08 ENCOUNTER — HOSPITAL ENCOUNTER (OUTPATIENT)
Facility: HOSPITAL | Age: 80
Setting detail: INFUSION SERIES
End: 2025-05-08
Payer: MEDICARE

## 2025-05-08 ENCOUNTER — OFFICE VISIT (OUTPATIENT)
Age: 80
End: 2025-05-08
Payer: MEDICARE

## 2025-05-08 VITALS
HEIGHT: 71 IN | OXYGEN SATURATION: 96 % | WEIGHT: 221 LBS | BODY MASS INDEX: 30.94 KG/M2 | TEMPERATURE: 97.5 F | SYSTOLIC BLOOD PRESSURE: 139 MMHG | HEART RATE: 85 BPM | DIASTOLIC BLOOD PRESSURE: 79 MMHG

## 2025-05-08 DIAGNOSIS — C67.9 MALIGNANT NEOPLASM OF URINARY BLADDER, UNSPECIFIED SITE (HCC): Primary | ICD-10-CM

## 2025-05-08 DIAGNOSIS — R19.7 DIARRHEA, UNSPECIFIED TYPE: ICD-10-CM

## 2025-05-08 PROCEDURE — 96375 TX/PRO/DX INJ NEW DRUG ADDON: CPT

## 2025-05-08 PROCEDURE — 96413 CHEMO IV INFUSION 1 HR: CPT

## 2025-05-08 PROCEDURE — G8417 CALC BMI ABV UP PARAM F/U: HCPCS | Performed by: INTERNAL MEDICINE

## 2025-05-08 PROCEDURE — 6360000002 HC RX W HCPCS

## 2025-05-08 PROCEDURE — 1123F ACP DISCUSS/DSCN MKR DOCD: CPT | Performed by: INTERNAL MEDICINE

## 2025-05-08 PROCEDURE — 99215 OFFICE O/P EST HI 40 MIN: CPT | Performed by: INTERNAL MEDICINE

## 2025-05-08 PROCEDURE — 1036F TOBACCO NON-USER: CPT | Performed by: INTERNAL MEDICINE

## 2025-05-08 PROCEDURE — G8428 CUR MEDS NOT DOCUMENT: HCPCS | Performed by: INTERNAL MEDICINE

## 2025-05-08 PROCEDURE — 2580000003 HC RX 258

## 2025-05-08 RX ORDER — ALBUTEROL SULFATE 90 UG/1
4 INHALANT RESPIRATORY (INHALATION) PRN
Status: DISCONTINUED | OUTPATIENT
Start: 2025-05-08 | End: 2025-05-09 | Stop reason: HOSPADM

## 2025-05-08 RX ORDER — MEPERIDINE HYDROCHLORIDE 25 MG/ML
12.5 INJECTION INTRAMUSCULAR; INTRAVENOUS; SUBCUTANEOUS PRN
Status: DISCONTINUED | OUTPATIENT
Start: 2025-05-08 | End: 2025-05-09 | Stop reason: HOSPADM

## 2025-05-08 RX ORDER — HEPARIN 100 UNIT/ML
500 SYRINGE INTRAVENOUS PRN
Status: DISCONTINUED | OUTPATIENT
Start: 2025-05-08 | End: 2025-05-09 | Stop reason: HOSPADM

## 2025-05-08 RX ORDER — ONDANSETRON 2 MG/ML
8 INJECTION INTRAMUSCULAR; INTRAVENOUS
Status: DISCONTINUED | OUTPATIENT
Start: 2025-05-08 | End: 2025-05-09 | Stop reason: HOSPADM

## 2025-05-08 RX ORDER — DIPHENHYDRAMINE HYDROCHLORIDE 50 MG/ML
50 INJECTION, SOLUTION INTRAMUSCULAR; INTRAVENOUS
Status: DISCONTINUED | OUTPATIENT
Start: 2025-05-08 | End: 2025-05-09 | Stop reason: HOSPADM

## 2025-05-08 RX ORDER — PROCHLORPERAZINE EDISYLATE 5 MG/ML
5 INJECTION INTRAMUSCULAR; INTRAVENOUS
Status: DISCONTINUED | OUTPATIENT
Start: 2025-05-08 | End: 2025-05-09 | Stop reason: HOSPADM

## 2025-05-08 RX ORDER — SODIUM CHLORIDE 9 MG/ML
INJECTION, SOLUTION INTRAVENOUS CONTINUOUS
Status: DISCONTINUED | OUTPATIENT
Start: 2025-05-08 | End: 2025-05-09 | Stop reason: HOSPADM

## 2025-05-08 RX ORDER — PREDNISONE 10 MG/1
TABLET ORAL
Qty: 56 TABLET | Refills: 0 | Status: SHIPPED | OUTPATIENT
Start: 2025-05-08 | End: 2025-06-19

## 2025-05-08 RX ORDER — SODIUM CHLORIDE 9 MG/ML
5-250 INJECTION, SOLUTION INTRAVENOUS PRN
Status: DISCONTINUED | OUTPATIENT
Start: 2025-05-08 | End: 2025-05-09 | Stop reason: HOSPADM

## 2025-05-08 RX ORDER — ACETAMINOPHEN 325 MG/1
650 TABLET ORAL
Status: DISCONTINUED | OUTPATIENT
Start: 2025-05-08 | End: 2025-05-09 | Stop reason: HOSPADM

## 2025-05-08 RX ORDER — LORAZEPAM 2 MG/ML
0.5 INJECTION INTRAMUSCULAR
Status: COMPLETED | OUTPATIENT
Start: 2025-05-08 | End: 2025-05-08

## 2025-05-08 RX ORDER — ONDANSETRON 2 MG/ML
8 INJECTION INTRAMUSCULAR; INTRAVENOUS ONCE
Status: COMPLETED | OUTPATIENT
Start: 2025-05-08 | End: 2025-05-08

## 2025-05-08 RX ORDER — SODIUM CHLORIDE 0.9 % (FLUSH) 0.9 %
5-40 SYRINGE (ML) INJECTION PRN
Status: DISCONTINUED | OUTPATIENT
Start: 2025-05-08 | End: 2025-05-09 | Stop reason: HOSPADM

## 2025-05-08 RX ORDER — EPINEPHRINE 1 MG/ML
0.3 INJECTION, SOLUTION INTRAMUSCULAR; SUBCUTANEOUS PRN
Status: DISCONTINUED | OUTPATIENT
Start: 2025-05-08 | End: 2025-05-09 | Stop reason: HOSPADM

## 2025-05-08 RX ORDER — HYDROCORTISONE SODIUM SUCCINATE 100 MG/2ML
100 INJECTION INTRAMUSCULAR; INTRAVENOUS
Status: DISCONTINUED | OUTPATIENT
Start: 2025-05-08 | End: 2025-05-09 | Stop reason: HOSPADM

## 2025-05-08 RX ADMIN — LORAZEPAM 0.5 MG: 2 INJECTION INTRAMUSCULAR; INTRAVENOUS at 13:19

## 2025-05-08 RX ADMIN — SODIUM CHLORIDE 25 ML/HR: 0.9 INJECTION, SOLUTION INTRAVENOUS at 13:04

## 2025-05-08 RX ADMIN — ENFORTUMAB VEDOTIN 125 MG: 30 INJECTION, POWDER, LYOPHILIZED, FOR SOLUTION INTRAVENOUS at 13:34

## 2025-05-08 RX ADMIN — ONDANSETRON 8 MG: 2 INJECTION, SOLUTION INTRAMUSCULAR; INTRAVENOUS at 13:06

## 2025-05-08 NOTE — PROGRESS NOTES
Robert Gastelum is a 80 y.o. 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 Have you been to the ER, urgent care clinic since your last visit?  Hospitalized since your last visit?No    2. Have you seen or consulted any other health care providers outside of the [facility] since your last visit?  Include any pap smears or colon screening. Yes, pt fell after last infusion and had EMS team come to pt's home x2.

## 2025-05-08 NOTE — PROGRESS NOTES
[facility] Progress Note    Mr. Gastelum Arrived ambulatory and in no distress for cycle 2 day 1 of Padcev regimen.  Assessment was completed, no acute issues at this time, no new complaints voiced.  Port accessed without difficulty, labs drawn and processed.        Lab results were obtained and reviewed.      Pre-medications  were administered as ordered and chemotherapy was initiated.  Medications Administered         0.9 % sodium chloride infusion Admin Date  05/08/2025 Action  New Bag Dose  25 mL/hr Rate  25 mL/hr Route  IntraVENous Documented By  Monie Del Real RN        enfortumab vedotin-ejfv (PADCEV) 125 mg in sodium chloride 0.9 % 100 mL chemo IVPB Admin Date  05/08/2025 Action  New Bag Dose  125 mg Rate  245 mL/hr Route  IntraVENous Documented By  Monie Del Real RN        LORazepam (ATIVAN) injection 0.5 mg Admin Date  05/08/2025 Action  Given Dose  0.5 mg Rate   Route  IntraVENous Documented By  Monie Del Real RN        ondansetron (ZOFRAN) injection 8 mg Admin Date  05/08/2025 Action  Given Dose  8 mg Rate   Route  IntraVENous Documented By  Monie Del Real RN         Blood return maintained throughout infusion    Two nurses verified prior to administering: Drug name, Drug dose, Infusion volume or drug volume when prepared in a syringe, Rate of administration, Route of administration, Expiration dates and/or times, Appearance and physical integrity of the drugs, Rate set on infusion pump, when used, and Sequencing of drug administration.    Mr. Gastelum tolerated treatment well, port flushed and de accessed, [facility] in [facility] condition      Future Appointments   Date Time Provider Department Center   5/14/2025 10:00 AM ANGELA LAB CHAIR 3 [facility]   5/15/2025 10:00 AM ANGELA SO CHAIR 17 [facility]   5/28/2025 10:00 AM ANGELA LAB CHAIR 3 [facility]   5/29/2025 10:00 AM ANGELA SO CHAIR 16 [facility]   6/4/2025 10:00 AM ANGELA LAB CHAIR 3 [facility]   6/5/2025 10:00 AM ANGELA

## 2025-05-12 ENCOUNTER — TELEPHONE (OUTPATIENT)
Age: 80
End: 2025-05-12

## 2025-05-12 NOTE — TELEPHONE ENCOUNTER
1203  Called pt to discuss mcm sent on 5/11 \"Urine has been very bloody since my infusion last Thursday. Has not subsided\", No answer. Left  requesting r/t call to discuss sx further. I will also send pt a mcm follow up as well.  Number to our office left on pts vm

## 2025-05-12 NOTE — TELEPHONE ENCOUNTER
The patient's wife Paris Sanders states they have been referred to Palliative by Dr. Davis. She is calling to make some inquiries. Please give her a call back [phone].

## 2025-05-12 NOTE — TELEPHONE ENCOUNTER
HIPAA x 3  SW pt EC/wife Libby who answered the phone to let her know that Dr. Davis didn't call the pt and that it was probably a reminder call for his appt on 5/14/25 for his labs. Libby let me know pt couldn't talk as he was with a nurse at the moment. Pts wife also let me know that pt had called about some blood in his urine but that it was looking better now. Pts wife also inquired about palliative referral. I let her know I would check with our referral coordinator to check on this. Pt wife Libby voiced understanding and was appreciative of my call.

## 2025-05-12 NOTE — TELEPHONE ENCOUNTER
[facility] Palliative Medicine Office  Nursing Note  [phone]  Fax [phone]      Name:  Robert Gastelum  YOB: 1945    Received outpatient Palliative Medicine referral from Yael Cooley NP to see patient for symptom management and supportive care. Chart  reviewed. Robert Gastelum is a 80 y.o. male with muscle invasive bladder cancer.  Patient's most recent office visit with Dr. Davis was on 5/8/25.  S      Patient's spouse Libby Herington called to schedule Palliative appointment for her .   Appointment scheduled for 6/9/25 at 1pm with Dr. Libby Ruiz.      Brittany Barker, RN, Gerontological Nursing-BC, [facility]

## 2025-05-12 NOTE — TELEPHONE ENCOUNTER
HIPAA x 3  Pt called to let me know that he had a missed call from Dr. Davis and was trying to call her back. I let him know I would let her know he is available for her to call back. Pt voiced understanding.

## 2025-05-14 ENCOUNTER — HOSPITAL ENCOUNTER (OUTPATIENT)
Facility: HOSPITAL | Age: 80
Setting detail: INFUSION SERIES
Discharge: HOME | End: 2025-05-14
Payer: MEDICARE

## 2025-05-14 DIAGNOSIS — C67.9 MALIGNANT NEOPLASM OF URINARY BLADDER, UNSPECIFIED SITE (HCC): ICD-10-CM

## 2025-05-14 LAB
ANION GAP SERPL CALC-SCNC: 6 MMOL/L (ref 2–12)
BASOPHILS # BLD: 0.02 K/UL (ref 0–0.1)
BASOPHILS NFR BLD: 0.3 % (ref 0–1)
BUN SERPL-MCNC: 44 MG/DL (ref 6–20)
BUN/CREAT SERPL: 23 (ref 12–20)
CALCIUM SERPL-MCNC: 9.6 MG/DL (ref 8.5–10.1)
CHLORIDE SERPL-SCNC: 107 MMOL/L (ref 97–108)
CO2 SERPL-SCNC: 24 MMOL/L (ref 21–32)
CREAT SERPL-MCNC: 1.95 MG/DL (ref 0.7–1.3)
DIFFERENTIAL METHOD BLD: ABNORMAL
EOSINOPHIL # BLD: 0.22 K/UL (ref 0–0.4)
EOSINOPHIL NFR BLD: 3.1 % (ref 0–7)
ERYTHROCYTE [DISTWIDTH] IN BLOOD BY AUTOMATED COUNT: 16.8 % (ref 11.5–14.5)
GLUCOSE SERPL-MCNC: 125 MG/DL (ref 65–100)
HCT VFR BLD AUTO: 35 % (ref 36.6–50.3)
HGB BLD-MCNC: 11.4 G/DL (ref 12.1–17)
IMM GRANULOCYTES # BLD AUTO: 0.02 K/UL (ref 0–0.04)
IMM GRANULOCYTES NFR BLD AUTO: 0.3 % (ref 0–0.5)
LYMPHOCYTES # BLD: 0.31 K/UL (ref 0.8–3.5)
LYMPHOCYTES NFR BLD: 4.4 % (ref 12–49)
MCH RBC QN AUTO: 31.5 PG (ref 26–34)
MCHC RBC AUTO-ENTMCNC: 32.6 G/DL (ref 30–36.5)
MCV RBC AUTO: 96.7 FL (ref 80–99)
MONOCYTES # BLD: 0.36 K/UL (ref 0–1)
MONOCYTES NFR BLD: 5.2 % (ref 5–13)
NEUTS SEG # BLD: 6.07 K/UL (ref 1.8–8)
NEUTS SEG NFR BLD: 86.7 % (ref 32–75)
NRBC # BLD: 0 K/UL (ref 0–0.01)
NRBC BLD-RTO: 0 PER 100 WBC
PHOSPHATE SERPL-MCNC: 3.2 MG/DL (ref 2.6–4.7)
PLATELET # BLD AUTO: 165 K/UL (ref 150–400)
PMV BLD AUTO: 9.3 FL (ref 8.9–12.9)
POTASSIUM SERPL-SCNC: 4.2 MMOL/L (ref 3.5–5.1)
RBC # BLD AUTO: 3.62 M/UL (ref 4.1–5.7)
RBC MORPH BLD: ABNORMAL
SODIUM SERPL-SCNC: 137 MMOL/L (ref 136–145)
WBC # BLD AUTO: 7 K/UL (ref 4.1–11.1)

## 2025-05-14 PROCEDURE — 85025 COMPLETE CBC W/AUTO DIFF WBC: CPT

## 2025-05-14 PROCEDURE — 80048 BASIC METABOLIC PNL TOTAL CA: CPT

## 2025-05-14 PROCEDURE — 84100 ASSAY OF PHOSPHORUS: CPT

## 2025-05-14 NOTE — PROGRESS NOTES
[facility] Progress Note    Date: May 14, 2025    Name: Robert Gastelum    MRN: [id number]         : 1945      1000:  Pt arrived ambulatory and in no distress, for lab visit.  Labs drawn via left AC, patient tolerated well.                Departed [facility] ambulatory and in no distress.    Future Appointments   Date Time Provider Department Center   5/15/2025 10:00 AM ANGELA SO CHAIR 17 [facility]   2025 10:00 AM ANGELA LAB CHAIR 3 [facility]   2025 10:00 AM ANGELA SO CHAIR 16 [facility]   2025 10:00 AM ANGELA LAB CHAIR 3 [facility]   2025 10:00 AM ANGELA SO CHAIR 8 [facility]   2025 10:15 AM Yael Cooley APRN - NP [facility] BS AMB   2025  1:00 PM Libby Ruiz MD [facility] BS AMB   2025 11:40 AM [facility]       Jeanie Boucher RN  May 14, 2025

## 2025-05-15 ENCOUNTER — HOSPITAL ENCOUNTER (OUTPATIENT)
Facility: HOSPITAL | Age: 80
Setting detail: INFUSION SERIES
Discharge: HOME | End: 2025-05-15
Payer: MEDICARE

## 2025-05-15 VITALS
HEART RATE: 68 BPM | HEIGHT: 71 IN | WEIGHT: 220 LBS | RESPIRATION RATE: 18 BRPM | OXYGEN SATURATION: 96 % | BODY MASS INDEX: 30.8 KG/M2 | DIASTOLIC BLOOD PRESSURE: 62 MMHG | TEMPERATURE: 98.5 F | SYSTOLIC BLOOD PRESSURE: 100 MMHG

## 2025-05-15 DIAGNOSIS — C67.9 MALIGNANT NEOPLASM OF URINARY BLADDER, UNSPECIFIED SITE (HCC): Primary | ICD-10-CM

## 2025-05-15 PROCEDURE — 96413 CHEMO IV INFUSION 1 HR: CPT

## 2025-05-15 PROCEDURE — 2580000003 HC RX 258

## 2025-05-15 PROCEDURE — 96375 TX/PRO/DX INJ NEW DRUG ADDON: CPT

## 2025-05-15 PROCEDURE — 6360000002 HC RX W HCPCS

## 2025-05-15 PROCEDURE — 2500000003 HC RX 250 WO HCPCS

## 2025-05-15 RX ORDER — ONDANSETRON 2 MG/ML
8 INJECTION INTRAMUSCULAR; INTRAVENOUS ONCE
Status: COMPLETED | OUTPATIENT
Start: 2025-05-15 | End: 2025-05-15

## 2025-05-15 RX ORDER — SODIUM CHLORIDE 9 MG/ML
5-250 INJECTION, SOLUTION INTRAVENOUS PRN
Status: DISCONTINUED | OUTPATIENT
Start: 2025-05-15 | End: 2025-05-16 | Stop reason: HOSPADM

## 2025-05-15 RX ORDER — SODIUM CHLORIDE 0.9 % (FLUSH) 0.9 %
5-40 SYRINGE (ML) INJECTION PRN
Status: DISCONTINUED | OUTPATIENT
Start: 2025-05-15 | End: 2025-05-16 | Stop reason: HOSPADM

## 2025-05-15 RX ORDER — HEPARIN 100 UNIT/ML
500 SYRINGE INTRAVENOUS PRN
Status: DISCONTINUED | OUTPATIENT
Start: 2025-05-15 | End: 2025-05-16 | Stop reason: HOSPADM

## 2025-05-15 RX ADMIN — SODIUM CHLORIDE, PRESERVATIVE FREE 10 ML: 5 INJECTION INTRAVENOUS at 10:30

## 2025-05-15 RX ADMIN — ENFORTUMAB VEDOTIN 125 MG: 30 INJECTION, POWDER, LYOPHILIZED, FOR SOLUTION INTRAVENOUS at 11:57

## 2025-05-15 RX ADMIN — ONDANSETRON 8 MG: 2 INJECTION, SOLUTION INTRAMUSCULAR; INTRAVENOUS at 10:42

## 2025-05-15 RX ADMIN — SODIUM CHLORIDE, PRESERVATIVE FREE 20 ML: 5 INJECTION INTRAVENOUS at 12:30

## 2025-05-15 RX ADMIN — SODIUM CHLORIDE 50 ML/HR: 9 INJECTION, SOLUTION INTRAVENOUS at 10:31

## 2025-05-15 ASSESSMENT — PAIN SCALES - GENERAL
PAINLEVEL_OUTOF10: 0
PAINLEVEL_OUTOF10: 0

## 2025-05-15 NOTE — PROGRESS NOTES
[facility] Chemotherapy/Immunotherapy Progress Note    Date: May 15, 2025  Name: oRbert Gastelum  MRN: [id number]       : 1945    Pt arrived ambulatory and in no acute distress to [facility] for Padcev    Denies flu-like symptoms. Arrives unaccompanied to scheduled [facility] appointment. Uses cane for ambulatory support.     Right chest port accessed with positive blood return. Labs drawn 25. Patient meets treatment parameters.     Mr. Gastelum's vitals were reviewed.  Patient Vitals for the past 12 hrs:   Temp Pulse Resp BP SpO2   05/15/25 1230 -- 68 18 100/62 --   05/15/25 1015 98.5 °F (36.9 °C) 86 20 124/78 96 %     Pre-medications were administered as ordered and chemotherapy was initiated. Please see MAR for specific drug names and time of administration.  Medications Administered         0.9 % sodium chloride infusion Admin Date  05/15/2025 Action  New Bag Dose  50 mL/hr Rate  50 mL/hr Route  IntraVENous Documented By  Brionna Kearney RN        enfortumab vedotin-ejfv (PADCEV) 125 mg in sodium chloride 0.9 % 100 mL chemo IVPB Admin Date  05/15/2025 Action  New Bag Dose  125 mg Rate  245 mL/hr Route  IntraVENous Documented By  Brionna Kearney RN        ondansetron (ZOFRAN) injection 8 mg Admin Date  05/15/2025 Action  Given Dose  8 mg Rate   Route  IntraVENous Documented By  Brionna Kearney RN        sodium chloride flush 0.9 % injection 5-40 mL Admin Date  05/15/2025 Action  Given Dose  10 mL Rate   Route  IntraVENous Documented By  Brionna Kearney RN        sodium chloride flush 0.9 % injection 5-40 mL Admin Date  05/15/2025 Action  Given Dose  20 mL Rate   Route  IntraVENous Documented By  Brionna Kearney RN          Prior to chemotherapy/immunotherapy administration the following were verified with a second chemotherapy/immunotherapy certified nurse: Patient name, Patient  or CSN, Drug name, Drug dose, Infusion/drug volume, Rate of administration, Route of administration, Expiration dates/times, Appearance and  physical integrity of the drug(s).     Chest port maintained positive blood return throughout treatment. Flushed with 20 ml Normal Saline and de-accessed per protocol. Band-aid and Gauze applied to site.     Pt aware of next appointment scheduled set for [facility]. Tolerated procedure well without issue. Education given about chemo precautions and infection prevention- education reinforced prior to departure.    Brionna Kearney RN  May 15, 2025

## 2025-05-20 RX ORDER — PROCHLORPERAZINE EDISYLATE 5 MG/ML
5 INJECTION INTRAMUSCULAR; INTRAVENOUS
Status: CANCELLED | OUTPATIENT
Start: 2025-06-05

## 2025-05-20 RX ORDER — EPINEPHRINE 1 MG/ML
0.3 INJECTION, SOLUTION INTRAMUSCULAR; SUBCUTANEOUS PRN
Status: CANCELLED | OUTPATIENT
Start: 2025-06-05

## 2025-05-20 RX ORDER — SODIUM CHLORIDE 9 MG/ML
5-250 INJECTION, SOLUTION INTRAVENOUS PRN
Status: CANCELLED | OUTPATIENT
Start: 2025-06-05

## 2025-05-20 RX ORDER — ALBUTEROL SULFATE 90 UG/1
4 INHALANT RESPIRATORY (INHALATION) PRN
Status: CANCELLED | OUTPATIENT
Start: 2025-06-05

## 2025-05-20 RX ORDER — HYDROCORTISONE SODIUM SUCCINATE 100 MG/2ML
100 INJECTION INTRAMUSCULAR; INTRAVENOUS
Status: CANCELLED | OUTPATIENT
Start: 2025-06-05

## 2025-05-20 RX ORDER — SODIUM CHLORIDE 9 MG/ML
INJECTION, SOLUTION INTRAVENOUS CONTINUOUS
Status: CANCELLED | OUTPATIENT
Start: 2025-06-05

## 2025-05-20 RX ORDER — DIPHENHYDRAMINE HYDROCHLORIDE 50 MG/ML
50 INJECTION, SOLUTION INTRAMUSCULAR; INTRAVENOUS
Status: CANCELLED | OUTPATIENT
Start: 2025-06-05

## 2025-05-20 RX ORDER — SODIUM CHLORIDE 0.9 % (FLUSH) 0.9 %
5-40 SYRINGE (ML) INJECTION PRN
Status: CANCELLED | OUTPATIENT
Start: 2025-06-05

## 2025-05-20 RX ORDER — HEPARIN 100 UNIT/ML
500 SYRINGE INTRAVENOUS PRN
Status: CANCELLED | OUTPATIENT
Start: 2025-06-05

## 2025-05-20 RX ORDER — ACETAMINOPHEN 325 MG/1
650 TABLET ORAL
Status: CANCELLED | OUTPATIENT
Start: 2025-06-05

## 2025-05-20 RX ORDER — ONDANSETRON 2 MG/ML
8 INJECTION INTRAMUSCULAR; INTRAVENOUS
Status: CANCELLED | OUTPATIENT
Start: 2025-06-05

## 2025-05-20 RX ORDER — ONDANSETRON 2 MG/ML
8 INJECTION INTRAMUSCULAR; INTRAVENOUS ONCE
Status: CANCELLED | OUTPATIENT
Start: 2025-06-05 | End: 2025-05-29

## 2025-05-29 ENCOUNTER — APPOINTMENT (OUTPATIENT)
Facility: HOSPITAL | Age: 80
End: 2025-05-29
Payer: MEDICARE

## 2025-05-29 ENCOUNTER — HOSPITAL ENCOUNTER (OUTPATIENT)
Facility: HOSPITAL | Age: 80
Setting detail: INFUSION SERIES
End: 2025-05-29

## 2025-05-29 DIAGNOSIS — C67.9 MALIGNANT NEOPLASM OF URINARY BLADDER, UNSPECIFIED SITE (HCC): Primary | ICD-10-CM

## 2025-05-30 ENCOUNTER — TELEPHONE (OUTPATIENT)
Age: 80
End: 2025-05-30

## 2025-05-30 NOTE — TELEPHONE ENCOUNTER
0923  Called pt due to missed infusion appt yesterday, 5/29. No answer, left  for pt to r/t call.    I also called pts wife, went straight to , no message left at this time.  [employer] Message will be sent to pt also.

## 2025-06-02 NOTE — PROGRESS NOTES
[facility] [place] at [facility]  [address], [address] [place] [address]  W: [phone]  F: [phone]    Reason for Visit:   Robert Gastelum is a 80 y.o. 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   12/6/2021: Radical urethro-cystoprostatectomy   2/3/22-1/2023: Adjuvant nivolumab  5/2023: RT with Dr. Scruggs- Surveillance   2/2024: Stage IV Bladder cancer with squamous histology  4/17/2025: Padcev plus Keytruda          History of Present Illness:     Patient is a 80 y.o.. male with PMH as below including sarcoidosis who  is seen for evaluation of bladder cancer.  He has a history of nonmuscle invasive bladder cancer in 2015, underwent 2 induction courses of BCG, had a non muscle invasive recurrence in 2019 and had re induction with BCG. Cystoscopy 6/2020 at [facility] did not show any recurrence. In March 2021 he had  a positive FISH and was seen by Dr. Naqvi. Had a CT IVP 3/2021 which showed some Bladder thickening. He underwent a Cystoscopy with TURBT and was found to have extensive non muscle invasive disease including that of prostatic urethra, CIS and a focus  of Muscle invasive disease in the R bladder wall. Grade 4 neutropenia after cycle 1. Completed

## 2025-06-03 ENCOUNTER — TELEPHONE (OUTPATIENT)
Age: 80
End: 2025-06-03

## 2025-06-03 RX ORDER — LORAZEPAM 2 MG/ML
0.5 INJECTION INTRAMUSCULAR
Status: CANCELLED | OUTPATIENT
Start: 2025-06-05

## 2025-06-03 NOTE — TELEPHONE ENCOUNTER
Called patient to advise/confirm upcoming appt with Dr. Ruiz on 06/09/2025 at 1:00  at [facility].  Mailbox is full on both numbers. Could not leave a message.

## 2025-06-04 ENCOUNTER — HOSPITAL ENCOUNTER (OUTPATIENT)
Facility: HOSPITAL | Age: 80
Setting detail: INFUSION SERIES
Discharge: HOME | End: 2025-06-04
Payer: MEDICARE

## 2025-06-04 DIAGNOSIS — C67.9 MALIGNANT NEOPLASM OF URINARY BLADDER, UNSPECIFIED SITE (HCC): ICD-10-CM

## 2025-06-04 LAB
ALBUMIN SERPL-MCNC: 3.6 G/DL (ref 3.5–5)
ALBUMIN/GLOB SERPL: 1.2 (ref 1.1–2.2)
ALP SERPL-CCNC: 59 U/L (ref 45–117)
ALT SERPL-CCNC: 27 U/L (ref 12–78)
ANION GAP SERPL CALC-SCNC: 8 MMOL/L (ref 2–12)
AST SERPL-CCNC: 15 U/L (ref 15–37)
BASOPHILS # BLD: 0.03 K/UL (ref 0–0.1)
BASOPHILS NFR BLD: 0.4 % (ref 0–1)
BILIRUB SERPL-MCNC: 0.6 MG/DL (ref 0.2–1)
BUN SERPL-MCNC: 40 MG/DL (ref 6–20)
BUN/CREAT SERPL: 22 (ref 12–20)
CALCIUM SERPL-MCNC: 9.3 MG/DL (ref 8.5–10.1)
CHLORIDE SERPL-SCNC: 111 MMOL/L (ref 97–108)
CO2 SERPL-SCNC: 21 MMOL/L (ref 21–32)
CREAT SERPL-MCNC: 1.82 MG/DL (ref 0.7–1.3)
DIFFERENTIAL METHOD BLD: ABNORMAL
EOSINOPHIL # BLD: 0.06 K/UL (ref 0–0.4)
EOSINOPHIL NFR BLD: 0.9 % (ref 0–7)
ERYTHROCYTE [DISTWIDTH] IN BLOOD BY AUTOMATED COUNT: 19.8 % (ref 11.5–14.5)
GLOBULIN SER CALC-MCNC: 2.9 G/DL (ref 2–4)
GLUCOSE SERPL-MCNC: 89 MG/DL (ref 65–100)
HCT VFR BLD AUTO: 38.4 % (ref 36.6–50.3)
HGB BLD-MCNC: 12 G/DL (ref 12.1–17)
IMM GRANULOCYTES # BLD AUTO: 0.05 K/UL (ref 0–0.04)
IMM GRANULOCYTES NFR BLD AUTO: 0.7 % (ref 0–0.5)
LYMPHOCYTES # BLD: 0.35 K/UL (ref 0.8–3.5)
LYMPHOCYTES NFR BLD: 5 % (ref 12–49)
MCH RBC QN AUTO: 32.5 PG (ref 26–34)
MCHC RBC AUTO-ENTMCNC: 31.3 G/DL (ref 30–36.5)
MCV RBC AUTO: 104.1 FL (ref 80–99)
MONOCYTES # BLD: 0.62 K/UL (ref 0–1)
MONOCYTES NFR BLD: 8.9 % (ref 5–13)
NEUTS SEG # BLD: 5.89 K/UL (ref 1.8–8)
NEUTS SEG NFR BLD: 84.1 % (ref 32–75)
NRBC # BLD: 0 K/UL (ref 0–0.01)
NRBC BLD-RTO: 0 PER 100 WBC
PHOSPHATE SERPL-MCNC: 3.6 MG/DL (ref 2.6–4.7)
PLATELET # BLD AUTO: 167 K/UL (ref 150–400)
PMV BLD AUTO: 9.1 FL (ref 8.9–12.9)
POTASSIUM SERPL-SCNC: 3.9 MMOL/L (ref 3.5–5.1)
PROT SERPL-MCNC: 6.5 G/DL (ref 6.4–8.2)
RBC # BLD AUTO: 3.69 M/UL (ref 4.1–5.7)
RBC MORPH BLD: ABNORMAL
SODIUM SERPL-SCNC: 140 MMOL/L (ref 136–145)
TSH SERPL DL<=0.05 MIU/L-ACNC: 1.73 UIU/ML (ref 0.36–3.74)
WBC # BLD AUTO: 7 K/UL (ref 4.1–11.1)

## 2025-06-04 PROCEDURE — 84100 ASSAY OF PHOSPHORUS: CPT

## 2025-06-04 PROCEDURE — 80053 COMPREHEN METABOLIC PANEL: CPT

## 2025-06-04 PROCEDURE — 84443 ASSAY THYROID STIM HORMONE: CPT

## 2025-06-04 PROCEDURE — 85025 COMPLETE CBC W/AUTO DIFF WBC: CPT

## 2025-06-04 NOTE — PROGRESS NOTES
[facility] - Chemotherapy Progress Note    1020 Pt admit to [facility] for pre-treatment labs ambulatory in stable condition. Assessment completed. Labs drawn peripherally and sent for processing. D/c home ambulatory in no distress. Pt aware of next [facility] appointment scheduled for 06/05/2025.    Recent Results (from the past 12 hours)   CBC With Auto Differential    Collection Time: 06/04/25 10:30 AM   Result Value Ref Range    WBC 7.0 4.1 - 11.1 K/uL    RBC 3.69 (L) 4.10 - 5.70 M/uL    Hemoglobin 12.0 (L) 12.1 - 17.0 g/dL    Hematocrit 38.4 36.6 - 50.3 %    .1 (H) 80.0 - 99.0 FL    MCH 32.5 26.0 - 34.0 PG    MCHC 31.3 30.0 - 36.5 g/dL    RDW 19.8 (H) 11.5 - 14.5 %    Platelets 167 150 - 400 K/uL    MPV 9.1 8.9 - 12.9 FL    Nucleated RBCs 0.0 0  WBC    nRBC 0.00 0.00 - 0.01 K/uL    Neutrophils % PENDING %    Lymphocytes % PENDING %    Monocytes % PENDING %    Eosinophils % PENDING %    Basophils % PENDING %    Immature Granulocytes % PENDING %    Neutrophils Absolute PENDING K/UL    Lymphocytes Absolute PENDING K/UL    Monocytes Absolute PENDING K/UL    Eosinophils Absolute PENDING K/UL    Basophils Absolute PENDING K/UL    Immature Granulocytes Absolute PENDING K/UL    Differential Type PENDING

## 2025-06-05 ENCOUNTER — HOSPITAL ENCOUNTER (OUTPATIENT)
Facility: HOSPITAL | Age: 80
Setting detail: INFUSION SERIES
Discharge: HOME | End: 2025-06-05
Payer: MEDICARE

## 2025-06-05 ENCOUNTER — APPOINTMENT (OUTPATIENT)
Facility: HOSPITAL | Age: 80
End: 2025-06-05
Payer: MEDICARE

## 2025-06-05 ENCOUNTER — OFFICE VISIT (OUTPATIENT)
Age: 80
End: 2025-06-05
Payer: MEDICARE

## 2025-06-05 ENCOUNTER — HOSPITAL ENCOUNTER (OUTPATIENT)
Facility: HOSPITAL | Age: 80
Setting detail: INFUSION SERIES
End: 2025-06-05

## 2025-06-05 VITALS
SYSTOLIC BLOOD PRESSURE: 128 MMHG | WEIGHT: 229 LBS | HEART RATE: 67 BPM | OXYGEN SATURATION: 97 % | DIASTOLIC BLOOD PRESSURE: 82 MMHG | TEMPERATURE: 97.3 F | BODY MASS INDEX: 31.94 KG/M2 | RESPIRATION RATE: 20 BRPM

## 2025-06-05 VITALS — DIASTOLIC BLOOD PRESSURE: 72 MMHG | HEART RATE: 86 BPM | SYSTOLIC BLOOD PRESSURE: 112 MMHG

## 2025-06-05 VITALS
BODY MASS INDEX: 32.06 KG/M2 | TEMPERATURE: 97.3 F | DIASTOLIC BLOOD PRESSURE: 82 MMHG | WEIGHT: 229 LBS | HEART RATE: 85 BPM | HEIGHT: 71 IN | RESPIRATION RATE: 18 BRPM | SYSTOLIC BLOOD PRESSURE: 128 MMHG

## 2025-06-05 DIAGNOSIS — C67.9 MALIGNANT NEOPLASM OF URINARY BLADDER, UNSPECIFIED SITE (HCC): Primary | ICD-10-CM

## 2025-06-05 PROCEDURE — 2580000003 HC RX 258: Performed by: INTERNAL MEDICINE

## 2025-06-05 PROCEDURE — 99215 OFFICE O/P EST HI 40 MIN: CPT

## 2025-06-05 PROCEDURE — 6360000002 HC RX W HCPCS: Performed by: INTERNAL MEDICINE

## 2025-06-05 PROCEDURE — G8417 CALC BMI ABV UP PARAM F/U: HCPCS

## 2025-06-05 PROCEDURE — 1159F MED LIST DOCD IN RCRD: CPT

## 2025-06-05 PROCEDURE — 96413 CHEMO IV INFUSION 1 HR: CPT

## 2025-06-05 PROCEDURE — G8427 DOCREV CUR MEDS BY ELIG CLIN: HCPCS

## 2025-06-05 PROCEDURE — 1160F RVW MEDS BY RX/DR IN RCRD: CPT

## 2025-06-05 PROCEDURE — 1036F TOBACCO NON-USER: CPT

## 2025-06-05 PROCEDURE — 1123F ACP DISCUSS/DSCN MKR DOCD: CPT

## 2025-06-05 PROCEDURE — 96375 TX/PRO/DX INJ NEW DRUG ADDON: CPT

## 2025-06-05 PROCEDURE — 3074F SYST BP LT 130 MM HG: CPT

## 2025-06-05 PROCEDURE — 3079F DIAST BP 80-89 MM HG: CPT

## 2025-06-05 PROCEDURE — 96417 CHEMO IV INFUS EACH ADDL SEQ: CPT

## 2025-06-05 RX ORDER — ONDANSETRON 2 MG/ML
8 INJECTION INTRAMUSCULAR; INTRAVENOUS ONCE
Status: COMPLETED | OUTPATIENT
Start: 2025-06-05 | End: 2025-06-05

## 2025-06-05 RX ORDER — SODIUM CHLORIDE 0.9 % (FLUSH) 0.9 %
5-40 SYRINGE (ML) INJECTION PRN
Status: DISCONTINUED | OUTPATIENT
Start: 2025-06-05 | End: 2025-06-06 | Stop reason: HOSPADM

## 2025-06-05 RX ORDER — SODIUM CHLORIDE 9 MG/ML
5-250 INJECTION, SOLUTION INTRAVENOUS PRN
Status: DISCONTINUED | OUTPATIENT
Start: 2025-06-05 | End: 2025-06-06 | Stop reason: HOSPADM

## 2025-06-05 RX ORDER — HEPARIN 100 UNIT/ML
500 SYRINGE INTRAVENOUS PRN
Status: DISCONTINUED | OUTPATIENT
Start: 2025-06-05 | End: 2025-06-06 | Stop reason: HOSPADM

## 2025-06-05 RX ORDER — PREDNISONE 10 MG/1
10 TABLET ORAL DAILY
Qty: 30 TABLET | Refills: 1 | Status: SHIPPED | OUTPATIENT
Start: 2025-06-05 | End: 2025-07-05

## 2025-06-05 RX ADMIN — SODIUM CHLORIDE 200 MG: 9 INJECTION, SOLUTION INTRAVENOUS at 12:55

## 2025-06-05 RX ADMIN — SODIUM CHLORIDE 50 ML/HR: 0.9 INJECTION, SOLUTION INTRAVENOUS at 11:44

## 2025-06-05 RX ADMIN — ENFORTUMAB VEDOTIN 125 MG: 30 INJECTION, POWDER, LYOPHILIZED, FOR SOLUTION INTRAVENOUS at 12:11

## 2025-06-05 RX ADMIN — ONDANSETRON 8 MG: 2 INJECTION, SOLUTION INTRAMUSCULAR; INTRAVENOUS at 11:44

## 2025-06-05 NOTE — PROGRESS NOTES
[facility] Chemo Progress Note    Date: 2025    Name: Robert Gastelum    MRN: [id number]         : 1945    Mr. Gastelum Arrived ambulatory and in no distress for cycle 3 day 1 of Padcev/Keytruda.      Assessment was completed and documented in flowsheets by access RN. Port accessed without difficulty by access RN. Labs drawn and processed on 25.      Mr. Gastelum's vitals were reviewed.  Patient Vitals for the past 12 hrs:   Pulse BP   25 1328 86 112/72         Lab results were obtained from 25 and reviewed.  Labs within parameter for treatment.     Pre-medications  were administered as ordered and chemotherapy was initiated.  Medications Administered         0.9 % sodium chloride infusion Admin Date  2025 Action  New Bag Dose  50 mL/hr Rate  50 mL/hr Route  IntraVENous Documented By  Richelle Ghosh RN        enfortumab vedotin-ejfv (PADCEV) 125 mg in sodium chloride 0.9 % 100 mL chemo IVPB Admin Date  2025 Action  New Bag Dose  125 mg Rate  245 mL/hr Route  IntraVENous Documented By  Richelle Ghosh RN        ondansetron (ZOFRAN) injection 8 mg Admin Date  2025 Action  Given Dose  8 mg Rate   Route  IntraVENous Documented By  Richelle Ghosh RN        pembrolizumab (KEYTRUDA) 200 mg in sodium chloride 0.9 % 100 mL IVPB Admin Date  2025 Action  New Bag Dose  200 mg Rate  236 mL/hr Route  IntraVENous Documented By  Richelle Ghosh RN            Two nurses verified prior to administering:  Drug name, Drug dose, Infusion volume or drug volume when prepared in a syringe, Rate of administration, Route of administration, Expiration dates and/or times, Appearance and physical integrity of the drugs, Rate set on infusion pump, when used, and Sequencing of drug administration.    Mr. Gastelum tolerated treatment well, port flushed and de-accessed per protocol. Patient was discharged from [facility] in stable condition. Patient aware of next appointment.     Future Appointments   Date

## 2025-06-05 NOTE — PROGRESS NOTES
Robert Gastelum is a 80 y.o. 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 Have you been to the ER, urgent care clinic since your last visit?  Hospitalized since your last visit?No    2. Have you seen or consulted any other health care providers outside of the [facility] since your last visit?  Include any pap smears or colon screening. No

## 2025-06-09 ENCOUNTER — OFFICE VISIT (OUTPATIENT)
Age: 80
End: 2025-06-09

## 2025-06-09 VITALS
DIASTOLIC BLOOD PRESSURE: 70 MMHG | HEART RATE: 94 BPM | HEIGHT: 71 IN | WEIGHT: 229 LBS | SYSTOLIC BLOOD PRESSURE: 112 MMHG | RESPIRATION RATE: 20 BRPM | BODY MASS INDEX: 32.06 KG/M2 | OXYGEN SATURATION: 96 %

## 2025-06-09 DIAGNOSIS — F32.A ANXIETY AND DEPRESSION: ICD-10-CM

## 2025-06-09 DIAGNOSIS — I69.398 WEAKNESS STATUS POST CEREBROVASCULAR ACCIDENT: ICD-10-CM

## 2025-06-09 DIAGNOSIS — F41.9 ANXIETY AND DEPRESSION: ICD-10-CM

## 2025-06-09 DIAGNOSIS — Z71.89 ADVANCED CARE PLANNING/COUNSELING DISCUSSION: ICD-10-CM

## 2025-06-09 DIAGNOSIS — R53.1 WEAKNESS STATUS POST CEREBROVASCULAR ACCIDENT: ICD-10-CM

## 2025-06-09 DIAGNOSIS — D86.9 SARCOIDOSIS: ICD-10-CM

## 2025-06-09 DIAGNOSIS — Z51.5 PALLIATIVE CARE BY SPECIALIST: Primary | ICD-10-CM

## 2025-06-09 DIAGNOSIS — G47.00 INSOMNIA, UNSPECIFIED TYPE: ICD-10-CM

## 2025-06-09 DIAGNOSIS — C67.9 MALIGNANT NEOPLASM METASTATIC FROM BLADDER (HCC): ICD-10-CM

## 2025-06-09 DIAGNOSIS — Z87.19 HISTORY OF INTESTINAL OBSTRUCTION: ICD-10-CM

## 2025-06-09 ASSESSMENT — PATIENT HEALTH QUESTIONNAIRE - PHQ9
SUM OF ALL RESPONSES TO PHQ QUESTIONS 1-9: 6
2. FEELING DOWN, DEPRESSED OR HOPELESS: NEARLY EVERY DAY
1. LITTLE INTEREST OR PLEASURE IN DOING THINGS: NEARLY EVERY DAY

## 2025-06-09 NOTE — PATIENT INSTRUCTIONS
Dear Robert Gastelum ,    It was a pleasure seeing you today at [facility].   Return in about 8 weeks (around 8/4/2025).    If labs or imaging tests have been ordered for you today, please call the office  at [phone] 48 hours after completion to obtain the results.      This is the plan we talked about:    Today we completed a durable do not resuscitate form.   We will scan a copy into your medical record.      The Palliative Medicine Team is here to support you and your family.       Sincerely,  Libby Ruiz MD

## 2025-06-11 PROBLEM — Z87.19 HISTORY OF INTESTINAL OBSTRUCTION: Status: ACTIVE | Noted: 2025-06-11

## 2025-06-11 PROBLEM — F32.A ANXIETY AND DEPRESSION: Status: ACTIVE | Noted: 2025-06-11

## 2025-06-11 PROBLEM — C67.9: Status: ACTIVE | Noted: 2025-06-11

## 2025-06-11 PROBLEM — G47.00 INSOMNIA: Status: ACTIVE | Noted: 2025-06-11

## 2025-06-11 PROBLEM — R53.1 WEAKNESS STATUS POST CEREBROVASCULAR ACCIDENT: Status: ACTIVE | Noted: 2025-06-11

## 2025-06-11 PROBLEM — F41.9 ANXIETY AND DEPRESSION: Status: ACTIVE | Noted: 2025-06-11

## 2025-06-11 PROBLEM — I69.398 WEAKNESS STATUS POST CEREBROVASCULAR ACCIDENT: Status: ACTIVE | Noted: 2025-06-11

## 2025-06-11 NOTE — PROGRESS NOTES
Advance Care Planning      Palliative Medicine Provider (MD/NP)  Advance Care Planning (ACP) Conversation      Date of Conversation: 06/11/25  The patient and/or authorized decision maker consented to a voluntary Advance Care Planning conversation.   Individuals present for the conversation:   Patient with decision making capacity and Spouse Libby    Legal Healthcare Agent(s):    Primary Decision Maker: Libby Hancock - Spouse - [phone]    ACP documents available in EMR prior to discussion:  -Power of  for Healthcare  -Living Will    Primary Palliative Diagnosis(es):  Bladder carcinoma    Conversation Summary:  Reviewed and discussed his current AMD / living will on file.  Pt affirms named healthcare agents remain accurate.  Discussed resuscitation preferences today in current state and he shares that he prefers a natural death and dying process free from CPR/mechanical intubation and other mechanical supports at time of death.  Portable DNR signed together.      Resuscitation Status:    Code Status: DNR    Outcomes / Completed Documentation:  An explanation of advance directives and their importance was provided and the following forms completed:    -Portable DNR    If new document completed, original was provided to patient and/or family member.    Copy was placed for scanning into the [facility] EMR.      I spent - minutes providing separately identifiable ACP services with the patient and/or surrogate decision maker in a voluntary, in-person conversation discussing the patient's wishes and goals as detailed in the above note.       Libby Ruiz MD          
Palliative Medicine Outpatient Clinic  Nurse Check in Note  [phone]    Patient Name: Robert Gastelum  YOB: 1945      Date of Visit: 06/09/2025  Visit Location:  [facility] Virtual Visit     Nurse verified patient is located in [place] for today's visit: Yes    Chief patient or family concern today: NP to establish    Medications:  Med reconciliation was performed with:  Patient    Requested refills:  None    If prescribed an opioid, does patient have access to naloxone at home?:  NA  If No, pend naloxone nasal spray    Function and Symptoms:  Use of assist devices:  Cane    Palliative Performance Status (PPS):   Palliative Performance Scale (PPS)  PPS: 70    ESAS:  Modified-[place] Symptom Assessment Scale (ESAS)  Tiredness Score: 5  Drowsiness Score: 6  Depression Score: 6  Pain Score: 4  Anxiety Score: 8  Nausea Score: 1  Appetite Score: 1  Dyspnea Score: 8  Constipation: Yes  Wellbeing Score: 3    Constipation?  Yes intermittent with chemo  Last BM: 06/08/25    Advance Care Planning:  Currently listed healthcare agent:      Is there an ACP Note within the past 12 months?  NO  If No, Alert Clinician and/or Social Work      Randi Park LPN        
demonstrate  significant increased metabolic activity.  Multiple cavitary nodules are also slightly progressed and now demonstrate  increased metabolic activity. There are mediastinal lymph nodes with increased  metabolic activity and left supraclavicular lymph nodes with increased metabolic  activity and these findings are progressed. Findings could represent acute  exacerbation of sarcoid but metastatic disease is not excluded and close  follow-up or biopsy is recommended.  2. Slightly prominent lymph nodes in the abdomen are stable in size but now  demonstrate increased metabolic activity and again are nonspecific and could be  related to sarcoid although metastatic disease is not excluded. 23X              Electronically signed by Abhay Merritt            Only check if applicable and billing time based rather than MDM    [x]   The total encounter time on this service date was 62 minutes which was spent performing a face-to-face encounter and personally completing the provider-level activities documented in the note.  This includes time spent prior to the visit and after the visit in direct care of the patient.  This time does not include time spent in any separately reportable services.     The patient (or guardian, if applicable) and other individuals in attendance with the patient were advised that Artificial Intelligence will be utilized during this visit to record, process the conversation to generate a clinical note, and support improvement of the AI technology. The patient (or guardian, if applicable) and other individuals in attendance at the appointment consented to the use of AI, including the recording.

## 2025-06-12 ENCOUNTER — HOSPITAL ENCOUNTER (OUTPATIENT)
Facility: HOSPITAL | Age: 80
Setting detail: INFUSION SERIES
End: 2025-06-12

## 2025-06-18 RX ORDER — ALBUTEROL SULFATE 90 UG/1
4 INHALANT RESPIRATORY (INHALATION) PRN
Status: CANCELLED | OUTPATIENT
Start: 2025-06-26

## 2025-06-18 RX ORDER — DIPHENHYDRAMINE HYDROCHLORIDE 50 MG/ML
50 INJECTION, SOLUTION INTRAMUSCULAR; INTRAVENOUS
Status: CANCELLED | OUTPATIENT
Start: 2025-06-26

## 2025-06-18 RX ORDER — HEPARIN 100 UNIT/ML
500 SYRINGE INTRAVENOUS PRN
Status: CANCELLED | OUTPATIENT
Start: 2025-06-26

## 2025-06-18 RX ORDER — HYDROCORTISONE SODIUM SUCCINATE 100 MG/2ML
100 INJECTION INTRAMUSCULAR; INTRAVENOUS
Status: CANCELLED | OUTPATIENT
Start: 2025-06-26

## 2025-06-18 RX ORDER — SODIUM CHLORIDE 9 MG/ML
INJECTION, SOLUTION INTRAVENOUS CONTINUOUS
Status: CANCELLED | OUTPATIENT
Start: 2025-06-26

## 2025-06-18 RX ORDER — SODIUM CHLORIDE 9 MG/ML
5-250 INJECTION, SOLUTION INTRAVENOUS PRN
Status: CANCELLED | OUTPATIENT
Start: 2025-06-26

## 2025-06-18 RX ORDER — ONDANSETRON 2 MG/ML
8 INJECTION INTRAMUSCULAR; INTRAVENOUS
Status: CANCELLED | OUTPATIENT
Start: 2025-06-26

## 2025-06-18 RX ORDER — PROCHLORPERAZINE EDISYLATE 5 MG/ML
5 INJECTION INTRAMUSCULAR; INTRAVENOUS
Status: CANCELLED | OUTPATIENT
Start: 2025-06-26

## 2025-06-18 RX ORDER — ACETAMINOPHEN 325 MG/1
650 TABLET ORAL
Status: CANCELLED | OUTPATIENT
Start: 2025-06-26

## 2025-06-18 RX ORDER — ONDANSETRON 2 MG/ML
8 INJECTION INTRAMUSCULAR; INTRAVENOUS ONCE
Status: CANCELLED | OUTPATIENT
Start: 2025-06-26 | End: 2025-06-26

## 2025-06-18 RX ORDER — EPINEPHRINE 1 MG/ML
0.3 INJECTION, SOLUTION INTRAMUSCULAR; SUBCUTANEOUS PRN
Status: CANCELLED | OUTPATIENT
Start: 2025-06-26

## 2025-06-18 RX ORDER — SODIUM CHLORIDE 0.9 % (FLUSH) 0.9 %
5-40 SYRINGE (ML) INJECTION PRN
Status: CANCELLED | OUTPATIENT
Start: 2025-06-26

## 2025-06-25 ENCOUNTER — HOSPITAL ENCOUNTER (OUTPATIENT)
Facility: HOSPITAL | Age: 80
Setting detail: INFUSION SERIES
Discharge: HOME | End: 2025-06-25
Payer: MEDICARE

## 2025-06-25 ENCOUNTER — APPOINTMENT (OUTPATIENT)
Facility: HOSPITAL | Age: 80
End: 2025-06-25
Payer: MEDICARE

## 2025-06-25 DIAGNOSIS — C67.9 MALIGNANT NEOPLASM OF URINARY BLADDER, UNSPECIFIED SITE (HCC): ICD-10-CM

## 2025-06-25 LAB
ALBUMIN SERPL-MCNC: 3.6 G/DL (ref 3.5–5)
ALBUMIN/GLOB SERPL: 1.1 (ref 1.1–2.2)
ALP SERPL-CCNC: 64 U/L (ref 45–117)
ALT SERPL-CCNC: 26 U/L (ref 12–78)
ANION GAP SERPL CALC-SCNC: 7 MMOL/L (ref 2–12)
AST SERPL-CCNC: 16 U/L (ref 15–37)
BASOPHILS # BLD: 0.03 K/UL (ref 0–0.1)
BASOPHILS NFR BLD: 0.6 % (ref 0–1)
BILIRUB SERPL-MCNC: 0.6 MG/DL (ref 0.2–1)
BUN SERPL-MCNC: 36 MG/DL (ref 6–20)
BUN/CREAT SERPL: 18 (ref 12–20)
CALCIUM SERPL-MCNC: 9.4 MG/DL (ref 8.5–10.1)
CHLORIDE SERPL-SCNC: 111 MMOL/L (ref 97–108)
CO2 SERPL-SCNC: 21 MMOL/L (ref 21–32)
CREAT SERPL-MCNC: 2.02 MG/DL (ref 0.7–1.3)
DIFFERENTIAL METHOD BLD: ABNORMAL
EOSINOPHIL # BLD: 0.23 K/UL (ref 0–0.4)
EOSINOPHIL NFR BLD: 4.6 % (ref 0–7)
ERYTHROCYTE [DISTWIDTH] IN BLOOD BY AUTOMATED COUNT: 18.1 % (ref 11.5–14.5)
GLOBULIN SER CALC-MCNC: 3.3 G/DL (ref 2–4)
GLUCOSE SERPL-MCNC: 95 MG/DL (ref 65–100)
HCT VFR BLD AUTO: 38.9 % (ref 36.6–50.3)
HGB BLD-MCNC: 12.2 G/DL (ref 12.1–17)
IMM GRANULOCYTES # BLD AUTO: 0.03 K/UL (ref 0–0.04)
IMM GRANULOCYTES NFR BLD AUTO: 0.6 % (ref 0–0.5)
LYMPHOCYTES # BLD: 0.42 K/UL (ref 0.8–3.5)
LYMPHOCYTES NFR BLD: 8.4 % (ref 12–49)
MCH RBC QN AUTO: 32.8 PG (ref 26–34)
MCHC RBC AUTO-ENTMCNC: 31.4 G/DL (ref 30–36.5)
MCV RBC AUTO: 104.6 FL (ref 80–99)
MONOCYTES # BLD: 0.69 K/UL (ref 0–1)
MONOCYTES NFR BLD: 13.7 % (ref 5–13)
NEUTS SEG # BLD: 3.61 K/UL (ref 1.8–8)
NEUTS SEG NFR BLD: 72.1 % (ref 32–75)
NRBC # BLD: 0 K/UL (ref 0–0.01)
NRBC BLD-RTO: 0 PER 100 WBC
PHOSPHATE SERPL-MCNC: 3.6 MG/DL (ref 2.6–4.7)
PLATELET # BLD AUTO: 214 K/UL (ref 150–400)
PMV BLD AUTO: 9.3 FL (ref 8.9–12.9)
POTASSIUM SERPL-SCNC: 4.1 MMOL/L (ref 3.5–5.1)
PROT SERPL-MCNC: 6.9 G/DL (ref 6.4–8.2)
RBC # BLD AUTO: 3.72 M/UL (ref 4.1–5.7)
RBC MORPH BLD: ABNORMAL
RBC MORPH BLD: ABNORMAL
SODIUM SERPL-SCNC: 139 MMOL/L (ref 136–145)
WBC # BLD AUTO: 5 K/UL (ref 4.1–11.1)

## 2025-06-25 PROCEDURE — 85025 COMPLETE CBC W/AUTO DIFF WBC: CPT

## 2025-06-25 PROCEDURE — 84100 ASSAY OF PHOSPHORUS: CPT

## 2025-06-25 PROCEDURE — 80053 COMPREHEN METABOLIC PANEL: CPT

## 2025-06-25 NOTE — PROGRESS NOTES
[facility] Progress Note    Date: 2025    Name: Robert Gastelum    MRN: [id number]         : 1945      Pt arrived ambulatory and in no distress, for lab visit.  Labs drawn via L AC, patient tolerated well.      Departed [facility] ambulatory and in no distress.    Future Appointments   Date Time Provider Department Center   2025 10:00 AM ANGELA LAB CHAIR 1 [facility]   2025 10:45 AM Zaida Davis MD [facility] BS AMB   2025 11:00 AM ANGELA SO CHAIR 18 [facility]   2025 10:30 AM ANGELA LAB CHAIR 3 [facility]   7/3/2025 10:00 AM ANGELA SO CHAIR 10 [facility]   2025 10:00 AM MO PET 1 [facility]   2025 10:00 AM ANGELA LAB CHAIR 3 [facility]   2025 10:00 AM ANGELA SO CHAIR 2 [facility]   2025 10:30 AM ANGELA LAB CHAIR 3 [facility]   2025 10:00 AM ANGELA SO CHAIR 9 [facility]   2025  2:00 PM Libby Ruiz MD [facility] BS AMB   2025 11:40 AM Luzmaria Jaimes DO [facility] BS AMB       Js [facility]  2025

## 2025-06-26 ENCOUNTER — HOSPITAL ENCOUNTER (OUTPATIENT)
Facility: HOSPITAL | Age: 80
Setting detail: INFUSION SERIES
Discharge: HOME | End: 2025-06-26
Payer: MEDICARE

## 2025-06-26 ENCOUNTER — APPOINTMENT (OUTPATIENT)
Facility: HOSPITAL | Age: 80
End: 2025-06-26
Payer: MEDICARE

## 2025-06-26 ENCOUNTER — HOSPITAL ENCOUNTER (OUTPATIENT)
Facility: HOSPITAL | Age: 80
Setting detail: INFUSION SERIES
End: 2025-06-26
Payer: MEDICARE

## 2025-06-26 ENCOUNTER — OFFICE VISIT (OUTPATIENT)
Age: 80
End: 2025-06-26
Payer: MEDICARE

## 2025-06-26 VITALS
TEMPERATURE: 97.5 F | HEART RATE: 100 BPM | HEIGHT: 71 IN | SYSTOLIC BLOOD PRESSURE: 96 MMHG | OXYGEN SATURATION: 91 % | BODY MASS INDEX: 30.69 KG/M2 | WEIGHT: 219.2 LBS | RESPIRATION RATE: 18 BRPM | DIASTOLIC BLOOD PRESSURE: 56 MMHG

## 2025-06-26 VITALS
DIASTOLIC BLOOD PRESSURE: 77 MMHG | TEMPERATURE: 97.6 F | SYSTOLIC BLOOD PRESSURE: 118 MMHG | RESPIRATION RATE: 18 BRPM | HEIGHT: 70 IN | HEART RATE: 87 BPM | BODY MASS INDEX: 31.35 KG/M2 | OXYGEN SATURATION: 90 % | WEIGHT: 219 LBS

## 2025-06-26 DIAGNOSIS — C67.9 MALIGNANT NEOPLASM OF URINARY BLADDER, UNSPECIFIED SITE (HCC): Primary | ICD-10-CM

## 2025-06-26 DIAGNOSIS — J98.4 PNEUMONITIS: ICD-10-CM

## 2025-06-26 PROCEDURE — 3078F DIAST BP <80 MM HG: CPT | Performed by: INTERNAL MEDICINE

## 2025-06-26 PROCEDURE — 1123F ACP DISCUSS/DSCN MKR DOCD: CPT | Performed by: INTERNAL MEDICINE

## 2025-06-26 PROCEDURE — 1126F AMNT PAIN NOTED NONE PRSNT: CPT | Performed by: INTERNAL MEDICINE

## 2025-06-26 PROCEDURE — 1036F TOBACCO NON-USER: CPT | Performed by: INTERNAL MEDICINE

## 2025-06-26 PROCEDURE — 3074F SYST BP LT 130 MM HG: CPT | Performed by: INTERNAL MEDICINE

## 2025-06-26 PROCEDURE — 6360000002 HC RX W HCPCS: Performed by: INTERNAL MEDICINE

## 2025-06-26 PROCEDURE — G8417 CALC BMI ABV UP PARAM F/U: HCPCS | Performed by: INTERNAL MEDICINE

## 2025-06-26 PROCEDURE — 1159F MED LIST DOCD IN RCRD: CPT | Performed by: INTERNAL MEDICINE

## 2025-06-26 PROCEDURE — 96375 TX/PRO/DX INJ NEW DRUG ADDON: CPT

## 2025-06-26 PROCEDURE — 2580000003 HC RX 258: Performed by: INTERNAL MEDICINE

## 2025-06-26 PROCEDURE — 99215 OFFICE O/P EST HI 40 MIN: CPT | Performed by: INTERNAL MEDICINE

## 2025-06-26 PROCEDURE — 96413 CHEMO IV INFUSION 1 HR: CPT

## 2025-06-26 PROCEDURE — G8427 DOCREV CUR MEDS BY ELIG CLIN: HCPCS | Performed by: INTERNAL MEDICINE

## 2025-06-26 RX ORDER — HEPARIN 100 UNIT/ML
500 SYRINGE INTRAVENOUS PRN
Status: DISCONTINUED | OUTPATIENT
Start: 2025-06-26 | End: 2025-06-27 | Stop reason: HOSPADM

## 2025-06-26 RX ORDER — PROCHLORPERAZINE EDISYLATE 5 MG/ML
5 INJECTION INTRAMUSCULAR; INTRAVENOUS
Status: CANCELLED | OUTPATIENT
Start: 2025-07-03

## 2025-06-26 RX ORDER — SODIUM CHLORIDE 9 MG/ML
5-250 INJECTION, SOLUTION INTRAVENOUS PRN
Status: CANCELLED | OUTPATIENT
Start: 2025-07-03

## 2025-06-26 RX ORDER — SODIUM CHLORIDE 9 MG/ML
INJECTION, SOLUTION INTRAVENOUS CONTINUOUS
Status: CANCELLED | OUTPATIENT
Start: 2025-07-03

## 2025-06-26 RX ORDER — ONDANSETRON 2 MG/ML
8 INJECTION INTRAMUSCULAR; INTRAVENOUS
Status: CANCELLED | OUTPATIENT
Start: 2025-07-03

## 2025-06-26 RX ORDER — PREDNISONE 10 MG/1
40 TABLET ORAL DAILY
Qty: 28 TABLET | Refills: 0 | Status: SHIPPED | OUTPATIENT
Start: 2025-06-26 | End: 2025-07-03

## 2025-06-26 RX ORDER — EPINEPHRINE 1 MG/ML
0.3 INJECTION, SOLUTION INTRAMUSCULAR; SUBCUTANEOUS PRN
Status: CANCELLED | OUTPATIENT
Start: 2025-07-03

## 2025-06-26 RX ORDER — ALBUTEROL SULFATE 90 UG/1
4 INHALANT RESPIRATORY (INHALATION) PRN
Status: CANCELLED | OUTPATIENT
Start: 2025-07-03

## 2025-06-26 RX ORDER — HYDROCORTISONE SODIUM SUCCINATE 100 MG/2ML
100 INJECTION INTRAMUSCULAR; INTRAVENOUS
Status: CANCELLED | OUTPATIENT
Start: 2025-07-03

## 2025-06-26 RX ORDER — SODIUM CHLORIDE 9 MG/ML
5-250 INJECTION, SOLUTION INTRAVENOUS PRN
Status: DISCONTINUED | OUTPATIENT
Start: 2025-06-26 | End: 2025-06-27 | Stop reason: HOSPADM

## 2025-06-26 RX ORDER — ACETAMINOPHEN 325 MG/1
650 TABLET ORAL
Status: CANCELLED | OUTPATIENT
Start: 2025-07-03

## 2025-06-26 RX ORDER — SODIUM CHLORIDE 0.9 % (FLUSH) 0.9 %
5-40 SYRINGE (ML) INJECTION PRN
Status: DISCONTINUED | OUTPATIENT
Start: 2025-06-26 | End: 2025-06-27 | Stop reason: HOSPADM

## 2025-06-26 RX ORDER — ONDANSETRON 2 MG/ML
8 INJECTION INTRAMUSCULAR; INTRAVENOUS ONCE
Status: COMPLETED | OUTPATIENT
Start: 2025-06-26 | End: 2025-06-26

## 2025-06-26 RX ORDER — HEPARIN 100 UNIT/ML
500 SYRINGE INTRAVENOUS PRN
Status: CANCELLED | OUTPATIENT
Start: 2025-07-03

## 2025-06-26 RX ORDER — SODIUM CHLORIDE 0.9 % (FLUSH) 0.9 %
5-40 SYRINGE (ML) INJECTION PRN
Status: CANCELLED | OUTPATIENT
Start: 2025-07-03

## 2025-06-26 RX ORDER — DIPHENHYDRAMINE HYDROCHLORIDE 50 MG/ML
50 INJECTION, SOLUTION INTRAMUSCULAR; INTRAVENOUS
Status: CANCELLED | OUTPATIENT
Start: 2025-07-03

## 2025-06-26 RX ADMIN — ONDANSETRON 8 MG: 2 INJECTION, SOLUTION INTRAMUSCULAR; INTRAVENOUS at 12:58

## 2025-06-26 RX ADMIN — ENFORTUMAB VEDOTIN 125 MG: 30 INJECTION, POWDER, LYOPHILIZED, FOR SOLUTION INTRAVENOUS at 13:17

## 2025-06-26 RX ADMIN — SODIUM CHLORIDE 50 ML/HR: 0.9 INJECTION, SOLUTION INTRAVENOUS at 12:58

## 2025-06-26 NOTE — PROGRESS NOTES
Robert Gastelum is a 80 y.o. male f/u Malignant neoplasm of urinary bladder, unspecified site     Chief Complaint   Patient presents with    Bladder Cancer     1. Have you been to the ER, urgent care clinic since your last visit?  Hospitalized since your last visit?No     2. Have you seen or consulted any other health care providers outside of the [facility] since your last visit?  Include any pap smears or colon screening. No    
Pneumonitis  See below     8) Diarrhea  Gr 2, immune mediated  Resolved on Prednisone   But now recurring     6) Encounter for high risk disease      Plan:       Proceed with C4D1 - HOLD Keytruda today-  Enfortumab vedotin (Padcev) alone 1.25mg/kg (max dose 125mg) day 1 and 8   Increase prednisone to 40 mg for now   PPI  CBC and CMP prior to each treatment, TSH every other cycle  Premedication with Aloxi and Dexamethasone (moderate emetic potential)  Home antiemetics: Ondansetron and Prochlorperazine PRN  Continue eliquis  Palliative care new patient appt on 6/9  PET 7/14  RTC 1 week     He is out of town 6/15 and we will remove D8 and resume treatment on C4D1 on 6/26     Patient to call or Exileshart message with any questions or concerns.     I appreciate the opportunity to participate in Mr. Robert Gastelum's care.    Signed By: Zaida Davis MD

## 2025-06-26 NOTE — PROGRESS NOTES
Outpatient Infusion Center - Chemotherapy Progress Note    1015 Pt admit to [facility] for Padcev/Keytruda C4D1 ambulatory in stable condition. Assessment completed by access RN.  Port accessed by access RN with positive blood return. Labs drawn prior to today's visit.  Line flushed, clamped, Curos Cap applied to end clave.    1230 Port flushed w/ + blood return; Per MD, Padcev only today, Keytruda held; discussed w/ pt, verbalized understanding      Medications Administered         0.9 % sodium chloride infusion Admin Date  06/26/2025 Action  New Bag Dose  50 mL/hr Rate  50 mL/hr Route  IntraVENous Documented By  Lore Baker RN        enfortumab vedotin-ejfv (PADCEV) 125 mg in sodium chloride 0.9 % 100 mL chemo IVPB Admin Date  06/26/2025 Action  New Bag Dose  125 mg Rate  245 mL/hr Route  IntraVENous Documented By  Lore Baker RN        ondansetron (ZOFRAN) injection 8 mg Admin Date  06/26/2025 Action  Given Dose  8 mg Rate   Route  IntraVENous Documented By  Lore Baker RN              Two nurses verified prior to administering:, Drug name, Drug dose, Infusion volume or drug volume when prepared in a syringe, Rate of administration, Route of administration, Expiration dates and/or times, Appearance and physical integrity of the drugs, Rate set on infusion pump, when used, Sequencing of drug administration         1400 Pt tolerated treatment well. Port maintained positive blood return throughout treatment, flushed with positive blood return at conclusion and de-accessed per protocol. D/c home ambulatory in no distress. Pt aware of next [facility] appointment scheduled for 07/02/2025.

## 2025-06-29 NOTE — PROGRESS NOTES
[facility] [place] at [facility]  [address], [address] [place] [id number]  W: [phone]  F: [phone]    Reason for Visit:   Robert Gastelum is a 80 y.o. 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   12/6/2021: Radical urethro-cystoprostatectomy   2/3/22-1/2023: Adjuvant nivolumab  5/2023: RT with Dr. Scruggs- Surveillance   2/2024: Stage IV Bladder cancer with squamous histology  4/17/2025: Padcev plus Keytruda          History of Present Illness:     Patient is a 80 y.o.. male with PMH as below including sarcoidosis who  is seen for evaluation of bladder cancer.  He has a history of nonmuscle invasive bladder cancer in 2015, underwent 2 induction courses of BCG, had a non muscle invasive recurrence in 2019 and had re induction with BCG. Cystoscopy 6/2020 at [facility] did not show any recurrence. In March 2021 he had  a positive FISH and was seen by Dr. Naqvi. Had a CT IVP 3/2021 which showed some Bladder thickening. He underwent a Cystoscopy with TURBT and was found to have extensive non muscle invasive disease including that of prostatic urethra, CIS and a focus  of Muscle invasive disease in the R bladder wall. Grade 4 neutropenia after cycle 1. Completed  Duration Of Freeze Thaw-Cycle (Seconds): 10 Render Post-Care Instructions In Note?: no Detail Level: Zone Post-Care Instructions: I reviewed with the patient in detail post-care instructions. Patient is to wear sunprotection, and avoid picking at any of the treated lesions. Pt may apply Vaseline to crusted or scabbing areas. Consent: The patient's consent was obtained including but not limited to risks of crusting, scabbing, blistering, scarring, darker or lighter pigmentary change, recurrence, incomplete removal and infection. Number Of Freeze-Thaw Cycles: 2 freeze-thaw cycles

## 2025-07-02 ENCOUNTER — HOSPITAL ENCOUNTER (OUTPATIENT)
Facility: HOSPITAL | Age: 80
Setting detail: INFUSION SERIES
Discharge: HOME | End: 2025-07-02
Payer: MEDICARE

## 2025-07-02 ENCOUNTER — OFFICE VISIT (OUTPATIENT)
Age: 80
End: 2025-07-02
Payer: MEDICARE

## 2025-07-02 VITALS
HEART RATE: 87 BPM | BODY MASS INDEX: 31.57 KG/M2 | WEIGHT: 220 LBS | SYSTOLIC BLOOD PRESSURE: 89 MMHG | TEMPERATURE: 98 F | RESPIRATION RATE: 16 BRPM | OXYGEN SATURATION: 92 % | DIASTOLIC BLOOD PRESSURE: 62 MMHG

## 2025-07-02 DIAGNOSIS — R06.02 SOB (SHORTNESS OF BREATH): ICD-10-CM

## 2025-07-02 DIAGNOSIS — C67.9 MALIGNANT NEOPLASM OF URINARY BLADDER, UNSPECIFIED SITE (HCC): Primary | ICD-10-CM

## 2025-07-02 DIAGNOSIS — C67.9 MALIGNANT NEOPLASM OF URINARY BLADDER, UNSPECIFIED SITE (HCC): ICD-10-CM

## 2025-07-02 LAB
ANION GAP SERPL CALC-SCNC: 4 MMOL/L (ref 2–12)
BASOPHILS # BLD: 0.05 K/UL (ref 0–0.1)
BASOPHILS NFR BLD: 0.6 % (ref 0–1)
BUN SERPL-MCNC: 35 MG/DL (ref 6–20)
BUN/CREAT SERPL: 17 (ref 12–20)
CALCIUM SERPL-MCNC: 9.4 MG/DL (ref 8.5–10.1)
CHLORIDE SERPL-SCNC: 106 MMOL/L (ref 97–108)
CO2 SERPL-SCNC: 25 MMOL/L (ref 21–32)
CREAT SERPL-MCNC: 2.03 MG/DL (ref 0.7–1.3)
DIFFERENTIAL METHOD BLD: ABNORMAL
EOSINOPHIL # BLD: 0.05 K/UL (ref 0–0.4)
EOSINOPHIL NFR BLD: 0.6 % (ref 0–7)
ERYTHROCYTE [DISTWIDTH] IN BLOOD BY AUTOMATED COUNT: 17.4 % (ref 11.5–14.5)
GLUCOSE SERPL-MCNC: 65 MG/DL (ref 65–100)
HCT VFR BLD AUTO: 42.4 % (ref 36.6–50.3)
HGB BLD-MCNC: 13.4 G/DL (ref 12.1–17)
IMM GRANULOCYTES # BLD AUTO: 0.06 K/UL (ref 0–0.04)
IMM GRANULOCYTES NFR BLD AUTO: 0.7 % (ref 0–0.5)
LYMPHOCYTES # BLD: 0.53 K/UL (ref 0.8–3.5)
LYMPHOCYTES NFR BLD: 6.1 % (ref 12–49)
MCH RBC QN AUTO: 33.2 PG (ref 26–34)
MCHC RBC AUTO-ENTMCNC: 31.6 G/DL (ref 30–36.5)
MCV RBC AUTO: 105 FL (ref 80–99)
MONOCYTES # BLD: 0.79 K/UL (ref 0–1)
MONOCYTES NFR BLD: 9.1 % (ref 5–13)
NEUTS SEG # BLD: 7.21 K/UL (ref 1.8–8)
NEUTS SEG NFR BLD: 82.9 % (ref 32–75)
NRBC # BLD: 0 K/UL (ref 0–0.01)
NRBC BLD-RTO: 0 PER 100 WBC
PHOSPHATE SERPL-MCNC: 2.6 MG/DL (ref 2.6–4.7)
PLATELET # BLD AUTO: 255 K/UL (ref 150–400)
PMV BLD AUTO: 8.9 FL (ref 8.9–12.9)
POTASSIUM SERPL-SCNC: 3.8 MMOL/L (ref 3.5–5.1)
RBC # BLD AUTO: 4.04 M/UL (ref 4.1–5.7)
RBC MORPH BLD: ABNORMAL
SODIUM SERPL-SCNC: 135 MMOL/L (ref 136–145)
WBC # BLD AUTO: 8.7 K/UL (ref 4.1–11.1)

## 2025-07-02 PROCEDURE — 3078F DIAST BP <80 MM HG: CPT | Performed by: INTERNAL MEDICINE

## 2025-07-02 PROCEDURE — G8428 CUR MEDS NOT DOCUMENT: HCPCS | Performed by: INTERNAL MEDICINE

## 2025-07-02 PROCEDURE — G8417 CALC BMI ABV UP PARAM F/U: HCPCS | Performed by: INTERNAL MEDICINE

## 2025-07-02 PROCEDURE — 85025 COMPLETE CBC W/AUTO DIFF WBC: CPT

## 2025-07-02 PROCEDURE — 99215 OFFICE O/P EST HI 40 MIN: CPT | Performed by: INTERNAL MEDICINE

## 2025-07-02 PROCEDURE — 80048 BASIC METABOLIC PNL TOTAL CA: CPT

## 2025-07-02 PROCEDURE — 1036F TOBACCO NON-USER: CPT | Performed by: INTERNAL MEDICINE

## 2025-07-02 PROCEDURE — 84100 ASSAY OF PHOSPHORUS: CPT

## 2025-07-02 PROCEDURE — 3074F SYST BP LT 130 MM HG: CPT | Performed by: INTERNAL MEDICINE

## 2025-07-02 PROCEDURE — 1123F ACP DISCUSS/DSCN MKR DOCD: CPT | Performed by: INTERNAL MEDICINE

## 2025-07-02 PROCEDURE — 1126F AMNT PAIN NOTED NONE PRSNT: CPT | Performed by: INTERNAL MEDICINE

## 2025-07-02 NOTE — PROGRESS NOTES
[facility] Progress Note    Date: 2025    Name: Robert Gastelum    MRN: [id number]         : 1945      Pt arrived ambulatory and in no distress, for lab visit.  Labs drawn via L AC, patient tolerated well.      Departed [facility] ambulatory and in no distress.    Future Appointments   Date Time Provider Department Center   2025 10:45 AM Zaida Davis MD [facility] BS AMB   7/3/2025 10:00 AM ANGELA SO CHAIR 10 BREMOSINF [facility]   2025 10:00 AM MO PET 1 [facility]   2025 10:00 AM ANGELA LAB CHAIR 3 [facility]   2025 10:00 AM ANGELA SO CHAIR 2 [facility]   2025 10:30 AM ANGELA LAB CHAIR 3 [facility]   2025 10:00 AM ANGELA SO CHAIR 9 BREMOSINF [facility]   2025  2:00 PM Libby Ruiz MD [facility] BS AMB   2025 11:40 AM Luzmaria Jaimes DO [facility] BS AMB       SHAMEKA GALVEZ RN  2025

## 2025-07-02 NOTE — PROGRESS NOTES
Robert Gastelum is a 80 y.o. 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 Have you been to the ER, urgent care clinic since your last visit?  Hospitalized since your last visit?No    2. Have you seen or consulted any other health care providers outside of the [facility] since your last visit?  Include any pap smears or colon screening. No

## 2025-07-03 ENCOUNTER — HOSPITAL ENCOUNTER (OUTPATIENT)
Facility: HOSPITAL | Age: 80
Setting detail: INFUSION SERIES
Discharge: HOME | End: 2025-07-03
Payer: MEDICARE

## 2025-07-03 VITALS
RESPIRATION RATE: 16 BRPM | BODY MASS INDEX: 31.5 KG/M2 | OXYGEN SATURATION: 94 % | WEIGHT: 220.02 LBS | SYSTOLIC BLOOD PRESSURE: 133 MMHG | HEART RATE: 76 BPM | DIASTOLIC BLOOD PRESSURE: 72 MMHG | TEMPERATURE: 97.5 F | HEIGHT: 70 IN

## 2025-07-03 DIAGNOSIS — C67.9 MALIGNANT NEOPLASM OF URINARY BLADDER, UNSPECIFIED SITE (HCC): Primary | ICD-10-CM

## 2025-07-03 PROCEDURE — 96375 TX/PRO/DX INJ NEW DRUG ADDON: CPT

## 2025-07-03 PROCEDURE — 2580000003 HC RX 258: Performed by: INTERNAL MEDICINE

## 2025-07-03 PROCEDURE — 96413 CHEMO IV INFUSION 1 HR: CPT

## 2025-07-03 PROCEDURE — 6360000002 HC RX W HCPCS: Performed by: INTERNAL MEDICINE

## 2025-07-03 RX ORDER — ONDANSETRON 2 MG/ML
8 INJECTION INTRAMUSCULAR; INTRAVENOUS ONCE
Status: COMPLETED | OUTPATIENT
Start: 2025-07-03 | End: 2025-07-03

## 2025-07-03 RX ORDER — SODIUM CHLORIDE 9 MG/ML
5-250 INJECTION, SOLUTION INTRAVENOUS PRN
Status: DISCONTINUED | OUTPATIENT
Start: 2025-07-03 | End: 2025-07-04 | Stop reason: HOSPADM

## 2025-07-03 RX ADMIN — ONDANSETRON 8 MG: 2 INJECTION, SOLUTION INTRAMUSCULAR; INTRAVENOUS at 11:07

## 2025-07-03 RX ADMIN — SODIUM CHLORIDE 25 ML/HR: 0.9 INJECTION, SOLUTION INTRAVENOUS at 11:07

## 2025-07-03 RX ADMIN — ENFORTUMAB VEDOTIN 125 MG: 30 INJECTION, POWDER, LYOPHILIZED, FOR SOLUTION INTRAVENOUS at 11:20

## 2025-07-03 NOTE — PROGRESS NOTES
[facility] Progress Note    Mr. Gastelum Arrived ambulatory and in no distress for cycle 4 day 8 of Padcev regimen.  Assessment was completed, no acute issues at this time, no new complaints voiced.  Port accessed without difficulty.          Mr. Gastelum's vitals were reviewed.  Patient Vitals for the past 12 hrs:   Temp Pulse Resp BP SpO2   07/03/25 1145 -- 76 -- 133/72 --   07/03/25 1015 97.5 °F (36.4 °C) 88 16 113/66 94 %         Lab results were obtained and reviewed.      Pre-medications  were administered as ordered and chemotherapy was initiated.  Medications Administered         0.9 % sodium chloride infusion Admin Date  07/03/2025 Action  New Bag Dose  25 mL/hr Rate  25 mL/hr Route  IntraVENous Documented By  Monie Del Real, JOHNATHON        enfortumab vedotin-ejfv (PADCEV) 125 mg in sodium chloride 0.9 % 100 mL chemo IVPB Admin Date  07/03/2025 Action  New Bag Dose  125 mg Rate  245 mL/hr Route  IntraVENous Documented By  Monie Del Real, JOHNATHON        ondansetron (ZOFRAN) injection 8 mg Admin Date  07/03/2025 Action  Given Dose  8 mg Rate   Route  IntraVENous Documented By  Monie Del Real, JOHNATHON           Blood return maintained throughout infusion.    Two nurses verified prior to administering: Drug name, Drug dose, Infusion volume or drug volume when prepared in a syringe, Rate of administration, Route of administration, Expiration dates and/or times, Appearance and physical integrity of the drugs, Rate set on infusion pump, when used, and Sequencing of drug administration.    Mr. Gastelum tolerated treatment well, port flushed and de accessed, patient was discharged from [facility] in stable condition.     Future Appointments   Date Time Provider Department Center   7/8/2025  9:50 AM MO CT 1 [facility]   7/14/2025 10:00 AM MO PET 1 [facility]HRCHPET [facility]   7/16/2025 10:00 AM ANGELA LAB CHAIR 3 [facility]   7/16/2025 10:15 AM Zaida Davis MD [facility] BS [facility]   7/17/2025 10:00 AM ANGELA SO CHAIR 2 ANGELAMOSINTIMOTHY

## 2025-07-08 ENCOUNTER — HOSPITAL ENCOUNTER (OUTPATIENT)
Age: 80
Discharge: HOME | End: 2025-07-11
Payer: MEDICARE

## 2025-07-08 DIAGNOSIS — R06.02 SOB (SHORTNESS OF BREATH): ICD-10-CM

## 2025-07-08 PROCEDURE — 71250 CT THORAX DX C-: CPT

## 2025-07-09 RX ORDER — ACETAMINOPHEN 325 MG/1
650 TABLET ORAL
Status: CANCELLED | OUTPATIENT
Start: 2025-07-17

## 2025-07-09 RX ORDER — SODIUM CHLORIDE 9 MG/ML
5-250 INJECTION, SOLUTION INTRAVENOUS PRN
Status: CANCELLED | OUTPATIENT
Start: 2025-07-17

## 2025-07-09 RX ORDER — PROCHLORPERAZINE EDISYLATE 5 MG/ML
5 INJECTION INTRAMUSCULAR; INTRAVENOUS
Status: CANCELLED | OUTPATIENT
Start: 2025-07-17

## 2025-07-09 RX ORDER — EPINEPHRINE 1 MG/ML
0.3 INJECTION, SOLUTION INTRAMUSCULAR; SUBCUTANEOUS PRN
Status: CANCELLED | OUTPATIENT
Start: 2025-07-17

## 2025-07-09 RX ORDER — HEPARIN 100 UNIT/ML
500 SYRINGE INTRAVENOUS PRN
Status: CANCELLED | OUTPATIENT
Start: 2025-07-17

## 2025-07-09 RX ORDER — HYDROCORTISONE SODIUM SUCCINATE 100 MG/2ML
100 INJECTION INTRAMUSCULAR; INTRAVENOUS
Status: CANCELLED | OUTPATIENT
Start: 2025-07-17

## 2025-07-09 RX ORDER — ONDANSETRON 2 MG/ML
8 INJECTION INTRAMUSCULAR; INTRAVENOUS
Status: CANCELLED | OUTPATIENT
Start: 2025-07-17

## 2025-07-09 RX ORDER — SODIUM CHLORIDE 0.9 % (FLUSH) 0.9 %
5-40 SYRINGE (ML) INJECTION PRN
Status: CANCELLED | OUTPATIENT
Start: 2025-07-17

## 2025-07-09 RX ORDER — DIPHENHYDRAMINE HYDROCHLORIDE 50 MG/ML
50 INJECTION, SOLUTION INTRAMUSCULAR; INTRAVENOUS
Status: CANCELLED | OUTPATIENT
Start: 2025-07-17

## 2025-07-09 RX ORDER — SODIUM CHLORIDE 9 MG/ML
INJECTION, SOLUTION INTRAVENOUS CONTINUOUS
Status: CANCELLED | OUTPATIENT
Start: 2025-07-17

## 2025-07-09 RX ORDER — ALBUTEROL SULFATE 90 UG/1
4 INHALANT RESPIRATORY (INHALATION) PRN
Status: CANCELLED | OUTPATIENT
Start: 2025-07-17

## 2025-07-10 ENCOUNTER — TELEPHONE (OUTPATIENT)
Age: 80
End: 2025-07-10

## 2025-07-10 ENCOUNTER — APPOINTMENT (OUTPATIENT)
Facility: HOSPITAL | Age: 80
End: 2025-07-10
Payer: MEDICARE

## 2025-07-10 NOTE — TELEPHONE ENCOUNTER
1443 HIPAA x2  Inquired how pt is feeling/SOB. Pt stated that he is still having SOB, about the same as it was at his appt last week.  Pt stated he has been using his Albuterol twice daily.  CT was completed and has resulted.  Will discuss with Dr. Davis and r/t call to pt with updates.

## 2025-07-11 ENCOUNTER — TELEPHONE (OUTPATIENT)
Age: 80
End: 2025-07-11

## 2025-07-11 NOTE — TELEPHONE ENCOUNTER
HIPAA x 3  SW pt to see how he's feeling, pt states \"there's something going on with my respiratory system, I get out of breath easily\". Diarrhea has resolved. Pt stated he has PET scan on Monday and is \"hoping that shows some answers\". I let him know I will update Dr. Davis and we will let him know once his PET scan has resulted. Pt voiced understanding and was appreciative of my call.       Dr. Davis aware of pt SOB per message 7/10 from Nilam Huber

## 2025-07-14 ENCOUNTER — HOSPITAL ENCOUNTER (OUTPATIENT)
Facility: HOSPITAL | Age: 80
Discharge: HOME | End: 2025-07-17
Payer: MEDICARE

## 2025-07-14 VITALS — WEIGHT: 215 LBS | BODY MASS INDEX: 30.85 KG/M2

## 2025-07-14 DIAGNOSIS — C67.9 MALIGNANT NEOPLASM OF URINARY BLADDER, UNSPECIFIED SITE (HCC): ICD-10-CM

## 2025-07-14 LAB
GLUCOSE BLD STRIP.AUTO-MCNC: 98 MG/DL (ref 65–117)
SERVICE CMNT-IMP: NORMAL

## 2025-07-14 PROCEDURE — A9609 HC RX DIAGNOSTIC RADIOPHARMACEUTICAL: HCPCS

## 2025-07-14 PROCEDURE — 78815 PET IMAGE W/CT SKULL-THIGH: CPT

## 2025-07-14 PROCEDURE — 82962 GLUCOSE BLOOD TEST: CPT

## 2025-07-14 PROCEDURE — 3430000000 HC RX DIAGNOSTIC RADIOPHARMACEUTICAL

## 2025-07-14 RX ORDER — FLUDEOXYGLUCOSE F-18 500 MCI/ML
10 INJECTION INTRAVENOUS
Status: COMPLETED | OUTPATIENT
Start: 2025-07-14 | End: 2025-07-14

## 2025-07-14 RX ADMIN — FLUDEOXYGLUCOSE F-18 10 MILLICURIE: 500 INJECTION INTRAVENOUS at 09:37

## 2025-07-16 ENCOUNTER — APPOINTMENT (OUTPATIENT)
Facility: HOSPITAL | Age: 80
End: 2025-07-16
Payer: MEDICARE

## 2025-07-16 ENCOUNTER — OFFICE VISIT (OUTPATIENT)
Age: 80
End: 2025-07-16
Payer: MEDICARE

## 2025-07-16 ENCOUNTER — HOSPITAL ENCOUNTER (OUTPATIENT)
Facility: HOSPITAL | Age: 80
Setting detail: INFUSION SERIES
Discharge: HOME | End: 2025-07-16
Payer: MEDICARE

## 2025-07-16 ENCOUNTER — TELEPHONE (OUTPATIENT)
Age: 80
End: 2025-07-16

## 2025-07-16 VITALS
BODY MASS INDEX: 31.97 KG/M2 | TEMPERATURE: 97.5 F | RESPIRATION RATE: 18 BRPM | DIASTOLIC BLOOD PRESSURE: 76 MMHG | WEIGHT: 222.8 LBS | HEART RATE: 74 BPM | OXYGEN SATURATION: 94 % | SYSTOLIC BLOOD PRESSURE: 120 MMHG

## 2025-07-16 DIAGNOSIS — C67.9 BLADDER CANCER METASTASIZED TO LUNG (HCC): ICD-10-CM

## 2025-07-16 DIAGNOSIS — C67.9 MALIGNANT NEOPLASM OF URINARY BLADDER, UNSPECIFIED SITE (HCC): Primary | ICD-10-CM

## 2025-07-16 DIAGNOSIS — C67.9 MALIGNANT NEOPLASM OF URINARY BLADDER, UNSPECIFIED SITE (HCC): ICD-10-CM

## 2025-07-16 DIAGNOSIS — C78.00 BLADDER CANCER METASTASIZED TO LUNG (HCC): ICD-10-CM

## 2025-07-16 LAB
ALBUMIN SERPL-MCNC: 3.7 G/DL (ref 3.5–5)
ALBUMIN/GLOB SERPL: 1.2 (ref 1.1–2.2)
ALP SERPL-CCNC: 48 U/L (ref 45–117)
ALT SERPL-CCNC: 35 U/L (ref 12–78)
ANION GAP SERPL CALC-SCNC: 6 MMOL/L (ref 2–12)
AST SERPL-CCNC: 24 U/L (ref 15–37)
BASOPHILS # BLD: 0.04 K/UL (ref 0–0.1)
BASOPHILS NFR BLD: 0.6 % (ref 0–1)
BILIRUB SERPL-MCNC: 0.5 MG/DL (ref 0.2–1)
BUN SERPL-MCNC: 41 MG/DL (ref 6–20)
BUN/CREAT SERPL: 21 (ref 12–20)
CALCIUM SERPL-MCNC: 8.9 MG/DL (ref 8.5–10.1)
CHLORIDE SERPL-SCNC: 112 MMOL/L (ref 97–108)
CO2 SERPL-SCNC: 19 MMOL/L (ref 21–32)
CREAT SERPL-MCNC: 1.99 MG/DL (ref 0.7–1.3)
DIFFERENTIAL METHOD BLD: ABNORMAL
EOSINOPHIL # BLD: 0.07 K/UL (ref 0–0.4)
EOSINOPHIL NFR BLD: 1.1 % (ref 0–7)
ERYTHROCYTE [DISTWIDTH] IN BLOOD BY AUTOMATED COUNT: 18.3 % (ref 11.5–14.5)
GLOBULIN SER CALC-MCNC: 3.2 G/DL (ref 2–4)
GLUCOSE SERPL-MCNC: 87 MG/DL (ref 65–100)
HCT VFR BLD AUTO: 37.8 % (ref 36.6–50.3)
HGB BLD-MCNC: 11.9 G/DL (ref 12.1–17)
IMM GRANULOCYTES # BLD AUTO: 0.13 K/UL (ref 0–0.04)
IMM GRANULOCYTES NFR BLD AUTO: 2 % (ref 0–0.5)
LYMPHOCYTES # BLD: 0.36 K/UL (ref 0.8–3.5)
LYMPHOCYTES NFR BLD: 5.5 % (ref 12–49)
MCH RBC QN AUTO: 33.5 PG (ref 26–34)
MCHC RBC AUTO-ENTMCNC: 31.5 G/DL (ref 30–36.5)
MCV RBC AUTO: 106.5 FL (ref 80–99)
MONOCYTES # BLD: 0.38 K/UL (ref 0–1)
MONOCYTES NFR BLD: 5.8 % (ref 5–13)
NEUTS SEG # BLD: 5.62 K/UL (ref 1.8–8)
NEUTS SEG NFR BLD: 85 % (ref 32–75)
NRBC # BLD: 0 K/UL (ref 0–0.01)
NRBC BLD-RTO: 0 PER 100 WBC
PHOSPHATE SERPL-MCNC: 2.8 MG/DL (ref 2.6–4.7)
PLATELET # BLD AUTO: 205 K/UL (ref 150–400)
PMV BLD AUTO: 9.3 FL (ref 8.9–12.9)
POTASSIUM SERPL-SCNC: 4.1 MMOL/L (ref 3.5–5.1)
PROT SERPL-MCNC: 6.9 G/DL (ref 6.4–8.2)
RBC # BLD AUTO: 3.55 M/UL (ref 4.1–5.7)
RBC MORPH BLD: ABNORMAL
RBC MORPH BLD: ABNORMAL
SODIUM SERPL-SCNC: 137 MMOL/L (ref 136–145)
TSH SERPL DL<=0.05 MIU/L-ACNC: 2.36 UIU/ML (ref 0.36–3.74)
WBC # BLD AUTO: 6.6 K/UL (ref 4.1–11.1)

## 2025-07-16 PROCEDURE — G8427 DOCREV CUR MEDS BY ELIG CLIN: HCPCS | Performed by: INTERNAL MEDICINE

## 2025-07-16 PROCEDURE — 1126F AMNT PAIN NOTED NONE PRSNT: CPT | Performed by: INTERNAL MEDICINE

## 2025-07-16 PROCEDURE — 1036F TOBACCO NON-USER: CPT | Performed by: INTERNAL MEDICINE

## 2025-07-16 PROCEDURE — 84100 ASSAY OF PHOSPHORUS: CPT

## 2025-07-16 PROCEDURE — 84443 ASSAY THYROID STIM HORMONE: CPT

## 2025-07-16 PROCEDURE — 3074F SYST BP LT 130 MM HG: CPT | Performed by: INTERNAL MEDICINE

## 2025-07-16 PROCEDURE — 99215 OFFICE O/P EST HI 40 MIN: CPT | Performed by: INTERNAL MEDICINE

## 2025-07-16 PROCEDURE — 1123F ACP DISCUSS/DSCN MKR DOCD: CPT | Performed by: INTERNAL MEDICINE

## 2025-07-16 PROCEDURE — 3078F DIAST BP <80 MM HG: CPT | Performed by: INTERNAL MEDICINE

## 2025-07-16 PROCEDURE — G8417 CALC BMI ABV UP PARAM F/U: HCPCS | Performed by: INTERNAL MEDICINE

## 2025-07-16 PROCEDURE — 1159F MED LIST DOCD IN RCRD: CPT | Performed by: INTERNAL MEDICINE

## 2025-07-16 PROCEDURE — 80053 COMPREHEN METABOLIC PANEL: CPT

## 2025-07-16 PROCEDURE — 85025 COMPLETE CBC W/AUTO DIFF WBC: CPT

## 2025-07-16 RX ORDER — PREDNISONE 10 MG/1
10 TABLET ORAL DAILY
Qty: 14 TABLET | Refills: 0 | Status: SHIPPED | OUTPATIENT
Start: 2025-07-16 | End: 2025-07-26

## 2025-07-16 NOTE — TELEPHONE ENCOUNTER
1402 HIPAA x2  Made pt aware Dr. Davis confirmed with our pharmacist Adeline that he is fine to drink kangen water. Pt voiced understanding.  Also made pt aware we are working on getting him an appt with Pulmonary. Pt voiced understanding and was appreciative of my call.

## 2025-07-16 NOTE — PROGRESS NOTES
Robert Gastelum is a 80 y.o. male    Chief Complaint   Patient presents with    Follow-up     Malignant neoplasm of urinary bladder, unspecified site (HCC)     1. Have you been to the ER, urgent care clinic since your last visit?  Hospitalized since your last visit?No    2. Have you seen or consulted any other health care providers outside of the [facility] since your last visit?  Include any pap smears or colon screening. No    
he was responding and hence the cavitations are larger, the loss of lung function would be irreversible. 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/6   HG Papillary [id number]   Margins negative   EPE  Present   See above  S/p rt 5/2023      3) Sarcoidosis   Follows with pulmonary     4) Left renal atrophy  S/p Nephrectomy     5) Anemia   Likely secondary to CKD and stable     6) left lower extremity DVT-acute 6/2024  On Eliquis  Continue indefinintely    7)SOB  Not pneumonitis  Likely due to increasing lung cavitation and volume loss    8) Diarrhea  Gr 1      6) Encounter for high risk disease      Plan:       Proceed with C4D8 -  Enfortumab vedotin (Padcev) alone 1.25mg/kg (max dose 125mg) day 1 and 8 .Resume keytruda with cycle 5  See pulmonary Dr Stiles  PREDNISONE 10 mg daily   PPI  CBC and CMP prior to each treatment, TSH every other cycle  Premedication with Aloxi and Dexamethasone (moderate emetic potential)  Home antiemetics: Ondansetron and Prochlorperazine PRN  Continue eliquis  Palliative care new patient appt on 6/9  RTC 2 weeks    Patient to call or LABOMARhart message with any questions or concerns.     I appreciate the opportunity to participate in Mr. Robert Gastelum's care.    Signed By: Zaida Davis MD

## 2025-07-16 NOTE — PROGRESS NOTES
Pt arrived to [facility] for labs in stable condition. Labs drawn peripherally from the left ac and sent for processing.     Please see pending results in EPIC.    Pt tolerated treatment well. D/Cd from [facility] in no distress.  Future Appointments   Date Time Provider Department Center   7/17/2025 10:00 AM ANGELA SO CHAIR 2 B[facility]OS[facility]   7/23/2025 10:30 AM ANGELA LAB CHAIR 3 [facility]   7/24/2025 10:00 AM ANGELA SO CHAIR 9 [facility]OS[facility]   8/4/2025  2:00 PM Libby Ruiz MD [facility] BS AMB   8/5/2025 11:40 [facility]   8/7/2025 10:00 AM ANGELA LAB CHAIR 3 [facility]   8/7/2025 10:15 AM ANGELA SO CHAIR 11 [facility]   8/14/2025  8:30 AM ANGELA LAB CHAIR 3 [facility]   8/14/2025  9:00 AM ANGELA SO CHAIR 6 BREMOSINF [facility]          131 What Type Of Note Output Would You Prefer (Optional)?: Bullet Format How Severe Is Your Rash?: mild Is This A New Presentation, Or A Follow-Up?: Rash

## 2025-07-17 ENCOUNTER — APPOINTMENT (OUTPATIENT)
Facility: HOSPITAL | Age: 80
End: 2025-07-17
Payer: MEDICARE

## 2025-07-17 ENCOUNTER — HOSPITAL ENCOUNTER (OUTPATIENT)
Facility: HOSPITAL | Age: 80
Setting detail: INFUSION SERIES
Discharge: HOME | End: 2025-07-17
Payer: MEDICARE

## 2025-07-17 VITALS
DIASTOLIC BLOOD PRESSURE: 86 MMHG | WEIGHT: 222.4 LBS | TEMPERATURE: 97.5 F | SYSTOLIC BLOOD PRESSURE: 136 MMHG | HEART RATE: 86 BPM | BODY MASS INDEX: 31.91 KG/M2 | RESPIRATION RATE: 20 BRPM

## 2025-07-17 DIAGNOSIS — C67.9 MALIGNANT NEOPLASM OF URINARY BLADDER, UNSPECIFIED SITE (HCC): Primary | ICD-10-CM

## 2025-07-17 PROCEDURE — 6360000002 HC RX W HCPCS: Performed by: INTERNAL MEDICINE

## 2025-07-17 PROCEDURE — 96375 TX/PRO/DX INJ NEW DRUG ADDON: CPT

## 2025-07-17 PROCEDURE — 2580000003 HC RX 258: Performed by: INTERNAL MEDICINE

## 2025-07-17 PROCEDURE — 96413 CHEMO IV INFUSION 1 HR: CPT

## 2025-07-17 PROCEDURE — 96417 CHEMO IV INFUS EACH ADDL SEQ: CPT

## 2025-07-17 RX ORDER — ONDANSETRON 2 MG/ML
8 INJECTION INTRAMUSCULAR; INTRAVENOUS ONCE
Status: COMPLETED | OUTPATIENT
Start: 2025-07-17 | End: 2025-07-17

## 2025-07-17 RX ADMIN — ONDANSETRON 8 MG: 2 INJECTION, SOLUTION INTRAMUSCULAR; INTRAVENOUS at 10:40

## 2025-07-17 RX ADMIN — SODIUM CHLORIDE 200 MG: 9 INJECTION, SOLUTION INTRAVENOUS at 11:44

## 2025-07-17 RX ADMIN — ENFORTUMAB VEDOTIN 125 MG: 30 INJECTION, POWDER, LYOPHILIZED, FOR SOLUTION INTRAVENOUS at 11:11

## 2025-07-17 ASSESSMENT — PAIN DESCRIPTION - LOCATION: LOCATION: ABDOMEN

## 2025-07-17 ASSESSMENT — PAIN SCALES - GENERAL: PAINLEVEL_OUTOF10: 3

## 2025-07-17 NOTE — PROGRESS NOTES
Outpatient Infusion Center - Chemotherapy Progress Note    1015 Pt admit to [facility] for Padcev/Keytruda C5D1 ambulatory with cane in stable condition. Assessment completed. No new concerns voiced. Port accessed with positive blood return. Labs drawn prior to today's visit. Line flushed, clamped, Curos Cap applied to end clave.      /86   Pulse 86   Temp 97.5 °F (36.4 °C) (Temporal)   Resp 20   Wt 100.9 kg (222 lb 6.4 oz)   BMI 31.91 kg/m²     Medications Administered         enfortumab vedotin-ejfv (PADCEV) 125 mg in sodium chloride 0.9 % 100 mL chemo IVPB Admin Date  07/17/2025 Action  New Bag Dose  125 mg Rate  245 mL/hr Route  IntraVENous Documented By  Lore Baker, RN        ondansetron (ZOFRAN) injection 8 mg Admin Date  07/17/2025 Action  Given Dose  8 mg Rate   Route  IntraVENous Documented By  Lore Baker, RN        pembrolizumab (KEYTRUDA) 200 mg in sodium chloride 0.9 % 100 mL IVPB Admin Date  07/17/2025 Action  New Bag Dose  200 mg Rate  236 mL/hr Route  IntraVENous Documented By  Lore Baker, RN                Two nurses verified prior to administering:, Drug name, Drug dose, Infusion volume or drug volume when prepared in a syringe, Rate of administration, Route of administration, Expiration dates and/or times, Appearance and physical integrity of the drugs, Rate set on infusion pump, when used, Sequencing of drug administration         1220 Pt tolerated treatment well. Port maintained positive blood return throughout treatment, flushed with positive blood return at conclusion and de-accessed per protocol. D/c home ambulatory in no distress. Pt aware of next OPI appointment scheduled for 07/23/2025.    Please see labs in Results tab

## 2025-07-21 ENCOUNTER — TELEPHONE (OUTPATIENT)
Age: 80
End: 2025-07-21

## 2025-07-21 NOTE — TELEPHONE ENCOUNTER
HIPAA x 3  Called pt to inquired if the swelling is any different than his normal swelling, pt stated normally only left leg is swollen now they're both swollen. I let him know I will call him back. Pt voiced understanding and was appreciative of my call.

## 2025-07-21 NOTE — TELEPHONE ENCOUNTER
HIPAA x 3  SW pt to let him know that per Yael Cooley NP he can take Tylenol as needed for headache, he also needs to elevate bilateral legs and use compression stockings, also make sure that he's monitoring his BP and that it's stable. I let him know she will reassess him on Wednesday at his appt. I let him know his appt are for 7/23 at 10:30 labs and 10:45 am f/u Yael Cooley NP. Pt voiced understanding and was appreciative of my call.

## 2025-07-21 NOTE — TELEPHONE ENCOUNTER
FYI to pcp   HIPAA x 3  Pt called to report that he's been very lightheaded this morning, his legs are very swollen pitting edema both red, pt is having sob but not worse than normally, has a headache on left side of head, Prednisone decreased from 20 mg to 10 mg, I let him know that I will send a message to the NP and let him know what she recommends. Pt voiced understanding and was appreciative of my call.

## 2025-07-23 ENCOUNTER — OFFICE VISIT (OUTPATIENT)
Age: 80
End: 2025-07-23
Payer: MEDICARE

## 2025-07-23 ENCOUNTER — HOSPITAL ENCOUNTER (OUTPATIENT)
Facility: HOSPITAL | Age: 80
Setting detail: INFUSION SERIES
Discharge: HOME | End: 2025-07-23
Payer: MEDICARE

## 2025-07-23 DIAGNOSIS — Z01.89 ENCOUNTER FOR OTHER SPECIFIED SPECIAL EXAMINATIONS: ICD-10-CM

## 2025-07-23 DIAGNOSIS — D64.9 ANEMIA, UNSPECIFIED TYPE: ICD-10-CM

## 2025-07-23 DIAGNOSIS — Z51.81 ENCOUNTER FOR THERAPEUTIC DRUG MONITORING: ICD-10-CM

## 2025-07-23 DIAGNOSIS — S91.109S OPEN WOUND OF TOE, SEQUELA: ICD-10-CM

## 2025-07-23 DIAGNOSIS — C67.9 MALIGNANT NEOPLASM OF URINARY BLADDER, UNSPECIFIED SITE (HCC): ICD-10-CM

## 2025-07-23 DIAGNOSIS — N18.32 CHRONIC KIDNEY DISEASE, STAGE 3B (HCC): ICD-10-CM

## 2025-07-23 DIAGNOSIS — C67.9 MALIGNANT NEOPLASM OF URINARY BLADDER, UNSPECIFIED SITE (HCC): Primary | ICD-10-CM

## 2025-07-23 DIAGNOSIS — M79.89 SWELLING OF LOWER LEG: ICD-10-CM

## 2025-07-23 LAB
ANION GAP SERPL CALC-SCNC: 9 MMOL/L (ref 2–12)
BASOPHILS # BLD: 0.03 K/UL (ref 0–0.1)
BASOPHILS NFR BLD: 0.5 % (ref 0–1)
BUN SERPL-MCNC: 39 MG/DL (ref 6–20)
BUN/CREAT SERPL: 21 (ref 12–20)
CALCIUM SERPL-MCNC: 9.6 MG/DL (ref 8.5–10.1)
CHLORIDE SERPL-SCNC: 109 MMOL/L (ref 97–108)
CO2 SERPL-SCNC: 22 MMOL/L (ref 21–32)
CREAT SERPL-MCNC: 1.86 MG/DL (ref 0.7–1.3)
DIFFERENTIAL METHOD BLD: ABNORMAL
EOSINOPHIL # BLD: 0.02 K/UL (ref 0–0.4)
EOSINOPHIL NFR BLD: 0.3 % (ref 0–7)
ERYTHROCYTE [DISTWIDTH] IN BLOOD BY AUTOMATED COUNT: 17.4 % (ref 11.5–14.5)
GLUCOSE SERPL-MCNC: 91 MG/DL (ref 65–100)
HCT VFR BLD AUTO: 37 % (ref 36.6–50.3)
HGB BLD-MCNC: 11.9 G/DL (ref 12.1–17)
IMM GRANULOCYTES # BLD AUTO: 0.03 K/UL (ref 0–0.04)
IMM GRANULOCYTES NFR BLD AUTO: 0.5 % (ref 0–0.5)
LYMPHOCYTES # BLD: 0.29 K/UL (ref 0.8–3.5)
LYMPHOCYTES NFR BLD: 4.5 % (ref 12–49)
MCH RBC QN AUTO: 33.5 PG (ref 26–34)
MCHC RBC AUTO-ENTMCNC: 32.2 G/DL (ref 30–36.5)
MCV RBC AUTO: 104.2 FL (ref 80–99)
MONOCYTES # BLD: 0.49 K/UL (ref 0–1)
MONOCYTES NFR BLD: 7.6 % (ref 5–13)
NEUTS SEG # BLD: 5.64 K/UL (ref 1.8–8)
NEUTS SEG NFR BLD: 86.6 % (ref 32–75)
NRBC # BLD: 0 K/UL (ref 0–0.01)
NRBC BLD-RTO: 0 PER 100 WBC
PHOSPHATE SERPL-MCNC: 2.9 MG/DL (ref 2.6–4.7)
PLATELET # BLD AUTO: 178 K/UL (ref 150–400)
PMV BLD AUTO: 9.3 FL (ref 8.9–12.9)
POTASSIUM SERPL-SCNC: 4.5 MMOL/L (ref 3.5–5.1)
RBC # BLD AUTO: 3.55 M/UL (ref 4.1–5.7)
RBC MORPH BLD: ABNORMAL
RBC MORPH BLD: ABNORMAL
SODIUM SERPL-SCNC: 140 MMOL/L (ref 136–145)
WBC # BLD AUTO: 6.5 K/UL (ref 4.1–11.1)

## 2025-07-23 PROCEDURE — 99215 OFFICE O/P EST HI 40 MIN: CPT | Performed by: INTERNAL MEDICINE

## 2025-07-23 PROCEDURE — G8427 DOCREV CUR MEDS BY ELIG CLIN: HCPCS | Performed by: INTERNAL MEDICINE

## 2025-07-23 PROCEDURE — 80048 BASIC METABOLIC PNL TOTAL CA: CPT

## 2025-07-23 PROCEDURE — 85025 COMPLETE CBC W/AUTO DIFF WBC: CPT

## 2025-07-23 PROCEDURE — 84100 ASSAY OF PHOSPHORUS: CPT

## 2025-07-23 PROCEDURE — 1160F RVW MEDS BY RX/DR IN RCRD: CPT | Performed by: INTERNAL MEDICINE

## 2025-07-23 PROCEDURE — 1123F ACP DISCUSS/DSCN MKR DOCD: CPT | Performed by: INTERNAL MEDICINE

## 2025-07-23 PROCEDURE — G8417 CALC BMI ABV UP PARAM F/U: HCPCS | Performed by: INTERNAL MEDICINE

## 2025-07-23 PROCEDURE — 1036F TOBACCO NON-USER: CPT | Performed by: INTERNAL MEDICINE

## 2025-07-23 PROCEDURE — 1159F MED LIST DOCD IN RCRD: CPT | Performed by: INTERNAL MEDICINE

## 2025-07-23 RX ORDER — MUPIROCIN 2 %
OINTMENT (GRAM) TOPICAL
Qty: 30 G | Refills: 1 | Status: SHIPPED | OUTPATIENT
Start: 2025-07-23 | End: 2025-07-30

## 2025-07-23 NOTE — PROGRESS NOTES
[facility] Progress Note    Date: 2025    Name: Robert Gastelum    MRN: [id number]         : 1945      1030:  Pt arrived ambulatory with cane and in no distress, for lab visit.  Labs drawn via L AC, patient tolerated well.      Departed [facility] ambulatory and in no distress.    Future Appointments   Date Time Provider Department Center   2025 10:00 AM ANGELA SO CHAIR 9 BREMOSINF [facility]   2025  2:00 PM Libby Ruiz MD [facility] BS AMB   2025 11:40 AM Luzmaria Jaimes DO NEUPBB BS [facility]   2025 10:00 AM ANGELA LAB CHAIR 3 BREMOSINF [facility]   2025 10:15 AM Yael Cooley APRN - NP MEDONC BS [facility]   2025 10:15 AM ANGELA SO CHAIR 11 BREMOSINF [facility]   2025  8:30 AM ANGELA LAB CHAIR 3 BREMOS[facility]   2025  9:00 AM ANGELA SO CHAIR 6 BREMOS[facility]       Shannon Vines RN  2025

## 2025-07-23 NOTE — PATIENT INSTRUCTIONS
We have ordered ECHO to be completed before your next visit. You will receive an automated call 1-2 days after today's visit. Please answer this call in order to schedule the test. If you miss this call or if you'd prefer to schedule on your own please call the scheduling department at [phone].

## 2025-07-28 ENCOUNTER — TRANSCRIBE ORDERS (OUTPATIENT)
Facility: HOSPITAL | Age: 80
End: 2025-07-28

## 2025-07-28 DIAGNOSIS — G44.52 NEW DAILY PERSISTENT HEADACHE: Primary | ICD-10-CM

## 2025-07-29 ENCOUNTER — TELEPHONE (OUTPATIENT)
Age: 80
End: 2025-07-29

## 2025-07-29 NOTE — TELEPHONE ENCOUNTER
Ester from [facility] [place] called to report pt has an appt scheduled with Dr. Edwin Wylie on next Thurs 8/7/25 at 8:55am.

## 2025-07-29 NOTE — TELEPHONE ENCOUNTER
0915  Call placed to PAR requesting pts recent office note. This nurse was made aware pt was scheduled for today 7/25 at 1:15 with Dr. Stiles and pt canceled appt.    0917   Called pt and left  requesting a r/t call to discuss canceled appt with PAR. R/t call requested.

## 2025-07-29 NOTE — TELEPHONE ENCOUNTER
1624 HIPAA x2  R/t call and spoke to pts wife. There was a mix up with Dr. Stiles schedule and he does not see pts on Tuesday's. Pt is now scheduled to see him this upcoming Thursday instead.  Libby confirmed pts appt with opic/ov tomorrow and has no questions or concerns at this time.

## 2025-07-30 ENCOUNTER — HOSPITAL ENCOUNTER (OUTPATIENT)
Facility: HOSPITAL | Age: 80
Setting detail: INFUSION SERIES
Discharge: HOME | End: 2025-07-30
Payer: MEDICARE

## 2025-07-30 ENCOUNTER — APPOINTMENT (OUTPATIENT)
Facility: HOSPITAL | Age: 80
End: 2025-07-30
Payer: MEDICARE

## 2025-07-30 ENCOUNTER — HOSPITAL ENCOUNTER (INPATIENT)
Facility: HOSPITAL | Age: 80
LOS: 2 days | Discharge: HOME | End: 2025-08-01
Attending: STUDENT IN AN ORGANIZED HEALTH CARE EDUCATION/TRAINING PROGRAM | Admitting: FAMILY MEDICINE
Payer: MEDICARE

## 2025-07-30 ENCOUNTER — OFFICE VISIT (OUTPATIENT)
Age: 80
End: 2025-07-30
Payer: MEDICARE

## 2025-07-30 VITALS
BODY MASS INDEX: 31.85 KG/M2 | HEART RATE: 86 BPM | TEMPERATURE: 97.6 F | RESPIRATION RATE: 20 BRPM | WEIGHT: 222 LBS | OXYGEN SATURATION: 90 % | SYSTOLIC BLOOD PRESSURE: 80 MMHG | DIASTOLIC BLOOD PRESSURE: 59 MMHG

## 2025-07-30 DIAGNOSIS — D64.9 ANEMIA, UNSPECIFIED TYPE: ICD-10-CM

## 2025-07-30 DIAGNOSIS — N18.32 CHRONIC KIDNEY DISEASE, STAGE 3B (HCC): ICD-10-CM

## 2025-07-30 DIAGNOSIS — R06.00 DYSPNEA, UNSPECIFIED TYPE: Primary | ICD-10-CM

## 2025-07-30 DIAGNOSIS — C67.9 MALIGNANT NEOPLASM OF URINARY BLADDER, UNSPECIFIED SITE (HCC): ICD-10-CM

## 2025-07-30 DIAGNOSIS — R06.02 SHORTNESS OF BREATH: ICD-10-CM

## 2025-07-30 DIAGNOSIS — R51.9 NONINTRACTABLE EPISODIC HEADACHE, UNSPECIFIED HEADACHE TYPE: ICD-10-CM

## 2025-07-30 DIAGNOSIS — C67.9 MALIGNANT NEOPLASM OF URINARY BLADDER, UNSPECIFIED SITE (HCC): Primary | ICD-10-CM

## 2025-07-30 DIAGNOSIS — R06.02 SOB (SHORTNESS OF BREATH): ICD-10-CM

## 2025-07-30 LAB
ALBUMIN SERPL-MCNC: 3.7 G/DL (ref 3.5–5.2)
ALBUMIN SERPL-MCNC: 3.9 G/DL (ref 3.5–5.2)
ALBUMIN/GLOB SERPL: 1.2 (ref 1.1–2.2)
ALBUMIN/GLOB SERPL: 1.4 (ref 1.1–2.2)
ALP SERPL-CCNC: 50 U/L (ref 40–129)
ALP SERPL-CCNC: 56 U/L (ref 40–129)
ALT SERPL-CCNC: 19 U/L (ref 10–35)
ALT SERPL-CCNC: 22 U/L (ref 10–50)
ANION GAP SERPL CALC-SCNC: 12 MMOL/L (ref 2–14)
ANION GAP SERPL CALC-SCNC: 13 MMOL/L (ref 2–14)
AST SERPL-CCNC: 22 U/L (ref 10–50)
AST SERPL-CCNC: 24 U/L (ref 10–50)
BASOPHILS # BLD: 0.04 K/UL (ref 0–0.1)
BASOPHILS # BLD: 0.04 K/UL (ref 0–0.1)
BASOPHILS NFR BLD: 0.8 % (ref 0–1)
BASOPHILS NFR BLD: 0.8 % (ref 0–1)
BILIRUB SERPL-MCNC: 0.4 MG/DL (ref 0–1.2)
BILIRUB SERPL-MCNC: 0.4 MG/DL (ref 0–1.2)
BUN SERPL-MCNC: 40 MG/DL (ref 8–23)
BUN SERPL-MCNC: 41 MG/DL (ref 8–23)
BUN/CREAT SERPL: 22 (ref 12–20)
BUN/CREAT SERPL: 22 (ref 12–20)
CALCIUM SERPL-MCNC: 9.8 MG/DL (ref 8.8–10.2)
CALCIUM SERPL-MCNC: 9.9 MG/DL (ref 8.8–10.2)
CHLORIDE SERPL-SCNC: 106 MMOL/L (ref 98–107)
CHLORIDE SERPL-SCNC: 107 MMOL/L (ref 98–107)
CO2 SERPL-SCNC: 21 MMOL/L (ref 20–29)
CO2 SERPL-SCNC: 22 MMOL/L (ref 20–29)
COMMENT:: NORMAL
CREAT SERPL-MCNC: 1.81 MG/DL (ref 0.7–1.2)
CREAT SERPL-MCNC: 1.89 MG/DL (ref 0.7–1.2)
DIFFERENTIAL METHOD BLD: ABNORMAL
DIFFERENTIAL METHOD BLD: ABNORMAL
EKG ATRIAL RATE: 82 BPM
EKG DIAGNOSIS: NORMAL
EKG P AXIS: 42 DEGREES
EKG P-R INTERVAL: 202 MS
EKG Q-T INTERVAL: 432 MS
EKG QRS DURATION: 146 MS
EKG QTC CALCULATION (BAZETT): 504 MS
EKG R AXIS: -6 DEGREES
EKG T AXIS: 148 DEGREES
EKG VENTRICULAR RATE: 82 BPM
EOSINOPHIL # BLD: 0.12 K/UL (ref 0–0.4)
EOSINOPHIL # BLD: 0.12 K/UL (ref 0–0.4)
EOSINOPHIL NFR BLD: 2.3 % (ref 0–7)
EOSINOPHIL NFR BLD: 2.3 % (ref 0–7)
ERYTHROCYTE [DISTWIDTH] IN BLOOD BY AUTOMATED COUNT: 17.4 % (ref 11.5–14.5)
ERYTHROCYTE [DISTWIDTH] IN BLOOD BY AUTOMATED COUNT: 17.5 % (ref 11.5–14.5)
GLOBULIN SER CALC-MCNC: 2.7 G/DL (ref 2–4)
GLOBULIN SER CALC-MCNC: 3.1 G/DL (ref 2–4)
GLUCOSE SERPL-MCNC: 107 MG/DL (ref 65–100)
GLUCOSE SERPL-MCNC: 62 MG/DL (ref 65–100)
HCT VFR BLD AUTO: 35.8 % (ref 36.6–50.3)
HCT VFR BLD AUTO: 38.4 % (ref 36.6–50.3)
HGB BLD-MCNC: 11.8 G/DL (ref 12.1–17)
HGB BLD-MCNC: 12.6 G/DL (ref 12.1–17)
IMM GRANULOCYTES # BLD AUTO: 0.04 K/UL (ref 0–0.04)
IMM GRANULOCYTES # BLD AUTO: 0.07 K/UL (ref 0–0.04)
IMM GRANULOCYTES NFR BLD AUTO: 0.8 % (ref 0–0.5)
IMM GRANULOCYTES NFR BLD AUTO: 1.3 % (ref 0–0.5)
LYMPHOCYTES # BLD: 0.42 K/UL (ref 0.8–3.5)
LYMPHOCYTES # BLD: 0.52 K/UL (ref 0.8–3.5)
LYMPHOCYTES NFR BLD: 10.2 % (ref 12–49)
LYMPHOCYTES NFR BLD: 8 % (ref 12–49)
MCH RBC QN AUTO: 33.7 PG (ref 26–34)
MCH RBC QN AUTO: 33.8 PG (ref 26–34)
MCHC RBC AUTO-ENTMCNC: 32.8 G/DL (ref 30–36.5)
MCHC RBC AUTO-ENTMCNC: 33 G/DL (ref 30–36.5)
MCV RBC AUTO: 102.6 FL (ref 80–99)
MCV RBC AUTO: 102.7 FL (ref 80–99)
MONOCYTES # BLD: 0.6 K/UL (ref 0–1)
MONOCYTES # BLD: 0.67 K/UL (ref 0–1)
MONOCYTES NFR BLD: 11.7 % (ref 5–13)
MONOCYTES NFR BLD: 12.7 % (ref 5–13)
NEUTS SEG # BLD: 3.78 K/UL (ref 1.8–8)
NEUTS SEG # BLD: 3.97 K/UL (ref 1.8–8)
NEUTS SEG NFR BLD: 74.2 % (ref 32–75)
NEUTS SEG NFR BLD: 74.9 % (ref 32–75)
NRBC # BLD: 0 K/UL (ref 0–0.01)
NRBC # BLD: 0 K/UL (ref 0–0.01)
NRBC BLD-RTO: 0 PER 100 WBC
NRBC BLD-RTO: 0 PER 100 WBC
NT PRO BNP: 1050 PG/ML (ref 0–450)
PLATELET # BLD AUTO: 231 K/UL (ref 150–400)
PLATELET # BLD AUTO: 301 K/UL (ref 150–400)
PMV BLD AUTO: 9.2 FL (ref 8.9–12.9)
PMV BLD AUTO: 9.3 FL (ref 8.9–12.9)
POTASSIUM SERPL-SCNC: 3.7 MMOL/L (ref 3.5–5.1)
POTASSIUM SERPL-SCNC: 3.9 MMOL/L (ref 3.5–5.1)
PROT SERPL-MCNC: 6.5 G/DL (ref 6.4–8.3)
PROT SERPL-MCNC: 6.8 G/DL (ref 6.4–8.3)
RBC # BLD AUTO: 3.49 M/UL (ref 4.1–5.7)
RBC # BLD AUTO: 3.74 M/UL (ref 4.1–5.7)
RBC MORPH BLD: ABNORMAL
SODIUM SERPL-SCNC: 140 MMOL/L (ref 136–145)
SODIUM SERPL-SCNC: 141 MMOL/L (ref 136–145)
SPECIMEN HOLD: NORMAL
TROPONIN T SERPL HS-MCNC: 44.5 NG/L (ref 0–22)
TROPONIN T SERPL HS-MCNC: 51.5 NG/L (ref 0–22)
WBC # BLD AUTO: 5.1 K/UL (ref 4.1–11.1)
WBC # BLD AUTO: 5.3 K/UL (ref 4.1–11.1)

## 2025-07-30 PROCEDURE — 99215 OFFICE O/P EST HI 40 MIN: CPT | Performed by: INTERNAL MEDICINE

## 2025-07-30 PROCEDURE — 94640 AIRWAY INHALATION TREATMENT: CPT

## 2025-07-30 PROCEDURE — 84484 ASSAY OF TROPONIN QUANT: CPT

## 2025-07-30 PROCEDURE — 99285 EMERGENCY DEPT VISIT HI MDM: CPT

## 2025-07-30 PROCEDURE — 85025 COMPLETE CBC W/AUTO DIFF WBC: CPT

## 2025-07-30 PROCEDURE — 6370000000 HC RX 637 (ALT 250 FOR IP): Performed by: NURSE PRACTITIONER

## 2025-07-30 PROCEDURE — 6360000004 HC RX CONTRAST MEDICATION: Performed by: STUDENT IN AN ORGANIZED HEALTH CARE EDUCATION/TRAINING PROGRAM

## 2025-07-30 PROCEDURE — 70553 MRI BRAIN STEM W/O & W/DYE: CPT

## 2025-07-30 PROCEDURE — 1036F TOBACCO NON-USER: CPT | Performed by: INTERNAL MEDICINE

## 2025-07-30 PROCEDURE — 70450 CT HEAD/BRAIN W/O DYE: CPT

## 2025-07-30 PROCEDURE — 3078F DIAST BP <80 MM HG: CPT | Performed by: INTERNAL MEDICINE

## 2025-07-30 PROCEDURE — A9579 GAD-BASE MR CONTRAST NOS,1ML: HCPCS | Performed by: STUDENT IN AN ORGANIZED HEALTH CARE EDUCATION/TRAINING PROGRAM

## 2025-07-30 PROCEDURE — 80053 COMPREHEN METABOLIC PANEL: CPT

## 2025-07-30 PROCEDURE — 1126F AMNT PAIN NOTED NONE PRSNT: CPT | Performed by: INTERNAL MEDICINE

## 2025-07-30 PROCEDURE — 6360000002 HC RX W HCPCS: Performed by: NURSE PRACTITIONER

## 2025-07-30 PROCEDURE — 3074F SYST BP LT 130 MM HG: CPT | Performed by: INTERNAL MEDICINE

## 2025-07-30 PROCEDURE — G8428 CUR MEDS NOT DOCUMENT: HCPCS | Performed by: INTERNAL MEDICINE

## 2025-07-30 PROCEDURE — 2060000000 HC ICU INTERMEDIATE R&B

## 2025-07-30 PROCEDURE — G8417 CALC BMI ABV UP PARAM F/U: HCPCS | Performed by: INTERNAL MEDICINE

## 2025-07-30 PROCEDURE — 83880 ASSAY OF NATRIURETIC PEPTIDE: CPT

## 2025-07-30 PROCEDURE — 1123F ACP DISCUSS/DSCN MKR DOCD: CPT | Performed by: INTERNAL MEDICINE

## 2025-07-30 PROCEDURE — 93005 ELECTROCARDIOGRAM TRACING: CPT | Performed by: STUDENT IN AN ORGANIZED HEALTH CARE EDUCATION/TRAINING PROGRAM

## 2025-07-30 PROCEDURE — 71046 X-RAY EXAM CHEST 2 VIEWS: CPT

## 2025-07-30 RX ORDER — POTASSIUM CHLORIDE 750 MG/1
40 TABLET, EXTENDED RELEASE ORAL PRN
Status: DISCONTINUED | OUTPATIENT
Start: 2025-07-30 | End: 2025-08-01 | Stop reason: HOSPADM

## 2025-07-30 RX ORDER — SODIUM CHLORIDE 0.9 % (FLUSH) 0.9 %
5-40 SYRINGE (ML) INJECTION 2 TIMES DAILY
Status: DISCONTINUED | OUTPATIENT
Start: 2025-07-30 | End: 2025-08-01 | Stop reason: HOSPADM

## 2025-07-30 RX ORDER — ASPIRIN 325 MG
1 TABLET ORAL DAILY
Status: DISCONTINUED | OUTPATIENT
Start: 2025-07-30 | End: 2025-07-30 | Stop reason: CLARIF

## 2025-07-30 RX ORDER — SODIUM CHLORIDE 0.9 % (FLUSH) 0.9 %
5-40 SYRINGE (ML) INJECTION EVERY 12 HOURS SCHEDULED
Status: DISCONTINUED | OUTPATIENT
Start: 2025-07-30 | End: 2025-08-01 | Stop reason: HOSPADM

## 2025-07-30 RX ORDER — IPRATROPIUM BROMIDE AND ALBUTEROL SULFATE 2.5; .5 MG/3ML; MG/3ML
1 SOLUTION RESPIRATORY (INHALATION) EVERY 4 HOURS PRN
Status: DISCONTINUED | OUTPATIENT
Start: 2025-07-30 | End: 2025-07-30

## 2025-07-30 RX ORDER — ONDANSETRON 4 MG/1
4 TABLET, ORALLY DISINTEGRATING ORAL EVERY 8 HOURS PRN
Status: DISCONTINUED | OUTPATIENT
Start: 2025-07-30 | End: 2025-08-01 | Stop reason: HOSPADM

## 2025-07-30 RX ORDER — MAGNESIUM SULFATE IN WATER 40 MG/ML
2000 INJECTION, SOLUTION INTRAVENOUS PRN
Status: DISCONTINUED | OUTPATIENT
Start: 2025-07-30 | End: 2025-08-01 | Stop reason: HOSPADM

## 2025-07-30 RX ORDER — POTASSIUM CHLORIDE 7.45 MG/ML
10 INJECTION INTRAVENOUS PRN
Status: DISCONTINUED | OUTPATIENT
Start: 2025-07-30 | End: 2025-08-01 | Stop reason: HOSPADM

## 2025-07-30 RX ORDER — MULTIVITAMIN WITH IRON
1 TABLET ORAL DAILY
Status: DISCONTINUED | OUTPATIENT
Start: 2025-07-31 | End: 2025-08-01 | Stop reason: HOSPADM

## 2025-07-30 RX ORDER — POLYETHYLENE GLYCOL 3350 17 G/17G
17 POWDER, FOR SOLUTION ORAL DAILY PRN
Status: DISCONTINUED | OUTPATIENT
Start: 2025-07-30 | End: 2025-08-01 | Stop reason: HOSPADM

## 2025-07-30 RX ORDER — ENOXAPARIN SODIUM 100 MG/ML
40 INJECTION SUBCUTANEOUS DAILY
Status: DISCONTINUED | OUTPATIENT
Start: 2025-07-30 | End: 2025-07-30

## 2025-07-30 RX ORDER — ESCITALOPRAM OXALATE 10 MG/1
10 TABLET ORAL
Status: DISCONTINUED | OUTPATIENT
Start: 2025-07-30 | End: 2025-08-01 | Stop reason: HOSPADM

## 2025-07-30 RX ORDER — ACETAMINOPHEN 325 MG/1
650 TABLET ORAL EVERY 6 HOURS PRN
Status: DISCONTINUED | OUTPATIENT
Start: 2025-07-30 | End: 2025-08-01 | Stop reason: HOSPADM

## 2025-07-30 RX ORDER — IPRATROPIUM BROMIDE AND ALBUTEROL SULFATE 2.5; .5 MG/3ML; MG/3ML
1 SOLUTION RESPIRATORY (INHALATION)
Status: DISCONTINUED | OUTPATIENT
Start: 2025-07-30 | End: 2025-08-01 | Stop reason: HOSPADM

## 2025-07-30 RX ORDER — PANTOPRAZOLE SODIUM 40 MG/1
40 TABLET, DELAYED RELEASE ORAL
Status: DISCONTINUED | OUTPATIENT
Start: 2025-07-31 | End: 2025-08-01 | Stop reason: HOSPADM

## 2025-07-30 RX ORDER — ONDANSETRON 2 MG/ML
4 INJECTION INTRAMUSCULAR; INTRAVENOUS EVERY 6 HOURS PRN
Status: DISCONTINUED | OUTPATIENT
Start: 2025-07-30 | End: 2025-08-01 | Stop reason: HOSPADM

## 2025-07-30 RX ORDER — ALPRAZOLAM 1 MG/1
1 TABLET ORAL
Status: ACTIVE | OUTPATIENT
Start: 2025-07-31 | End: 2025-08-01

## 2025-07-30 RX ORDER — MUPIROCIN 2 %
OINTMENT (GRAM) TOPICAL 2 TIMES DAILY
Status: DISCONTINUED | OUTPATIENT
Start: 2025-07-30 | End: 2025-08-01 | Stop reason: HOSPADM

## 2025-07-30 RX ORDER — ZOLPIDEM TARTRATE 5 MG/1
5 TABLET ORAL NIGHTLY PRN
Status: DISCONTINUED | OUTPATIENT
Start: 2025-07-30 | End: 2025-08-01 | Stop reason: HOSPADM

## 2025-07-30 RX ORDER — PRAMIPEXOLE DIHYDROCHLORIDE 0.12 MG/1
0.25 TABLET ORAL
Status: DISCONTINUED | OUTPATIENT
Start: 2025-07-30 | End: 2025-08-01 | Stop reason: HOSPADM

## 2025-07-30 RX ORDER — ACETAMINOPHEN 650 MG/1
650 SUPPOSITORY RECTAL EVERY 6 HOURS PRN
Status: DISCONTINUED | OUTPATIENT
Start: 2025-07-30 | End: 2025-08-01 | Stop reason: HOSPADM

## 2025-07-30 RX ORDER — ALBUTEROL SULFATE 0.83 MG/ML
2.5 SOLUTION RESPIRATORY (INHALATION) EVERY 6 HOURS PRN
Status: DISCONTINUED | OUTPATIENT
Start: 2025-07-30 | End: 2025-08-01 | Stop reason: HOSPADM

## 2025-07-30 RX ORDER — GADOTERIDOL 279.3 MG/ML
20 INJECTION INTRAVENOUS
Status: COMPLETED | OUTPATIENT
Start: 2025-07-30 | End: 2025-07-30

## 2025-07-30 RX ORDER — SODIUM CHLORIDE 0.9 % (FLUSH) 0.9 %
5-40 SYRINGE (ML) INJECTION PRN
Status: DISCONTINUED | OUTPATIENT
Start: 2025-07-30 | End: 2025-08-01 | Stop reason: HOSPADM

## 2025-07-30 RX ORDER — BUMETANIDE 0.25 MG/ML
1 INJECTION, SOLUTION INTRAMUSCULAR; INTRAVENOUS ONCE
Status: COMPLETED | OUTPATIENT
Start: 2025-07-30 | End: 2025-07-30

## 2025-07-30 RX ORDER — ALBUTEROL SULFATE 90 UG/1
2 INHALANT RESPIRATORY (INHALATION) 4 TIMES DAILY PRN
Status: DISCONTINUED | OUTPATIENT
Start: 2025-07-30 | End: 2025-07-30 | Stop reason: CLARIF

## 2025-07-30 RX ORDER — ATORVASTATIN CALCIUM 40 MG/1
40 TABLET, FILM COATED ORAL DAILY
Status: DISCONTINUED | OUTPATIENT
Start: 2025-07-31 | End: 2025-07-31

## 2025-07-30 RX ORDER — SODIUM CHLORIDE 9 MG/ML
INJECTION, SOLUTION INTRAVENOUS PRN
Status: DISCONTINUED | OUTPATIENT
Start: 2025-07-30 | End: 2025-08-01 | Stop reason: HOSPADM

## 2025-07-30 RX ADMIN — ESCITALOPRAM OXALATE 10 MG: 10 TABLET ORAL at 21:12

## 2025-07-30 RX ADMIN — BUMETANIDE 1 MG: 0.25 INJECTION INTRAMUSCULAR; INTRAVENOUS at 18:26

## 2025-07-30 RX ADMIN — APIXABAN 5 MG: 5 TABLET, FILM COATED ORAL at 21:12

## 2025-07-30 RX ADMIN — ZOLPIDEM TARTRATE 5 MG: 5 TABLET ORAL at 22:34

## 2025-07-30 RX ADMIN — GADOTERIDOL 20 ML: 279.3 INJECTION, SOLUTION INTRAVENOUS at 22:13

## 2025-07-30 RX ADMIN — PRAMIPEXOLE DIHYDROCHLORIDE 0.25 MG: 0.25 TABLET ORAL at 22:34

## 2025-07-30 RX ADMIN — IPRATROPIUM BROMIDE AND ALBUTEROL SULFATE 1 DOSE: .5; 3 SOLUTION RESPIRATORY (INHALATION) at 19:23

## 2025-07-30 ASSESSMENT — PAIN - FUNCTIONAL ASSESSMENT: PAIN_FUNCTIONAL_ASSESSMENT: NONE - DENIES PAIN

## 2025-07-30 ASSESSMENT — ENCOUNTER SYMPTOMS: SHORTNESS OF BREATH: 1

## 2025-07-30 NOTE — PROGRESS NOTES
Robert Gastelum is a 80 y.o. 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 Have you been to the ER, urgent care clinic since your last visit?  Hospitalized since your last visit?No    2. Have you seen or consulted any other health care providers outside of the [facility] since your last visit?  Include any pap smears or colon screening. No      
care alone and also recommended a second opinion              2) Urethral TCC   S/P Total urethrectomy 12/6   HG Papillary [id number]   Margins negative   EPE  Present   See above  S/p rt 5/2023      3) Sarcoidosis   Follows with pulmonary     4) Left renal atrophy  S/p Nephrectomy     5) Anemia   Likely secondary to CKD and stable     6) left lower extremity DVT-acute 6/2024  On Eliquis  Continue indefinintely    7)SOB  Not pneumonitis  Likely due to increasing lung cavitation and volume loss but given edema will r/o AHF.  Has new hypotension today    8) new headaches  Concerning for brain metastases    6) Encounter for high risk disease      Plan:       Will send to the emergency room.  Needs stat brain MRI with contrast, echo consultation.  Will follow    Patient to call or MyChart message with any questions or concerns.     I appreciate the opportunity to participate in Mr. Robert Gastelum's care.    Signed By: Zaida Davis MD

## 2025-07-30 NOTE — PROGRESS NOTES
[facility] Progress Note    Date: 2025    Name: Robert Gastelum    MRN: [id number]         : 1945      Pt arrived ambulatory and in no distress, for lab visit.  Labs drawn peripherally per order, patient tolerated well.              Departed [facility] ambulatory and in no distress.    Future Appointments   Date Time Provider Department Center   2025  1:15 PM [facility]   2025  2:00 PM Libby Ruiz MD [facility] BS [facility]   2025 11:40 AM [facility]   2025  6:30 PM [place] MRI 2 OPEN MÓNICA Jenkins [facility]       Alyson Cordova, RN  2025

## 2025-07-30 NOTE — ED NOTES
12:01 PM    80 y.o. male w pmh of sarcoidosis, currently undergoing treatment for bladder cancer, on 2L oxygen, presents for evaluation of SOB.  Patient was evaluated with oncologist today, after blood work she recommended he present to the ED for admission.  Patient sees Dr. Davis.  Current complaints include shortness of breath, bilateral lower extremity edema.  Additionally, patient complains of recurrent left-sided intermittent headaches that occur behind the eye and radiate down the jaw.  Notes that these headaches do not respond to Tylenol and have been on and off for the last few weeks.  Denies any vision changes.  No recent falls.      I have evaluated the patient as the Provider in Rapid Medical Evaluation (RME). I have reviewed his vital signs and the triage nurse assessment. I have talked with the patient and any available family and advised that I am the provider in triage and have ordered the appropriate study to initiate their work up based on the clinical presentation during my assessment. I have advised that the patient will be accommodated in the Main ED as soon as possible. I have also requested to contact the triage nurse or myself immediately if the patient experiences any changes in their condition during this brief waiting period.  CANDIDA Torrez Nicole, PA-C  07/30/25 0229

## 2025-07-30 NOTE — ED PROVIDER NOTES
[facility] EMERGENCY DEPARTMENT  EMERGENCY DEPARTMENT ENCOUNTER      Pt Name: Robert Gastelum  MRN: [id number]  Birthdate 1945  Date of evaluation: 7/30/2025  Provider: Kaylin Palmer DO    CHIEF COMPLAINT       Chief Complaint   Patient presents with    Shortness of Breath         HISTORY OF PRESENT ILLNESS    HPI    Robert Gastelum is a 80 y.o. male with history of hypertension, hyperlipidemia, bladder cancer with resection and on chemotherapy, pulmonary sarcoidosis who presents to the emergency department for evaluation of shortness of breath.  Patient was being seen in oncology office this morning and sent into the emergency department for worsening shortness of breath.  Patient notes over the last several months he has been having progressive shortness of breath, states primarily with exertion and improves with rest.  No associated chest pain but does endorse associated lightheadedness.  He also has had lower extremity edema for the same period of time.  Notes for the last several weeks he has been experiencing left frontal headaches which radiate to his eyes and jaw.  States they primarily occur in the morning some relief with Tylenol and throughout the day.  No weakness or numbness.  No other recent illness.    Nursing Notes were reviewed.    REVIEW OF SYSTEMS       Review of Systems   Respiratory:  Positive for shortness of breath.    Cardiovascular:  Negative for chest pain.   Neurological:  Positive for headaches.           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, KIDNEY REMOVED    Left  bundle branch block     MRSA (methicillin resistant Staphylococcus aureus) carrier 09/04/2024    POSITIVE NASAL SWAB    Obesity 01/01/2008    Posterior cervical adenopathy 11/19/2018    Pulmonary sarcoidosis     Sleep apnea     USES CPAP    Sleep apnea treated with continuous positive airway pressure (CPAP)          SURGICAL HISTORY       Past Surgical History:   Procedure Laterality Date    CARDIAC CATHETERIZATION  01/2021    CATARACT REMOVAL Bilateral     CERVICAL FUSION N/A 10/15/2024    C4-C7 ANTERIOR CERVICAL DISCECTOMY AND FUSION performed by Kip Hurst MD at [facility] MAIN OR    CYSTOSCOPY  06/2020    HERNIA REPAIR Left 1990    INGUINAL    HERNIA REPAIR Right AS A CHILD    INGUINAL    IR GUIDED URETERAL STENT PLACE W NEPHRO CATH NEW ACCESS  08/08/2023    IR URETERAL PLACEMENT STENT&NEPHRO CATH NEW ACCESS 8/8/2023 [facility] RAD ANGIO IR    IR PORT PLACEMENT > 5 YEARS  07/16/2021    IR PORT PLACEMENT EQUAL OR GREATER THAN 5 YEARS 7/16/2021 [facility] RAD ANGIO IR    JOINT REPLACEMENT  Feb. 2017    KIDNEY REMOVAL Left 11/13/2023    LAPAROSCOPIC, HAND ASSISTED LEFT NEPHRECTOMY.  PARASTOMAL HERNIA REPAIR (E R A S) REMOVAL OF NEPHROSTOMY TUBE performed by Pranav Naqvi MD at [facility] MAIN OR    MOHS SURGERY  2020    NOSE    MOHS SURGERY      TOP OF HEAD    ORTHOPEDIC SURGERY Right     BONE SPURS REMOVED FROM FOOT    PARASTOMAL HERNIA REPAIR Right 03/2025    PROSTATE SURGERY  Dec. 2021    REVISION TOTAL KNEE ARTHROPLASTY Left 05/2019    THYROID SURGERY  2005    BENIGN LUMP REMOVED    TOTAL KNEE ARTHROPLASTY Left 2008    TRANSURETHRAL RESECTION OF BLADDER TUMOR  09/2015    TRANSURETHRAL RESECTION OF BLADDER TUMOR  02/2021    TRANSURETHRAL RESECTION OF BLADDER TUMOR  06/2021    URINARY SURGERY  12/2021    RADICAL CYSTECTOMY, URETHRECTOMY, PROSTATECTOMY, BILATERAL LYMPHADENECTOMY, ILEAL CONDUIT DIVERSION    US BIOPSY LYMPH NODE  08/12/2024    US LYMPH NODE BIOPSY 8/12/2024 Mila Norris, APREFREM - NP [facility] RAD US         CURRENT  documented in ED Course.  ECG/medicine tests: ordered and independent interpretation performed. Decision-making details documented in ED Course.    Risk  Prescription drug management.  Decision regarding hospitalization.        CONSULTS:  None    PROCEDURES:  Unless otherwise noted below, none     Procedures    REASSESSMENT     Discussed results with patient and spouse at bedside.  Discussed plan for admission, they are in agreement.    Perfect Serve Consult for Admission  2:57 PM    ED Room Number: ER18/18  Patient Name and age:  Robert Gastelum 80 y.o.  male  Working Diagnosis:   1. Dyspnea, unspecified type    2. Nonintractable episodic headache, unspecified headache type        COVID-19 Suspicion: No  Sepsis present:  No  Reassessment needed: No  Code Status:  Full Code  Readmission: No  Isolation Requirements: no  Recommended Level of Care: telemetry  Department: [facility] Adult ED - [phone]    Other:  80 y.o. male with history of hypertension, hyperlipidemia, bladder cancer with resection and on chemotherapy, pulmonary sarcoidosis who presents to the emergency department for evaluation of shortness of breath. Sent in by his oncologist for work up of his SOB and new headaches. BNP elevated at  1,050 and trop 51. CXR w/o evidence of pulmonary edema. Oncology is concerned for reduced EF from his chemo. CT head shows Small area of cortical volume loss and hyperdensity in the posterior superior left frontal lobe may be sequela of prior infarction, but given his history ordering MRI to eval further       FINAL IMPRESSION      1. Dyspnea, unspecified type    2. Nonintractable episodic headache, unspecified headache type          DISPOSITION/PLAN   DISPOSITION Decision To Admit 07/30/2025 02:56:21 PM        (Please note that portions of this note were completed with a voice recognition program.  Efforts were made to edit the dictations but occasionally words are mis-transcribed.)    Kaylin Palmer DO

## 2025-07-30 NOTE — ED TRIAGE NOTES
Patient in through triage from Dr. Avitia's office for a routine oncology appointment. While there the MD became concerned with his new onset SOB and wanted him seen in the ed to rule out new onset heart failure, on 2L on arrival does not wear oxygen at baseline. Recent new hx of bilateral lower extremity pitting edema. Hx of bladder cancer.     Also in triage the patient reports intermittent left head pain behind the eye that radiates to his left jaw and does not ease up with medications. Denies any vision impairment when the pain flares.

## 2025-07-30 NOTE — H&P
History and Physical    Date of Service:  7/30/2025  Primary Care Provider: Robert Leo MD  Source of information: The patient and Chart review    Chief Complaint: Shortness of Breath      History of Presenting Illness:   Robert Gastelum is a 80 y.o. male with a past medical history significant for sarcoidosis, metastatic bladder cancer , history of extensive DVT on AC therapy, depression and a stroke after surgery in October on his neck with residual right upper extremity weakness for which he is seen by outpatient physical therapy at [facility] who presents with progressively worsening shortness of breath and lower extremity edema.  Patient reports he was at the oncology office and reported that he was more short of breath to them.  He was found to be hypoxic in the office and started on oxygen and brought to the emergency room.  Dr. Davis's note indicates concern for brain mets and heart failure.     Patient had recent CT and PET scan done showing enlarging lung lesions with increased size and cavitation of multiple bilateral pulmonary nodules suggesting worsening metastatic disease.  He also describes left-sided headaches not relieved by medications that are at times very severe and always occur behind the left eye radiating into the left jaw.  He has noted progressive shortness of breath over the course of the past several months, worse in the past 8 weeks and associated with dizziness and lightheadedness when he stands.  His lower extremity edema has also been worse in the course of the past 2 weeks.  He states that his palliative chemo was stopped last week and that he was referred for a second opinion to [facility] and has an appointment there on August 7.  He has met with palliative care in the past and has a DNR.      The patient denies any headache, blurry vision, sore throat, trouble swallowing, trouble with speech, chest pain, cough, fever, chills, N/V/D, abd pain, urinary symptoms,

## 2025-07-31 ENCOUNTER — APPOINTMENT (OUTPATIENT)
Facility: HOSPITAL | Age: 80
End: 2025-07-31
Attending: INTERNAL MEDICINE
Payer: MEDICARE

## 2025-07-31 ENCOUNTER — APPOINTMENT (OUTPATIENT)
Facility: HOSPITAL | Age: 80
End: 2025-07-31
Payer: MEDICARE

## 2025-07-31 ENCOUNTER — HOSPITAL ENCOUNTER (OUTPATIENT)
Facility: HOSPITAL | Age: 80
Discharge: HOME | End: 2025-08-03

## 2025-07-31 DIAGNOSIS — C79.31 SECONDARY MALIGNANT NEOPLASM OF BRAIN (HCC): ICD-10-CM

## 2025-07-31 DIAGNOSIS — C67.9 MALIGNANT NEOPLASM OF URINARY BLADDER, UNSPECIFIED SITE (HCC): Primary | ICD-10-CM

## 2025-07-31 PROBLEM — R06.00 DYSPNEA: Status: ACTIVE | Noted: 2025-07-30

## 2025-07-31 LAB
ANION GAP SERPL CALC-SCNC: 10 MMOL/L (ref 2–14)
BUN SERPL-MCNC: 35 MG/DL (ref 8–23)
BUN/CREAT SERPL: 26 (ref 12–20)
CALCIUM SERPL-MCNC: 7.3 MG/DL (ref 8.8–10.2)
CHLORIDE SERPL-SCNC: 113 MMOL/L (ref 98–107)
CO2 SERPL-SCNC: 18 MMOL/L (ref 20–29)
CREAT SERPL-MCNC: 1.34 MG/DL (ref 0.7–1.2)
ERYTHROCYTE [DISTWIDTH] IN BLOOD BY AUTOMATED COUNT: 17.2 % (ref 11.5–14.5)
GLUCOSE SERPL-MCNC: 76 MG/DL (ref 65–100)
HCT VFR BLD AUTO: 32.9 % (ref 36.6–50.3)
HGB BLD-MCNC: 11 G/DL (ref 12.1–17)
MAGNESIUM SERPL-MCNC: 1.7 MG/DL (ref 1.6–2.4)
MCH RBC QN AUTO: 34 PG (ref 26–34)
MCHC RBC AUTO-ENTMCNC: 33.4 G/DL (ref 30–36.5)
MCV RBC AUTO: 101.5 FL (ref 80–99)
NRBC # BLD: 0 K/UL (ref 0–0.01)
NRBC BLD-RTO: 0 PER 100 WBC
PLATELET # BLD AUTO: 200 K/UL (ref 150–400)
PMV BLD AUTO: 9.2 FL (ref 8.9–12.9)
POTASSIUM SERPL-SCNC: 2.9 MMOL/L (ref 3.5–5.1)
RBC # BLD AUTO: 3.24 M/UL (ref 4.1–5.7)
SODIUM SERPL-SCNC: 141 MMOL/L (ref 136–145)
WBC # BLD AUTO: 4.8 K/UL (ref 4.1–11.1)

## 2025-07-31 PROCEDURE — 94760 N-INVAS EAR/PLS OXIMETRY 1: CPT

## 2025-07-31 PROCEDURE — 6370000000 HC RX 637 (ALT 250 FOR IP): Performed by: NURSE PRACTITIONER

## 2025-07-31 PROCEDURE — 6360000002 HC RX W HCPCS: Performed by: INTERNAL MEDICINE

## 2025-07-31 PROCEDURE — 85027 COMPLETE CBC AUTOMATED: CPT

## 2025-07-31 PROCEDURE — 2060000000 HC ICU INTERMEDIATE R&B

## 2025-07-31 PROCEDURE — 6370000000 HC RX 637 (ALT 250 FOR IP): Performed by: FAMILY MEDICINE

## 2025-07-31 PROCEDURE — 2500000003 HC RX 250 WO HCPCS: Performed by: STUDENT IN AN ORGANIZED HEALTH CARE EDUCATION/TRAINING PROGRAM

## 2025-07-31 PROCEDURE — 99233 SBSQ HOSP IP/OBS HIGH 50: CPT | Performed by: INTERNAL MEDICINE

## 2025-07-31 PROCEDURE — 93306 TTE W/DOPPLER COMPLETE: CPT

## 2025-07-31 PROCEDURE — 2500000003 HC RX 250 WO HCPCS: Performed by: NURSE PRACTITIONER

## 2025-07-31 PROCEDURE — 80048 BASIC METABOLIC PNL TOTAL CA: CPT

## 2025-07-31 PROCEDURE — 94640 AIRWAY INHALATION TREATMENT: CPT

## 2025-07-31 PROCEDURE — 83735 ASSAY OF MAGNESIUM: CPT

## 2025-07-31 PROCEDURE — 6370000000 HC RX 637 (ALT 250 FOR IP): Performed by: HOSPITALIST

## 2025-07-31 RX ORDER — BUTALBITAL, ACETAMINOPHEN AND CAFFEINE 50; 325; 40 MG/1; MG/1; MG/1
1 TABLET ORAL ONCE
Status: COMPLETED | OUTPATIENT
Start: 2025-07-31 | End: 2025-07-31

## 2025-07-31 RX ORDER — ATORVASTATIN CALCIUM 40 MG/1
40 TABLET, FILM COATED ORAL NIGHTLY
Status: DISCONTINUED | OUTPATIENT
Start: 2025-08-01 | End: 2025-08-01 | Stop reason: HOSPADM

## 2025-07-31 RX ORDER — POTASSIUM CHLORIDE 750 MG/1
40 TABLET, EXTENDED RELEASE ORAL ONCE
Status: COMPLETED | OUTPATIENT
Start: 2025-07-31 | End: 2025-07-31

## 2025-07-31 RX ORDER — ATORVASTATIN CALCIUM 40 MG/1
40 TABLET, FILM COATED ORAL NIGHTLY
Status: CANCELLED | OUTPATIENT
Start: 2025-07-31

## 2025-07-31 RX ORDER — TIZANIDINE 2 MG/1
4 TABLET ORAL ONCE
Status: COMPLETED | OUTPATIENT
Start: 2025-08-01 | End: 2025-08-01

## 2025-07-31 RX ADMIN — PRAMIPEXOLE DIHYDROCHLORIDE 0.25 MG: 0.25 TABLET ORAL at 20:39

## 2025-07-31 RX ADMIN — ESCITALOPRAM OXALATE 10 MG: 10 TABLET ORAL at 20:39

## 2025-07-31 RX ADMIN — IPRATROPIUM BROMIDE AND ALBUTEROL SULFATE 1 DOSE: .5; 3 SOLUTION RESPIRATORY (INHALATION) at 08:39

## 2025-07-31 RX ADMIN — IPRATROPIUM BROMIDE AND ALBUTEROL SULFATE 1 DOSE: .5; 3 SOLUTION RESPIRATORY (INHALATION) at 19:52

## 2025-07-31 RX ADMIN — SODIUM CHLORIDE, PRESERVATIVE FREE 10 ML: 5 INJECTION INTRAVENOUS at 20:52

## 2025-07-31 RX ADMIN — BUTALBITAL, ACETAMINOPHEN, AND CAFFEINE 1 TABLET: 50; 325; 40 TABLET ORAL at 23:32

## 2025-07-31 RX ADMIN — ARFORMOTEROL TARTRATE: 15 SOLUTION RESPIRATORY (INHALATION) at 19:52

## 2025-07-31 RX ADMIN — ATORVASTATIN CALCIUM 40 MG: 40 TABLET, FILM COATED ORAL at 10:09

## 2025-07-31 RX ADMIN — ACETAMINOPHEN 650 MG: 325 TABLET ORAL at 20:59

## 2025-07-31 RX ADMIN — APIXABAN 5 MG: 5 TABLET, FILM COATED ORAL at 20:39

## 2025-07-31 RX ADMIN — POTASSIUM CHLORIDE 40 MEQ: 750 TABLET, FILM COATED, EXTENDED RELEASE ORAL at 10:09

## 2025-07-31 RX ADMIN — IPRATROPIUM BROMIDE AND ALBUTEROL SULFATE 1 DOSE: .5; 3 SOLUTION RESPIRATORY (INHALATION) at 13:21

## 2025-07-31 RX ADMIN — ARFORMOTEROL TARTRATE: 15 SOLUTION RESPIRATORY (INHALATION) at 13:40

## 2025-07-31 RX ADMIN — THERA TABS 1 TABLET: TAB at 10:09

## 2025-07-31 RX ADMIN — APIXABAN 5 MG: 5 TABLET, FILM COATED ORAL at 10:08

## 2025-07-31 RX ADMIN — ACETAMINOPHEN 650 MG: 325 TABLET ORAL at 00:47

## 2025-07-31 RX ADMIN — IPRATROPIUM BROMIDE AND ALBUTEROL SULFATE 1 DOSE: .5; 3 SOLUTION RESPIRATORY (INHALATION) at 17:04

## 2025-07-31 RX ADMIN — ACETAMINOPHEN 650 MG: 325 TABLET ORAL at 13:36

## 2025-07-31 ASSESSMENT — PAIN SCALES - GENERAL
PAINLEVEL_OUTOF10: 0
PAINLEVEL_OUTOF10: 3
PAINLEVEL_OUTOF10: 3
PAINLEVEL_OUTOF10: 10

## 2025-07-31 ASSESSMENT — PAIN DESCRIPTION - DESCRIPTORS
DESCRIPTORS: ACHING
DESCRIPTORS: CRAMPING
DESCRIPTORS: ACHING

## 2025-07-31 ASSESSMENT — PAIN DESCRIPTION - LOCATION
LOCATION: LEG
LOCATION: HEAD
LOCATION: HEAD

## 2025-07-31 NOTE — ED NOTES
Pt returned from MRI and given the rest of his night meds. Pt also had this rn attach his urostomy to his bag so he can get some sleep.

## 2025-07-31 NOTE — PROGRESS NOTES
[facility]                                                                                          Hospitalist Progress Note  Ralf Phillip MD  Office Phone: [phone]        Date of Service:  2025  NAME:  Robert Gastelum  :  1945  MRN:  [id number]       Admission Summary:   80 y.o. male with a past medical history significant for sarcoidosis, metastatic bladder cancer , history of extensive DVT on AC therapy, depression and a stroke after surgery in October on his neck with residual right upper extremity weakness for which he is seen by outpatient physical therapy at [facility] who presents with progressively worsening shortness of breath and lower extremity edema.  Patient reports he was at the oncology office and reported that he was more short of breath to them.  He was found to be hypoxic in the office and started on oxygen and brought to the emergency room.  Dr. Davis's note indicates concern for brain mets and heart failure.      Patient had recent CT and PET scan done showing enlarging lung lesions with increased size and cavitation of multiple bilateral pulmonary nodules suggesting worsening metastatic disease.  He also describes left-sided headaches not relieved by medications that are at times very severe and always occur behind the left eye radiating into the left jaw.  He has noted progressive shortness of breath over the course of the past several months, worse in the past 8 weeks and associated with dizziness and lightheadedness when he stands.  His lower extremity edema has also been worse in the course of the past 2 weeks.  He states that his palliative chemo was stopped last week and that he was referred for a second opinion to [facility] and has an appointment there on .        Interval history / Subjective:   Seen and examined, breathing much better, denies pain. Reports wheezing and chest congestion resolved.     Assessment &  soft/ non tender, non distended, BS physiological,   Ext: no clubbing, no cyanosis, trace edema  Neuro/Psych: grossly non-focal  Skin: warm     Data Review:    Review and/or order of clinical lab test  Review and/or order of tests in the radiology section of CPT  Review and/or order of tests in the medicine section of CPT      I have personally and independently reviewed all pertinent labs, diagnostic studies, imaging, and have provided independent interpretation of the same.     Labs:     Recent Labs     07/30/25  1213 07/31/25  0552   WBC 5.3 4.8   HGB 11.8* 11.0*   HCT 35.8* 32.9*    200     Recent Labs     07/30/25  1037 07/30/25  1213 07/31/25  0552    140 141   K 3.9 3.7 2.9*    107 113*   CO2 22 21 18*   BUN 41* 40* 35*   MG  --   --  1.7     Recent Labs     07/30/25  1037 07/30/25  1213   ALT 22 19   GLOB 2.7 3.1     No results for input(s): \"INR\", \"APTT\" in the last 72 hours.    Invalid input(s): \"PTP\"   No results for input(s): \"TIBC\" in the last 72 hours.    Invalid input(s): \"FE\", \"PSAT\", \"FERR\"   No results found for: \"RBCF\"   No results for input(s): \"PH\", \"PCO2\", \"PO2\" in the last 72 hours.  No results for input(s): \"CPK\" in the last 72 hours.    Invalid input(s): \"CPKMB\", \"CKNDX\", \"TROIQ\"  Lab Results   Component Value Date/Time    CHOL 148 01/14/2025 09:04 AM    HDL 48 01/14/2025 09:04 AM    LDL 77 01/14/2025 09:04 AM    LDL 64.6 12/19/2022 02:50 PM     No results found for: \"GLUCPOC\"  [unfilled]    Notes reviewed from all clinical/nonclinical/nursing services involved in patient's clinical care. Care coordination discussions were held with appropriate clinical/nonclinical/ nursing providers based on care coordination needs.         Patients current active Medications were reviewed, considered, added and adjusted based on the clinical condition today.      Home Medications were reconciled to the best of my ability given all available resources at the time of admission. Route is PO if

## 2025-07-31 NOTE — ED NOTES
Pt requested tylenol for leg cramps. Pt assisted to side of bed so he can stretch his calf. Pt back in bed, medicated and covered. Pt has no other requests at this time

## 2025-07-31 NOTE — PROGRESS NOTES
[facility] [place] at [facility]  [address], [address] [place] [id number]  W: [phone]  F: [phone]    Reason for evaluation   Robert Gastelum is a 80 y.o. male who is seen in follow-up of Muscle invasive bladder cancer- Mixed histology-stage IV admitted with HA and SOB       Treatment History:   3/1/2021: CT IVP: Multiple abdominal, iliac, mediastinal nodes unchanged from 2019 consistent with h/o sarcoidosis, Thickened R lateral  bladder wall.   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   12/6/2021: Radical urethro-cystoprostatectomy   2/3/22-1/2023: Adjuvant nivolumab  5/2023: RT with Dr. Scruggs- Surveillance   2/2025: Stage IV Bladder cancer with squamous histology  4/17/2025: Padcev plus Keytruda          History of Present Illness:     Patient is a 80 y.o.. male with PMH as below including sarcoidosis who is known to me for stage IV bladder cancer with squamous histology.  His treatment history is as above.  He presented to the clinic 7/30/2025 for Enfortumab and Keytruda.  He has had progressive shortness of breath for several weeks which did not improve with steroids.  When seen in clinic he reported worsening shortness of breath, orthopnea, lower extremity edema, hypotension, new headaches particularly behind his right eye.  He was sent to the ER for further evaluation.    Echo is pending.  He has had an MRI.  Had a headache this morning.  Shortness of breath improved some with steroids.  Edema is better  is covering    Patient to call or MyChart message with any questions or concerns.     I appreciate the opportunity to participate in Mr. Robert Gastelum's care.    Signed By: Zaida Davis MD

## 2025-07-31 NOTE — CONSULTS
PULMONARY/CRITICAL CARE/SLEEP MEDICINE    Initial Physician Consultation Note    Name: Robert Gastelum   : 1945   MRN: [id number]   Date: 2025      Subjective:   Consult Note: 2025     This patient has been seen and evaluated at the request of Dr. Phillip for worsening and progressive shortness of breath  . Medical records and data reviewed.  Patient is a 80 y.o. male who presented to the hospital with complaints of SOB. Patient was seen in oncology office and was noted to be hypotensive and reported increased pedal edema as well as shortness of breath.  He was sent to the emergency room for further evaluation.  He has not had high-grade fevers or chills and denies hemoptysis, pleuritic chest pain or discolored expectoration. He has had episodes of wheezing. He has had a history of headaches which is being investigated.  He has complex medical history that includes history of sarcoidosis and has been followed by Dr. Stiles in our office and was last seen in 2024.  He is also had a diagnosis of stage IV bladder cancer with squamous differentiation and has had multiple courses of treatments in the past.  He is currently being treated with Enfortumab and Keytruda.  He has innumerable pulmonary lesions as well as known intrathoracic mediastinal and hilar lymphadenopathy noted on previous imaging.  He did undergo navigational bronchoscopy of lung lesion in 2024 that was highly suggestive of metastatic bladder carcinoma-report attached below.  Most recent PET scan done on  had shown regression of metabolic activity in mediastinal as well as parenchymal lesions. He presents to the hospital with increasing shortness of breath.  CT of the chest recently had shown innumerable pulmonary nodules, some with central cavitation.  Patient had last undergone PFTs in 2023 at which time FEV1 was noted to be normal at 2.99 L or 100% of predicted and mild expiratory airflow  abnormality, atraumatic.   Eyes:  Conjunctivae/corneas clear. PERRL, EOMs intact.   Neck: Supple, symmetrical, trachea midline, no adenopathy, thyroid: no enlargment/tenderness/nodules, no carotid bruit and no JVD.   Lungs:   Clear to auscultation bilaterally.   Chest wall:  No tenderness or deformity.   Heart:  Regular rate and rhythm, S1, S2 normal, no murmur, click, rub or gallop.   Abdomen:   Soft, non-tender. Bowel sounds normal. No masses,  No organomegaly.   Extremities: + edema   Pulses: 2+ and symmetric all extremities.   Skin: Skin color, texture, turgor normal. No rashes or lesions   Neurologic: Grossly nonfocal        LABS AND  DATA: Personally reviewed    Recent Labs     07/30/25  1213 07/31/25  0552   WBC 5.3 4.8   HGB 11.8* 11.0*   HCT 35.8* 32.9*    200     Recent Labs     07/30/25  1213 07/31/25  0552    141   K 3.7 2.9*    113*   CO2 21 18*   BUN 40* 35*   MG  --  1.7     Recent Labs     07/30/25  1037 07/30/25  1213   GLOB 2.7 3.1     No results for input(s): \"INR\", \"APTT\" in the last 72 hours.    Invalid input(s): \"PTP\"   Invalid input(s): \"PHI\", \"PCO2I\", \"PO2I\", \"FIO2I\"  No results for input(s): \"CPK\", \"CKMB\" in the last 72 hours.    Invalid input(s): \"TROIQ\", \"BNPP\"      MEDS: Reviewed  Medication list reviewed and updated.  See Medication List.    Chest Imaging: personally reviewed and report checked  CT Head W/O Contrast  Result Date: 7/30/2025  INDICATION: headaches, h/o bladder cancer r/o mets EXAM:  HEAD CT WITHOUT CONTRAST COMPARISON: October 17, 2024 TECHNIQUE:  Routine noncontrast axial head CT was performed.  Sagittal and coronal reconstructions were generated.  CT dose reduction was achieved through use of a standardized protocol tailored for this examination and automatic exposure control for dose modulation. FINDINGS: There is a small focus of hyperdensity associated with subtle volume loss in the posterior left frontal lobe cortex superiorly (2:25, 602:62,  (40MG tablet, 1 Oral daily) Active.  Ambien  (5MG tablet, Oral daily) Active.  oxyCODONE  (oral) Specific strength unknown - Active.  Eliquis  (oral) Specific strength unknown - Active.  Medications Reconciled     Health Maintenance History (Farrah Ennis; 12/19/2024 8:55 AM)  None   [08/14/2023]:    Past Surgical History (Farrah Ennis; 12/19/2024 8:55 AM)  Other Surgery    Knee Replacement, Total    Hiatal Hernia Surgery   .  Cataract Surgery   .  Vasectomy   .  Joint Replacement   .  HISTORY OF EYE SURGERY (V45.69) (Z98.890)          Review of Systems (Farrah Ennis; 12/19/2024 8:55 AM)  General Not Present- Fever, Food allergies, Medication allergies, Night Sweats, Seasonal allergies, Weight Gain and Weight Loss.  Skin Not Present- Cyanosis, Jaundice and Rash.  HEENT Not Present- Blurred Vision, Deafness, Difficulty swallowing, Double Vision, Ear Discharge, Ear Pain, Eye discharge, Hoarseness, Nasal obstruction, Nose Bleed, Ringing in the Ears, Sinusitis, Sore Throat and Wears glasses/contact lenses.  Respiratory Not Present- Cough, Coughing blood, Daytime sleepiness, Shortness of breath, Snoring and Wheezing.  Cardiovascular Not Present- Chest Pain, Palpitations and Swelling of Extremities.  Gastrointestinal Not Present- Abdominal Pain, Constipation, Diarrhea, Indigestion, Nausea, Rectal Bleeding and Vomiting.  Male Genitourinary Not Present- Blood in Urine, Frequency, Incontinence, Painful Urination and Urinating at Night.  Musculoskeletal Not Present- Joint Pain, Joint Stiffness and Joint Swelling.  Neurological Not Present- Headaches, Numbness and Weakness.  Psychiatric Not Present- Anxiety, Depression and Hallucinations.  Endocrine Not Present- Cold Intolerance, Excessive Thirst and Heat Intolerance.  Hematology Not Present- Abnormal Bleeding, Easy Bruising and Swollen glands.    Vitals (Farrah Ennis; 12/19/2024 8:56 AM)  12/19/2024 8:55 AM  Weight: 236 lb   Height: 71 in   Body  Monoclonal Antibody is intended for laboratory use     in the detection of the PD-L1 protein in formalin-fixed, paraffin-embedded     tissue.  It is indicated as an aid in the assessment of PD-L1 expression in     human tissues. This antibody is intended for in vitro diagnostic (IVD) use. The     performance characteristics of this assay have not been validated for     decalcified specimens. Results should be interpreted with caution given the     likelihood of false negative results on decalcified specimens.        PD-L1 immunohistochemistry was performed using the  antibody on the     [place] BenchMark ULTRA. This assay has been internally validated against the     22C3 pharmDx ([employer]). PD-L1 protein expression is quantified in NSCLC by     using the Tumor Proportion Score (TPS), which is the percentage of viable tumor     cells showing partial or complete membrane staining at any intensity. PD-L1     expression in the specimen should be considered: high expression if TPS >/=     50%, expressed if TPS 1-49% and no expression if TPS < 1%.        Methodology:     [employer] PD-L1 () Rabbit Monoclonal Primary Antibody for NSCLC:     PD-L1 staining was performed utilizing the [place] PD-L1 () IVD protocol     on the Benchmark Ultra platform.  The OptiView DAB IHC Detection system Kit was     used.  The antibody-enzyme complex is then visualized with a precipitating     enzyme reaction product.  Each step was incubated for a precise time and     temperature. At the end of each incubation step the instrument washes the     sections to stop the reaction and to remove unbound material that would hinder     the desired reaction in subsequent steps.        Date of Service:         1/30/25     Block:                   A1     Control:                 Satisfactory                 SPECIMEN ADEQUACY                  Specimen 1: SATISFACTORY FOR EVALUATION.

## 2025-07-31 NOTE — PROGRESS NOTES
Spiritual Health History and Assessment/Progress Note  [facility]    Initial Encounter,  ,  ,      Name: Robert Gastelum MRN: [id number]    Age: 80 y.o.     Sex: male   Language: English   Latter-day: Pentecostalism   Shortness of breath     Date: 7/31/2025            Total Time Calculated: 20 min              Spiritual Assessment began in [facility] EMERGENCY DEPARTMENT        Referral/Consult From: Rounding   Encounter Overview/Reason: Initial Encounter  Service Provided For: Patient    Rosanne, Belief, Meaning:   Patient is connected with a rosanne tradition or spiritual practice and has beliefs or practices that help with coping during difficult times  Family/Friends No family/friends present      Importance and Influence:  Patient has no beliefs influential to healthcare decision-making identified during this visit  Family/Friends No family/friends present    Community:  Patient is connected with a spiritual community and feels well-supported. Support system includes: Spouse/Partner, Children, and Rosanne Community  Family/Friends No family/friends present    Assessment and Plan of Care:     Patient Interventions include: Facilitated expression of thoughts and feelings, Explored spiritual coping/struggle/distress, Engaged in theological reflection, Affirmed coping skills/support systems, and Facilitated life review and/ or legacy  Family/Friends Interventions include: No family/friends present    Patient Plan of Care: Spiritual Care available upon further referral  Family/Friends Plan of Care: No family/friends present    Electronically signed by CLARI Villalta on 7/31/2025 at 11:28 AM      For additional spiritual care, please contact the  on-call at ([phone]).    Archana Chase MDiv, MS, BCC, CGP  Staff   [facility]  [phone]

## 2025-07-31 NOTE — ED NOTES
ED TO INPATIENT SBAR HANDOFF    Patient Name: Robert Gastelum   :  1945  80 y.o.   MRN:  [id number]  ED Room #:  ER18/18     Situation  Code Status: DNR   Allergies: Penicillins  Weight: No data found.    Arrived from: home    Chief Complaint:   Chief Complaint   Patient presents with    Shortness of Breath       Hospital Problem/Diagnosis:  Principal Problem:    Dyspnea  Resolved Problems:    * No resolved hospital problems. *      Mobility: limited transfer mobility   ED Fall Risk: Presents to emergency department  because of falls (Syncope, seizure, or loss of consciousness): No, Age > 70: Yes, Altered Mental Status, Intoxication with alcohol or substance confusion (Disorientation, impaired judgment, poor safety awaremess, or inability to follow instructions): No, Impaired Mobility: Ambulates or transfers with assistive devices or assistance; Unable to ambulate or transer.: Yes, Nursing Judgement: Yes   Fell in ED or prior to admission: no   Restraints: no     Sitter: no   Family/Caregiver Present: no    Neet to know social/safety information:     Background  History:   Past Medical History:   Diagnosis Date    Arthritis     Asthma     MILD    Bladder cancer (HCC) 2015    Cancer (HCC)     scc top of head and nose    COVID-19 vaccine series completed     MODERNA VACCINE 21 AND 21    Depression with anxiety     Hearing loss 2010    Hx of blood clots 2024    LEFT LEG    Hyperlipidemia     Hypertension     Kidney disease     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, KIDNEY REMOVED    Left bundle branch block     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    POSITIVE NASAL SWAB    Obesity 2008    Posterior cervical adenopathy 2018    Pulmonary sarcoidosis     Sleep apnea     USES CPAP    Sleep apnea treated with continuous positive airway pressure (CPAP)        Assessment    Abnormal Assessment Findings:   Imaging:   MRI BRAIN W WO CONTRAST   Final  within normal limits   TROPONIN T - Abnormal; Notable for the following components:    Troponin T 44.5 (*)     All other components within normal limits   CBC - Abnormal; Notable for the following components:    RBC 3.24 (*)     Hemoglobin 11.0 (*)     Hematocrit 32.9 (*)     .5 (*)     RDW 17.2 (*)     All other components within normal limits   BASIC METABOLIC PANEL - Abnormal; Notable for the following components:    Potassium 2.9 (*)     Chloride 113 (*)     CO2 18 (*)     BUN 35 (*)     Creatinine 1.34 (*)     BUN/Creatinine Ratio 26 (*)     Est, Glom Filt Rate 54 (*)     Calcium 7.3 (*)     All other components within normal limits         Vitals/MEWS: MEWS Score: 2  Level of Consciousness: Alert (0)   Vitals:    07/31/25 0402 07/31/25 0407 07/31/25 0840 07/31/25 1137   BP: (!) 142/80   119/68   Pulse: 83 82 76 88   Resp: 24 19 16 23   Temp:    97.8 °F (36.6 °C)   TempSrc:    Oral   SpO2:  94%     Height:         DI:   Predictive Model Details          27 (Normal)  Factor Value    Calculated 7/31/2025 15:54 48% Age 80 years old    Deterioration Index Model 35% Respiratory rate 23     5% Potassium abnormal (2.9 mmol/L)     4% BUN abnormal (35 MG/DL)     2% Systolic 119     2% Hematocrit abnormal (32.9 %)     2% Pulse 88     1% Pulse oximetry 94 %     0% WBC count 4.8 K/uL     0% Temperature 97.8 °F (36.6 °C)     0% Sodium 141 mmol/L         FiO2 (%):   O2 Flow Rate: O2 Device: None (Room air) O2 Flow Rate (L/min): 2 L/min  Cardiac Rhythm:      Pain Scale: Pain Assessment  Pain Assessment: None - Denies Pain  Pain Level: 3  Patient's Stated Pain Goal: 0 - No pain  Pain Location: Head  Pain Descriptors: Aching  Non-Pharmaceutical Pain Intervention(s): Rest, Repositioned  Last documented pain score: (0-10 scale) Pain Level: 3  Last documented pain medication administered: 1330     Mental Status: oriented, alert, and coherent  Orientation Level:    NIH Score:    C-SSRS: Risk of Suicide: No Risk    PO Status:

## 2025-08-01 VITALS
OXYGEN SATURATION: 94 % | DIASTOLIC BLOOD PRESSURE: 74 MMHG | TEMPERATURE: 97.3 F | RESPIRATION RATE: 14 BRPM | HEART RATE: 78 BPM | BODY MASS INDEX: 31.85 KG/M2 | HEIGHT: 70 IN | SYSTOLIC BLOOD PRESSURE: 124 MMHG

## 2025-08-01 LAB
ANION GAP SERPL CALC-SCNC: 13 MMOL/L (ref 2–14)
BUN SERPL-MCNC: 41 MG/DL (ref 8–23)
BUN/CREAT SERPL: 23 (ref 12–20)
CALCIUM SERPL-MCNC: 9 MG/DL (ref 8.8–10.2)
CHLORIDE SERPL-SCNC: 106 MMOL/L (ref 98–107)
CO2 SERPL-SCNC: 19 MMOL/L (ref 20–29)
CREAT SERPL-MCNC: 1.76 MG/DL (ref 0.7–1.2)
ECHO AV AREA PEAK VELOCITY: 1.9 CM2
ECHO AV AREA/BSA PEAK VELOCITY: 0.9 CM2/M2
ECHO AV PEAK GRADIENT: 11 MMHG
ECHO AV PEAK VELOCITY: 1.6 M/S
ECHO AV VELOCITY RATIO: 0.56
ECHO EST RA PRESSURE: 3 MMHG
ECHO LV E' LATERAL VELOCITY: 7.23 CM/S
ECHO LV E' SEPTAL VELOCITY: 5.25 CM/S
ECHO LV EF PHYSICIAN: 5 %
ECHO LVOT AREA: 3.5 CM2
ECHO LVOT DIAM: 2.1 CM
ECHO LVOT PEAK GRADIENT: 3 MMHG
ECHO LVOT PEAK VELOCITY: 0.9 M/S
ECHO RIGHT VENTRICULAR SYSTOLIC PRESSURE (RVSP): 31 MMHG
ECHO TV REGURGITANT MAX VELOCITY: 2.63 M/S
ECHO TV REGURGITANT PEAK GRADIENT: 28 MMHG
ERYTHROCYTE [DISTWIDTH] IN BLOOD BY AUTOMATED COUNT: 17.3 % (ref 11.5–14.5)
GLUCOSE SERPL-MCNC: 125 MG/DL (ref 65–100)
HCT VFR BLD AUTO: 35.3 % (ref 36.6–50.3)
HGB BLD-MCNC: 11.6 G/DL (ref 12.1–17)
MAGNESIUM SERPL-MCNC: 2 MG/DL (ref 1.6–2.4)
MCH RBC QN AUTO: 33.7 PG (ref 26–34)
MCHC RBC AUTO-ENTMCNC: 32.9 G/DL (ref 30–36.5)
MCV RBC AUTO: 102.6 FL (ref 80–99)
NRBC # BLD: 0 K/UL (ref 0–0.01)
NRBC BLD-RTO: 0 PER 100 WBC
PLATELET # BLD AUTO: 223 K/UL (ref 150–400)
PMV BLD AUTO: 9.3 FL (ref 8.9–12.9)
POTASSIUM SERPL-SCNC: 3.4 MMOL/L (ref 3.5–5.1)
RBC # BLD AUTO: 3.44 M/UL (ref 4.1–5.7)
SODIUM SERPL-SCNC: 138 MMOL/L (ref 136–145)
WBC # BLD AUTO: 5.2 K/UL (ref 4.1–11.1)

## 2025-08-01 PROCEDURE — 85027 COMPLETE CBC AUTOMATED: CPT

## 2025-08-01 PROCEDURE — 2500000003 HC RX 250 WO HCPCS: Performed by: STUDENT IN AN ORGANIZED HEALTH CARE EDUCATION/TRAINING PROGRAM

## 2025-08-01 PROCEDURE — 6370000000 HC RX 637 (ALT 250 FOR IP): Performed by: NURSE PRACTITIONER

## 2025-08-01 PROCEDURE — 36415 COLL VENOUS BLD VENIPUNCTURE: CPT

## 2025-08-01 PROCEDURE — 80048 BASIC METABOLIC PNL TOTAL CA: CPT

## 2025-08-01 PROCEDURE — 6370000000 HC RX 637 (ALT 250 FOR IP): Performed by: HOSPITALIST

## 2025-08-01 PROCEDURE — 83735 ASSAY OF MAGNESIUM: CPT

## 2025-08-01 PROCEDURE — 6370000000 HC RX 637 (ALT 250 FOR IP): Performed by: FAMILY MEDICINE

## 2025-08-01 PROCEDURE — 6360000002 HC RX W HCPCS: Performed by: INTERNAL MEDICINE

## 2025-08-01 PROCEDURE — 2500000003 HC RX 250 WO HCPCS: Performed by: NURSE PRACTITIONER

## 2025-08-01 PROCEDURE — 94760 N-INVAS EAR/PLS OXIMETRY 1: CPT

## 2025-08-01 PROCEDURE — 94640 AIRWAY INHALATION TREATMENT: CPT

## 2025-08-01 RX ORDER — POTASSIUM CHLORIDE 750 MG/1
20 TABLET, EXTENDED RELEASE ORAL ONCE
Status: COMPLETED | OUTPATIENT
Start: 2025-08-01 | End: 2025-08-01

## 2025-08-01 RX ORDER — IPRATROPIUM BROMIDE AND ALBUTEROL SULFATE 2.5; .5 MG/3ML; MG/3ML
3 SOLUTION RESPIRATORY (INHALATION) EVERY 4 HOURS PRN
Qty: 360 ML | Refills: 0 | Status: SHIPPED | OUTPATIENT
Start: 2025-08-01

## 2025-08-01 RX ORDER — BUDESONIDE AND FORMOTEROL FUMARATE DIHYDRATE 160; 4.5 UG/1; UG/1
2 AEROSOL RESPIRATORY (INHALATION) 2 TIMES DAILY
Qty: 30.6 G | Refills: 1 | Status: SHIPPED | OUTPATIENT
Start: 2025-08-01

## 2025-08-01 RX ORDER — TRAMADOL HYDROCHLORIDE 50 MG/1
50 TABLET ORAL ONCE
Status: COMPLETED | OUTPATIENT
Start: 2025-08-01 | End: 2025-08-01

## 2025-08-01 RX ORDER — TRAMADOL HYDROCHLORIDE 50 MG/1
50 TABLET ORAL EVERY 8 HOURS PRN
Qty: 12 TABLET | Refills: 0 | Status: SHIPPED | OUTPATIENT
Start: 2025-08-01 | End: 2025-08-06

## 2025-08-01 RX ADMIN — PANTOPRAZOLE SODIUM 40 MG: 40 TABLET, DELAYED RELEASE ORAL at 07:16

## 2025-08-01 RX ADMIN — IPRATROPIUM BROMIDE AND ALBUTEROL SULFATE 1 DOSE: .5; 3 SOLUTION RESPIRATORY (INHALATION) at 12:28

## 2025-08-01 RX ADMIN — POTASSIUM CHLORIDE 20 MEQ: 750 TABLET, FILM COATED, EXTENDED RELEASE ORAL at 07:17

## 2025-08-01 RX ADMIN — SODIUM CHLORIDE, PRESERVATIVE FREE 10 ML: 5 INJECTION INTRAVENOUS at 10:02

## 2025-08-01 RX ADMIN — ACETAMINOPHEN 650 MG: 325 TABLET ORAL at 05:25

## 2025-08-01 RX ADMIN — IPRATROPIUM BROMIDE AND ALBUTEROL SULFATE 1 DOSE: .5; 3 SOLUTION RESPIRATORY (INHALATION) at 07:58

## 2025-08-01 RX ADMIN — MUPIROCIN: 20 OINTMENT TOPICAL at 09:56

## 2025-08-01 RX ADMIN — THERA TABS 1 TABLET: TAB at 09:56

## 2025-08-01 RX ADMIN — TIZANIDINE 4 MG: 2 TABLET ORAL at 00:23

## 2025-08-01 RX ADMIN — SODIUM CHLORIDE, PRESERVATIVE FREE 10 ML: 5 INJECTION INTRAVENOUS at 09:57

## 2025-08-01 RX ADMIN — APIXABAN 5 MG: 5 TABLET, FILM COATED ORAL at 09:56

## 2025-08-01 RX ADMIN — ARFORMOTEROL TARTRATE: 15 SOLUTION RESPIRATORY (INHALATION) at 07:57

## 2025-08-01 RX ADMIN — TRAMADOL HYDROCHLORIDE 50 MG: 50 TABLET, COATED ORAL at 12:17

## 2025-08-01 ASSESSMENT — PAIN SCALES - GENERAL
PAINLEVEL_OUTOF10: 5
PAINLEVEL_OUTOF10: 6

## 2025-08-01 ASSESSMENT — PAIN DESCRIPTION - DESCRIPTORS: DESCRIPTORS: ACHING

## 2025-08-01 ASSESSMENT — PAIN DESCRIPTION - LOCATION: LOCATION: HEAD

## 2025-08-01 NOTE — DISCHARGE SUMMARY
Discharge Summary       PATIENT ID: Robert Gastelum  MRN: [id number]   YOB: 1945    DATE OF ADMISSION: 7/30/2025  1:45 PM    DATE OF DISCHARGE: 8/1/2025   PRIMARY CARE PROVIDER: Robert Leo MD     ATTENDING PHYSICIAN: Marjan  DISCHARGING PROVIDER: Ralf Phillip MD    To contact this individual call [phone] and ask the  to page.  If unavailable ask to be transferred the Adult Hospitalist Department.    CONSULTATIONS: IP CONSULT TO PULMONOLOGY  IP CONSULT TO RADIATION ONCOLOGY    PROCEDURES/SURGERIES: * No surgery found *     ADMITTING DIAGNOSES & HOSPITAL COURSE:   Shortness of breath  Sarcoidosis  Metastatic muscle invasive bladder cancer  Brain metastases  History of DVT  History of L nephrectomy   Hypokalemia     80 y.o. male with a past medical history significant for sarcoidosis, metastatic bladder cancer , history of extensive DVT on AC therapy, depression and a stroke after surgery in October on his neck with residual right upper extremity weakness for which he is seen by outpatient physical therapy at [facility] who presents with progressively worsening shortness of breath and lower extremity edema. Patient reports he was at the oncology office and reported that he was more short of breath to them. He was found to be hypoxic in the office and started on oxygen and brought to the emergency room.     -bronchospastic here, resolved with inhaled bronchodilators, back to baseline at discharge. LABA/ICS on discharge per pulm recs.  -echocardiogram with normal LVEF though limited study  -MRI brain with brain mets, rad/onc consulted and plans for gamma-knife  -discussed anticoagulation with pt's oncologist due to associated hemorrhage with brain mets, recommendation is to hold apixaban until radiation therapy  -continue acetaminophen for headache, trial of PRN tramadol, unfortunately NSAIDs would be contraindicated with his renal function and need for resumption of

## 2025-08-01 NOTE — CARE COORDINATION
RUR: 19%  Readmission? No  IM? 1st IM given on 7/31  ? N/A    Plan: home with outpatient services and family support    Pharmacy: [facility] [id number] [place] [id number] - P [phone] - F [phone] [[id number]]     CM received confirmation that pt is medically clear for discharge. CM completed a quick review of pt's chart. CM met with pt at bedside briefly to inquire about any discharge needs. Pt, as well as nursing, confirmed that he does not have any case management needs at this time. Pt's spouse will provide him with transportation home from the hospital.     CM available if any discharge needs arise.     08/01/25 1838   Service Assessment   Patient Orientation Alert and Oriented   Cognition Alert   History Provided By Patient   Primary Caregiver Self   Accompanied By/Relationship Spouse   Support Systems Spouse/Significant Other;Family Members   Patient's Healthcare Decision Maker is: Named in Scanned ACP Document   PCP Verified by CM Yes   Last Visit to PCP   (Unclear from chart)   Prior Functional Level Assistance with the following:;Mobility   Current Functional Level Assistance with the following:;Mobility   Can patient return to prior living arrangement Yes   Ability to make needs known: Good   Family able to assist with home care needs: Yes   Would you like for me to discuss the discharge plan with any other family members/significant others, and if so, who?   (Pt's spouse, Libby Hancock, at bedside)   Financial Resources Medicare   Community Resources Transportation   CM/SW Referral Other (see comment)  (Discharge planning, outpatient follow up)     Advance Care Planning     General Advance Care Planning (ACP) Conversation    Date of Conversation: 8/1/2025  Conducted with: Patient with Decision Making Capacity  Other persons present: Spouse Libby Hancock    Healthcare Decision Maker:   Primary Decision Maker: Libby Hancock - Spouse - [phone]     Today we documented

## 2025-08-01 NOTE — PROGRESS NOTES
Bedside shift change report given to JOHNATHON Ibrahim (oncoming nurse) by JOHNATHON Lyman (offgoing nurse). Report included the following information Nurse Handoff Report, Index, ED Encounter Summary, ED SBAR, Adult Overview, Surgery Report, Intake/Output, MAR, Recent Results, Med Rec Status, Alarm Parameters, Procedure Verification, Quality Measures, and Neuro Assessment.

## 2025-08-01 NOTE — DISCHARGE INSTRUCTIONS
Future Appointments   Date Time Provider Department Center   8/1/2025  1:15 PM [facility] ECHO LAB 1 [facility]   8/4/2025  2:00 PM Libby Ruiz MD [facility]   8/5/2025 11:40 AM [place]   8/7/2025  6:30 PM [facility] 2 [facility]     Eliquis is temporarily stopped due to small areas of bleeding around the brain lesions. Your oncologist recommended holding this medication until you undergo radiation.

## 2025-08-01 NOTE — PROGRESS NOTES
Hospitalist Night Cover     Name: Robert Gastelum  YOB: 1945      Overnight update:        Notified of patient complaining of left sided muscle spasm more pronounce on her left leg.  VS: /73, HR 91, RR 18, O2 sat 93% on RA    - Zanaflex 4 mg PO x 1 dose  - BMP, Magnesium    0200: notified of K+ 3.4    - Klor-con 20 meq PO x 1 dose     Evelin Gonzales, BRANDONP

## 2025-08-01 NOTE — PLAN OF CARE
Problem: Safety - Adult  Goal: Free from fall injury  Outcome: Adequate for Discharge     Problem: Discharge Planning  Goal: Discharge to home or other facility with appropriate resources  Outcome: Adequate for Discharge     Problem: Pain  Goal: Verbalizes/displays adequate comfort level or baseline comfort level  Outcome: Adequate for Discharge

## 2025-08-04 ENCOUNTER — OFFICE VISIT (OUTPATIENT)
Age: 80
End: 2025-08-04
Payer: MEDICARE

## 2025-08-04 ENCOUNTER — TELEPHONE (OUTPATIENT)
Age: 80
End: 2025-08-04

## 2025-08-04 VITALS
OXYGEN SATURATION: 96 % | HEIGHT: 70 IN | BODY MASS INDEX: 31.78 KG/M2 | DIASTOLIC BLOOD PRESSURE: 76 MMHG | WEIGHT: 222 LBS | SYSTOLIC BLOOD PRESSURE: 104 MMHG | RESPIRATION RATE: 20 BRPM | HEART RATE: 91 BPM

## 2025-08-04 DIAGNOSIS — Z51.5 PALLIATIVE CARE BY SPECIALIST: ICD-10-CM

## 2025-08-04 DIAGNOSIS — G47.00 INSOMNIA, UNSPECIFIED TYPE: ICD-10-CM

## 2025-08-04 DIAGNOSIS — C67.9 MALIGNANT NEOPLASM METASTATIC FROM BLADDER (HCC): ICD-10-CM

## 2025-08-04 DIAGNOSIS — F32.A ANXIETY AND DEPRESSION: ICD-10-CM

## 2025-08-04 DIAGNOSIS — F41.9 ANXIETY AND DEPRESSION: ICD-10-CM

## 2025-08-04 DIAGNOSIS — R51.9 ACUTE INTRACTABLE HEADACHE, UNSPECIFIED HEADACHE TYPE: Primary | ICD-10-CM

## 2025-08-04 PROCEDURE — 3078F DIAST BP <80 MM HG: CPT | Performed by: INTERNAL MEDICINE

## 2025-08-04 PROCEDURE — G8417 CALC BMI ABV UP PARAM F/U: HCPCS | Performed by: INTERNAL MEDICINE

## 2025-08-04 PROCEDURE — 99215 OFFICE O/P EST HI 40 MIN: CPT | Performed by: INTERNAL MEDICINE

## 2025-08-04 PROCEDURE — 1111F DSCHRG MED/CURRENT MED MERGE: CPT | Performed by: INTERNAL MEDICINE

## 2025-08-04 PROCEDURE — 1036F TOBACCO NON-USER: CPT | Performed by: INTERNAL MEDICINE

## 2025-08-04 PROCEDURE — G8427 DOCREV CUR MEDS BY ELIG CLIN: HCPCS | Performed by: INTERNAL MEDICINE

## 2025-08-04 PROCEDURE — 3074F SYST BP LT 130 MM HG: CPT | Performed by: INTERNAL MEDICINE

## 2025-08-04 PROCEDURE — 1123F ACP DISCUSS/DSCN MKR DOCD: CPT | Performed by: INTERNAL MEDICINE

## 2025-08-04 RX ORDER — BUTALBITAL, ACETAMINOPHEN AND CAFFEINE 50; 325; 40 MG/1; MG/1; MG/1
1 TABLET ORAL EVERY 4 HOURS PRN
Qty: 30 TABLET | Refills: 0 | Status: SHIPPED | OUTPATIENT
Start: 2025-08-04 | End: 2025-08-07 | Stop reason: SDUPTHER

## 2025-08-04 RX ORDER — TRAMADOL HYDROCHLORIDE 50 MG/1
50 TABLET ORAL EVERY 6 HOURS PRN
Qty: 60 TABLET | Refills: 0 | Status: SHIPPED | OUTPATIENT
Start: 2025-08-04 | End: 2025-08-19

## 2025-08-04 ASSESSMENT — PATIENT HEALTH QUESTIONNAIRE - PHQ9
SUM OF ALL RESPONSES TO PHQ QUESTIONS 1-9: 4
1. LITTLE INTEREST OR PLEASURE IN DOING THINGS: MORE THAN HALF THE DAYS
SUM OF ALL RESPONSES TO PHQ QUESTIONS 1-9: 4
SUM OF ALL RESPONSES TO PHQ QUESTIONS 1-9: 4
2. FEELING DOWN, DEPRESSED OR HOPELESS: MORE THAN HALF THE DAYS
SUM OF ALL RESPONSES TO PHQ QUESTIONS 1-9: 4

## 2025-08-05 ENCOUNTER — OFFICE VISIT (OUTPATIENT)
Age: 80
End: 2025-08-05
Payer: MEDICARE

## 2025-08-05 VITALS
HEART RATE: 72 BPM | BODY MASS INDEX: 30.02 KG/M2 | DIASTOLIC BLOOD PRESSURE: 80 MMHG | WEIGHT: 209.7 LBS | OXYGEN SATURATION: 95 % | HEIGHT: 70 IN | SYSTOLIC BLOOD PRESSURE: 126 MMHG

## 2025-08-05 DIAGNOSIS — Z86.73 REMOTE HISTORY OF STROKE: ICD-10-CM

## 2025-08-05 DIAGNOSIS — R51.9 CHRONIC DAILY HEADACHE: ICD-10-CM

## 2025-08-05 DIAGNOSIS — C79.31: Primary | ICD-10-CM

## 2025-08-05 PROCEDURE — G8417 CALC BMI ABV UP PARAM F/U: HCPCS | Performed by: PSYCHIATRY & NEUROLOGY

## 2025-08-05 PROCEDURE — 1125F AMNT PAIN NOTED PAIN PRSNT: CPT | Performed by: PSYCHIATRY & NEUROLOGY

## 2025-08-05 PROCEDURE — 99215 OFFICE O/P EST HI 40 MIN: CPT | Performed by: PSYCHIATRY & NEUROLOGY

## 2025-08-05 PROCEDURE — 1123F ACP DISCUSS/DSCN MKR DOCD: CPT | Performed by: PSYCHIATRY & NEUROLOGY

## 2025-08-05 PROCEDURE — 1036F TOBACCO NON-USER: CPT | Performed by: PSYCHIATRY & NEUROLOGY

## 2025-08-05 PROCEDURE — G8427 DOCREV CUR MEDS BY ELIG CLIN: HCPCS | Performed by: PSYCHIATRY & NEUROLOGY

## 2025-08-05 PROCEDURE — 3079F DIAST BP 80-89 MM HG: CPT | Performed by: PSYCHIATRY & NEUROLOGY

## 2025-08-05 PROCEDURE — 1159F MED LIST DOCD IN RCRD: CPT | Performed by: PSYCHIATRY & NEUROLOGY

## 2025-08-05 PROCEDURE — 1111F DSCHRG MED/CURRENT MED MERGE: CPT | Performed by: PSYCHIATRY & NEUROLOGY

## 2025-08-05 PROCEDURE — 3074F SYST BP LT 130 MM HG: CPT | Performed by: PSYCHIATRY & NEUROLOGY

## 2025-08-05 RX ORDER — TOPIRAMATE 50 MG/1
TABLET, FILM COATED ORAL
Qty: 180 TABLET | Refills: 1 | Status: SHIPPED | OUTPATIENT
Start: 2025-08-05

## 2025-08-07 ENCOUNTER — APPOINTMENT (OUTPATIENT)
Facility: HOSPITAL | Age: 80
End: 2025-08-07
Payer: MEDICARE

## 2025-08-07 DIAGNOSIS — Z51.5 PALLIATIVE CARE BY SPECIALIST: ICD-10-CM

## 2025-08-07 DIAGNOSIS — R51.9 ACUTE INTRACTABLE HEADACHE, UNSPECIFIED HEADACHE TYPE: Primary | ICD-10-CM

## 2025-08-07 RX ORDER — BUTALBITAL, ACETAMINOPHEN AND CAFFEINE 50; 325; 40 MG/1; MG/1; MG/1
1 TABLET ORAL EVERY 6 HOURS PRN
Qty: 40 TABLET | Refills: 0 | Status: SHIPPED | OUTPATIENT
Start: 2025-08-07 | End: 2025-08-07

## 2025-08-07 RX ORDER — BUTALBITAL, ACETAMINOPHEN AND CAFFEINE 50; 325; 40 MG/1; MG/1; MG/1
1 TABLET ORAL EVERY 6 HOURS PRN
Qty: 40 TABLET | Refills: 0 | Status: SHIPPED | OUTPATIENT
Start: 2025-08-07 | End: 2025-08-17

## 2025-08-14 ENCOUNTER — APPOINTMENT (OUTPATIENT)
Facility: HOSPITAL | Age: 80
End: 2025-08-14
Payer: MEDICARE

## 2025-08-14 ENCOUNTER — TELEPHONE (OUTPATIENT)
Age: 80
End: 2025-08-14

## 2025-08-14 ENCOUNTER — TRANSCRIBE ORDERS (OUTPATIENT)
Facility: HOSPITAL | Age: 80
End: 2025-08-14

## 2025-08-14 DIAGNOSIS — C79.31 METASTASIS TO BRAIN (HCC): Primary | ICD-10-CM

## 2025-08-14 DIAGNOSIS — I82.412 ACUTE DEEP VEIN THROMBOSIS (DVT) OF FEMORAL VEIN OF LEFT LOWER EXTREMITY (HCC): Primary | ICD-10-CM

## 2025-08-15 ENCOUNTER — TELEPHONE (OUTPATIENT)
Age: 80
End: 2025-08-15

## 2025-08-18 ENCOUNTER — APPOINTMENT (OUTPATIENT)
Facility: HOSPITAL | Age: 80
End: 2025-08-18
Payer: MEDICARE

## 2025-08-18 ENCOUNTER — HOSPITAL ENCOUNTER (INPATIENT)
Facility: HOSPITAL | Age: 80
LOS: 2 days | Discharge: HOSPICE | End: 2025-08-22
Admitting: HOSPITALIST
Payer: MEDICARE

## 2025-08-18 DIAGNOSIS — S09.90XA CLOSED HEAD INJURY, INITIAL ENCOUNTER: ICD-10-CM

## 2025-08-18 DIAGNOSIS — C79.9 METASTATIC MALIGNANT NEOPLASM, UNSPECIFIED SITE (HCC): ICD-10-CM

## 2025-08-18 DIAGNOSIS — I82.513 CHRONIC BILATERAL DEEP VEIN THROMBOSIS (DVT) OF FEMORAL VEINS (HCC): ICD-10-CM
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
## 2025-08-18 DIAGNOSIS — R20.0 LEFT ARM NUMBNESS: Primary | ICD-10-CM

## 2025-08-18 PROBLEM — R29.898 LEFT ARM WEAKNESS: Status: ACTIVE | Noted: 2025-08-18

## 2025-08-18 LAB
ALBUMIN SERPL-MCNC: 3.8 G/DL (ref 3.5–5.2)
ALBUMIN/GLOB SERPL: 1.1 (ref 1.1–2.2)
ALP SERPL-CCNC: 68 U/L (ref 40–129)
ALT SERPL-CCNC: 20 U/L (ref 10–50)
ANION GAP SERPL CALC-SCNC: 13 MMOL/L (ref 2–14)
AST SERPL-CCNC: 25 U/L (ref 10–50)
BASOPHILS # BLD: 0.1 K/UL (ref 0–0.1)
BASOPHILS NFR BLD: 2 % (ref 0–1)
BILIRUB SERPL-MCNC: 0.3 MG/DL (ref 0–1.2)
BUN SERPL-MCNC: 51 MG/DL (ref 8–23)
BUN/CREAT SERPL: 24 (ref 12–20)
CALCIUM SERPL-MCNC: 9.5 MG/DL (ref 8.8–10.2)
CHLORIDE SERPL-SCNC: 105 MMOL/L (ref 98–107)
CO2 SERPL-SCNC: 21 MMOL/L (ref 20–29)
CREAT SERPL-MCNC: 2.08 MG/DL (ref 0.7–1.2)
DIFFERENTIAL METHOD BLD: ABNORMAL
EOSINOPHIL # BLD: 0.15 K/UL (ref 0–0.4)
EOSINOPHIL NFR BLD: 3 % (ref 0–7)
ERYTHROCYTE [DISTWIDTH] IN BLOOD BY AUTOMATED COUNT: 15.5 % (ref 11.5–14.5)
GLOBULIN SER CALC-MCNC: 3.4 G/DL (ref 2–4)
GLUCOSE BLD STRIP.AUTO-MCNC: 87 MG/DL (ref 65–117)
GLUCOSE SERPL-MCNC: 99 MG/DL (ref 65–100)
HCT VFR BLD AUTO: 37.7 % (ref 36.6–50.3)
HGB BLD-MCNC: 11.9 G/DL (ref 12.1–17)
IMM GRANULOCYTES # BLD AUTO: 0 K/UL
IMM GRANULOCYTES NFR BLD AUTO: 0 %
INR PPP: 1.1 (ref 0.9–1.1)
LYMPHOCYTES # BLD: 0.41 K/UL (ref 0.8–3.5)
LYMPHOCYTES NFR BLD: 8 % (ref 12–49)
MCH RBC QN AUTO: 33.3 PG (ref 26–34)
MCHC RBC AUTO-ENTMCNC: 31.6 G/DL (ref 30–36.5)
MCV RBC AUTO: 105.6 FL (ref 80–99)
MONOCYTES # BLD: 0.26 K/UL (ref 0–1)
MONOCYTES NFR BLD: 5 % (ref 5–13)
NEUTS BAND NFR BLD MANUAL: 1 % (ref 0–6)
NEUTS SEG # BLD: 4.18 K/UL (ref 1.8–8)
NEUTS SEG NFR BLD: 81 % (ref 32–75)
NRBC # BLD: 0 K/UL (ref 0–0.01)
NRBC BLD-RTO: 0 PER 100 WBC
PLATELET # BLD AUTO: 236 K/UL (ref 150–400)
PMV BLD AUTO: 9.3 FL (ref 8.9–12.9)
POTASSIUM SERPL-SCNC: 4.6 MMOL/L (ref 3.5–5.1)
PROT SERPL-MCNC: 7.1 G/DL (ref 6.4–8.3)
PROTHROMBIN TIME: 11.6 SEC (ref 9.2–11.2)
RBC # BLD AUTO: 3.57 M/UL (ref 4.1–5.7)
RBC MORPH BLD: ABNORMAL
SERVICE CMNT-IMP: NORMAL
SODIUM SERPL-SCNC: 139 MMOL/L (ref 136–145)
TROPONIN T SERPL HS-MCNC: 51.6 NG/L (ref 0–22)
WBC # BLD AUTO: 5.1 K/UL (ref 4.1–11.1)

## 2025-08-18 PROCEDURE — 72131 CT LUMBAR SPINE W/O DYE: CPT

## 2025-08-18 PROCEDURE — G0378 HOSPITAL OBSERVATION PER HR: HCPCS

## 2025-08-18 PROCEDURE — 85610 PROTHROMBIN TIME: CPT

## 2025-08-18 PROCEDURE — 80053 COMPREHEN METABOLIC PANEL: CPT

## 2025-08-18 PROCEDURE — 84484 ASSAY OF TROPONIN QUANT: CPT

## 2025-08-18 PROCEDURE — 70486 CT MAXILLOFACIAL W/O DYE: CPT

## 2025-08-18 PROCEDURE — 72125 CT NECK SPINE W/O DYE: CPT

## 2025-08-18 PROCEDURE — 6360000004 HC RX CONTRAST MEDICATION: Performed by: RADIOLOGY

## 2025-08-18 PROCEDURE — 6370000000 HC RX 637 (ALT 250 FOR IP): Performed by: FAMILY MEDICINE

## 2025-08-18 PROCEDURE — 73030 X-RAY EXAM OF SHOULDER: CPT

## 2025-08-18 PROCEDURE — 72128 CT CHEST SPINE W/O DYE: CPT

## 2025-08-18 PROCEDURE — 0042T CT BRAIN PERFUSION: CPT

## 2025-08-18 PROCEDURE — 6360000002 HC RX W HCPCS: Performed by: FAMILY MEDICINE

## 2025-08-18 PROCEDURE — 82962 GLUCOSE BLOOD TEST: CPT

## 2025-08-18 PROCEDURE — 74176 CT ABD & PELVIS W/O CONTRAST: CPT

## 2025-08-18 PROCEDURE — 70496 CT ANGIOGRAPHY HEAD: CPT

## 2025-08-18 PROCEDURE — 2500000003 HC RX 250 WO HCPCS: Performed by: FAMILY MEDICINE

## 2025-08-18 PROCEDURE — 73070 X-RAY EXAM OF ELBOW: CPT

## 2025-08-18 PROCEDURE — 4A03X5D MEASUREMENT OF ARTERIAL FLOW, INTRACRANIAL, EXTERNAL APPROACH: ICD-10-PCS | Performed by: HOSPITALIST

## 2025-08-18 PROCEDURE — 71250 CT THORAX DX C-: CPT

## 2025-08-18 PROCEDURE — 6370000000 HC RX 637 (ALT 250 FOR IP)

## 2025-08-18 PROCEDURE — 93005 ELECTROCARDIOGRAM TRACING: CPT

## 2025-08-18 PROCEDURE — 73060 X-RAY EXAM OF HUMERUS: CPT

## 2025-08-18 PROCEDURE — 99285 EMERGENCY DEPT VISIT HI MDM: CPT

## 2025-08-18 PROCEDURE — 85025 COMPLETE CBC W/AUTO DIFF WBC: CPT

## 2025-08-18 PROCEDURE — 73000 X-RAY EXAM OF COLLAR BONE: CPT

## 2025-08-18 PROCEDURE — 70450 CT HEAD/BRAIN W/O DYE: CPT

## 2025-08-18 PROCEDURE — APPNB30 APP NON BILLABLE TIME 0-30 MINS: Performed by: NURSE PRACTITIONER

## 2025-08-18 RX ORDER — ALBUTEROL SULFATE 90 UG/1
2 INHALANT RESPIRATORY (INHALATION) 4 TIMES DAILY PRN
Status: DISCONTINUED | OUTPATIENT
Start: 2025-08-18 | End: 2025-08-22 | Stop reason: HOSPADM

## 2025-08-18 RX ORDER — ATORVASTATIN CALCIUM 20 MG/1
80 TABLET, FILM COATED ORAL NIGHTLY
Status: DISCONTINUED | OUTPATIENT
Start: 2025-08-18 | End: 2025-08-22 | Stop reason: HOSPADM

## 2025-08-18 RX ORDER — SODIUM CHLORIDE 0.9 % (FLUSH) 0.9 %
5-40 SYRINGE (ML) INJECTION EVERY 12 HOURS SCHEDULED
Status: DISCONTINUED | OUTPATIENT
Start: 2025-08-18 | End: 2025-08-22 | Stop reason: HOSPADM

## 2025-08-18 RX ORDER — POLYETHYLENE GLYCOL 3350 17 G/17G
17 POWDER, FOR SOLUTION ORAL DAILY PRN
Status: DISCONTINUED | OUTPATIENT
Start: 2025-08-18 | End: 2025-08-22 | Stop reason: HOSPADM

## 2025-08-18 RX ORDER — ACETAMINOPHEN 500 MG
500 TABLET ORAL EVERY 8 HOURS
Status: DISCONTINUED | OUTPATIENT
Start: 2025-08-18 | End: 2025-08-22 | Stop reason: HOSPADM

## 2025-08-18 RX ORDER — IPRATROPIUM BROMIDE AND ALBUTEROL SULFATE 2.5; .5 MG/3ML; MG/3ML
1 SOLUTION RESPIRATORY (INHALATION) EVERY 4 HOURS PRN
Status: DISCONTINUED | OUTPATIENT
Start: 2025-08-18 | End: 2025-08-22 | Stop reason: HOSPADM

## 2025-08-18 RX ORDER — LABETALOL HYDROCHLORIDE 5 MG/ML
10 INJECTION, SOLUTION INTRAVENOUS EVERY 10 MIN PRN
Status: DISCONTINUED | OUTPATIENT
Start: 2025-08-18 | End: 2025-08-22 | Stop reason: HOSPADM

## 2025-08-18 RX ORDER — ONDANSETRON 4 MG/1
4 TABLET, ORALLY DISINTEGRATING ORAL EVERY 8 HOURS PRN
Status: DISCONTINUED | OUTPATIENT
Start: 2025-08-18 | End: 2025-08-22 | Stop reason: HOSPADM

## 2025-08-18 RX ORDER — SODIUM CHLORIDE 0.9 % (FLUSH) 0.9 %
5-40 SYRINGE (ML) INJECTION PRN
Status: DISCONTINUED | OUTPATIENT
Start: 2025-08-18 | End: 2025-08-22 | Stop reason: HOSPADM

## 2025-08-18 RX ORDER — ESCITALOPRAM OXALATE 10 MG/1
10 TABLET ORAL
Status: DISCONTINUED | OUTPATIENT
Start: 2025-08-18 | End: 2025-08-22 | Stop reason: HOSPADM

## 2025-08-18 RX ORDER — SODIUM CHLORIDE 9 MG/ML
INJECTION, SOLUTION INTRAVENOUS PRN
Status: DISCONTINUED | OUTPATIENT
Start: 2025-08-18 | End: 2025-08-22 | Stop reason: HOSPADM

## 2025-08-18 RX ORDER — BUSPIRONE HYDROCHLORIDE 5 MG/1
5 TABLET ORAL DAILY PRN
Status: DISCONTINUED | OUTPATIENT
Start: 2025-08-18 | End: 2025-08-22 | Stop reason: HOSPADM

## 2025-08-18 RX ORDER — OXYCODONE HYDROCHLORIDE 5 MG/1
5 TABLET ORAL
Refills: 0 | Status: COMPLETED | OUTPATIENT
Start: 2025-08-18 | End: 2025-08-18

## 2025-08-18 RX ORDER — OXYCODONE HYDROCHLORIDE 5 MG/1
10 TABLET ORAL EVERY 4 HOURS PRN
Refills: 0 | Status: DISCONTINUED | OUTPATIENT
Start: 2025-08-18 | End: 2025-08-22 | Stop reason: HOSPADM

## 2025-08-18 RX ORDER — ONDANSETRON 2 MG/ML
4 INJECTION INTRAMUSCULAR; INTRAVENOUS EVERY 6 HOURS PRN
Status: DISCONTINUED | OUTPATIENT
Start: 2025-08-18 | End: 2025-08-22 | Stop reason: HOSPADM

## 2025-08-18 RX ORDER — IOPAMIDOL 755 MG/ML
100 INJECTION, SOLUTION INTRAVASCULAR
Status: COMPLETED | OUTPATIENT
Start: 2025-08-18 | End: 2025-08-18

## 2025-08-18 RX ADMIN — ATORVASTATIN CALCIUM 80 MG: 40 TABLET, FILM COATED ORAL at 22:30

## 2025-08-18 RX ADMIN — IOPAMIDOL 80 ML: 755 INJECTION, SOLUTION INTRAVENOUS at 19:25

## 2025-08-18 RX ADMIN — ACETAMINOPHEN 500 MG: 500 TABLET ORAL at 22:30

## 2025-08-18 RX ADMIN — IOPAMIDOL 40 ML: 755 INJECTION, SOLUTION INTRAVENOUS at 19:24

## 2025-08-18 RX ADMIN — ESCITALOPRAM OXALATE 10 MG: 10 TABLET ORAL at 22:31

## 2025-08-18 RX ADMIN — SODIUM CHLORIDE, PRESERVATIVE FREE 10 ML: 5 INJECTION INTRAVENOUS at 22:38

## 2025-08-18 RX ADMIN — OXYCODONE HYDROCHLORIDE 5 MG: 5 TABLET ORAL at 22:31

## 2025-08-18 RX ADMIN — ARFORMOTEROL TARTRATE: 15 SOLUTION RESPIRATORY (INHALATION) at 22:34

## 2025-08-18 ASSESSMENT — PAIN DESCRIPTION - LOCATION
LOCATION: LEG;NECK;SHOULDER
LOCATION: LEG;SHOULDER;ARM

## 2025-08-18 ASSESSMENT — PAIN - FUNCTIONAL ASSESSMENT
PAIN_FUNCTIONAL_ASSESSMENT: PREVENTS OR INTERFERES SOME ACTIVE ACTIVITIES AND ADLS
PAIN_FUNCTIONAL_ASSESSMENT: 0-10
PAIN_FUNCTIONAL_ASSESSMENT: PREVENTS OR INTERFERES SOME ACTIVE ACTIVITIES AND ADLS

## 2025-08-18 ASSESSMENT — PAIN DESCRIPTION - ONSET: ONSET: ON-GOING

## 2025-08-18 ASSESSMENT — PAIN SCALES - GENERAL
PAINLEVEL_OUTOF10: 8
PAINLEVEL_OUTOF10: 2
PAINLEVEL_OUTOF10: 8

## 2025-08-18 ASSESSMENT — PAIN DESCRIPTION - ORIENTATION
ORIENTATION: LEFT
ORIENTATION: LEFT

## 2025-08-18 ASSESSMENT — PAIN DESCRIPTION - FREQUENCY: FREQUENCY: CONTINUOUS

## 2025-08-18 ASSESSMENT — PAIN DESCRIPTION - PAIN TYPE: TYPE: ACUTE PAIN

## 2025-08-18 ASSESSMENT — PAIN DESCRIPTION - DESCRIPTORS
DESCRIPTORS: STABBING
DESCRIPTORS: STABBING

## 2025-08-19 ENCOUNTER — APPOINTMENT (OUTPATIENT)
Facility: HOSPITAL | Age: 80
End: 2025-08-19
Payer: MEDICARE

## 2025-08-19 PROBLEM — C67.9 BLADDER CANCER METASTASIZED TO BRAIN (HCC): Status: ACTIVE | Noted: 2025-07-31
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
## 2025-08-19 PROBLEM — R20.0 LEFT ARM NUMBNESS: Status: ACTIVE | Noted: 2025-08-19

## 2025-08-19 LAB
CHOLEST SERPL-MCNC: 94 MG/DL (ref 0–200)
EKG ATRIAL RATE: 75 BPM
EKG DIAGNOSIS: NORMAL
EKG P AXIS: 15 DEGREES
EKG P-R INTERVAL: 210 MS
EKG Q-T INTERVAL: 448 MS
EKG QRS DURATION: 148 MS
EKG QTC CALCULATION (BAZETT): 500 MS
EKG R AXIS: 30 DEGREES
EKG T AXIS: 183 DEGREES
EKG VENTRICULAR RATE: 75 BPM
ERYTHROCYTE [DISTWIDTH] IN BLOOD BY AUTOMATED COUNT: 15.4 % (ref 11.5–14.5)
EST. AVERAGE GLUCOSE BLD GHB EST-MCNC: 111 MG/DL
HBA1C MFR BLD: 5.5 % (ref 4–5.6)
HCT VFR BLD AUTO: 34 % (ref 36.6–50.3)
HDLC SERPL-MCNC: 34 MG/DL (ref 40–60)
HDLC SERPL: 2.8 (ref 0–5)
HGB BLD-MCNC: 11.2 G/DL (ref 12.1–17)
LDLC SERPL CALC-MCNC: 30 MG/DL (ref 0–100)
MCH RBC QN AUTO: 33.2 PG (ref 26–34)
MCHC RBC AUTO-ENTMCNC: 32.9 G/DL (ref 30–36.5)
MCV RBC AUTO: 100.9 FL (ref 80–99)
NRBC # BLD: 0 K/UL (ref 0–0.01)
NRBC BLD-RTO: 0 PER 100 WBC
PLATELET # BLD AUTO: 219 K/UL (ref 150–400)
PMV BLD AUTO: 9.3 FL (ref 8.9–12.9)
RBC # BLD AUTO: 3.37 M/UL (ref 4.1–5.7)
TRIGL SERPL-MCNC: 151 MG/DL (ref 0–150)
VLDLC SERPL CALC-MCNC: 30 MG/DL
WBC # BLD AUTO: 5.5 K/UL (ref 4.1–11.1)

## 2025-08-19 PROCEDURE — 97535 SELF CARE MNGMENT TRAINING: CPT

## 2025-08-19 PROCEDURE — 97161 PT EVAL LOW COMPLEX 20 MIN: CPT

## 2025-08-19 PROCEDURE — 70551 MRI BRAIN STEM W/O DYE: CPT

## 2025-08-19 PROCEDURE — 99223 1ST HOSP IP/OBS HIGH 75: CPT | Performed by: NURSE PRACTITIONER

## 2025-08-19 PROCEDURE — 97165 OT EVAL LOW COMPLEX 30 MIN: CPT

## 2025-08-19 PROCEDURE — 99222 1ST HOSP IP/OBS MODERATE 55: CPT | Performed by: INTERNAL MEDICINE

## 2025-08-19 PROCEDURE — G0378 HOSPITAL OBSERVATION PER HR: HCPCS

## 2025-08-19 PROCEDURE — 87040 BLOOD CULTURE FOR BACTERIA: CPT

## 2025-08-19 PROCEDURE — 83036 HEMOGLOBIN GLYCOSYLATED A1C: CPT

## 2025-08-19 PROCEDURE — 97116 GAIT TRAINING THERAPY: CPT

## 2025-08-19 PROCEDURE — 92610 EVALUATE SWALLOWING FUNCTION: CPT

## 2025-08-19 PROCEDURE — 6370000000 HC RX 637 (ALT 250 FOR IP): Performed by: FAMILY MEDICINE

## 2025-08-19 PROCEDURE — 85027 COMPLETE CBC AUTOMATED: CPT

## 2025-08-19 PROCEDURE — 6360000002 HC RX W HCPCS: Performed by: FAMILY MEDICINE

## 2025-08-19 PROCEDURE — 2500000003 HC RX 250 WO HCPCS: Performed by: FAMILY MEDICINE

## 2025-08-19 PROCEDURE — 93010 ELECTROCARDIOGRAM REPORT: CPT | Performed by: INTERNAL MEDICINE

## 2025-08-19 PROCEDURE — 97530 THERAPEUTIC ACTIVITIES: CPT

## 2025-08-19 PROCEDURE — 94640 AIRWAY INHALATION TREATMENT: CPT

## 2025-08-19 PROCEDURE — 6360000002 HC RX W HCPCS: Performed by: INTERNAL MEDICINE

## 2025-08-19 PROCEDURE — 80061 LIPID PANEL: CPT

## 2025-08-19 RX ORDER — DEXAMETHASONE 4 MG/1
4 TABLET ORAL DAILY
Status: DISCONTINUED | OUTPATIENT
Start: 2025-08-19 | End: 2025-08-22 | Stop reason: HOSPADM

## 2025-08-19 RX ADMIN — ACETAMINOPHEN 500 MG: 500 TABLET ORAL at 21:15

## 2025-08-19 RX ADMIN — SODIUM CHLORIDE, PRESERVATIVE FREE 10 ML: 5 INJECTION INTRAVENOUS at 21:15

## 2025-08-19 RX ADMIN — APIXABAN 5 MG: 5 TABLET, FILM COATED ORAL at 11:41

## 2025-08-19 RX ADMIN — ESCITALOPRAM OXALATE 10 MG: 10 TABLET ORAL at 21:14

## 2025-08-19 RX ADMIN — OXYCODONE 10 MG: 5 TABLET ORAL at 16:55

## 2025-08-19 RX ADMIN — OXYCODONE 10 MG: 5 TABLET ORAL at 00:15

## 2025-08-19 RX ADMIN — ARFORMOTEROL TARTRATE: 15 SOLUTION RESPIRATORY (INHALATION) at 20:03

## 2025-08-19 RX ADMIN — OXYCODONE 10 MG: 5 TABLET ORAL at 21:14

## 2025-08-19 RX ADMIN — OXYCODONE 10 MG: 5 TABLET ORAL at 11:40

## 2025-08-19 RX ADMIN — DEXAMETHASONE 4 MG: 4 TABLET ORAL at 17:58

## 2025-08-19 RX ADMIN — ACETAMINOPHEN 500 MG: 500 TABLET ORAL at 06:32

## 2025-08-19 RX ADMIN — ATORVASTATIN CALCIUM 80 MG: 40 TABLET, FILM COATED ORAL at 21:14

## 2025-08-19 RX ADMIN — SODIUM CHLORIDE, PRESERVATIVE FREE 10 ML: 5 INJECTION INTRAVENOUS at 11:41

## 2025-08-19 RX ADMIN — APIXABAN 5 MG: 5 TABLET, FILM COATED ORAL at 21:14

## 2025-08-19 ASSESSMENT — PAIN DESCRIPTION - ORIENTATION
ORIENTATION: LEFT

## 2025-08-19 ASSESSMENT — PAIN SCALES - GENERAL
PAINLEVEL_OUTOF10: 4
PAINLEVEL_OUTOF10: 6
PAINLEVEL_OUTOF10: 10
PAINLEVEL_OUTOF10: 6
PAINLEVEL_OUTOF10: 4
PAINLEVEL_OUTOF10: 8
PAINLEVEL_OUTOF10: 2

## 2025-08-19 ASSESSMENT — PAIN DESCRIPTION - LOCATION
LOCATION: SHOULDER
LOCATION: SHOULDER
LOCATION: ARM
LOCATION: ARM

## 2025-08-19 ASSESSMENT — PAIN - FUNCTIONAL ASSESSMENT
PAIN_FUNCTIONAL_ASSESSMENT: 0-10

## 2025-08-19 ASSESSMENT — PAIN DESCRIPTION - DESCRIPTORS
DESCRIPTORS: ACHING

## 2025-08-20 PROCEDURE — 99233 SBSQ HOSP IP/OBS HIGH 50: CPT | Performed by: NURSE PRACTITIONER

## 2025-08-20 PROCEDURE — 97116 GAIT TRAINING THERAPY: CPT

## 2025-08-20 PROCEDURE — 6370000000 HC RX 637 (ALT 250 FOR IP): Performed by: FAMILY MEDICINE

## 2025-08-20 PROCEDURE — 99233 SBSQ HOSP IP/OBS HIGH 50: CPT | Performed by: INTERNAL MEDICINE

## 2025-08-20 PROCEDURE — 6360000002 HC RX W HCPCS: Performed by: INTERNAL MEDICINE

## 2025-08-20 PROCEDURE — 6360000002 HC RX W HCPCS: Performed by: FAMILY MEDICINE

## 2025-08-20 PROCEDURE — 2500000003 HC RX 250 WO HCPCS: Performed by: FAMILY MEDICINE

## 2025-08-20 PROCEDURE — 97535 SELF CARE MNGMENT TRAINING: CPT

## 2025-08-20 PROCEDURE — 97530 THERAPEUTIC ACTIVITIES: CPT

## 2025-08-20 PROCEDURE — 1100000000 HC RM PRIVATE

## 2025-08-20 PROCEDURE — 94640 AIRWAY INHALATION TREATMENT: CPT

## 2025-08-20 RX ADMIN — ESCITALOPRAM OXALATE 10 MG: 10 TABLET ORAL at 20:34

## 2025-08-20 RX ADMIN — APIXABAN 5 MG: 5 TABLET, FILM COATED ORAL at 20:34

## 2025-08-20 RX ADMIN — ARFORMOTEROL TARTRATE: 15 SOLUTION RESPIRATORY (INHALATION) at 19:17

## 2025-08-20 RX ADMIN — OXYCODONE 10 MG: 5 TABLET ORAL at 23:06

## 2025-08-20 RX ADMIN — SODIUM CHLORIDE, PRESERVATIVE FREE 10 ML: 5 INJECTION INTRAVENOUS at 15:32

## 2025-08-20 RX ADMIN — SODIUM CHLORIDE, PRESERVATIVE FREE 10 ML: 5 INJECTION INTRAVENOUS at 20:35

## 2025-08-20 RX ADMIN — ACETAMINOPHEN 500 MG: 500 TABLET ORAL at 07:20

## 2025-08-20 RX ADMIN — DEXAMETHASONE 4 MG: 4 TABLET ORAL at 09:40

## 2025-08-20 RX ADMIN — ATORVASTATIN CALCIUM 80 MG: 40 TABLET, FILM COATED ORAL at 20:34

## 2025-08-20 RX ADMIN — ACETAMINOPHEN 500 MG: 500 TABLET ORAL at 15:39

## 2025-08-20 RX ADMIN — ARFORMOTEROL TARTRATE: 15 SOLUTION RESPIRATORY (INHALATION) at 07:37

## 2025-08-20 RX ADMIN — OXYCODONE 10 MG: 5 TABLET ORAL at 09:41

## 2025-08-20 RX ADMIN — ACETAMINOPHEN 500 MG: 500 TABLET ORAL at 23:06

## 2025-08-20 RX ADMIN — APIXABAN 5 MG: 5 TABLET, FILM COATED ORAL at 09:41

## 2025-08-20 RX ADMIN — OXYCODONE 10 MG: 5 TABLET ORAL at 18:42

## 2025-08-20 ASSESSMENT — PAIN DESCRIPTION - ORIENTATION
ORIENTATION: LEFT

## 2025-08-20 ASSESSMENT — PAIN SCALES - GENERAL
PAINLEVEL_OUTOF10: 5
PAINLEVEL_OUTOF10: 6
PAINLEVEL_OUTOF10: 6
PAINLEVEL_OUTOF10: 0
PAINLEVEL_OUTOF10: 3

## 2025-08-20 ASSESSMENT — PAIN DESCRIPTION - DESCRIPTORS: DESCRIPTORS: ACHING

## 2025-08-20 ASSESSMENT — PAIN DESCRIPTION - LOCATION
LOCATION: RIB CAGE;SHOULDER
LOCATION: SHOULDER
LOCATION: SHOULDER
LOCATION: ARM

## 2025-08-20 ASSESSMENT — PAIN - FUNCTIONAL ASSESSMENT
PAIN_FUNCTIONAL_ASSESSMENT: 0-10
PAIN_FUNCTIONAL_ASSESSMENT: 0-10

## 2025-08-21 PROCEDURE — 6370000000 HC RX 637 (ALT 250 FOR IP): Performed by: FAMILY MEDICINE

## 2025-08-21 PROCEDURE — 2500000003 HC RX 250 WO HCPCS: Performed by: FAMILY MEDICINE

## 2025-08-21 PROCEDURE — 94760 N-INVAS EAR/PLS OXIMETRY 1: CPT

## 2025-08-21 PROCEDURE — 94640 AIRWAY INHALATION TREATMENT: CPT

## 2025-08-21 PROCEDURE — 6360000002 HC RX W HCPCS: Performed by: FAMILY MEDICINE

## 2025-08-21 PROCEDURE — 1100000000 HC RM PRIVATE

## 2025-08-21 PROCEDURE — 6360000002 HC RX W HCPCS: Performed by: INTERNAL MEDICINE

## 2025-08-21 PROCEDURE — 2700000000 HC OXYGEN THERAPY PER DAY

## 2025-08-21 RX ADMIN — SODIUM CHLORIDE, PRESERVATIVE FREE 10 ML: 5 INJECTION INTRAVENOUS at 08:49

## 2025-08-21 RX ADMIN — DEXAMETHASONE 4 MG: 4 TABLET ORAL at 08:48

## 2025-08-21 RX ADMIN — ESCITALOPRAM OXALATE 10 MG: 10 TABLET ORAL at 20:41

## 2025-08-21 RX ADMIN — OXYCODONE 10 MG: 5 TABLET ORAL at 05:14

## 2025-08-21 RX ADMIN — ARFORMOTEROL TARTRATE: 15 SOLUTION RESPIRATORY (INHALATION) at 21:32

## 2025-08-21 RX ADMIN — OXYCODONE 10 MG: 5 TABLET ORAL at 20:55

## 2025-08-21 RX ADMIN — APIXABAN 5 MG: 5 TABLET, FILM COATED ORAL at 20:41

## 2025-08-21 RX ADMIN — ACETAMINOPHEN 500 MG: 500 TABLET ORAL at 06:38

## 2025-08-21 RX ADMIN — ACETAMINOPHEN 500 MG: 500 TABLET ORAL at 23:03

## 2025-08-21 RX ADMIN — ACETAMINOPHEN 500 MG: 500 TABLET ORAL at 14:16

## 2025-08-21 RX ADMIN — ATORVASTATIN CALCIUM 80 MG: 40 TABLET, FILM COATED ORAL at 20:41

## 2025-08-21 RX ADMIN — APIXABAN 5 MG: 5 TABLET, FILM COATED ORAL at 08:48

## 2025-08-21 RX ADMIN — SODIUM CHLORIDE, PRESERVATIVE FREE 10 ML: 5 INJECTION INTRAVENOUS at 20:43

## 2025-08-21 RX ADMIN — ARFORMOTEROL TARTRATE: 15 SOLUTION RESPIRATORY (INHALATION) at 08:10

## 2025-08-21 ASSESSMENT — PAIN SCALES - GENERAL
PAINLEVEL_OUTOF10: 6
PAINLEVEL_OUTOF10: 3
PAINLEVEL_OUTOF10: 2
PAINLEVEL_OUTOF10: 4
PAINLEVEL_OUTOF10: 2
PAINLEVEL_OUTOF10: 6

## 2025-08-21 ASSESSMENT — PAIN - FUNCTIONAL ASSESSMENT
PAIN_FUNCTIONAL_ASSESSMENT: 0-10

## 2025-08-21 ASSESSMENT — PAIN DESCRIPTION - LOCATION
LOCATION: ARM
LOCATION: ARM;SHOULDER

## 2025-08-21 ASSESSMENT — PAIN DESCRIPTION - ORIENTATION
ORIENTATION: LEFT
ORIENTATION: LEFT

## 2025-08-22 VITALS
WEIGHT: 221.34 LBS | TEMPERATURE: 97.1 F | HEART RATE: 86 BPM | RESPIRATION RATE: 17 BRPM | DIASTOLIC BLOOD PRESSURE: 90 MMHG | OXYGEN SATURATION: 93 % | HEIGHT: 70 IN | BODY MASS INDEX: 31.69 KG/M2 | SYSTOLIC BLOOD PRESSURE: 135 MMHG

## 2025-08-22 PROCEDURE — 6360000002 HC RX W HCPCS: Performed by: FAMILY MEDICINE

## 2025-08-22 PROCEDURE — 2500000003 HC RX 250 WO HCPCS: Performed by: FAMILY MEDICINE

## 2025-08-22 PROCEDURE — 6360000002 HC RX W HCPCS: Performed by: INTERNAL MEDICINE

## 2025-08-22 PROCEDURE — 6370000000 HC RX 637 (ALT 250 FOR IP): Performed by: FAMILY MEDICINE

## 2025-08-22 PROCEDURE — 94760 N-INVAS EAR/PLS OXIMETRY 1: CPT

## 2025-08-22 PROCEDURE — 94640 AIRWAY INHALATION TREATMENT: CPT

## 2025-08-22 RX ORDER — DEXAMETHASONE 4 MG/1
TABLET ORAL
Qty: 11 TABLET | Refills: 0 | Status: SHIPPED | OUTPATIENT
Start: 2025-08-23 | End: 2025-09-06

## 2025-08-22 RX ADMIN — APIXABAN 5 MG: 5 TABLET, FILM COATED ORAL at 09:11

## 2025-08-22 RX ADMIN — DEXAMETHASONE 4 MG: 4 TABLET ORAL at 09:10

## 2025-08-22 RX ADMIN — ARFORMOTEROL TARTRATE: 15 SOLUTION RESPIRATORY (INHALATION) at 08:01

## 2025-08-22 RX ADMIN — SODIUM CHLORIDE, PRESERVATIVE FREE 10 ML: 5 INJECTION INTRAVENOUS at 09:11

## 2025-08-22 RX ADMIN — ACETAMINOPHEN 500 MG: 500 TABLET ORAL at 09:10

## 2025-08-22 RX ADMIN — OXYCODONE 10 MG: 5 TABLET ORAL at 05:05

## 2025-08-22 ASSESSMENT — PAIN DESCRIPTION - ONSET: ONSET: GRADUAL

## 2025-08-22 ASSESSMENT — PAIN DESCRIPTION - PAIN TYPE
TYPE: ACUTE PAIN
TYPE: ACUTE PAIN

## 2025-08-22 ASSESSMENT — PAIN SCALES - GENERAL
PAINLEVEL_OUTOF10: 7
PAINLEVEL_OUTOF10: 5
PAINLEVEL_OUTOF10: 5

## 2025-08-22 ASSESSMENT — PAIN DESCRIPTION - DESCRIPTORS
DESCRIPTORS: SHARP
DESCRIPTORS: SHARP

## 2025-08-22 ASSESSMENT — PAIN DESCRIPTION - ORIENTATION
ORIENTATION: LEFT;UPPER
ORIENTATION: UPPER

## 2025-08-22 ASSESSMENT — PAIN - FUNCTIONAL ASSESSMENT
PAIN_FUNCTIONAL_ASSESSMENT: ACTIVITIES ARE NOT PREVENTED
PAIN_FUNCTIONAL_ASSESSMENT: 0-10
PAIN_FUNCTIONAL_ASSESSMENT: ACTIVITIES ARE NOT PREVENTED

## 2025-08-22 ASSESSMENT — PAIN DESCRIPTION - LOCATION
LOCATION: ARM
LOCATION: ARM

## 2025-08-22 ASSESSMENT — PAIN DESCRIPTION - FREQUENCY
FREQUENCY: CONTINUOUS
FREQUENCY: INTERMITTENT

## 2025-08-24 LAB
BACTERIA SPEC CULT: NORMAL
SERVICE CMNT-IMP: NORMAL

## 2025-09-05 ENCOUNTER — TELEPHONE (OUTPATIENT)
Age: 80
End: 2025-09-05

## (undated) DEVICE — DRAPE,ABDOMINAL,MAJOR,PCH/TRGH,ST,8/CS: Brand: MEDLINE

## (undated) DEVICE — APPLIER CLP L SHFT DIA12MM 20 ROT MULT LIGACLP

## (undated) DEVICE — GARMENT,MEDLINE,DVT,INT,CALF,MED, GEN2: Brand: MEDLINE

## (undated) DEVICE — INFECTION CONTROL KIT SYS

## (undated) DEVICE — SYRINGE IRRIG 60ML SFT PLIABLE BLB EZ TO GRP 1 HND USE W/

## (undated) DEVICE — TOWEL SURG W17XL27IN STD BLU COT NONFENESTRATED PREWASHED

## (undated) DEVICE — RESERVOIR,SUCTION,100CC,SILICONE: Brand: MEDLINE

## (undated) DEVICE — FLOSEAL WITH RECOTHROM - 10ML.: Brand: FLOSEAL HEMOSTATIC MATRIX

## (undated) DEVICE — GOWN,SIRUS,NONRNF,SETINSLV,XL,20/CS: Brand: MEDLINE

## (undated) DEVICE — BIPOLAR IRRIGATOR INTEGRATED TUBING AND BIPOLAR CORD SET, DISPOSABLE

## (undated) DEVICE — SOLUTION SCRB 2OZ 10% POVIDONE IOD ANTIMIC BTL

## (undated) DEVICE — HANDLE LT SNAP ON ULT DURABLE LENS FOR TRUMPF ALC DISPOSABLE

## (undated) DEVICE — MARKER,SKIN,WI/RULER AND LABELS: Brand: MEDLINE

## (undated) DEVICE — TUBING, SUCTION, 1/4" X 12', STRAIGHT: Brand: MEDLINE

## (undated) DEVICE — TRAY PREP DRY W/ PREM GLV 2 APPL 6 SPNG 2 UNDPD 1 OVERWRAP

## (undated) DEVICE — TAPE ADH CLTH SILK H2O REPELLENT CURAD

## (undated) DEVICE — GDWIRE URET STR STD .035X150 -- ZIPWIRE STD

## (undated) DEVICE — SOLUTION ANTIFOG VIS SYS CLEARIFY LAPSCP

## (undated) DEVICE — DRAPE,REIN 53X77,STERILE: Brand: MEDLINE

## (undated) DEVICE — SYR 10ML LUER LOK 1/5ML GRAD --

## (undated) DEVICE — PAD,NON-ADHERENT,W/AD,3X4,ST,LF,1/PK: Brand: MEDLINE

## (undated) DEVICE — SUTURE VCRL SZ 3-0 L18IN ABSRB UD L26MM SH 1/2 CIR J864D

## (undated) DEVICE — SUTURE PDS II SZ 1 L27IN ABSRB VLT CT-1 L36MM 1/2 CIR Z341H

## (undated) DEVICE — STAPLER ECHELON 3000 45MM STANDARD

## (undated) DEVICE — SUTURE MONOCRYL + SZ 4-0 L27IN ABSRB UD L19MM PS-2 3/8 CIR MCP426H

## (undated) DEVICE — TBG INSUFFLATION LUER LOCK: Brand: MEDLINE INDUSTRIES, INC.

## (undated) DEVICE — PACK,LAPAROTOMY,2 REINFORCED GOWNS: Brand: MEDLINE

## (undated) DEVICE — KIT EVAC 400CC DIA1/8IN H PAT 12.5IN 3 SPR RND SHP PVC DRN

## (undated) DEVICE — X-RAY DETECTABLE SPONGES,16 PLY: Brand: VISTEC

## (undated) DEVICE — SUTURE PERMAHAND SZ 2-0 L30IN NONABSORBABLE BLK SILK W/O A305H

## (undated) DEVICE — DRAPE SURG W41XL74IN CLR FULL SZ C ARM 3 ADH POLY STRP E

## (undated) DEVICE — HYPODERMIC SAFETY NEEDLE: Brand: MAGELLAN

## (undated) DEVICE — SUTURE PERMAHAND SZ 3-0 L18IN NONABSORBABLE BLK L26MM SH C013D

## (undated) DEVICE — LAPAROSCOPIC TROCAR SLEEVE/SINGLE USE: Brand: KII® SLEEVE

## (undated) DEVICE — BASIN ST MAJOR-NO CAUTERY: Brand: MEDLINE INDUSTRIES, INC.

## (undated) DEVICE — Device: Brand: SENSURA MIO

## (undated) DEVICE — PAD,NON-ADHERENT,3X8,STERILE,LF,1/PK: Brand: MEDLINE

## (undated) DEVICE — CUTTING ELECTRODE MONO 22FR 12/30°  FOR RESECTOSCOPES, TELESCOPE Ø 4 MM, FOR SHEATHS, CONTINUOUS IRRIGATION, 22/24 FR, LOOP: ROUND, WIRE Ø 0.25 MM, FORK COLOR GREEN, STEM COLOR TRANSPARENT, PACK = 10 PCS, FOR SHARK RESECTOSCOPE, STERILE, FOR SINGLE USE: Brand: SHARK

## (undated) DEVICE — SYRINGE 20ML LL S/C 50

## (undated) DEVICE — REM POLYHESIVE ADULT PATIENT RETURN ELECTRODE: Brand: VALLEYLAB

## (undated) DEVICE — 3M™ RANGER™ FLUID WARMING IRRIGATION SET, 24750, 10/CASE: Brand: 3M™ RANGER™

## (undated) DEVICE — GLOVE ORANGE PI 7 1/2   MSG9075

## (undated) DEVICE — KIT DSG LRG NEG PRSS WND THER VAC GRANUFOAM

## (undated) DEVICE — BIT DRL L12MM DIA2.2MM BLU FLAT CHK FOR ANT CERV PLT SYS

## (undated) DEVICE — SCISSORS ENDOSCP DIA5MM CRV MPLR CAUT W/ RATCH HNDL

## (undated) DEVICE — GOWN,SIRUS,POLYRNF,BRTHSLV,XL,30/CS: Brand: MEDLINE

## (undated) DEVICE — GLOVE SURG SZ 8 L12IN FNGR THK94MIL STD WHT LTX FREE

## (undated) DEVICE — SUTURE PDS II SZ 1 L54IN ABSRB VLT L65MM TP-1 1/2 CIR Z879G

## (undated) DEVICE — JELLY,LUBE,STERILE,FLIP TOP,TUBE,4-OZ: Brand: MEDLINE

## (undated) DEVICE — 1LYRTR 16FR10ML 100%SILI SNAP: Brand: MEDLINE INDUSTRIES, INC.

## (undated) DEVICE — CONTAINER,SPECIMEN,4OZ,OR STRL: Brand: MEDLINE

## (undated) DEVICE — RELOAD STPL L45MM H1-2.6MM MESENTERY THN TISS WHT GRIPPING

## (undated) DEVICE — Device

## (undated) DEVICE — SUTURE PERMAHAND SZ 3-0 L30IN NONABSORBABLE BLK SH L26MM K832H

## (undated) DEVICE — Z DISCONTINUED USE 2874146 AGENT HEMSTAT W2XL14IN OXIDIZED REGENERATED CELOS ABSRB FOR

## (undated) DEVICE — NDL PRT INJ NSAF BLNT 18GX1.5 --

## (undated) DEVICE — GENERAL LAPAROSCOPY - SMH: Brand: MEDLINE INDUSTRIES, INC.

## (undated) DEVICE — COLLAR CERV H3XL22IN UNIV COT M DENS FOAM BRTH ADJ

## (undated) DEVICE — TOOL 14MH30 LEGEND 14CM 3MM: Brand: MIDAS REX ™

## (undated) DEVICE — WRAP SURG W1.31XL1.34M CARD FOR PT 165-172CM THERMOWRP

## (undated) DEVICE — ACCESS PLATFORM FOR MINIMALLY INVASIVE SURGERY.: Brand: GELPORT® LAPAROSCOPIC  SYSTEM

## (undated) DEVICE — SUTURE PDS II SZ 1 L36IN ABSRB VLT L48MM CTX 1/2 CIR Z371T

## (undated) DEVICE — PACK,BASIC,SIRUS,V: Brand: MEDLINE

## (undated) DEVICE — SUTURE PERMAHAND SZ 3-0 L30IN NONABSORBABLE BLK L26MM SH C017D

## (undated) DEVICE — BIPOLAR FORCEPS CORD: Brand: VALLEYLAB

## (undated) DEVICE — SOLUTION IRRIG 1000ML 0.9% SOD CHL USP POUR PLAS BTL

## (undated) DEVICE — SUTURE VCRL SZ 1 L36IN ABSRB VLT L48MM CTX 1/2 CIR J371H

## (undated) DEVICE — SOL IRR GLYC 1.5 % 3000ML --

## (undated) DEVICE — DRAIN SURG PENROSE 0.5X18 IN CLOSED WND DRAINAGE PREM SIL

## (undated) DEVICE — SOLUTION IRRIG 3000ML 0.9% SOD CHL USP UROMATIC PLAS CONT

## (undated) DEVICE — SUTURE VCRL SZ 4-0 L27IN ABSRB UD L17MM RB-1 1/2 CIR J214H

## (undated) DEVICE — SOL IRR STRL H2O 1000ML BTL --

## (undated) DEVICE — SKIN PREP TRAY 4 COMPARTM TRAY: Brand: MEDLINE INDUSTRIES, INC.

## (undated) DEVICE — CATHETER,FOLEY,SILI-ELAST,LTX,22FR,10ML: Brand: MEDLINE

## (undated) DEVICE — CURVED, LARGE JAW, OPEN SEALER/DIVIDER NANO-COATED: Brand: LIGASURE IMPACT

## (undated) DEVICE — YANKAUER,BULB TIP,W/O VENT,RIGID,STERILE: Brand: MEDLINE

## (undated) DEVICE — SUT CHRMC 4-0 27IN RB1 BRN --

## (undated) DEVICE — DRESSING BORDERED ADH GZ UNIV GEN USE 8INX4IN AND 6INX2IN

## (undated) DEVICE — SUTURE NONABSORBABLE L60IN L65MM SZ 1-0 BLK 1 1/2 CIR NYL 824G

## (undated) DEVICE — BASIC SINGLE BASIN BTC-LF: Brand: MEDLINE INDUSTRIES, INC.

## (undated) DEVICE — SYR LR LCK 1ML GRAD NSAF 30ML --

## (undated) DEVICE — 3M™ TEGADERM™ TRANSPARENT FILM DRESSING FRAME STYLE, 1628, 6 IN X 8 IN (15 CM X 20 CM), 10/CT 8CT/CASE: Brand: 3M™ TEGADERM™

## (undated) DEVICE — DRAPE MICSCP W46XL120IN POLY DRAWSTRAP W STEREO OBS TB AND

## (undated) DEVICE — LEGGINGS, PAIR, 31X48, STERILE: Brand: MEDLINE

## (undated) DEVICE — BLADE ASSEMB CLP HAIR FINE --

## (undated) DEVICE — SUT CHRMC 0 54IN REEL BRN --

## (undated) DEVICE — SUTURE VICRYL + SZ 2-0 L18IN ABSRB UD CP-2 L26MM 1/2 CIR REV VCP762D

## (undated) DEVICE — INTENDED USED TO PROTECT, TAG AND HELP LOCATED SUTURES DURING SURGERY: Brand: STERION®SUTURE AID BOOTIES

## (undated) DEVICE — TIDISHIELD UROLOGY DRAIN BAGS FROSTY VINYL STERILE FITS SIEMENS UROSKOP ACCESS 20 PER CASE: Brand: TIDISHIELD

## (undated) DEVICE — BINDER ABD M/L H12IN FOR 46-62IN WHT 4 SLD PNL DSGN HOOP

## (undated) DEVICE — SUT CHRMC 3-0 27IN SH BRN --

## (undated) DEVICE — SUTURE CHROMIC GUT SZ 2-0 L27IN ABSRB BRN L26MM SH 1/2 CIR G123H

## (undated) DEVICE — NEEDLE SPNL 22GA L3.5IN BLK HUB S STL REG WALL FIT STYL W/

## (undated) DEVICE — COVER,TABLE,HEAVY DUTY,60"X90",STRL: Brand: MEDLINE

## (undated) DEVICE — SUT ETHLN 2-0 18IN FS BLK --

## (undated) DEVICE — SOLUTION IV 250ML 0.9% SOD CHL CLR INJ FLX BG CONT PRT CLSR

## (undated) DEVICE — CATHETER,URETHRAL,REDRUBBER,STRL,12FR: Brand: MEDLINE INDUSTRIES, INC.

## (undated) DEVICE — TOWEL,OR,DSP,ST,BLUE,STD,4/PK,20PK/CS: Brand: MEDLINE

## (undated) DEVICE — DRAPE, LAVH, STERILE: Brand: MEDLINE

## (undated) DEVICE — TISSUE RETRIEVAL SYSTEM: Brand: INZII RETRIEVAL SYSTEM

## (undated) DEVICE — HYPODERMIC SAFETY NEEDLE: Brand: MONOJECT

## (undated) DEVICE — SUT 2.5X0.1875X0.033IN --

## (undated) DEVICE — SPONGE LAPAROTOMY W18XL18IN WHITE STRUNG RADIOPAQUE STERILE

## (undated) DEVICE — PREP SKN CHLRAPRP APL 26ML STR --

## (undated) DEVICE — BLADE,CARBON-STEEL,15,STRL,DISPOSABLE,TB: Brand: MEDLINE

## (undated) DEVICE — SCREW EXT FIX L14MM FOR DISTRCTN

## (undated) DEVICE — CYSTOSCOPY PACK: Brand: CONVERTORS

## (undated) DEVICE — BLADE ELECTRODE: Brand: EDGE

## (undated) DEVICE — DRAINBAG,ANTI-REFLUX TOWER,L/F,2000ML,LL: Brand: MEDLINE

## (undated) DEVICE — SPONGE LAP 18X18IN STRL -- 5/PK

## (undated) DEVICE — STERILE POLYISOPRENE POWDER-FREE SURGICAL GLOVES WITH EMOLLIENT COATING: Brand: PROTEXIS

## (undated) DEVICE — BLADE,CARBON-STEEL,11,STRL,DISPOSABLE,TB: Brand: MEDLINE

## (undated) DEVICE — RELOAD STPL L75MM OPN H3.8MM CLS 1.5MM WIRE DIA0.2MM REG

## (undated) DEVICE — SOLUTION IV 1000ML 0.9% SOD CHL PH 5 INJ USP VIAFLX PLAS

## (undated) DEVICE — BONE WAX WHITE: Brand: BONE WAX WHITE

## (undated) DEVICE — GLOVE ORANGE PI 8   MSG9080

## (undated) DEVICE — ANTERIOR CERVICAL-SMH: Brand: MEDLINE INDUSTRIES, INC.

## (undated) DEVICE — CLIP LIG M BLU TI HRT SHP WIRE HORZ 600 PER BX

## (undated) DEVICE — PIN FIX THRD FOR SKYLINE ANT CERV PLT SYS
Type: IMPLANTABLE DEVICE | Site: SPINE CERVICAL | Status: NON-FUNCTIONAL
Removed: 2024-10-15

## (undated) DEVICE — PENCIL SMK EVAC 10 FT BLADE ELECTRD ROCKER FOR TELSCP

## (undated) DEVICE — CUTTING ELECTRODE MONO 24FR 12/30°  FOR RESECTOSCOPES, TELESCOPE Ø 4 MM, FOR SHEATHS, INTERMITTENT/CONTINUOUS IRRIGATION, 24/26 FR, LOOP: ROUND, WIRE Ø 0.35 MM, FORK COLOR YELLOW, STEM COLOR TRANSPARENT, PACK = 10 PCS, FOR SHARK RESECTOSCOPE, STERILE, FOR SINGLE USE: Brand: SHARK

## (undated) DEVICE — SEALER ONE-STEP 37CM LIGASURE MARYLAND XP

## (undated) DEVICE — SUTURE PERMAHAND SZ 2-0 L18IN NONABSORBABLE BLK L26MM PS 1588H

## (undated) DEVICE — LIQUIBAND RAPID ADHESIVE 36/CS 0.8ML: Brand: MEDLINE

## (undated) DEVICE — BAG,DRAINAGE,4L,A/R TOWER,LL,SLIDE TAP: Brand: MEDLINE

## (undated) DEVICE — SOLUTION IRRIG 1000ML STRL H2O USP PLAS POUR BTL

## (undated) DEVICE — COVER LT HNDL PLAS RIG 1 PER PK

## (undated) DEVICE — DRAPE FLD WRM W44XL66IN C6L FOR INTRATEMP SYS THERMABASIN

## (undated) DEVICE — SUT CHRMC 0 27IN CT1 BRN --

## (undated) DEVICE — SPONGE GZ W4XL4IN COT 12 PLY TYP VII WVN C FLD DSGN

## (undated) DEVICE — DRSG FOAM VAC 10X15X1CM WHT --

## (undated) DEVICE — TUBING, SUCTION, 1/4" X 10', STRAIGHT: Brand: MEDLINE

## (undated) DEVICE — TAPE DRSG RETEN HYPFX 2INX10YD --

## (undated) DEVICE — CONNECTOR TBNG WHT PLAS SUCT STR 5IN1 LTWT W/ M CONN

## (undated) DEVICE — DRAIN SURG 19FR 0.25IN SIL RND W/ TRCR INDIC DOT RADPQ FULL

## (undated) DEVICE — BLADE CLIPPER GEN PURP NS

## (undated) DEVICE — BLADE,CARBON-STEEL,10,STRL,DISPOSABLE,TB: Brand: MEDLINE

## (undated) DEVICE — METER URIN 400ML URET ADPT W/ EZ LOK SAMP PRT

## (undated) DEVICE — TAPE,CLOTH/SILK,CURAD,3"X10YD,LF,40/CS: Brand: CURAD

## (undated) DEVICE — TROCAR: Brand: KII® OPTICAL ACCESS SYSTEM

## (undated) DEVICE — SUT CHRMC 4-0 27IN SH BRN --

## (undated) DEVICE — SPONGE GZ W4XL4IN COT 12 PLY TYP VII WVN C FLD DSGN STERILE